# Patient Record
Sex: FEMALE | Race: WHITE | Employment: OTHER | URBAN - NONMETROPOLITAN AREA
[De-identification: names, ages, dates, MRNs, and addresses within clinical notes are randomized per-mention and may not be internally consistent; named-entity substitution may affect disease eponyms.]

---

## 2017-01-07 RX ORDER — METHYLPHENIDATE HYDROCHLORIDE 10 MG/1
10 TABLET ORAL 2 TIMES DAILY
Qty: 60 TABLET | Refills: 0 | Status: SHIPPED | OUTPATIENT
Start: 2017-01-07 | End: 2017-02-09 | Stop reason: SDUPTHER

## 2017-01-07 RX ORDER — HYDROCODONE BITARTRATE AND ACETAMINOPHEN 10; 325 MG/1; MG/1
1 TABLET ORAL EVERY 6 HOURS PRN
Qty: 90 TABLET | Refills: 0 | Status: SHIPPED | OUTPATIENT
Start: 2017-01-07 | End: 2017-02-09 | Stop reason: SDUPTHER

## 2017-02-07 ENCOUNTER — TELEPHONE (OUTPATIENT)
Dept: NEUROLOGY | Age: 47
End: 2017-02-07

## 2017-02-09 RX ORDER — HYDROCODONE BITARTRATE AND ACETAMINOPHEN 10; 325 MG/1; MG/1
1 TABLET ORAL EVERY 6 HOURS PRN
Qty: 90 TABLET | Refills: 0 | Status: SHIPPED | OUTPATIENT
Start: 2017-02-09 | End: 2017-03-01 | Stop reason: SDUPTHER

## 2017-02-09 RX ORDER — METHYLPHENIDATE HYDROCHLORIDE 10 MG/1
10 TABLET ORAL 2 TIMES DAILY
Qty: 60 TABLET | Refills: 0 | Status: SHIPPED | OUTPATIENT
Start: 2017-02-09 | End: 2017-03-01 | Stop reason: SDUPTHER

## 2017-02-23 ENCOUNTER — HOSPITAL ENCOUNTER (OUTPATIENT)
Dept: PAIN MANAGEMENT | Age: 47
Discharge: HOME | End: 2017-02-23
Payer: MEDICARE

## 2017-02-23 DIAGNOSIS — G35 MULTIPLE SCLEROSIS (HCC): ICD-10-CM

## 2017-02-23 PROCEDURE — 62369 ANAL SP INF PMP W/REPRG&FILL: CPT

## 2017-02-23 PROCEDURE — 2500000003 HC RX 250 WO HCPCS

## 2017-03-01 ENCOUNTER — OFFICE VISIT (OUTPATIENT)
Dept: NEUROLOGY | Age: 47
End: 2017-03-01
Payer: MEDICARE

## 2017-03-01 VITALS
WEIGHT: 170 LBS | OXYGEN SATURATION: 99 % | DIASTOLIC BLOOD PRESSURE: 80 MMHG | HEIGHT: 69 IN | HEART RATE: 80 BPM | SYSTOLIC BLOOD PRESSURE: 106 MMHG | BODY MASS INDEX: 25.18 KG/M2

## 2017-03-01 DIAGNOSIS — G35 MULTIPLE SCLEROSIS (HCC): Primary | ICD-10-CM

## 2017-03-01 DIAGNOSIS — M79.622 PAIN IN BOTH UPPER ARMS: ICD-10-CM

## 2017-03-01 DIAGNOSIS — M79.621 PAIN IN BOTH UPPER ARMS: ICD-10-CM

## 2017-03-01 DIAGNOSIS — R20.0 NUMBNESS: ICD-10-CM

## 2017-03-01 DIAGNOSIS — R53.1 WEAKNESS: ICD-10-CM

## 2017-03-01 DIAGNOSIS — M62.838 MUSCLE SPASTICITY: ICD-10-CM

## 2017-03-01 DIAGNOSIS — R53.83 OTHER FATIGUE: ICD-10-CM

## 2017-03-01 PROCEDURE — 99214 OFFICE O/P EST MOD 30 MIN: CPT | Performed by: PSYCHIATRY & NEUROLOGY

## 2017-03-01 PROCEDURE — G8484 FLU IMMUNIZE NO ADMIN: HCPCS | Performed by: PSYCHIATRY & NEUROLOGY

## 2017-03-01 PROCEDURE — 1036F TOBACCO NON-USER: CPT | Performed by: PSYCHIATRY & NEUROLOGY

## 2017-03-01 PROCEDURE — G8419 CALC BMI OUT NRM PARAM NOF/U: HCPCS | Performed by: PSYCHIATRY & NEUROLOGY

## 2017-03-01 PROCEDURE — G8427 DOCREV CUR MEDS BY ELIG CLIN: HCPCS | Performed by: PSYCHIATRY & NEUROLOGY

## 2017-03-01 RX ORDER — PREGABALIN 300 MG/1
300 CAPSULE ORAL 2 TIMES DAILY
Qty: 60 CAPSULE | Refills: 5 | Status: SHIPPED | OUTPATIENT
Start: 2017-03-01 | End: 2017-06-01

## 2017-03-01 RX ORDER — METHYLPHENIDATE HYDROCHLORIDE 10 MG/1
10 TABLET ORAL 2 TIMES DAILY
Qty: 60 TABLET | Refills: 0 | Status: SHIPPED | OUTPATIENT
Start: 2017-03-01 | End: 2017-04-07 | Stop reason: SDUPTHER

## 2017-03-01 RX ORDER — HYDROCODONE BITARTRATE AND ACETAMINOPHEN 10; 325 MG/1; MG/1
1 TABLET ORAL EVERY 6 HOURS PRN
Qty: 90 TABLET | Refills: 0 | Status: SHIPPED | OUTPATIENT
Start: 2017-03-08 | End: 2017-04-07 | Stop reason: SDUPTHER

## 2017-03-01 RX ORDER — ARMODAFINIL 150 MG/1
150 TABLET ORAL DAILY
Qty: 30 TABLET | Refills: 5 | Status: SHIPPED | OUTPATIENT
Start: 2017-03-01 | End: 2017-03-31

## 2017-03-17 RX ORDER — DULOXETIN HYDROCHLORIDE 60 MG/1
CAPSULE, DELAYED RELEASE ORAL
Qty: 60 CAPSULE | Refills: 0 | Status: SHIPPED | OUTPATIENT
Start: 2017-03-17 | End: 2017-06-15 | Stop reason: SDUPTHER

## 2017-04-07 RX ORDER — HYDROCODONE BITARTRATE AND ACETAMINOPHEN 10; 325 MG/1; MG/1
1 TABLET ORAL EVERY 6 HOURS PRN
Qty: 90 TABLET | Refills: 0 | Status: SHIPPED | OUTPATIENT
Start: 2017-04-07 | End: 2017-05-15 | Stop reason: SDUPTHER

## 2017-04-07 RX ORDER — METHYLPHENIDATE HYDROCHLORIDE 10 MG/1
10 TABLET ORAL 2 TIMES DAILY
Qty: 60 TABLET | Refills: 0 | Status: SHIPPED | OUTPATIENT
Start: 2017-04-07 | End: 2017-05-15 | Stop reason: SDUPTHER

## 2017-04-10 ENCOUNTER — HOSPITAL ENCOUNTER (OUTPATIENT)
Dept: WOUND CARE | Age: 47
Discharge: HOME | End: 2017-04-10
Payer: MEDICARE

## 2017-04-10 VITALS
HEART RATE: 76 BPM | DIASTOLIC BLOOD PRESSURE: 63 MMHG | RESPIRATION RATE: 16 BRPM | TEMPERATURE: 98.6 F | HEIGHT: 69 IN | SYSTOLIC BLOOD PRESSURE: 107 MMHG

## 2017-04-10 DIAGNOSIS — L97.521 NEUROPATHIC ULCER OF LEFT FOOT, LIMITED TO BREAKDOWN OF SKIN (HCC): ICD-10-CM

## 2017-04-10 PROCEDURE — 99213 OFFICE O/P EST LOW 20 MIN: CPT

## 2017-04-18 ENCOUNTER — HOSPITAL ENCOUNTER (OUTPATIENT)
Dept: WOUND CARE | Age: 47
Discharge: HOME | End: 2017-04-18
Payer: MEDICARE

## 2017-04-18 VITALS
HEART RATE: 90 BPM | SYSTOLIC BLOOD PRESSURE: 124 MMHG | DIASTOLIC BLOOD PRESSURE: 82 MMHG | HEIGHT: 69 IN | RESPIRATION RATE: 16 BRPM | WEIGHT: 170 LBS | TEMPERATURE: 99.5 F | BODY MASS INDEX: 25.18 KG/M2

## 2017-04-18 DIAGNOSIS — L97.521 NEUROPATHIC ULCER OF LEFT FOOT, LIMITED TO BREAKDOWN OF SKIN (HCC): ICD-10-CM

## 2017-04-18 PROCEDURE — 97597 DBRDMT OPN WND 1ST 20 CM/<: CPT | Performed by: SURGERY

## 2017-04-18 PROCEDURE — 97597 DBRDMT OPN WND 1ST 20 CM/<: CPT

## 2017-05-02 ENCOUNTER — HOSPITAL ENCOUNTER (OUTPATIENT)
Dept: WOUND CARE | Age: 47
Discharge: HOME | End: 2017-05-02
Payer: MEDICARE

## 2017-05-02 VITALS
HEIGHT: 69 IN | HEART RATE: 75 BPM | DIASTOLIC BLOOD PRESSURE: 78 MMHG | RESPIRATION RATE: 16 BRPM | SYSTOLIC BLOOD PRESSURE: 118 MMHG | WEIGHT: 170 LBS | BODY MASS INDEX: 25.18 KG/M2 | TEMPERATURE: 97.6 F

## 2017-05-02 DIAGNOSIS — L97.521 NEUROPATHIC ULCER OF LEFT FOOT, LIMITED TO BREAKDOWN OF SKIN (HCC): ICD-10-CM

## 2017-05-02 PROCEDURE — 97597 DBRDMT OPN WND 1ST 20 CM/<: CPT

## 2017-05-02 PROCEDURE — 97597 DBRDMT OPN WND 1ST 20 CM/<: CPT | Performed by: SURGERY

## 2017-05-09 RX ORDER — BACLOFEN 10 MG/1
10 TABLET ORAL 2 TIMES DAILY PRN
Qty: 60 TABLET | Refills: 2 | Status: SHIPPED | OUTPATIENT
Start: 2017-05-09 | End: 2018-03-26 | Stop reason: SDUPTHER

## 2017-05-16 ENCOUNTER — HOSPITAL ENCOUNTER (OUTPATIENT)
Dept: WOUND CARE | Age: 47
Discharge: HOME | End: 2017-05-16
Payer: MEDICARE

## 2017-05-16 VITALS
SYSTOLIC BLOOD PRESSURE: 127 MMHG | HEART RATE: 75 BPM | TEMPERATURE: 97.6 F | DIASTOLIC BLOOD PRESSURE: 85 MMHG | RESPIRATION RATE: 14 BRPM

## 2017-05-16 DIAGNOSIS — L97.521 NEUROPATHIC ULCER OF LEFT FOOT, LIMITED TO BREAKDOWN OF SKIN (HCC): ICD-10-CM

## 2017-05-16 PROCEDURE — 99212 OFFICE O/P EST SF 10 MIN: CPT | Performed by: SURGERY

## 2017-05-16 PROCEDURE — 99212 OFFICE O/P EST SF 10 MIN: CPT

## 2017-05-16 RX ORDER — METHYLPHENIDATE HYDROCHLORIDE 10 MG/1
10 TABLET ORAL 2 TIMES DAILY
Qty: 60 TABLET | Refills: 0 | Status: SHIPPED | OUTPATIENT
Start: 2017-05-16 | End: 2017-06-01 | Stop reason: SDUPTHER

## 2017-05-16 RX ORDER — HYDROCODONE BITARTRATE AND ACETAMINOPHEN 10; 325 MG/1; MG/1
1 TABLET ORAL EVERY 6 HOURS PRN
Qty: 90 TABLET | Refills: 0 | Status: SHIPPED | OUTPATIENT
Start: 2017-05-16 | End: 2017-06-01 | Stop reason: SDUPTHER

## 2017-05-16 RX ORDER — CYANOCOBALAMIN 1000 UG/ML
1000 INJECTION INTRAMUSCULAR; SUBCUTANEOUS
COMMUNITY
End: 2017-09-06

## 2017-05-16 ASSESSMENT — PAIN DESCRIPTION - LOCATION: LOCATION: BACK

## 2017-05-16 ASSESSMENT — PAIN DESCRIPTION - FREQUENCY: FREQUENCY: CONTINUOUS

## 2017-05-16 ASSESSMENT — PAIN DESCRIPTION - DESCRIPTORS: DESCRIPTORS: OTHER (COMMENT)

## 2017-05-16 ASSESSMENT — PAIN DESCRIPTION - PROGRESSION: CLINICAL_PROGRESSION: NOT CHANGED

## 2017-05-16 ASSESSMENT — PAIN DESCRIPTION - PAIN TYPE: TYPE: CHRONIC PAIN

## 2017-05-16 ASSESSMENT — PAIN SCALES - GENERAL: PAINLEVEL_OUTOF10: 4

## 2017-05-23 ENCOUNTER — HOSPITAL ENCOUNTER (EMERGENCY)
Age: 47
Discharge: HOME | End: 2017-05-23
Payer: MEDICARE

## 2017-05-23 ENCOUNTER — APPOINTMENT (OUTPATIENT)
Dept: GENERAL RADIOLOGY | Age: 47
End: 2017-05-23
Payer: MEDICARE

## 2017-05-23 VITALS
OXYGEN SATURATION: 96 % | BODY MASS INDEX: 25.18 KG/M2 | DIASTOLIC BLOOD PRESSURE: 62 MMHG | WEIGHT: 170 LBS | HEART RATE: 78 BPM | RESPIRATION RATE: 16 BRPM | HEIGHT: 69 IN | SYSTOLIC BLOOD PRESSURE: 124 MMHG | TEMPERATURE: 97.7 F

## 2017-05-23 DIAGNOSIS — N30.00 ACUTE CYSTITIS WITHOUT HEMATURIA: Primary | ICD-10-CM

## 2017-05-23 LAB
ALBUMIN SERPL-MCNC: 4.4 G/DL (ref 3.5–5.2)
ALP BLD-CCNC: 164 U/L (ref 35–104)
ALT SERPL-CCNC: 57 U/L (ref 5–33)
ANION GAP SERPL CALCULATED.3IONS-SCNC: 15 MMOL/L (ref 7–19)
AST SERPL-CCNC: 23 U/L (ref 5–32)
BACTERIA: ABNORMAL /HPF
BASOPHILS ABSOLUTE: 0 K/UL (ref 0–0.2)
BASOPHILS RELATIVE PERCENT: 0.2 % (ref 0–1)
BILIRUB SERPL-MCNC: 0.5 MG/DL (ref 0.2–1.2)
BILIRUBIN URINE: NEGATIVE
BLOOD, URINE: NEGATIVE
BUN BLDV-MCNC: 12 MG/DL (ref 6–20)
CALCIUM SERPL-MCNC: 9.4 MG/DL (ref 8.6–10)
CHLORIDE BLD-SCNC: 107 MMOL/L (ref 98–111)
CLARITY: ABNORMAL
CO2: 23 MMOL/L (ref 22–29)
COLOR: YELLOW
CREAT SERPL-MCNC: 0.3 MG/DL (ref 0.5–0.9)
EOSINOPHILS ABSOLUTE: 0 K/UL (ref 0–0.6)
EOSINOPHILS RELATIVE PERCENT: 0.4 % (ref 0–5)
EPITHELIAL CELLS, UA: ABNORMAL /HPF
GFR NON-AFRICAN AMERICAN: >60
GLUCOSE BLD-MCNC: 101 MG/DL (ref 74–109)
GLUCOSE URINE: NEGATIVE MG/DL
HCT VFR BLD CALC: 43 % (ref 37–47)
HEMOGLOBIN: 14.1 G/DL (ref 12–16)
KETONES, URINE: NEGATIVE MG/DL
LEUKOCYTE ESTERASE, URINE: ABNORMAL
LIPASE: 8 U/L (ref 13–60)
LYMPHOCYTES ABSOLUTE: 0.3 K/UL (ref 1.1–4.5)
LYMPHOCYTES RELATIVE PERCENT: 5.4 % (ref 20–40)
MCH RBC QN AUTO: 29.1 PG (ref 27–31)
MCHC RBC AUTO-ENTMCNC: 32.8 G/DL (ref 33–37)
MCV RBC AUTO: 88.7 FL (ref 81–99)
MONOCYTES ABSOLUTE: 0.2 K/UL (ref 0–0.9)
MONOCYTES RELATIVE PERCENT: 4 % (ref 0–10)
NEUTROPHILS ABSOLUTE: 4.5 K/UL (ref 1.5–7.5)
NEUTROPHILS RELATIVE PERCENT: 89.6 % (ref 50–65)
NITRITE, URINE: POSITIVE
PDW BLD-RTO: 13.3 % (ref 11.5–14.5)
PH UA: 7.5
PLATELET # BLD: 196 K/UL (ref 130–400)
PMV BLD AUTO: 11 FL (ref 7.4–10.4)
POTASSIUM SERPL-SCNC: 3.7 MMOL/L (ref 3.5–5)
PROTEIN UA: 30 MG/DL
RBC # BLD: 4.85 M/UL (ref 4.2–5.4)
RBC UA: ABNORMAL /HPF (ref 0–2)
SODIUM BLD-SCNC: 145 MMOL/L (ref 136–145)
SPECIFIC GRAVITY UA: 1.01
TOTAL PROTEIN: 7.1 G/DL (ref 6.6–8.7)
TROPONIN: <0.01 NG/ML (ref 0–0.03)
UROBILINOGEN, URINE: 0.2 E.U./DL
WBC # BLD: 5 K/UL (ref 4.8–10.8)
WBC UA: ABNORMAL /HPF (ref 0–5)

## 2017-05-23 PROCEDURE — 81001 URINALYSIS AUTO W/SCOPE: CPT

## 2017-05-23 PROCEDURE — 99283 EMERGENCY DEPT VISIT LOW MDM: CPT | Performed by: PHYSICIAN ASSISTANT

## 2017-05-23 PROCEDURE — 87185 SC STD ENZYME DETCJ PER NZM: CPT

## 2017-05-23 PROCEDURE — 85025 COMPLETE CBC W/AUTO DIFF WBC: CPT

## 2017-05-23 PROCEDURE — 87077 CULTURE AEROBIC IDENTIFY: CPT

## 2017-05-23 PROCEDURE — 93005 ELECTROCARDIOGRAM TRACING: CPT

## 2017-05-23 PROCEDURE — 87086 URINE CULTURE/COLONY COUNT: CPT

## 2017-05-23 PROCEDURE — 99284 EMERGENCY DEPT VISIT MOD MDM: CPT

## 2017-05-23 PROCEDURE — 80053 COMPREHEN METABOLIC PANEL: CPT

## 2017-05-23 PROCEDURE — 96365 THER/PROPH/DIAG IV INF INIT: CPT

## 2017-05-23 PROCEDURE — 36415 COLL VENOUS BLD VENIPUNCTURE: CPT

## 2017-05-23 PROCEDURE — 83690 ASSAY OF LIPASE: CPT

## 2017-05-23 PROCEDURE — 71010 XR CHEST PORTABLE: CPT

## 2017-05-23 PROCEDURE — 6360000002 HC RX W HCPCS: Performed by: PHYSICIAN ASSISTANT

## 2017-05-23 PROCEDURE — 2580000003 HC RX 258: Performed by: PHYSICIAN ASSISTANT

## 2017-05-23 PROCEDURE — 84484 ASSAY OF TROPONIN QUANT: CPT

## 2017-05-23 PROCEDURE — 96375 TX/PRO/DX INJ NEW DRUG ADDON: CPT

## 2017-05-23 RX ORDER — ONDANSETRON 4 MG/1
4 TABLET, ORALLY DISINTEGRATING ORAL EVERY 8 HOURS PRN
Qty: 15 TABLET | Refills: 0 | Status: SHIPPED | OUTPATIENT
Start: 2017-05-23 | End: 2018-03-26 | Stop reason: SDUPTHER

## 2017-05-23 RX ORDER — PROMETHAZINE HYDROCHLORIDE 25 MG/ML
12.5 INJECTION, SOLUTION INTRAMUSCULAR; INTRAVENOUS ONCE
Status: COMPLETED | OUTPATIENT
Start: 2017-05-23 | End: 2017-05-23

## 2017-05-23 RX ORDER — CEFDINIR 300 MG/1
300 CAPSULE ORAL 2 TIMES DAILY
Qty: 20 CAPSULE | Refills: 0 | Status: SHIPPED | OUTPATIENT
Start: 2017-05-23 | End: 2017-06-02

## 2017-05-23 RX ORDER — 0.9 % SODIUM CHLORIDE 0.9 %
500 INTRAVENOUS SOLUTION INTRAVENOUS ONCE
Status: COMPLETED | OUTPATIENT
Start: 2017-05-23 | End: 2017-05-23

## 2017-05-23 RX ADMIN — CEFTRIAXONE 1 G: 1 INJECTION, POWDER, FOR SOLUTION INTRAMUSCULAR; INTRAVENOUS at 19:39

## 2017-05-23 RX ADMIN — SODIUM CHLORIDE 500 ML: 9 INJECTION, SOLUTION INTRAVENOUS at 18:09

## 2017-05-23 RX ADMIN — PROMETHAZINE HYDROCHLORIDE 12.5 MG: 25 INJECTION, SOLUTION INTRAMUSCULAR; INTRAVENOUS at 18:50

## 2017-05-23 ASSESSMENT — ENCOUNTER SYMPTOMS
COUGH: 0
ABDOMINAL PAIN: 1
DIARRHEA: 0
BACK PAIN: 0
SHORTNESS OF BREATH: 0
WHEEZING: 0
NAUSEA: 1
CONSTIPATION: 0
VOMITING: 1

## 2017-05-23 ASSESSMENT — PAIN SCALES - GENERAL: PAINLEVEL_OUTOF10: 5

## 2017-05-24 ENCOUNTER — HOSPITAL ENCOUNTER (OUTPATIENT)
Dept: PAIN MANAGEMENT | Age: 47
Discharge: HOME | End: 2017-05-24
Payer: MEDICARE

## 2017-05-24 DIAGNOSIS — G35 MULTIPLE SCLEROSIS (HCC): ICD-10-CM

## 2017-05-24 PROCEDURE — 2500000003 HC RX 250 WO HCPCS

## 2017-05-24 PROCEDURE — 62369 ANAL SP INF PMP W/REPRG&FILL: CPT

## 2017-05-25 ENCOUNTER — TRANSCRIBE ORDERS (OUTPATIENT)
Dept: ADMINISTRATIVE | Facility: HOSPITAL | Age: 47
End: 2017-05-25

## 2017-05-25 DIAGNOSIS — Z12.31 VISIT FOR SCREENING MAMMOGRAM: Primary | ICD-10-CM

## 2017-05-25 LAB
EKG P AXIS: 84 DEGREES
EKG P-R INTERVAL: 176 MS
EKG Q-T INTERVAL: 436 MS
EKG QRS DURATION: 110 MS
EKG QTC CALCULATION (BAZETT): 455 MS
EKG T AXIS: 63 DEGREES

## 2017-05-26 LAB
EKG P AXIS: 76 DEGREES
EKG P-R INTERVAL: 158 MS
EKG Q-T INTERVAL: 452 MS
EKG QRS DURATION: 108 MS
EKG QTC CALCULATION (BAZETT): 474 MS
EKG T AXIS: 38 DEGREES
ORGANISM: ABNORMAL
URINE CULTURE, ROUTINE: ABNORMAL

## 2017-06-01 ENCOUNTER — OFFICE VISIT (OUTPATIENT)
Dept: NEUROLOGY | Age: 47
End: 2017-06-01
Payer: MEDICARE

## 2017-06-01 VITALS — HEART RATE: 95 BPM | OXYGEN SATURATION: 95 % | SYSTOLIC BLOOD PRESSURE: 133 MMHG | DIASTOLIC BLOOD PRESSURE: 78 MMHG

## 2017-06-01 DIAGNOSIS — R53.1 WEAKNESS: ICD-10-CM

## 2017-06-01 DIAGNOSIS — G35 MULTIPLE SCLEROSIS (HCC): Primary | ICD-10-CM

## 2017-06-01 DIAGNOSIS — M79.621 PAIN IN BOTH UPPER ARMS: ICD-10-CM

## 2017-06-01 DIAGNOSIS — M79.622 PAIN IN BOTH UPPER ARMS: ICD-10-CM

## 2017-06-01 DIAGNOSIS — R53.83 OTHER FATIGUE: ICD-10-CM

## 2017-06-01 DIAGNOSIS — K94.13 COLOSTOMY AND ENTEROSTOMY MALFUNCTION (HCC): ICD-10-CM

## 2017-06-01 DIAGNOSIS — M62.838 MUSCLE SPASTICITY: ICD-10-CM

## 2017-06-01 DIAGNOSIS — G35 MULTIPLE SCLEROSIS (HCC): ICD-10-CM

## 2017-06-01 DIAGNOSIS — K94.03 COLOSTOMY AND ENTEROSTOMY MALFUNCTION (HCC): ICD-10-CM

## 2017-06-01 DIAGNOSIS — R20.0 NUMBNESS: ICD-10-CM

## 2017-06-01 PROCEDURE — G8419 CALC BMI OUT NRM PARAM NOF/U: HCPCS | Performed by: PSYCHIATRY & NEUROLOGY

## 2017-06-01 PROCEDURE — 4004F PT TOBACCO SCREEN RCVD TLK: CPT | Performed by: PSYCHIATRY & NEUROLOGY

## 2017-06-01 PROCEDURE — 99214 OFFICE O/P EST MOD 30 MIN: CPT | Performed by: PSYCHIATRY & NEUROLOGY

## 2017-06-01 PROCEDURE — G8427 DOCREV CUR MEDS BY ELIG CLIN: HCPCS | Performed by: PSYCHIATRY & NEUROLOGY

## 2017-06-01 RX ORDER — METHYLPHENIDATE HYDROCHLORIDE 10 MG/1
10 TABLET ORAL 2 TIMES DAILY
Qty: 60 TABLET | Refills: 0 | Status: SHIPPED | OUTPATIENT
Start: 2017-06-15 | End: 2017-07-14 | Stop reason: SDUPTHER

## 2017-06-01 RX ORDER — PREGABALIN 300 MG/1
300 CAPSULE ORAL 2 TIMES DAILY
Qty: 60 CAPSULE | Refills: 5 | Status: SHIPPED | OUTPATIENT
Start: 2017-06-01 | End: 2017-12-04 | Stop reason: SDUPTHER

## 2017-06-01 RX ORDER — PREGABALIN 100 MG/1
300 CAPSULE ORAL 2 TIMES DAILY
COMMUNITY
End: 2017-06-01 | Stop reason: SDUPTHER

## 2017-06-01 RX ORDER — HYDROCODONE BITARTRATE AND ACETAMINOPHEN 10; 325 MG/1; MG/1
1 TABLET ORAL EVERY 6 HOURS PRN
Qty: 90 TABLET | Refills: 0 | Status: SHIPPED | OUTPATIENT
Start: 2017-06-15 | End: 2017-07-14 | Stop reason: SDUPTHER

## 2017-06-02 LAB — HBV SURFACE AB TITR SER: NORMAL MIU/ML

## 2017-06-03 LAB
HEPATITIS B CORE TOTAL ANTIBODY: NEGATIVE
HEPATITIS BE ANTIGEN: NEGATIVE

## 2017-06-05 ENCOUNTER — HOSPITAL ENCOUNTER (OUTPATIENT)
Dept: MAMMOGRAPHY | Facility: HOSPITAL | Age: 47
Discharge: HOME | End: 2017-06-05
Attending: INTERNAL MEDICINE | Admitting: INTERNAL MEDICINE

## 2017-06-05 DIAGNOSIS — Z12.31 VISIT FOR SCREENING MAMMOGRAM: ICD-10-CM

## 2017-06-05 PROCEDURE — 77063 BREAST TOMOSYNTHESIS BI: CPT

## 2017-06-05 PROCEDURE — G0202 SCR MAMMO BI INCL CAD: HCPCS

## 2017-06-15 RX ORDER — ALBUTEROL SULFATE 90 UG/1
POWDER, METERED RESPIRATORY (INHALATION)
Qty: 1 INHALER | Refills: 3 | Status: SHIPPED | OUTPATIENT
Start: 2017-06-15 | End: 2018-03-26 | Stop reason: SDUPTHER

## 2017-06-19 ENCOUNTER — TELEPHONE (OUTPATIENT)
Dept: NEUROLOGY | Age: 47
End: 2017-06-19

## 2017-06-28 ENCOUNTER — OFFICE VISIT (OUTPATIENT)
Dept: INTERNAL MEDICINE | Age: 47
End: 2017-06-28
Payer: MEDICARE

## 2017-06-28 VITALS
HEIGHT: 69 IN | DIASTOLIC BLOOD PRESSURE: 70 MMHG | TEMPERATURE: 98.6 F | HEART RATE: 74 BPM | OXYGEN SATURATION: 99 % | SYSTOLIC BLOOD PRESSURE: 124 MMHG

## 2017-06-28 DIAGNOSIS — Z13.1 ENCOUNTER FOR SCREENING FOR DIABETES MELLITUS: ICD-10-CM

## 2017-06-28 DIAGNOSIS — N30.00 ACUTE CYSTITIS WITHOUT HEMATURIA: ICD-10-CM

## 2017-06-28 DIAGNOSIS — F17.210 CIGARETTE NICOTINE DEPENDENCE WITHOUT COMPLICATION: ICD-10-CM

## 2017-06-28 DIAGNOSIS — Z23 NEED FOR PROPHYLACTIC VACCINATION AGAINST DIPHTHERIA-TETANUS-PERTUSSIS (DTP): ICD-10-CM

## 2017-06-28 DIAGNOSIS — B37.9 YEAST INFECTION: Primary | ICD-10-CM

## 2017-06-28 PROBLEM — N63.0 BREAST LUMP: Status: ACTIVE | Noted: 2017-06-28

## 2017-06-28 PROBLEM — R05.9 COUGH: Status: ACTIVE | Noted: 2017-06-28

## 2017-06-28 PROBLEM — E53.8 COBALAMIN DEFICIENCY: Status: ACTIVE | Noted: 2017-06-28

## 2017-06-28 PROBLEM — R51.9 HEADACHE: Status: ACTIVE | Noted: 2017-06-28

## 2017-06-28 PROBLEM — J01.90 ACUTE SINUSITIS: Status: ACTIVE | Noted: 2017-06-28

## 2017-06-28 PROBLEM — B49 DISEASE CAUSED BY FUNGUS: Status: ACTIVE | Noted: 2017-06-28

## 2017-06-28 PROBLEM — E78.5 HYPERLIPIDEMIA: Status: ACTIVE | Noted: 2017-06-28

## 2017-06-28 PROBLEM — S91.009A OPEN WOUND OF ANKLE: Status: ACTIVE | Noted: 2017-06-28

## 2017-06-28 PROBLEM — E66.3 OVERWEIGHT: Status: ACTIVE | Noted: 2017-06-28

## 2017-06-28 PROBLEM — J44.9 CAFL (CHRONIC AIRFLOW LIMITATION) (HCC): Status: ACTIVE | Noted: 2017-06-28

## 2017-06-28 PROBLEM — J11.1 INFLUENZA: Status: ACTIVE | Noted: 2017-06-28

## 2017-06-28 PROBLEM — L89.159 DECUBITUS ULCER OF SACRAL REGION: Status: ACTIVE | Noted: 2017-06-28

## 2017-06-28 PROBLEM — E55.9 VITAMIN D DEFICIENCY: Status: ACTIVE | Noted: 2017-06-28

## 2017-06-28 PROBLEM — Z13.89 ENCOUNTER FOR SCREENING FOR OTHER DISORDER: Status: ACTIVE | Noted: 2017-06-28

## 2017-06-28 PROBLEM — F17.200 CURRENT SMOKER: Status: ACTIVE | Noted: 2017-06-28

## 2017-06-28 PROBLEM — R30.0 DIFFICULT OR PAINFUL URINATION: Status: ACTIVE | Noted: 2017-06-28

## 2017-06-28 PROBLEM — G47.00 INSOMNIA: Status: ACTIVE | Noted: 2017-06-28

## 2017-06-28 PROBLEM — J30.1 HAY FEVER: Status: ACTIVE | Noted: 2017-06-28

## 2017-06-28 PROBLEM — K11.7 APTYALISM: Status: ACTIVE | Noted: 2017-06-28

## 2017-06-28 PROBLEM — J20.9 ACUTE BRONCHITIS: Status: ACTIVE | Noted: 2017-06-28

## 2017-06-28 PROBLEM — H53.2 BINOCULAR VISION DISORDER WITH DIPLOPIA: Status: ACTIVE | Noted: 2017-06-28

## 2017-06-28 PROBLEM — M54.9 BACK PAIN: Status: ACTIVE | Noted: 2017-06-28

## 2017-06-28 PROCEDURE — 99214 OFFICE O/P EST MOD 30 MIN: CPT | Performed by: PHYSICIAN ASSISTANT

## 2017-06-28 PROCEDURE — 4004F PT TOBACCO SCREEN RCVD TLK: CPT | Performed by: PHYSICIAN ASSISTANT

## 2017-06-28 PROCEDURE — G8427 DOCREV CUR MEDS BY ELIG CLIN: HCPCS | Performed by: PHYSICIAN ASSISTANT

## 2017-06-28 PROCEDURE — G8419 CALC BMI OUT NRM PARAM NOF/U: HCPCS | Performed by: PHYSICIAN ASSISTANT

## 2017-06-28 RX ORDER — FLUCONAZOLE 150 MG/1
150 TABLET ORAL ONCE
Qty: 1 TABLET | Refills: 1 | Status: SHIPPED | OUTPATIENT
Start: 2017-06-28 | End: 2017-06-28

## 2017-06-28 RX ORDER — VARENICLINE TARTRATE 25 MG
KIT ORAL
Qty: 53 TABLET | Refills: 0 | Status: SHIPPED | OUTPATIENT
Start: 2017-06-28 | End: 2017-09-05 | Stop reason: SDUPTHER

## 2017-06-28 RX ORDER — CEFDINIR 300 MG/1
300 CAPSULE ORAL 2 TIMES DAILY
Qty: 20 CAPSULE | Refills: 0 | Status: SHIPPED | OUTPATIENT
Start: 2017-06-28 | End: 2017-07-08

## 2017-06-28 RX ORDER — FLUCONAZOLE 100 MG/1
100 TABLET ORAL DAILY PRN
Status: CANCELLED | OUTPATIENT
Start: 2017-06-28

## 2017-06-28 ASSESSMENT — ENCOUNTER SYMPTOMS
ABDOMINAL PAIN: 0
DIARRHEA: 0
RHINORRHEA: 0
COLOR CHANGE: 0
PHOTOPHOBIA: 0
CHEST TIGHTNESS: 0
EYE PAIN: 0
SINUS PRESSURE: 0
SORE THROAT: 0
VOMITING: 0
SHORTNESS OF BREATH: 0
COUGH: 0
EYE REDNESS: 0
NAUSEA: 0

## 2017-06-28 ASSESSMENT — PATIENT HEALTH QUESTIONNAIRE - PHQ9
2. FEELING DOWN, DEPRESSED OR HOPELESS: 0
SUM OF ALL RESPONSES TO PHQ9 QUESTIONS 1 & 2: 0
1. LITTLE INTEREST OR PLEASURE IN DOING THINGS: 0
SUM OF ALL RESPONSES TO PHQ QUESTIONS 1-9: 0

## 2017-06-29 ENCOUNTER — TELEPHONE (OUTPATIENT)
Dept: NEUROLOGY | Age: 47
End: 2017-06-29

## 2017-07-13 ENCOUNTER — TELEPHONE (OUTPATIENT)
Dept: INTERNAL MEDICINE | Age: 47
End: 2017-07-13

## 2017-07-13 DIAGNOSIS — N39.0 URINARY TRACT INFECTION WITHOUT HEMATURIA, SITE UNSPECIFIED: ICD-10-CM

## 2017-07-13 DIAGNOSIS — N39.0 URINARY TRACT INFECTION WITHOUT HEMATURIA, SITE UNSPECIFIED: Primary | ICD-10-CM

## 2017-07-13 DIAGNOSIS — Z13.1 ENCOUNTER FOR SCREENING FOR DIABETES MELLITUS: ICD-10-CM

## 2017-07-13 LAB
BACTERIA: ABNORMAL /HPF
BILIRUBIN URINE: NEGATIVE
BLOOD, URINE: ABNORMAL
CASTS: ABNORMAL /LPF
CHOLESTEROL, FASTING: 192 MG/DL (ref 160–199)
CLARITY: ABNORMAL
COLOR: ABNORMAL
EPITHELIAL CELLS, UA: 1 /HPF (ref 0–5)
GLUCOSE FASTING: 82 MG/DL (ref 74–109)
GLUCOSE URINE: NEGATIVE MG/DL
HDLC SERPL-MCNC: 33 MG/DL (ref 65–121)
HYALINE CASTS: >87 /HPF (ref 0–8)
KETONES, URINE: NEGATIVE MG/DL
LDL CHOLESTEROL CALCULATED: 107 MG/DL
LEUKOCYTE ESTERASE, URINE: ABNORMAL
MUCUS: ABNORMAL /LPF
NITRITE, URINE: POSITIVE
PH UA: 7
PROTEIN UA: 30 MG/DL
RBC UA: 2 /HPF (ref 0–4)
RBC UA: ABNORMAL /HPF (ref 0–2)
SPECIFIC GRAVITY UA: 1.01
TRIGLYCERIDE, FASTING: 260 MG/DL (ref 150–199)
UROBILINOGEN, URINE: 0.2 E.U./DL
WBC UA: 83 /HPF (ref 0–5)
WBC UA: ABNORMAL /HPF (ref 0–5)

## 2017-07-14 RX ORDER — LEVOCETIRIZINE DIHYDROCHLORIDE 5 MG/1
TABLET, FILM COATED ORAL
Qty: 30 TABLET | Refills: 0 | Status: SHIPPED | OUTPATIENT
Start: 2017-07-14 | End: 2017-08-22 | Stop reason: SDUPTHER

## 2017-07-14 RX ORDER — METHYLPHENIDATE HYDROCHLORIDE 10 MG/1
10 TABLET ORAL 2 TIMES DAILY
Qty: 60 TABLET | Refills: 0 | Status: SHIPPED | OUTPATIENT
Start: 2017-07-14 | End: 2017-08-31 | Stop reason: CLARIF

## 2017-07-14 RX ORDER — LEVOFLOXACIN 500 MG/1
500 TABLET, FILM COATED ORAL DAILY
Qty: 7 TABLET | Refills: 0 | Status: SHIPPED | OUTPATIENT
Start: 2017-07-14 | End: 2017-09-06

## 2017-07-14 RX ORDER — HYDROCODONE BITARTRATE AND ACETAMINOPHEN 10; 325 MG/1; MG/1
1 TABLET ORAL EVERY 6 HOURS PRN
Qty: 90 TABLET | Refills: 0 | Status: SHIPPED | OUTPATIENT
Start: 2017-07-14 | End: 2017-08-15 | Stop reason: SDUPTHER

## 2017-07-17 LAB
ORGANISM: ABNORMAL
URINE CULTURE, ROUTINE: ABNORMAL

## 2017-07-17 RX ORDER — CEFDINIR 300 MG/1
300 CAPSULE ORAL 2 TIMES DAILY
Qty: 14 CAPSULE | Refills: 0 | Status: SHIPPED | OUTPATIENT
Start: 2017-07-17 | End: 2017-09-06

## 2017-07-18 ENCOUNTER — TELEPHONE (OUTPATIENT)
Dept: NEUROLOGY | Age: 47
End: 2017-07-18

## 2017-07-25 ENCOUNTER — OFFICE VISIT (OUTPATIENT)
Dept: NEUROLOGY | Age: 47
End: 2017-07-25
Payer: MEDICARE

## 2017-07-25 VITALS
BODY MASS INDEX: 25.18 KG/M2 | WEIGHT: 170 LBS | HEIGHT: 69 IN | SYSTOLIC BLOOD PRESSURE: 98 MMHG | DIASTOLIC BLOOD PRESSURE: 60 MMHG

## 2017-07-25 DIAGNOSIS — M62.838 MUSCLE SPASTICITY: ICD-10-CM

## 2017-07-25 DIAGNOSIS — R53.83 OTHER FATIGUE: ICD-10-CM

## 2017-07-25 DIAGNOSIS — M79.621 PAIN IN BOTH UPPER ARMS: ICD-10-CM

## 2017-07-25 DIAGNOSIS — M54.2 PAIN, NECK: ICD-10-CM

## 2017-07-25 DIAGNOSIS — R20.0 NUMBNESS: ICD-10-CM

## 2017-07-25 DIAGNOSIS — R53.1 WEAKNESS: ICD-10-CM

## 2017-07-25 DIAGNOSIS — M79.622 PAIN IN BOTH UPPER ARMS: ICD-10-CM

## 2017-07-25 DIAGNOSIS — G35 MULTIPLE SCLEROSIS (HCC): Primary | ICD-10-CM

## 2017-07-25 PROCEDURE — 99214 OFFICE O/P EST MOD 30 MIN: CPT | Performed by: PSYCHIATRY & NEUROLOGY

## 2017-07-25 PROCEDURE — 4004F PT TOBACCO SCREEN RCVD TLK: CPT | Performed by: PSYCHIATRY & NEUROLOGY

## 2017-07-25 PROCEDURE — G8427 DOCREV CUR MEDS BY ELIG CLIN: HCPCS | Performed by: PSYCHIATRY & NEUROLOGY

## 2017-07-25 PROCEDURE — G8419 CALC BMI OUT NRM PARAM NOF/U: HCPCS | Performed by: PSYCHIATRY & NEUROLOGY

## 2017-07-25 RX ORDER — LORAZEPAM 2 MG/1
2 TABLET ORAL ONCE
Qty: 3 TABLET | Refills: 0 | Status: SHIPPED | OUTPATIENT
Start: 2017-07-25 | End: 2017-07-25

## 2017-08-14 ENCOUNTER — HOSPITAL ENCOUNTER (OUTPATIENT)
Dept: NEUROLOGY | Age: 47
Discharge: HOME | End: 2017-08-14
Payer: MEDICARE

## 2017-08-14 ENCOUNTER — HOSPITAL ENCOUNTER (OUTPATIENT)
Dept: MRI IMAGING | Age: 47
Discharge: HOME | End: 2017-08-14
Payer: MEDICARE

## 2017-08-14 ENCOUNTER — HOSPITAL ENCOUNTER (OUTPATIENT)
Dept: PAIN MANAGEMENT | Age: 47
Discharge: HOME | End: 2017-08-14
Payer: MEDICARE

## 2017-08-14 ENCOUNTER — HOSPITAL ENCOUNTER (OUTPATIENT)
Dept: GENERAL RADIOLOGY | Age: 47
Discharge: HOME | End: 2017-08-14
Payer: MEDICARE

## 2017-08-14 VITALS
OXYGEN SATURATION: 100 % | DIASTOLIC BLOOD PRESSURE: 77 MMHG | HEART RATE: 70 BPM | SYSTOLIC BLOOD PRESSURE: 109 MMHG | TEMPERATURE: 97.4 F | RESPIRATION RATE: 18 BRPM

## 2017-08-14 DIAGNOSIS — R20.0 NUMBNESS: ICD-10-CM

## 2017-08-14 DIAGNOSIS — G35 MULTIPLE SCLEROSIS (HCC): ICD-10-CM

## 2017-08-14 DIAGNOSIS — M54.2 PAIN, NECK: ICD-10-CM

## 2017-08-14 PROCEDURE — 70553 MRI BRAIN STEM W/O & W/DYE: CPT

## 2017-08-14 PROCEDURE — A9579 GAD-BASE MR CONTRAST NOS,1ML: HCPCS | Performed by: PSYCHIATRY & NEUROLOGY

## 2017-08-14 PROCEDURE — 6360000004 HC RX CONTRAST MEDICATION: Performed by: PSYCHIATRY & NEUROLOGY

## 2017-08-14 PROCEDURE — 95886 MUSC TEST DONE W/N TEST COMP: CPT

## 2017-08-14 PROCEDURE — 95886 MUSC TEST DONE W/N TEST COMP: CPT | Performed by: PSYCHIATRY & NEUROLOGY

## 2017-08-14 PROCEDURE — 95911 NRV CNDJ TEST 9-10 STUDIES: CPT | Performed by: PSYCHIATRY & NEUROLOGY

## 2017-08-14 PROCEDURE — 62367 ANALYZE SPINE INFUS PUMP: CPT

## 2017-08-14 PROCEDURE — 72156 MRI NECK SPINE W/O & W/DYE: CPT

## 2017-08-14 PROCEDURE — 73521 X-RAY EXAM HIPS BI 2 VIEWS: CPT

## 2017-08-14 PROCEDURE — 95911 NRV CNDJ TEST 9-10 STUDIES: CPT

## 2017-08-14 RX ADMIN — GADOPENTETATE DIMEGLUMINE 16 ML: 469.01 INJECTION INTRAVENOUS at 16:52

## 2017-08-14 ASSESSMENT — PAIN SCALES - GENERAL: PAINLEVEL_OUTOF10: 6

## 2017-08-15 RX ORDER — HYDROCODONE BITARTRATE AND ACETAMINOPHEN 10; 325 MG/1; MG/1
1 TABLET ORAL EVERY 6 HOURS PRN
Qty: 90 TABLET | Refills: 0 | Status: SHIPPED | OUTPATIENT
Start: 2017-08-15 | End: 2017-09-06 | Stop reason: SDUPTHER

## 2017-08-15 RX ORDER — DEXTROAMPHETAMINE SACCHARATE, AMPHETAMINE ASPARTATE, DEXTROAMPHETAMINE SULFATE AND AMPHETAMINE SULFATE 5; 5; 5; 5 MG/1; MG/1; MG/1; MG/1
20 TABLET ORAL DAILY
Qty: 30 TABLET | Refills: 0 | Status: SHIPPED | OUTPATIENT
Start: 2017-08-15 | End: 2018-01-10 | Stop reason: SDUPTHER

## 2017-08-16 ENCOUNTER — TELEPHONE (OUTPATIENT)
Dept: NEUROLOGY | Age: 47
End: 2017-08-16

## 2017-08-18 ENCOUNTER — TELEPHONE (OUTPATIENT)
Dept: NEUROLOGY | Age: 47
End: 2017-08-18

## 2017-08-22 RX ORDER — LEVOCETIRIZINE DIHYDROCHLORIDE 5 MG/1
TABLET, FILM COATED ORAL
Qty: 30 TABLET | Refills: 0 | Status: SHIPPED | OUTPATIENT
Start: 2017-08-22 | End: 2017-10-30 | Stop reason: SDUPTHER

## 2017-08-23 ENCOUNTER — TELEPHONE (OUTPATIENT)
Dept: NEUROLOGY | Age: 47
End: 2017-08-23

## 2017-08-31 ENCOUNTER — HOSPITAL ENCOUNTER (OUTPATIENT)
Dept: PAIN MANAGEMENT | Age: 47
Discharge: HOME | End: 2017-08-31
Payer: MEDICARE

## 2017-08-31 PROCEDURE — 62369 ANAL SP INF PMP W/REPRG&FILL: CPT

## 2017-08-31 PROCEDURE — 2500000003 HC RX 250 WO HCPCS

## 2017-09-05 ENCOUNTER — OFFICE VISIT (OUTPATIENT)
Dept: INTERNAL MEDICINE | Age: 47
End: 2017-09-05
Payer: MEDICARE

## 2017-09-05 VITALS
DIASTOLIC BLOOD PRESSURE: 82 MMHG | OXYGEN SATURATION: 99 % | SYSTOLIC BLOOD PRESSURE: 134 MMHG | HEIGHT: 69 IN | BODY MASS INDEX: 25.18 KG/M2 | HEART RATE: 84 BPM | WEIGHT: 170 LBS

## 2017-09-05 DIAGNOSIS — I10 ESSENTIAL HYPERTENSION: ICD-10-CM

## 2017-09-05 DIAGNOSIS — N32.81 OAB (OVERACTIVE BLADDER): ICD-10-CM

## 2017-09-05 DIAGNOSIS — Z23 NEED FOR PNEUMOCOCCAL VACCINE: ICD-10-CM

## 2017-09-05 DIAGNOSIS — G35 MS (MULTIPLE SCLEROSIS) (HCC): ICD-10-CM

## 2017-09-05 DIAGNOSIS — E55.9 VITAMIN D DEFICIENCY: ICD-10-CM

## 2017-09-05 DIAGNOSIS — G40.909 SEIZURE DISORDER (HCC): ICD-10-CM

## 2017-09-05 DIAGNOSIS — Z87.440 HISTORY OF FREQUENT URINARY TRACT INFECTIONS: ICD-10-CM

## 2017-09-05 DIAGNOSIS — G89.29 OTHER CHRONIC PAIN: ICD-10-CM

## 2017-09-05 DIAGNOSIS — R41.840 CONCENTRATION DEFICIT: ICD-10-CM

## 2017-09-05 DIAGNOSIS — Z00.00 ROUTINE ADULT HEALTH MAINTENANCE: Primary | ICD-10-CM

## 2017-09-05 DIAGNOSIS — F17.200 SMOKING: ICD-10-CM

## 2017-09-05 DIAGNOSIS — R53.83 FATIGUE, UNSPECIFIED TYPE: ICD-10-CM

## 2017-09-05 DIAGNOSIS — F17.210 CIGARETTE NICOTINE DEPENDENCE WITHOUT COMPLICATION: ICD-10-CM

## 2017-09-05 DIAGNOSIS — Z00.00 ROUTINE GENERAL MEDICAL EXAMINATION AT A HEALTH CARE FACILITY: ICD-10-CM

## 2017-09-05 DIAGNOSIS — G62.9 NEUROPATHY: ICD-10-CM

## 2017-09-05 DIAGNOSIS — E78.5 HYPERLIPIDEMIA, UNSPECIFIED HYPERLIPIDEMIA TYPE: ICD-10-CM

## 2017-09-05 DIAGNOSIS — J01.90 ACUTE SINUSITIS, RECURRENCE NOT SPECIFIED, UNSPECIFIED LOCATION: ICD-10-CM

## 2017-09-05 DIAGNOSIS — K94.19 ALTERED BOWEL ELIMINATION DUE TO INTESTINAL OSTOMY (HCC): ICD-10-CM

## 2017-09-05 DIAGNOSIS — R68.2 DRY MOUTH: ICD-10-CM

## 2017-09-05 DIAGNOSIS — Z91.09 ENVIRONMENTAL ALLERGIES: ICD-10-CM

## 2017-09-05 DIAGNOSIS — F32.A DEPRESSION, UNSPECIFIED DEPRESSION TYPE: ICD-10-CM

## 2017-09-05 PROCEDURE — G0439 PPPS, SUBSEQ VISIT: HCPCS | Performed by: INTERNAL MEDICINE

## 2017-09-05 PROCEDURE — G0009 ADMIN PNEUMOCOCCAL VACCINE: HCPCS | Performed by: INTERNAL MEDICINE

## 2017-09-05 PROCEDURE — G8419 CALC BMI OUT NRM PARAM NOF/U: HCPCS | Performed by: INTERNAL MEDICINE

## 2017-09-05 PROCEDURE — 90732 PPSV23 VACC 2 YRS+ SUBQ/IM: CPT | Performed by: INTERNAL MEDICINE

## 2017-09-05 PROCEDURE — 4004F PT TOBACCO SCREEN RCVD TLK: CPT | Performed by: INTERNAL MEDICINE

## 2017-09-05 PROCEDURE — G8427 DOCREV CUR MEDS BY ELIG CLIN: HCPCS | Performed by: INTERNAL MEDICINE

## 2017-09-05 PROCEDURE — 99214 OFFICE O/P EST MOD 30 MIN: CPT | Performed by: INTERNAL MEDICINE

## 2017-09-05 RX ORDER — PILOCARPINE HYDROCHLORIDE 5 MG/1
7.5 TABLET, FILM COATED ORAL 3 TIMES DAILY
Qty: 90 TABLET | Refills: 5 | Status: SHIPPED | OUTPATIENT
Start: 2017-09-05 | End: 2018-03-26 | Stop reason: SDUPTHER

## 2017-09-05 RX ORDER — VARENICLINE TARTRATE 25 MG
KIT ORAL
Qty: 53 TABLET | Refills: 0 | Status: SHIPPED | OUTPATIENT
Start: 2017-09-05 | End: 2018-03-26

## 2017-09-05 RX ORDER — HYDROCHLOROTHIAZIDE 25 MG/1
25 TABLET ORAL DAILY
Qty: 30 TABLET | Refills: 5 | Status: SHIPPED | OUTPATIENT
Start: 2017-09-05 | End: 2018-07-24 | Stop reason: SDUPTHER

## 2017-09-05 RX ORDER — AMOXICILLIN AND CLAVULANATE POTASSIUM 875; 125 MG/1; MG/1
1 TABLET, FILM COATED ORAL 2 TIMES DAILY
Qty: 20 TABLET | Refills: 0 | Status: SHIPPED | OUTPATIENT
Start: 2017-09-05 | End: 2017-09-15

## 2017-09-05 ASSESSMENT — PATIENT HEALTH QUESTIONNAIRE - PHQ9
2. FEELING DOWN, DEPRESSED OR HOPELESS: 0
SUM OF ALL RESPONSES TO PHQ9 QUESTIONS 1 & 2: 0
SUM OF ALL RESPONSES TO PHQ QUESTIONS 1-9: 0
1. LITTLE INTEREST OR PLEASURE IN DOING THINGS: 0

## 2017-09-06 ENCOUNTER — OFFICE VISIT (OUTPATIENT)
Dept: NEUROLOGY | Age: 47
End: 2017-09-06
Payer: MEDICARE

## 2017-09-06 VITALS — SYSTOLIC BLOOD PRESSURE: 98 MMHG | HEART RATE: 88 BPM | OXYGEN SATURATION: 100 % | DIASTOLIC BLOOD PRESSURE: 67 MMHG

## 2017-09-06 DIAGNOSIS — R53.1 WEAKNESS: ICD-10-CM

## 2017-09-06 DIAGNOSIS — R20.0 NUMBNESS: ICD-10-CM

## 2017-09-06 DIAGNOSIS — K94.13 COLOSTOMY AND ENTEROSTOMY MALFUNCTION (HCC): ICD-10-CM

## 2017-09-06 DIAGNOSIS — K94.03 COLOSTOMY AND ENTEROSTOMY MALFUNCTION (HCC): ICD-10-CM

## 2017-09-06 DIAGNOSIS — M79.622 PAIN IN BOTH UPPER ARMS: ICD-10-CM

## 2017-09-06 DIAGNOSIS — M79.621 PAIN IN BOTH UPPER ARMS: ICD-10-CM

## 2017-09-06 DIAGNOSIS — G35 MULTIPLE SCLEROSIS (HCC): Primary | ICD-10-CM

## 2017-09-06 DIAGNOSIS — M54.2 PAIN, NECK: ICD-10-CM

## 2017-09-06 DIAGNOSIS — G35 MS (MULTIPLE SCLEROSIS) (HCC): Primary | ICD-10-CM

## 2017-09-06 DIAGNOSIS — M62.838 MUSCLE SPASTICITY: ICD-10-CM

## 2017-09-06 PROCEDURE — 99214 OFFICE O/P EST MOD 30 MIN: CPT | Performed by: PSYCHIATRY & NEUROLOGY

## 2017-09-06 PROCEDURE — G8419 CALC BMI OUT NRM PARAM NOF/U: HCPCS | Performed by: PSYCHIATRY & NEUROLOGY

## 2017-09-06 PROCEDURE — G8427 DOCREV CUR MEDS BY ELIG CLIN: HCPCS | Performed by: PSYCHIATRY & NEUROLOGY

## 2017-09-06 PROCEDURE — 4004F PT TOBACCO SCREEN RCVD TLK: CPT | Performed by: PSYCHIATRY & NEUROLOGY

## 2017-09-06 RX ORDER — HYDROCODONE BITARTRATE AND ACETAMINOPHEN 10; 325 MG/1; MG/1
1 TABLET ORAL EVERY 6 HOURS PRN
Qty: 90 TABLET | Refills: 0 | Status: SHIPPED | OUTPATIENT
Start: 2017-09-14 | End: 2017-10-06 | Stop reason: SDUPTHER

## 2017-09-06 RX ORDER — METHYLPHENIDATE HYDROCHLORIDE 20 MG/1
20 TABLET ORAL DAILY
Qty: 30 TABLET | Refills: 0 | Status: SHIPPED | OUTPATIENT
Start: 2017-09-06 | End: 2017-10-05 | Stop reason: SDUPTHER

## 2017-09-06 RX ORDER — METHYLPHENIDATE HYDROCHLORIDE 10 MG/1
10 TABLET ORAL DAILY
Qty: 30 TABLET | Refills: 0 | Status: SHIPPED | OUTPATIENT
Start: 2017-09-06 | End: 2017-10-05 | Stop reason: SDUPTHER

## 2017-09-06 RX ORDER — DULOXETIN HYDROCHLORIDE 60 MG/1
60 CAPSULE, DELAYED RELEASE ORAL DAILY
COMMUNITY
End: 2018-03-26 | Stop reason: ALTCHOICE

## 2017-09-08 ASSESSMENT — ENCOUNTER SYMPTOMS
DIARRHEA: 0
BLOOD IN STOOL: 0
WHEEZING: 0
EYE DISCHARGE: 0
RHINORRHEA: 0
CHEST TIGHTNESS: 0
ABDOMINAL PAIN: 0
ABDOMINAL DISTENTION: 0
SORE THROAT: 0
NAUSEA: 0
EYE REDNESS: 0
COUGH: 0
EYE PAIN: 0
PHOTOPHOBIA: 0
SINUS PRESSURE: 1
COLOR CHANGE: 0
EYE ITCHING: 0
CONSTIPATION: 0
SHORTNESS OF BREATH: 0
VOMITING: 0

## 2017-09-19 ENCOUNTER — HOSPITAL ENCOUNTER (OUTPATIENT)
Dept: INFUSION THERAPY | Age: 47
Setting detail: INFUSION SERIES
Discharge: HOME | End: 2017-09-19
Payer: MEDICARE

## 2017-09-19 VITALS
HEART RATE: 86 BPM | DIASTOLIC BLOOD PRESSURE: 95 MMHG | RESPIRATION RATE: 20 BRPM | SYSTOLIC BLOOD PRESSURE: 139 MMHG | OXYGEN SATURATION: 100 % | TEMPERATURE: 98.2 F

## 2017-09-19 DIAGNOSIS — G35 MULTIPLE SCLEROSIS (HCC): ICD-10-CM

## 2017-09-19 PROCEDURE — 96413 CHEMO IV INFUSION 1 HR: CPT

## 2017-09-19 PROCEDURE — 96415 CHEMO IV INFUSION ADDL HR: CPT

## 2017-09-19 PROCEDURE — 2500000003 HC RX 250 WO HCPCS: Performed by: PSYCHIATRY & NEUROLOGY

## 2017-09-19 PROCEDURE — 2580000003 HC RX 258: Performed by: PSYCHIATRY & NEUROLOGY

## 2017-09-19 PROCEDURE — C9494 INJECTION, OCRELIZUMAB: HCPCS | Performed by: PSYCHIATRY & NEUROLOGY

## 2017-09-19 RX ADMIN — OCRELIZUMAB 300 MG: 300 INJECTION INTRAVENOUS at 10:20

## 2017-09-19 NOTE — IP AVS SNAPSHOT
After Visit Summary    This summary was created for you. Thank you for entrusting your care to us. The following information includes details about your hospital/visit stay along with steps you should take to help with your recovery once you leave the hospital.  In this packet, you will find information about the topics listed below:    · Instructions about your medications including a list of your home medications  · A summary of your hospital visit  · Follow-up appointments once you have left the hospital  · Your care plan at home      You may receive a survey regarding the care you received during your stay. Your input is valuable to us. We encourage you to complete and return your survey in the envelope provided. We hope you will choose us in the future for your healthcare needs. Patient Information     Patient Name KATHARINE Haq 1970      Care Provided at:     Name Address Phone       [address] [phone]            Your Visit    Here you will find information about your visit, including the reason for your visit. Please take this sheet with you when you visit your doctor or other health care provider in the future. It will help determine the best possible medical care for you at that time. If you have any questions once you leave the hospital, please call the department phone number listed below. Why you were here     Your primary diagnosis was:  Not on File      Visit Information     Date & Time Department Dept. Phone    2017 [facility] [phone]         Follow-up Appointments    Below is a list of your follow-up and future appointments. This may not be a complete list as you may have made appointments directly with providers that we are not aware of or your providers may have made some for you. Please call your providers to confirm appointments. It is important to keep your appointments. Please bring your current insurance card, photo ID, co-pay, and all medication bottles to your appointment. If self-pay, payment is expected at the time of service. To-Do List     Future Appointments Provider Department Dept Phone    9/26/2017 2:00 PM Freedom Cavanaugh MD [facility] [phone]    Please arrive 15 minutes prior to appointment, bring photo ID and insurance card. 10/3/2017 9:00 AM SCHEDULE, [facility] [phone]    12/6/2017 1:00 PM [facility] PAIN PUMP REFILL [facility]    12/6/2017 2:00 PM Freedom Cavanaugh MD [facility] [phone]    Please arrive 15 minutes prior to appointment, bring photo ID and insurance card. 3/8/2018 1:30 PM Demetrius Garcia MD [facility] [phone]    Please arrive 15 minutes prior to appointment time, bring insurance card and photo ID. Care Plan Once You Return Home    This section includes instructions you will need to follow once you leave the hospital.  Your care team will discuss these with you, so you and those caring for you know how to best care for your health needs at home. This section may also include educational information about certain health topics that may be of help to you. Discharge Instructions       Ocrevus 300 mg         Important Information    If your condition worsens or if you have any concerns, call your doctor or seek emergency medical services (dial 9-1-1) as needed. If you have any of the following symptoms/conditions, call your doctor. Call your primary care physician to obtain results of outstanding lab tests, cultures, x-rays, or other tests. The information on all pages of the After Visit Summary has been reviewed with me, the patient and/or responsible adult, by my health care provider(s).  I had the opportunity to ask questions regarding this information. I understand I should dispose of my armband safely at home to protect my health information. A complete copy of the After Visit Summary has been given to me, the patient and/or responsible adult. Patient Signature/Responsible Adult:  __________________________________________________    Date/Time:  __________________________________________________                 Clinician Signature:  __________________________________________________    Date/Time:  __________________________________________________            After Visit Summary  (Discharge Instructions)    Medication List for Home    Based on the information you provided to us as well as any changes during this visit, the following is your updated medication list.  Compare this with your prescription bottles at home. If you have any questions or concerns, contact your primary care physician's office. Daily Medication List (This medication list can be shared with any healthcare provider who is helping you manage your medications)      ASK your doctor about these medications if you have questions        Last Dose    Next Dose Due AM NOON PM NIGHT    amphetamine-dextroamphetamine 20 MG tablet   Commonly known as:  ADDERALL (20MG)   Take 1 tablet by mouth daily .                                          baclofen 10 MG tablet   Commonly known as:  LIORESAL   Take 1 tablet by mouth 2 times daily as needed (muscle spasms)                                         DULoxetine 60 MG extended release capsule   Commonly known as:  CYMBALTA   Take 60 mg by mouth daily                                         Fingolimod HCl 0.5 MG Caps   Take 1 capsule by mouth daily                                         fluconazole 100 MG tablet   Commonly known as:  DIFLUCAN   Take 100 mg by mouth daily as needed (pt takes 1 tablet daily x3 doses prn)                                         fluticasone 50 MCG/ACT nasal spray Commonly known as:  FLONASE   2 sprays 2 times daily as needed                                         hydrochlorothiazide 25 MG tablet   Commonly known as:  HYDRODIURIL   Take 1 tablet by mouth daily                                         HYDROcodone-acetaminophen  MG per tablet   Commonly known as:  NORCO   Take 1 tablet by mouth every 6 hours as needed for Pain .                                         levETIRAcetam 500 MG tablet   Commonly known as:  KEPPRA   TAKE 1 TABLET BY MOUTH TWICE DAILY. levocetirizine 5 MG tablet   Commonly known as:  XYZAL   TAKE 1 TABLET IN THE EVENING                                         methylphenidate 10 MG tablet   Commonly known as:  RITALIN   Take 1 tablet by mouth daily . methylphenidate 20 MG tablet   Commonly known as:  RITALIN   Take 1 tablet by mouth daily . mupirocin 2 % ointment   Commonly known as:  BACTROBAN                                         nitrofurantoin 50 MG capsule   Commonly known as:  MACRODANTIN   TAKE 1 CAPSULE NIGHTLY FOR 10 DAYS. ondansetron 4 MG disintegrating tablet   Commonly known as:  ZOFRAN ODT   Take 1 tablet by mouth every 8 hours as needed for Nausea or Vomiting                                         oxybutynin 15 MG extended release tablet   Commonly known as:  DITROPAN XL   Take 10 mg by mouth 2 times daily                                         pilocarpine 5 MG tablet   Commonly known as:  SALAGEN   Take 1.5 tablets by mouth 3 times daily                                         pregabalin 300 MG capsule   Commonly known as:  LYRICA   Take 1 capsule by mouth 2 times daily                                         PROAIR RESPICLICK 427 (90 Base) MCG/ACT aerosol powder inhalation   Generic drug:  albuterol sulfate   INHALE 2 PUFFS EVERY 4 HOURS AS NEEDED FOR COUGH AND WHEEZE. tiZANidine 4 MG tablet   Commonly known as:  ZANAFLEX   TAKE 3 TABLETS BY MOUTH TWICE DAILY. varenicline 0.5 MG X 11 & 1 MG X 42 tablet   Commonly known as:  CHANTIX STARTING MONTH CHADWICK   Take by mouth. Allergies as of 9/19/2017        Reactions    Darvocet [Propoxyphene N-acetaminophen]     Morphine And Related Swelling    Morphine Hcl     Propoxyphene Swelling      Immunizations as of 9/19/2017     Name Date Dose VIS Date Route    Influenza Virus Vaccine 9/24/2015 -- -- --    Influenza, High Dose 9/24/2015 -- -- --    Pneumococcal Polysaccharide ([id number]) 9/5/2017 0.5 mL 4/24/2015 Intramuscular    Pneumococcal Polysaccharide (Zukbzhclg04) 10/9/2014 -- -- --    Tdap (Boostrix, Adacel) 6/28/2017 0.5 mL 2/24/2015 Intramuscular        MyChart Signup     [employer] allows you to send messages to your doctor, view your test results, renew your prescriptions, schedule appointments, view visit notes, and more. How Do I Sign Up? 1. In your Internet browser, go to [email or web address]  2. Click on the Sign Up Now link in the Sign In box. You will see the New Member Sign Up page. 3. Enter your [employer] Access Code exactly as it appears below. You will not need to use this code after youve completed the sign-up process. If you do not sign up before the expiration date, you must request a new code. [employer] Access Code: [email or web address]  Expires: 11/18/2017 10:47 AM    4. Enter your Social Security Number ([id number]) and Date of Birth (mm/dd/yyyy) as indicated and click Submit. You will be taken to the next sign-up page. 5. Create a [employer] ID. This will be your [employer] login ID and cannot be changed, so think of one that is secure and easy to remember. 6. Create a [employer] password. You can change your password at any time. 7. Enter your Password Reset Question and Answer. This can be used at a later time if you forget your password. 8. Enter your e-mail address. You will receive e-mail notification when new information is available in [address] 9. Click Sign Up. You can now view your medical record. Additional Information  If you have questions, please contact the physician practice where you receive care. Remember, [employer] is NOT to be used for urgent needs. For medical emergencies, dial 911. For questions regarding your [employer] account call [phone]. If you have a clinical question, please call your doctor's office. View your information online  ? Review your current list of  medications, immunization, and allergies. ? Review your future test results online . ? Review your discharge instructions provided by your caregivers at discharge    Certain functionality such as prescription refills, scheduling appointments or sending messages to your provider are not activated if your provider does not use [employer] in his/her office    For questions regarding your [employer] account call [phone]. If you have a clinical question, please call your doctor's office.            Default Flowsheet Data (all recorded)      Follow Up / Scheduling     None

## 2017-10-03 ENCOUNTER — HOSPITAL ENCOUNTER (OUTPATIENT)
Dept: INFUSION THERAPY | Age: 47
Setting detail: INFUSION SERIES
Discharge: HOME | End: 2017-10-03
Payer: MEDICARE

## 2017-10-03 VITALS — RESPIRATION RATE: 17 BRPM | DIASTOLIC BLOOD PRESSURE: 55 MMHG | HEART RATE: 88 BPM | SYSTOLIC BLOOD PRESSURE: 79 MMHG

## 2017-10-03 DIAGNOSIS — G35 MULTIPLE SCLEROSIS (HCC): ICD-10-CM

## 2017-10-03 NOTE — FLOWSHEET NOTE
Pt here for 2nd ocrevus infusion. Hypotensive (see flowsheet). Dr Blessing Cheung notified. Instructed to hold ocrevus infusion today and reschedule for next week. Pt released with assistant and instructed to drink plenty of fluids to rehydrate after taking a diuretic yesterday.

## 2017-10-06 ENCOUNTER — TELEPHONE (OUTPATIENT)
Dept: INTERNAL MEDICINE | Age: 47
End: 2017-10-06

## 2017-10-06 DIAGNOSIS — K94.03 COLOSTOMY MALFUNCTION (HCC): Primary | ICD-10-CM

## 2017-10-06 RX ORDER — METHYLPHENIDATE HYDROCHLORIDE 20 MG/1
20 TABLET ORAL DAILY
Qty: 30 TABLET | Refills: 0 | Status: SHIPPED | OUTPATIENT
Start: 2017-10-06 | End: 2017-11-05

## 2017-10-06 RX ORDER — HYDROCODONE BITARTRATE AND ACETAMINOPHEN 10; 325 MG/1; MG/1
1 TABLET ORAL EVERY 6 HOURS PRN
Qty: 90 TABLET | Refills: 0 | Status: SHIPPED | OUTPATIENT
Start: 2017-10-14 | End: 2017-12-06 | Stop reason: SDUPTHER

## 2017-10-06 RX ORDER — METHYLPHENIDATE HYDROCHLORIDE 10 MG/1
10 TABLET ORAL DAILY
Qty: 30 TABLET | Refills: 0 | Status: SHIPPED | OUTPATIENT
Start: 2017-10-06 | End: 2017-11-10 | Stop reason: SDUPTHER

## 2017-10-10 ENCOUNTER — HOSPITAL ENCOUNTER (OUTPATIENT)
Dept: INFUSION THERAPY | Age: 47
Setting detail: INFUSION SERIES
Discharge: HOME | End: 2017-10-10
Payer: MEDICARE

## 2017-10-10 VITALS
SYSTOLIC BLOOD PRESSURE: 108 MMHG | RESPIRATION RATE: 17 BRPM | TEMPERATURE: 97.3 F | WEIGHT: 180 LBS | DIASTOLIC BLOOD PRESSURE: 75 MMHG | HEART RATE: 87 BPM | BODY MASS INDEX: 26.66 KG/M2 | HEIGHT: 69 IN

## 2017-10-10 DIAGNOSIS — G35 MULTIPLE SCLEROSIS (HCC): ICD-10-CM

## 2017-10-10 PROCEDURE — 96365 THER/PROPH/DIAG IV INF INIT: CPT

## 2017-10-10 PROCEDURE — 96413 CHEMO IV INFUSION 1 HR: CPT

## 2017-10-10 PROCEDURE — 2580000003 HC RX 258: Performed by: PSYCHIATRY & NEUROLOGY

## 2017-10-10 PROCEDURE — 6360000002 HC RX W HCPCS: Performed by: PSYCHIATRY & NEUROLOGY

## 2017-10-10 PROCEDURE — 96415 CHEMO IV INFUSION ADDL HR: CPT

## 2017-10-10 PROCEDURE — C9494 INJECTION, OCRELIZUMAB: HCPCS | Performed by: PSYCHIATRY & NEUROLOGY

## 2017-10-10 PROCEDURE — 96366 THER/PROPH/DIAG IV INF ADDON: CPT

## 2017-10-10 RX ADMIN — OCRELIZUMAB 300 MG: 300 INJECTION INTRAVENOUS at 10:08

## 2017-10-16 RX ORDER — VARENICLINE TARTRATE 1 MG/1
TABLET, FILM COATED ORAL
Qty: 56 TABLET | Refills: 0 | Status: SHIPPED | OUTPATIENT
Start: 2017-10-16 | End: 2018-02-09 | Stop reason: SDUPTHER

## 2017-10-30 RX ORDER — LEVOCETIRIZINE DIHYDROCHLORIDE 5 MG/1
TABLET, FILM COATED ORAL
Qty: 30 TABLET | Refills: 0 | Status: SHIPPED | OUTPATIENT
Start: 2017-10-30 | End: 2018-01-05 | Stop reason: SDUPTHER

## 2017-10-31 ENCOUNTER — OFFICE VISIT (OUTPATIENT)
Dept: SURGERY | Age: 47
End: 2017-10-31
Payer: MEDICARE

## 2017-10-31 VITALS
BODY MASS INDEX: 25.18 KG/M2 | DIASTOLIC BLOOD PRESSURE: 68 MMHG | WEIGHT: 170 LBS | HEIGHT: 69 IN | SYSTOLIC BLOOD PRESSURE: 100 MMHG | TEMPERATURE: 98.1 F

## 2017-10-31 DIAGNOSIS — T14.8XXA SKIN EXCORIATION: Primary | ICD-10-CM

## 2017-10-31 PROCEDURE — G8417 CALC BMI ABV UP PARAM F/U: HCPCS | Performed by: PHYSICIAN ASSISTANT

## 2017-10-31 PROCEDURE — G8484 FLU IMMUNIZE NO ADMIN: HCPCS | Performed by: PHYSICIAN ASSISTANT

## 2017-10-31 PROCEDURE — 99212 OFFICE O/P EST SF 10 MIN: CPT | Performed by: PHYSICIAN ASSISTANT

## 2017-10-31 PROCEDURE — 4004F PT TOBACCO SCREEN RCVD TLK: CPT | Performed by: PHYSICIAN ASSISTANT

## 2017-10-31 PROCEDURE — G8427 DOCREV CUR MEDS BY ELIG CLIN: HCPCS | Performed by: PHYSICIAN ASSISTANT

## 2017-10-31 NOTE — PROGRESS NOTES
María Uriostegui is a 80-year-old  female that was referred by Dr. Sherri Turner due to problems with skin breakdown around her stomal site. She reports that this began about 1-2 months ago after having lots of problems with diarrhea. She has tried multiple creams to the skin surface with no success. She reports that her stoma is still working well with no complications. She also reports that by the time she had seen me today the skin is actually improving and is almost completely resolved. Physical examination demonstrated the patient have a left lower quadrant stoma. Stoma is fully functional with no evidence of prolapse. The skin site at the base of the stoma demonstrated resolving erythema and appears to be likely secondary to candidiasis. Impression: Resolving skin excoriation at the base of her left lower quadrant stoma    Plan: Due to the patient having success with her current creams she is advised to continue her same regiment. She was also recommended that if this breaks down again, we'll refer her to [facility] therapist at [facility]. She is also concerned about having sporadic constipation from her pain medication therefore I recommended daily MiraLAX for this issue.     Electronically signed by Maurisio Zaldivar PA-C on 11/25/17 at 2:05 PM

## 2017-11-13 RX ORDER — HYDROCODONE BITARTRATE AND ACETAMINOPHEN 10; 325 MG/1; MG/1
1 TABLET ORAL EVERY 6 HOURS PRN
Qty: 90 TABLET | Refills: 0 | Status: SHIPPED | OUTPATIENT
Start: 2017-11-13 | End: 2017-11-20

## 2017-11-13 RX ORDER — METHYLPHENIDATE HYDROCHLORIDE 10 MG/1
10 TABLET ORAL DAILY
Qty: 30 TABLET | Refills: 0 | Status: SHIPPED | OUTPATIENT
Start: 2017-11-13 | End: 2018-02-12 | Stop reason: SDUPTHER

## 2017-11-13 RX ORDER — METHYLPHENIDATE HYDROCHLORIDE 20 MG/1
20 TABLET ORAL DAILY
Qty: 30 TABLET | Refills: 0 | Status: SHIPPED | OUTPATIENT
Start: 2017-11-13 | End: 2017-12-06 | Stop reason: SDUPTHER

## 2017-11-21 RX ORDER — OXYBUTYNIN CHLORIDE 15 MG/1
TABLET, EXTENDED RELEASE ORAL
Qty: 60 TABLET | Refills: 5 | Status: SHIPPED | OUTPATIENT
Start: 2017-11-21 | End: 2018-07-24 | Stop reason: SDUPTHER

## 2017-12-04 RX ORDER — PREGABALIN 300 MG/1
300 CAPSULE ORAL 2 TIMES DAILY
Qty: 60 CAPSULE | Refills: 5 | Status: SHIPPED | OUTPATIENT
Start: 2017-12-04 | End: 2018-06-12 | Stop reason: SDUPTHER

## 2017-12-06 ENCOUNTER — HOSPITAL ENCOUNTER (OUTPATIENT)
Dept: PAIN MANAGEMENT | Age: 47
Discharge: HOME | End: 2017-12-06
Payer: MEDICARE

## 2017-12-06 ENCOUNTER — OFFICE VISIT (OUTPATIENT)
Dept: NEUROLOGY | Age: 47
End: 2017-12-06
Payer: MEDICARE

## 2017-12-06 VITALS — SYSTOLIC BLOOD PRESSURE: 94 MMHG | HEART RATE: 72 BPM | DIASTOLIC BLOOD PRESSURE: 60 MMHG

## 2017-12-06 DIAGNOSIS — M54.2 PAIN, NECK: ICD-10-CM

## 2017-12-06 DIAGNOSIS — R53.83 OTHER FATIGUE: ICD-10-CM

## 2017-12-06 DIAGNOSIS — M62.838 MUSCLE SPASTICITY: ICD-10-CM

## 2017-12-06 DIAGNOSIS — K94.13 COLOSTOMY AND ENTEROSTOMY MALFUNCTION (HCC): ICD-10-CM

## 2017-12-06 DIAGNOSIS — M79.622 PAIN IN BOTH UPPER ARMS: ICD-10-CM

## 2017-12-06 DIAGNOSIS — K94.03 COLOSTOMY AND ENTEROSTOMY MALFUNCTION (HCC): ICD-10-CM

## 2017-12-06 DIAGNOSIS — M79.621 PAIN IN BOTH UPPER ARMS: ICD-10-CM

## 2017-12-06 DIAGNOSIS — R53.1 WEAKNESS: ICD-10-CM

## 2017-12-06 DIAGNOSIS — G35 MS (MULTIPLE SCLEROSIS) (HCC): Primary | ICD-10-CM

## 2017-12-06 DIAGNOSIS — R20.0 NUMBNESS: ICD-10-CM

## 2017-12-06 PROCEDURE — 2500000003 HC RX 250 WO HCPCS

## 2017-12-06 PROCEDURE — 99214 OFFICE O/P EST MOD 30 MIN: CPT | Performed by: PSYCHIATRY & NEUROLOGY

## 2017-12-06 PROCEDURE — 4004F PT TOBACCO SCREEN RCVD TLK: CPT | Performed by: PSYCHIATRY & NEUROLOGY

## 2017-12-06 PROCEDURE — 62369 ANAL SP INF PMP W/REPRG&FILL: CPT

## 2017-12-06 PROCEDURE — G8484 FLU IMMUNIZE NO ADMIN: HCPCS | Performed by: PSYCHIATRY & NEUROLOGY

## 2017-12-06 PROCEDURE — G8417 CALC BMI ABV UP PARAM F/U: HCPCS | Performed by: PSYCHIATRY & NEUROLOGY

## 2017-12-06 PROCEDURE — G8427 DOCREV CUR MEDS BY ELIG CLIN: HCPCS | Performed by: PSYCHIATRY & NEUROLOGY

## 2017-12-06 RX ORDER — DULOXETIN HYDROCHLORIDE 30 MG/1
30 CAPSULE, DELAYED RELEASE ORAL DAILY
COMMUNITY
End: 2018-03-26 | Stop reason: SDUPTHER

## 2017-12-06 RX ORDER — LEVETIRACETAM 500 MG/1
500 TABLET ORAL DAILY
COMMUNITY
End: 2019-01-31 | Stop reason: SDUPTHER

## 2017-12-06 RX ORDER — METHYLPHENIDATE HYDROCHLORIDE 20 MG/1
20 TABLET ORAL DAILY
Qty: 30 TABLET | Refills: 0 | Status: SHIPPED | OUTPATIENT
Start: 2017-12-10 | End: 2018-01-09

## 2017-12-06 RX ORDER — HYDROCODONE BITARTRATE AND ACETAMINOPHEN 10; 325 MG/1; MG/1
1 TABLET ORAL EVERY 6 HOURS PRN
Qty: 90 TABLET | Refills: 0 | Status: SHIPPED | OUTPATIENT
Start: 2017-12-14 | End: 2018-01-10 | Stop reason: SDUPTHER

## 2017-12-06 NOTE — PROGRESS NOTES
Chief Complaint   Patient presents with    Multiple Sclerosis     3 month follow up       Kerri Olivares is a 56y.o. year old female who is seen for evaluation of multiple sclerosis. The patient indicates that she was diagnosed with multiple sclerosis in 1994. At that time to develop some visual disturbances including blurred vision and double vision. Within eight months she was in a wheelchair. She currently has no movement of the lower extremities. She does have some increased Spasticity in the legs worse in the arms. She underwent a baclofen pump with Dr. Mynra Gilliland with some complications. She is doing better from this. She currently sees Dr. nichols for her pump management. She does have some cognitive issues. She denies diplopia, dysarthria, or dysphagia. She does have some incontinence of bladder. She does have pain in the hips and lower extremities. She also has headaches with pain behind both eyes. She was followed by Dr. Miranda Pedro of neurology. She follows up today for evaluation. She is currently in a wheelchair. Since her last visit she is doing better with physical therapy. She had an accidental overdose with her baclofen pump and had a seizure. Doing better on. Recently admitted with seizure. Was off Chantix a few weeks and had a UTI. Had seizure in oct 2014 with seizure due to baclofen overose. This was her second seizure. On Keppra. no more seizures. trying to wean off some meds. More fatigue lately. Worsening memory. Doing much better. Lyrical helps. Ritalin helps. More numbness in hands. More neck pain. First infusion of Ocrevus. Normally has low blood pressure. Typically 90/60.         Active Ambulatory Problems     Diagnosis Date Noted    Muscle spasticity     Neuropathic ulcer of right foot, limited to breakdown of skin ([facility] 02/01/2016    Peripheral vascular disease ([facility] 02/01/2016    MS (multiple sclerosis) ([facility] 06/08/2016    Pain in both upper arms 08/04/2016    Numbness 08/04/2016    Fatigue 08/04/2016    Weakness 08/04/2016    Sepsis ([address] 08/19/2016    Urinary tract infection 08/19/2016    Chronic pain 08/19/2016    Hx of seizure disorder 08/19/2016    Hypokalemia 08/20/2016    Hypokalemia 08/20/2016    Abnormal EKG     Encephalopathy 08/25/2016    Elevated troponin level     Neuropathic ulcer of left foot, limited to breakdown of skin ([address] 04/10/2017    Acute bronchitis 06/28/2017    Acute sinusitis 06/28/2017    Open wound of ankle 06/28/2017    Vitamin D deficiency 06/28/2017    Back pain 06/28/2017    Breakdown (mechanical) of cranial or spinal infusion catheter, initial encounter 10/25/2016    CAFL (chronic airflow limitation) ([address] 06/28/2017    Cough 06/28/2017    Encounter for screening for other disorder 06/28/2017    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    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    Difficult or painful urination 06/28/2017    Headache 06/28/2017    Hyperlipidemia 06/28/2017    Influenza 06/28/2017    Insomnia 06/28/2017    Breast lump 06/28/2017    Patient overweight 06/28/2017    Hay fever 06/28/2017    Decubitus ulcer of sacral region 06/28/2017    Current smoker 06/28/2017    Cobalamin deficiency 06/28/2017    Disease caused by fungus 06/28/2017    Pain, neck 09/06/2017    Colostomy and enterostomy malfunction ([address] 09/06/2017     Resolved Ambulatory Problems     Diagnosis Date Noted    No Resolved Ambulatory Problems     Past Medical History:   Diagnosis Date    Asthma     Back pain 6/28/2017    CAFL (chronic airflow limitation) (HCC) 6/28/2017    Hay fever 6/28/2017    Headache 6/28/2017    Hyperlipidemia 6/28/2017    MS (multiple sclerosis) ([address]     Neuropathic ulcer of left foot, limited to breakdown of skin ([address] 4/10/2017    Peripheral vascular disease ([address]        Past Surgical History:   Procedure Laterality Date    BACLOFEN PUMP IMPLANTATION      COLOSTOMY      FEMUR FRACTURE SURGERY      Right    HYSTERECTOMY      OTHER SURGICAL HISTORY      urostomy    OTHER SURGICAL HISTORY      pain pump    TOE AMPUTATION      L last toe    TONSILLECTOMY      URETEROTOMY         Family History   Problem Relation Age of Onset    Cancer Mother      breast    Diabetes Father     High Blood Pressure Father     Cancer Paternal Aunt      breast    Heart Disease Paternal Grandmother        Allergies   Allergen Reactions    Darvocet [Propoxyphene N-Acetaminophen]     Morphine And Related Swelling    Morphine Hcl     Propoxyphene Swelling       Social History     Social History    Marital status:      Spouse name: N/A    Number of children: N/A    Years of education: N/A     Occupational History    Not on file. Social History Main Topics    Smoking status: Current Some Day Smoker     Last attempt to quit: 1/8/2016    Smokeless tobacco: Never Used    Alcohol use No    Drug use: Yes     Types: Marijuana    Sexual activity: Not on file     Other Topics Concern    Not on file     Social History Narrative    No narrative on file     Review of Systems     Constitutional - No fever or chills. No diaphoresis or significant fatigue. HENT -  No tinnitus or significant hearing loss. Eyes - yes sudden vision change or eye pain  Respiratory - no significant shortness of breath or cough  Cardiovascular - no chest pain No palpitations or significant leg swelling  Gastrointestinal - no abdominal swelling or pain. Genitourinary - No difficulty urinating, dysuria  Musculoskeletal - yes back pain or myalgia. Skin - no color change or rash  Neurologic - No seizures. No lateralizing weakness. Hematologic - yes easy bruising or excessive bleeding. Psychiatric - no severe anxiety or nervousness. All other review of systems are negative.       Current Outpatient Prescriptions   Medication Sig Dispense Refill    DULoxetine (CYMBALTA) 30 MG extended release capsule Take 30 mg by mouth daily      0.5 MG CAPS Take 1 capsule by mouth daily      methylphenidate (RITALIN) 10 MG tablet Take 1 tablet by mouth daily . 30 tablet 0    varenicline (CHANTIX STARTING MONTH CHADWICK) 0.5 MG X 11 & 1 MG X 42 tablet Take by mouth. 53 tablet 0    amphetamine-dextroamphetamine (ADDERALL, 20MG,) 20 MG tablet Take 1 tablet by mouth daily . 30 tablet 0     No current facility-administered medications for this visit. BP 94/60   Pulse 72     Constitutional - well developed, well nourished. Eyes - conjunctiva normal.  Pupils react to light  Ear, nose, throat -hearing intact to finger rub No scars, masses, or lesions over external nose or ears, no atrophy of tongue  Neck-symmetric, no masses noted, no jugular vein distension  Respiration- chest wall appears symmetric, good expansion,   normal effort without use of accessory muscles  Musculoskeletal - no significant wasting of muscles noted, no bony deformities, gait no gross ataxia  Extremities-no clubbing, cyanosis or edema  Skin - warm, dry, and intact. No rash, erythema, or pallor.   Psychiatric - mood, affect, and behavior appear normal.      Neurological exam  Awake, alert, fluent oriented x 3 appropriate affect  Attention and concentration appear appropriate  Recent and remote memory appears unremarkable  Speech normal without dysarthria  No clear issues with language of fund of knowledge    Cranial Nerve Exam   CN II- Visual fields grossly unremarkable  CN III, IV,VI-EOMI, No nystagmus, conjugate eye movements, no ptosis    CN VII-no facial assymetry    Motor Exam  Antigravity in arms    Sensory Exam  Sensation reduced     Reflexes     No clonus ankles  No Perez's sign bilateral hands    Tremors- no tremors in hands or head noted    Gait  In wheelchair    Coordination  Finger to nose-unremarkable    Lab Results   Component Value Date    RHMPPQOY68 517 08/20/2016     Lab Results   Component Value Date    WBC 5.0 05/23/2017    HGB 14.1 05/23/2017    HCT 43.0 05/23/2017    MCV 88.7 05/23/2017     05/23/2017     Lab Results   Component Value Date     05/23/2017    K 3.7 05/23/2017     05/23/2017    CO2 23 05/23/2017    BUN 12 05/23/2017    CREATININE 0.3 (L) 05/23/2017    GLUCOSE 101 05/23/2017    CALCIUM 9.4 05/23/2017    PROT 7.1 05/23/2017    LABALBU 4.4 05/23/2017    BILITOT 0.5 05/23/2017    ALKPHOS 164 (H) 05/23/2017    AST 23 05/23/2017    ALT 57 (H) 05/23/2017    LABGLOM >60 05/23/2017    GLOB 2.7 08/19/2016     Impression   1.. No foci of abnormal T2 signal or enhancement within the cervical   cord to suggest plaques of multiple sclerosis or mass. 2. Mild bulging of the disc at C5-C6 with uncinate spurring   contributing to neural foraminal narrowing. There is no central   stenosis. The remaining cervical disc levels are unremarkable. Signed by Dr Porfirio Herron on 8/14/2017 5:05 PM           Assessment    ICD-10-CM ICD-9-CM    1. MS (multiple sclerosis) ([facility] G35 340    2. Numbness R20.0 782.0    3. Pain in both upper arms M79.621 729.5     M79.622     4. Muscle spasticity M62.838 728.85    5. Weakness R53.1 780.79    6. Pain, neck M54.2 723.1    7. Colostomy and enterostomy malfunction (HCC) K94.03 569.62     K94.13     8. Other fatigue R53.83 780.79        Her neurological examination today was significant for no movement in the lower extremities. She had some altered sensation in the legs along with some spasticity. She's wheelchair-bound. Her MRI of the brain revealed no new lesions. There was extensive white matter lesions. , Her cervical, thoracic, and lumbosacral spine were relatively unremarkable. She was agreeable to be started on Gilenya with no issues. stable. She had been on Copaxone but came off of this on her own. She denies any clear relapses over the last several years. She'll be referred to physical therapy as well. She will have a CBC and CMP every 3 months. The patient indicated understanding of the management plan.  At

## 2017-12-06 NOTE — PROGRESS NOTES
Lisa Kan/Delgado  Intrathecal Pump Refill      Reason for visit today:  Pump Refill       Performed by: [x] RN   [] APRN   [] DOCTOR     B/P - 93/62       Temp - 98.4       Pulse - 81       Respirations - 18        SpO2 - 98% RA         Pupils - 3  mm     Oxygen Therapy: [] Yes  [x] No  Device/Liters -    CESAR report reviewed:    [x] Appropriate  [] Inappropriate, reviewed with physician    Diagnosis: Muscle Spasticity                                                                                  Pain Ratin/10    Last Oral Pain Medication taken: NA    Brief History & Current Issues: Patient has had a few muscle spasms. but otherwise everything is going well. Patient denies any falls, injuries or x-rays since last visit. Has been considering harming self to escape stress, pain, problems?   no       Has a sucide plan? no    Has attempted sucide in the past? no    Has a close friend or family member who committed sucide? no    Pump medication:   [x]  Baclofen:3000 mcg/ml   [x]  Bupivacaine:3 mg/ml    []  Clonidine:      []  Dilaudid:    []  Fentanyl:    []  Morphine:   []  Prialt:    []  Sufentanil:     Current medication daily rate:     [x]  Baclofen:784.5 mcg/day     [x]  Bupivacaine: 0.7845 mg/day   []  Clonidine:      []  Dilaudid:    []  Fentanyl:    []  Morphine:   []  Prialt:    []  Sufentanil:     Patient Therapy Manager (PTM) [] Yes [x] No    Pump Refill Procedure Note:  Implanted pump interrogated and pump area prepped according to protocol. Template placed over pump. Using sterile technique, pump accessed with #22 gauge [x] 1.5 inch, [] 2 inch non-coring needle securely attached to extension tubing and an empty 20 ml syringe. Accessed X 1 stick/s. Needle inserted into pump at a 90 degree angle. Pump emptied between syringe changes. Pump filled with sterile solution using a 0.22 micron bacterial-retentive filter at rate of 1ml/3seconds.  Small amount of solution withdrawn to confirm

## 2017-12-11 ENCOUNTER — OFFICE VISIT (OUTPATIENT)
Dept: UROLOGY | Age: 47
End: 2017-12-11
Payer: MEDICARE

## 2017-12-11 VITALS — TEMPERATURE: 96.4 F | HEIGHT: 69 IN

## 2017-12-11 DIAGNOSIS — N39.0 URINARY TRACT INFECTION WITHOUT HEMATURIA, SITE UNSPECIFIED: Primary | ICD-10-CM

## 2017-12-11 DIAGNOSIS — N39.0 RECURRENT URINARY TRACT INFECTION: ICD-10-CM

## 2017-12-11 LAB
BACTERIA URINE, POC: NORMAL
BILIRUBIN URINE: 0 MG/DL
BLOOD, URINE: NEGATIVE
CASTS URINE, POC: NORMAL
CLARITY: CLEAR
COLOR: YELLOW
CRYSTALS URINE, POC: NORMAL
EPI CELLS URINE, POC: NORMAL
GLUCOSE URINE: NORMAL
KETONES, URINE: NEGATIVE
LEUKOCYTE EST, POC: POSITIVE
NITRITE, URINE: POSITIVE
PH UA: 7 (ref 4.5–8)
PROTEIN UA: POSITIVE
RBC URINE, POC: NORMAL
SPECIFIC GRAVITY UA: 1.01 (ref 1–1.03)
UROBILINOGEN, URINE: NORMAL
WBC URINE, POC: NORMAL
YEAST URINE, POC: NORMAL

## 2017-12-11 PROCEDURE — G8417 CALC BMI ABV UP PARAM F/U: HCPCS | Performed by: PHYSICIAN ASSISTANT

## 2017-12-11 PROCEDURE — G8484 FLU IMMUNIZE NO ADMIN: HCPCS | Performed by: PHYSICIAN ASSISTANT

## 2017-12-11 PROCEDURE — 99214 OFFICE O/P EST MOD 30 MIN: CPT | Performed by: PHYSICIAN ASSISTANT

## 2017-12-11 PROCEDURE — 81001 URINALYSIS AUTO W/SCOPE: CPT | Performed by: PHYSICIAN ASSISTANT

## 2017-12-11 PROCEDURE — 4004F PT TOBACCO SCREEN RCVD TLK: CPT | Performed by: PHYSICIAN ASSISTANT

## 2017-12-11 PROCEDURE — G8427 DOCREV CUR MEDS BY ELIG CLIN: HCPCS | Performed by: PHYSICIAN ASSISTANT

## 2017-12-11 ASSESSMENT — ENCOUNTER SYMPTOMS
VOMITING: 0
BACK PAIN: 0
WHEEZING: 0
EYE PAIN: 0
ABDOMINAL PAIN: 0
BLURRED VISION: 0
SINUS PAIN: 0
SHORTNESS OF BREATH: 0

## 2017-12-11 NOTE — PROGRESS NOTES
Sulma Ji is a 56 y.o. female who presents today   Chief Complaint   Patient presents with    New Patient     im here today for recurrent uti     Urinary Tract Infection     Recurrent UTIs/UTI symptoms:  Patient with history of recurrent urinary tract infections with history of MS who is wheelchair bound. I saw the patient last 09/21/16. She has had previous infections that have required hospitalization. She has a urostomy but she does self cath through an opening in her abdomen this was done at [facility]. She was not able to cath using her urethra due to  her physical limitations. She is on oxybutynin 15 mg twice a day for bladder spasms and Macrodantin 50 mg. She has been getting worsening bladder spasms and discomfort as well as malodorous urine and colitis of the urine. She denies any fever or chills nausea vomiting or flank pain. Past Medical History:   Diagnosis Date    Asthma     Back pain 6/28/2017    [facility]FL [facility]flow limitation[facility] 6/28/2017    Hay fever 6/28/2017    Headache 6/28/2017    Hyperlipidemia 6/28/2017    MS [facility]multiple sclerosis) ([facility])     Neuropathic ulcer of left foot, limited to breakdown of skin ([facility] 4/10/2017    Peripheral vascular disease ([facility] [address]        Past Surgical History:   Procedure Laterality Date    BACLOFEN PUMP IMPLANTATION      COLOSTOMY      FEMUR FRACTURE SURGERY      Right    HYSTERECTOMY      OTHER SURGICAL HISTORY      urostomy    OTHER SURGICAL HISTORY      pain pump    TOE AMPUTATION      L last toe    TONSILLECTOMY      URETEROTOMY         Current Outpatient Prescriptions   Medication Sig Dispense Refill    DULoxetine (CYMBALTA) 30 MG extended release capsule Take 30 mg by mouth daily      levETIRAcetam (KEPPRA) 500 MG tablet Take 500 mg by mouth daily      [START ON 12/14/2017] HYDROcodone-acetaminophen (NORCO)  MG per tablet Take 1 tablet by mouth every 6 hours as needed for Pain .  90 tablet 0    methylphenidate (RITALIN) 20 MG tablet Take 1 tablet by mouth daily . 30 tablet 0    pregabalin (LYRICA) 300 MG capsule Take 1 capsule by mouth 2 times daily . 60 capsule 5    oxybutynin (DITROPAN XL) 15 MG extended release tablet TAKE 1 TABLET BY MOUTH TWICE DAILY. 60 tablet 5    methylphenidate (RITALIN) 10 MG tablet Take 1 tablet by mouth daily . 30 tablet 0    levocetirizine (XYZAL) 5 MG tablet TAKE 1 TABLET BY MOUTH IN THE EVENING 30 tablet 0    CHANTIX CONTINUING MONTH CHADWICK 1 MG tablet TAKE AS DIRECTED PER PACKAGE DIRECTIONS. 56 tablet 0    DULoxetine (CYMBALTA) 60 MG extended release capsule Take 30 mg by mouth daily       varenicline (CHANTIX STARTING MONTH PAK) 0.5 MG X 11 & 1 MG X 42 tablet Take by mouth. 53 tablet 0    hydrochlorothiazide (HYDRODIURIL) 25 MG tablet Take 1 tablet by mouth daily 30 tablet 5    pilocarpine (SALAGEN) 5 MG tablet Take 1.5 tablets by mouth 3 times daily 90 tablet 5    nitrofurantoin (MACRODANTIN) 50 MG capsule TAKE 1 CAPSULE NIGHTLY FOR 10 DAYS. 30 capsule 6    tiZANidine (ZANAFLEX) 4 MG tablet TAKE 3 TABLETS BY MOUTH TWICE DAILY. 180 tablet 5    levETIRAcetam (KEPPRA) 500 MG tablet TAKE 1 TABLET BY MOUTH TWICE DAILY. (Patient taking differently: DAILY) 60 tablet 11    amphetamine-dextroamphetamine (ADDERALL, 20MG,) 20 MG tablet Take 1 tablet by mouth daily .  30 tablet 0    PROAIR RESPICLICK 536 (90 Base) MCG/ACT aerosol powder inhalation INHALE 2 PUFFS EVERY 4 HOURS AS NEEDED FOR COUGH AND WHEEZE. 1 Inhaler 3    ondansetron (ZOFRAN ODT) 4 MG disintegrating tablet Take 1 tablet by mouth every 8 hours as needed for Nausea or Vomiting 15 tablet 0    baclofen (LIORESAL) 10 MG tablet Take 1 tablet by mouth 2 times daily as needed (muscle spasms) 60 tablet 2    fluconazole (DIFLUCAN) 100 MG tablet Take 100 mg by mouth daily as needed (pt takes 1 tablet daily x3 doses prn)       mupirocin (BACTROBAN) 2 % ointment       fluticasone (FLONASE) 50 MCG/ACT nasal spray 2 sprays 2 times daily as exhibits no discharge. No scleral icterus. Neck: No JVD present. No tracheal deviation present. No thyromegaly present. Cardiovascular: Exam reveals no gallop and no friction rub. Pulmonary/Chest: No stridor. She has no rales. Abdominal: She exhibits no distension and no mass. There is no rebound and no guarding. Musculoskeletal: She exhibits no edema. Patient wheelchair-bound. Neurological: No cranial nerve deficit. She exhibits normal muscle tone. Coordination normal.   Skin: She is not diaphoretic. No pallor. DATA:  U/A:    Lab Results   Component Value Date    COLORU Yellow 12/11/2017    PROTEINU Positive 12/11/2017    PHUR 7.0 12/11/2017    LABCAST 3-5 WBC 07/13/2017    WBCUA 16-20 07/13/2017    WBCUA 83 07/13/2017    RBCUA 2-4 07/13/2017    RBCUA 2 07/13/2017    MUCUS 1+ 07/13/2017    BACTERIA 4+ 07/13/2017    CLARITYU Clear 12/11/2017    SPECGRAV 1.015 12/11/2017    LEUKOCYTESUR positive 12/11/2017    LEUKOCYTESUR SMALL 07/13/2017    UROBILINOGEN Normal 12/11/2017    BILIRUBINUR 0 12/11/2017    BLOODU Negative 12/11/2017    GLUCOSEU neg 12/11/2017    AMORPHOUS 1+ 08/27/2016           1. Urinary tract infection without hematuria, site unspecified  Although with the self cathing is likely to be contaminant however the patient is symptomatic and voided urine was suspicious we'll send for culture and sensitivity a sterile cath was used to get the sample. We will not treat pending culture results. - POC URINE with Microscopic    2. Recurrent urinary tract infection        Orders Placed This Encounter   Procedures    POC URINE with Microscopic     No orders of the defined types were placed in this encounter. Plan:  Patient will return pending culture and sensitivity results.

## 2018-01-05 RX ORDER — LEVOCETIRIZINE DIHYDROCHLORIDE 5 MG/1
TABLET, FILM COATED ORAL
Qty: 30 TABLET | Refills: 0 | Status: SHIPPED | OUTPATIENT
Start: 2018-01-05 | End: 2018-02-09 | Stop reason: SDUPTHER

## 2018-01-05 RX ORDER — PILOCARPINE HYDROCHLORIDE 7.5 MG/1
TABLET, FILM COATED ORAL
Qty: 270 TABLET | Refills: 0 | Status: SHIPPED | OUTPATIENT
Start: 2018-01-05 | End: 2018-02-28 | Stop reason: SDUPTHER

## 2018-01-11 RX ORDER — HYDROCODONE BITARTRATE AND ACETAMINOPHEN 10; 325 MG/1; MG/1
1 TABLET ORAL EVERY 6 HOURS PRN
Qty: 90 TABLET | Refills: 0 | Status: SHIPPED | OUTPATIENT
Start: 2018-01-11 | End: 2018-02-12 | Stop reason: SDUPTHER

## 2018-01-11 RX ORDER — METHYLPHENIDATE HYDROCHLORIDE 20 MG/1
20 TABLET ORAL DAILY
Qty: 30 TABLET | Refills: 0 | Status: SHIPPED | OUTPATIENT
Start: 2018-01-11 | End: 2018-02-10

## 2018-02-08 ENCOUNTER — TELEPHONE (OUTPATIENT)
Dept: NEUROSURGERY | Age: 48
End: 2018-02-08

## 2018-02-08 RX ORDER — VARENICLINE TARTRATE 25 MG
KIT ORAL
Qty: 270 TABLET | Refills: 3 | Status: SHIPPED | OUTPATIENT
Start: 2018-02-08 | End: 2018-03-26 | Stop reason: CLARIF

## 2018-02-09 RX ORDER — LEVOCETIRIZINE DIHYDROCHLORIDE 5 MG/1
TABLET, FILM COATED ORAL
Qty: 30 TABLET | Refills: 0 | Status: SHIPPED | OUTPATIENT
Start: 2018-02-09 | End: 2018-07-24 | Stop reason: SDUPTHER

## 2018-02-09 RX ORDER — VARENICLINE TARTRATE 1 MG/1
1 TABLET, FILM COATED ORAL 2 TIMES DAILY
Qty: 56 TABLET | Refills: 5 | Status: SHIPPED | OUTPATIENT
Start: 2018-02-09 | End: 2018-07-24 | Stop reason: SDUPTHER

## 2018-02-09 NOTE — TELEPHONE ENCOUNTER
We have received the paper. Dr. Summer Friedman is out of the office and once he returns on 2/12/18 it will be signed and faxed back.

## 2018-02-12 NOTE — TELEPHONE ENCOUNTER
Requested Prescriptions     Pending Prescriptions Disp Refills    methylphenidate (RITALIN) 10 MG tablet 30 tablet 0     Sig: Take 1 tablet by mouth daily for 30 days.  HYDROcodone-acetaminophen (NORCO)  MG per tablet 90 tablet 0     Sig: Take 1 tablet by mouth every 6 hours as needed for Pain for up to 30 days Must last 30 days.          Last OV 12/6/17  Next OV 3/6/18  Last Rx norco- 12/14/18  Last Rx Ritalin- 12/10/18  Devyn Phillips

## 2018-02-13 RX ORDER — HYDROCODONE BITARTRATE AND ACETAMINOPHEN 10; 325 MG/1; MG/1
1 TABLET ORAL EVERY 6 HOURS PRN
Qty: 90 TABLET | Refills: 0 | Status: SHIPPED | OUTPATIENT
Start: 2018-02-13 | End: 2018-03-06 | Stop reason: SDUPTHER

## 2018-02-13 RX ORDER — METHYLPHENIDATE HYDROCHLORIDE 10 MG/1
10 TABLET ORAL DAILY
Qty: 30 TABLET | Refills: 0 | Status: SHIPPED | OUTPATIENT
Start: 2018-02-13 | End: 2018-03-06 | Stop reason: SDUPTHER

## 2018-02-21 ENCOUNTER — TELEPHONE (OUTPATIENT)
Dept: NEUROSURGERY | Age: 48
End: 2018-02-21

## 2018-02-28 RX ORDER — PILOCARPINE HYDROCHLORIDE 7.5 MG/1
TABLET, FILM COATED ORAL
Qty: 270 TABLET | Refills: 0 | Status: SHIPPED | OUTPATIENT
Start: 2018-02-28 | End: 2018-07-24 | Stop reason: SDUPTHER

## 2018-03-06 ENCOUNTER — OFFICE VISIT (OUTPATIENT)
Dept: NEUROLOGY | Age: 48
End: 2018-03-06
Payer: MEDICARE

## 2018-03-06 ENCOUNTER — TELEPHONE (OUTPATIENT)
Dept: NEUROLOGY | Age: 48
End: 2018-03-06

## 2018-03-06 ENCOUNTER — TELEPHONE (OUTPATIENT)
Dept: INTERNAL MEDICINE | Age: 48
End: 2018-03-06

## 2018-03-06 VITALS — SYSTOLIC BLOOD PRESSURE: 118 MMHG | HEART RATE: 84 BPM | DIASTOLIC BLOOD PRESSURE: 81 MMHG

## 2018-03-06 DIAGNOSIS — K94.13 COLOSTOMY AND ENTEROSTOMY MALFUNCTION (HCC): ICD-10-CM

## 2018-03-06 DIAGNOSIS — R53.1 WEAKNESS: ICD-10-CM

## 2018-03-06 DIAGNOSIS — R20.0 NUMBNESS: ICD-10-CM

## 2018-03-06 DIAGNOSIS — K94.03 COLOSTOMY AND ENTEROSTOMY MALFUNCTION (HCC): ICD-10-CM

## 2018-03-06 DIAGNOSIS — Z78.9 DIFFICULT INTRAVENOUS ACCESS: Primary | ICD-10-CM

## 2018-03-06 DIAGNOSIS — M54.2 PAIN, NECK: ICD-10-CM

## 2018-03-06 DIAGNOSIS — M79.622 PAIN IN BOTH UPPER ARMS: ICD-10-CM

## 2018-03-06 DIAGNOSIS — M79.621 PAIN IN BOTH UPPER ARMS: ICD-10-CM

## 2018-03-06 DIAGNOSIS — M62.838 MUSCLE SPASTICITY: ICD-10-CM

## 2018-03-06 DIAGNOSIS — G35 MS (MULTIPLE SCLEROSIS) (HCC): Primary | ICD-10-CM

## 2018-03-06 DIAGNOSIS — R53.83 OTHER FATIGUE: ICD-10-CM

## 2018-03-06 PROCEDURE — 4004F PT TOBACCO SCREEN RCVD TLK: CPT | Performed by: PSYCHIATRY & NEUROLOGY

## 2018-03-06 PROCEDURE — 99213 OFFICE O/P EST LOW 20 MIN: CPT | Performed by: PSYCHIATRY & NEUROLOGY

## 2018-03-06 PROCEDURE — G8484 FLU IMMUNIZE NO ADMIN: HCPCS | Performed by: PSYCHIATRY & NEUROLOGY

## 2018-03-06 PROCEDURE — G8427 DOCREV CUR MEDS BY ELIG CLIN: HCPCS | Performed by: PSYCHIATRY & NEUROLOGY

## 2018-03-06 PROCEDURE — G8417 CALC BMI ABV UP PARAM F/U: HCPCS | Performed by: PSYCHIATRY & NEUROLOGY

## 2018-03-06 RX ORDER — HYDROCODONE BITARTRATE AND ACETAMINOPHEN 10; 325 MG/1; MG/1
1 TABLET ORAL EVERY 6 HOURS PRN
Qty: 90 TABLET | Refills: 0 | Status: SHIPPED | OUTPATIENT
Start: 2018-03-15 | End: 2018-04-12 | Stop reason: SDUPTHER

## 2018-03-06 RX ORDER — METHYLPHENIDATE HYDROCHLORIDE 20 MG/1
20 TABLET ORAL DAILY
Qty: 30 TABLET | Refills: 0 | Status: SHIPPED | OUTPATIENT
Start: 2018-03-15 | End: 2018-04-12 | Stop reason: SDUPTHER

## 2018-03-06 NOTE — PROGRESS NOTES
08/04/2016    Fatigue 08/04/2016    Weakness 08/04/2016    Sepsis ([address] 08/19/2016    Urinary tract infection 08/19/2016    Chronic pain 08/19/2016    Hx of seizure disorder 08/19/2016    Hypokalemia 08/20/2016    Hypokalemia 08/20/2016    Abnormal EKG     Encephalopathy 08/25/2016    Elevated troponin level     Neuropathic ulcer of left foot, limited to breakdown of skin ([address] 04/10/2017    Acute bronchitis 06/28/2017    Acute sinusitis 06/28/2017    Open wound of ankle 06/28/2017    Vitamin D deficiency 06/28/2017    Back pain 06/28/2017    Breakdown (mechanical) of cranial or spinal infusion catheter, initial encounter 10/25/2016    CAFL (chronic airflow limitation) ([address] 06/28/2017    Cough 06/28/2017    Encounter for screening for other disorder 06/28/2017    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    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    Difficult or painful urination 06/28/2017    Headache 06/28/2017    Hyperlipidemia 06/28/2017    Influenza 06/28/2017    Insomnia 06/28/2017    Breast lump 06/28/2017    Patient overweight 06/28/2017    Hay fever 06/28/2017    Decubitus ulcer of sacral region 06/28/2017    Current smoker 06/28/2017    Cobalamin deficiency 06/28/2017    Disease caused by fungus 06/28/2017    Pain, neck 09/06/2017    Colostomy and enterostomy malfunction ([address] 09/06/2017     Resolved Ambulatory Problems     Diagnosis Date Noted    No Resolved Ambulatory Problems     Past Medical History:   Diagnosis Date    Asthma     Back pain 6/28/2017    [facility]FL [facility]ch[facility] airflow limitation) ([facility]) 6/28/2017    Hay fever 6/28/2017    Headache 6/28/2017    Hyperlipidemia 6/28/2017    MS (multiple sclerosis) ([address]     Neuropathic ulcer of left foot, limited to breakdown of skin ([address] 4/10/2017    Peripheral vascular disease ([address]        Past Surgical History:   Procedure Laterality Date    BACLOFEN PUMP IMPLANTATION      COLOSTOMY      FEMUR FRACTURE SURGERY      Right MG CAPS Take 1 capsule by mouth daily      varenicline (CHANTIX STARTING MONTH PAK) 0.5 MG X 11 & 1 MG X 42 tablet See instructiions on starter pack 270 tablet 3    varenicline (CHANTIX STARTING MONTH PAK) 0.5 MG X 11 & 1 MG X 42 tablet Take by mouth. 53 tablet 0    levETIRAcetam (KEPPRA) 500 MG tablet TAKE 1 TABLET BY MOUTH TWICE DAILY. (Patient taking differently: DAILY) 60 tablet 11     No current facility-administered medications for this visit. /81   Pulse 84     Constitutional - well developed, well nourished. Eyes - conjunctiva normal.  Pupils react to light  Ear, nose, throat -hearing intact to finger rub No scars, masses, or lesions over external nose or ears, no atrophy of tongue  Neck-symmetric, no masses noted, no jugular vein distension  Respiration- chest wall appears symmetric, good expansion,   normal effort without use of accessory muscles  Musculoskeletal - no significant wasting of muscles noted, no bony deformities, gait no gross ataxia  Extremities-no clubbing, cyanosis or edema  Skin - warm, dry, and intact. No rash, erythema, or pallor.   Psychiatric - mood, affect, and behavior appear normal.      Neurological exam  Awake, alert, fluent oriented x 3 appropriate affect  Attention and concentration appear appropriate  Recent and remote memory appears unremarkable  Speech normal without dysarthria  No clear issues with language of fund of knowledge    Cranial Nerve Exam   CN II- Visual fields grossly unremarkable  CN III, IV,VI-EOMI, No nystagmus, conjugate eye movements, no ptosis    CN VII-no facial assymetry    Motor Exam  Antigravity in arms    Sensory Exam  Sensation reduced     Reflexes     No clonus ankles  No Perez's sign bilateral hands    Tremors- no tremors in hands or head noted    Gait  In wheelchair    Coordination  Finger to nose-unremarkable    Lab Results   Component Value Date    [id number] 517 08/20/2016     Lab Results   Component Value Date    WBC 5.0 indicated understanding of the management plan. At this time she will be referred to [facility] as per her wishes for some experimental treatments. She may consider decreasing her keppra to 1 tabs to bid. She is seeing Dr Grisel Bailey who manages baclofen pump. She will be continued  Ritalin to see if this helps at a future. She will be tried on Nuvgil. Lyrica restarted. She will be referred to [facility] for colostomy issues. She will be started. She will be continued on Ocrevus for progressive MS. her hepatitis B panel was unremkarkable. Her EMG with NCs of the arms was suggestive of an ulnar neuropathy on the left. her MRI of the brain had stable white matter change. Her MRI of the c spine had some minimal disc bulging but no MS plaques Her x rays of her hips due to pain were unremarkable except for bladder stone along with pin in right hip. She's to follow-up with me in approximately 3 months and call with any further problems. Continue present care      Plan    No orders of the defined types were placed in this encounter. Orders Placed This Encounter   Medications    HYDROcodone-acetaminophen (NORCO)  MG per tablet     Sig: Take 1 tablet by mouth every 6 hours as needed for Pain for up to 30 days Must last 30 days. Dispense:  90 tablet     Refill:  0    methylphenidate (RITALIN) 20 MG tablet     Sig: Take 1 tablet by mouth daily for 30 days. Dispense:  30 tablet     Refill:  0       Return in about 3 months (around 6/6/2018).

## 2018-03-06 NOTE — TELEPHONE ENCOUNTER
Spoke with [facility],  She said they faxed over an order for Dr. Dennis Paul to sign and they have not gotten it back.   [phone] Reference [id number]  3/6 1111

## 2018-03-07 NOTE — TELEPHONE ENCOUNTER
Called [facility] to have them fax the forms, as I have not seen them come through as of yet. Left a vm with patients name, , reference number, our number and fax number and told her to call if she needed anything.

## 2018-03-21 ENCOUNTER — HOSPITAL ENCOUNTER (OUTPATIENT)
Dept: PAIN MANAGEMENT | Age: 48
Discharge: HOME | End: 2018-03-21
Payer: MEDICARE

## 2018-03-21 PROCEDURE — 62369 ANAL SP INF PMP W/REPRG&FILL: CPT

## 2018-03-21 PROCEDURE — 2500000003 HC RX 250 WO HCPCS

## 2018-03-26 ENCOUNTER — HOSPITAL ENCOUNTER (OUTPATIENT)
Dept: WOUND CARE | Age: 48
Discharge: HOME | End: 2018-03-26
Payer: MEDICARE

## 2018-03-26 ENCOUNTER — OFFICE VISIT (OUTPATIENT)
Dept: INTERNAL MEDICINE | Age: 48
End: 2018-03-26
Payer: MEDICARE

## 2018-03-26 VITALS
DIASTOLIC BLOOD PRESSURE: 62 MMHG | OXYGEN SATURATION: 99 % | HEART RATE: 55 BPM | TEMPERATURE: 97.7 F | SYSTOLIC BLOOD PRESSURE: 124 MMHG | HEIGHT: 69 IN

## 2018-03-26 VITALS — TEMPERATURE: 97.9 F | BODY MASS INDEX: 23.41 KG/M2 | HEIGHT: 69 IN | RESPIRATION RATE: 16 BRPM | WEIGHT: 158.07 LBS

## 2018-03-26 DIAGNOSIS — L97.521 NEUROPATHIC ULCER OF LEFT FOOT, LIMITED TO BREAKDOWN OF SKIN (HCC): ICD-10-CM

## 2018-03-26 DIAGNOSIS — G35 MS (MULTIPLE SCLEROSIS) (HCC): Chronic | ICD-10-CM

## 2018-03-26 DIAGNOSIS — M62.838 MUSCLE SPASTICITY: Chronic | ICD-10-CM

## 2018-03-26 DIAGNOSIS — J06.9 UPPER RESPIRATORY TRACT INFECTION, UNSPECIFIED TYPE: Primary | ICD-10-CM

## 2018-03-26 DIAGNOSIS — G89.29 OTHER CHRONIC PAIN: Chronic | ICD-10-CM

## 2018-03-26 PROCEDURE — G8427 DOCREV CUR MEDS BY ELIG CLIN: HCPCS | Performed by: NURSE PRACTITIONER

## 2018-03-26 PROCEDURE — 99214 OFFICE O/P EST MOD 30 MIN: CPT | Performed by: NURSE PRACTITIONER

## 2018-03-26 PROCEDURE — G8484 FLU IMMUNIZE NO ADMIN: HCPCS | Performed by: NURSE PRACTITIONER

## 2018-03-26 PROCEDURE — 4004F PT TOBACCO SCREEN RCVD TLK: CPT | Performed by: NURSE PRACTITIONER

## 2018-03-26 PROCEDURE — G8417 CALC BMI ABV UP PARAM F/U: HCPCS | Performed by: NURSE PRACTITIONER

## 2018-03-26 PROCEDURE — 99214 OFFICE O/P EST MOD 30 MIN: CPT

## 2018-03-26 RX ORDER — FLUCONAZOLE 100 MG/1
100 TABLET ORAL DAILY PRN
Qty: 3 TABLET | Refills: 1 | Status: SHIPPED | OUTPATIENT
Start: 2018-03-26 | End: 2018-07-16 | Stop reason: SDUPTHER

## 2018-03-26 RX ORDER — ONDANSETRON 4 MG/1
4 TABLET, ORALLY DISINTEGRATING ORAL EVERY 8 HOURS PRN
Qty: 15 TABLET | Refills: 0 | Status: SHIPPED | OUTPATIENT
Start: 2018-03-26 | End: 2018-07-16 | Stop reason: SDUPTHER

## 2018-03-26 RX ORDER — IPRATROPIUM BROMIDE AND ALBUTEROL SULFATE 2.5; .5 MG/3ML; MG/3ML
SOLUTION RESPIRATORY (INHALATION)
Qty: 450 ML | Refills: 0 | Status: ON HOLD | OUTPATIENT
Start: 2018-03-26 | End: 2018-06-26

## 2018-03-26 RX ORDER — PILOCARPINE HYDROCHLORIDE 7.5 MG/1
7.5 TABLET, FILM COATED ORAL 3 TIMES DAILY
Qty: 90 TABLET | Refills: 5 | Status: ON HOLD | OUTPATIENT
Start: 2018-03-26 | End: 2018-06-26

## 2018-03-26 RX ORDER — DULOXETIN HYDROCHLORIDE 30 MG/1
30 CAPSULE, DELAYED RELEASE ORAL DAILY
Qty: 30 CAPSULE | Refills: 5 | Status: SHIPPED | OUTPATIENT
Start: 2018-03-26 | End: 2018-11-15 | Stop reason: SDUPTHER

## 2018-03-26 RX ORDER — IPRATROPIUM BROMIDE AND ALBUTEROL SULFATE 2.5; .5 MG/3ML; MG/3ML
SOLUTION RESPIRATORY (INHALATION)
Qty: 450 ML | Refills: 0 | Status: SHIPPED | OUTPATIENT
Start: 2018-03-26 | End: 2018-07-24 | Stop reason: SDUPTHER

## 2018-03-26 RX ORDER — BACLOFEN 10 MG/1
10 TABLET ORAL 2 TIMES DAILY PRN
Qty: 60 TABLET | Refills: 2 | Status: SHIPPED | OUTPATIENT
Start: 2018-03-26 | End: 2018-08-15 | Stop reason: SDUPTHER

## 2018-03-26 RX ORDER — PILOCARPINE HYDROCHLORIDE 7.5 MG/1
7.5 TABLET, FILM COATED ORAL 3 TIMES DAILY
COMMUNITY
End: 2018-03-26 | Stop reason: SDUPTHER

## 2018-03-26 RX ORDER — METHYLPREDNISOLONE 4 MG/1
TABLET ORAL
Qty: 1 KIT | Refills: 0 | Status: SHIPPED | OUTPATIENT
Start: 2018-03-26 | End: 2018-04-01

## 2018-03-26 RX ORDER — FLUTICASONE PROPIONATE 50 MCG
2 SPRAY, SUSPENSION (ML) NASAL DAILY
Qty: 1 BOTTLE | Refills: 5 | Status: SHIPPED | OUTPATIENT
Start: 2018-03-26 | End: 2019-01-31 | Stop reason: ALTCHOICE

## 2018-03-26 RX ORDER — AZITHROMYCIN 250 MG/1
TABLET, FILM COATED ORAL
Qty: 1 PACKET | Refills: 0 | Status: SHIPPED | OUTPATIENT
Start: 2018-03-26 | End: 2018-06-12

## 2018-03-26 ASSESSMENT — ENCOUNTER SYMPTOMS
COUGH: 1
PHOTOPHOBIA: 0
SHORTNESS OF BREATH: 0
BACK PAIN: 0
NAUSEA: 0
COLOR CHANGE: 0
VOMITING: 0
VOICE CHANGE: 0
RHINORRHEA: 0

## 2018-03-26 ASSESSMENT — PAIN SCALES - GENERAL: PAINLEVEL_OUTOF10: 4

## 2018-03-26 ASSESSMENT — PAIN DESCRIPTION - DESCRIPTORS: DESCRIPTORS: OTHER (COMMENT)

## 2018-03-26 ASSESSMENT — PAIN DESCRIPTION - PROGRESSION: CLINICAL_PROGRESSION: NOT CHANGED

## 2018-03-26 ASSESSMENT — PAIN DESCRIPTION - FREQUENCY: FREQUENCY: CONTINUOUS

## 2018-03-26 ASSESSMENT — PAIN DESCRIPTION - PAIN TYPE: TYPE: CHRONIC PAIN

## 2018-03-26 ASSESSMENT — PAIN DESCRIPTION - LOCATION: LOCATION: BACK

## 2018-03-26 NOTE — PATIENT INSTRUCTIONS
1. Begin medrol dose pack and zpack as directed. 2. May take OTC delsym for cough. 3. Mucinex as needed to thin secretions. 4. Increase water intake. 5. Medication refills sent to pharmacy. 6. Follow-up as needed and for routine visit with Dr Donavan Prado.

## 2018-03-26 NOTE — PROGRESS NOTES
08/04/2016    Numbness 08/04/2016    Fatigue 08/04/2016    Weakness 08/04/2016    Neuropathic ulcer of right foot, limited to breakdown of skin ([facility] 02/01/2016    Peripheral vascular disease ([facility] [address] 02/01/2016     Updating Deprecated Diagnoses      Muscle spasticity      Current Outpatient Prescriptions   Medication Sig Dispense Refill    Ocrelizumab (OCREVUS IV) Infuse intravenously      albuterol sulfate (PROAIR RESPICLICK) 353 (90 Base) MCG/ACT aerosol powder inhalation Inhale 1 puff into the lungs every 6 hours as needed for Wheezing or Shortness of Breath 1 Inhaler 3    pilocarpine (SALAGEN) 7.5 MG tablet Take 1 tablet by mouth 3 times daily 90 tablet 5    ondansetron (ZOFRAN ODT) 4 MG disintegrating tablet Take 1 tablet by mouth every 8 hours as needed for Nausea or Vomiting 15 tablet 0    mupirocin (BACTROBAN) 2 % ointment Twice daily to affected area 1 Tube 0    fluticasone (FLONASE) 50 MCG/ACT nasal spray 2 sprays by Nasal route daily 1 Bottle 5    fluconazole (DIFLUCAN) 100 MG tablet Take 1 tablet by mouth daily as needed (pt takes 1 tablet daily x3 doses prn) 3 tablet 1    DULoxetine (CYMBALTA) 30 MG extended release capsule Take 1 capsule by mouth daily 30 capsule 5    baclofen (LIORESAL) 10 MG tablet Take 1 tablet by mouth 2 times daily as needed (muscle spasms) 60 tablet 2    azithromycin (ZITHROMAX) 250 MG tablet Take 2 tabs (500 mg) on Day 1, and take 1 tab (250 mg) on days 2 through 5. 1 packet 0    HYDROcodone-acetaminophen (NORCO)  MG per tablet Take 1 tablet by mouth every 6 hours as needed for Pain for up to 30 days Must last 30 days. 90 tablet 0    methylphenidate (RITALIN) 20 MG tablet Take 1 tablet by mouth daily for 30 days. 30 tablet 0    pilocarpine (SALAGEN) 7.5 MG tablet TAKE 1 TABLET BY MOUTH THREE TIMES DAILY.  270 tablet 0    tiZANidine (ZANAFLEX) 4 MG tablet TAKE 3 TABLETS TWICE DAILY 180 tablet 5    levocetirizine (XYZAL) 5 MG tablet TAKE 1 TABLET IN THE significant edema. No signs atheroembolic event. Pulmonary - effort appears normal.  No respiratory distress. Lungs - Breath sounds normal. No wheezes or rales. GI - Abdomen - soft, non tender, bowel sounds X 4 quadrants. No guarding or rebound tenderness. No distension or palpable mass. Genitourinary - deferred. Musculoskeletal - ROM appears normal.  No significant edema. Neurologic - alert and oriented X 3. Physiologic. Psychiatric - mood, affect, and behavior appear normal.  Judgment and thought processes appear normal.  Skin - left 2nd toe     Post Debridement Measurements and Assessment:  Pressure Ulcer 08/19/16 Ankle Outer;Left;Right reddened with scabs over (Active)   Number of days: 584       Wound 02/01/16 Other (Comment) Toe (Comment  which one) Left WOUND 56 LEFT GREAT TOE (Active)   Number of days: 784       Wound 08/19/16 Blister Toe (Comment  which one) pink and healing  (Active)   Number of days: 584       Wound 03/26/18 Toe (Comment  which one) Left WOUND 59 LEFT GREAT TOE NEUROPATHIC ULCER (Active)   Wound Type Wound 3/26/2018  3:22 PM   Wound Other 3/26/2018  3:22 PM   Dressing Status Old drainage; Intact 3/26/2018  3:22 PM   Wound Cleansed Rinsed/Irrigated with saline 3/26/2018  3:22 PM   Wound Length (cm) 0 cm 3/26/2018  3:49 PM   Wound Width (cm) 0 cm 3/26/2018  3:49 PM   Wound Depth (cm)  0 3/26/2018  3:49 PM   Calculated Wound Size (cm^2) (l*w) 0 cm^2 3/26/2018  3:49 PM   Change in Wound Size % (l*w) 100 3/26/2018  3:49 PM   Wound Assessment Granulation tissue;Pink;Red;Slough; Yellow 3/26/2018  3:22 PM   Drainage Amount Moderate 3/26/2018  3:22 PM   Drainage Description Serosanguinous 3/26/2018  3:22 PM   Odor None 3/26/2018  3:22 PM   Margins Attached edges 3/26/2018  3:22 PM   [place]-wound Assessment Pink 3/26/2018  3:22 PM   Non-staged Wound Description Full thickness 3/26/2018  3:22 PM   [place]%Wound Bed 90 3/26/2018  3:22 PM   Yellow%Wound Bed 10 3/26/2018  3:22 PM   Debridement

## 2018-03-26 NOTE — PROGRESS NOTES
[facility]  [facility] [address]  Dept: [phone]  Dept Fax: [phone]  Loc: [phone]    Asia Rhodes is a 53 y.o. female who presents today for her medical conditions/complaints as noted below. Asia Rhodes is c/o of Medication Refill (Medication Refill) and Congestion (Cough and congestion)        HPI:     HPI   Chief Complaint   Patient presents with    Medication Refill     Medication Refill    Congestion     Cough and congestion     Patient presents today for evaluation of cough and congestion as well as for medication refills. She states the cough and congestion has been present x 1 week. She has not taken any OTC medication; she denies fever or shortness of breath. She states the cough has been productive with green/yellow sputum. She is otherwise doing well on her medications for MS and pain; she states some of her medications do make her nauseous throughout the day and she would like some zofran.      Past Medical History:   Diagnosis Date    Asthma     Back pain 6/28/2017    CAFL [facility]chronic airflow limitation) ([facility]) 6/28/2017    Hay fever 6/28/2017    Headache 6/28/2017    Hyperlipidemia 6/28/2017    MS (multiple sclerosis) ([facility])     Neuropathic ulcer of left foot, limited to breakdown of skin ([address] 4/10/2017    Peripheral vascular disease ([facility] [address]       Past Surgical History:   Procedure Laterality Date    BACLOFEN PUMP IMPLANTATION      COLOSTOMY      FEMUR FRACTURE SURGERY      Right    HYSTERECTOMY      OTHER SURGICAL HISTORY      urostomy    OTHER SURGICAL HISTORY      pain pump    TOE AMPUTATION      L last toe    TONSILLECTOMY      URETEROTOMY         Vitals 3/26/2018 3/6/2018 12/11/2017 12/6/2017 10/31/2017 03/96/7517   SYSTOLIC 904 [id number] - [phone]   DIASTOLIC 62 81 - 60 68 75   Site Left Arm - - - Left Arm -   Position Sitting - - - Sitting -   Cuff Size - - - - Large Adult - pain.   Musculoskeletal: Negative for back pain and neck pain. Skin: Negative for color change and rash. Allergic/Immunologic: Negative for environmental allergies and food allergies. Neurological: Negative for dizziness, speech difficulty and headaches. Hematological: Does not bruise/bleed easily. Psychiatric/Behavioral: Negative for sleep disturbance and suicidal ideas. Objective:     Physical Exam   Constitutional: She is oriented to person, place, and time. She appears well-developed and well-nourished. Wheel-chair bound   HENT:   Head: Atraumatic. Right Ear: External ear normal.   Left Ear: External ear normal.   Nose: Nose normal.   Mouth/Throat: Oropharynx is clear and moist.   Eyes: Conjunctivae are normal. Pupils are equal, round, and reactive to light. Neck: Normal range of motion. Neck supple. Cardiovascular: Normal rate, regular rhythm, S1 normal, S2 normal and normal heart sounds. Pulmonary/Chest: Effort normal and breath sounds normal.   Abdominal: Soft. Bowel sounds are normal.   Musculoskeletal: Normal range of motion. Neurological: She is alert and oriented to person, place, and time. Skin: Skin is warm and dry. Bilateral foot dressings; she is going to woundcare next   Psychiatric: She has a normal mood and affect. Her behavior is normal.   Nursing note and vitals reviewed. /62 (Site: Left Arm, Position: Sitting)   Pulse 55   Temp 97.7 °F (36.5 °C)   Ht 5' 9\" (1.753 m)   SpO2 99%     Assessment:      1. Upper respiratory tract infection, unspecified type     2. MS (multiple sclerosis) (HCC)     3. Other chronic pain     4. Muscle spasticity         Plan:     1. Begin medrol dose pack and zpack as directed. 2. May take OTC delsym for cough. 3. Mucinex as needed to thin secretions. 4. Increase water intake. 5. Medication refills sent to pharmacy. 6. Follow-up as needed and for routine visit with Dr Rony Christiansen.      Patient given educational materials - see patient instructions. Discussed use, benefit, and side effects of prescribed medications. All patient questions answered. Pt voiced understanding. Reviewed health maintenance. Instructed to continue current medications, diet and exercise. Patient agreed with treatment plan. Follow up as directed. MEDICATIONS:  Orders Placed This Encounter   Medications    albuterol sulfate (PROAIR RESPICLICK) 411 (90 Base) MCG/ACT aerosol powder inhalation     Sig: Inhale 1 puff into the lungs every 6 hours as needed for Wheezing or Shortness of Breath     Dispense:  1 Inhaler     Refill:  3    pilocarpine (SALAGEN) 7.5 MG tablet     Sig: Take 1 tablet by mouth 3 times daily     Dispense:  90 tablet     Refill:  5    ondansetron (ZOFRAN ODT) 4 MG disintegrating tablet     Sig: Take 1 tablet by mouth every 8 hours as needed for Nausea or Vomiting     Dispense:  15 tablet     Refill:  0    mupirocin (BACTROBAN) 2 % ointment     Sig: Twice daily to affected area     Dispense:  1 Tube     Refill:  0    fluticasone (FLONASE) 50 MCG/ACT nasal spray     Si sprays by Nasal route daily     Dispense:  1 Bottle     Refill:  5    fluconazole (DIFLUCAN) 100 MG tablet     Sig: Take 1 tablet by mouth daily as needed (pt takes 1 tablet daily x3 doses prn)     Dispense:  3 tablet     Refill:  1    DULoxetine (CYMBALTA) 30 MG extended release capsule     Sig: Take 1 capsule by mouth daily     Dispense:  30 capsule     Refill:  5    baclofen (LIORESAL) 10 MG tablet     Sig: Take 1 tablet by mouth 2 times daily as needed (muscle spasms)     Dispense:  60 tablet     Refill:  2         ORDERS:  No orders of the defined types were placed in this encounter. Follow-up:  Return if symptoms worsen or fail to improve. PATIENT INSTRUCTIONS:  There are no Patient Instructions on file for this visit.   Electronically signed by EFREN Awad on 3/26/2018 at 2:48 PM

## 2018-03-28 ENCOUNTER — OFFICE VISIT (OUTPATIENT)
Dept: SURGERY | Age: 48
End: 2018-03-28
Payer: MEDICARE

## 2018-03-28 VITALS
SYSTOLIC BLOOD PRESSURE: 104 MMHG | TEMPERATURE: 97.4 F | DIASTOLIC BLOOD PRESSURE: 60 MMHG | WEIGHT: 155 LBS | HEIGHT: 68 IN | BODY MASS INDEX: 23.49 KG/M2

## 2018-03-28 DIAGNOSIS — G35 MS (MULTIPLE SCLEROSIS) (HCC): ICD-10-CM

## 2018-03-28 DIAGNOSIS — I87.8 POOR VENOUS ACCESS: Primary | ICD-10-CM

## 2018-03-28 PROCEDURE — 99214 OFFICE O/P EST MOD 30 MIN: CPT | Performed by: PHYSICIAN ASSISTANT

## 2018-03-28 PROCEDURE — G8484 FLU IMMUNIZE NO ADMIN: HCPCS | Performed by: PHYSICIAN ASSISTANT

## 2018-03-28 PROCEDURE — G8427 DOCREV CUR MEDS BY ELIG CLIN: HCPCS | Performed by: PHYSICIAN ASSISTANT

## 2018-03-28 PROCEDURE — G8420 CALC BMI NORM PARAMETERS: HCPCS | Performed by: PHYSICIAN ASSISTANT

## 2018-03-28 PROCEDURE — 4004F PT TOBACCO SCREEN RCVD TLK: CPT | Performed by: PHYSICIAN ASSISTANT

## 2018-04-13 RX ORDER — HYDROCODONE BITARTRATE AND ACETAMINOPHEN 10; 325 MG/1; MG/1
1 TABLET ORAL DAILY
Qty: 90 TABLET | Refills: 0 | Status: SHIPPED | OUTPATIENT
Start: 2018-04-13 | End: 2018-04-17 | Stop reason: SDUPTHER

## 2018-04-13 RX ORDER — METHYLPHENIDATE HYDROCHLORIDE 20 MG/1
20 TABLET ORAL DAILY
Qty: 30 TABLET | Refills: 0 | Status: SHIPPED | OUTPATIENT
Start: 2018-04-13 | End: 2018-05-13

## 2018-04-18 RX ORDER — HYDROCODONE BITARTRATE AND ACETAMINOPHEN 10; 325 MG/1; MG/1
1 TABLET ORAL 3 TIMES DAILY PRN
Qty: 90 TABLET | Refills: 0 | Status: SHIPPED | OUTPATIENT
Start: 2018-04-18 | End: 2018-05-14 | Stop reason: SDUPTHER

## 2018-05-01 ENCOUNTER — TELEPHONE (OUTPATIENT)
Dept: NEUROLOGY | Age: 48
End: 2018-05-01

## 2018-05-15 RX ORDER — METHYLPHENIDATE HYDROCHLORIDE 10 MG/1
20 TABLET ORAL DAILY
Qty: 30 TABLET | Refills: 0 | Status: SHIPPED | OUTPATIENT
Start: 2018-05-15 | End: 2018-06-12 | Stop reason: SDUPTHER

## 2018-05-15 RX ORDER — HYDROCODONE BITARTRATE AND ACETAMINOPHEN 10; 325 MG/1; MG/1
1 TABLET ORAL 3 TIMES DAILY PRN
Qty: 90 TABLET | Refills: 0 | Status: SHIPPED | OUTPATIENT
Start: 2018-05-18 | End: 2018-06-12 | Stop reason: SDUPTHER

## 2018-05-22 ENCOUNTER — HOSPITAL ENCOUNTER (OUTPATIENT)
Dept: INFUSION THERAPY | Age: 48
Setting detail: INFUSION SERIES
Discharge: HOME | End: 2018-05-22
Payer: MEDICARE

## 2018-05-22 VITALS
OXYGEN SATURATION: 100 % | DIASTOLIC BLOOD PRESSURE: 72 MMHG | SYSTOLIC BLOOD PRESSURE: 122 MMHG | RESPIRATION RATE: 17 BRPM | HEART RATE: 72 BPM | TEMPERATURE: 98.3 F

## 2018-05-22 DIAGNOSIS — G35 MS (MULTIPLE SCLEROSIS) (HCC): ICD-10-CM

## 2018-05-22 PROCEDURE — 6360000002 HC RX W HCPCS: Performed by: PSYCHIATRY & NEUROLOGY

## 2018-05-22 PROCEDURE — 2580000003 HC RX 258: Performed by: PSYCHIATRY & NEUROLOGY

## 2018-05-22 PROCEDURE — 96413 CHEMO IV INFUSION 1 HR: CPT

## 2018-05-22 PROCEDURE — 96415 CHEMO IV INFUSION ADDL HR: CPT

## 2018-05-22 RX ORDER — METHYLPREDNISOLONE SODIUM SUCCINATE 125 MG/2ML
100 INJECTION, POWDER, LYOPHILIZED, FOR SOLUTION INTRAMUSCULAR; INTRAVENOUS ONCE
Status: COMPLETED | OUTPATIENT
Start: 2018-05-22 | End: 2018-05-22

## 2018-05-22 RX ADMIN — OCRELIZUMAB 600 MG: 300 INJECTION INTRAVENOUS at 10:13

## 2018-05-22 RX ADMIN — METHYLPREDNISOLONE SODIUM SUCCINATE 100 MG: 125 INJECTION, POWDER, FOR SOLUTION INTRAMUSCULAR; INTRAVENOUS at 10:03

## 2018-05-29 ENCOUNTER — TELEPHONE (OUTPATIENT)
Dept: SURGERY | Age: 48
End: 2018-05-29

## 2018-06-12 ENCOUNTER — OFFICE VISIT (OUTPATIENT)
Dept: NEUROLOGY | Age: 48
End: 2018-06-12
Payer: MEDICARE

## 2018-06-12 ENCOUNTER — TELEPHONE (OUTPATIENT)
Dept: NEUROLOGY | Age: 48
End: 2018-06-12

## 2018-06-12 ENCOUNTER — HOSPITAL ENCOUNTER (OUTPATIENT)
Dept: PAIN MANAGEMENT | Age: 48
Discharge: HOME | End: 2018-06-12
Payer: MEDICARE

## 2018-06-12 VITALS — HEART RATE: 56 BPM | SYSTOLIC BLOOD PRESSURE: 102 MMHG | DIASTOLIC BLOOD PRESSURE: 66 MMHG

## 2018-06-12 DIAGNOSIS — M79.621 PAIN IN BOTH UPPER ARMS: ICD-10-CM

## 2018-06-12 DIAGNOSIS — M54.2 PAIN, NECK: ICD-10-CM

## 2018-06-12 DIAGNOSIS — M62.838 MUSCLE SPASTICITY: ICD-10-CM

## 2018-06-12 DIAGNOSIS — R20.0 NUMBNESS: ICD-10-CM

## 2018-06-12 DIAGNOSIS — M79.622 PAIN IN BOTH UPPER ARMS: ICD-10-CM

## 2018-06-12 DIAGNOSIS — G35 MS (MULTIPLE SCLEROSIS) (HCC): Primary | ICD-10-CM

## 2018-06-12 DIAGNOSIS — I73.9 PERIPHERAL VASCULAR DISEASE (HCC): ICD-10-CM

## 2018-06-12 DIAGNOSIS — R53.83 OTHER FATIGUE: ICD-10-CM

## 2018-06-12 DIAGNOSIS — R53.1 WEAKNESS: ICD-10-CM

## 2018-06-12 DIAGNOSIS — R53.1 WEAKNESS: Primary | ICD-10-CM

## 2018-06-12 PROCEDURE — 4004F PT TOBACCO SCREEN RCVD TLK: CPT | Performed by: PSYCHIATRY & NEUROLOGY

## 2018-06-12 PROCEDURE — 62369 ANAL SP INF PMP W/REPRG&FILL: CPT

## 2018-06-12 PROCEDURE — G8420 CALC BMI NORM PARAMETERS: HCPCS | Performed by: PSYCHIATRY & NEUROLOGY

## 2018-06-12 PROCEDURE — 99214 OFFICE O/P EST MOD 30 MIN: CPT | Performed by: PSYCHIATRY & NEUROLOGY

## 2018-06-12 PROCEDURE — 2500000003 HC RX 250 WO HCPCS

## 2018-06-12 PROCEDURE — G8427 DOCREV CUR MEDS BY ELIG CLIN: HCPCS | Performed by: PSYCHIATRY & NEUROLOGY

## 2018-06-12 RX ORDER — METHYLPHENIDATE HYDROCHLORIDE 5 MG/1
5 TABLET ORAL DAILY
Qty: 30 TABLET | Refills: 0 | Status: SHIPPED | OUTPATIENT
Start: 2018-06-12 | End: 2018-07-12 | Stop reason: SDUPTHER

## 2018-06-12 RX ORDER — LACTULOSE 10 G/15ML
20 SOLUTION ORAL 3 TIMES DAILY
Qty: 1 BOTTLE | Refills: 5 | Status: SHIPPED | OUTPATIENT
Start: 2018-06-12 | End: 2021-01-11

## 2018-06-12 RX ORDER — METHYLPHENIDATE HYDROCHLORIDE 20 MG/1
20 TABLET ORAL DAILY
Qty: 30 TABLET | Refills: 0 | Status: SHIPPED | OUTPATIENT
Start: 2018-06-12 | End: 2018-07-12 | Stop reason: SDUPTHER

## 2018-06-12 RX ORDER — PREGABALIN 300 MG/1
300 CAPSULE ORAL 2 TIMES DAILY
Qty: 60 CAPSULE | Refills: 5 | Status: SHIPPED | OUTPATIENT
Start: 2018-06-12 | End: 2018-09-12

## 2018-06-12 RX ORDER — HYDROCODONE BITARTRATE AND ACETAMINOPHEN 10; 325 MG/1; MG/1
1 TABLET ORAL 3 TIMES DAILY PRN
Qty: 90 TABLET | Refills: 0 | Status: SHIPPED | OUTPATIENT
Start: 2018-06-17 | End: 2018-07-12 | Stop reason: SDUPTHER

## 2018-06-26 ENCOUNTER — ANESTHESIA EVENT (OUTPATIENT)
Dept: OPERATING ROOM | Age: 48
End: 2018-06-26
Payer: MEDICARE

## 2018-06-26 ENCOUNTER — APPOINTMENT (OUTPATIENT)
Dept: GENERAL RADIOLOGY | Age: 48
End: 2018-06-26
Attending: SURGERY
Payer: MEDICARE

## 2018-06-26 ENCOUNTER — HOSPITAL ENCOUNTER (OUTPATIENT)
Age: 48
Setting detail: OUTPATIENT SURGERY
Discharge: HOME | End: 2018-06-26
Attending: SURGERY | Admitting: SURGERY
Payer: MEDICARE

## 2018-06-26 ENCOUNTER — ANESTHESIA (OUTPATIENT)
Dept: OPERATING ROOM | Age: 48
End: 2018-06-26
Payer: MEDICARE

## 2018-06-26 VITALS
SYSTOLIC BLOOD PRESSURE: 130 MMHG | TEMPERATURE: 97.8 F | BODY MASS INDEX: 25.33 KG/M2 | RESPIRATION RATE: 16 BRPM | HEART RATE: 67 BPM | OXYGEN SATURATION: 100 % | WEIGHT: 171 LBS | DIASTOLIC BLOOD PRESSURE: 78 MMHG | HEIGHT: 69 IN

## 2018-06-26 VITALS — DIASTOLIC BLOOD PRESSURE: 59 MMHG | SYSTOLIC BLOOD PRESSURE: 101 MMHG | OXYGEN SATURATION: 100 %

## 2018-06-26 DIAGNOSIS — G35 MS (MULTIPLE SCLEROSIS) (HCC): Primary | Chronic | ICD-10-CM

## 2018-06-26 LAB
ALBUMIN SERPL-MCNC: 4.2 G/DL (ref 3.5–5.2)
ALP BLD-CCNC: 74 U/L (ref 35–104)
ALT SERPL-CCNC: 21 U/L (ref 5–33)
ANION GAP SERPL CALCULATED.3IONS-SCNC: 14 MMOL/L (ref 7–19)
AST SERPL-CCNC: 15 U/L (ref 5–32)
BILIRUB SERPL-MCNC: 0.4 MG/DL (ref 0.2–1.2)
BUN BLDV-MCNC: 11 MG/DL (ref 6–20)
CALCIUM SERPL-MCNC: 9.5 MG/DL (ref 8.6–10)
CHLORIDE BLD-SCNC: 105 MMOL/L (ref 98–111)
CO2: 24 MMOL/L (ref 22–29)
CREAT SERPL-MCNC: <0.5 MG/DL (ref 0.5–0.9)
GFR NON-AFRICAN AMERICAN: >60
GLUCOSE BLD-MCNC: 97 MG/DL (ref 74–109)
HCT VFR BLD CALC: 43.5 % (ref 37–47)
HEMOGLOBIN: 13.3 G/DL (ref 12–16)
MCH RBC QN AUTO: 28.1 PG (ref 27–31)
MCHC RBC AUTO-ENTMCNC: 30.6 G/DL (ref 33–37)
MCV RBC AUTO: 91.8 FL (ref 81–99)
PDW BLD-RTO: 13.5 % (ref 11.5–14.5)
PLATELET # BLD: 210 K/UL (ref 130–400)
PMV BLD AUTO: 10.9 FL (ref 9.4–12.3)
POTASSIUM SERPL-SCNC: 4 MMOL/L (ref 3.5–4.9)
RBC # BLD: 4.74 M/UL (ref 4.2–5.4)
SODIUM BLD-SCNC: 143 MMOL/L (ref 136–145)
TOTAL PROTEIN: 6.6 G/DL (ref 6.6–8.7)
WBC # BLD: 6.2 K/UL (ref 4.8–10.8)

## 2018-06-26 PROCEDURE — 80053 COMPREHEN METABOLIC PANEL: CPT

## 2018-06-26 PROCEDURE — 7100000000 HC PACU RECOVERY - FIRST 15 MIN: Performed by: SURGERY

## 2018-06-26 PROCEDURE — 36561 INSERT TUNNELED CV CATH: CPT | Performed by: SURGERY

## 2018-06-26 PROCEDURE — 3600000003 HC SURGERY LEVEL 3 BASE: Performed by: SURGERY

## 2018-06-26 PROCEDURE — 3700000001 HC ADD 15 MINUTES (ANESTHESIA): Performed by: SURGERY

## 2018-06-26 PROCEDURE — 2500000003 HC RX 250 WO HCPCS: Performed by: SURGERY

## 2018-06-26 PROCEDURE — 6360000002 HC RX W HCPCS: Performed by: SURGERY

## 2018-06-26 PROCEDURE — 77001 FLUOROGUIDE FOR VEIN DEVICE: CPT | Performed by: SURGERY

## 2018-06-26 PROCEDURE — 3700000000 HC ANESTHESIA ATTENDED CARE: Performed by: SURGERY

## 2018-06-26 PROCEDURE — 36415 COLL VENOUS BLD VENIPUNCTURE: CPT

## 2018-06-26 PROCEDURE — 7100000011 HC PHASE II RECOVERY - ADDTL 15 MIN: Performed by: SURGERY

## 2018-06-26 PROCEDURE — 7100000010 HC PHASE II RECOVERY - FIRST 15 MIN: Performed by: SURGERY

## 2018-06-26 PROCEDURE — 85027 COMPLETE CBC AUTOMATED: CPT

## 2018-06-26 PROCEDURE — 3209999900 FLUORO FOR SURGICAL PROCEDURES

## 2018-06-26 PROCEDURE — 71045 X-RAY EXAM CHEST 1 VIEW: CPT

## 2018-06-26 PROCEDURE — 3600000013 HC SURGERY LEVEL 3 ADDTL 15MIN: Performed by: SURGERY

## 2018-06-26 PROCEDURE — 2580000003 HC RX 258: Performed by: NURSE ANESTHETIST, CERTIFIED REGISTERED

## 2018-06-26 PROCEDURE — 7100000001 HC PACU RECOVERY - ADDTL 15 MIN: Performed by: SURGERY

## 2018-06-26 PROCEDURE — 6360000002 HC RX W HCPCS: Performed by: NURSE ANESTHETIST, CERTIFIED REGISTERED

## 2018-06-26 PROCEDURE — C1751 CATH, INF, PER/CENT/MIDLINE: HCPCS | Performed by: SURGERY

## 2018-06-26 PROCEDURE — 2580000003 HC RX 258: Performed by: SURGERY

## 2018-06-26 DEVICE — PORT INFUS PLAS SGL LUMN W/ 9.6FR SIL CATH AIRGUARD VLV: Type: IMPLANTABLE DEVICE | Status: FUNCTIONAL

## 2018-06-26 RX ORDER — DIPHENHYDRAMINE HYDROCHLORIDE 50 MG/ML
12.5 INJECTION INTRAMUSCULAR; INTRAVENOUS
Status: DISCONTINUED | OUTPATIENT
Start: 2018-06-26 | End: 2018-06-26 | Stop reason: HOSPADM

## 2018-06-26 RX ORDER — METOCLOPRAMIDE HYDROCHLORIDE 5 MG/ML
10 INJECTION INTRAMUSCULAR; INTRAVENOUS
Status: DISCONTINUED | OUTPATIENT
Start: 2018-06-26 | End: 2018-06-26 | Stop reason: HOSPADM

## 2018-06-26 RX ORDER — PROPOFOL 10 MG/ML
INJECTION, EMULSION INTRAVENOUS PRN
Status: DISCONTINUED | OUTPATIENT
Start: 2018-06-26 | End: 2018-06-26 | Stop reason: SDUPTHER

## 2018-06-26 RX ORDER — ENALAPRILAT 2.5 MG/2ML
1.25 INJECTION INTRAVENOUS
Status: DISCONTINUED | OUTPATIENT
Start: 2018-06-26 | End: 2018-06-26 | Stop reason: HOSPADM

## 2018-06-26 RX ORDER — SODIUM CHLORIDE, SODIUM LACTATE, POTASSIUM CHLORIDE, CALCIUM CHLORIDE 600; 310; 30; 20 MG/100ML; MG/100ML; MG/100ML; MG/100ML
INJECTION, SOLUTION INTRAVENOUS CONTINUOUS PRN
Status: DISCONTINUED | OUTPATIENT
Start: 2018-06-26 | End: 2018-06-26 | Stop reason: SDUPTHER

## 2018-06-26 RX ORDER — FENTANYL CITRATE 50 UG/ML
50 INJECTION, SOLUTION INTRAMUSCULAR; INTRAVENOUS EVERY 5 MIN PRN
Status: DISCONTINUED | OUTPATIENT
Start: 2018-06-26 | End: 2018-06-26 | Stop reason: HOSPADM

## 2018-06-26 RX ORDER — LABETALOL HYDROCHLORIDE 5 MG/ML
5 INJECTION, SOLUTION INTRAVENOUS EVERY 10 MIN PRN
Status: DISCONTINUED | OUTPATIENT
Start: 2018-06-26 | End: 2018-06-26 | Stop reason: HOSPADM

## 2018-06-26 RX ORDER — HYDROCODONE BITARTRATE AND ACETAMINOPHEN 5; 325 MG/1; MG/1
1 TABLET ORAL EVERY 6 HOURS PRN
Qty: 10 TABLET | Refills: 0 | Status: SHIPPED | OUTPATIENT
Start: 2018-06-26 | End: 2018-07-24 | Stop reason: CLARIF

## 2018-06-26 RX ORDER — FENTANYL CITRATE 50 UG/ML
25 INJECTION, SOLUTION INTRAMUSCULAR; INTRAVENOUS EVERY 5 MIN PRN
Status: DISCONTINUED | OUTPATIENT
Start: 2018-06-26 | End: 2018-06-26 | Stop reason: HOSPADM

## 2018-06-26 RX ORDER — MEPERIDINE HYDROCHLORIDE 50 MG/ML
12.5 INJECTION INTRAMUSCULAR; INTRAVENOUS; SUBCUTANEOUS EVERY 5 MIN PRN
Status: DISCONTINUED | OUTPATIENT
Start: 2018-06-26 | End: 2018-06-26 | Stop reason: HOSPADM

## 2018-06-26 RX ORDER — MIDAZOLAM HYDROCHLORIDE 1 MG/ML
INJECTION INTRAMUSCULAR; INTRAVENOUS PRN
Status: DISCONTINUED | OUTPATIENT
Start: 2018-06-26 | End: 2018-06-26 | Stop reason: SDUPTHER

## 2018-06-26 RX ORDER — SODIUM CHLORIDE, SODIUM LACTATE, POTASSIUM CHLORIDE, CALCIUM CHLORIDE 600; 310; 30; 20 MG/100ML; MG/100ML; MG/100ML; MG/100ML
INJECTION, SOLUTION INTRAVENOUS CONTINUOUS
Status: DISCONTINUED | OUTPATIENT
Start: 2018-06-26 | End: 2018-06-26 | Stop reason: HOSPADM

## 2018-06-26 RX ORDER — PROPOFOL 10 MG/ML
INJECTION, EMULSION INTRAVENOUS CONTINUOUS PRN
Status: DISCONTINUED | OUTPATIENT
Start: 2018-06-26 | End: 2018-06-26 | Stop reason: SDUPTHER

## 2018-06-26 RX ORDER — PROMETHAZINE HYDROCHLORIDE 25 MG/ML
6.25 INJECTION, SOLUTION INTRAMUSCULAR; INTRAVENOUS
Status: DISCONTINUED | OUTPATIENT
Start: 2018-06-26 | End: 2018-06-26 | Stop reason: HOSPADM

## 2018-06-26 RX ORDER — HYDRALAZINE HYDROCHLORIDE 20 MG/ML
5 INJECTION INTRAMUSCULAR; INTRAVENOUS EVERY 10 MIN PRN
Status: DISCONTINUED | OUTPATIENT
Start: 2018-06-26 | End: 2018-06-26 | Stop reason: HOSPADM

## 2018-06-26 RX ORDER — ONDANSETRON 2 MG/ML
INJECTION INTRAMUSCULAR; INTRAVENOUS PRN
Status: DISCONTINUED | OUTPATIENT
Start: 2018-06-26 | End: 2018-06-26 | Stop reason: SDUPTHER

## 2018-06-26 RX ORDER — FENTANYL CITRATE 50 UG/ML
INJECTION, SOLUTION INTRAMUSCULAR; INTRAVENOUS PRN
Status: DISCONTINUED | OUTPATIENT
Start: 2018-06-26 | End: 2018-06-26 | Stop reason: SDUPTHER

## 2018-06-26 RX ADMIN — PROPOFOL 75 MCG/KG/MIN: 10 INJECTION, EMULSION INTRAVENOUS at 14:29

## 2018-06-26 RX ADMIN — MIDAZOLAM HYDROCHLORIDE 2 MG: 1 INJECTION, SOLUTION INTRAMUSCULAR; INTRAVENOUS at 14:21

## 2018-06-26 RX ADMIN — PROPOFOL 50 MG: 10 INJECTION, EMULSION INTRAVENOUS at 14:29

## 2018-06-26 RX ADMIN — SODIUM CHLORIDE, SODIUM LACTATE, POTASSIUM CHLORIDE, AND CALCIUM CHLORIDE: 600; 310; 30; 20 INJECTION, SOLUTION INTRAVENOUS at 14:11

## 2018-06-26 RX ADMIN — FENTANYL CITRATE 25 MCG: 50 INJECTION, SOLUTION INTRAMUSCULAR; INTRAVENOUS at 14:52

## 2018-06-26 RX ADMIN — ONDANSETRON HYDROCHLORIDE 4 MG: 2 SOLUTION INTRAMUSCULAR; INTRAVENOUS at 14:52

## 2018-06-26 RX ADMIN — SODIUM CHLORIDE, SODIUM LACTATE, POTASSIUM CHLORIDE, AND CALCIUM CHLORIDE: 600; 310; 30; 20 INJECTION, SOLUTION INTRAVENOUS at 14:23

## 2018-06-26 RX ADMIN — Medication 2 G: at 14:35

## 2018-06-26 ASSESSMENT — PAIN SCALES - GENERAL
PAINLEVEL_OUTOF10: 0
PAINLEVEL_OUTOF10: 0

## 2018-06-26 ASSESSMENT — PAIN DESCRIPTION - DESCRIPTORS: DESCRIPTORS: PRESSURE

## 2018-06-26 ASSESSMENT — PAIN - FUNCTIONAL ASSESSMENT: PAIN_FUNCTIONAL_ASSESSMENT: 0-10

## 2018-07-05 ENCOUNTER — TELEPHONE (OUTPATIENT)
Dept: SURGERY | Age: 48
End: 2018-07-05

## 2018-07-05 DIAGNOSIS — I87.8 POOR VENOUS ACCESS: Primary | ICD-10-CM

## 2018-07-12 DIAGNOSIS — M79.621 PAIN IN BOTH UPPER ARMS: ICD-10-CM

## 2018-07-12 DIAGNOSIS — R53.83 OTHER FATIGUE: ICD-10-CM

## 2018-07-12 DIAGNOSIS — G35 MS (MULTIPLE SCLEROSIS) (HCC): ICD-10-CM

## 2018-07-12 DIAGNOSIS — M79.622 PAIN IN BOTH UPPER ARMS: ICD-10-CM

## 2018-07-12 DIAGNOSIS — M62.838 MUSCLE SPASTICITY: ICD-10-CM

## 2018-07-12 DIAGNOSIS — M54.2 PAIN, NECK: ICD-10-CM

## 2018-07-13 RX ORDER — HYDROCODONE BITARTRATE AND ACETAMINOPHEN 10; 325 MG/1; MG/1
1 TABLET ORAL 3 TIMES DAILY PRN
Qty: 90 TABLET | Refills: 0 | Status: SHIPPED | OUTPATIENT
Start: 2018-07-17 | End: 2018-08-14 | Stop reason: SDUPTHER

## 2018-07-13 RX ORDER — METHYLPHENIDATE HYDROCHLORIDE 5 MG/1
5 TABLET ORAL DAILY
Qty: 30 TABLET | Refills: 0 | Status: SHIPPED | OUTPATIENT
Start: 2018-07-13 | End: 2018-08-14 | Stop reason: SDUPTHER

## 2018-07-13 RX ORDER — METHYLPHENIDATE HYDROCHLORIDE 20 MG/1
20 TABLET ORAL DAILY
Qty: 30 TABLET | Refills: 0 | Status: SHIPPED | OUTPATIENT
Start: 2018-07-13 | End: 2018-08-14 | Stop reason: SDUPTHER

## 2018-07-16 RX ORDER — OXYBUTYNIN CHLORIDE 15 MG/1
TABLET, EXTENDED RELEASE ORAL
Qty: 60 TABLET | Refills: 0 | OUTPATIENT
Start: 2018-07-16

## 2018-07-16 RX ORDER — FLUCONAZOLE 100 MG/1
TABLET ORAL
Qty: 3 TABLET | Refills: 0 | Status: SHIPPED | OUTPATIENT
Start: 2018-07-16 | End: 2018-09-13 | Stop reason: SDUPTHER

## 2018-07-16 RX ORDER — ONDANSETRON 4 MG/1
4 TABLET, FILM COATED ORAL EVERY 8 HOURS PRN
Qty: 15 TABLET | Refills: 0 | Status: SHIPPED | OUTPATIENT
Start: 2018-07-16 | End: 2018-09-13 | Stop reason: SDUPTHER

## 2018-07-24 ENCOUNTER — OFFICE VISIT (OUTPATIENT)
Dept: INTERNAL MEDICINE | Age: 48
End: 2018-07-24
Payer: MEDICARE

## 2018-07-24 VITALS
DIASTOLIC BLOOD PRESSURE: 74 MMHG | SYSTOLIC BLOOD PRESSURE: 122 MMHG | HEIGHT: 69 IN | OXYGEN SATURATION: 98 % | HEART RATE: 64 BPM | BODY MASS INDEX: 25.33 KG/M2 | WEIGHT: 171 LBS

## 2018-07-24 DIAGNOSIS — G35 COGNITIVE IMPAIRMENT DUE TO MULTIPLE SCLEROSIS (HCC): ICD-10-CM

## 2018-07-24 DIAGNOSIS — E78.5 HYPERLIPIDEMIA, UNSPECIFIED HYPERLIPIDEMIA TYPE: ICD-10-CM

## 2018-07-24 DIAGNOSIS — R41.89 COGNITIVE IMPAIRMENT DUE TO MULTIPLE SCLEROSIS (HCC): ICD-10-CM

## 2018-07-24 DIAGNOSIS — G35 MULTIPLE SCLEROSIS (HCC): ICD-10-CM

## 2018-07-24 DIAGNOSIS — F32.A ANXIETY AND DEPRESSION: Primary | ICD-10-CM

## 2018-07-24 DIAGNOSIS — I10 ESSENTIAL HYPERTENSION: ICD-10-CM

## 2018-07-24 DIAGNOSIS — F41.9 ANXIETY AND DEPRESSION: Primary | ICD-10-CM

## 2018-07-24 DIAGNOSIS — G62.9 NEUROPATHY: ICD-10-CM

## 2018-07-24 DIAGNOSIS — Z91.09 ENVIRONMENTAL ALLERGIES: ICD-10-CM

## 2018-07-24 PROCEDURE — G8417 CALC BMI ABV UP PARAM F/U: HCPCS | Performed by: INTERNAL MEDICINE

## 2018-07-24 PROCEDURE — G8427 DOCREV CUR MEDS BY ELIG CLIN: HCPCS | Performed by: INTERNAL MEDICINE

## 2018-07-24 PROCEDURE — 99214 OFFICE O/P EST MOD 30 MIN: CPT | Performed by: INTERNAL MEDICINE

## 2018-07-24 PROCEDURE — 1036F TOBACCO NON-USER: CPT | Performed by: INTERNAL MEDICINE

## 2018-07-24 RX ORDER — LEVOCETIRIZINE DIHYDROCHLORIDE 5 MG/1
TABLET, FILM COATED ORAL
Qty: 30 TABLET | Refills: 0 | Status: SHIPPED | OUTPATIENT
Start: 2018-07-24 | End: 2018-08-15 | Stop reason: SDUPTHER

## 2018-07-24 RX ORDER — HYDROCHLOROTHIAZIDE 25 MG/1
25 TABLET ORAL DAILY
Qty: 30 TABLET | Refills: 5 | Status: SHIPPED | OUTPATIENT
Start: 2018-07-24 | End: 2018-11-15 | Stop reason: SDUPTHER

## 2018-07-24 RX ORDER — OXYBUTYNIN CHLORIDE 15 MG/1
TABLET, EXTENDED RELEASE ORAL
Qty: 60 TABLET | Refills: 5 | Status: SHIPPED | OUTPATIENT
Start: 2018-07-24 | End: 2018-11-15 | Stop reason: SDUPTHER

## 2018-07-24 RX ORDER — VARENICLINE TARTRATE 1 MG/1
1 TABLET, FILM COATED ORAL 2 TIMES DAILY
Qty: 56 TABLET | Refills: 5 | Status: SHIPPED | OUTPATIENT
Start: 2018-07-24 | End: 2018-12-11 | Stop reason: ALTCHOICE

## 2018-07-24 RX ORDER — AZELASTINE 1 MG/ML
2 SPRAY, METERED NASAL 2 TIMES DAILY
Qty: 1 BOTTLE | Refills: 3 | Status: SHIPPED | OUTPATIENT
Start: 2018-07-24 | End: 2019-05-02 | Stop reason: SDUPTHER

## 2018-07-24 RX ORDER — PILOCARPINE HYDROCHLORIDE 7.5 MG/1
TABLET, FILM COATED ORAL
Qty: 270 TABLET | Refills: 0 | Status: SHIPPED | OUTPATIENT
Start: 2018-07-24 | End: 2018-11-15 | Stop reason: SDUPTHER

## 2018-07-24 RX ORDER — IPRATROPIUM BROMIDE AND ALBUTEROL SULFATE 2.5; .5 MG/3ML; MG/3ML
SOLUTION RESPIRATORY (INHALATION)
Qty: 450 ML | Refills: 0 | Status: SHIPPED | OUTPATIENT
Start: 2018-07-24 | End: 2018-07-30 | Stop reason: SDUPTHER

## 2018-07-24 NOTE — PATIENT INSTRUCTIONS
Patient Education        Allergies: Care Instructions  Your Care Instructions    Allergies occur when your body's defense system (immune system) overreacts to certain substances. The immune system treats a harmless substance as if it were a harmful germ or virus. Many things can cause this overreaction, including pollens, medicine, food, dust, animal dander, and mold. Allergies can be mild or severe. Mild allergies can be managed with home treatment. But medicine may be needed to prevent problems. Managing your allergies is an important part of staying healthy. Your doctor may suggest that you have allergy testing to help find out what is causing your allergies. When you know what things trigger your symptoms, you can avoid them. This can prevent allergy symptoms and other health problems. For severe allergies that cause reactions that affect your whole body (anaphylactic reactions), your doctor may prescribe a shot of epinephrine to carry with you in case you have a severe reaction. Learn how to give yourself the shot and keep it with you at all times. Make sure it is not . Follow-up care is a key part of your treatment and safety. Be sure to make and go to all appointments, and call your doctor if you are having problems. It's also a good idea to know your test results and keep a list of the medicines you take. How can you care for yourself at home? · If you have been told by your doctor that dust or dust mites are causing your allergy, decrease the dust around your bed:  [facility] sheets, pillowcases, and other bedding in hot water every week. ¨ Use dust-proof covers for pillows, duvets, and mattresses. Avoid plastic covers because they tear easily and do not \"breathe. \" Wash as instructed on the label. ¨ Do not use any blankets and pillows that you do not need. ¨ Use blankets that you can wash in your washing machine.   ¨ Consider removing drapes and carpets, which attract and hold dust, from your bedroom. · If you are allergic to house dust and mites, do not use home humidifiers. Your doctor can suggest ways you can control dust and mites. · Look for signs of cockroaches. Cockroaches cause allergic reactions. Use cockroach baits to get rid of them. Then, clean your home well. Cockroaches like areas where grocery bags, newspapers, empty bottles, or cardboard boxes are stored. Do not keep these inside your home, and keep trash and food containers sealed. Seal off any spots where cockroaches might enter your home. · If you are allergic to mold, get rid of furniture, rugs, and drapes that smell musty. Check for mold in the bathroom. · If you are allergic to outdoor pollen or mold spores, use air-conditioning. Change or clean all filters every month. Keep windows closed. · If you are allergic to pollen, stay inside when pollen counts are high. Use a vacuum  with a HEPA filter or a double-thickness filter at least two times each week. · Stay inside when air pollution is bad. Avoid paint fumes, perfumes, and other strong odors. · Avoid conditions that make your allergies worse. Stay away from smoke. Do not smoke or let anyone else smoke in your house. Do not use fireplaces or wood-burning stoves. · If you are allergic to your pets, change the air filter in your furnace every month. Use high-efficiency filters. · If you are allergic to pet dander, keep pets outside or out of your bedroom. Old carpet and cloth furniture can hold a lot of animal dander. You may need to replace them. When should you call for help? Give an epinephrine shot if:    · You think you are having a severe allergic reaction.     · You have symptoms in more than one body area, such as mild nausea and an itchy mouth.    After giving an epinephrine shot call 911, even if you feel better.   Call 911 if:    · You have symptoms of a severe allergic reaction.  These may include:  ¨ Sudden raised, red areas (hives) all over your

## 2018-07-29 ASSESSMENT — ENCOUNTER SYMPTOMS
SORE THROAT: 0
ABDOMINAL DISTENTION: 0
EYE REDNESS: 0
NAUSEA: 0
SINUS PRESSURE: 0
VOMITING: 0
EYE ITCHING: 0
EYE DISCHARGE: 0
PHOTOPHOBIA: 0
BLOOD IN STOOL: 0
ABDOMINAL PAIN: 0
EYE PAIN: 0
CONSTIPATION: 0
WHEEZING: 0
COUGH: 0
RHINORRHEA: 0
DIARRHEA: 0
COLOR CHANGE: 0
SHORTNESS OF BREATH: 0
CHEST TIGHTNESS: 0

## 2018-07-29 NOTE — PROGRESS NOTES
Chief Complaint   Patient presents with    Medication Refill     pt requesting azelastine        HPI: Nadia Jasmine is a 53 y.o. female is here for Follow-up of depression. She does have a history of multiple sclerosis. She has been put on Ocrevus. She thinks that that is working well. She feels that she is less forgetful since starting the medication. She has stopped smoking. She does take Lyrica for her neuropathic pain, which does work weell. She does see neurology for her MS. She is on Ritalin for focus. She takes it 5 out of 7 days of the week. She feels that the Ritalin does help her to focus and concentrate. She did have a Mediport placed recently for history of frequent UTIs. She has not had any fevers or chills. She feels well. She has no major concerns or complaints today. She does have a history of environmental allergies.  She wants a refill on her Astelin nasal spray    Past Medical History:   Diagnosis Date    Asthma     Back pain 6/28/2017    [facility] limitation[facility] 6/28/2017    Hay fever 6/28/2017    Headache 6/28/2017    Hyperlipidemia 6/28/2017    MS [facility]multiple sclerosis) ([facility])     Neuropathic ulcer of left foot, limited to breakdown of skin ([address] 4/10/2017    Peripheral vascular disease ([facility] [address]       Past Surgical History:   Procedure Laterality Date    BACLOFEN PUMP IMPLANTATION      COLOSTOMY      FEMUR FRACTURE SURGERY      Right    HYSTERECTOMY      OTHER SURGICAL HISTORY      urostomy    OTHER SURGICAL HISTORY      pain pump    [place] INSJ PRPH CTR VAD W/SUBQ PORT UNDER 5 YR N/A 6/26/2018    SINGLE LUMEN PORT PLACEMENT WITH FLUORO performed by Washington Ramos MD at [facility] TOE AMPUTATION      L last toe    TONSILLECTOMY      URETEROTOMY        Social History     Social History    Marital status:      Spouse name: N/A    Number of children: N/A    Years of education: N/A     Social History Main Topics    Smoking status: Former Smoker Breast lump    Patient overweight    Hay fever    Decubitus ulcer of sacral region    Current smoker    Cobalamin deficiency    Disease caused by fungus    Pain, neck    Colostomy and enterostomy malfunction ([facility] [address]        Review of Systems   Constitutional: Positive for fatigue. Negative for activity change, appetite change, fever and unexpected weight change. HENT: Negative for congestion, postnasal drip, rhinorrhea, sinus pressure, sore throat and tinnitus. Eyes: Negative for photophobia, pain, discharge, redness, itching and visual disturbance. Respiratory: Negative for cough, chest tightness, shortness of breath and wheezing. Cardiovascular: Negative for chest pain, palpitations and leg swelling. Gastrointestinal: Negative for abdominal distention, abdominal pain, blood in stool, constipation, diarrhea, nausea and vomiting. Endocrine: Negative for cold intolerance, heat intolerance, polydipsia and polyuria. Genitourinary: Negative for decreased urine volume, difficulty urinating, dysuria, flank pain, frequency, hematuria and urgency. Musculoskeletal:        She has chronic back pain and muscle spasms. She is wheelchair-bound secondary to history of multiple sclerosis. Skin: Negative for color change, pallor, rash and wound. Allergic/Immunologic: Positive for environmental allergies. Negative for food allergies and immunocompromised state. Neurological: Positive for headaches. Negative for dizziness, seizures, syncope, speech difficulty, weakness and numbness. Complains of a sinus headache   Hematological: Negative for adenopathy. Does not bruise/bleed easily. Psychiatric/Behavioral: Positive for dysphoric mood. Negative for agitation, confusion and decreased concentration. The patient is nervous/anxious. The patient is not hyperactive.          Anxiety and depression are stable       /74   Pulse 64   Ht 5' 9\" (1.753 m)   Wt 171 lb (77.6 kg)   SpO2 98%   BMI 25.25

## 2018-07-30 ENCOUNTER — TELEPHONE (OUTPATIENT)
Dept: INTERNAL MEDICINE | Age: 48
End: 2018-07-30

## 2018-07-30 RX ORDER — IPRATROPIUM BROMIDE AND ALBUTEROL SULFATE 2.5; .5 MG/3ML; MG/3ML
SOLUTION RESPIRATORY (INHALATION)
Qty: 450 ML | Refills: 3 | Status: SHIPPED | OUTPATIENT
Start: 2018-07-30 | End: 2019-05-02 | Stop reason: SDUPTHER

## 2018-08-09 ENCOUNTER — TELEPHONE (OUTPATIENT)
Dept: NEUROLOGY | Age: 48
End: 2018-08-09

## 2018-08-09 DIAGNOSIS — G35 MULTIPLE SCLEROSIS (HCC): Primary | ICD-10-CM

## 2018-08-10 NOTE — TELEPHONE ENCOUNTER
Spoke to the infusion center and the patient is needing an order for her port to be flushed. I tried to place the order but there isn't a standard one that I see. They said it needs to say routine port flush every 4 to 6 weeks. 20cc normal saline and 300 units of heparin and a diagnosis of why the patient has a port. If you will print it out I will take it over.

## 2018-08-13 RX ORDER — SODIUM CHLORIDE 0.9 % (FLUSH) 0.9 %
20 SYRINGE (ML) INJECTION
Qty: 20 ML | Refills: 5 | Status: ON HOLD | OUTPATIENT
Start: 2018-08-13

## 2018-08-14 ENCOUNTER — HOSPITAL ENCOUNTER (OUTPATIENT)
Dept: INFUSION THERAPY | Age: 48
Setting detail: INFUSION SERIES
Discharge: HOME | End: 2018-08-14
Payer: MEDICARE

## 2018-08-14 DIAGNOSIS — M79.621 PAIN IN BOTH UPPER ARMS: ICD-10-CM

## 2018-08-14 DIAGNOSIS — R53.83 OTHER FATIGUE: ICD-10-CM

## 2018-08-14 DIAGNOSIS — M54.2 PAIN, NECK: ICD-10-CM

## 2018-08-14 DIAGNOSIS — M62.838 MUSCLE SPASTICITY: ICD-10-CM

## 2018-08-14 DIAGNOSIS — G35 MS (MULTIPLE SCLEROSIS) (HCC): ICD-10-CM

## 2018-08-14 DIAGNOSIS — M79.622 PAIN IN BOTH UPPER ARMS: ICD-10-CM

## 2018-08-14 PROCEDURE — 2580000003 HC RX 258: Performed by: PSYCHIATRY & NEUROLOGY

## 2018-08-14 PROCEDURE — 6360000002 HC RX W HCPCS: Performed by: PSYCHIATRY & NEUROLOGY

## 2018-08-14 PROCEDURE — 96523 IRRIG DRUG DELIVERY DEVICE: CPT

## 2018-08-14 RX ORDER — HEPARIN SODIUM (PORCINE) LOCK FLUSH IV SOLN 100 UNIT/ML 100 UNIT/ML
300 SOLUTION INTRAVENOUS PRN
Status: DISCONTINUED | OUTPATIENT
Start: 2018-08-14 | End: 2018-08-16 | Stop reason: HOSPADM

## 2018-08-14 RX ORDER — SODIUM CHLORIDE 0.9 % (FLUSH) 0.9 %
20 SYRINGE (ML) INJECTION PRN
Status: DISCONTINUED | OUTPATIENT
Start: 2018-08-14 | End: 2018-08-16 | Stop reason: HOSPADM

## 2018-08-14 RX ADMIN — SODIUM CHLORIDE, PRESERVATIVE FREE 300 UNITS: 5 INJECTION INTRAVENOUS at 13:33

## 2018-08-14 RX ADMIN — Medication 20 ML: at 13:32

## 2018-08-15 RX ORDER — LEVOCETIRIZINE DIHYDROCHLORIDE 5 MG/1
TABLET, FILM COATED ORAL
Qty: 90 TABLET | Refills: 3 | Status: SHIPPED | OUTPATIENT
Start: 2018-08-15 | End: 2018-11-15 | Stop reason: SDUPTHER

## 2018-08-15 RX ORDER — METHYLPHENIDATE HYDROCHLORIDE 5 MG/1
5 TABLET ORAL DAILY
Qty: 30 TABLET | Refills: 0 | Status: SHIPPED | OUTPATIENT
Start: 2018-08-15 | End: 2018-09-14

## 2018-08-15 RX ORDER — HYDROCODONE BITARTRATE AND ACETAMINOPHEN 10; 325 MG/1; MG/1
1 TABLET ORAL 3 TIMES DAILY PRN
Qty: 90 TABLET | Refills: 0 | Status: SHIPPED | OUTPATIENT
Start: 2018-08-15 | End: 2018-09-12 | Stop reason: SDUPTHER

## 2018-08-15 RX ORDER — BACLOFEN 10 MG/1
10 TABLET ORAL 2 TIMES DAILY PRN
Qty: 60 TABLET | Refills: 0 | Status: SHIPPED | OUTPATIENT
Start: 2018-08-15 | End: 2018-09-12 | Stop reason: SDUPTHER

## 2018-08-15 RX ORDER — METHYLPHENIDATE HYDROCHLORIDE 20 MG/1
20 TABLET ORAL DAILY
Qty: 30 TABLET | Refills: 0 | Status: SHIPPED | OUTPATIENT
Start: 2018-08-15 | End: 2018-09-12 | Stop reason: SDUPTHER

## 2018-09-12 ENCOUNTER — OFFICE VISIT (OUTPATIENT)
Dept: NEUROLOGY | Age: 48
End: 2018-09-12
Payer: MEDICARE

## 2018-09-12 ENCOUNTER — HOSPITAL ENCOUNTER (OUTPATIENT)
Dept: INFUSION THERAPY | Age: 48
Setting detail: INFUSION SERIES
Discharge: HOME | End: 2018-09-12
Payer: MEDICARE

## 2018-09-12 VITALS
SYSTOLIC BLOOD PRESSURE: 134 MMHG | HEIGHT: 69 IN | DIASTOLIC BLOOD PRESSURE: 84 MMHG | BODY MASS INDEX: 25.33 KG/M2 | HEART RATE: 85 BPM | WEIGHT: 171 LBS

## 2018-09-12 DIAGNOSIS — G35 MULTIPLE SCLEROSIS (HCC): ICD-10-CM

## 2018-09-12 DIAGNOSIS — M79.622 PAIN IN BOTH UPPER ARMS: ICD-10-CM

## 2018-09-12 DIAGNOSIS — M79.621 PAIN IN BOTH UPPER ARMS: ICD-10-CM

## 2018-09-12 DIAGNOSIS — M54.2 PAIN, NECK: ICD-10-CM

## 2018-09-12 DIAGNOSIS — M62.838 MUSCLE SPASTICITY: ICD-10-CM

## 2018-09-12 DIAGNOSIS — G35 MS (MULTIPLE SCLEROSIS) (HCC): Primary | ICD-10-CM

## 2018-09-12 DIAGNOSIS — R53.83 OTHER FATIGUE: ICD-10-CM

## 2018-09-12 PROCEDURE — 1036F TOBACCO NON-USER: CPT | Performed by: PSYCHIATRY & NEUROLOGY

## 2018-09-12 PROCEDURE — G8427 DOCREV CUR MEDS BY ELIG CLIN: HCPCS | Performed by: PSYCHIATRY & NEUROLOGY

## 2018-09-12 PROCEDURE — 99213 OFFICE O/P EST LOW 20 MIN: CPT | Performed by: PSYCHIATRY & NEUROLOGY

## 2018-09-12 PROCEDURE — 6360000002 HC RX W HCPCS: Performed by: PSYCHIATRY & NEUROLOGY

## 2018-09-12 PROCEDURE — G8417 CALC BMI ABV UP PARAM F/U: HCPCS | Performed by: PSYCHIATRY & NEUROLOGY

## 2018-09-12 PROCEDURE — 2580000003 HC RX 258: Performed by: PSYCHIATRY & NEUROLOGY

## 2018-09-12 PROCEDURE — 96523 IRRIG DRUG DELIVERY DEVICE: CPT

## 2018-09-12 RX ORDER — SODIUM CHLORIDE 0.9 % (FLUSH) 0.9 %
20 SYRINGE (ML) INJECTION PRN
Status: DISCONTINUED | OUTPATIENT
Start: 2018-09-12 | End: 2018-09-14 | Stop reason: HOSPADM

## 2018-09-12 RX ORDER — BACLOFEN 10 MG/1
10 TABLET ORAL 2 TIMES DAILY PRN
Qty: 60 TABLET | Refills: 5 | Status: ON HOLD | OUTPATIENT
Start: 2018-09-14

## 2018-09-12 RX ORDER — HEPARIN SODIUM (PORCINE) LOCK FLUSH IV SOLN 100 UNIT/ML 100 UNIT/ML
300 SOLUTION INTRAVENOUS PRN
Status: DISCONTINUED | OUTPATIENT
Start: 2018-09-12 | End: 2018-09-14 | Stop reason: HOSPADM

## 2018-09-12 RX ORDER — HYDROCODONE BITARTRATE AND ACETAMINOPHEN 10; 325 MG/1; MG/1
1 TABLET ORAL 3 TIMES DAILY PRN
Qty: 90 TABLET | Refills: 0 | Status: SHIPPED | OUTPATIENT
Start: 2018-09-14 | End: 2018-10-17 | Stop reason: SDUPTHER

## 2018-09-12 RX ORDER — PREGABALIN 300 MG/1
300 CAPSULE ORAL 2 TIMES DAILY
COMMUNITY
End: 2018-11-13 | Stop reason: SDUPTHER

## 2018-09-12 RX ORDER — METHYLPHENIDATE HYDROCHLORIDE 20 MG/1
20 TABLET ORAL DAILY
Qty: 30 TABLET | Refills: 0 | Status: SHIPPED | OUTPATIENT
Start: 2018-09-14 | End: 2018-10-17 | Stop reason: SDUPTHER

## 2018-09-12 RX ADMIN — Medication 20 ML: at 14:28

## 2018-09-12 RX ADMIN — HEPARIN 300 UNITS: 100 SYRINGE at 14:27

## 2018-09-12 NOTE — FLOWSHEET NOTE
Pt here for routine port flush.  mediport accessed with 20 gg 1 inch salomon needle. Good blood return noted. Flushes easily. Port flushed with 20 cc n/s, then 300 u heparin flush. Needle removed, bandaid applied. Pt released in satisfacctory condition with d/c instructions.

## 2018-09-12 NOTE — PROGRESS NOTES
OTHER SURGICAL HISTORY      urostomy    OTHER SURGICAL HISTORY      pain pump    [place] [facility] VAD W/SUBQ PORT UNDER 5 YR N/A 6/26/2018    SINGLE LUMEN PORT PLACEMENT WITH FLUORO performed by Clarissa Melara MD at [address] TOE AMPUTATION      L last toe    TONSILLECTOMY      URETEROTOMY         Family History   Problem Relation Age of Onset    Cancer Mother         breast    Diabetes Father     High Blood Pressure Father     Cancer Paternal Aunt         breast    Heart Disease Paternal Grandmother        Allergies   Allergen Reactions    Darvocet [Propoxyphene N-Acetaminophen]     Morphine And Related Swelling    Morphine Hcl     Propoxyphene Swelling       Social History     Social History    Marital status:      Spouse name: N/A    Number of children: N/A    Years of education: N/A     Occupational History    Not on file. Social History Main Topics    Smoking status: Former Smoker     Types: Cigarettes     Quit date: 1/8/2018    Smokeless tobacco: Never Used    Alcohol use No    Drug use: Yes     Types: Marijuana    Sexual activity: Not on file     Other Topics Concern    Not on file     Social History Narrative    No narrative on file     Review of Systems     Constitutional - No fever or chills. yes diaphoresis or significant fatigue. HENT -  No tinnitus or significant hearing loss. Eyes - yes sudden vision change or eye pain  Respiratory - no significant shortness of breath or cough  Cardiovascular - no chest pain No palpitations or significant leg swelling  Gastrointestinal - no abdominal swelling or pain. Genitourinary - No difficulty urinating, dysuria  Musculoskeletal - yes back pain or myalgia. Skin - no color change or rash  Neurologic - No seizures. No lateralizing weakness. Hematologic - no easy bruising or excessive bleeding. Psychiatric - no severe anxiety or nervousness. All other review of systems are negative.     Current Outpatient Prescriptions route 2 times daily Use in each nostril as directed 1 Bottle 3    ondansetron (ZOFRAN) 4 MG tablet Take 1 tablet by mouth every 8 hours as needed for Nausea or Vomiting 15 tablet 0    fluconazole (DIFLUCAN) 100 MG tablet TAKE 1 TABLET DAILY FOR 3 DOSES AS NEEDED. 3 tablet 0    nitrofurantoin (MACRODANTIN) 50 MG capsule TAKE 1 CAPSULE BY MOUTH NIGHTLY FOR 10 DAYS 30 capsule 0    lactulose (CHRONULAC) 10 GM/15ML solution Take 30 mLs by mouth 3 times daily 1 Bottle 5    Ocrelizumab (OCREVUS IV) Infuse intravenously      fluticasone (FLONASE) 50 MCG/ACT nasal spray 2 sprays by Nasal route daily 1 Bottle 5    DULoxetine (CYMBALTA) 30 MG extended release capsule Take 1 capsule by mouth daily 30 capsule 5    tiZANidine (ZANAFLEX) 4 MG tablet TAKE 3 TABLETS TWICE DAILY 180 tablet 5    levETIRAcetam (KEPPRA) 500 MG tablet Take 500 mg by mouth daily       No current facility-administered medications for this visit. /84   Pulse 85   Ht 5' 9\" (1.753 m)   Wt 171 lb (77.6 kg)   BMI 25.25 kg/m²     Constitutional - well developed, well nourished. Eyes - conjunctiva normal.  Pupils react to light  Ear, nose, throat -hearing intact to finger rub No scars, masses, or lesions over external nose or ears, no atrophy of tongue  Neck-symmetric, no masses noted, no jugular vein distension  Respiration- chest wall appears symmetric, good expansion,   normal effort without use of accessory muscles  Musculoskeletal - no significant wasting of muscles noted, no bony deformities, gait no gross ataxia  Extremities-no clubbing, cyanosis or edema  Skin - warm, dry, and intact. No rash, erythema, or pallor.   Psychiatric - mood, affect, and behavior appear normal.      Neurological exam  Awake, alert, fluent oriented x 3 appropriate affect  Attention and concentration appear appropriate  Recent and remote memory appears unremarkable  Speech normal without dysarthria  No clear issues with language of fund of and call with any further problems. Continue present care      Plan    No orders of the defined types were placed in this encounter. Orders Placed This Encounter   Medications    HYDROcodone-acetaminophen (NORCO)  MG per tablet     Sig: Take 1 tablet by mouth 3 times daily as needed for Pain for up to 30 days. Must last 30 days. Dispense:  90 tablet     Refill:  0    methylphenidate (RITALIN) 20 MG tablet     Sig: Take 1 tablet by mouth daily for 30 days. .     Dispense:  30 tablet     Refill:  0    baclofen (LIORESAL) 10 MG tablet     Sig: Take 1 tablet by mouth 2 times daily as needed (muscle spasms)     Dispense:  60 tablet     Refill:  5       Return in about 3 months (around 12/12/2018).

## 2018-09-13 RX ORDER — FLUCONAZOLE 100 MG/1
TABLET ORAL
Qty: 3 TABLET | Refills: 0 | Status: SHIPPED | OUTPATIENT
Start: 2018-09-13 | End: 2018-11-15 | Stop reason: SDUPTHER

## 2018-09-13 RX ORDER — ONDANSETRON 4 MG/1
4 TABLET, FILM COATED ORAL EVERY 8 HOURS PRN
Qty: 15 TABLET | Refills: 0 | Status: SHIPPED | OUTPATIENT
Start: 2018-09-13 | End: 2018-11-15 | Stop reason: SDUPTHER

## 2018-09-25 RX ORDER — VARENICLINE TARTRATE 1 MG/1
TABLET, FILM COATED ORAL
Qty: 56 TABLET | Refills: 0 | Status: SHIPPED | OUTPATIENT
Start: 2018-09-25 | End: 2018-12-11 | Stop reason: ALTCHOICE

## 2018-09-25 NOTE — TELEPHONE ENCOUNTER
Alyce called requesting a refill of the below medication which has been pended for you:     Requested Prescriptions     Pending Prescriptions Disp Refills    CHANTIX CONTINUING MONTH CHADWICK 1 MG tablet [Pharmacy Med Name: CHANTIX CONTINUING MONTH PA] 56 tablet 0     Sig: TAKE AS DIRECTED.        Last Appointment Date: 7/24/2018  Next Appointment Date: 11/15/2018    Allergies   Allergen Reactions    Darvocet [Propoxyphene N-Acetaminophen]     Morphine And Related Swelling    Morphine Hcl     Propoxyphene Swelling

## 2018-09-26 PROBLEM — Z13.89 ENCOUNTER FOR SCREENING FOR OTHER DISORDER: Status: RESOLVED | Noted: 2017-06-28 | Resolved: 2018-09-26

## 2018-09-26 PROBLEM — R05.9 COUGH: Status: RESOLVED | Noted: 2017-06-28 | Resolved: 2018-09-26

## 2018-09-26 LAB
Lab: NORMAL
REPORT: NORMAL
THIS TEST SENT TO: NORMAL

## 2018-09-28 ENCOUNTER — TELEPHONE (OUTPATIENT)
Dept: INTERNAL MEDICINE | Age: 48
End: 2018-09-28

## 2018-09-28 NOTE — TELEPHONE ENCOUNTER
Medical supplies company called and advised she needed a verbal order for her ostomy supplies. Gave the verbal order for all colostomy supplies for lifetime up to what insurance would cover. She is going to fax over a form for you to sign and fax back.

## 2018-10-03 ENCOUNTER — TELEPHONE (OUTPATIENT)
Dept: INTERNAL MEDICINE | Age: 48
End: 2018-10-03

## 2018-10-15 ENCOUNTER — TELEPHONE (OUTPATIENT)
Dept: NEUROLOGY | Age: 48
End: 2018-10-15

## 2018-10-15 ENCOUNTER — HOSPITAL ENCOUNTER (OUTPATIENT)
Dept: INFUSION THERAPY | Age: 48
Setting detail: INFUSION SERIES
Discharge: HOME | End: 2018-10-15
Payer: MEDICARE

## 2018-10-15 PROCEDURE — 96523 IRRIG DRUG DELIVERY DEVICE: CPT

## 2018-10-15 PROCEDURE — 2580000003 HC RX 258: Performed by: PSYCHIATRY & NEUROLOGY

## 2018-10-15 PROCEDURE — 6360000002 HC RX W HCPCS: Performed by: PSYCHIATRY & NEUROLOGY

## 2018-10-15 RX ORDER — HEPARIN SODIUM (PORCINE) LOCK FLUSH IV SOLN 100 UNIT/ML 100 UNIT/ML
300 SOLUTION INTRAVENOUS PRN
Status: DISCONTINUED | OUTPATIENT
Start: 2018-10-15 | End: 2018-10-17 | Stop reason: HOSPADM

## 2018-10-15 RX ORDER — SODIUM CHLORIDE 0.9 % (FLUSH) 0.9 %
20 SYRINGE (ML) INJECTION PRN
Status: DISCONTINUED | OUTPATIENT
Start: 2018-10-15 | End: 2018-10-17 | Stop reason: HOSPADM

## 2018-10-15 RX ADMIN — SODIUM CHLORIDE, PRESERVATIVE FREE 300 UNITS: 5 INJECTION INTRAVENOUS at 14:54

## 2018-10-15 RX ADMIN — Medication 20 ML: at 14:54

## 2018-10-17 DIAGNOSIS — R53.83 OTHER FATIGUE: ICD-10-CM

## 2018-10-17 DIAGNOSIS — M79.621 PAIN IN BOTH UPPER ARMS: ICD-10-CM

## 2018-10-17 DIAGNOSIS — M54.2 PAIN, NECK: ICD-10-CM

## 2018-10-17 DIAGNOSIS — M62.838 MUSCLE SPASTICITY: ICD-10-CM

## 2018-10-17 DIAGNOSIS — M79.622 PAIN IN BOTH UPPER ARMS: ICD-10-CM

## 2018-10-17 DIAGNOSIS — G35 MS (MULTIPLE SCLEROSIS) (HCC): ICD-10-CM

## 2018-10-17 NOTE — TELEPHONE ENCOUNTER
Requested Prescriptions     Pending Prescriptions Disp Refills    HYDROcodone-acetaminophen (NORCO)  MG per tablet 90 tablet 0     Sig: Take 1 tablet by mouth 3 times daily as needed for Pain for up to 30 days. Must last 30 days.  methylphenidate (RITALIN) 20 MG tablet 30 tablet 0     Sig: Take 1 tablet by mouth daily for 30 days. .       Last OV 9/12/18  Next OV 12/20/18  Last Rx 9/14/18  Randall Newell up to date

## 2018-10-18 RX ORDER — METHYLPHENIDATE HYDROCHLORIDE 20 MG/1
20 TABLET ORAL DAILY
Qty: 30 TABLET | Refills: 0 | Status: SHIPPED | OUTPATIENT
Start: 2018-10-18 | End: 2018-11-13 | Stop reason: SDUPTHER

## 2018-10-18 RX ORDER — HYDROCODONE BITARTRATE AND ACETAMINOPHEN 10; 325 MG/1; MG/1
1 TABLET ORAL 3 TIMES DAILY PRN
Qty: 90 TABLET | Refills: 0 | Status: SHIPPED | OUTPATIENT
Start: 2018-10-18 | End: 2018-11-13 | Stop reason: SDUPTHER

## 2018-11-13 DIAGNOSIS — G35 MS (MULTIPLE SCLEROSIS) (HCC): ICD-10-CM

## 2018-11-13 DIAGNOSIS — M79.622 PAIN IN BOTH UPPER ARMS: ICD-10-CM

## 2018-11-13 DIAGNOSIS — M79.621 PAIN IN BOTH UPPER ARMS: ICD-10-CM

## 2018-11-13 DIAGNOSIS — R53.83 OTHER FATIGUE: ICD-10-CM

## 2018-11-13 DIAGNOSIS — M54.2 PAIN, NECK: ICD-10-CM

## 2018-11-13 DIAGNOSIS — M62.838 MUSCLE SPASTICITY: ICD-10-CM

## 2018-11-13 RX ORDER — METHYLPHENIDATE HYDROCHLORIDE 20 MG/1
20 TABLET ORAL DAILY
Qty: 30 TABLET | Refills: 0 | Status: SHIPPED | OUTPATIENT
Start: 2018-11-17 | End: 2018-12-18 | Stop reason: SDUPTHER

## 2018-11-13 RX ORDER — HYDROCODONE BITARTRATE AND ACETAMINOPHEN 10; 325 MG/1; MG/1
1 TABLET ORAL 3 TIMES DAILY PRN
Qty: 90 TABLET | Refills: 0 | Status: SHIPPED | OUTPATIENT
Start: 2018-11-17 | End: 2018-12-18 | Stop reason: SDUPTHER

## 2018-11-13 RX ORDER — PREGABALIN 300 MG/1
300 CAPSULE ORAL 2 TIMES DAILY
Qty: 60 CAPSULE | Refills: 5 | Status: SHIPPED | OUTPATIENT
Start: 2018-11-17 | End: 2019-04-22 | Stop reason: SDUPTHER

## 2018-11-13 NOTE — TELEPHONE ENCOUNTER
Requested Prescriptions     Pending Prescriptions Disp Refills    HYDROcodone-acetaminophen (NORCO)  MG per tablet 90 tablet 0     Sig: Take 1 tablet by mouth 3 times daily as needed for Pain for up to 30 days. Must last 30 days.  pregabalin (LYRICA) 300 MG capsule       Sig: Take 1 capsule by mouth 2 times daily.  Massy methylphenidate (RITALIN) 20 MG tablet 30 tablet 0     Sig: Take 1 tablet by mouth daily for 30 days. .       Last ov 9/12/18  Next ov 12/20/18  Last rx 10/18/18  Alesha Benitoe done in oct

## 2018-11-15 ENCOUNTER — TELEPHONE (OUTPATIENT)
Dept: INTERNAL MEDICINE | Age: 48
End: 2018-11-15

## 2018-11-15 ENCOUNTER — OFFICE VISIT (OUTPATIENT)
Dept: INTERNAL MEDICINE | Age: 48
End: 2018-11-15
Payer: MEDICARE

## 2018-11-15 VITALS
BODY MASS INDEX: 25.25 KG/M2 | HEIGHT: 69 IN | DIASTOLIC BLOOD PRESSURE: 80 MMHG | HEART RATE: 68 BPM | SYSTOLIC BLOOD PRESSURE: 110 MMHG | OXYGEN SATURATION: 97 %

## 2018-11-15 DIAGNOSIS — R30.0 DYSURIA: ICD-10-CM

## 2018-11-15 DIAGNOSIS — G35 MULTIPLE SCLEROSIS (HCC): ICD-10-CM

## 2018-11-15 DIAGNOSIS — Z12.31 SCREENING MAMMOGRAM, ENCOUNTER FOR: ICD-10-CM

## 2018-11-15 DIAGNOSIS — E55.9 VITAMIN D DEFICIENCY: ICD-10-CM

## 2018-11-15 DIAGNOSIS — Z80.0 FAMILY HISTORY OF COLON CANCER: ICD-10-CM

## 2018-11-15 DIAGNOSIS — L98.9 SKIN LESION: ICD-10-CM

## 2018-11-15 DIAGNOSIS — Z91.09 ENVIRONMENTAL ALLERGIES: ICD-10-CM

## 2018-11-15 DIAGNOSIS — Z00.00 ROUTINE GENERAL MEDICAL EXAMINATION AT A HEALTH CARE FACILITY: ICD-10-CM

## 2018-11-15 DIAGNOSIS — J44.9 CHRONIC OBSTRUCTIVE PULMONARY DISEASE, UNSPECIFIED COPD TYPE (HCC): ICD-10-CM

## 2018-11-15 DIAGNOSIS — R68.2 DRY MOUTH: ICD-10-CM

## 2018-11-15 DIAGNOSIS — R56.9 SEIZURE (HCC): ICD-10-CM

## 2018-11-15 DIAGNOSIS — R73.9 HYPERGLYCEMIA: ICD-10-CM

## 2018-11-15 DIAGNOSIS — Z00.00 ROUTINE ADULT HEALTH MAINTENANCE: Primary | ICD-10-CM

## 2018-11-15 DIAGNOSIS — N39.0 RECURRENT UTI: ICD-10-CM

## 2018-11-15 DIAGNOSIS — F32.89 OTHER DEPRESSION: ICD-10-CM

## 2018-11-15 DIAGNOSIS — N32.81 OAB (OVERACTIVE BLADDER): ICD-10-CM

## 2018-11-15 DIAGNOSIS — E78.5 HYPERLIPIDEMIA, UNSPECIFIED HYPERLIPIDEMIA TYPE: ICD-10-CM

## 2018-11-15 DIAGNOSIS — R53.83 OTHER FATIGUE: ICD-10-CM

## 2018-11-15 LAB
ALBUMIN SERPL-MCNC: 4.2 G/DL (ref 3.5–5.2)
ALP BLD-CCNC: 74 U/L (ref 35–104)
ALT SERPL-CCNC: 17 U/L (ref 5–33)
ANION GAP SERPL CALCULATED.3IONS-SCNC: 10 MMOL/L (ref 7–19)
AST SERPL-CCNC: 15 U/L (ref 5–32)
BACTERIA: ABNORMAL /HPF
BASOPHILS ABSOLUTE: 0 K/UL (ref 0–0.2)
BASOPHILS RELATIVE PERCENT: 0.6 % (ref 0–1)
BILIRUB SERPL-MCNC: 0.4 MG/DL (ref 0.2–1.2)
BILIRUBIN URINE: ABNORMAL
BLOOD, URINE: ABNORMAL
BUN BLDV-MCNC: 15 MG/DL (ref 6–20)
CALCIUM SERPL-MCNC: 9.7 MG/DL (ref 8.6–10)
CHLORIDE BLD-SCNC: 104 MMOL/L (ref 98–111)
CHOLESTEROL, TOTAL: 192 MG/DL (ref 160–199)
CLARITY: ABNORMAL
CO2: 30 MMOL/L (ref 22–29)
COLOR: ABNORMAL
CREAT SERPL-MCNC: 0.5 MG/DL (ref 0.5–0.9)
EOSINOPHILS ABSOLUTE: 0.1 K/UL (ref 0–0.6)
EOSINOPHILS RELATIVE PERCENT: 1.9 % (ref 0–5)
EPITHELIAL CELLS, UA: ABNORMAL /HPF
GFR NON-AFRICAN AMERICAN: >60
GLUCOSE BLD-MCNC: 98 MG/DL (ref 74–109)
GLUCOSE URINE: NEGATIVE MG/DL
HCT VFR BLD CALC: 43.7 % (ref 37–47)
HDLC SERPL-MCNC: 49 MG/DL (ref 65–121)
HEMOGLOBIN: 13.4 G/DL (ref 12–16)
KETONES, URINE: ABNORMAL MG/DL
LDL CHOLESTEROL CALCULATED: 121 MG/DL
LEUKOCYTE ESTERASE, URINE: ABNORMAL
LYMPHOCYTES ABSOLUTE: 0.9 K/UL (ref 1.1–4.5)
LYMPHOCYTES RELATIVE PERCENT: 13.6 % (ref 20–40)
MCH RBC QN AUTO: 27.7 PG (ref 27–31)
MCHC RBC AUTO-ENTMCNC: 30.7 G/DL (ref 33–37)
MCV RBC AUTO: 90.3 FL (ref 81–99)
MONOCYTES ABSOLUTE: 0.5 K/UL (ref 0–0.9)
MONOCYTES RELATIVE PERCENT: 7.1 % (ref 0–10)
NEUTROPHILS ABSOLUTE: 4.8 K/UL (ref 1.5–7.5)
NEUTROPHILS RELATIVE PERCENT: 76.5 % (ref 50–65)
NITRITE, URINE: NEGATIVE
PDW BLD-RTO: 13.3 % (ref 11.5–14.5)
PH UA: 7
PLATELET # BLD: 176 K/UL (ref 130–400)
PMV BLD AUTO: 11.6 FL (ref 9.4–12.3)
POTASSIUM SERPL-SCNC: 3.9 MMOL/L (ref 3.5–5)
PROTEIN UA: 100 MG/DL
RBC # BLD: 4.84 M/UL (ref 4.2–5.4)
RBC UA: ABNORMAL /HPF (ref 0–2)
SODIUM BLD-SCNC: 144 MMOL/L (ref 136–145)
SPECIFIC GRAVITY UA: 1.02
TOTAL PROTEIN: 6.9 G/DL (ref 6.6–8.7)
TRIGL SERPL-MCNC: 109 MG/DL (ref 0–149)
TSH SERPL DL<=0.05 MIU/L-ACNC: 2.56 UIU/ML (ref 0.27–4.2)
URINE REFLEX TO CULTURE: YES
UROBILINOGEN, URINE: 1 E.U./DL
WBC # BLD: 6.3 K/UL (ref 4.8–10.8)
WBC UA: ABNORMAL /HPF (ref 0–5)

## 2018-11-15 PROCEDURE — G8417 CALC BMI ABV UP PARAM F/U: HCPCS | Performed by: INTERNAL MEDICINE

## 2018-11-15 PROCEDURE — G0008 ADMIN INFLUENZA VIRUS VAC: HCPCS | Performed by: INTERNAL MEDICINE

## 2018-11-15 PROCEDURE — G8482 FLU IMMUNIZE ORDER/ADMIN: HCPCS | Performed by: INTERNAL MEDICINE

## 2018-11-15 PROCEDURE — 3023F SPIROM DOC REV: CPT | Performed by: INTERNAL MEDICINE

## 2018-11-15 PROCEDURE — 90686 IIV4 VACC NO PRSV 0.5 ML IM: CPT | Performed by: INTERNAL MEDICINE

## 2018-11-15 PROCEDURE — G8926 SPIRO NO PERF OR DOC: HCPCS | Performed by: INTERNAL MEDICINE

## 2018-11-15 PROCEDURE — 1036F TOBACCO NON-USER: CPT | Performed by: INTERNAL MEDICINE

## 2018-11-15 PROCEDURE — G0439 PPPS, SUBSEQ VISIT: HCPCS | Performed by: INTERNAL MEDICINE

## 2018-11-15 PROCEDURE — 99214 OFFICE O/P EST MOD 30 MIN: CPT | Performed by: INTERNAL MEDICINE

## 2018-11-15 PROCEDURE — G8427 DOCREV CUR MEDS BY ELIG CLIN: HCPCS | Performed by: INTERNAL MEDICINE

## 2018-11-15 RX ORDER — BLOOD-GLUCOSE METER
1 KIT MISCELLANEOUS DAILY
Qty: 1 KIT | Refills: 0 | Status: ON HOLD | OUTPATIENT
Start: 2018-11-15

## 2018-11-15 RX ORDER — TIZANIDINE 4 MG/1
TABLET ORAL
Qty: 180 TABLET | Refills: 5 | Status: SHIPPED | OUTPATIENT
Start: 2018-11-15 | End: 2019-05-02 | Stop reason: SDUPTHER

## 2018-11-15 RX ORDER — SULFAMETHOXAZOLE AND TRIMETHOPRIM 400; 80 MG/1; MG/1
1 TABLET ORAL 2 TIMES DAILY
Qty: 20 TABLET | Refills: 0 | Status: SHIPPED | OUTPATIENT
Start: 2018-11-15 | End: 2018-11-25

## 2018-11-15 RX ORDER — ONDANSETRON 4 MG/1
4 TABLET, FILM COATED ORAL EVERY 8 HOURS PRN
Qty: 15 TABLET | Refills: 0 | Status: SHIPPED | OUTPATIENT
Start: 2018-11-15 | End: 2019-05-02 | Stop reason: SDUPTHER

## 2018-11-15 RX ORDER — GLUCOSAMINE HCL/CHONDROITIN SU 500-400 MG
CAPSULE ORAL
Qty: 100 STRIP | Refills: 0 | Status: ON HOLD | OUTPATIENT
Start: 2018-11-15

## 2018-11-15 RX ORDER — NITROFURANTOIN MACROCRYSTALS 50 MG/1
50 CAPSULE ORAL DAILY
Qty: 90 CAPSULE | Refills: 3 | Status: SHIPPED | OUTPATIENT
Start: 2018-11-15 | End: 2019-12-02 | Stop reason: SDUPTHER

## 2018-11-15 RX ORDER — DULOXETIN HYDROCHLORIDE 30 MG/1
30 CAPSULE, DELAYED RELEASE ORAL DAILY
Qty: 30 CAPSULE | Refills: 0 | Status: SHIPPED | OUTPATIENT
Start: 2018-11-15 | End: 2018-11-15 | Stop reason: SDUPTHER

## 2018-11-15 RX ORDER — LEVOCETIRIZINE DIHYDROCHLORIDE 5 MG/1
TABLET, FILM COATED ORAL
Qty: 90 TABLET | Refills: 3 | Status: SHIPPED | OUTPATIENT
Start: 2018-11-15 | End: 2020-04-01

## 2018-11-15 RX ORDER — FUROSEMIDE 20 MG/1
20 TABLET ORAL DAILY
Qty: 90 TABLET | Refills: 1 | Status: SHIPPED | OUTPATIENT
Start: 2018-11-15 | End: 2019-01-31 | Stop reason: ALTCHOICE

## 2018-11-15 RX ORDER — DULOXETIN HYDROCHLORIDE 30 MG/1
30 CAPSULE, DELAYED RELEASE ORAL DAILY
Qty: 30 CAPSULE | Refills: 0 | Status: SHIPPED | OUTPATIENT
Start: 2018-11-15 | End: 2019-01-18 | Stop reason: SDUPTHER

## 2018-11-15 RX ORDER — FLUCONAZOLE 100 MG/1
TABLET ORAL
Qty: 3 TABLET | Refills: 0 | Status: SHIPPED | OUTPATIENT
Start: 2018-11-15 | End: 2018-11-15 | Stop reason: SDUPTHER

## 2018-11-15 RX ORDER — ALBUTEROL SULFATE 90 UG/1
1 AEROSOL, METERED RESPIRATORY (INHALATION) EVERY 6 HOURS PRN
Qty: 8.5 G | Refills: 0 | Status: SHIPPED | OUTPATIENT
Start: 2018-11-15 | End: 2019-01-18 | Stop reason: SDUPTHER

## 2018-11-15 RX ORDER — FLUCONAZOLE 100 MG/1
TABLET ORAL
Qty: 3 TABLET | Refills: 0 | Status: SHIPPED | OUTPATIENT
Start: 2018-11-15 | End: 2019-05-28

## 2018-11-15 RX ORDER — LANCETS 30 GAUGE
1 EACH MISCELLANEOUS 2 TIMES DAILY
Qty: 100 EACH | Refills: 5 | Status: SHIPPED | OUTPATIENT
Start: 2018-11-15 | End: 2020-04-14 | Stop reason: ALTCHOICE

## 2018-11-15 RX ORDER — ONDANSETRON 4 MG/1
4 TABLET, FILM COATED ORAL EVERY 8 HOURS PRN
Qty: 15 TABLET | Refills: 0 | Status: SHIPPED | OUTPATIENT
Start: 2018-11-15 | End: 2018-11-15 | Stop reason: SDUPTHER

## 2018-11-15 RX ORDER — PILOCARPINE HYDROCHLORIDE 7.5 MG/1
TABLET, FILM COATED ORAL
Qty: 270 TABLET | Refills: 0 | Status: SHIPPED | OUTPATIENT
Start: 2018-11-15 | End: 2019-05-02 | Stop reason: SDUPTHER

## 2018-11-15 RX ORDER — OXYBUTYNIN CHLORIDE 15 MG/1
TABLET, EXTENDED RELEASE ORAL
Qty: 60 TABLET | Refills: 5 | Status: SHIPPED | OUTPATIENT
Start: 2018-11-15 | End: 2019-05-02 | Stop reason: SDUPTHER

## 2018-11-15 RX ORDER — HYDROCHLOROTHIAZIDE 25 MG/1
25 TABLET ORAL DAILY
Qty: 30 TABLET | Refills: 5 | Status: SHIPPED | OUTPATIENT
Start: 2018-11-15 | End: 2018-11-15 | Stop reason: ALTCHOICE

## 2018-11-15 ASSESSMENT — ENCOUNTER SYMPTOMS
ABDOMINAL PAIN: 0
BLOOD IN STOOL: 0
DIARRHEA: 0
PHOTOPHOBIA: 0
EYE ITCHING: 0
EYE PAIN: 0
SORE THROAT: 0
RHINORRHEA: 0
WHEEZING: 0
VOMITING: 0
ABDOMINAL DISTENTION: 0
EYE REDNESS: 0
EYE DISCHARGE: 0
SINUS PRESSURE: 0
CHEST TIGHTNESS: 0
SHORTNESS OF BREATH: 0
COUGH: 0
COLOR CHANGE: 0
NAUSEA: 0
CONSTIPATION: 0

## 2018-11-15 ASSESSMENT — PATIENT HEALTH QUESTIONNAIRE - PHQ9
SUM OF ALL RESPONSES TO PHQ QUESTIONS 1-9: 0
SUM OF ALL RESPONSES TO PHQ QUESTIONS 1-9: 0

## 2018-11-15 ASSESSMENT — LIFESTYLE VARIABLES: HOW OFTEN DO YOU HAVE A DRINK CONTAINING ALCOHOL: 0

## 2018-11-15 ASSESSMENT — ANXIETY QUESTIONNAIRES: GAD7 TOTAL SCORE: 0

## 2018-11-15 NOTE — PROGRESS NOTES
 Encephalopathy    Elevated troponin level    Neuropathic ulcer of left foot, limited to breakdown of skin ([facility])    Acute bronchitis    Acute sinusitis    Open wound of ankle    Vitamin D deficiency    Back pain    Breakdown (mechanical) of cranial or spinal infusion catheter, initial encounter    CAFL (chronic airflow limitation) ([facility])    Binocular vision disorder with diplopia    Aptyalism    Difficult or painful urination    Headache    Hyperlipidemia    Influenza    Insomnia    Breast lump    Patient overweight    Hay fever    Decubitus ulcer of sacral region    Current smoker    Cobalamin deficiency    Disease caused by fungus    Pain, neck    Colostomy and enterostomy malfunction ([facility])        Review of Systems   Constitutional: Positive for fatigue. Negative for activity change, appetite change, fever and unexpected weight change. HENT: Negative for congestion, postnasal drip, rhinorrhea, sinus pressure, sore throat and tinnitus. Eyes: Negative for photophobia, pain, discharge, redness, itching and visual disturbance. Respiratory: Negative for cough, chest tightness, shortness of breath and wheezing. Cardiovascular: Negative for chest pain, palpitations and leg swelling. Gastrointestinal: Negative for abdominal distention, abdominal pain, blood in stool, constipation, diarrhea, nausea and vomiting. Endocrine: Negative for cold intolerance, heat intolerance, polydipsia and polyuria. Genitourinary: Negative for decreased urine volume, difficulty urinating, dysuria, flank pain, frequency, hematuria and urgency. Musculoskeletal:        She has chronic back pain and muscle spasms. She is wheelchair-bound secondary to history of multiple sclerosis. Skin: Positive for rash. Negative for color change, pallor and wound. She has a red raised lesion to her right lower extremity.  She does have 1+ edema bilaterally   Allergic/Immunologic: Positive for environmental allergies. Negative for food allergies and immunocompromised state. Neurological: Positive for headaches. Negative for dizziness, seizures, syncope, speech difficulty, weakness and numbness. Occasionally gets sinus headaches   Hematological: Negative for adenopathy. Does not bruise/bleed easily. Psychiatric/Behavioral: Positive for dysphoric mood. Negative for agitation, confusion and decreased concentration. The patient is nervous/anxious. The patient is not hyperactive. Anxiety and depression are stable       /80 (Site: Right Upper Arm, Position: Sitting, Cuff Size: Medium Adult)   Pulse 68   Ht 5' 9\" (1.753 m)   SpO2 97%   BMI 25.25 kg/m²   Physical Exam   Constitutional: She is oriented to person, place, and time. She appears well-developed and well-nourished. No distress. HENT:   Head: Normocephalic and atraumatic. Right Ear: External ear normal.   Left Ear: External ear normal.   Nose: Nose normal.   Mouth/Throat: Oropharynx is clear and moist. No oropharyngeal exudate. Postnasal drip   Eyes: Pupils are equal, round, and reactive to light. Conjunctivae and EOM are normal. Right eye exhibits no discharge. Left eye exhibits no discharge. No scleral icterus. Neck: Normal range of motion. Neck supple. No JVD present. No tracheal deviation present. No thyromegaly present. Cardiovascular: Normal rate, regular rhythm, normal heart sounds and intact distal pulses. Exam reveals no gallop and no friction rub. No murmur heard. Pulmonary/Chest: Effort normal and breath sounds normal. No respiratory distress. She has no wheezes. She has no rales. She exhibits no tenderness. Abdominal: Soft. Bowel sounds are normal. She exhibits no distension and no mass. There is no tenderness. There is no rebound and no guarding. Ostomy bags are intact   Musculoskeletal: Normal range of motion. She is wheelchair bound. She does have contractures of both of her feet.    Lymphadenopathy: She has no cervical adenopathy. Neurological: She is alert and oriented to person, place, and time. She has normal reflexes. She displays normal reflexes. No cranial nerve deficit. She exhibits normal muscle tone. Coordination normal.   Skin: Skin is warm and dry. Rash noted. She is not diaphoretic. No erythema. No pallor. He has a red raised lesion about the size of a baseball to her right lower extremity   Psychiatric: She has a normal mood and affect. Her behavior is normal. Judgment and thought content normal.   Nursing note and vitals reviewed. 1. Dysuria    2. Screening mammogram, encounter for    3. Family history of colon cancer        ASSESSMENT/PLAN:    61-year-old woman here for follow-up and annual physical exam    1. Health maintenance: Routine screening is as per HPI. Labs ordered    2. Overactive bladder: Continue oxybutynin    3. Depression: Continue Cymbalta at current dose    4. Dry mouth: Continue polyp heart pain    5. Environmental allergies: Continue Xyzal    6. Multiple sclerosis: Continue Zanaflex, Lyrica, and hydrocodone as per Dr. Johnny Ball    7. COPD: Continue albuterol. Nebulizer and supplies ordered for patient    8. Possible hyperglycemia: Labs ordered today. Glucometer ordered    9. Recurrent UTIs: Continue Macrobid. We will order a urinalysis today    10. Swelling of the lower extremities: Discontinue hydrochlorothiazide. Start Lasix. I'll he suspected this is secondary to Lyrica. Patient's been advised to keep her feet up    11. Decreased focus and fatigue: Continue Ritalin as per Dr. Johnny Ball    12. Seizure disorder: Continue Keppra    13. Skin lesion: Try Silvadene cream            Alyce was seen today for medicare awv. Diagnoses and all orders for this visit:    Dysuria  -     Urinalysis Reflex to Culture; Future    Screening mammogram, encounter for  -     DK DIGITAL SCREEN W OR WO CAD BILATERAL;  Future    Family history of colon cancer  -     [facility] [place]

## 2018-11-15 NOTE — PROGRESS NOTES
pain    Hx of seizure disorder    Hypokalemia    Hypokalemia    Abnormal EKG    Encephalopathy    Elevated troponin level    [facility]uropath[facility] ulcer [facility] left foot, [facility] breakdown [facility]    Acute bronchitis    Acute sinusitis    Open wound of ankle    Vitamin D deficiency    Back pain    Breakdown (mechanical) of cranial or spinal infusion catheter, initial encounter    [facility]FL [facility]chronic airflow limitation) [facility]    Binocular vision disorder with diplopia    Aptyalism    Difficult or painful urination    Headache    Hyperlipidemia    Influenza    Insomnia    Breast lump    Patient overweight    Hay fever    Decubitus ulcer of sacral region    Current smoker    Cobalamin deficiency    Disease caused by fungus    Pain, neck    Colostomy and enterostomy malfunction ([facility])        Review of Systems    /80 (Site: Right Upper Arm, Position: Sitting, Cuff Size: Medium Adult)   Pulse 68   Ht 5' 9\" (1.753 m)   SpO2 97%   BMI 25.25 kg/m²   Physical Exam    1. Dysuria    2. Screening mammogram, encounter for    3. Family history of colon cancer        ASSESSMENT/PLAN:    Alyce was seen today for medicare aw. Diagnoses and all orders for this visit:    Dysuria  -     Urinalysis Reflex to Culture; Future    Screening mammogram, encounter for  -     DK DIGITAL SCREEN W OR WO CAD BILATERAL; Future    Family history of colon cancer  -     [facility]- Mariza Huerta MD    Other orders  -     ondansetron (ZOFRAN) 4 MG tablet; Take 1 tablet by mouth every 8 hours as needed for Nausea or Vomiting  -     fluconazole (DIFLUCAN) 100 MG tablet; TAKE 1 TABLET DAILY FOR 3 DOSES AS NEEDED.  -     oxybutynin (DITROPAN XL) 15 MG extended release tablet; TAKE 1 TABLET BY MOUTH TWICE DAILY. -     DULoxetine (CYMBALTA) 30 MG extended release capsule; Take 1 capsule by mouth daily  -     pilocarpine (SALAGEN) 7.5 MG tablet; TAKE 1 TABLET BY MOUTH THREE TIMES DAILY.   -     Discontinue: hydrochlorothiazide (HYDRODIURIL) 25 MG tablet; Take 1 tablet by mouth daily  -     levocetirizine (XYZAL) 5 MG tablet; TAKE 1 TABLET IN THE EVENING  -     tiZANidine (ZANAFLEX) 4 MG tablet; TAKE 3 TABLETS TWICE DAILY  -     albuterol sulfate HFA (PROAIR HFA) 108 (90 Base) MCG/ACT inhaler; Inhale 1 puff into the lungs every 6 hours as needed for Wheezing or Shortness of Breath  -     glucose monitoring kit (FREESTYLE) monitoring kit; 1 kit by Does not apply route daily  -     blood glucose monitor strips; Test 1 times a day & as needed for symptoms of irregular blood glucose. -     Lancets MISC; 1 each by Does not apply route 2 times daily  -     INFLUENZA, QUADV, 6 MO AND OLDER, IM, PF, PREFILL SYR, 0.5ML (FLUARIX QUADV, PF)  -     nitrofurantoin (MACRODANTIN) 50 MG capsule; Take 1 capsule by mouth daily  -     silver sulfADIAZINE (SILVADENE) 1 % cream; Apply topically daily. -     furosemide (LASIX) 20 MG tablet; Take 1 tablet by mouth daily          Return in about 1 week (around 11/22/2018), or recheck swelling and leg wound. Orders Placed This Encounter   Procedures    DK DIGITAL SCREEN W OR WO CAD BILATERAL     Standing Status:   Future     Standing Expiration Date:   1/15/2020    INFLUENZA, QUADV, 6 MO AND OLDER, IM, PF, PREFILL SYR, 0.5ML (FLUARIX QUADV, PF)    Urinalysis Reflex to Culture     Standing Status:   Future     Number of Occurrences:   1     Standing Expiration Date:   11/15/2019     Order Specific Question:   SPECIFY(EX-CATH,MIDSTREAM,CYSTO,ETC)?      Answer:   clean catch   [facility]- Hector Adams MD     Referral Priority:   Routine     Referral Type:   Eval and Treat     Referral Reason:   Specialty Services Required     Referred to Provider:   Brady Renteria MD     Requested Specialty:   Gastroenterology     Number of Visits Requested:   Luz Arellano MD

## 2018-11-15 NOTE — PROGRESS NOTES
daily Yes Shine Hatfield MD   blood glucose monitor strips Test 1 times a day & as needed for symptoms of irregular blood glucose. Yes Shine Hatfield MD   Lancets MISC 1 each by Does not apply route 2 times daily Yes Shine Hatfield MD   nitrofurantoin (MACRODANTIN) 50 MG capsule Take 1 capsule by mouth daily Yes Shine Hatfield MD   silver sulfADIAZINE (SILVADENE) 1 % cream Apply topically daily. Yes Shine Hatfield MD   furosemide (LASIX) 20 MG tablet Take 1 tablet by mouth daily Yes Shine Hatfield MD   HYDROcodone-acetaminophen (NORCO)  MG per tablet Take 1 tablet by mouth 3 times daily as needed for Pain for up to 30 days. Must last 30 days. Yes Dada Santo MD   pregabalin (LYRICA) 300 MG capsule Take 1 capsule by mouth 2 times daily for 30 days. Huma Dobbins MD   methylphenidate (RITALIN) 20 MG tablet Take 1 tablet by mouth daily for 30 days. Huma Dobbins MD   levETIRAcetam (KEPPRA) 500 MG tablet TAKE 1 TABLET BY MOUTH TWICE DAILY Yes Dada Santo MD   mupirocin (BACTROBAN) 2 % ointment APPLY AND GENTLY MASSAGE INTO AFFECTED AREA(S) TWICE DAILY. Yes Shine Hatfield MD   baclofen (LIORESAL) 10 MG tablet Take 1 tablet by mouth 2 times daily as needed (muscle spasms) Yes Dada Santo MD   Sodium Chloride Flush (SALINE FLUSH) 0.9 % SOLN Infuse 20 mLs intravenously every 30 days Not every 30 days but every 4-6 weeks as need with 300 units of heparin to flush port for Infusion of Ocrevus for multiple sclerosis Yes Dada Santo MD   Heparin Lock Flush (HEPARIN FLUSH, 100 UNITS/ML,) 100 UNIT/ML injection 3 mLs by Intercatheter route every 30 days No every 30 days but every 4-6 weeks. Flush port with 300 units heparin with 20 cc normal saline for ocrevus infusion for Multiple sclerosis Yes Dada Santo MD   ipratropium-albuterol (DUONEB) 0.5-2.5 (3) MG/3ML SOLN nebulizer solution USE 1 UNIT DOSE IN NEBULIZER EVERY 4 TO 6 HOURS AS NEEDED.  Yes Shine Hatfield MD and emotional support that you need?: Yes  Do you have a Living Will?: (!) No  General Health Risk Interventions:  · None needed    Health Habits/Nutrition  Do you exercise for at least 20 minutes 2-3 times per week?: (!) No  Have you lost any weight without trying in the past 3 months?: No  Do you eat fewer than 2 meals per day?: No  Have you seen a dentist within the past year?: (!) No  Body mass index is 25.25 kg/m².   Health Habits/Nutrition Interventions:  · None needed    Hearing/Vision  Do you or your family notice any trouble with your hearing?: No  Do you have difficulty driving, watching TV, or doing any of your daily activities because of your eyesight?: No  Have you had an eye exam within the past year?: (!) No  Hearing/Vision Interventions:  · None needed    Safety  Do you have working smoke detectors?: Yes  Have all throw rugs been removed or fastened?: Yes  Do you have non-slip mats in all bathtubs?: (!) No  Do all of your stairways have a railing or banister?: Yes  Are your doorways, halls and stairs free of clutter?: Yes  Do you always fasten your seatbelt when you are in a car?: Yes  Safety Interventions:  · Patient declines any further evaluation/treatment for this issue    ADLs  In the past 7 days, did you need help from others to perform any of the following everyday activities?: (!) Dressing, Bathing, Toileting  In the past 7 days, did you need help from others to take care of any of the following?: (!) Transportation, Food Preparation, Taking Medications  ADL Interventions:  · Patient declines any further evaluation/treatment for this issue    Personalized Preventive Plan   Current Health Maintenance Status  Immunization History   Administered Date(s) Administered    Influenza Virus Vaccine 09/24/2015    Influenza, High Dose (Fluzone 65 yrs and older) 09/24/2015    InfluenzaBen, 6 mo and older, IM, PF (Flulaval, Fluarix) 11/15/2018    Pneumococcal Polysaccharide ([id number]) 10/09/2014, 2017    Tdap (Boostrix, Adacel) 2017        Health Maintenance   Topic Date Due    Cervical cancer screen  1992    Potassium monitoring  2019    Creatinine monitoring  2019    Lipid screen  2022    DTaP/Tdap/Td vaccine (2 - Td) 2027    Flu vaccine  Completed    HIV screen  Completed       Recommendations for Preventive Services Due: see orders. Recommended screening schedule for the next 5-10 years is provided to the patient in written form: see Patient Instructions/AVS.    Medicare Annual Wellness Visit  Name: Giuseppe Conte Date: 11/15/2018   MRN: [id number] Sex: Female   Age: 53 y.o. Ethnicity: Non-/Non    : 1970 Race: Armando David is here for Medicare AWV (has swelling in feet and calves, sore on right leg,daily dose of macrodantin,wants flu shot, new nebulizer supplies)    Screenings for behavioral, psychosocial and functional/safety risks, and cognitive dysfunction are all negative except as indicated below. These results, as well as other patient data from the [address] form, are documented in Flowsheets linked to this Encounter. Allergies   Allergen Reactions    Darvocet [Propoxyphene N-Acetaminophen]     Morphine And Related Swelling    Morphine Hcl     Propoxyphene Swelling       Prior to Visit Medications    Medication Sig Taking? Authorizing Provider   ondansetron (ZOFRAN) 4 MG tablet Take 1 tablet by mouth every 8 hours as needed for Nausea or Vomiting Yes Jerl Gaucher, MD   fluconazole (DIFLUCAN) 100 MG tablet TAKE 1 TABLET DAILY FOR 3 DOSES AS NEEDED. Yes Jerl Gaucher, MD   oxybutynin (DITROPAN XL) 15 MG extended release tablet TAKE 1 TABLET BY MOUTH TWICE DAILY.  Yes Jerl Gaucher, MD   DULoxetine (CYMBALTA) 30 MG extended release capsule Take 1 capsule by mouth daily Yes Jerl Gaucher, MD   pilocarpine (SALAGEN) 7.5 MG tablet TAKE 1 TABLET BY MOUTH THREE TIMES

## 2018-11-17 LAB — VITAMIN D 1,25-DIHYDROXY: 58.1 PG/ML (ref 19.9–79.3)

## 2018-11-18 LAB
ORGANISM: ABNORMAL
URINE CULTURE, ROUTINE: ABNORMAL

## 2018-11-19 ENCOUNTER — TELEPHONE (OUTPATIENT)
Dept: INTERNAL MEDICINE | Age: 48
End: 2018-11-19

## 2018-11-19 RX ORDER — CEFDINIR 300 MG/1
300 CAPSULE ORAL 2 TIMES DAILY
Qty: 20 CAPSULE | Refills: 0 | Status: SHIPPED | OUTPATIENT
Start: 2018-11-19 | End: 2018-11-29

## 2018-11-26 ENCOUNTER — HOSPITAL ENCOUNTER (OUTPATIENT)
Dept: WOUND CARE | Age: 48
Discharge: HOME | End: 2018-11-26
Payer: MEDICARE

## 2018-11-26 VITALS
SYSTOLIC BLOOD PRESSURE: 121 MMHG | DIASTOLIC BLOOD PRESSURE: 78 MMHG | TEMPERATURE: 97.5 F | RESPIRATION RATE: 18 BRPM | HEART RATE: 78 BPM

## 2018-11-26 DIAGNOSIS — I83.891 VENOUS STASIS ULCER OF RIGHT LOWER LEG WITH EDEMA OF RIGHT LOWER LEG (HCC): ICD-10-CM

## 2018-11-26 DIAGNOSIS — I70.232 ATHEROSCLEROSIS OF NATIVE ARTERIES OF RIGHT LEG WITH ULCERATION OF CALF (HCC): ICD-10-CM

## 2018-11-26 DIAGNOSIS — L97.919 VENOUS STASIS ULCER OF RIGHT LOWER LEG WITH EDEMA OF RIGHT LOWER LEG (HCC): ICD-10-CM

## 2018-11-26 DIAGNOSIS — I83.019 VENOUS STASIS ULCER OF RIGHT LOWER LEG WITH EDEMA OF RIGHT LOWER LEG (HCC): ICD-10-CM

## 2018-11-26 DIAGNOSIS — L97.812 NON-PRESSURE CHRONIC ULCER OF OTHER PART OF RIGHT LOWER LEG WITH FAT LAYER EXPOSED (HCC): ICD-10-CM

## 2018-11-26 DIAGNOSIS — R60.9 VENOUS STASIS ULCER OF RIGHT LOWER LEG WITH EDEMA OF RIGHT LOWER LEG (HCC): ICD-10-CM

## 2018-11-26 PROBLEM — L97.521 NEUROPATHIC ULCER OF LEFT FOOT, LIMITED TO BREAKDOWN OF SKIN (HCC): Status: RESOLVED | Noted: 2017-04-10 | Resolved: 2018-11-26

## 2018-11-26 PROBLEM — R60.0 VENOUS STASIS ULCER OF RIGHT LOWER LEG WITH EDEMA OF RIGHT LOWER LEG (HCC): Status: ACTIVE | Noted: 2018-11-26

## 2018-11-26 PROCEDURE — 99214 OFFICE O/P EST MOD 30 MIN: CPT

## 2018-11-26 PROCEDURE — 99214 OFFICE O/P EST MOD 30 MIN: CPT | Performed by: NURSE PRACTITIONER

## 2018-11-26 NOTE — PROGRESS NOTES
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 This started 3 week(s) ago. She believes this is not healing. She has been applying nothing. She has not had  fever or chills. She has a history of venous disease.     Melvin Chavez is a 53 y.o. 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 ([address] 11/26/2018    Venous stasis ulcer of right lower leg with edema of right lower leg (HCC) 11/26/2018    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 layer exposed ([address] 11/26/2018    Pain, neck 09/06/2017    Colostomy and enterostomy malfunction ([address] 09/06/2017    Acute bronchitis 06/28/2017    Acute sinusitis 06/28/2017    Open wound of ankle 06/28/2017    Vitamin D deficiency 06/28/2017    Back pain 06/28/2017    CAFL (chronic airflow limitation) ([facility] 06/28/2017    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    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    Difficult or painful urination 06/28/2017    Headache 06/28/2017    Hyperlipidemia 06/28/2017    Influenza 06/28/2017    Insomnia 06/28/2017    Breast lump 06/28/2017    Patient overweight 06/28/2017     Updating deleted diagnoses      Hay fever 06/28/2017    Decubitus ulcer of sacral region 06/28/2017    Current smoker 06/28/2017    Cobalamin deficiency 06/28/2017    Disease caused by fungus 06/28/2017 Ocrelizumab (OCREVUS IV) Infuse intravenously      fluticasone (FLONASE) 50 MCG/ACT nasal spray 2 sprays by Nasal route daily 1 Bottle 5    levETIRAcetam (KEPPRA) 500 MG tablet Take 500 mg by mouth daily       No current facility-administered medications for this encounter. Allergies: Darvocet [propoxyphene n-acetaminophen];  Morphine and related; Morphine hcl; and Propoxyphene    Past Medical History:   Diagnosis Date    Asthma     Back pain 6/28/2017    [facility]FL [facility]ch[facility]flow limitation) ([facility] 6/28/2017    Hay fever 6/28/2017    Headache 6/28/2017    Hyperlipidemia 6/28/2017    MS (multiple sclerosis) [facility]     Neuropathic ulcer of left foot, limited to breakdown of skin ([facility] 4/10/2017    Peripheral vascular disease ([facility]      Past Surgical History:   Procedure Laterality Date    BACLOFEN PUMP IMPLANTATION      COLOSTOMY      FEMUR FRACTURE SURGERY      Right    HYSTERECTOMY      OTHER SURGICAL HISTORY      urostomy    OTHER SURGICAL HISTORY      pain pump    AR INSJ PRPH CTR VAD W/SUBQ PORT UNDER 5 YR N/A 6/26/2018    SINGLE LUMEN PORT PLACEMENT WITH FLUORO performed by Nestor Dobbins MD at [address] [facility] TOE AMPUTATION      L last toe    TONSILLECTOMY      URETEROTOMY       Family History   Problem Relation Age of Onset    Cancer Mother         breast    Diabetes Father     High Blood Pressure Father     Cancer Paternal Aunt         breast    Heart Disease Paternal Grandmother      Social History   Substance Use Topics    Smoking status: Former Smoker     Types: Cigarettes     Quit date: 1/8/2018    Smokeless tobacco: Never Used    Alcohol use No       Review of Systems  Constitutional / general:  No fever / chills / sweats  Head:  No headache / neck stiffness / trauma / visual change  Eyes:  No blurry vision / acute visual change or loss / itching / redness  ENT: No sore throat / hoarseness / nasal drainage / ear pain  CV:  No chest pain / palpitations/ orthopnea   Respiratory:  No and even online, but you must have a calf and ankle measurement (taken in the morning before your legs swell) to order them. You will also need the strength that the physician has ordered. Most insurance companies do not pay for these unless you have an open ulceration currently. 8)  Replace your stockings every 3-6 months as they stretch out and become less effective. 9) If you are overweight, losing weight can be helpful. 10) Follow up with your primary care to address medical management to prevent swelling in your lower legs if you are on diuretics (lasix, bumex).

## 2018-12-04 ENCOUNTER — HOSPITAL ENCOUNTER (OUTPATIENT)
Dept: WOUND CARE | Age: 48
Discharge: HOME | End: 2018-12-04
Payer: MEDICARE

## 2018-12-04 ENCOUNTER — HOSPITAL ENCOUNTER (OUTPATIENT)
Dept: NON INVASIVE DIAGNOSTICS | Age: 48
Discharge: HOME | End: 2018-12-04
Payer: MEDICARE

## 2018-12-04 VITALS — BODY MASS INDEX: 25.25 KG/M2 | HEIGHT: 69 IN

## 2018-12-04 DIAGNOSIS — R60.9 VENOUS STASIS ULCER OF RIGHT LOWER LEG WITH EDEMA OF RIGHT LOWER LEG (HCC): ICD-10-CM

## 2018-12-04 DIAGNOSIS — L97.919 VENOUS STASIS ULCER OF RIGHT LOWER LEG WITH EDEMA OF RIGHT LOWER LEG (HCC): ICD-10-CM

## 2018-12-04 DIAGNOSIS — I70.232 ATHEROSCLEROSIS OF NATIVE ARTERIES OF RIGHT LEG WITH ULCERATION OF CALF (HCC): ICD-10-CM

## 2018-12-04 DIAGNOSIS — L97.812 NON-PRESSURE CHRONIC ULCER OF OTHER PART OF RIGHT LOWER LEG WITH FAT LAYER EXPOSED (HCC): ICD-10-CM

## 2018-12-04 DIAGNOSIS — I83.019 VENOUS STASIS ULCER OF RIGHT LOWER LEG WITH EDEMA OF RIGHT LOWER LEG (HCC): ICD-10-CM

## 2018-12-04 DIAGNOSIS — I83.891 VENOUS STASIS ULCER OF RIGHT LOWER LEG WITH EDEMA OF RIGHT LOWER LEG (HCC): ICD-10-CM

## 2018-12-04 PROCEDURE — 97597 DBRDMT OPN WND 1ST 20 CM/<: CPT

## 2018-12-04 PROCEDURE — 93923 UPR/LXTR ART STDY 3+ LVLS: CPT

## 2018-12-04 PROCEDURE — 97597 DBRDMT OPN WND 1ST 20 CM/<: CPT | Performed by: SURGERY

## 2018-12-04 PROCEDURE — 99212 OFFICE O/P EST SF 10 MIN: CPT | Performed by: SURGERY

## 2018-12-04 ASSESSMENT — PAIN SCALES - GENERAL: PAINLEVEL_OUTOF10: 0

## 2018-12-04 ASSESSMENT — PAIN DESCRIPTION - PAIN TYPE: TYPE: ACUTE PAIN

## 2018-12-04 ASSESSMENT — PAIN DESCRIPTION - PROGRESSION: CLINICAL_PROGRESSION: NOT CHANGED
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
## 2018-12-11 ENCOUNTER — TELEPHONE (OUTPATIENT)
Dept: INTERNAL MEDICINE | Age: 48
End: 2018-12-11

## 2018-12-11 ENCOUNTER — HOSPITAL ENCOUNTER (OUTPATIENT)
Dept: WOUND CARE | Age: 48
Discharge: HOME | End: 2018-12-11
Payer: MEDICARE

## 2018-12-11 VITALS
BODY MASS INDEX: 25.25 KG/M2 | SYSTOLIC BLOOD PRESSURE: 89 MMHG | DIASTOLIC BLOOD PRESSURE: 59 MMHG | RESPIRATION RATE: 18 BRPM | HEART RATE: 74 BPM | TEMPERATURE: 97.4 F | HEIGHT: 69 IN

## 2018-12-11 DIAGNOSIS — I83.019 VENOUS STASIS ULCER OF RIGHT LOWER LEG WITH EDEMA OF RIGHT LOWER LEG (HCC): ICD-10-CM

## 2018-12-11 DIAGNOSIS — L97.812 NON-PRESSURE CHRONIC ULCER OF OTHER PART OF RIGHT LOWER LEG WITH FAT LAYER EXPOSED (HCC): ICD-10-CM

## 2018-12-11 DIAGNOSIS — S91.105A OPEN WOUND OF SECOND TOE OF LEFT FOOT, INITIAL ENCOUNTER: Chronic | ICD-10-CM

## 2018-12-11 DIAGNOSIS — I70.232 ATHEROSCLEROSIS OF NATIVE ARTERIES OF RIGHT LEG WITH ULCERATION OF CALF (HCC): ICD-10-CM

## 2018-12-11 DIAGNOSIS — R60.9 VENOUS STASIS ULCER OF RIGHT LOWER LEG WITH EDEMA OF RIGHT LOWER LEG (HCC): ICD-10-CM

## 2018-12-11 DIAGNOSIS — I83.891 VENOUS STASIS ULCER OF RIGHT LOWER LEG WITH EDEMA OF RIGHT LOWER LEG (HCC): ICD-10-CM

## 2018-12-11 DIAGNOSIS — L97.919 VENOUS STASIS ULCER OF RIGHT LOWER LEG WITH EDEMA OF RIGHT LOWER LEG (HCC): ICD-10-CM

## 2018-12-11 PROCEDURE — 97597 DBRDMT OPN WND 1ST 20 CM/<: CPT | Performed by: SURGERY

## 2018-12-11 PROCEDURE — 99212 OFFICE O/P EST SF 10 MIN: CPT | Performed by: SURGERY

## 2018-12-11 PROCEDURE — 97597 DBRDMT OPN WND 1ST 20 CM/<: CPT

## 2018-12-11 ASSESSMENT — PAIN DESCRIPTION - PAIN TYPE: TYPE: ACUTE PAIN

## 2018-12-11 ASSESSMENT — PAIN DESCRIPTION - PROGRESSION: CLINICAL_PROGRESSION: NOT CHANGED

## 2018-12-11 ASSESSMENT — PAIN SCALES - GENERAL: PAINLEVEL_OUTOF10: 0

## 2018-12-11 NOTE — PROGRESS NOTES
layer exposed ([facility] L97.812    Open wound of second toe of left foot S91.105A       ALLERGIES    Allergies   Allergen Reactions    Darvocet [Propoxyphene N-Acetaminophen]     Morphine And Related Swelling    Morphine Hcl     Propoxyphene Swelling            Objective:      BP (!) 89/59   Pulse 74   Temp 97.4 °F (36.3 °C) (Temporal)   Resp 18   Ht 5' 9\" (1.753 m)   BMI 25.25 kg/m²     Post Debridement Measurements and Assessment:  Wound 11/26/18 Wound 60 - Right lateral lower leg - Venous  (Active)   Wound Image   12/11/2018 10:48 AM   Wound Venous 12/11/2018 10:48 AM   Dressing Status Old drainage 12/11/2018 10:48 AM   Dressing Changed Changed/New 12/11/2018 12:00 PM   Wound Cleansed Rinsed/Irrigated with saline 12/11/2018 10:48 AM   Wound Length (cm) 0.2 cm 12/11/2018 10:48 AM   Wound Width (cm) 0.2 cm 12/11/2018 10:48 AM   Wound Depth (cm) 0.1 cm 12/11/2018 10:48 AM   Wound Surface Area (cm^2) 0.04 cm^2 12/11/2018 10:48 AM   Change in Wound Size % (l*w) 86.67 12/11/2018 10:48 AM   Wound Volume (cm^3) 0 cm^3 12/11/2018 10:48 AM   Wound Healing % 100 12/11/2018 10:48 AM   Post-Procedure Length (cm) 1 cm 12/11/2018 11:36 AM   Post-Procedure Width (cm) 0.3 cm 12/11/2018 11:36 AM   Post-Procedure Depth (cm) 0.1 cm 12/11/2018 11:36 AM   Post-Procedure Surface Area (cm^2) 0.3 cm^2 12/11/2018 11:36 AM   Post-Procedure Volume (cm^3) 0.03 cm^3 12/11/2018 11:36 AM   Wound Assessment Pink;Red 12/11/2018 10:48 AM   Drainage Amount Moderate 12/11/2018 10:48 AM   Drainage Description Serosanguinous 12/11/2018 10:48 AM   Odor None 12/11/2018 10:48 AM   Margins Defined edges 12/11/2018 10:48 AM   [place]%Wound Bed 50 12/4/2018 11:54 AM   Red%Wound Bed 50 12/4/2018 11:54 AM   Yellow%Wound Bed 0 12/4/2018 11:54 AM   Black%Wound Bed 0 12/4/2018 11:54 AM   Number of days: 14       Wound 12/04/18 Wound 61.   Right Dorsal Foot (Active)   Wound Image   12/11/2018 10:48 AM   Wound Burn 12/11/2018 10:48 AM   Dressing Status Old

## 2018-12-11 NOTE — TELEPHONE ENCOUNTER
Called the patient, we had received a request for catheters and supplies. Dr Blessing Colindres will need to see her for this, this patient has not talked to Dr Blessing Colindres about this and the place that she is now wanting them from is wanting office notes. Patient voice understood and will address this at her next office visit.

## 2018-12-12 ENCOUNTER — TELEPHONE (OUTPATIENT)
Dept: NEUROLOGY | Age: 48
End: 2018-12-12

## 2018-12-13 DIAGNOSIS — G35 MULTIPLE SCLEROSIS (HCC): Primary | ICD-10-CM

## 2018-12-18 ENCOUNTER — TELEPHONE (OUTPATIENT)
Dept: INTERNAL MEDICINE | Age: 48
End: 2018-12-18

## 2018-12-18 ENCOUNTER — HOSPITAL ENCOUNTER (OUTPATIENT)
Dept: WOUND CARE | Age: 48
Discharge: HOME | End: 2018-12-18
Payer: MEDICARE

## 2018-12-18 ENCOUNTER — TELEPHONE (OUTPATIENT)
Dept: NEUROLOGY | Age: 48
End: 2018-12-18

## 2018-12-18 VITALS
DIASTOLIC BLOOD PRESSURE: 82 MMHG | BODY MASS INDEX: 25.25 KG/M2 | HEART RATE: 78 BPM | RESPIRATION RATE: 18 BRPM | TEMPERATURE: 98.5 F | SYSTOLIC BLOOD PRESSURE: 119 MMHG | HEIGHT: 69 IN

## 2018-12-18 DIAGNOSIS — I83.891 VENOUS STASIS ULCER OF RIGHT LOWER LEG WITH EDEMA OF RIGHT LOWER LEG (HCC): ICD-10-CM

## 2018-12-18 DIAGNOSIS — M62.838 MUSCLE SPASTICITY: ICD-10-CM

## 2018-12-18 DIAGNOSIS — I83.019 VENOUS STASIS ULCER OF RIGHT LOWER LEG WITH EDEMA OF RIGHT LOWER LEG (HCC): ICD-10-CM

## 2018-12-18 DIAGNOSIS — M54.2 PAIN, NECK: ICD-10-CM

## 2018-12-18 DIAGNOSIS — I70.232 ATHEROSCLEROSIS OF NATIVE ARTERIES OF RIGHT LEG WITH ULCERATION OF CALF (HCC): ICD-10-CM

## 2018-12-18 DIAGNOSIS — R53.83 OTHER FATIGUE: ICD-10-CM

## 2018-12-18 DIAGNOSIS — M79.621 PAIN IN BOTH UPPER ARMS: ICD-10-CM

## 2018-12-18 DIAGNOSIS — M79.622 PAIN IN BOTH UPPER ARMS: ICD-10-CM

## 2018-12-18 DIAGNOSIS — G35 MS (MULTIPLE SCLEROSIS) (HCC): ICD-10-CM

## 2018-12-18 DIAGNOSIS — L97.812 NON-PRESSURE CHRONIC ULCER OF OTHER PART OF RIGHT LOWER LEG WITH FAT LAYER EXPOSED (HCC): ICD-10-CM

## 2018-12-18 DIAGNOSIS — L97.919 VENOUS STASIS ULCER OF RIGHT LOWER LEG WITH EDEMA OF RIGHT LOWER LEG (HCC): ICD-10-CM

## 2018-12-18 DIAGNOSIS — R60.9 VENOUS STASIS ULCER OF RIGHT LOWER LEG WITH EDEMA OF RIGHT LOWER LEG (HCC): ICD-10-CM

## 2018-12-18 PROCEDURE — 11042 DBRDMT SUBQ TIS 1ST 20SQCM/<: CPT | Performed by: SURGERY

## 2018-12-18 PROCEDURE — 11042 DBRDMT SUBQ TIS 1ST 20SQCM/<: CPT

## 2018-12-18 RX ORDER — METHYLPHENIDATE HYDROCHLORIDE 20 MG/1
20 TABLET ORAL DAILY
Qty: 30 TABLET | Refills: 0 | Status: SHIPPED | OUTPATIENT
Start: 2018-12-18 | End: 2019-01-18 | Stop reason: SDUPTHER

## 2018-12-18 RX ORDER — HYDROCODONE BITARTRATE AND ACETAMINOPHEN 10; 325 MG/1; MG/1
1 TABLET ORAL 3 TIMES DAILY PRN
Qty: 90 TABLET | Refills: 0 | Status: SHIPPED | OUTPATIENT
Start: 2018-12-18 | End: 2019-01-18 | Stop reason: SDUPTHER

## 2018-12-18 ASSESSMENT — PAIN SCALES - GENERAL: PAINLEVEL_OUTOF10: 0

## 2018-12-18 ASSESSMENT — PAIN DESCRIPTION - PROGRESSION: CLINICAL_PROGRESSION: NOT CHANGED

## 2018-12-18 NOTE — PROGRESS NOTES
left foot S91.105A       ALLERGIES    Allergies   Allergen Reactions    Darvocet [Propoxyphene N-Acetaminophen]     Morphine And Related Swelling    Morphine Hcl     Propoxyphene Swelling            Objective:      /82   Pulse 78   Temp 98.5 °F (36.9 °C) (Temporal)   Resp 18   Ht 5' 9\" (1.753 m)   BMI 25.25 kg/m²     Post Debridement Measurements and Assessment:  Wound 11/26/18 Wound 60 - Right lateral lower leg - Venous  (Active)   Wound Image   12/18/2018  3:38 PM   Wound Venous 12/18/2018  3:38 PM   Dressing Status Clean;Dry; Intact 12/18/2018  3:38 PM   Dressing Changed Changed/New 12/11/2018 12:00 PM   Wound Cleansed Rinsed/Irrigated with saline 12/18/2018  3:38 PM   Wound Length (cm) 0 cm 12/18/2018  3:38 PM   Wound Width (cm) 0 cm 12/18/2018  3:38 PM   Wound Depth (cm) 0 cm 12/18/2018  3:38 PM   Wound Surface Area (cm^2) 0 cm^2 12/18/2018  3:38 PM   Change in Wound Size % (l*w) 100 12/18/2018  3:38 PM   Wound Volume (cm^3) 0 cm^3 12/18/2018  3:38 PM   Wound Healing % 100 12/18/2018  3:38 PM   Post-Procedure Length (cm) 1 cm 12/11/2018 11:36 AM   Post-Procedure Width (cm) 0.3 cm 12/11/2018 11:36 AM   Post-Procedure Depth (cm) 0.1 cm 12/11/2018 11:36 AM   Post-Procedure Surface Area (cm^2) 0.3 cm^2 12/11/2018 11:36 AM   Post-Procedure Volume (cm^3) 0.03 cm^3 12/11/2018 11:36 AM   Wound Assessment Pink;Red 12/11/2018 10:48 AM   Drainage Amount Moderate 12/11/2018 10:48 AM   Drainage Description Serosanguinous 12/11/2018 10:48 AM   Odor None 12/11/2018 10:48 AM   Margins Defined edges 12/11/2018 10:48 AM   [place]%Wound Bed 50 12/4/2018 11:54 AM   Red%Wound Bed 50 12/4/2018 11:54 AM   Yellow%Wound Bed 0 12/4/2018 11:54 AM   Black%Wound Bed 0 12/4/2018 11:54 AM   Number of days: 22       Wound 12/04/18 Wound 61.   Right Dorsal Foot (Active)   Wound Image   12/18/2018  3:38 PM   Wound Venous 12/18/2018  3:38 PM   Dressing Status Old drainage 12/18/2018  3:38 PM   Dressing Changed Changed/New 12/11/2018 12:00 PM   Wound Cleansed Rinsed/Irrigated with saline 12/18/2018  3:38 PM   Wound Length (cm) 1.4 cm 12/18/2018  3:38 PM   Wound Width (cm) 0.8 cm 12/18/2018  3:38 PM   Wound Depth (cm) 0.2 cm 12/18/2018  3:38 PM   Wound Surface Area (cm^2) 1.12 cm^2 12/18/2018  3:38 PM   Change in Wound Size % (l*w) -40 12/18/2018  3:38 PM   Wound Volume (cm^3) 0.22 cm^3 12/18/2018  3:38 PM   Wound Healing % -175 12/18/2018  3:38 PM   Post-Procedure Length (cm) 1.4 cm 12/18/2018  4:15 PM   Post-Procedure Width (cm) 0.8 cm 12/18/2018  4:15 PM   Post-Procedure Depth (cm) 0.1 cm 12/18/2018  4:15 PM   Post-Procedure Surface Area (cm^2) 1.12 cm^2 12/18/2018  4:15 PM   Post-Procedure Volume (cm^3) 0.11 cm^3 12/18/2018  4:15 PM   Wound Assessment Tan; White;Bleeding;Granulation tissue;Painful;Pink;Slough; Red 12/18/2018  3:38 PM   Drainage Amount Moderate 12/18/2018  3:38 PM   Drainage Description Yellow;Tan;Serosanguinous 12/18/2018  3:38 PM   Odor None 12/18/2018  3:38 PM   Margins Defined edges 12/18/2018  3:38 PM   Audelia-wound Assessment Red 12/18/2018  3:38 PM   [place]%Wound Bed 0 12/18/2018  3:38 PM   Red%Wound Bed 50 12/18/2018  3:38 PM   Yellow%Wound Bed 50 12/18/2018  3:38 PM   Black%Wound Bed 0 12/18/2018  3:38 PM   Purple%Wound Bed 0 12/18/2018  3:38 PM   Other%Wound Bed 0 12/18/2018  3:38 PM   Number of days: 14       Wound 12/11/18 Wound 62. Left 2nd Toe (Active)   Wound Image   12/18/2018  3:38 PM   Wound Traumatic 12/18/2018  3:38 PM   Dressing Status Dry; Intact 12/18/2018  3:38 PM   Dressing Changed Changed/New 12/11/2018 12:00 PM   Wound Cleansed Rinsed/Irrigated with saline 12/18/2018  3:38 PM   Wound Length (cm) 0.5 cm 12/18/2018  3:38 PM   Wound Width (cm) 1 cm 12/18/2018  3:38 PM   Wound Depth (cm) 0.1 cm 12/18/2018  3:38 PM   Wound Surface Area (cm^2) 0.5 cm^2 12/18/2018  3:38 PM   Change in Wound Size % (l*w) 0 12/18/2018  3:38 PM   Wound Volume (cm^3) 0.05 cm^3 12/18/2018  3:38 PM   Wound Healing % 0 12/18/2018  3:38

## 2018-12-18 NOTE — PLAN OF CARE
Problem: Wound:  Goal: Will show signs of wound healing; wound closure and no evidence of infection  Will show signs of wound healing; wound closure and no evidence of infection   Outcome: Ongoing      Problem: Venous:  Goal: Signs of wound healing will improve  Signs of wound healing will improve   Outcome: Ongoing      Problem: Smoking cessation:  Goal: Ability to formulate a plan to maintain a tobacco-free life will be supported  Ability to formulate a plan to maintain a tobacco-free life will be supported   Outcome: Ongoing

## 2018-12-20 ENCOUNTER — HOSPITAL ENCOUNTER (OUTPATIENT)
Dept: INFUSION THERAPY | Age: 48
Setting detail: INFUSION SERIES
Discharge: HOME | End: 2018-12-20
Payer: MEDICARE

## 2018-12-20 PROCEDURE — 2580000003 HC RX 258: Performed by: PSYCHIATRY & NEUROLOGY

## 2018-12-20 PROCEDURE — 6360000002 HC RX W HCPCS: Performed by: PSYCHIATRY & NEUROLOGY

## 2018-12-20 PROCEDURE — 96523 IRRIG DRUG DELIVERY DEVICE: CPT

## 2018-12-20 RX ORDER — SODIUM CHLORIDE 0.9 % (FLUSH) 0.9 %
20 SYRINGE (ML) INJECTION PRN
Status: DISCONTINUED | OUTPATIENT
Start: 2018-12-20 | End: 2018-12-22 | Stop reason: HOSPADM

## 2018-12-20 RX ADMIN — SODIUM CHLORIDE, PRESERVATIVE FREE 300 UNITS: 5 INJECTION INTRAVENOUS at 14:54

## 2018-12-20 RX ADMIN — Medication 20 ML: at 14:54

## 2018-12-28 ENCOUNTER — TELEPHONE (OUTPATIENT)
Dept: INTERNAL MEDICINE | Age: 48
End: 2018-12-28

## 2019-01-08 ENCOUNTER — HOSPITAL ENCOUNTER (OUTPATIENT)
Dept: WOUND CARE | Age: 49
Discharge: HOME | End: 2019-01-08
Payer: MEDICARE

## 2019-01-08 VITALS — HEIGHT: 69 IN | BODY MASS INDEX: 25.25 KG/M2

## 2019-01-08 DIAGNOSIS — R60.9 VENOUS STASIS ULCER OF RIGHT LOWER LEG WITH EDEMA OF RIGHT LOWER LEG (HCC): ICD-10-CM

## 2019-01-08 DIAGNOSIS — L97.812 NON-PRESSURE CHRONIC ULCER OF OTHER PART OF RIGHT LOWER LEG WITH FAT LAYER EXPOSED (HCC): ICD-10-CM

## 2019-01-08 DIAGNOSIS — L97.919 VENOUS STASIS ULCER OF RIGHT LOWER LEG WITH EDEMA OF RIGHT LOWER LEG (HCC): ICD-10-CM

## 2019-01-08 DIAGNOSIS — I70.232 ATHEROSCLEROSIS OF NATIVE ARTERIES OF RIGHT LEG WITH ULCERATION OF CALF (HCC): ICD-10-CM

## 2019-01-08 DIAGNOSIS — I83.019 VENOUS STASIS ULCER OF RIGHT LOWER LEG WITH EDEMA OF RIGHT LOWER LEG (HCC): ICD-10-CM

## 2019-01-08 DIAGNOSIS — I83.891 VENOUS STASIS ULCER OF RIGHT LOWER LEG WITH EDEMA OF RIGHT LOWER LEG (HCC): ICD-10-CM

## 2019-01-08 PROCEDURE — 29580 STRAPPING UNNA BOOT: CPT

## 2019-01-08 PROCEDURE — 99211 OFF/OP EST MAY X REQ PHY/QHP: CPT | Performed by: SURGERY

## 2019-01-08 ASSESSMENT — PAIN DESCRIPTION - PAIN TYPE: TYPE: ACUTE PAIN

## 2019-01-08 ASSESSMENT — PAIN SCALES - GENERAL: PAINLEVEL_OUTOF10: 0

## 2019-01-08 ASSESSMENT — PAIN DESCRIPTION - PROGRESSION: CLINICAL_PROGRESSION: NOT CHANGED

## 2019-01-09 ENCOUNTER — HOSPITAL ENCOUNTER (OUTPATIENT)
Dept: INFUSION THERAPY | Age: 49
End: 2019-01-09

## 2019-01-15 ENCOUNTER — HOSPITAL ENCOUNTER (OUTPATIENT)
Dept: WOUND CARE | Age: 49
Discharge: HOME | End: 2019-01-15
Payer: MEDICARE

## 2019-01-15 VITALS
TEMPERATURE: 98 F | DIASTOLIC BLOOD PRESSURE: 74 MMHG | RESPIRATION RATE: 18 BRPM | HEIGHT: 69 IN | SYSTOLIC BLOOD PRESSURE: 111 MMHG | HEART RATE: 80 BPM | BODY MASS INDEX: 25.25 KG/M2

## 2019-01-15 DIAGNOSIS — I83.019 VENOUS STASIS ULCER OF RIGHT LOWER LEG WITH EDEMA OF RIGHT LOWER LEG (HCC): ICD-10-CM

## 2019-01-15 DIAGNOSIS — L97.919 VENOUS STASIS ULCER OF RIGHT LOWER LEG WITH EDEMA OF RIGHT LOWER LEG (HCC): ICD-10-CM

## 2019-01-15 DIAGNOSIS — R60.9 VENOUS STASIS ULCER OF RIGHT LOWER LEG WITH EDEMA OF RIGHT LOWER LEG (HCC): ICD-10-CM

## 2019-01-15 DIAGNOSIS — L97.812 NON-PRESSURE CHRONIC ULCER OF OTHER PART OF RIGHT LOWER LEG WITH FAT LAYER EXPOSED (HCC): ICD-10-CM

## 2019-01-15 DIAGNOSIS — I70.232 ATHEROSCLEROSIS OF NATIVE ARTERIES OF RIGHT LEG WITH ULCERATION OF CALF (HCC): ICD-10-CM

## 2019-01-15 DIAGNOSIS — I83.891 VENOUS STASIS ULCER OF RIGHT LOWER LEG WITH EDEMA OF RIGHT LOWER LEG (HCC): ICD-10-CM

## 2019-01-15 PROCEDURE — 97597 DBRDMT OPN WND 1ST 20 CM/<: CPT | Performed by: SURGERY

## 2019-01-15 PROCEDURE — 97597 DBRDMT OPN WND 1ST 20 CM/<: CPT

## 2019-01-15 ASSESSMENT — PAIN SCALES - GENERAL: PAINLEVEL_OUTOF10: 0

## 2019-01-18 DIAGNOSIS — G35 MS (MULTIPLE SCLEROSIS) (HCC): ICD-10-CM

## 2019-01-18 DIAGNOSIS — M62.838 MUSCLE SPASTICITY: ICD-10-CM

## 2019-01-18 DIAGNOSIS — M54.2 PAIN, NECK: ICD-10-CM

## 2019-01-18 DIAGNOSIS — M79.622 PAIN IN BOTH UPPER ARMS: ICD-10-CM

## 2019-01-18 DIAGNOSIS — R53.83 OTHER FATIGUE: ICD-10-CM

## 2019-01-18 DIAGNOSIS — M79.621 PAIN IN BOTH UPPER ARMS: ICD-10-CM

## 2019-01-18 RX ORDER — DULOXETIN HYDROCHLORIDE 30 MG/1
30 CAPSULE, DELAYED RELEASE ORAL DAILY
Qty: 30 CAPSULE | Refills: 0 | Status: SHIPPED | OUTPATIENT
Start: 2019-01-18 | End: 2019-02-18 | Stop reason: SDUPTHER

## 2019-01-18 RX ORDER — HYDROCODONE BITARTRATE AND ACETAMINOPHEN 10; 325 MG/1; MG/1
1 TABLET ORAL 3 TIMES DAILY PRN
Qty: 90 TABLET | Refills: 0 | Status: SHIPPED | OUTPATIENT
Start: 2019-01-18 | End: 2019-02-14 | Stop reason: SDUPTHER

## 2019-01-18 RX ORDER — METHYLPHENIDATE HYDROCHLORIDE 20 MG/1
20 TABLET ORAL DAILY
Qty: 30 TABLET | Refills: 0 | Status: SHIPPED | OUTPATIENT
Start: 2019-01-18 | End: 2019-02-14 | Stop reason: SDUPTHER

## 2019-01-22 ENCOUNTER — HOSPITAL ENCOUNTER (OUTPATIENT)
Dept: INFUSION THERAPY | Age: 49
Setting detail: INFUSION SERIES
Discharge: HOME | End: 2019-01-22
Payer: MEDICARE

## 2019-01-22 VITALS
DIASTOLIC BLOOD PRESSURE: 79 MMHG | HEART RATE: 91 BPM | RESPIRATION RATE: 17 BRPM | TEMPERATURE: 98.8 F | OXYGEN SATURATION: 99 % | SYSTOLIC BLOOD PRESSURE: 127 MMHG

## 2019-01-22 DIAGNOSIS — G35 MS (MULTIPLE SCLEROSIS) (HCC): ICD-10-CM

## 2019-01-22 PROCEDURE — 96413 CHEMO IV INFUSION 1 HR: CPT

## 2019-01-22 PROCEDURE — 2580000003 HC RX 258: Performed by: PSYCHIATRY & NEUROLOGY

## 2019-01-22 PROCEDURE — 96415 CHEMO IV INFUSION ADDL HR: CPT

## 2019-01-22 PROCEDURE — 6360000002 HC RX W HCPCS: Performed by: PSYCHIATRY & NEUROLOGY

## 2019-01-22 RX ORDER — SODIUM CHLORIDE 0.9 % (FLUSH) 0.9 %
20 SYRINGE (ML) INJECTION PRN
Status: DISCONTINUED | OUTPATIENT
Start: 2019-01-22 | End: 2019-01-24 | Stop reason: HOSPADM

## 2019-01-22 RX ORDER — SODIUM CHLORIDE 9 MG/ML
INJECTION, SOLUTION INTRAVENOUS ONCE
Status: COMPLETED | OUTPATIENT
Start: 2019-01-22 | End: 2019-01-22

## 2019-01-22 RX ORDER — METHYLPREDNISOLONE SODIUM SUCCINATE 125 MG/2ML
100 INJECTION, POWDER, LYOPHILIZED, FOR SOLUTION INTRAMUSCULAR; INTRAVENOUS ONCE
Status: COMPLETED | OUTPATIENT
Start: 2019-01-22 | End: 2019-01-22

## 2019-01-22 RX ADMIN — OCRELIZUMAB 600 MG: 300 INJECTION INTRAVENOUS at 10:54

## 2019-01-22 RX ADMIN — Medication 20 ML: at 15:42

## 2019-01-22 RX ADMIN — SODIUM CHLORIDE: 9 INJECTION, SOLUTION INTRAVENOUS at 10:39

## 2019-01-22 RX ADMIN — SODIUM CHLORIDE, PRESERVATIVE FREE 300 UNITS: 5 INJECTION INTRAVENOUS at 15:42

## 2019-01-22 RX ADMIN — METHYLPREDNISOLONE SODIUM SUCCINATE 100 MG: 125 INJECTION, POWDER, FOR SOLUTION INTRAMUSCULAR; INTRAVENOUS at 10:39

## 2019-01-31 ENCOUNTER — HOSPITAL ENCOUNTER (OUTPATIENT)
Dept: WOUND CARE | Age: 49
Discharge: HOME | End: 2019-01-31
Payer: MEDICARE

## 2019-01-31 VITALS
WEIGHT: 161.82 LBS | TEMPERATURE: 99.3 F | HEART RATE: 78 BPM | RESPIRATION RATE: 18 BRPM | BODY MASS INDEX: 23.97 KG/M2 | SYSTOLIC BLOOD PRESSURE: 109 MMHG | DIASTOLIC BLOOD PRESSURE: 70 MMHG | HEIGHT: 69 IN

## 2019-01-31 DIAGNOSIS — L97.812 NON-PRESSURE CHRONIC ULCER OF OTHER PART OF RIGHT LOWER LEG WITH FAT LAYER EXPOSED (HCC): ICD-10-CM

## 2019-01-31 DIAGNOSIS — L97.929 VENOUS STASIS ULCER OF LEFT LOWER EXTREMITY (HCC): Chronic | ICD-10-CM

## 2019-01-31 DIAGNOSIS — I83.029 VENOUS STASIS ULCER OF LEFT LOWER EXTREMITY (HCC): Chronic | ICD-10-CM

## 2019-01-31 DIAGNOSIS — S91.105D OPEN WOUND OF SECOND TOE OF LEFT FOOT, SUBSEQUENT ENCOUNTER: Primary | Chronic | ICD-10-CM

## 2019-01-31 DIAGNOSIS — I70.232 ATHEROSCLEROSIS OF NATIVE ARTERIES OF RIGHT LEG WITH ULCERATION OF CALF (HCC): ICD-10-CM

## 2019-01-31 DIAGNOSIS — S91.105D OPEN WOUND OF SECOND TOE OF LEFT FOOT, SUBSEQUENT ENCOUNTER: Chronic | ICD-10-CM

## 2019-01-31 DIAGNOSIS — Z12.31 SCREENING MAMMOGRAM, ENCOUNTER FOR: ICD-10-CM

## 2019-01-31 LAB
ALBUMIN SERPL-MCNC: 4.3 G/DL (ref 3.5–5.2)
ALP BLD-CCNC: 90 U/L (ref 35–104)
ALT SERPL-CCNC: 26 U/L (ref 5–33)
ANION GAP SERPL CALCULATED.3IONS-SCNC: 14 MMOL/L (ref 7–19)
AST SERPL-CCNC: 10 U/L (ref 5–32)
BILIRUB SERPL-MCNC: <0.2 MG/DL (ref 0.2–1.2)
BUN BLDV-MCNC: 16 MG/DL (ref 6–20)
C-REACTIVE PROTEIN: 1.36 MG/DL (ref 0–0.5)
CALCIUM SERPL-MCNC: 9.5 MG/DL (ref 8.6–10)
CHLORIDE BLD-SCNC: 102 MMOL/L (ref 98–111)
CO2: 28 MMOL/L (ref 22–29)
CREAT SERPL-MCNC: <0.5 MG/DL (ref 0.5–0.9)
GFR NON-AFRICAN AMERICAN: >60
GLUCOSE BLD-MCNC: 101 MG/DL (ref 74–109)
HCT VFR BLD CALC: 43.7 % (ref 37–47)
HEMOGLOBIN: 13.5 G/DL (ref 12–16)
MCH RBC QN AUTO: 27.6 PG (ref 27–31)
MCHC RBC AUTO-ENTMCNC: 30.9 G/DL (ref 33–37)
MCV RBC AUTO: 89.4 FL (ref 81–99)
PDW BLD-RTO: 13.3 % (ref 11.5–14.5)
PLATELET # BLD: 229 K/UL (ref 130–400)
PMV BLD AUTO: 11.4 FL (ref 9.4–12.3)
POTASSIUM SERPL-SCNC: 4 MMOL/L (ref 3.5–5)
PREALBUMIN: 22 MG/DL (ref 20–40)
RBC # BLD: 4.89 M/UL (ref 4.2–5.4)
SEDIMENTATION RATE, ERYTHROCYTE: 12 MM/HR (ref 0–20)
SODIUM BLD-SCNC: 144 MMOL/L (ref 136–145)
TOTAL PROTEIN: 7.1 G/DL (ref 6.6–8.7)
WBC # BLD: 7.3 K/UL (ref 4.8–10.8)

## 2019-01-31 PROCEDURE — 11044 DBRDMT BONE 1ST 20 SQ CM/<: CPT | Performed by: SURGERY

## 2019-01-31 PROCEDURE — 97597 DBRDMT OPN WND 1ST 20 CM/<: CPT | Performed by: SURGERY

## 2019-01-31 PROCEDURE — 11044 DBRDMT BONE 1ST 20 SQ CM/<: CPT

## 2019-01-31 PROCEDURE — 97597 DBRDMT OPN WND 1ST 20 CM/<: CPT

## 2019-01-31 RX ORDER — HYDROCHLOROTHIAZIDE 25 MG/1
25 TABLET ORAL DAILY PRN
COMMUNITY
Start: 2018-12-18 | End: 2019-05-02 | Stop reason: SDUPTHER

## 2019-01-31 RX ORDER — CEPHALEXIN 250 MG/1
250 CAPSULE ORAL 4 TIMES DAILY
Qty: 28 CAPSULE | Refills: 0 | Status: SHIPPED | OUTPATIENT
Start: 2019-01-31 | End: 2019-03-26 | Stop reason: ALTCHOICE

## 2019-01-31 ASSESSMENT — PAIN SCALES - GENERAL: PAINLEVEL_OUTOF10: 0

## 2019-02-14 ENCOUNTER — HOSPITAL ENCOUNTER (OUTPATIENT)
Dept: GENERAL RADIOLOGY | Age: 49
Discharge: HOME | End: 2019-02-14
Payer: MEDICARE

## 2019-02-14 ENCOUNTER — HOSPITAL ENCOUNTER (OUTPATIENT)
Dept: WOUND CARE | Age: 49
Discharge: HOME | End: 2019-02-14
Payer: MEDICARE

## 2019-02-14 VITALS
HEART RATE: 110 BPM | HEIGHT: 69 IN | WEIGHT: 161 LBS | SYSTOLIC BLOOD PRESSURE: 99 MMHG | BODY MASS INDEX: 23.85 KG/M2 | DIASTOLIC BLOOD PRESSURE: 63 MMHG | TEMPERATURE: 99.9 F | RESPIRATION RATE: 18 BRPM

## 2019-02-14 DIAGNOSIS — S91.105D OPEN WOUND OF SECOND TOE OF LEFT FOOT, SUBSEQUENT ENCOUNTER: Chronic | ICD-10-CM

## 2019-02-14 DIAGNOSIS — M79.622 PAIN IN BOTH UPPER ARMS: ICD-10-CM

## 2019-02-14 DIAGNOSIS — M79.621 PAIN IN BOTH UPPER ARMS: ICD-10-CM

## 2019-02-14 DIAGNOSIS — M62.838 MUSCLE SPASTICITY: ICD-10-CM

## 2019-02-14 DIAGNOSIS — L97.929 VENOUS STASIS ULCER OF LEFT LOWER EXTREMITY (HCC): ICD-10-CM

## 2019-02-14 DIAGNOSIS — I70.232 ATHEROSCLEROSIS OF NATIVE ARTERIES OF RIGHT LEG WITH ULCERATION OF CALF (HCC): ICD-10-CM

## 2019-02-14 DIAGNOSIS — L97.812 NON-PRESSURE CHRONIC ULCER OF OTHER PART OF RIGHT LOWER LEG WITH FAT LAYER EXPOSED (HCC): ICD-10-CM

## 2019-02-14 DIAGNOSIS — I83.029 VENOUS STASIS ULCER OF LEFT LOWER EXTREMITY (HCC): ICD-10-CM

## 2019-02-14 DIAGNOSIS — G35 MS (MULTIPLE SCLEROSIS) (HCC): ICD-10-CM

## 2019-02-14 DIAGNOSIS — S91.105D OPEN WOUND OF SECOND TOE OF LEFT FOOT, SUBSEQUENT ENCOUNTER: Primary | Chronic | ICD-10-CM

## 2019-02-14 DIAGNOSIS — R53.83 OTHER FATIGUE: ICD-10-CM

## 2019-02-14 DIAGNOSIS — M54.2 PAIN, NECK: ICD-10-CM

## 2019-02-14 PROCEDURE — 97597 DBRDMT OPN WND 1ST 20 CM/<: CPT

## 2019-02-14 PROCEDURE — 73600 X-RAY EXAM OF ANKLE: CPT

## 2019-02-14 PROCEDURE — 73630 X-RAY EXAM OF FOOT: CPT

## 2019-02-14 PROCEDURE — 97597 DBRDMT OPN WND 1ST 20 CM/<: CPT | Performed by: NURSE PRACTITIONER

## 2019-02-14 RX ORDER — METHYLPHENIDATE HYDROCHLORIDE 20 MG/1
20 TABLET ORAL DAILY
Qty: 30 TABLET | Refills: 0 | Status: SHIPPED | OUTPATIENT
Start: 2019-02-16 | End: 2019-03-21 | Stop reason: SDUPTHER

## 2019-02-14 RX ORDER — HYDROCODONE BITARTRATE AND ACETAMINOPHEN 10; 325 MG/1; MG/1
1 TABLET ORAL 3 TIMES DAILY PRN
Qty: 90 TABLET | Refills: 0 | Status: SHIPPED | OUTPATIENT
Start: 2019-02-16 | End: 2019-03-15 | Stop reason: SDUPTHER

## 2019-02-14 ASSESSMENT — PAIN DESCRIPTION - PAIN TYPE: TYPE: ACUTE PAIN

## 2019-02-14 ASSESSMENT — PAIN DESCRIPTION - PROGRESSION: CLINICAL_PROGRESSION: NOT CHANGED

## 2019-02-14 ASSESSMENT — PAIN SCALES - GENERAL: PAINLEVEL_OUTOF10: 0

## 2019-02-18 RX ORDER — DULOXETIN HYDROCHLORIDE 30 MG/1
CAPSULE, DELAYED RELEASE ORAL
Qty: 30 CAPSULE | Refills: 1 | Status: SHIPPED | OUTPATIENT
Start: 2019-02-18 | End: 2019-04-01

## 2019-02-21 ENCOUNTER — HOSPITAL ENCOUNTER (OUTPATIENT)
Dept: WOUND CARE | Age: 49
Discharge: HOME | End: 2019-02-21
Payer: MEDICARE

## 2019-02-21 VITALS
TEMPERATURE: 97.2 F | DIASTOLIC BLOOD PRESSURE: 73 MMHG | HEIGHT: 69 IN | BODY MASS INDEX: 23.85 KG/M2 | RESPIRATION RATE: 18 BRPM | HEART RATE: 88 BPM | SYSTOLIC BLOOD PRESSURE: 129 MMHG | WEIGHT: 161 LBS

## 2019-02-21 PROCEDURE — 97597 DBRDMT OPN WND 1ST 20 CM/<: CPT

## 2019-02-21 PROCEDURE — 97597 DBRDMT OPN WND 1ST 20 CM/<: CPT | Performed by: NURSE PRACTITIONER

## 2019-02-21 ASSESSMENT — PAIN SCALES - GENERAL: PAINLEVEL_OUTOF10: 0

## 2019-02-26 ENCOUNTER — OFFICE VISIT (OUTPATIENT)
Dept: NEUROLOGY | Age: 49
End: 2019-02-26
Payer: MEDICARE

## 2019-02-26 ENCOUNTER — HOSPITAL ENCOUNTER (OUTPATIENT)
Dept: INFUSION THERAPY | Age: 49
Setting detail: INFUSION SERIES
Discharge: HOME | End: 2019-02-26
Payer: MEDICARE

## 2019-02-26 VITALS
HEIGHT: 69 IN | WEIGHT: 161 LBS | DIASTOLIC BLOOD PRESSURE: 79 MMHG | SYSTOLIC BLOOD PRESSURE: 116 MMHG | HEART RATE: 83 BPM | BODY MASS INDEX: 23.85 KG/M2

## 2019-02-26 DIAGNOSIS — R53.1 WEAKNESS: ICD-10-CM

## 2019-02-26 DIAGNOSIS — M54.2 PAIN, NECK: ICD-10-CM

## 2019-02-26 DIAGNOSIS — I73.9 PERIPHERAL VASCULAR DISEASE (HCC): ICD-10-CM

## 2019-02-26 DIAGNOSIS — G35 MS (MULTIPLE SCLEROSIS) (HCC): ICD-10-CM

## 2019-02-26 DIAGNOSIS — M62.838 MUSCLE SPASTICITY: ICD-10-CM

## 2019-02-26 DIAGNOSIS — M79.621 PAIN IN BOTH UPPER ARMS: ICD-10-CM

## 2019-02-26 DIAGNOSIS — R20.0 NUMBNESS: ICD-10-CM

## 2019-02-26 DIAGNOSIS — R53.83 OTHER FATIGUE: ICD-10-CM

## 2019-02-26 DIAGNOSIS — M79.622 PAIN IN BOTH UPPER ARMS: ICD-10-CM

## 2019-02-26 DIAGNOSIS — G35 MS (MULTIPLE SCLEROSIS) (HCC): Primary | ICD-10-CM

## 2019-02-26 DIAGNOSIS — G35 MULTIPLE SCLEROSIS (HCC): ICD-10-CM

## 2019-02-26 PROCEDURE — 99214 OFFICE O/P EST MOD 30 MIN: CPT | Performed by: PSYCHIATRY & NEUROLOGY

## 2019-02-26 PROCEDURE — 2580000003 HC RX 258: Performed by: PSYCHIATRY & NEUROLOGY

## 2019-02-26 PROCEDURE — G8427 DOCREV CUR MEDS BY ELIG CLIN: HCPCS | Performed by: PSYCHIATRY & NEUROLOGY

## 2019-02-26 PROCEDURE — 6360000002 HC RX W HCPCS: Performed by: PSYCHIATRY & NEUROLOGY

## 2019-02-26 PROCEDURE — G8599 NO ASA/ANTIPLAT THER USE RNG: HCPCS | Performed by: PSYCHIATRY & NEUROLOGY

## 2019-02-26 PROCEDURE — 1036F TOBACCO NON-USER: CPT | Performed by: PSYCHIATRY & NEUROLOGY

## 2019-02-26 PROCEDURE — 96523 IRRIG DRUG DELIVERY DEVICE: CPT

## 2019-02-26 PROCEDURE — G8420 CALC BMI NORM PARAMETERS: HCPCS | Performed by: PSYCHIATRY & NEUROLOGY

## 2019-02-26 PROCEDURE — G8482 FLU IMMUNIZE ORDER/ADMIN: HCPCS | Performed by: PSYCHIATRY & NEUROLOGY

## 2019-02-26 RX ORDER — SODIUM CHLORIDE 0.9 % (FLUSH) 0.9 %
20 SYRINGE (ML) INJECTION PRN
Status: DISCONTINUED | OUTPATIENT
Start: 2019-02-26 | End: 2019-02-28 | Stop reason: HOSPADM

## 2019-02-26 RX ORDER — HEPARIN SODIUM (PORCINE) LOCK FLUSH IV SOLN 100 UNIT/ML 100 UNIT/ML
300 SOLUTION INTRAVENOUS PRN
Status: DISCONTINUED | OUTPATIENT
Start: 2019-02-26 | End: 2019-02-28 | Stop reason: HOSPADM

## 2019-02-26 RX ADMIN — SODIUM CHLORIDE, PRESERVATIVE FREE 300 UNITS: 5 INJECTION INTRAVENOUS at 14:02

## 2019-02-26 RX ADMIN — Medication 20 ML: at 14:02

## 2019-02-27 ENCOUNTER — HOSPITAL ENCOUNTER (OUTPATIENT)
Dept: WOUND CARE | Age: 49
Discharge: HOME | End: 2019-02-27
Payer: MEDICARE

## 2019-02-27 VITALS
RESPIRATION RATE: 18 BRPM | TEMPERATURE: 98.6 F | HEART RATE: 78 BPM | DIASTOLIC BLOOD PRESSURE: 87 MMHG | SYSTOLIC BLOOD PRESSURE: 110 MMHG

## 2019-02-27 PROCEDURE — 99214 OFFICE O/P EST MOD 30 MIN: CPT | Performed by: NURSE PRACTITIONER

## 2019-02-27 PROCEDURE — 99214 OFFICE O/P EST MOD 30 MIN: CPT

## 2019-03-04 LAB
JC VIRUS SOURCE: NORMAL
JC VIRUS, DNA: NOT DETECTED

## 2019-03-12 ENCOUNTER — HOSPITAL ENCOUNTER (OUTPATIENT)
Dept: WOUND CARE | Age: 49
Discharge: HOME | End: 2019-03-12
Payer: MEDICARE

## 2019-03-12 VITALS — HEIGHT: 69 IN | WEIGHT: 161 LBS | BODY MASS INDEX: 23.85 KG/M2

## 2019-03-12 DIAGNOSIS — L97.929 VENOUS STASIS ULCER OF LEFT LOWER EXTREMITY (HCC): ICD-10-CM

## 2019-03-12 DIAGNOSIS — L97.812 NON-PRESSURE CHRONIC ULCER OF OTHER PART OF RIGHT LOWER LEG WITH FAT LAYER EXPOSED (HCC): ICD-10-CM

## 2019-03-12 DIAGNOSIS — I83.029 VENOUS STASIS ULCER OF LEFT LOWER EXTREMITY (HCC): ICD-10-CM

## 2019-03-12 PROBLEM — B49 DISEASE CAUSED BY FUNGUS: Status: RESOLVED | Noted: 2017-06-28 | Resolved: 2019-03-12

## 2019-03-12 PROBLEM — K94.03 COLOSTOMY AND ENTEROSTOMY MALFUNCTION (HCC): Status: RESOLVED | Noted: 2017-09-06 | Resolved: 2019-03-12

## 2019-03-12 PROBLEM — J20.9 ACUTE BRONCHITIS: Status: RESOLVED | Noted: 2017-06-28 | Resolved: 2019-03-12

## 2019-03-12 PROBLEM — K94.13 COLOSTOMY AND ENTEROSTOMY MALFUNCTION (HCC): Status: RESOLVED | Noted: 2017-09-06 | Resolved: 2019-03-12

## 2019-03-12 PROBLEM — J01.90 ACUTE SINUSITIS: Status: RESOLVED | Noted: 2017-06-28 | Resolved: 2019-03-12

## 2019-03-12 PROBLEM — E66.3 PATIENT OVERWEIGHT: Status: RESOLVED | Noted: 2017-06-28 | Resolved: 2019-03-12

## 2019-03-12 PROCEDURE — 11042 DBRDMT SUBQ TIS 1ST 20SQCM/<: CPT | Performed by: SURGERY

## 2019-03-12 PROCEDURE — 99213 OFFICE O/P EST LOW 20 MIN: CPT

## 2019-03-12 PROCEDURE — 11044 DBRDMT BONE 1ST 20 SQ CM/<: CPT | Performed by: SURGERY

## 2019-03-12 ASSESSMENT — PAIN SCALES - GENERAL: PAINLEVEL_OUTOF10: 0

## 2019-03-12 ASSESSMENT — PAIN DESCRIPTION - PAIN TYPE: TYPE: ACUTE PAIN

## 2019-03-12 ASSESSMENT — PAIN DESCRIPTION - PROGRESSION: CLINICAL_PROGRESSION: NOT CHANGED

## 2019-03-15 DIAGNOSIS — G35 MS (MULTIPLE SCLEROSIS) (HCC): ICD-10-CM

## 2019-03-15 DIAGNOSIS — M79.621 PAIN IN BOTH UPPER ARMS: ICD-10-CM

## 2019-03-15 DIAGNOSIS — M79.622 PAIN IN BOTH UPPER ARMS: ICD-10-CM

## 2019-03-15 DIAGNOSIS — R53.83 OTHER FATIGUE: ICD-10-CM

## 2019-03-15 DIAGNOSIS — M54.2 PAIN, NECK: ICD-10-CM

## 2019-03-15 DIAGNOSIS — M62.838 MUSCLE SPASTICITY: ICD-10-CM

## 2019-03-18 RX ORDER — HYDROCODONE BITARTRATE AND ACETAMINOPHEN 10; 325 MG/1; MG/1
1 TABLET ORAL 3 TIMES DAILY PRN
Qty: 90 TABLET | Refills: 0 | Status: SHIPPED | OUTPATIENT
Start: 2019-03-18 | End: 2019-04-22 | Stop reason: SDUPTHER

## 2019-03-21 LAB
Lab: NORMAL
REPORT: NORMAL
THIS TEST SENT TO: NORMAL

## 2019-03-26 ENCOUNTER — PREP FOR PROCEDURE (OUTPATIENT)
Dept: WOUND CARE | Age: 49
End: 2019-03-26

## 2019-03-26 ENCOUNTER — HOSPITAL ENCOUNTER (OUTPATIENT)
Dept: WOUND CARE | Age: 49
Discharge: HOME | End: 2019-03-26
Payer: MEDICARE

## 2019-03-26 VITALS
RESPIRATION RATE: 16 BRPM | HEIGHT: 69 IN | BODY MASS INDEX: 23.85 KG/M2 | SYSTOLIC BLOOD PRESSURE: 91 MMHG | TEMPERATURE: 98.6 F | WEIGHT: 161 LBS | HEART RATE: 80 BPM | DIASTOLIC BLOOD PRESSURE: 67 MMHG

## 2019-03-26 DIAGNOSIS — I83.029 VENOUS STASIS ULCER OF LEFT LOWER EXTREMITY (HCC): ICD-10-CM

## 2019-03-26 DIAGNOSIS — L97.929 VENOUS STASIS ULCER OF LEFT LOWER EXTREMITY (HCC): ICD-10-CM

## 2019-03-26 DIAGNOSIS — L97.812 NON-PRESSURE CHRONIC ULCER OF OTHER PART OF RIGHT LOWER LEG WITH FAT LAYER EXPOSED (HCC): ICD-10-CM

## 2019-03-26 PROCEDURE — 11042 DBRDMT SUBQ TIS 1ST 20SQCM/<: CPT

## 2019-03-26 PROCEDURE — 11044 DBRDMT BONE 1ST 20 SQ CM/<: CPT

## 2019-03-26 PROCEDURE — 11042 DBRDMT SUBQ TIS 1ST 20SQCM/<: CPT | Performed by: SURGERY

## 2019-03-26 PROCEDURE — 11044 DBRDMT BONE 1ST 20 SQ CM/<: CPT | Performed by: SURGERY

## 2019-04-01 ENCOUNTER — HOSPITAL ENCOUNTER (OUTPATIENT)
Dept: PREADMISSION TESTING | Age: 49
Discharge: HOME | DRG: 580 | End: 2019-04-05
Payer: MEDICARE

## 2019-04-01 VITALS — BODY MASS INDEX: 23.85 KG/M2 | WEIGHT: 161 LBS | HEIGHT: 69 IN

## 2019-04-01 LAB
EKG P AXIS: 72 DEGREES
EKG P-R INTERVAL: 158 MS
EKG Q-T INTERVAL: 390 MS
EKG QRS DURATION: 100 MS
EKG QTC CALCULATION (BAZETT): 400 MS
EKG T AXIS: 49 DEGREES

## 2019-04-01 PROCEDURE — 93005 ELECTROCARDIOGRAM TRACING: CPT

## 2019-04-01 RX ORDER — LEVETIRACETAM 500 MG/1
500 TABLET ORAL NIGHTLY
COMMUNITY
End: 2020-06-10 | Stop reason: SDUPTHER

## 2019-04-01 RX ORDER — M-VIT,TX,IRON,MINS/CALC/FOLIC 27MG-0.4MG
1 TABLET ORAL DAILY
Status: ON HOLD | COMMUNITY

## 2019-04-01 RX ORDER — DULOXETIN HYDROCHLORIDE 30 MG/1
30 CAPSULE, DELAYED RELEASE ORAL DAILY
COMMUNITY
End: 2019-08-28

## 2019-04-01 RX ORDER — VITAMIN B COMPLEX
1 CAPSULE ORAL DAILY
COMMUNITY
End: 2021-06-14

## 2019-04-01 RX ORDER — ASCORBIC ACID 500 MG
1000 TABLET ORAL DAILY
COMMUNITY
End: 2019-08-28

## 2019-04-03 ENCOUNTER — PREP FOR PROCEDURE (OUTPATIENT)
Dept: WOUND CARE | Age: 49
End: 2019-04-03

## 2019-04-03 RX ORDER — VANCOMYCIN HYDROCHLORIDE 1 G/200ML
1000 INJECTION, SOLUTION INTRAVENOUS
Status: CANCELLED | OUTPATIENT
Start: 2019-04-03 | End: 2019-04-03

## 2019-04-03 RX ORDER — SODIUM CHLORIDE 0.9 % (FLUSH) 0.9 %
10 SYRINGE (ML) INJECTION PRN
Status: CANCELLED | OUTPATIENT
Start: 2019-04-03

## 2019-04-03 RX ORDER — SODIUM CHLORIDE 0.9 % (FLUSH) 0.9 %
10 SYRINGE (ML) INJECTION EVERY 12 HOURS SCHEDULED
Status: CANCELLED | OUTPATIENT
Start: 2019-04-03

## 2019-04-03 NOTE — H&P (VIEW-ONLY)
Wound Care Center              Progress Note and Procedure Note        Alyce Baker  AGE: 51 y.o. GENDER: female  : 1970  TODAY'S DATE:  3/26/2019 Note: this note will serve as H&P for 4-3-19     Subjective:         HISTORY of PRESENT ILLNESS HPI   Eber Nix is a 51 y.o. female who presents today for wound evaluation.     Wound Type:pressure and neuropathic  Wound Location:left lateral ankle and left second toe  Modifying factors:chronic pressure and shear force          Patient Active Problem List   Diagnosis Code    Muscle spasticity M62.838    Peripheral vascular disease ([facility]) I73.9    MS (multiple sclerosis) ([facility]) G35    Pain in both upper arms M79.621, M79.622    Numbness R20.0    Fatigue R53.83    Weakness R53.1    Sepsis ([facility]) A41.9    Chronic pain G89.29    Hx of seizure disorder Z86.69    Hypokalemia E87.6    Hypokalemia E87.6    Abnormal EKG R94.31    Encephalopathy G93.40    Open wound of ankle S91.009A    Vitamin D deficiency E55.9    Back pain M54.9    Breakdown (mechanical) of cranial or spinal infusion catheter, initial encounter T85.610A    CAFL (chronic airflow limitation) ([facility]) J44.9    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    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    Difficult or painful urination R30.0    Headache R51    Hyperlipidemia E78.5    Influenza J11.1    Insomnia G47.00    Breast lump N63.0    Hay fever J30.1    Decubitus ulcer of sacral region L89.159    Current smoker F17.200    Cobalamin deficiency E53.8    Pain, neck M54.2    Venous stasis ulcer of left lower extremity ([facility]) I83.029, L97.929    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 layer exposed ([facility] L97.812    Open wound of second toe of left foot S91.105A         ALLERGIES          Allergies   Allergen Reactions    Darvocet [Propoxyphene N-Acetaminophen]      Morphine And Related Swelling    Morphine Hcl      Propoxyphene Swelling      ROS:  Negative except for weakness, chronic wounds, no acute changes        Objective:       BP 91/67   Pulse 80   Temp 98.6 °F (37 °C) (Temporal)   Resp 16   Ht 5' 9\" (1.753 m)   Wt 161 lb (73 kg)   BMI 23.78 kg/m²   General: NAD, awake and alert  Chest: Regular, no wheezinf  Abdomen: Soft, NT, ND  Extremities: chronic atrophy, wounds as described  Post Debridement Measurements and Assessment:       Wound 12/11/18 Wound 62.   Left 2nd Toe Neuropathic  (Active)   Wound Image   3/26/2019  2:09 PM   Wound Traumatic 3/26/2019  2:09 PM   Dressing Status Old drainage 3/26/2019  2:09 PM   Dressing Changed Changed/New 3/12/2019  4:15 PM   Dressing/Treatment Hydrofera blue;4x4;Medipore 3/12/2019  4:15 PM   Wound Cleansed Rinsed/Irrigated with saline 1/15/2019  3:50 PM   Wound Length (cm) 0.3 cm 3/26/2019  2:09 PM   Wound Width (cm) 1 cm 3/26/2019  2:09 PM   Wound Depth (cm) 0.2 cm 3/26/2019  2:09 PM   Wound Surface Area (cm^2) 0.3 cm^2 3/26/2019  2:09 PM   Change in Wound Size % (l*w) 40 3/26/2019  2:09 PM   Wound Volume (cm^3) 0.06 cm^3 3/26/2019  2:09 PM   Wound Healing % -20 3/26/2019  2:09 PM   Post-Procedure Length (cm) 0.4 cm 3/12/2019  3:34 PM   Post-Procedure Width (cm) 0.5 cm 3/12/2019  3:34 PM   Post-Procedure Depth (cm) 0.1 cm 3/12/2019  3:34 PM   Post-Procedure Surface Area (cm^2) 0.2 cm^2 3/12/2019  3:34 PM   Post-Procedure Volume (cm^3) 0.02 cm^3 3/12/2019  3:34 PM   Distance Tunneling (cm) 0 cm 3/26/2019  2:09 PM   Tunneling Position ___ O'Clock 0 3/26/2019  2:09 PM   Undermining Starts ___ O'Clock 0 3/26/2019  2:09 PM   Undermining Ends___ O'Clock 0 3/26/2019  2:09 PM   Undermining Maxium Distance (cm) 0 3/26/2019  2:09 PM   Wound Assessment Purple;Pink;Pale 3/26/2019  2:09 PM   Drainage Amount Small 3/26/2019  2:09 PM   Drainage Description Serosanguinous 3/26/2019  2:09 PM   Odor None 3/26/2019  2:09 PM   Margins Unattached edges 3/26/2019  2:09 PM   Exposed structure Bone 3/26/2019  2:09 PM   Audelia-wound Assessment [place] 3/26/2019  2:09 PM   Non-staged Wound Description Not applicable 2/85/2027  4:81 PM   [place]%Wound Bed 100 3/26/2019  2:09 PM   Red%Wound Bed 0 3/26/2019  2:09 PM   Yellow%Wound Bed 0 3/26/2019  2:09 PM   Black%Wound Bed 0 3/26/2019  2:09 PM   Purple%Wound Bed 0 3/26/2019  2:09 PM   Other%Wound Bed 0 3/12/2019  2:59 PM   Culture Taken No 3/12/2019  2:59 PM   Number of days: 105       Wound 01/08/19 Ankle Left;Lateral Wound 63.   left lateral ankle Neuropathic Ulcer (Active)   Wound Image   3/26/2019  2:09 PM   Wound Traumatic 3/26/2019  2:09 PM   Dressing Status Old drainage 3/26/2019  2:09 PM   Dressing Changed Changed/New 3/12/2019  4:15 PM   Dressing/Treatment Hydrofera blue 3/12/2019  4:15 PM   Wound Cleansed Rinsed/Irrigated with saline 3/26/2019  2:09 PM   Wound Length (cm) 0.9 cm 3/26/2019  2:09 PM   Wound Width (cm) 0.9 cm 3/26/2019  2:09 PM   Wound Depth (cm) 0.1 cm 3/26/2019  2:09 PM   Wound Surface Area (cm^2) 0.81 cm^2 3/26/2019  2:09 PM   Change in Wound Size % (l*w) 19 3/26/2019  2:09 PM   Wound Volume (cm^3) 0.08 cm^3 3/26/2019  2:09 PM   Wound Healing % 20 3/26/2019  2:09 PM   Post-Procedure Length (cm) 0.5 cm 3/12/2019  3:34 PM   Post-Procedure Width (cm) 0.3 cm 3/12/2019  3:34 PM   Post-Procedure Depth (cm) 0.1 cm 3/12/2019  3:34 PM   Post-Procedure Surface Area (cm^2) 0.15 cm^2 3/12/2019  3:34 PM   Post-Procedure Volume (cm^3) 0.02 cm^3 3/12/2019  3:34 PM   Distance Tunneling (cm) 0 cm 3/26/2019  2:09 PM   Tunneling Position ___ O'Clock 0 3/26/2019  2:09 PM   Undermining Starts ___ O'Clock 0 3/26/2019  2:09 PM   Undermining Ends___ O'Clock 0 3/26/2019  2:09 PM   Undermining [place] Distance (cm) 0 3/26/2019  2:09 PM   Wound Assessment Purple;Pink;Red 3/26/2019  2:09 PM   Drainage Amount Small 3/26/2019  2:09 PM   Drainage Description Serosanguinous 3/26/2019  2:09 PM   Odor None 3/26/2019  2:09 PM   Margins Epibole (rolled edges) 3/26/2019  2:09 PM   Exposed structure Fascia 3/26/2019  2:09 PM Audelia-wound Assessment Red; Swelling 3/26/2019  2:09 PM   Non-staged Wound Description Full thickness 3/26/2019  2:09 PM   [place]%Wound Bed 50 3/26/2019  2:09 PM   Red%Wound Bed 0 3/26/2019  2:09 PM   Yellow%Wound Bed 50 3/26/2019  2:09 PM   Black%Wound Bed 0 3/26/2019  2:09 PM   Purple%Wound Bed 0 3/26/2019  2:09 PM   Other%Wound Bed 0 2/27/2019 11:27 AM   Culture Taken No 3/12/2019  2:59 PM   Number of days: 76         The patients pain isPain Level: 0  . Wounds are unchanged.     Please refer to nursing measurements and assessment regarding wound pre and post debridement.     Procedure Note:     Wound #: 62, 63      Debridement: Excisional Debridement     Anesthetic:    Using curette the wound was sharply debrided    down through and including the removal of epidermis, dermis, subcutaneous tissue, muscle/fascia and bone of toe wound; epidermis, dermis and subcutaneous of left ankle        Total Surface Area Debrided:  0.35 sq cm   Devitalized Tissue Debrided:  fibrin, biofilm and slough     Percent of Wound Debrided: 100%        Bleeding: Minimal     Hemostasis:   not needed        Response to treatment:  Well tolerated by patient.        Assessment:           Patient Active Problem List   Diagnosis    Muscle spasticity    Peripheral vascular disease ([address]    [facility]) ([facility])    Pain in both upper arms    Numbness    Fatigue    Weakness    Sepsis ([address]    Chronic pain    Hx of seizure disorder    Hypokalemia    Hypokalemia    Abnormal EKG    Encephalopathy    Open wound of ankle    Vitamin D deficiency    Back pain    Breakdown (mechanical) of cranial or spinal infusion catheter, initial encounter    CAFL (chronic airflow limitation) ([facility])    Binocular vision disorder with diplopia    Aptyalism    Difficult or painful urination    Headache    Hyperlipidemia    Influenza    Insomnia    Breast lump    Hay fever    Decubitus ulcer of sacral region    Current smoker    Cobalamin deficiency    Pain, neck    Venous stasis ulcer of left lower extremity (HCC)    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 layer exposed ([address]    Open wound of second toe of left foot            Plan:            Plan for wound - Dress per physician order  Treatment:                Compression : No              Offloading : Yes              Dressing : see AVS              Additional Therapy : none            1. Discussed appropriate home care of this wound. Wound redressed. 2. Patient instructions were given. 3. Follow up: after surgery     The patient has decided on a transmet amputation with skin graft over the left ankle wound. This has been scheduled for next Thursday. Continue with current dressings, and discussed protecting the skin on her foot in the area of the amputation.      Electronically signed by Josefina Fuentes MD on 3/26/2019 at 2:34 PM      Addendum: Melony Kussmaul, MD     I met with patient on 2/27/19 in [facility] note Karen Laureano's) Discussed at length options of left 2nd toe amputation, vs left TMA, vs left AKA. And possible STSG to left ankle wound. The patient decided on a left TMA with STSG to left ankle wound.

## 2019-04-04 ENCOUNTER — ANESTHESIA (OUTPATIENT)
Dept: OPERATING ROOM | Age: 49
DRG: 580 | End: 2019-04-04
Payer: MEDICARE

## 2019-04-04 ENCOUNTER — ANESTHESIA EVENT (OUTPATIENT)
Dept: OPERATING ROOM | Age: 49
DRG: 580 | End: 2019-04-04
Payer: MEDICARE

## 2019-04-04 ENCOUNTER — HOSPITAL ENCOUNTER (INPATIENT)
Age: 49
LOS: 1 days | Discharge: HOME | DRG: 580 | End: 2019-04-05
Attending: SURGERY | Admitting: SURGERY
Payer: MEDICARE

## 2019-04-04 VITALS — SYSTOLIC BLOOD PRESSURE: 94 MMHG | OXYGEN SATURATION: 100 % | DIASTOLIC BLOOD PRESSURE: 75 MMHG

## 2019-04-04 PROBLEM — L97.529 TYPE 2 DIABETES MELLITUS WITH LEFT DIABETIC FOOT ULCER (HCC): Status: ACTIVE | Noted: 2019-04-04

## 2019-04-04 PROBLEM — E11.621 TYPE 2 DIABETES MELLITUS WITH LEFT DIABETIC FOOT ULCER (HCC): Status: ACTIVE | Noted: 2019-04-04

## 2019-04-04 PROCEDURE — 0Y6N0Z9 DETACHMENT AT LEFT FOOT, PARTIAL 1ST RAY, OPEN APPROACH: ICD-10-PCS | Performed by: SURGERY

## 2019-04-04 PROCEDURE — 28805 AMPUTATION THRU METATARSAL: CPT | Performed by: SURGERY

## 2019-04-04 PROCEDURE — 0Y6N0ZD DETACHMENT AT LEFT FOOT, PARTIAL 4TH RAY, OPEN APPROACH: ICD-10-PCS | Performed by: SURGERY

## 2019-04-04 PROCEDURE — 88305 TISSUE EXAM BY PATHOLOGIST: CPT

## 2019-04-04 PROCEDURE — 2580000003 HC RX 258: Performed by: SURGERY

## 2019-04-04 PROCEDURE — 6370000000 HC RX 637 (ALT 250 FOR IP): Performed by: SURGERY

## 2019-04-04 PROCEDURE — 2709999900 HC NON-CHARGEABLE SUPPLY: Performed by: SURGERY

## 2019-04-04 PROCEDURE — 0QBR0ZZ EXCISION OF LEFT TOE PHALANX, OPEN APPROACH: ICD-10-PCS | Performed by: SURGERY

## 2019-04-04 PROCEDURE — 0Y6N0ZB DETACHMENT AT LEFT FOOT, PARTIAL 2ND RAY, OPEN APPROACH: ICD-10-PCS | Performed by: SURGERY

## 2019-04-04 PROCEDURE — 6360000002 HC RX W HCPCS: Performed by: ANESTHESIOLOGY

## 2019-04-04 PROCEDURE — 6360000002 HC RX W HCPCS: Performed by: SURGERY

## 2019-04-04 PROCEDURE — 64445 NJX AA&/STRD SCIATIC NRV IMG: CPT | Performed by: NURSE ANESTHETIST, CERTIFIED REGISTERED

## 2019-04-04 PROCEDURE — 7100000001 HC PACU RECOVERY - ADDTL 15 MIN: Performed by: SURGERY

## 2019-04-04 PROCEDURE — 1210000000 HC MED SURG R&B

## 2019-04-04 PROCEDURE — 6360000002 HC RX W HCPCS: Performed by: NURSE ANESTHETIST, CERTIFIED REGISTERED

## 2019-04-04 PROCEDURE — 7100000000 HC PACU RECOVERY - FIRST 15 MIN: Performed by: SURGERY

## 2019-04-04 PROCEDURE — 0Y6N0ZC DETACHMENT AT LEFT FOOT, PARTIAL 3RD RAY, OPEN APPROACH: ICD-10-PCS | Performed by: SURGERY

## 2019-04-04 PROCEDURE — 3600000004 HC SURGERY LEVEL 4 BASE: Performed by: SURGERY

## 2019-04-04 PROCEDURE — 3700000000 HC ANESTHESIA ATTENDED CARE: Performed by: SURGERY

## 2019-04-04 PROCEDURE — 3600000014 HC SURGERY LEVEL 4 ADDTL 15MIN: Performed by: SURGERY

## 2019-04-04 PROCEDURE — 3700000001 HC ADD 15 MINUTES (ANESTHESIA): Performed by: SURGERY

## 2019-04-04 PROCEDURE — 0Y6N0ZF DETACHMENT AT LEFT FOOT, PARTIAL 5TH RAY, OPEN APPROACH: ICD-10-PCS | Performed by: SURGERY

## 2019-04-04 RX ORDER — SODIUM CHLORIDE 0.9 % (FLUSH) 0.9 %
20 SYRINGE (ML) INJECTION PRN
Status: DISCONTINUED | OUTPATIENT
Start: 2019-04-04 | End: 2019-04-05 | Stop reason: HOSPADM

## 2019-04-04 RX ORDER — SODIUM CHLORIDE, SODIUM LACTATE, POTASSIUM CHLORIDE, CALCIUM CHLORIDE 600; 310; 30; 20 MG/100ML; MG/100ML; MG/100ML; MG/100ML
INJECTION, SOLUTION INTRAVENOUS CONTINUOUS
Status: DISCONTINUED | OUTPATIENT
Start: 2019-04-04 | End: 2019-04-04

## 2019-04-04 RX ORDER — ONDANSETRON 2 MG/ML
4 INJECTION INTRAMUSCULAR; INTRAVENOUS EVERY 6 HOURS PRN
Status: DISCONTINUED | OUTPATIENT
Start: 2019-04-04 | End: 2019-04-05 | Stop reason: HOSPADM

## 2019-04-04 RX ORDER — SODIUM CHLORIDE 0.9 % (FLUSH) 0.9 %
10 SYRINGE (ML) INJECTION PRN
Status: DISCONTINUED | OUTPATIENT
Start: 2019-04-04 | End: 2019-04-04 | Stop reason: HOSPADM

## 2019-04-04 RX ORDER — MIDAZOLAM HYDROCHLORIDE 1 MG/ML
2 INJECTION INTRAMUSCULAR; INTRAVENOUS
Status: DISCONTINUED | OUTPATIENT
Start: 2019-04-04 | End: 2019-04-04 | Stop reason: HOSPADM

## 2019-04-04 RX ORDER — ONDANSETRON 2 MG/ML
INJECTION INTRAMUSCULAR; INTRAVENOUS PRN
Status: DISCONTINUED | OUTPATIENT
Start: 2019-04-04 | End: 2019-04-04 | Stop reason: SDUPTHER

## 2019-04-04 RX ORDER — PROPOFOL 10 MG/ML
INJECTION, EMULSION INTRAVENOUS CONTINUOUS PRN
Status: DISCONTINUED | OUTPATIENT
Start: 2019-04-04 | End: 2019-04-04 | Stop reason: SDUPTHER

## 2019-04-04 RX ORDER — SODIUM CHLORIDE 0.9 % (FLUSH) 0.9 %
10 SYRINGE (ML) INJECTION EVERY 12 HOURS SCHEDULED
Status: DISCONTINUED | OUTPATIENT
Start: 2019-04-04 | End: 2019-04-04 | Stop reason: HOSPADM

## 2019-04-04 RX ORDER — LABETALOL HYDROCHLORIDE 5 MG/ML
5 INJECTION, SOLUTION INTRAVENOUS EVERY 10 MIN PRN
Status: DISCONTINUED | OUTPATIENT
Start: 2019-04-04 | End: 2019-04-04 | Stop reason: HOSPADM

## 2019-04-04 RX ORDER — DIPHENHYDRAMINE HYDROCHLORIDE 50 MG/ML
12.5 INJECTION INTRAMUSCULAR; INTRAVENOUS
Status: DISCONTINUED | OUTPATIENT
Start: 2019-04-04 | End: 2019-04-04 | Stop reason: HOSPADM

## 2019-04-04 RX ORDER — ENALAPRILAT 2.5 MG/2ML
1.25 INJECTION INTRAVENOUS
Status: DISCONTINUED | OUTPATIENT
Start: 2019-04-04 | End: 2019-04-04 | Stop reason: HOSPADM

## 2019-04-04 RX ORDER — ROPIVACAINE HYDROCHLORIDE 5 MG/ML
INJECTION, SOLUTION EPIDURAL; INFILTRATION; PERINEURAL PRN
Status: DISCONTINUED | OUTPATIENT
Start: 2019-04-04 | End: 2019-04-04 | Stop reason: SDUPTHER

## 2019-04-04 RX ORDER — HYDRALAZINE HYDROCHLORIDE 20 MG/ML
5 INJECTION INTRAMUSCULAR; INTRAVENOUS EVERY 10 MIN PRN
Status: DISCONTINUED | OUTPATIENT
Start: 2019-04-04 | End: 2019-04-04 | Stop reason: HOSPADM

## 2019-04-04 RX ORDER — METOCLOPRAMIDE HYDROCHLORIDE 5 MG/ML
10 INJECTION INTRAMUSCULAR; INTRAVENOUS
Status: DISCONTINUED | OUTPATIENT
Start: 2019-04-04 | End: 2019-04-04 | Stop reason: HOSPADM

## 2019-04-04 RX ORDER — SCOLOPAMINE TRANSDERMAL SYSTEM 1 MG/1
1 PATCH, EXTENDED RELEASE TRANSDERMAL
Status: DISCONTINUED | OUTPATIENT
Start: 2019-04-04 | End: 2019-04-05 | Stop reason: HOSPADM

## 2019-04-04 RX ORDER — DULOXETIN HYDROCHLORIDE 30 MG/1
30 CAPSULE, DELAYED RELEASE ORAL NIGHTLY
Status: DISCONTINUED | OUTPATIENT
Start: 2019-04-04 | End: 2019-04-05 | Stop reason: HOSPADM

## 2019-04-04 RX ORDER — OXYBUTYNIN CHLORIDE 5 MG/1
15 TABLET, EXTENDED RELEASE ORAL 2 TIMES DAILY
Status: DISCONTINUED | OUTPATIENT
Start: 2019-04-04 | End: 2019-04-05 | Stop reason: HOSPADM

## 2019-04-04 RX ORDER — CEFAZOLIN SODIUM 1 G/50ML
1 INJECTION, SOLUTION INTRAVENOUS EVERY 8 HOURS
Status: COMPLETED | OUTPATIENT
Start: 2019-04-05 | End: 2019-04-05

## 2019-04-04 RX ORDER — METHYLPHENIDATE HYDROCHLORIDE 5 MG/1
20 TABLET ORAL DAILY
Status: DISCONTINUED | OUTPATIENT
Start: 2019-04-05 | End: 2019-04-05 | Stop reason: HOSPADM

## 2019-04-04 RX ORDER — FENTANYL CITRATE 50 UG/ML
25 INJECTION, SOLUTION INTRAMUSCULAR; INTRAVENOUS
Status: DISCONTINUED | OUTPATIENT
Start: 2019-04-04 | End: 2019-04-04 | Stop reason: HOSPADM

## 2019-04-04 RX ORDER — MEPERIDINE HYDROCHLORIDE 50 MG/ML
12.5 INJECTION INTRAMUSCULAR; INTRAVENOUS; SUBCUTANEOUS EVERY 5 MIN PRN
Status: DISCONTINUED | OUTPATIENT
Start: 2019-04-04 | End: 2019-04-04 | Stop reason: HOSPADM

## 2019-04-04 RX ORDER — LACTULOSE 10 G/15ML
20 SOLUTION ORAL 3 TIMES DAILY
Status: DISCONTINUED | OUTPATIENT
Start: 2019-04-04 | End: 2019-04-05 | Stop reason: HOSPADM

## 2019-04-04 RX ORDER — HYDROCODONE BITARTRATE AND ACETAMINOPHEN 10; 325 MG/1; MG/1
1 TABLET ORAL 3 TIMES DAILY PRN
Status: DISCONTINUED | OUTPATIENT
Start: 2019-04-04 | End: 2019-04-05 | Stop reason: HOSPADM

## 2019-04-04 RX ORDER — AZELASTINE 1 MG/ML
2 SPRAY, METERED NASAL 2 TIMES DAILY
Status: DISCONTINUED | OUTPATIENT
Start: 2019-04-04 | End: 2019-04-04

## 2019-04-04 RX ORDER — VANCOMYCIN HYDROCHLORIDE 1 G/200ML
1000 INJECTION, SOLUTION INTRAVENOUS
Status: COMPLETED | OUTPATIENT
Start: 2019-04-04 | End: 2019-04-04

## 2019-04-04 RX ORDER — TIZANIDINE 4 MG/1
12 TABLET ORAL 2 TIMES DAILY
Status: DISCONTINUED | OUTPATIENT
Start: 2019-04-04 | End: 2019-04-05 | Stop reason: HOSPADM

## 2019-04-04 RX ORDER — IPRATROPIUM BROMIDE AND ALBUTEROL SULFATE 2.5; .5 MG/3ML; MG/3ML
1 SOLUTION RESPIRATORY (INHALATION) EVERY 6 HOURS PRN
Status: DISCONTINUED | OUTPATIENT
Start: 2019-04-04 | End: 2019-04-05 | Stop reason: HOSPADM

## 2019-04-04 RX ORDER — VANCOMYCIN HYDROCHLORIDE 1 G/200ML
1000 INJECTION, SOLUTION INTRAVENOUS EVERY 12 HOURS
Status: COMPLETED | OUTPATIENT
Start: 2019-04-04 | End: 2019-04-05

## 2019-04-04 RX ORDER — ALBUTEROL SULFATE 90 UG/1
1 AEROSOL, METERED RESPIRATORY (INHALATION) EVERY 6 HOURS PRN
Status: DISCONTINUED | OUTPATIENT
Start: 2019-04-04 | End: 2019-04-05 | Stop reason: HOSPADM

## 2019-04-04 RX ORDER — SODIUM CHLORIDE 9 MG/ML
INJECTION, SOLUTION INTRAVENOUS CONTINUOUS
Status: DISCONTINUED | OUTPATIENT
Start: 2019-04-04 | End: 2019-04-05 | Stop reason: HOSPADM

## 2019-04-04 RX ORDER — PILOCARPINE HYDROCHLORIDE 5 MG/1
7.5 TABLET, FILM COATED ORAL 3 TIMES DAILY
Status: DISCONTINUED | OUTPATIENT
Start: 2019-04-04 | End: 2019-04-05 | Stop reason: HOSPADM

## 2019-04-04 RX ORDER — FENTANYL CITRATE 50 UG/ML
INJECTION, SOLUTION INTRAMUSCULAR; INTRAVENOUS PRN
Status: DISCONTINUED | OUTPATIENT
Start: 2019-04-04 | End: 2019-04-04 | Stop reason: SDUPTHER

## 2019-04-04 RX ORDER — BACLOFEN 10 MG/1
10 TABLET ORAL 2 TIMES DAILY PRN
Status: DISCONTINUED | OUTPATIENT
Start: 2019-04-04 | End: 2019-04-05 | Stop reason: HOSPADM

## 2019-04-04 RX ORDER — MIDAZOLAM HYDROCHLORIDE 1 MG/ML
2 INJECTION INTRAMUSCULAR; INTRAVENOUS
Status: COMPLETED | OUTPATIENT
Start: 2019-04-04 | End: 2019-04-04

## 2019-04-04 RX ORDER — CETIRIZINE HYDROCHLORIDE 5 MG/1
5 TABLET ORAL NIGHTLY
Status: DISCONTINUED | OUTPATIENT
Start: 2019-04-04 | End: 2019-04-05 | Stop reason: HOSPADM

## 2019-04-04 RX ORDER — PREGABALIN 150 MG/1
300 CAPSULE ORAL 2 TIMES DAILY
Status: DISCONTINUED | OUTPATIENT
Start: 2019-04-04 | End: 2019-04-05 | Stop reason: HOSPADM

## 2019-04-04 RX ORDER — ONDANSETRON 4 MG/1
4 TABLET, FILM COATED ORAL EVERY 8 HOURS PRN
Status: DISCONTINUED | OUTPATIENT
Start: 2019-04-04 | End: 2019-04-05 | Stop reason: HOSPADM

## 2019-04-04 RX ORDER — LIDOCAINE HYDROCHLORIDE 10 MG/ML
1 INJECTION, SOLUTION EPIDURAL; INFILTRATION; INTRACAUDAL; PERINEURAL
Status: DISCONTINUED | OUTPATIENT
Start: 2019-04-04 | End: 2019-04-04 | Stop reason: HOSPADM

## 2019-04-04 RX ORDER — LEVETIRACETAM 500 MG/1
500 TABLET ORAL NIGHTLY
Status: DISCONTINUED | OUTPATIENT
Start: 2019-04-04 | End: 2019-04-05 | Stop reason: HOSPADM

## 2019-04-04 RX ORDER — FENTANYL CITRATE 50 UG/ML
50 INJECTION, SOLUTION INTRAMUSCULAR; INTRAVENOUS
Status: COMPLETED | OUTPATIENT
Start: 2019-04-04 | End: 2019-04-04

## 2019-04-04 RX ORDER — PROMETHAZINE HYDROCHLORIDE 25 MG/ML
6.25 INJECTION, SOLUTION INTRAMUSCULAR; INTRAVENOUS
Status: DISCONTINUED | OUTPATIENT
Start: 2019-04-04 | End: 2019-04-04 | Stop reason: HOSPADM

## 2019-04-04 RX ORDER — NITROFURANTOIN MACROCRYSTALS 50 MG/1
50 CAPSULE ORAL NIGHTLY
Status: DISCONTINUED | OUTPATIENT
Start: 2019-04-04 | End: 2019-04-05 | Stop reason: HOSPADM

## 2019-04-04 RX ADMIN — PILOCARPINE HYDROCHLORIDE 7.5 MG: 5 TABLET, FILM COATED ORAL at 23:03

## 2019-04-04 RX ADMIN — CETIRIZINE HYDROCHLORIDE 5 MG: 5 TABLET ORAL at 23:03

## 2019-04-04 RX ADMIN — PREGABALIN 300 MG: 150 CAPSULE ORAL at 23:03

## 2019-04-04 RX ADMIN — FENTANYL CITRATE 50 MCG: 50 INJECTION, SOLUTION INTRAMUSCULAR; INTRAVENOUS at 16:19

## 2019-04-04 RX ADMIN — ROPIVACAINE HYDROCHLORIDE 20 ML: 5 INJECTION, SOLUTION EPIDURAL; INFILTRATION; PERINEURAL at 16:36

## 2019-04-04 RX ADMIN — LEVETIRACETAM 500 MG: 500 TABLET ORAL at 23:05

## 2019-04-04 RX ADMIN — SODIUM CHLORIDE: 9 INJECTION, SOLUTION INTRAVENOUS at 20:05

## 2019-04-04 RX ADMIN — PROPOFOL 100 MCG/KG/MIN: 10 INJECTION, EMULSION INTRAVENOUS at 16:49

## 2019-04-04 RX ADMIN — NITROFURANTOIN MACROCRYSTALS 50 MG: 50 CAPSULE ORAL at 23:03

## 2019-04-04 RX ADMIN — VANCOMYCIN HYDROCHLORIDE 1000 MG: 1 INJECTION, SOLUTION INTRAVENOUS at 10:21

## 2019-04-04 RX ADMIN — LACTULOSE 20 G: 20 SOLUTION ORAL at 23:10

## 2019-04-04 RX ADMIN — TIZANIDINE 12 MG: 4 TABLET ORAL at 23:05

## 2019-04-04 RX ADMIN — Medication 2 G: at 16:49

## 2019-04-04 RX ADMIN — MIDAZOLAM 1 MG: 1 INJECTION INTRAMUSCULAR; INTRAVENOUS at 16:17

## 2019-04-04 RX ADMIN — VANCOMYCIN HYDROCHLORIDE 1000 MG: 1 INJECTION, SOLUTION INTRAVENOUS at 23:09

## 2019-04-04 RX ADMIN — HYDROCODONE BITARTRATE AND ACETAMINOPHEN 1 TABLET: 10; 325 TABLET ORAL at 23:04

## 2019-04-04 RX ADMIN — SODIUM CHLORIDE, SODIUM LACTATE, POTASSIUM CHLORIDE, AND CALCIUM CHLORIDE: 600; 310; 30; 20 INJECTION, SOLUTION INTRAVENOUS at 16:49

## 2019-04-04 RX ADMIN — ONDANSETRON HYDROCHLORIDE 4 MG: 2 INJECTION, SOLUTION INTRAMUSCULAR; INTRAVENOUS at 16:53

## 2019-04-04 RX ADMIN — OXYBUTYNIN CHLORIDE 15 MG: 5 TABLET, EXTENDED RELEASE ORAL at 23:04

## 2019-04-04 RX ADMIN — SODIUM CHLORIDE, SODIUM LACTATE, POTASSIUM CHLORIDE, AND CALCIUM CHLORIDE: 600; 310; 30; 20 INJECTION, SOLUTION INTRAVENOUS at 10:22

## 2019-04-04 RX ADMIN — DULOXETINE HYDROCHLORIDE 30 MG: 30 CAPSULE, DELAYED RELEASE ORAL at 23:04

## 2019-04-04 RX ADMIN — FENTANYL CITRATE 100 MCG: 50 INJECTION INTRAMUSCULAR; INTRAVENOUS at 16:49

## 2019-04-04 ASSESSMENT — PAIN SCALES - GENERAL
PAINLEVEL_OUTOF10: 4
PAINLEVEL_OUTOF10: 6
PAINLEVEL_OUTOF10: 0

## 2019-04-04 ASSESSMENT — LIFESTYLE VARIABLES: SMOKING_STATUS: 1

## 2019-04-04 NOTE — INTERVAL H&P NOTE
H&P Update    Patient's History and Physical from March 26,  was reviewed. Patient examined. There has been no change  I did go over with patient and her  that there are changes which might indicate osteomyelitis on her exray. Some of the femur may have to be resected if this wound goes down to bone and there is osteomyelitis present. Liliya Coelho

## 2019-04-04 NOTE — BRIEF OP NOTE
Brief Postoperative Note  ______________________________________________________________    Patient: Rahul Hodges  YOB: 1970  MRN: [id number]  Date of Procedure: 4/4/2019    Pre-Op Diagnosis: L97.516, L98.499, L97.322    Post-Op Diagnosis: Same       Procedure(s):  LEFT TRANSMET AMPUTATION    Anesthesia: Regional, Monitor Anesthesia Care    Surgeon(s):  Kathya Hill MD    Assistant: Elvi Guillermo    Estimated Blood Loss (mL): 392     Complications: None    Specimens:   ID Type Source Tests Collected by Time Destination   A : left forefoot Tissue Foot SURGICAL PATHOLOGY Kathya Hill MD 4/4/2019 1709      Drains:   Colostomy RUQ (Active)       Urostomy (Active)       Findings: good bleeding and clean tissue at line of resection.   Dictation #[id number]  Kathya Hill MD  Date: 4/4/2019  Time: 6:08 PM

## 2019-04-04 NOTE — ANESTHESIA POSTPROCEDURE EVALUATION
Department of Anesthesiology  Postprocedure Note    Patient: Florence Alexandre  MRN: [id number]  YOB: 1970  Date of evaluation: 4/4/2019  Time:  6:06 PM     Procedure Summary     Date:  04/04/19 Room / Location:  [facility] OR  / [facility] OR    Anesthesia Start:  1647 Anesthesia Stop:  1806    Procedure:  LEFT TRANSMET AMPUTATION (Left ) Diagnosis:  (L97.516, L98.499, D86.846)    Surgeon:  Holli Caputo MD Responsible Provider: EFREN Bergman CRNA    Anesthesia Type:  regional, MAC ASA Status:  3          Anesthesia Type: regional, MAC    Adriana Phase I: Adriana Score: 10    Adriana Phase II:      Last vitals: Reviewed and per EMR flowsheets.        Anesthesia Post Evaluation    Patient location during evaluation: PACU  Patient participation: complete - patient participated  Level of consciousness: awake and alert  Pain score: 0  Airway patency: patent  Nausea & Vomiting: no vomiting and no nausea  Complications: no  Cardiovascular status: hemodynamically stable  Respiratory status: spontaneous ventilation and nasal cannula  Hydration status: stable

## 2019-04-04 NOTE — INTERVAL H&P NOTE
H&P Update    Patient's History and Physical from March 26,  was reviewed. Patient examined. There has been no change. My original interval note was for the wrong patient and should be disregarded.   Dain Rabago

## 2019-04-04 NOTE — ANESTHESIA PRE PROCEDURE
Department of Anesthesiology  Preprocedure Note       Name:  Billy Mallory   Age:  51 y.o.  :  1970                                          MRN:  [id number]         Date:  2019      Surgeon: José Pedroza):  Bonnye Jeans, MD    Procedure: LEFT TRANSMET AMPUTATION (Left )  SPLIT THICKNESS SKIN GRAFT LEFT ANKLE (Left )    Medications prior to admission:   Prior to Admission medications    Medication Sig Start Date End Date Taking? Authorizing Provider   BACLOFEN, PAIN PUMP REFILL CHARGE, by Implant route continuous Indications: every 3 months   Yes Historical Provider, MD   DULoxetine (CYMBALTA) 30 MG extended release capsule Take 30 mg by mouth daily   Yes Historical Provider, MD   levETIRAcetam (KEPPRA) 500 MG tablet Take 500 mg by mouth nightly   Yes Historical Provider, MD   vitamin A 57317 units capsule Take 10,000 Units by mouth daily   Yes Historical Provider, MD   b complex vitamins capsule Take 1 capsule by mouth daily   Yes Historical Provider, MD   vitamin D (CHOLECALCIFEROL) 1000 UNIT TABS tablet Take 1,000 Units by mouth daily   Yes Historical Provider, MD   vitamin C (ASCORBIC ACID) 500 MG tablet Take 1,000 mg by mouth daily   Yes Historical Provider, MD   Multiple Vitamins-Minerals (THERAPEUTIC MULTIVITAMIN-MINERALS) tablet Take 1 tablet by mouth daily   Yes Historical Provider, MD   Multiple Vitamins-Minerals (HAIR SKIN & NAILS ADVANCED PO) Take 1 tablet by mouth daily   Yes Historical Provider, MD   methylphenidate (RITALIN) 20 MG tablet Take 1 tablet by mouth daily for 30 days. 3/22/19 4/21/19 Yes Ileana Lee MD   PROAIR  (90 Base) MCG/ACT inhaler INHALE 1 PUFF INTO THE LUNGS EVERY 6 HOURS AS NEEDED FOR WHEEZING OR SHORTNESS OF BREATH 3/15/19  Yes Jackie Avelar MD   oxybutynin (DITROPAN XL) 15 MG extended release tablet TAKE 1 TABLET BY MOUTH TWICE DAILY. Patient taking differently: Take 15 mg by mouth 2 times daily TAKE 1 TABLET BY MOUTH TWICE DAILY.  11/15/18  Yes Sandra Vogt 10 mL Intravenous 2 times per day Holli Caputo MD        sodium chloride flush 0.9 % injection 10 mL  10 mL Intravenous PRN Holli Caputo MD        vancomycin (VANCOCIN) 1000 mg in dextrose 5% 200 mL IVPB  1,000 mg Intravenous On Call to Ortiz Oconnor MD           Allergies:     Allergies   Allergen Reactions    Darvocet [Propoxyphene N-Acetaminophen]     Morphine And Related Swelling    Morphine Hcl     Propoxyphene Swelling       Problem List:    Patient Active Problem List   Diagnosis Code    Muscle spasticity M62.838    Peripheral vascular disease ([facility]) I73.9    MS (multiple sclerosis) ([facility]) G35    Pain in both upper arms M79.621, M79.622    Numbness R20.0    Fatigue R53.83    Weakness R53.1    Sepsis ([facility]) A41.9    Chronic pain G89.29    Hx of seizure disorder Z86.69    Hypokalemia E87.6    Hypokalemia E87.6    Abnormal EKG R94.31    Encephalopathy G93.40    Open wound of ankle S91.009A    Vitamin D deficiency E55.9    Back pain M54.9    Breakdown (mechanical) of cranial or spinal infusion catheter, initial encounter T85.610A    CAFL (chronic airflow limitation) ([facility]) J44.9    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    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    Difficult or painful urination R30.0    Headache R51    Hyperlipidemia E78.5    Influenza J11.1    Insomnia G47.00    Breast lump N63.0    Hay fever J30.1    Decubitus ulcer of sacral region L89.159    Current smoker F17.200    Cobalamin deficiency E53.8    Pain, neck M54.2    Venous stasis ulcer of left lower extremity ([facility]) I83.029, L97.929    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 layer exposed ([facility] [address] L97.812    Open wound of second toe of left foot S91.105A       Past Medical History:        Diagnosis Date    Ankle wound     and toe wound    Asthma     Back pain 6/28/2017    CAFL (chronic airflow limitation) ([facility]) 6/28/2017    Hay fever 6/28/2017    Headache 6/28/2017    Hyperlipidemia 2017    MS (multiple sclerosis) (HCC)     Muscle spasticity     Neuropathic ulcer of left foot, limited to breakdown of skin ([address] 4/10/2017    Peripheral vascular disease (HCC)     Seizure (HCC)     occ. related to ms       Past Surgical History:        Procedure Laterality Date    BACLOFEN PUMP IMPLANTATION      COLOSTOMY      FEMUR FRACTURE SURGERY      Right    HYSTERECTOMY      OTHER SURGICAL HISTORY      urostomy    OTHER SURGICAL HISTORY      pain pump    NV INSJ PRPH CTR VAD W/SUBQ PORT UNDER 5 YR N/A 2018    SINGLE LUMEN PORT PLACEMENT WITH FLUORO performed by Maeve Gill MD at [facility] TOE AMPUTATION      L last toe    TONSILLECTOMY      URETEROTOMY         Social History:    Social History     Tobacco Use    Smoking status: Current Some Day Smoker     Packs/day: 0.25     Types: Cigarettes     Last attempt to quit: 2018     Years since quittin.2    Smokeless tobacco: Never Used    Tobacco comment: rare cigarette   Substance Use Topics    Alcohol use: No                                Ready to quit: Not Answered  Counseling given: Not Answered  Comment: rare cigarette      Vital Signs (Current):   Vitals:    19 1007   BP: (!) 119/56   Pulse: 66   Resp: 18   Temp: 99 °F (37.2 °C)   TempSrc: Temporal   SpO2: 100%   Weight: 161 lb (73 kg)   Height: 5' 9\" (1.753 m)                                              BP Readings from Last 3 Encounters:   19 (!) 119/56   19 91/67   19 110/87       NPO Status: Time of last liquid consumption: 0000                        Time of last solid consumption: 0000                        Date of last liquid consumption: 19                        Date of last solid food consumption: 19    BMI:   Wt Readings from Last 3 Encounters:   19 161 lb (73 kg)   19 161 lb (73 kg)   19 161 lb (73 kg)     Body mass index is 23.78 kg/m².     CBC:   Lab Results   Component Value Date

## 2019-04-05 VITALS
WEIGHT: 161 LBS | OXYGEN SATURATION: 100 % | HEIGHT: 69 IN | TEMPERATURE: 99.3 F | SYSTOLIC BLOOD PRESSURE: 121 MMHG | HEART RATE: 70 BPM | RESPIRATION RATE: 17 BRPM | BODY MASS INDEX: 23.85 KG/M2 | DIASTOLIC BLOOD PRESSURE: 74 MMHG

## 2019-04-05 LAB
ANION GAP SERPL CALCULATED.3IONS-SCNC: 12 MMOL/L (ref 7–19)
BUN BLDV-MCNC: 11 MG/DL (ref 6–20)
CALCIUM SERPL-MCNC: 8.6 MG/DL (ref 8.6–10)
CHLORIDE BLD-SCNC: 110 MMOL/L (ref 98–111)
CO2: 25 MMOL/L (ref 22–29)
CREAT SERPL-MCNC: <0.5 MG/DL (ref 0.5–0.9)
GFR NON-AFRICAN AMERICAN: >60
GLUCOSE BLD-MCNC: 93 MG/DL (ref 74–109)
HCT VFR BLD CALC: 35.1 % (ref 37–47)
HEMOGLOBIN: 10.7 G/DL (ref 12–16)
MCH RBC QN AUTO: 27.4 PG (ref 27–31)
MCHC RBC AUTO-ENTMCNC: 30.5 G/DL (ref 33–37)
MCV RBC AUTO: 90 FL (ref 81–99)
PDW BLD-RTO: 13.6 % (ref 11.5–14.5)
PLATELET # BLD: 163 K/UL (ref 130–400)
PMV BLD AUTO: 11.5 FL (ref 9.4–12.3)
POTASSIUM SERPL-SCNC: 3.6 MMOL/L (ref 3.5–5)
RBC # BLD: 3.9 M/UL (ref 4.2–5.4)
SODIUM BLD-SCNC: 147 MMOL/L (ref 136–145)
WBC # BLD: 4.4 K/UL (ref 4.8–10.8)

## 2019-04-05 PROCEDURE — 2580000003 HC RX 258: Performed by: SURGERY

## 2019-04-05 PROCEDURE — 99024 POSTOP FOLLOW-UP VISIT: CPT | Performed by: SURGERY

## 2019-04-05 PROCEDURE — 36415 COLL VENOUS BLD VENIPUNCTURE: CPT

## 2019-04-05 PROCEDURE — 99024 POSTOP FOLLOW-UP VISIT: CPT | Performed by: NURSE PRACTITIONER

## 2019-04-05 PROCEDURE — 80048 BASIC METABOLIC PNL TOTAL CA: CPT

## 2019-04-05 PROCEDURE — 6360000002 HC RX W HCPCS: Performed by: SURGERY

## 2019-04-05 PROCEDURE — 6370000000 HC RX 637 (ALT 250 FOR IP): Performed by: SURGERY

## 2019-04-05 PROCEDURE — 85027 COMPLETE CBC AUTOMATED: CPT

## 2019-04-05 RX ADMIN — CEFAZOLIN SODIUM 1 G: 1 INJECTION, SOLUTION INTRAVENOUS at 10:04

## 2019-04-05 RX ADMIN — OXYBUTYNIN CHLORIDE 15 MG: 5 TABLET, EXTENDED RELEASE ORAL at 10:01

## 2019-04-05 RX ADMIN — HEPARIN 300 UNITS: 100 SYRINGE at 15:44

## 2019-04-05 RX ADMIN — PILOCARPINE HYDROCHLORIDE 7.5 MG: 5 TABLET, FILM COATED ORAL at 15:10

## 2019-04-05 RX ADMIN — TIZANIDINE 12 MG: 4 TABLET ORAL at 10:02

## 2019-04-05 RX ADMIN — METHYLPHENIDATE HYDROCHLORIDE 20 MG: 5 TABLET ORAL at 10:03

## 2019-04-05 RX ADMIN — CEFAZOLIN SODIUM 1 G: 1 INJECTION, SOLUTION INTRAVENOUS at 01:35

## 2019-04-05 RX ADMIN — ENOXAPARIN SODIUM 40 MG: 40 INJECTION SUBCUTANEOUS at 10:04

## 2019-04-05 RX ADMIN — PREGABALIN 300 MG: 150 CAPSULE ORAL at 10:02

## 2019-04-05 RX ADMIN — LACTULOSE 20 G: 20 SOLUTION ORAL at 10:04

## 2019-04-05 RX ADMIN — LACTULOSE 20 G: 20 SOLUTION ORAL at 15:10

## 2019-04-05 RX ADMIN — PILOCARPINE HYDROCHLORIDE 7.5 MG: 5 TABLET, FILM COATED ORAL at 10:02

## 2019-04-05 RX ADMIN — SODIUM CHLORIDE: 9 INJECTION, SOLUTION INTRAVENOUS at 05:32

## 2019-04-05 NOTE — PROGRESS NOTES
Patient's specialty mattress was found to be turned off during morning rounds. Mattress was turned back on and this nurse, Merrick Moya, and [facility] rolled patient to examine back, no skin breakdown noted. This nurse noticed right heel redness and softness during shift assessment. Mepilex was applied. Will continue to monitor.

## 2019-04-05 NOTE — PROGRESS NOTES
PHARMACY NOTE  Alyce Baker was ordered azelastine (Astelin) nasal spray. Per the [address], this medication is non-formulary and not stocked by pharmacy. It has been discontinued. The medication can be reordered at discharge.      Electronically signed by Can Tijerina [address] [place] [address] on 4/4/2019 at 7:16 PM

## 2019-04-05 NOTE — PROGRESS NOTES
Vascular Surgery  Discharge Summary    Patient ID: Pancho Wong      Patient's PCP: Dee Ramachandran MD    Admit Date: 4/4/2019     Discharge Date: 04/05/2019      Admitting Physician: Dr. Lucy Jefferson     Discharge Physician: Blue Landry MD      Discharge Diagnoses:    Principal Problem:    Open wound of second toe of left foot -  S/P Left Transmet Amputation on 04/04/2019    Active Problems:    MS (multiple sclerosis) ([address]    Mixed Hyperlipidemia       Hospital Course:   Ms. Belen Perez is a 51year old female with MS, wheelchair bound, who is followed in wound care clinic. She was seen on 03/26/2019, noted to have chronic atrophy with left 2nd toe wound. Due to chronicity of wounds with atrophy, left transmet amputation was recommended. The operation was explained and discussed in detail, including the risks and benefits. She understood and was in agreement to proceed. She was admitted on 04/04/2019, taken to the OR and underwent left transmet amputation with application of CoFlex dressing. She tolerated the operation without complication and was admitted to the vascular floor. She has done well postoperatively. She has remained hemodynamically stable in good pain control, tolerating her diet. She is felt stable for discharge home with outpatient follow-up in wound clinic. Exam:   Vital Signs: /74   Pulse 70   Temp 99.3 °F (37.4 °C)   Resp 17   Ht 5' 9\" (1.753 m)   Wt 161 lb (73 kg)   SpO2 100%   BMI 23.78 kg/m²   General appearance: No apparent distress, appears stated age and cooperative. Neck: Supple. No JVD. Respiratory:  Normal respiratory effort. Clear to auscultation bilaterally without rales, wheezes, or rhonchi. Cardiovascular: Regular rate and rhythm with normal heart tones without murmurs, rubs or gallops. Abdomen: Soft, non-tender, non-distended with normal bowel sounds. Extremities: Left lower extremity with CoFlex dressing dry and intact.  Right heel with DAILY.  Qty: 270 tablet, Refills: 0      levocetirizine (XYZAL) 5 MG tablet TAKE 1 TABLET IN THE EVENING  Qty: 90 tablet, Refills: 3      tiZANidine (ZANAFLEX) 4 MG tablet TAKE 3 TABLETS TWICE DAILY  Qty: 180 tablet, Refills: 5      nitrofurantoin (MACRODANTIN) 50 MG capsule Take 1 capsule by mouth daily  Qty: 90 capsule, Refills: 3      baclofen (LIORESAL) 10 MG tablet Take 1 tablet by mouth 2 times daily as needed (muscle spasms)  Qty: 60 tablet, Refills: 5    Associated Diagnoses: MS (multiple sclerosis) ([facility] [address] Pain, neck      azelastine (ASTELIN) 0.1 % nasal spray 2 sprays by Nasal route 2 times daily Use in each nostril as directed  Qty: 1 Bottle, Refills: 3      mupirocin (BACTROBAN) 2 % nasal ointment by Nasal route 2 times daily as needed Take by Nasal route 2 times daily. HYDROcodone-acetaminophen (NORCO)  MG per tablet Take 1 tablet by mouth 3 times daily as needed for Pain for up to 30 days. Must last 30 days  Qty: 90 tablet, Refills: 0    Associated Diagnoses: MS (multiple sclerosis) ([facility] [address] Pain, neck; Pain in both upper arms; Muscle spasticity; Other fatigue      hydrochlorothiazide (HYDRODIURIL) 25 MG tablet Take 25 mg by mouth daily as needed      ondansetron (ZOFRAN) 4 MG tablet Take 1 tablet by mouth every 8 hours as needed for Nausea or Vomiting  Qty: 15 tablet, Refills: 0      fluconazole (DIFLUCAN) 100 MG tablet TAKE 1 TABLET DAILY FOR 3 DOSES AS NEEDED. Qty: 3 tablet, Refills: 0      glucose monitoring kit (FREESTYLE) monitoring kit 1 kit by Does not apply route daily  Qty: 1 kit, Refills: 0      blood glucose monitor strips Test 1 times a day & as needed for symptoms of irregular blood glucose. Qty: 100 strip, Refills: 0    Comments: Brand per patient preference. May round up to next available package size. Lancets MISC 1 each by Does not apply route 2 times daily  Qty: 100 each, Refills: 5      silver sulfADIAZINE (SILVADENE) 1 % cream Apply topically daily.   Qty: 400 g, Refills: 3      pregabalin (LYRICA) 300 MG capsule Take 1 capsule by mouth 2 times daily for 30 days. Fayrene Challenger: 60 capsule, Refills: 5    Associated Diagnoses: Pain, neck      Sodium Chloride Flush (SALINE FLUSH) 0.9 % SOLN Infuse 20 mLs intravenously every 30 days Not every 30 days but every 4-6 weeks as need with 300 units of heparin to flush port for Infusion of Ocrevus for multiple sclerosis  Qty: 20 mL, Refills: 5    Associated Diagnoses: Multiple sclerosis (HCC)      Heparin Lock Flush (HEPARIN FLUSH, 100 UNITS/ML,) 100 UNIT/ML injection 3 mLs by Intercatheter route every 30 days No every 30 days but every 4-6 weeks. Flush port with 300 units heparin with 20 cc normal saline for ocrevus infusion for Multiple sclerosis  Qty: 1 Syringe, Refills: 5    Associated Diagnoses: Multiple sclerosis (HCC)      ipratropium-albuterol (DUONEB) 0.5-2.5 (3) MG/3ML SOLN nebulizer solution USE 1 UNIT DOSE IN NEBULIZER EVERY 4 TO 6 HOURS AS NEEDED. Qty: 450 mL, Refills: 3      lactulose (CHRONULAC) 10 GM/15ML solution Take 30 mLs by mouth 3 times daily  Qty: 1 Bottle, Refills: 5    Associated Diagnoses: MS (multiple sclerosis) ([address] Pain, neck; Pain in both upper arms; Muscle spasticity; Other fatigue      Ocrelizumab (OCREVUS IV) Infuse intravenously       !! - Potential duplicate medications found. Please discuss with provider. Disposition:  Home    Discharge Instructions/Follow-up:    1. Hospital Follow-up with Dr. Gerardo Keane in 1 week. 2. Appointment at [facility] on 04/10/2019 at 230 pm for Routine Postoperative Follow-up with Wound Check. Time Spent on discharge is 25 minutes in the examination, evaluation, counseling and review of medications and discharge plan. Patient seen in conjunction with EFREN Haddad      Thank you Nicole Slater MD for the opportunity to be involved in this patient's care.  If you have any questions or concerns please feel free to contact me at [phone].

## 2019-04-05 NOTE — CARE COORDINATION
[address] Transitions Interview     2019    Patient: Yelena Adams Patient : 1970   MRN: [id number]    RARS: Readmission Risk Score: 12         Spoke with: Alyce Baker      Readmission Risk  Patient Active Problem List   Diagnosis    Muscle spasticity    Peripheral vascular disease ([address]    MS (multiple sclerosis) ([address]    Pain in both upper arms    Numbness    Fatigue    Weakness    Sepsis ([address]    Chronic pain    Hx of seizure disorder    Hypokalemia    Hypokalemia    Abnormal EKG    Encephalopathy    Open wound of ankle    Vitamin D deficiency    Back pain    Breakdown (mechanical) of cranial or spinal infusion catheter, initial encounter    CAFL (chronic airflow limitation) ([facility])    Binocular vision disorder with diplopia    Aptyalism    Difficult or painful urination    Headache    Hyperlipidemia    Influenza    Insomnia    Breast lump    Hay fever    Decubitus ulcer of sacral region    Current smoker    Cobalamin deficiency    Pain, neck    Venous stasis ulcer of left lower extremity ([address]    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 layer exposed ([address]    Open wound of second toe of left foot    Type 2 diabetes mellitus with left diabetic foot ulcer [facility])       Inpatient Assessment  Care Transitions Summary    Care Transitions Inpatient Review  Medication Review  Housing Review  Social Support  Durable Medical Equipment  Functional Review  Hearing and Vision  Care Transitions Interventions         Follow Up: Met at bedside with patient and attempted to discuss CTC process. She was rather sleepy and did not really answer any questions. Will follow along while here and after discharge as indicated.        Future Appointments   Date Time Provider Maurizio Hawkins   4/10/2019  2:30 PM MD DIANA [facility]   2019  1:50 PM EFREN [facility]   2019  2:45 PM Litzy CALDERON Fantasma Galindo MD LPS [facility]   5/28/2019  1:00 PM Vargas Mitchell MD [address] [facility] Due   Topic Date Due    A1C test (Diabetic or Prediabetic)  01/08/1981    Diabetic microalbuminuria test  01/08/1989       Dequan Dubon RN

## 2019-04-05 NOTE — DISCHARGE INSTR - DIET

## 2019-04-05 NOTE — PROGRESS NOTES
Vascular Surgery Rounding Note    4/5/2019  1:48 PM  Post op day - 1 left TMA    Subjective- No complaints    Objective-   Invalid input(s): 24H    Intake/Output Summary (Last 24 hours) at 4/5/2019 1348  Last data filed at 4/5/2019 1008  Gross per 24 hour   Intake 2278.65 ml   Output 570 ml   Net 1708.65 ml     In: 1778.7 [I.V.:1778.7]  Out: 470 [Urine:470]    CBC:   Recent Labs     04/05/19  0516   WBC 4.4*   RBC 3.90*   HGB 10.7*   HCT 35.1*   MCV 90.0   MCH 27.4   MCHC 30.5*   RDW 13.6      MPV 11.5      Last 3 CMP:   Recent Labs     04/05/19  0516   *   K 3.6      CO2 25   BUN 11   CREATININE <0.5   GLUCOSE 93   CALCIUM 8.6      Troponin: No results for input(s): TROPONINI in the last 72 hours. Calcium:   Lab Results   Component Value Date    CALCIUM 8.6 04/05/2019    CALCIUM 9.5 01/31/2019    CALCIUM 9.7 11/15/2018      Ionized Calcium: No results found for: IONCA   Lipids: No results for input(s): CHOL, HDL in the last 72 hours. Invalid input(s): LDLCALCU  INR: No results for input(s): INR in the last 72 hours. Lactic Acid:   Lab Results   Component Value Date    LACTA 1.9 08/19/2016    LACTA 2.5 08/19/2016              Physical Exam:  Awake, alert, appropriate Yes  /74   Pulse 70   Temp 99.3 °F (37.4 °C)   Resp 17   Ht 5' 9\" (1.753 m)   Wt 161 lb (73 kg)   SpO2 100%   BMI 23.78 kg/m²   Heart-Normal S1 and S2.  Regular rhythm. No murmurs, gallops, or rubs. Lung-negative  Neck-suppleno adenopathy, no carotid bruit, no JVD, supple, symmetrical, trachea midline and thyroid not enlarged, symmetric, no tenderness/mass/nodules  Abdomen-Abdomen soft, non-tender. BS normal. No masses,  No organomegaly  Extremities-negative  Neurologic- alert, oriented, normal speech, no focal findings generalized weakness  Wounds wrapped in Coflex. Back-no wounds  Assessment/Plan  1. POD 1 left TMA, doing well,pain well controlled,   2.  Homme today

## 2019-04-05 NOTE — DISCHARGE SUMMARY
Vascular Surgery  Discharge Summary    Patient ID: Marge Vyas      Patient's PCP: Sid Wu MD    Admit Date: 4/4/2019     Discharge Date: 04/05/2019      Admitting Physician: Dr. Cece Rodriguez     Discharge Physician: Chetan Palafox MD      Discharge Diagnoses:    Principal Problem:    Open wound of second toe of left foot -  S/P Left Transmet Amputation on 04/04/2019    Active Problems:    MS (multiple sclerosis) ([address]    Mixed Hyperlipidemia       Hospital Course:   Ms. José Rolon is a 51year old female with MS, wheelchair bound, who is followed in wound care clinic. She was seen on 03/26/2019, noted to have chronic atrophy with left 2nd toe wound. Due to chronicity of wounds with atrophy, left transmet amputation was recommended. The operation was explained and discussed in detail, including the risks and benefits. She understood and was in agreement to proceed. She was admitted on 04/04/2019, taken to the OR and underwent left transmet amputation with application of CoFlex dressing. She tolerated the operation without complication and was admitted to the vascular floor. She has done well postoperatively. She has remained hemodynamically stable in good pain control, tolerating her diet. She is felt stable for discharge home with outpatient follow-up in wound clinic. Exam:   Vital Signs: /74   Pulse 70   Temp 99.3 °F (37.4 °C)   Resp 17   Ht 5' 9\" (1.753 m)   Wt 161 lb (73 kg)   SpO2 100%   BMI 23.78 kg/m²   General appearance: No apparent distress, appears stated age and cooperative. Neck: Supple. No JVD. Respiratory:  Normal respiratory effort. Clear to auscultation bilaterally without rales, wheezes, or rhonchi. Cardiovascular: Regular rate and rhythm with normal heart tones without murmurs, rubs or gallops. Abdomen: Soft, non-tender, non-distended with normal bowel sounds. Extremities: Left lower extremity with CoFlex dressing dry and intact.  Right heel with [phone].

## 2019-04-08 ENCOUNTER — CARE COORDINATION (OUTPATIENT)
Dept: CASE MANAGEMENT | Age: 49
End: 2019-04-08

## 2019-04-08 DIAGNOSIS — I73.9 PERIPHERAL VASCULAR DISEASE (HCC): Primary | ICD-10-CM

## 2019-04-08 PROCEDURE — 1111F DSCHRG MED/CURRENT MED MERGE: CPT | Performed by: INTERNAL MEDICINE

## 2019-04-08 NOTE — CARE COORDINATION
[address] Transitions Initial Follow Up Call    Call within 2 business days of discharge: Yes    Patient: Rahul Hodges Patient : 1970   MRN: [id number]    Discharge Date: 19 RARS: Readmission Risk Score: 12      Last Discharge [facility]       Complaint Diagnosis Description Type Department Provider    19   Admission (Discharged) [facility] [address] [place] MD Nasim           Bruno Ermias: Ladarius Khan      Non-face-to-face services provided:  Reviewed encounter information for continuity of care prior to follow up phone call - chart notes, consults, progress notes, test results, med list, appointments, AVS, other information. Care Transitions 24 Hour Call    Schedule Follow Up Appointment with PCP:  Completed  Do you have any ongoing symptoms?:  No  Do you have a copy of your discharge instructions?:  Yes  Do you have all of your prescriptions and are they filled?:  Yes  Have you been contacted by a [facility] Pharmacist?:  No  Have you scheduled your follow up appointment?:  No  Were you discharged with any Home Care or Post Acute Services:  No  Do you feel like you have everything you need to keep you well at home?:  Yes  Care Transitions Interventions         Follow Up: Placed a call to the home and spoke with patient for an initial follow up call post discharge. She reported that she is doing very well and has not had any issues since getting home. She reported that she is sleeping well. She reported that she has not had any wound care since getting home as it is not to be addressed until she sees the surgeon in a couple of days. She is eating and drinking well. She is tolerating pain well. She reported that she does not have home health, but is able to hire help to come in at home as needed through a program.  She denied any needs. Reported that she already had an appointment scheduled with PCP.   No new issues or needs reported. Will follow as indicated.        Future Appointments   Date Time Provider Maurizio Shruthi   4/10/2019  2:30 PM Rita Sandoval MD [facility] [address] [facility]   1/55/2864  6:82 PM [facility]   5/2/2019  1:50 PM EFREN Dempsey Res [facility]   5/2/2019  2:45 PM [facility]   5/28/2019  1:00 PM Ata Banks MD [facility]       Rebel Melendez RN

## 2019-04-10 ENCOUNTER — HOSPITAL ENCOUNTER (OUTPATIENT)
Dept: WOUND CARE | Age: 49
Discharge: HOME | End: 2019-04-10
Payer: MEDICARE

## 2019-04-10 VITALS
TEMPERATURE: 97.9 F | DIASTOLIC BLOOD PRESSURE: 64 MMHG | WEIGHT: 161 LBS | RESPIRATION RATE: 17 BRPM | HEART RATE: 77 BPM | HEIGHT: 69 IN | SYSTOLIC BLOOD PRESSURE: 101 MMHG | BODY MASS INDEX: 23.85 KG/M2

## 2019-04-10 DIAGNOSIS — L97.812 NON-PRESSURE CHRONIC ULCER OF OTHER PART OF RIGHT LOWER LEG WITH FAT LAYER EXPOSED (HCC): ICD-10-CM

## 2019-04-10 DIAGNOSIS — I83.029 VENOUS STASIS ULCER OF LEFT LOWER EXTREMITY (HCC): ICD-10-CM

## 2019-04-10 DIAGNOSIS — L97.929 VENOUS STASIS ULCER OF LEFT LOWER EXTREMITY (HCC): ICD-10-CM

## 2019-04-10 PROCEDURE — 29580 STRAPPING UNNA BOOT: CPT

## 2019-04-10 ASSESSMENT — PAIN SCALES - GENERAL: PAINLEVEL_OUTOF10: 0

## 2019-04-10 NOTE — PLAN OF CARE
Problem: Wound:  Goal: Will show signs of wound healing; wound closure and no evidence of infection  Description  Will show signs of wound healing; wound closure and no evidence of infection   Outcome: Ongoing     Problem: Venous:  Goal: Signs of wound healing will improve  Description  Signs of wound healing will improve   Outcome: Ongoing     Problem: Smoking cessation:  Goal: Ability to formulate a plan to maintain a tobacco-free life will be supported  Description  Ability to formulate a plan to maintain a tobacco-free life will be supported   Outcome: Ongoing

## 2019-04-10 NOTE — PLAN OF CARE
[employer] Application   Below Knee    NAME:  Alyce Baker  YOB: 1970  MEDICAL RECORD NUMBER:  [id number]  DATE:  4/10/2019     [x] Applied moisturizing agent to dry skin as needed.  [x] Appied primary and secondary dressing as ordered     [x] Applied Unna roll from toes to knee overlapping each time.  [x] Applied ace wrap or coban from toes to below the knee. Lucilla Parent [x] Instructed patient/caregiver to keep dressing dry and intact. DO NOT REMOVE DRESSING.  [x] Instructed pt/family/caregiver to report excessive draining, loose bandage, wet dressing, severe pain or tingling in toes.  [x] Applied [employer] dressing below the knee to Left lower leg(s)        Unna Boot(s) were applied per  Guidelines.      Electronically signed by Jalyn Sarabia RN on 4/10/2019 at 3:39 PM

## 2019-04-11 ENCOUNTER — CARE COORDINATION (OUTPATIENT)
Dept: CASE MANAGEMENT | Age: 49
End: 2019-04-11

## 2019-04-11 DIAGNOSIS — N39.0 URINARY TRACT INFECTION WITHOUT HEMATURIA, SITE UNSPECIFIED: Primary | ICD-10-CM

## 2019-04-11 NOTE — CARE COORDINATION
Called and left voicemail with Dr. Araujo [place] office regarding rescheduling patient an appointment for hospital follow up, and that the patient needed 72 hours notice to make transit aware, as that is her mode of transportation. She also has a foul smelling urine per Irene Cortez, the caregiver, and would like to bring in a urine for testing if possible. Left patient's contact information, and CTC contact information.

## 2019-04-11 NOTE — CARE COORDINATION
[facility] Transitions Follow Up Call    2019    Patient: Tee Estrada  Patient : 1970   MRN: [id number]  Reason for Admission:   Discharge Date: 19 RARS: Readmission Risk Score: 12         Spoke with: Belkys Ngo private nurse    Care Transitions Subsequent and Final Call    Subsequent and Final Calls  Do you have any ongoing symptoms?:  No  Have your medications changed?:  No  Do you have any questions related to your medications?:  No  Do you currently have any active services?:  No  Do you have any needs or concerns that I can assist you with?:  No  Identified Barriers:  None  Care Transitions Interventions  Other Interventions: Follow Up : Called and spoke with Belkys Ngo, patient's private nurse, and she says patient is doing great. She states that patient is currently in the shower. She says that they saw Dr. Donny Lovell office yesterday and had dressing changes to wound, all looks good. She is to follow up with him on the  next week. She says she was to follow up with Dr. Fantasma Galindo and had to cancel due to needing 72 hours notice for transit to transport. She is trying to reschedule for next week on the same day they are out to follow up with Dr. Donny Lovell office. She says the patient has been drinking more, but her urine is still foul smelling. Advised her to take a urine to Dr. Fantasma Galindo office, and get her in as soon as possible so as to catch it before it becomes a full blown UTI. Will follow up with patient at a later time.    Future Appointments   Date Time Provider Maurizio Hawkins   2019  1:00 PM EFREN Gonzalez [facility]   2019  1:50 PM EFREN Beavers [facility] Jorge Underwood [facility]   2019  2:45 PM Saul Gold MD [facility]   2019  1:00 PM Vargas Mitchell MD [facility] NEURO [facility]       Alvin Pepe RN

## 2019-04-15 ENCOUNTER — CARE COORDINATION (OUTPATIENT)
Dept: CASE MANAGEMENT | Age: 49
End: 2019-04-15

## 2019-04-17 ENCOUNTER — TELEPHONE (OUTPATIENT)
Dept: INTERNAL MEDICINE | Age: 49
End: 2019-04-17

## 2019-04-17 ENCOUNTER — HOSPITAL ENCOUNTER (OUTPATIENT)
Dept: WOUND CARE | Age: 49
Discharge: HOME | End: 2019-04-17
Payer: MEDICARE

## 2019-04-17 VITALS
TEMPERATURE: 97.3 F | DIASTOLIC BLOOD PRESSURE: 78 MMHG | RESPIRATION RATE: 18 BRPM | SYSTOLIC BLOOD PRESSURE: 103 MMHG | HEIGHT: 69 IN | WEIGHT: 161 LBS | BODY MASS INDEX: 23.85 KG/M2 | HEART RATE: 95 BPM

## 2019-04-17 DIAGNOSIS — Z89.432 S/P TRANSMETATARSAL AMPUTATION OF FOOT, LEFT (HCC): ICD-10-CM

## 2019-04-17 DIAGNOSIS — L97.812 NON-PRESSURE CHRONIC ULCER OF OTHER PART OF RIGHT LOWER LEG WITH FAT LAYER EXPOSED (HCC): ICD-10-CM

## 2019-04-17 DIAGNOSIS — I83.029 VENOUS STASIS ULCER OF LEFT LOWER EXTREMITY (HCC): ICD-10-CM

## 2019-04-17 DIAGNOSIS — L97.929 VENOUS STASIS ULCER OF LEFT LOWER EXTREMITY (HCC): ICD-10-CM

## 2019-04-17 PROCEDURE — 29580 STRAPPING UNNA BOOT: CPT

## 2019-04-17 PROCEDURE — 99214 OFFICE O/P EST MOD 30 MIN: CPT | Performed by: NURSE PRACTITIONER

## 2019-04-17 RX ORDER — VARENICLINE TARTRATE 25 MG
KIT ORAL
Qty: 270 TABLET | Refills: 3 | Status: SHIPPED | OUTPATIENT
Start: 2019-04-17 | End: 2019-05-28

## 2019-04-17 ASSESSMENT — PAIN SCALES - GENERAL: PAINLEVEL_OUTOF10: 0

## 2019-04-17 NOTE — TELEPHONE ENCOUNTER
Alyce called requesting a refill of the below medication which has been pended for you:     Requested Prescriptions     Pending Prescriptions Disp Refills    varenicline (CHANTIX STARTING MONTH PAK) 0.5 MG X 11 & 1 MG X 42 tablet 270 tablet 3     Sig: See instructiions on starter pack       Last Appointment Date: 11/15/2018  Next Appointment Date: 5/2/2019    Allergies   Allergen Reactions    Darvocet [Propoxyphene N-Acetaminophen]     Morphine And Related Swelling    Morphine Hcl     Propoxyphene Swelling

## 2019-04-17 NOTE — PROGRESS NOTES
Patient Care Team:  Josie Randle MD as PCP - General (Family Medicine)  Josie Randle MD as PCP - S Attributed Provider  Reji Wren MD as Neurologist (Neurology)  Reji Wren MD as Consulting Physician (Neurology)  Ti Thompson, RN as Care Transition  Marilee Perera, NIC as Care Transition    TODAY'S DATE:  4/17/2019     HISTORY of PRESENT ILLNESS HPI   Niharika Hauser is a 51 y.o. female who presents today for wound evaluation. Patient had left trans met by Dr. Amaya Ramirez and is here for post op visit.      Wound Type:non-healing surgical  Wound Location:left trans met  Modifying factors:edema    Patient Active Problem List   Diagnosis Code    Muscle spasticity M62.838    Peripheral vascular disease ([facility]) I73.9    MS (multiple sclerosis) ([facility]) G35    Pain in both upper arms M79.621, M79.622    Numbness R20.0    Fatigue R53.83    Weakness R53.1    Sepsis ([facility]) A41.9    Chronic pain G89.29    Hx of seizure disorder Z86.69    Hypokalemia E87.6    Hypokalemia E87.6    Abnormal EKG R94.31    Encephalopathy G93.40    Open wound of ankle S91.009A    Vitamin D deficiency E55.9    Back pain M54.9    Breakdown (mechanical) of cranial or spinal infusion catheter, initial encounter T85.610A    CAFL (chronic airflow limitation) ([facility]) J44.9    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    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    Difficult or painful urination R30.0    Headache R51    Hyperlipidemia E78.5    Influenza J11.1    Insomnia G47.00    Breast lump N63.0    Hay fever J30.1    Decubitus ulcer of sacral region L89.159    Current smoker F17.200    Cobalamin deficiency E53.8    Pain, neck M54.2    Venous stasis ulcer of left lower extremity ([facility]) I83.029, L97.929    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 layer exposed ([facility] L97.812    Open wound of second toe of left foot S91.105A    Type 2 diabetes mellitus with left diabetic foot ulcer ([address] E11.621, S25.008  S/P transmetatarsal amputation of foot, left ([address] X57.726       She reports she developed a wound on left foot. She believes this is not healing. She has been applying coflex per wound care orders. She has not had  fever or chills. She has a history of MS.     Yusef Riojas is a 51 y.o. female with the following history reviewed and [facility] in [facility]:  Patient Active Problem List    Diagnosis Date Noted    Sepsis ([address] 08/19/2016     Priority: High    MS (multiple sclerosis) ([address] 06/08/2016     Priority: Medium    S/P transmetatarsal amputation of foot, left ([address] 04/17/2019    Type 2 diabetes mellitus with left diabetic foot ulcer ([address] 04/04/2019    Open wound of second toe of left foot 12/11/2018    Venous stasis ulcer of left lower extremity ([address] 11/26/2018    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 layer exposed ([address] 11/26/2018    Pain, neck 09/06/2017    Open wound of ankle 06/28/2017    Vitamin D deficiency 06/28/2017    Back pain 06/28/2017    CAFL (chronic airflow limitation) ([address] 06/28/2017    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    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    Difficult or painful urination 06/28/2017    Headache 06/28/2017    Hyperlipidemia 06/28/2017    Influenza 06/28/2017    Insomnia 06/28/2017    Breast lump 06/28/2017    Hay fever 06/28/2017    Decubitus ulcer of sacral region 06/28/2017    Current smoker 06/28/2017    Cobalamin deficiency 06/28/2017    Breakdown (mechanical) of cranial or spinal infusion catheter, initial encounter 10/25/2016    Encephalopathy 08/25/2016    Abnormal EKG     Hypokalemia 08/20/2016    Hypokalemia 08/20/2016    Chronic pain 08/19/2016    Hx of seizure disorder 08/19/2016    Pain in both upper arms 08/04/2016    Numbness 08/04/2016    Fatigue 08/04/2016    Weakness 08/04/2016    Peripheral vascular disease ([address] 02/01/2016     Updating Deprecated Diagnoses      Muscle spasticity Current Outpatient Medications   Medication Sig Dispense Refill    BACLOFEN, PAIN PUMP REFILL CHARGE, by Implant route continuous Indications: every 3 months      mupirocin (BACTROBAN) 2 % nasal ointment by Nasal route 2 times daily as needed Take by Nasal route 2 times daily.  DULoxetine (CYMBALTA) 30 MG extended release capsule Take 30 mg by mouth daily      levETIRAcetam (KEPPRA) 500 MG tablet Take 500 mg by mouth nightly      vitamin A 15874 units capsule Take 10,000 Units by mouth daily      b complex vitamins capsule Take 1 capsule by mouth daily      vitamin D (CHOLECALCIFEROL) 1000 UNIT TABS tablet Take 1,000 Units by mouth daily      vitamin C (ASCORBIC ACID) 500 MG tablet Take 1,000 mg by mouth daily      Multiple Vitamins-Minerals (THERAPEUTIC MULTIVITAMIN-MINERALS) tablet Take 1 tablet by mouth daily      Multiple Vitamins-Minerals (HAIR SKIN & NAILS ADVANCED PO) Take 1 tablet by mouth daily      methylphenidate (RITALIN) 20 MG tablet Take 1 tablet by mouth daily for 30 days. 30 tablet 0    HYDROcodone-acetaminophen (NORCO)  MG per tablet Take 1 tablet by mouth 3 times daily as needed for Pain for up to 30 days. Must last 30 days 90 tablet 0    PROAIR  (90 Base) MCG/ACT inhaler INHALE 1 PUFF INTO THE LUNGS EVERY 6 HOURS AS NEEDED FOR WHEEZING OR SHORTNESS OF BREATH 8.5 g 0    hydrochlorothiazide (HYDRODIURIL) 25 MG tablet Take 25 mg by mouth daily as needed      ondansetron (ZOFRAN) 4 MG tablet Take 1 tablet by mouth every 8 hours as needed for Nausea or Vomiting 15 tablet 0    fluconazole (DIFLUCAN) 100 MG tablet TAKE 1 TABLET DAILY FOR 3 DOSES AS NEEDED. (Patient taking differently: Take 100 mg by mouth daily as needed TAKE 1 TABLET DAILY FOR 3 DOSES AS NEEDED.) 3 tablet 0    oxybutynin (DITROPAN XL) 15 MG extended release tablet TAKE 1 TABLET BY MOUTH TWICE DAILY. (Patient taking differently: Take 15 mg by mouth 2 times daily TAKE 1 TABLET BY MOUTH TWICE DAILY. ) 60 tablet 5    pilocarpine (SALAGEN) 7.5 MG tablet TAKE 1 TABLET BY MOUTH THREE TIMES DAILY. (Patient taking differently: Take 7.5 mg by mouth 3 times daily TAKE 1 TABLET BY MOUTH THREE TIMES DAILY.) 270 tablet 0    levocetirizine (XYZAL) 5 MG tablet TAKE 1 TABLET IN THE EVENING (Patient taking differently: Take 5 mg by mouth nightly TAKE 1 TABLET IN THE EVENING) 90 tablet 3    tiZANidine (ZANAFLEX) 4 MG tablet TAKE 3 TABLETS TWICE DAILY (Patient taking differently: Take 12 mg by mouth 2 times daily TAKE 3 TABLETS TWICE DAILY) 180 tablet 5    glucose monitoring kit (FREESTYLE) monitoring kit 1 kit by Does not apply route daily 1 kit 0    blood glucose monitor strips Test 1 times a day & as needed for symptoms of irregular blood glucose. 100 strip 0    Lancets MISC 1 each by Does not apply route 2 times daily 100 each 5    nitrofurantoin (MACRODANTIN) 50 MG capsule Take 1 capsule by mouth daily (Patient taking differently: Take 50 mg by mouth nightly ) 90 capsule 3    silver sulfADIAZINE (SILVADENE) 1 % cream Apply topically daily. (Patient taking differently: daily as needed Apply topically daily.) 400 g 3    pregabalin (LYRICA) 300 MG capsule Take 1 capsule by mouth 2 times daily for 30 days. . 60 capsule 5    baclofen (LIORESAL) 10 MG tablet Take 1 tablet by mouth 2 times daily as needed (muscle spasms) 60 tablet 5    Sodium Chloride Flush (SALINE FLUSH) 0.9 % SOLN Infuse 20 mLs intravenously every 30 days Not every 30 days but every 4-6 weeks as need with 300 units of heparin to flush port for Infusion of Ocrevus for multiple sclerosis 20 mL 5    Heparin Lock Flush (HEPARIN FLUSH, 100 UNITS/ML,) 100 UNIT/ML injection 3 mLs by Intercatheter route every 30 days No every 30 days but every 4-6 weeks.  Flush port with 300 units heparin with 20 cc normal saline for ocrevus infusion for Multiple sclerosis 1 Syringe 5    ipratropium-albuterol (DUONEB) 0.5-2.5 (3) MG/3ML SOLN nebulizer solution USE 1 UNIT DOSE IN NEBULIZER EVERY 4 TO 6 HOURS AS NEEDED. (Patient taking differently: Take 1 vial by nebulization every 6 hours as needed USE 1 UNIT DOSE IN NEBULIZER EVERY 4 TO 6 HOURS AS NEEDED.) 450 mL 3    azelastine (ASTELIN) 0.1 % nasal spray 2 sprays by Nasal route 2 times daily Use in each nostril as directed 1 Bottle 3    lactulose (CHRONULAC) 10 GM/15ML solution Take 30 mLs by mouth 3 times daily (Patient taking differently: Take 20 g by mouth 2 times daily as needed ) 1 Bottle 5    Ocrelizumab (OCREVUS IV) Infuse intravenously      varenicline (CHANTIX STARTING MONTH PAK) 0.5 MG X 11 & 1 MG X 42 tablet See instructiions on starter pack 270 tablet 3     No current facility-administered medications for this encounter. Allergies: Darvocet [propoxyphene n-acetaminophen];  Morphine and related; Morphine hcl; and Propoxyphene    Past Medical History:   Diagnosis Date    Ankle wound     and toe wound    Asthma     Back pain 6/28/2017    CAFL (chronic airflow limitation) ([facility]) 6/28/2017    Hay fever 6/28/2017    Headache 6/28/2017    Hyperlipidemia 6/28/2017    MS (multiple sclerosis) ([facility])     Muscle spasticity     Neuropathic ulcer of left foot, limited to breakdown of skin ([address] 4/10/2017    [facility] disease [facility]     Seizure ([facility])     occ. related to ms    Type 2 diabetes mellitus with left diabetic foot ulcer ([facility] 4/4/2019     Past Surgical History:   Procedure Laterality Date    BACLOFEN PUMP IMPLANTATION      COLOSTOMY      FEMUR FRACTURE SURGERY      Right    FOOT AMPUTATION Left 4/4/2019    LEFT TRANSMET AMPUTATION performed by Saji López MD at [address]    OTHER SURGICAL HISTORY      pain pump    [facility] INSJ PRPH CTR VAD W/SUBQ PORT UNDER 5 YR N/A 6/26/2018    SINGLE LUMEN PORT PLACEMENT WITH FLUORO performed by Gabe Snyder MD at [facility] TOE AMPUTATION      L last toe    TONSILLECTOMY      URETEROTOMY Family History   Problem Relation Age of Onset    Cancer Mother         breast    Diabetes Father     High Blood Pressure Father     Cancer Paternal Aunt         breast    Heart Disease Paternal Grandmother      Social History     Tobacco Use    Smoking status: Current Some Day Smoker     Packs/day: 0.25     Types: Cigarettes     Last attempt to quit: 2018     Years since quittin.2    Smokeless tobacco: Never Used    Tobacco comment: 2 to 5 cigarettes   Substance Use Topics    Alcohol use: No       Review of Systems  Constitutional / general:  No fever / chills / sweats  Head:  No headache / neck stiffness  Eyes:  No blurry vision / itching / redness  ENT: No sore throat / ear pain  CV:  No chest pain / palpitations   Respiratory:  No cough / shortness of breath  GI: No nausea / vomiting   Neuro: No seizure / headache  Musculoskeletal:  No muscle pain  Vascular: No thrombosis  Heme / endocrine: No easy bruising / bleeding / heat or cold intolerance  Psychiatric:  No depression / anxiety / insomnia / mood changes  Skin:  No skin hair or nail changes  Wound: left trans met  All other review of systems are negative.     Physical Exam    /78   Pulse 95   Temp 97.3 °F (36.3 °C) (Temporal)   Resp 18   Ht 5' 9\" (1.753 m)   Wt 161 lb (73 kg)   BMI 23.78 kg/m²     General appearance: Appears stated age, cooperative and no distress  Head: Normocephalic, atraumatic  Eyes: conjunctivae/corneas clear  Ears: normal external ears, nares patent  Neck: no JVD,  trachea midline   Lungs: clear to auscultation bilaterally with symmetric expansion  Heart: regular rate rhythm   Abdomen: soft, non-tender; non-distended   Extremities: no sign of DVT  Lymphatic: No palpable lymph node enlargment  Neurologic: Alert and oriented X 3,  Psychiatric:  Thought content appropriate, normal insight, mood appropriate  Skin: left trans met    Post Debridement Measurements and Assessment:    Wound 19 Ankle Left;Lateral Wound 63. left lateral ankle Neuropathic Ulcer (Active)   Wound Image   4/17/2019  1:48 PM   Wound Traumatic 4/10/2019  3:28 PM   Dressing Status Old drainage; Intact 4/10/2019  3:28 PM   Dressing Changed Changed/New 3/12/2019  4:15 PM   Dressing/Treatment Hydrofera blue 3/12/2019  4:15 PM   Wound Cleansed Rinsed/Irrigated with saline 4/10/2019  3:28 PM   Wound Length (cm) 0 cm 4/17/2019  1:48 PM   Wound Width (cm) 0 cm 4/17/2019  1:48 PM   Wound Depth (cm) 0 cm 4/17/2019  1:48 PM   Wound Surface Area (cm^2) 0 cm^2 4/17/2019  1:48 PM   Change in Wound Size % (l*w) 100 4/17/2019  1:48 PM   Wound Volume (cm^3) 0 cm^3 4/17/2019  1:48 PM   Wound Healing % 100 4/17/2019  1:48 PM   Post-Procedure Length (cm) 0.5 cm 3/12/2019  3:34 PM   Post-Procedure Width (cm) 0.3 cm 3/12/2019  3:34 PM   Post-Procedure Depth (cm) 0.1 cm 3/12/2019  3:34 PM   Post-Procedure Surface Area (cm^2) 0.15 cm^2 3/12/2019  3:34 PM   Post-Procedure Volume (cm^3) 0.02 cm^3 3/12/2019  3:34 PM   Distance Tunneling (cm) 0 cm 3/26/2019  2:09 PM   Tunneling Position ___ O'Clock 0 3/26/2019  2:09 PM   Undermining Starts ___ O'Clock 0 3/26/2019  2:09 PM   Undermining Ends___ O'Clock 0 3/26/2019  2:09 PM   Undermining Maxium Distance (cm) 0 3/26/2019  2:09 PM   Wound Assessment Granulation tissue;Pink;Red;Slough; Yellow 4/10/2019  3:28 PM   Drainage Amount Small 4/10/2019  3:28 PM   Drainage Description Serosanguinous 4/10/2019  3:28 PM   Odor None 4/10/2019  3:28 PM   Margins Attached edges 4/10/2019  3:28 PM   Exposed structure Fascia 3/26/2019  2:09 PM   Audelia-wound Assessment Dry; Intact 4/10/2019  3:28 PM   Non-staged Wound Description Full thickness 4/10/2019  3:28 PM   [place]%Wound Bed 45 4/10/2019  3:28 PM   Red%Wound Bed 45 4/10/2019  3:28 PM   Yellow%Wound Bed 10 4/10/2019  3:28 PM   Black%Wound Bed 0 3/26/2019  2:09 PM   Purple%Wound Bed 0 3/26/2019  2:09 PM   Other%Wound Bed 0 1/31/2019  2:04 PM   Culture Taken No 3/12/2019  2:59 PM Number of days: 98      Please refer to nursing measurements and assessment regarding wound pre and post debridement. Procedure Note:  Wound scrubbed with saline and gauze to remove biofilm       Risk factors for atherosclerosis of all vascular beds have been reviewed with the patient including:  Family history, tobacco abuse in all forms, elevated cholesterol, hyperlipidemia, and diabetes. Assessment    1. Venous stasis ulcer of left lower extremity ([address]    2. 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 layer exposed ([address]    3. S/P transmetatarsal amputation of foot, left ([address]        Plan for wound - Dress per physician order  Treatment:     Compression : Yes   Offloading : Yes    Dressing : SEE AVS Discharge Instructions    Plan  Dressings to promote moist wound healing   Patient examined by Dr. Hope Gee and myself. Ok to remove every other suture. 1. Nurse visit one week   2. Follow up 2 weeks     I spent a total of 45 minutes face to face with the patient. Over 50% of that time was spent on counseling and care coordination. Patient was told that if symptoms worsen or new symptoms develop they are to go to the emergency department immediately. The patient was educated on care and need for routine wound care follow-up. Patient educated that any labs, tests or imaging ordered today will be reviewed at next wound care follow up appointment. Strict return instructions including red flag signs and symptoms were discussed with the patient. Patient was educated on diagnosis and treatment plan. Medications for discharge discussed, and adverse effects reviewed. Questions invited and answered. Patient shows understanding of the discharge information and agrees to follow-up. Discussed appropriate home care of this wound. Wound re-dressed, using teach back method. Patient instructions were given.   Recommend no smoking  Offloading instructions given  Encourage to follow up routine medical management with PCP     Discharge Instructions       Visit Discharge/Physician Orders    Discharge condition: Stable  Discharge to: Home  Left via:Private automobile  Accompanied by:  ECF/HHA:    Dressing Orders:   Left trans met: Adaptic to incision line, Copa, Coflex  Elevate legs to above the level of your heart for 30-40 minutes at least 3 times a week    Treatment Orders:  Every other suture out   Keep Dressing Dry and Intact until next visit  Smoking cessation with PCP     [facility] followup visit:     Wednesday April 17th Nurse Visit   Wednesday April 24th Dr. Jacky Hector     (Please note your next appointment above and if you are unable to keep, kindly give a 24 hour notice. Thank you.)  If you experience any of the following, please call the [facility] during business hours:  * Increase in Pain  * Temperature over 101  * Increase in drainage from your wound  * Drainage with a foul odor  * Bleeding  * Increase in swelling  * Need for compression bandage changes due to slippage, breakthrough drainage. If you need medical attention outside of the business hours of the [address] please contact your PCP or go  to the nearest emergency room.

## 2019-04-18 ENCOUNTER — CARE COORDINATION (OUTPATIENT)
Dept: CASE MANAGEMENT | Age: 49
End: 2019-04-18

## 2019-04-18 NOTE — CARE COORDINATION
[facility] Transitions Follow Up Call    2019    Patient: Jayson Story  Patient : 1970   MRN: [id number]  Reason for Admission:   Discharge Date: 19 RARS: Readmission Risk Score: 12         Spoke with: [address] Transitions Subsequent and Final Call    Subsequent and Final Calls  Do you have any ongoing symptoms?:  No  Have your medications changed?:  Yes  Patient Reports:  see CC note  Do you have any questions related to your medications?:  No  Do you currently have any active services?:  No  Do you have any needs or concerns that I can assist you with?:  No  Identified Barriers:  None  Care Transitions Interventions  Other Interventions: Follow Up : Spoke with patient and she states she is doing well. She has had follow up this week with Dr. Laura Tate and wound care. She states that half of her stitches were taken out, every other one. She says she may get those out next week. Dr. Laura Tate says everything is healing good. She says her ankle wound is totally closed. She did obtain a script for Chantix and has stopped smoking. She states she started because she was worrying about the wound on her foot. She also got a sterile container to get a urine to submit to Dr. Arthur Callahan office. She has no complaints, or problems today. Will discharge from [facility] services at this time. Instructed patient to call prn with any issues or concerns.    Future Appointments   Date Time Provider Maurizio Hawkins   2019  2:30 PM Kaden Alonso MD [facility]   2019  9:15 AM Kaden Alonso MD [facility]   2019  1:50 PM EFREN Plata [facility]   2019  2:45 PM Gloria Adams MD [facility]   2019  1:00 PM Courtney Muhammad MD [facility]       Josiah Phillips RN

## 2019-04-22 DIAGNOSIS — M54.2 PAIN, NECK: ICD-10-CM

## 2019-04-22 DIAGNOSIS — M62.838 MUSCLE SPASTICITY: ICD-10-CM

## 2019-04-22 DIAGNOSIS — G35 MS (MULTIPLE SCLEROSIS) (HCC): ICD-10-CM

## 2019-04-22 DIAGNOSIS — M79.621 PAIN IN BOTH UPPER ARMS: ICD-10-CM

## 2019-04-22 DIAGNOSIS — M79.622 PAIN IN BOTH UPPER ARMS: ICD-10-CM

## 2019-04-22 DIAGNOSIS — R53.83 OTHER FATIGUE: ICD-10-CM

## 2019-04-22 RX ORDER — PREGABALIN 300 MG/1
300 CAPSULE ORAL 2 TIMES DAILY
Qty: 60 CAPSULE | Refills: 5 | Status: SHIPPED | OUTPATIENT
Start: 2019-04-22 | End: 2019-12-31 | Stop reason: SDUPTHER

## 2019-04-22 RX ORDER — HYDROCODONE BITARTRATE AND ACETAMINOPHEN 10; 325 MG/1; MG/1
1 TABLET ORAL 3 TIMES DAILY PRN
Qty: 90 TABLET | Refills: 0 | Status: SHIPPED | OUTPATIENT
Start: 2019-04-22 | End: 2019-05-17 | Stop reason: SDUPTHER

## 2019-04-22 RX ORDER — METHYLPHENIDATE HYDROCHLORIDE 20 MG/1
20 TABLET ORAL DAILY
Qty: 30 TABLET | Refills: 0 | Status: SHIPPED | OUTPATIENT
Start: 2019-04-22 | End: 2019-05-17 | Stop reason: SDUPTHER

## 2019-04-24 ENCOUNTER — HOSPITAL ENCOUNTER (OUTPATIENT)
Dept: WOUND CARE | Age: 49
Discharge: HOME | End: 2019-04-24
Payer: MEDICARE

## 2019-04-24 VITALS
RESPIRATION RATE: 16 BRPM | SYSTOLIC BLOOD PRESSURE: 106 MMHG | WEIGHT: 161 LBS | TEMPERATURE: 98 F | DIASTOLIC BLOOD PRESSURE: 78 MMHG | HEIGHT: 69 IN | BODY MASS INDEX: 23.85 KG/M2 | HEART RATE: 80 BPM

## 2019-04-24 DIAGNOSIS — Z89.432 S/P TRANSMETATARSAL AMPUTATION OF FOOT, LEFT (HCC): ICD-10-CM

## 2019-04-24 DIAGNOSIS — L97.812 NON-PRESSURE CHRONIC ULCER OF OTHER PART OF RIGHT LOWER LEG WITH FAT LAYER EXPOSED (HCC): ICD-10-CM

## 2019-04-24 DIAGNOSIS — N39.0 URINARY TRACT INFECTION WITHOUT HEMATURIA, SITE UNSPECIFIED: ICD-10-CM

## 2019-04-24 DIAGNOSIS — L97.929 VENOUS STASIS ULCER OF LEFT LOWER EXTREMITY (HCC): ICD-10-CM

## 2019-04-24 DIAGNOSIS — I83.029 VENOUS STASIS ULCER OF LEFT LOWER EXTREMITY (HCC): ICD-10-CM

## 2019-04-24 LAB
BACTERIA: ABNORMAL /HPF
BILIRUBIN URINE: NEGATIVE
BLOOD, URINE: NEGATIVE
CLARITY: ABNORMAL
COLOR: YELLOW
EPITHELIAL CELLS, UA: ABNORMAL /HPF
GLUCOSE URINE: NEGATIVE MG/DL
KETONES, URINE: NEGATIVE MG/DL
LEUKOCYTE ESTERASE, URINE: ABNORMAL
NITRITE, URINE: POSITIVE
PH UA: 7 (ref 5–8)
PROTEIN UA: 30 MG/DL
RBC UA: ABNORMAL /HPF (ref 0–2)
SPECIFIC GRAVITY UA: 1.01 (ref 1–1.03)
URINE REFLEX TO CULTURE: YES
UROBILINOGEN, URINE: 0.2 E.U./DL
WBC UA: ABNORMAL /HPF (ref 0–5)

## 2019-04-24 PROCEDURE — 29580 STRAPPING UNNA BOOT: CPT

## 2019-04-24 NOTE — PLAN OF CARE
[employer] Application   Below Knee    NAME:  Alyce Baker  YOB: 1970  MEDICAL RECORD NUMBER:  [id number]  DATE:  4/24/2019     [x] Applied moisturizing agent to dry skin as needed.  [x] Appied primary and secondary dressing as ordered     [x] Applied Unna roll from toes to knee overlapping each time.  [x] Applied ace wrap or coban from toes to below the knee. Fabiana Avelar [x] Instructed patient/caregiver to keep dressing dry and intact. DO NOT REMOVE DRESSING.  [x] Instructed pt/family/caregiver to report excessive draining, loose bandage, wet dressing, severe pain or tingling in toes.  [x] Applied [employer] dressing below the knee to left lower leg(s)        Unna Boot(s) were applied per  Guidelines.      Electronically signed by Ida Salas RN on 4/24/2019 at 2:59 PM

## 2019-04-25 ENCOUNTER — TELEPHONE (OUTPATIENT)
Dept: INTERNAL MEDICINE | Age: 49
End: 2019-04-25

## 2019-04-25 RX ORDER — LEVOFLOXACIN 750 MG/1
750 TABLET ORAL DAILY
Qty: 10 TABLET | Refills: 0 | Status: SHIPPED | OUTPATIENT
Start: 2019-04-25 | End: 2019-04-29 | Stop reason: ALTCHOICE

## 2019-04-25 NOTE — TELEPHONE ENCOUNTER
Notes recorded by Jaycob Reese on 4/25/2019 at 12:39 PM CDT  I spoke with patient about results and she voiced understanding. I will send her script to [address] She will take all of this unless we call her back to change it, after we get the culture.  ------    Notes recorded by Perfecto Bush MD on 4/25/2019 at 5:56 AM CDT  She has a UTI.  Levaquin 750 mg po daily for 10 days

## 2019-04-29 ENCOUNTER — TELEPHONE (OUTPATIENT)
Dept: INTERNAL MEDICINE | Age: 49
End: 2019-04-29

## 2019-04-29 LAB
ORGANISM: ABNORMAL
URINE CULTURE, ROUTINE: ABNORMAL

## 2019-04-29 RX ORDER — CEFDINIR 300 MG/1
300 CAPSULE ORAL 2 TIMES DAILY
Qty: 20 CAPSULE | Refills: 0 | Status: SHIPPED | OUTPATIENT
Start: 2019-04-29 | End: 2019-05-09

## 2019-04-29 NOTE — TELEPHONE ENCOUNTER
----- Message from Saul Gold MD sent at 4/28/2019  8:15 AM CDT -----  She is growing out three different bacteria in her urine.  Change antibiotic to Omnicef 300 mg po BID for 10 days

## 2019-04-30 NOTE — DISCHARGE INSTR - COC
Continuity of Care Form    Patient Name: Roman Reyna   :  1970  MRN:  [id number]    Admit date:  (Not on file)  Discharge date:  ***    Code Status Order: Prior   Advance Directives:     Admitting Physician:  No admitting provider for patient encounter. PCP: Kody Lai MD    Discharging Nurse: [facility] Unit/Room#: No information available for this encounter.   Discharging Unit Phone Number: ***    Emergency Contact:   Extended Emergency Contact Information  Primary Emergency Contact: Adam Kellogg  Address: [address] [place], [address] Phone: [phone]  Relation: Other  Secondary Emergency Contact: Surinder Dumont  [facility]           [facility] [address] Phone: [phone]  Relation: Child    Past Surgical History:  Past Surgical History:   Procedure Laterality Date    BACLOFEN PUMP IMPLANTATION      COLOSTOMY      FEMUR FRACTURE SURGERY      Right    FOOT AMPUTATION Left 2019    LEFT TRANSMET AMPUTATION performed by Kishan Bentley MD at [address]      urostomy    OTHER SURGICAL HISTORY      pain pump    VA INSJ PRPH CTR VAD W/SUBQ PORT UNDER 5 YR N/A 2018    SINGLE LUMEN PORT PLACEMENT WITH FLUORO performed by Chuck Moore MD at [address] TOE AMPUTATION      L last toe    TONSILLECTOMY      URETEROTOMY         Immunization History:   Immunization History   Administered Date(s) Administered    Influenza Virus Vaccine 2015    Influenza, High Dose (Fluzone 65 yrs and older) 2015    Influenza, Luisito Srivastava, 6 mo and older, IM, PF (Flulaval, Fluarix) 11/15/2018    Pneumococcal Polysaccharide ([id number]) 10/09/2014, 2017    Tdap (Boostrix, Adacel) 2017       Active Problems:  Patient Active Problem List   Diagnosis Code    Muscle spasticity M62.838    Peripheral vascular disease ([facility] I73.9    MS (multiple sclerosis) ([facility] G35    Pain in both upper arms M79.621, M79.622    Numbness R20.0    Fatigue R53.83    Weakness R53.1    Sepsis ([facility]) A41.9    Chronic pain G89.29    Hx of seizure disorder Z86.69    Hypokalemia E87.6    Hypokalemia E87.6    Abnormal EKG R94.31    Encephalopathy G93.40    Open wound of ankle S91.009A    Vitamin D deficiency E55.9    Back pain M54.9    Breakdown (mechanical) of cranial or spinal infusion catheter, initial encounter T85.610A    CAFL (chronic airflow limitation) ([facility]) J44.9    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    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    Difficult or painful urination R30.0    Headache R51    Hyperlipidemia E78.5    Influenza J11.1    Insomnia G47.00    Breast lump N63.0    Hay fever J30.1    Decubitus ulcer of sacral region L89.159    Current smoker F17.200    Cobalamin deficiency E53.8    Pain, neck M54.2    Venous stasis ulcer of left lower extremity ([facility]) I83.029, L97.929    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 layer exposed ([facility] L97.812    Open wound of second toe of left foot S91.105A    Type 2 diabetes mellitus with left diabetic foot ulcer ([facility]) E11.621, L97.529    S/P transmetatarsal amputation of foot, left ([facility]) K27.037       Isolation/Infection:   Isolation          No Isolation        Patient Infection Status     Infection Encounter Level? Onset Date Added Added By Resolved Resolved By Review Date    MDRO (multi-drug resistant organism) No  08/22/16 Sotero Perez             Nurse Assessment:  Last Vital Signs: There were no vitals taken for this visit.     Last documented pain score (0-10 scale):    Last Weight:   Wt Readings from Last 1 Encounters:   04/24/19 161 lb (73 kg)     Mental Status:  {IP PT MENTAL STATUS:20030}    IV Access:  {[facility] IV ACCESS:174097074}    Nursing Mobility/ADLs:  Walking   {P DME JXYW:357094245}  Transfer  {P DME VZBO:681195470}  Bathing  {P DME LPXU:259067908}  Dressing  {CHP DME NPMN:468674178}  Toileting  {CHP DME MWCN:930702349}  Feeding  {CHP DME JPGY:680476355}  Med Admin  {CHP DME KNXR:815800955}  Med Delivery   { [facility] MED Delivery:844876764}    Wound Care Documentation and Therapy:        Elimination:  Continence:   · Bowel: {YES / MI:39604}  · Bladder: {YES / AE:49566}  Urinary Catheter: {Urinary Catheter:443505500}   Colostomy/Ileostomy/Ileal Conduit: {YES / BM:36407}       Date of Last BM: ***  No intake or output data in the 24 hours ending 19 1645  No intake/output data recorded.     Safety Concerns:     [employer] Safety Concerns:502388565}    Impairments/Disabilities:      [employer] Impairments/Disabilities:239193379}    Nutrition Therapy:  Current Nutrition Therapy:   [employer] Diet List:423440542}    Routes of Feeding: {CHP DME Other Feedings:929085869}  Liquids: {Slp liquid thickness:12423}  Daily Fluid Restriction: {CHP DME Yes amt example:600653225}  Last Modified Barium Swallow with Video (Video Swallowing Test): {Done Not Done VOOX:319246796}    Treatments at the Time of Hospital Discharge:   Respiratory Treatments: ***  Oxygen Therapy:  {Therapy; copd oxygen:71308}  Ventilator:    { CC Vent HDSR:742437665}    Rehab Therapies: {THERAPEUTIC INTERVENTION:5905744435}  Weight Bearing Status/Restrictions: [employer] Weight Bearin}  Other Medical Equipment (for information only, NOT a DME order):  {EQUIPMENT:312131166}  Other Treatments: ***    Patient's personal belongings (please select all that are sent with patient):  {[facility] DME Belongings:150032357}    RN SIGNATURE:  {Esignature:689730108}    CASE MANAGEMENT/SOCIAL WORK SECTION    Inpatient Status Date: ***    Readmission Risk Assessment Score:  Readmission Risk              Risk of Unplanned Readmission:        0           Discharging to Facility/ Agency   · Name:   · Address:  · Phone:  · Fax:    Dialysis Facility (if applicable)   · Name:  · Address:  · Dialysis Schedule:  · Phone:  · Fax:    / signature: {Esignature:293884677}    PHYSICIAN SECTION    Prognosis: {Prognosis:8657276828}    Condition at Discharge: Jenae Pierce Patient Condition:121938680}    Rehab Potential (if transferring to Rehab): {Prognosis:3509062817}    Recommended Labs or Other Treatments After Discharge: ***    Physician Certification: I certify the above information and transfer of Maureen Minaya  is necessary for the continuing treatment of the diagnosis listed and that she requires {Admit to Appropriate Level of Care:21778} for {GREATER/LESS:735734036} 30 days.      Update Admission H&P: {CHP DME Changes in ANRYA:612422572}    PHYSICIAN SIGNATURE:  {Esignature:020139700}

## 2019-05-01 ENCOUNTER — HOSPITAL ENCOUNTER (OUTPATIENT)
Dept: WOUND CARE | Age: 49
Discharge: HOME | End: 2019-05-01
Payer: MEDICARE

## 2019-05-01 VITALS
TEMPERATURE: 99.7 F | RESPIRATION RATE: 16 BRPM | WEIGHT: 161 LBS | SYSTOLIC BLOOD PRESSURE: 115 MMHG | BODY MASS INDEX: 23.85 KG/M2 | HEIGHT: 69 IN | HEART RATE: 77 BPM | DIASTOLIC BLOOD PRESSURE: 81 MMHG

## 2019-05-01 DIAGNOSIS — L97.929 VENOUS STASIS ULCER OF LEFT LOWER EXTREMITY (HCC): ICD-10-CM

## 2019-05-01 DIAGNOSIS — I83.029 VENOUS STASIS ULCER OF LEFT LOWER EXTREMITY (HCC): ICD-10-CM

## 2019-05-01 DIAGNOSIS — Z89.432 S/P TRANSMETATARSAL AMPUTATION OF FOOT, LEFT (HCC): ICD-10-CM

## 2019-05-01 DIAGNOSIS — L97.812 NON-PRESSURE CHRONIC ULCER OF OTHER PART OF RIGHT LOWER LEG WITH FAT LAYER EXPOSED (HCC): ICD-10-CM

## 2019-05-01 PROCEDURE — 97597 DBRDMT OPN WND 1ST 20 CM/<: CPT

## 2019-05-01 PROCEDURE — 97597 DBRDMT OPN WND 1ST 20 CM/<: CPT | Performed by: SURGERY

## 2019-05-01 ASSESSMENT — PAIN DESCRIPTION - PAIN TYPE: TYPE: ACUTE PAIN

## 2019-05-01 ASSESSMENT — PAIN SCALES - GENERAL: PAINLEVEL_OUTOF10: 0

## 2019-05-01 ASSESSMENT — PAIN DESCRIPTION - PROGRESSION: CLINICAL_PROGRESSION: NOT CHANGED

## 2019-05-01 NOTE — PROGRESS NOTES
[facility]   Progress Note and Procedure Note      Alyce Baker  AGE: 51 y.o. GENDER: female  : 1970  TODAY'S DATE:  2019    Subjective:   CC- Left foot wound sp TMA, left lateral ankle wound (Not connected to PO period). TMA 4-4-19 POD-26  Left lateral ankle wound  HISTORY of PRESENT ILLNESS HPI   Tee Estrada is a 51 y.o. female who presents today for wound evaluation.     Wound Type:pressure and neuropathic  Wound Location:left ankle(s): lateral  Modifying factors:chronic pressure and shear force    Patient Active Problem List   Diagnosis Code    Muscle spasticity M62.838    Peripheral vascular disease ([facility]) I73.9    MS (multiple sclerosis) ([facility]) G35    Pain in both upper arms M79.621, M79.622    Numbness R20.0    Fatigue R53.83    Weakness R53.1    Sepsis ([facility]) A41.9    Chronic pain G89.29    Hx of seizure disorder Z86.69    Hypokalemia E87.6    Hypokalemia E87.6    Abnormal EKG R94.31    Encephalopathy G93.40    Open wound of ankle S91.009A    Vitamin D deficiency E55.9    Back pain M54.9    Breakdown (mechanical) of cranial or spinal infusion catheter, initial encounter T85.610A    CAFL (chronic airflow limitation) ([facility]) J44.9    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    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    Difficult or painful urination R30.0    Headache R51    Hyperlipidemia E78.5    Influenza J11.1    Insomnia G47.00    Breast lump N63.0    Hay fever J30.1    Decubitus ulcer of sacral region L89.159    Current smoker F17.200    Cobalamin deficiency E53.8    Pain, neck M54.2    Venous stasis ulcer of left lower extremity ([facility]) I83.029, L97.929    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 layer exposed ([address] L97.812    Open wound of second toe of left foot S91.105A    Type 2 diabetes mellitus with left diabetic foot ulcer ([facility]) E11.621, L97.529    S/P transmetatarsal amputation of foot, left ([address] S21.569 ALLERGIES    Allergies   Allergen Reactions    Darvocet [Propoxyphene N-Acetaminophen]     Morphine And Related Swelling    Morphine Hcl     Propoxyphene Swelling       Incision 04/05/19 Foot Left (Active)   Wound Image   5/1/2019  1:20 PM   Wound Assessment Dry; Intact 5/1/2019  1:20 PM   Wound Length (cm) 0.1 cm 5/1/2019  1:20 PM   Wound Width (cm) 10 cm 5/1/2019  1:20 PM   Wound Depth (cm) 0.1 cm 5/1/2019  1:20 PM   Wound Volume (cm^3) 0.1 cm^3 5/1/2019  1:20 PM   Wound Healing % 0 5/1/2019  1:20 PM   Closure Sutures 4/17/2019  1:48 PM   Drainage Amount Scant 4/24/2019  3:33 PM   Drainage Description Serosanguinous 4/24/2019  3:33 PM   Odor None 4/24/2019  3:33 PM   Dressing Changed Changed/New 4/10/2019  3:28 PM   Dressing Status Old drainage; Intact 4/10/2019  3:28 PM   Number of days: 25       Objective:      /81   Pulse 77   Temp 99.7 °F (37.6 °C) (Temporal)   Resp 16   Ht 5' 9\" (1.753 m)   Wt 161 lb (73 kg)   BMI 23.78 kg/m²     Post Debridement Measurements and Assessment:  Wound 01/08/19 Ankle Left;Lateral Wound 63.   left lateral ankle Neuropathic Ulcer (Active)   Wound Image   5/1/2019  1:20 PM   Wound Traumatic 5/1/2019  1:20 PM   Dressing Status Old drainage 5/1/2019  1:20 PM   Dressing Changed Changed/New 3/12/2019  4:15 PM   Dressing/Treatment Hydrofera blue 3/12/2019  4:15 PM   Wound Cleansed Rinsed/Irrigated with saline 5/1/2019  1:20 PM   Wound Length (cm) 1 cm 5/1/2019  1:20 PM   Wound Width (cm) 0.6 cm 5/1/2019  1:20 PM   Wound Depth (cm) 0.1 cm 5/1/2019  1:20 PM   Wound Surface Area (cm^2) 0.6 cm^2 5/1/2019  1:20 PM   Change in Wound Size % (l*w) 40 5/1/2019  1:20 PM   Wound Volume (cm^3) 0.06 cm^3 5/1/2019  1:20 PM   Wound Healing % 40 5/1/2019  1:20 PM   Post-Procedure Length (cm) 0.5 cm 3/12/2019  3:34 PM   Post-Procedure Width (cm) 0.3 cm 3/12/2019  3:34 PM   Post-Procedure Depth (cm) 0.1 cm 3/12/2019  3:34 PM   Post-Procedure Surface Area (cm^2) 0.15 cm^2 3/12/2019  3:34 5/1/2019  1:20 PM   Margins Defined edges 5/1/2019  1:20 PM   Non-staged Wound Description Full thickness 5/1/2019  1:20 PM   [place]%Wound Bed 50 5/1/2019  1:20 PM   Red%Wound Bed 50 5/1/2019  1:20 PM   Yellow%Wound Bed 0 5/1/2019  1:20 PM   Black%Wound Bed 0 5/1/2019  1:20 PM   Purple%Wound Bed 0 5/1/2019  1:20 PM   Number of days: 0          The patients pain isPain Level: 0 Pain Type: Acute pain. Wound(s) has worsened slightly. Please refer to nursing measurements and assessment regarding wound pre and post debridement. Procedure Note:    Wound #: 63 Left lateral ankle wound, (Note this is problem not in PO period)  Debridement: Non-excisional Debridement    Anesthetic:    Using curette the wound was sharply debrided    down through and including the removal of epidermis and dermis. Total Surface Area Debrided:  2 sq cm   Devitalized Tissue Debrided:  fibrin and biofilm    Percent of Wound Debrided: 100%      Bleeding: None    Hemostasis:   not needed      Response to treatment:  Well tolerated by patient.       Assessment:      Patient Active Problem List   Diagnosis    Muscle spasticity    Peripheral vascular disease ([address]    MS [facility] sclerosis) ([facility]    Pain in both upper arms    Numbness    Fatigue    Weakness    Sepsis ([address]    Chronic pain    Hx of seizure disorder    Hypokalemia    Hypokalemia    Abnormal EKG    Encephalopathy    Open wound of ankle    Vitamin D deficiency    Back pain    Breakdown (mechanical) of cranial or spinal infusion catheter, initial encounter    CAFL (chronic airflow limitation) ([facility])    Binocular vision disorder with diplopia    Aptyalism    Difficult or painful urination    Headache    Hyperlipidemia    Influenza    Insomnia    Breast lump    Hay fever    Decubitus ulcer of sacral region    Current smoker    Cobalamin deficiency    Pain, neck    Venous stasis ulcer of left lower extremity (HCC)    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 layer exposed ([address]    Open wound of second toe of left foot    Type 2 diabetes mellitus with left diabetic foot ulcer ([address]    S/P transmetatarsal amputation of foot, left ([facility])      Assessment- small open area on medial aspect of TMA incision- I debrided out a vicryl suture- most likely in process of \"spitting out\" suture-should heal will use gel. Ankle wound - will use Hydroferra blue. Plan:          Plan for wound - Dress per physician order  Treatment:     Compression : No   Offloading : Yes   Dressing : see avs   Additional Therapy :      1. Discussed appropriate home care of this wound. Wound redressed. 2. Patient instructions were given. 3. Follow up: 2 week(s).   Electronically signed by Parrish Jauregui MD on 5/1/2019 at 2:17 PM

## 2019-05-02 ENCOUNTER — OFFICE VISIT (OUTPATIENT)
Dept: INTERNAL MEDICINE | Age: 49
End: 2019-05-02
Payer: MEDICARE

## 2019-05-02 ENCOUNTER — OFFICE VISIT (OUTPATIENT)
Dept: GASTROENTEROLOGY | Age: 49
End: 2019-05-02
Payer: MEDICARE

## 2019-05-02 VITALS
DIASTOLIC BLOOD PRESSURE: 72 MMHG | RESPIRATION RATE: 20 BRPM | OXYGEN SATURATION: 98 % | HEART RATE: 69 BPM | SYSTOLIC BLOOD PRESSURE: 102 MMHG

## 2019-05-02 VITALS
HEART RATE: 73 BPM | SYSTOLIC BLOOD PRESSURE: 120 MMHG | HEIGHT: 69 IN | WEIGHT: 161 LBS | OXYGEN SATURATION: 98 % | BODY MASS INDEX: 23.85 KG/M2 | DIASTOLIC BLOOD PRESSURE: 70 MMHG

## 2019-05-02 DIAGNOSIS — G35 MULTIPLE SCLEROSIS (HCC): ICD-10-CM

## 2019-05-02 DIAGNOSIS — Z12.11 ENCOUNTER FOR SCREENING COLONOSCOPY: Primary | ICD-10-CM

## 2019-05-02 DIAGNOSIS — E55.9 VITAMIN D DEFICIENCY: ICD-10-CM

## 2019-05-02 DIAGNOSIS — Z28.39 DELINQUENT IMMUNIZATION STATUS: ICD-10-CM

## 2019-05-02 DIAGNOSIS — F32.89 OTHER DEPRESSION: Primary | ICD-10-CM

## 2019-05-02 DIAGNOSIS — E78.5 HYPERLIPIDEMIA, UNSPECIFIED HYPERLIPIDEMIA TYPE: ICD-10-CM

## 2019-05-02 DIAGNOSIS — R53.83 OTHER FATIGUE: ICD-10-CM

## 2019-05-02 DIAGNOSIS — R73.9 HYPERGLYCEMIA: ICD-10-CM

## 2019-05-02 DIAGNOSIS — Z80.0 FAMILY HISTORY OF COLON CANCER: ICD-10-CM

## 2019-05-02 DIAGNOSIS — Z93.3 COLOSTOMY IN PLACE (HCC): ICD-10-CM

## 2019-05-02 DIAGNOSIS — E03.9 HYPOTHYROIDISM, UNSPECIFIED TYPE: ICD-10-CM

## 2019-05-02 DIAGNOSIS — K59.09 CHRONIC CONSTIPATION: ICD-10-CM

## 2019-05-02 DIAGNOSIS — S91.332S PENETRATING WOUND OF LEFT FOOT, SEQUELA: ICD-10-CM

## 2019-05-02 LAB
ALBUMIN SERPL-MCNC: 4.6 G/DL (ref 3.5–5.2)
ALP BLD-CCNC: 77 U/L (ref 35–104)
ALT SERPL-CCNC: 13 U/L (ref 5–33)
AST SERPL-CCNC: 11 U/L (ref 5–32)
BILIRUB SERPL-MCNC: <0.2 MG/DL (ref 0.2–1.2)
BILIRUBIN DIRECT: 0.1 MG/DL (ref 0–0.3)
BILIRUBIN, INDIRECT: 0.1 MG/DL (ref 0.1–1)
HBA1C MFR BLD: 4.9 % (ref 4–6)
RUBELLA ANTIBODY IGG: REACTIVE
TOTAL PROTEIN: 7 G/DL (ref 6.6–8.7)
TSH SERPL DL<=0.05 MIU/L-ACNC: 2.27 UIU/ML (ref 0.27–4.2)
VITAMIN B-12: 387 PG/ML (ref 211–946)
VITAMIN D 25-HYDROXY: 33.6 NG/ML

## 2019-05-02 PROCEDURE — 1111F DSCHRG MED/CURRENT MED MERGE: CPT | Performed by: NURSE PRACTITIONER

## 2019-05-02 PROCEDURE — G8420 CALC BMI NORM PARAMETERS: HCPCS | Performed by: INTERNAL MEDICINE

## 2019-05-02 PROCEDURE — 1036F TOBACCO NON-USER: CPT | Performed by: INTERNAL MEDICINE

## 2019-05-02 PROCEDURE — 1111F DSCHRG MED/CURRENT MED MERGE: CPT | Performed by: INTERNAL MEDICINE

## 2019-05-02 PROCEDURE — G8598 ASA/ANTIPLAT THER USED: HCPCS | Performed by: INTERNAL MEDICINE

## 2019-05-02 PROCEDURE — G8598 ASA/ANTIPLAT THER USED: HCPCS | Performed by: NURSE PRACTITIONER

## 2019-05-02 PROCEDURE — 99213 OFFICE O/P EST LOW 20 MIN: CPT | Performed by: NURSE PRACTITIONER

## 2019-05-02 PROCEDURE — 1036F TOBACCO NON-USER: CPT | Performed by: NURSE PRACTITIONER

## 2019-05-02 PROCEDURE — G8427 DOCREV CUR MEDS BY ELIG CLIN: HCPCS | Performed by: INTERNAL MEDICINE

## 2019-05-02 PROCEDURE — G8420 CALC BMI NORM PARAMETERS: HCPCS | Performed by: NURSE PRACTITIONER

## 2019-05-02 PROCEDURE — G8427 DOCREV CUR MEDS BY ELIG CLIN: HCPCS | Performed by: NURSE PRACTITIONER

## 2019-05-02 PROCEDURE — 99214 OFFICE O/P EST MOD 30 MIN: CPT | Performed by: INTERNAL MEDICINE

## 2019-05-02 RX ORDER — HYDROCHLOROTHIAZIDE 25 MG/1
25 TABLET ORAL DAILY PRN
Qty: 90 TABLET | Refills: 3 | Status: SHIPPED | OUTPATIENT
Start: 2019-05-02 | End: 2020-06-22

## 2019-05-02 RX ORDER — VARENICLINE TARTRATE 1 MG/1
1 TABLET, FILM COATED ORAL 2 TIMES DAILY
Qty: 60 TABLET | Refills: 5 | Status: SHIPPED | OUTPATIENT
Start: 2019-05-02 | End: 2019-11-01 | Stop reason: SDUPTHER

## 2019-05-02 RX ORDER — PILOCARPINE HYDROCHLORIDE 7.5 MG/1
TABLET, FILM COATED ORAL
Qty: 270 TABLET | Refills: 3 | Status: SHIPPED | OUTPATIENT
Start: 2019-05-02 | End: 2020-05-01

## 2019-05-02 RX ORDER — TIZANIDINE 4 MG/1
TABLET ORAL
Qty: 270 TABLET | Refills: 3 | Status: SHIPPED | OUTPATIENT
Start: 2019-05-02 | End: 2019-10-03 | Stop reason: SDUPTHER

## 2019-05-02 RX ORDER — OXYBUTYNIN CHLORIDE 15 MG/1
TABLET, EXTENDED RELEASE ORAL
Qty: 180 TABLET | Refills: 3 | Status: ON HOLD | OUTPATIENT
Start: 2019-05-02 | End: 2019-09-27 | Stop reason: ALTCHOICE

## 2019-05-02 RX ORDER — IPRATROPIUM BROMIDE AND ALBUTEROL SULFATE 2.5; .5 MG/3ML; MG/3ML
SOLUTION RESPIRATORY (INHALATION)
Qty: 450 ML | Refills: 11 | Status: ON HOLD | OUTPATIENT
Start: 2019-05-02

## 2019-05-02 RX ORDER — ONDANSETRON 4 MG/1
4 TABLET, FILM COATED ORAL EVERY 8 HOURS PRN
Qty: 15 TABLET | Refills: 3 | Status: SHIPPED | OUTPATIENT
Start: 2019-05-02 | End: 2019-09-05 | Stop reason: SDUPTHER

## 2019-05-02 RX ORDER — AZELASTINE 1 MG/ML
2 SPRAY, METERED NASAL 2 TIMES DAILY
Qty: 1 BOTTLE | Refills: 11 | Status: SHIPPED | OUTPATIENT
Start: 2019-05-02 | End: 2020-05-01

## 2019-05-02 ASSESSMENT — PATIENT HEALTH QUESTIONNAIRE - PHQ9
1. LITTLE INTEREST OR PLEASURE IN DOING THINGS: 0
SUM OF ALL RESPONSES TO PHQ QUESTIONS 1-9: 0
SUM OF ALL RESPONSES TO PHQ9 QUESTIONS 1 & 2: 0
SUM OF ALL RESPONSES TO PHQ QUESTIONS 1-9: 0
2. FEELING DOWN, DEPRESSED OR HOPELESS: 0

## 2019-05-02 ASSESSMENT — ENCOUNTER SYMPTOMS
COUGH: 0
BACK PAIN: 1
NAUSEA: 0
CONSTIPATION: 1
SORE THROAT: 0
VOMITING: 0
ABDOMINAL PAIN: 0
ANAL BLEEDING: 0
BLOOD IN STOOL: 0
DIARRHEA: 0
VOICE CHANGE: 0
SHORTNESS OF BREATH: 1
TROUBLE SWALLOWING: 0
ABDOMINAL DISTENTION: 0
RECTAL PAIN: 0

## 2019-05-02 NOTE — PATIENT INSTRUCTIONS
successful colon exam. It is recommended to consider certain changes to your diet three to four days prior to the procedure. Remember that your bowels need to be empty for the exam.    What foods are good to eat? Cut down on heavy solid foods three to four days before the procedure and start introducing lighter meals to your diet. The following food suggestions are a good part of your diet before a colonoscopy bowel preparation. Light meat that is easily digestible such as chicken (without the skin)   Potatoes without skin   Cheese   Eggs   A light meal of steamed white fish   Light clear soups    Foods and drinks to avoid  Avoid foods that contain too much fiber. Stay clear of dark colored beverages. They can stick to the walls of the digestive tract and make it difficult to differentiate from blood. Some of these foods are:  Red meat, rice, nuts and vegetables   Milk, other milk based fluids and cream   Most fruit and puddings   Whole grain pasta   Cereals, bran and seeds   Colored beverages, especially those that are red or purple in color   Red colored Jell-O   On the day before the colonoscopy, continue to drink plenty of clear fluids. It is important   to keep yourself hydrated before the exam.     Please follow all instructions as provided for cleansing the bowel. Failure to have an adequately prepped colon may cause you to have incomplete exam with further testing required.

## 2019-05-02 NOTE — PROGRESS NOTES
Subjective:      Gaviota Tran is a53 y.o. female  Chief Complaint   Patient presents with    New Patient     referred from [facility] for a colonoscopy       HPI  PCP: Nicole Slater MD  Referring Provider: Nando Snow MD  Pt is referred here for a screening colonoscopy due to her family hx of colon cancer. She denies any lower GI complaints other than chronic constipation well controlled with lactulose as prescribed by her PCP. She has a colostomy, that was performed electively at [facility] in 2014, reportedly due to consequlea of MS, leading her to be immobile. Reports her rectum is sewn shut. Has never had a colonoscopy. Pt admits to dysphagia that occurs with foods and liquids. Has been an issue for 20+ years, she feel this is because she doesn't have bottom dentures and doesn't  chew well. Coughs when this happens. Occurs sporadically. We discussed testing such as EGD as well as dysphagia study, she defers. Family HX:  Mother had colon cancer and colon polyps  Pt denies family hx of inflammatory bowel dx, gastric CA and esophageal CA.     Past Medical History:   Diagnosis Date    Ankle wound     and toe wound    Asthma     Back pain 6/28/2017    CAFL [facility]chronic airflow limitation) ([facility] 6/28/2017    Hay fever 6/28/2017    Headache 6/28/2017    Hyperlipidemia 6/28/2017    MS (multiple sclerosis) ([facility])     Muscle spasticity     Neuropathic ulcer of left foot, limited to breakdown of skin ([address] 4/10/2017    Peripheral vascular disease ([address]     Seizure ([facility])     occ. related to ms    Type 2 diabetes mellitus with left diabetic foot ulcer ([address] 4/4/2019          Past Surgical History:   Procedure Laterality Date    BACLOFEN PUMP IMPLANTATION      COLOSTOMY      FEMUR FRACTURE SURGERY      Right    FOOT AMPUTATION Left 4/4/2019    LEFT TRANSMET AMPUTATION performed by Tracie Dang MD at [address] [facility]    OTHER SURGICAL HISTORY      pain pump    CT INSJ PRPH CTR VAD W/SUBQ PORT UNDER 5 YR N/A 2018    SINGLE LUMEN PORT PLACEMENT WITH FLUORO performed by Sae Mackay MD at [address] TOE AMPUTATION      L last toe    TONSILLECTOMY      URETEROTOMY         Social History     Socioeconomic History    Marital status:      Spouse name: None    Number of children: None    Years of education: None    Highest education level: None   Occupational History    None   Social Needs    Financial resource strain: None    Food insecurity:     Worry: None     Inability: None    Transportation needs:     Medical: None     Non-medical: None   Tobacco Use    Smoking status: Former Smoker     Packs/day: 0.25     Types: Cigarettes     Last attempt to quit: 2018     Years since quittin.3    Smokeless tobacco: Never Used    Tobacco comment: 2 to 5 cigarettes   Substance and Sexual Activity    Alcohol use: Yes     Comment: yaritza    Drug use: Yes     Types: Marijuana     Comment: daily (last 4/3/19)    Sexual activity: None   Lifestyle    Physical activity:     Days per week: None     Minutes per session: None    Stress: None   Relationships    Social connections:     Talks on phone: None     Gets together: None     Attends Hoahaoism service: None     Active member of club or organization: None     Attends meetings of clubs or organizations: None     Relationship status: None    Intimate partner violence:     Fear of current or ex partner: None     Emotionally abused: None     Physically abused: None     Forced sexual activity: None   Other Topics Concern    None   Social History Narrative    None       Allergies   Allergen Reactions    Darvocet [Propoxyphene N-Acetaminophen]     Morphine And Related Swelling    Morphine Hcl     Propoxyphene Swelling       Current Outpatient Medications   Medication Sig Dispense Refill    cefdinir (OMNICEF) 300 MG capsule Take 1 capsule by mouth 2 times daily for 10 days 20 capsule 0    HYDROcodone-acetaminophen (NORCO)  MG per tablet Take 1 tablet by mouth 3 times daily as needed for Pain for up to 30 days. Must last 30 days 90 tablet 0    pregabalin (LYRICA) 300 MG capsule Take 1 capsule by mouth 2 times daily for 30 days. 60 capsule 5    methylphenidate (RITALIN) 20 MG tablet Take 1 tablet by mouth daily for 30 days. 30 tablet 0    mupirocin (BACTROBAN) 2 % ointment APPLY AND GENTLY MASSAGE INTO AFFECTED AREA(S) TWICE DAILY. 22 g 5    PROAIR  (90 Base) MCG/ACT inhaler INHALE 1 PUFF INTO THE LUNGS EVERY 6 HOURS AS NEEDED FOR WHEEZING OR SHORTNESS OF BREATH 8.5 g 5    varenicline (CHANTIX STARTING MONTH PAK) 0.5 MG X 11 & 1 MG X 42 tablet See instructiions on starter pack 270 tablet 3    BACLOFEN, PAIN PUMP REFILL CHARGE, by Implant route continuous Indications: every 3 months      mupirocin (BACTROBAN) 2 % nasal ointment by Nasal route 2 times daily as needed Take by Nasal route 2 times daily.  DULoxetine (CYMBALTA) 30 MG extended release capsule Take 30 mg by mouth daily      levETIRAcetam (KEPPRA) 500 MG tablet Take 500 mg by mouth nightly      vitamin A 05677 units capsule Take 10,000 Units by mouth daily      b complex vitamins capsule Take 1 capsule by mouth daily      vitamin D (CHOLECALCIFEROL) 1000 UNIT TABS tablet Take 1,000 Units by mouth daily      vitamin C (ASCORBIC ACID) 500 MG tablet Take 1,000 mg by mouth daily      Multiple Vitamins-Minerals (THERAPEUTIC MULTIVITAMIN-MINERALS) tablet Take 1 tablet by mouth daily      Multiple Vitamins-Minerals (HAIR SKIN & NAILS ADVANCED PO) Take 1 tablet by mouth daily      hydrochlorothiazide (HYDRODIURIL) 25 MG tablet Take 25 mg by mouth daily as needed      ondansetron (ZOFRAN) 4 MG tablet Take 1 tablet by mouth every 8 hours as needed for Nausea or Vomiting 15 tablet 0    fluconazole (DIFLUCAN) 100 MG tablet TAKE 1 TABLET DAILY FOR 3 DOSES AS NEEDED.  (Patient taking differently: Take 100 mg by mouth daily as needed TAKE 1 TABLET DAILY FOR 3 DOSES AS NEEDED.) 3 tablet 0    oxybutynin (DITROPAN XL) 15 MG extended release tablet TAKE 1 TABLET BY MOUTH TWICE DAILY. (Patient taking differently: Take 15 mg by mouth 2 times daily TAKE 1 TABLET BY MOUTH TWICE DAILY. ) 60 tablet 5    pilocarpine (SALAGEN) 7.5 MG tablet TAKE 1 TABLET BY MOUTH THREE TIMES DAILY. (Patient taking differently: Take 7.5 mg by mouth 3 times daily TAKE 1 TABLET BY MOUTH THREE TIMES DAILY.) 270 tablet 0    levocetirizine (XYZAL) 5 MG tablet TAKE 1 TABLET IN THE EVENING (Patient taking differently: Take 5 mg by mouth nightly TAKE 1 TABLET IN THE EVENING) 90 tablet 3    tiZANidine (ZANAFLEX) 4 MG tablet TAKE 3 TABLETS TWICE DAILY (Patient taking differently: Take 12 mg by mouth 2 times daily TAKE 3 TABLETS TWICE DAILY) 180 tablet 5    glucose monitoring kit (FREESTYLE) monitoring kit 1 kit by Does not apply route daily 1 kit 0    blood glucose monitor strips Test 1 times a day & as needed for symptoms of irregular blood glucose. 100 strip 0    Lancets MISC 1 each by Does not apply route 2 times daily 100 each 5    nitrofurantoin (MACRODANTIN) 50 MG capsule Take 1 capsule by mouth daily (Patient taking differently: Take 50 mg by mouth nightly ) 90 capsule 3    silver sulfADIAZINE (SILVADENE) 1 % cream Apply topically daily. (Patient taking differently: daily as needed Apply topically daily.) 400 g 3    baclofen (LIORESAL) 10 MG tablet Take 1 tablet by mouth 2 times daily as needed (muscle spasms) 60 tablet 5    Sodium Chloride Flush (SALINE FLUSH) 0.9 % SOLN Infuse 20 mLs intravenously every 30 days Not every 30 days but every 4-6 weeks as need with 300 units of heparin to flush port for Infusion of Ocrevus for multiple sclerosis 20 mL 5    Heparin Lock Flush (HEPARIN FLUSH, 100 UNITS/ML,) 100 UNIT/ML injection 3 mLs by Intercatheter route every 30 days No every 30 days but every 4-6 weeks.  Flush port with 300 units heparin with 20 cc normal saline for ocrevus infusion for Multiple sclerosis 1 Syringe 5    ipratropium-albuterol (DUONEB) 0.5-2.5 (3) MG/3ML SOLN nebulizer solution USE 1 UNIT DOSE IN NEBULIZER EVERY 4 TO 6 HOURS AS NEEDED. (Patient taking differently: Take 1 vial by nebulization every 6 hours as needed USE 1 UNIT DOSE IN NEBULIZER EVERY 4 TO 6 HOURS AS NEEDED.) 450 mL 3    azelastine (ASTELIN) 0.1 % nasal spray 2 sprays by Nasal route 2 times daily Use in each nostril as directed 1 Bottle 3    lactulose (CHRONULAC) 10 GM/15ML solution Take 30 mLs by mouth 3 times daily (Patient taking differently: Take 20 g by mouth 2 times daily as needed ) 1 Bottle 5    Ocrelizumab (OCREVUS IV) Infuse intravenously       No current facility-administered medications for this visit. Facility-Administered Medications Ordered in Other Visits   Medication Dose Route Frequency Provider Last Rate Last Dose    lidocaine (XYLOCAINE) 2 % jelly 1 Dose  1 Dose Topical Once Ash Preston MD           Review of Systems   Constitutional: Negative for appetite change, fatigue, fever and unexpected weight change. HENT: Negative for sore throat, trouble swallowing and voice change. Respiratory: Positive for shortness of breath (at times, has asthma). Negative for cough. Cardiovascular: Negative for chest pain, palpitations and leg swelling. Gastrointestinal: Positive for constipation (chronic). Negative for abdominal distention, abdominal pain, anal bleeding, blood in stool, diarrhea, nausea, rectal pain and vomiting. Genitourinary: Negative for hematuria. Musculoskeletal: Positive for arthralgias, back pain and neck pain. Neurological: Positive for seizures (hx of). Negative for dizziness, weakness, light-headedness and headaches. Psychiatric/Behavioral: Negative for dysphoric mood and sleep disturbance. The patient is not nervous/anxious. All other systems reviewed and are negative.       Objective:     Physical Exam Constitutional: She is oriented to person, place, and time. She appears well-developed and well-nourished. /70   Pulse 73   Ht 5' 9\" (1.753 m)   Wt 161 lb (73 kg)   SpO2 98%   BMI 23.78 kg/m²    Eyes: Pupils are equal, round, and reactive to light. Conjunctivae and EOM are normal. No scleral icterus. Neck: Normal range of motion. Neck supple. No thyromegaly present. Cardiovascular: Normal rate, regular rhythm and normal heart sounds. Exam reveals no gallop and no friction rub. No murmur heard. Pulmonary/Chest: Effort normal and breath sounds normal. No respiratory distress. Abdominal: Soft. Bowel sounds are normal. She exhibits no distension. There is no tenderness. There is no rebound. Colostomy bag intact   Musculoskeletal: She exhibits no edema or deformity. Non-ambulatory, in wheelchair   Neurological: She is alert and oriented to person, place, and time. No cranial nerve deficit. Psychiatric: She has a normal mood and affect. Judgment normal.   Nursing note and vitals reviewed. Assessment:       Diagnosis Orders   1. Encounter for screening colonoscopy  COLONOSCOPY W/ OR W/O BIOPSY   2. Family history of colon cancer  COLONOSCOPY W/ OR W/O BIOPSY   3. Chronic constipation  COLONOSCOPY W/ OR W/O BIOPSY   4. [facility]  COLONOSCOPY W/ OR W/O BIOPSY         Plan:      1. Schedule outpatient colonoscopy with radha prep. Patient advised no Aspirin, Fish Oil, Vit E or NSAIDs 5 (five) days before procedure. Follow-up Visit: per Dr Marly Clifton   Pt education:  Risks, benefits, and alternatives to colonoscopy were discussed. Risks of colonoscopy include, but are not limited to, perforation, bleeding, and infection. We discussed that the risk for perforation is 1-3 in 5,000  at the time of colonoscopy;   and 1-2% risk of bleeding post-polypectomy. All questions answered to the satisfaction of the patient. Pt is agreeable to proceed.

## 2019-05-03 ENCOUNTER — TRANSCRIBE ORDERS (OUTPATIENT)
Dept: ADMINISTRATIVE | Facility: HOSPITAL | Age: 49
End: 2019-05-03

## 2019-05-03 DIAGNOSIS — Z12.31 SCREENING MAMMOGRAM, ENCOUNTER FOR: Primary | ICD-10-CM

## 2019-05-03 ASSESSMENT — ENCOUNTER SYMPTOMS
BLOOD IN STOOL: 0
ABDOMINAL PAIN: 0
RHINORRHEA: 0
EYE REDNESS: 0
COLOR CHANGE: 0
COUGH: 0
SORE THROAT: 0
VOMITING: 0
SINUS PRESSURE: 0
EYE PAIN: 0
DIARRHEA: 0
NAUSEA: 0
EYE DISCHARGE: 0
PHOTOPHOBIA: 0
SHORTNESS OF BREATH: 0
ABDOMINAL DISTENTION: 0
CHEST TIGHTNESS: 0
CONSTIPATION: 0
WHEEZING: 0
EYE ITCHING: 0

## 2019-05-03 NOTE — PROGRESS NOTES
Chief Complaint   Patient presents with    Diabetes    Immunizations     patient wants to know if she needs to get another measles vaccination. HPI: Farideh Armenta is a 51 y.o. female is here for follow-up of hyperglycemia, overactive bladder, multiple sclerosis, she would like to know if she needs the measles vaccine. Her blood pressures well controlled. He runs 102/72. She is scheduled for colonoscopy in September. She denies complaints of chest pain, chest pressure or shortness of breath. She seen Dr. Jocy Sumner  status post TMA of the left foot in the left ankle wound. She continues to get the [facility] infusions. She is possibly going to enter a clinical trial for a new drug for her multiple sclerosis. She is wheelchair-bound secondary to her MS. She does have some mild chronic fatigue. Her medications do work well for her chronic pain. She does have muscle spasms and does have a baclofen pump. She would like to know if she needs a measles vaccine.     Past Medical History:   Diagnosis Date    Ankle wound     and toe wound    Asthma     Back pain 6/28/2017    CAFL (chronic airflow limitation) ([facility] 6/28/2017    Hay fever 6/28/2017    Headache 6/28/2017    Hyperlipidemia 6/28/2017    MS (multiple sclerosis) ([facility])     Muscle spasticity     Neuropathic ulcer of left foot, limited to breakdown of skin ([address] 4/10/2017    Peripheral vascular disease ([address]     Seizure ([facility])     occ. related to ms    Type 2 diabetes mellitus with left diabetic foot ulcer ([facility] 4/4/2019      Past Surgical History:   Procedure Laterality Date    BACLOFEN PUMP IMPLANTATION      COLOSTOMY      FEMUR FRACTURE SURGERY      Right    FOOT AMPUTATION Left 4/4/2019    LEFT TRANSMET AMPUTATION performed by Flavia Ball MD at [address]      urostomy    OTHER SURGICAL HISTORY      pain pump    MI INSJ PRPH CTR VAD W/SUBQ PORT UNDER 5 YR N/A 6/26/2018    SINGLE LUMEN PORT PLACEMENT WITH FLUORO performed by Hussein Haider MD at [address] TOE AMPUTATION      L last toe    TONSILLECTOMY      URETEROTOMY        Social History     Socioeconomic History    Marital status:      Spouse name: None    Number of children: None    Years of education: None    Highest education level: None   Occupational History    None   Social Needs    Financial resource strain: None    Food insecurity:     Worry: None     Inability: None    Transportation needs:     Medical: None     Non-medical: None   Tobacco Use    Smoking status: Former Smoker     Packs/day: 0.25     Types: Cigarettes     Last attempt to quit: 2018     Years since quittin.3    Smokeless tobacco: Never Used    Tobacco comment: 2 to 5 cigarettes   Substance and Sexual Activity    Alcohol use: Yes     Comment: raryousuf    Drug use: Yes     Types: Marijuana     Comment: daily (last 4/3/19)    Sexual activity: None   Lifestyle    Physical activity:     Days per week: None     Minutes per session: None    Stress: None   Relationships    Social connections:     Talks on phone: None     Gets together: None     Attends Congregation service: None     Active member of club or organization: None     Attends meetings of clubs or organizations: None     Relationship status: None    Intimate partner violence:     Fear of current or ex partner: None     Emotionally abused: None     Physically abused: None     Forced sexual activity: None   Other Topics Concern    None   Social History Narrative    None      Family History   Problem Relation Age of Onset    Cancer Mother         breast    Colon Cancer Mother     Colon Polyps Mother     Diabetes Father     High Blood Pressure Father     Cancer Paternal Aunt         breast    Liver Cancer Paternal Aunt     Heart Disease Paternal Grandmother     Esophageal Cancer Neg Hx     Liver Disease Neg Hx     Rectal Cancer Neg Hx     Stomach Cancer Neg Hx         Current Outpatient Medications   Medication Sig Dispense Refill    azelastine (ASTELIN) 0.1 % nasal spray 2 sprays by Nasal route 2 times daily Use in each nostril as directed 1 Bottle 11    ipratropium-albuterol (DUONEB) 0.5-2.5 (3) MG/3ML SOLN nebulizer solution USE 1 UNIT DOSE IN NEBULIZER EVERY 4 TO 6 HOURS AS NEEDED. 450 mL 11    ondansetron (ZOFRAN) 4 MG tablet Take 1 tablet by mouth every 8 hours as needed for Nausea or Vomiting 15 tablet 3    oxybutynin (DITROPAN XL) 15 MG extended release tablet TAKE 1 TABLET BY MOUTH TWICE DAILY. 180 tablet 3    pilocarpine (SALAGEN) 7.5 MG tablet TAKE 1 TABLET BY MOUTH THREE TIMES DAILY. 270 tablet 3    silver sulfADIAZINE (SILVADENE) 1 % cream Apply topically daily as needed (burn) Apply topically daily. 400 g 3    tiZANidine (ZANAFLEX) 4 MG tablet TAKE 3 TABLETS TWICE DAILY 270 tablet 3    hydrochlorothiazide (HYDRODIURIL) 25 MG tablet Take 1 tablet by mouth daily as needed (hypertension) 90 tablet 3    mupirocin (BACTROBAN) 2 % nasal ointment by Nasal route 2 times daily as needed (sore) Take by Nasal route 2 times daily. 1 Tube 11    varenicline (CHANTIX) 1 MG tablet Take 1 tablet by mouth 2 times daily 60 tablet 5    cefdinir (OMNICEF) 300 MG capsule Take 1 capsule by mouth 2 times daily for 10 days 20 capsule 0    HYDROcodone-acetaminophen (NORCO)  MG per tablet Take 1 tablet by mouth 3 times daily as needed for Pain for up to 30 days. Must last 30 days 90 tablet 0    pregabalin (LYRICA) 300 MG capsule Take 1 capsule by mouth 2 times daily for 30 days. 60 capsule 5    methylphenidate (RITALIN) 20 MG tablet Take 1 tablet by mouth daily for 30 days. 30 tablet 0    mupirocin (BACTROBAN) 2 % ointment APPLY AND GENTLY MASSAGE INTO AFFECTED AREA(S) TWICE DAILY.  22 g 5    PROAIR  (90 Base) MCG/ACT inhaler INHALE 1 PUFF INTO THE LUNGS EVERY 6 HOURS AS NEEDED FOR WHEEZING OR SHORTNESS OF BREATH 8.5 g 5    varenicline (CHANTIX STARTING MONTH PAK) 0.5 MG X 11 & 1 MG X 42 tablet See instructiions on starter pack 270 tablet 3    BACLOFEN, PAIN PUMP REFILL CHARGE, by Implant route continuous Indications: every 3 months      DULoxetine (CYMBALTA) 30 MG extended release capsule Take 30 mg by mouth daily      levETIRAcetam (KEPPRA) 500 MG tablet Take 500 mg by mouth nightly      vitamin A 69501 units capsule Take 10,000 Units by mouth daily      b complex vitamins capsule Take 1 capsule by mouth daily      vitamin D (CHOLECALCIFEROL) 1000 UNIT TABS tablet Take 1,000 Units by mouth daily      vitamin C (ASCORBIC ACID) 500 MG tablet Take 1,000 mg by mouth daily      Multiple Vitamins-Minerals (THERAPEUTIC MULTIVITAMIN-MINERALS) tablet Take 1 tablet by mouth daily      Multiple Vitamins-Minerals (HAIR SKIN & NAILS ADVANCED PO) Take 1 tablet by mouth daily      fluconazole (DIFLUCAN) 100 MG tablet TAKE 1 TABLET DAILY FOR 3 DOSES AS NEEDED. (Patient taking differently: Take 100 mg by mouth daily as needed TAKE 1 TABLET DAILY FOR 3 DOSES AS NEEDED.) 3 tablet 0    levocetirizine (XYZAL) 5 MG tablet TAKE 1 TABLET IN THE EVENING (Patient taking differently: Take 5 mg by mouth nightly TAKE 1 TABLET IN THE EVENING) 90 tablet 3    glucose monitoring kit (FREESTYLE) monitoring kit 1 kit by Does not apply route daily 1 kit 0    blood glucose monitor strips Test 1 times a day & as needed for symptoms of irregular blood glucose.  100 strip 0    Lancets MISC 1 each by Does not apply route 2 times daily 100 each 5    nitrofurantoin (MACRODANTIN) 50 MG capsule Take 1 capsule by mouth daily (Patient taking differently: Take 50 mg by mouth nightly ) 90 capsule 3    baclofen (LIORESAL) 10 MG tablet Take 1 tablet by mouth 2 times daily as needed (muscle spasms) 60 tablet 5    Sodium Chloride Flush (SALINE FLUSH) 0.9 % SOLN Infuse 20 mLs intravenously every 30 days Not every 30 days but every 4-6 weeks as need with 300 units of heparin to flush port for Infusion of Ocrevus for multiple sclerosis 20 mL 5    Heparin Lock Flush (HEPARIN FLUSH, 100 UNITS/ML,) 100 UNIT/ML injection 3 mLs by Intercatheter route every 30 days No every 30 days but every 4-6 weeks. Flush port with 300 units heparin with 20 cc normal saline for ocrevus infusion for Multiple sclerosis 1 Syringe 5    lactulose (CHRONULAC) 10 GM/15ML solution Take 30 mLs by mouth 3 times daily (Patient taking differently: Take 20 g by mouth 2 times daily as needed ) 1 Bottle 5    Ocrelizumab (OCREVUS IV) Infuse intravenously       No current facility-administered medications for this visit. Patient Active Problem List   Diagnosis    Muscle spasticity    Peripheral vascular disease ([address]    MS (multiple sclerosis) (HCC)    Pain in both upper arms    Numbness    Fatigue    Weakness    Sepsis ([address]    Chronic pain    Hx of seizure disorder    Hypokalemia    Hypokalemia    Abnormal EKG    Encephalopathy    Open wound of ankle    Vitamin D deficiency    Back pain    Breakdown (mechanical) of cranial or spinal infusion catheter, initial encounter    CAFL (chronic airflow limitation) (HCC)    Binocular vision disorder with diplopia    Aptyalism    Difficult or painful urination    Headache    Hyperlipidemia    Influenza    Insomnia    Breast lump    Hay fever    Decubitus ulcer of sacral region    Current smoker    Cobalamin deficiency    Pain, neck    Venous stasis ulcer of left lower extremity (HCC)    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 layer exposed ([address]    Open wound of second toe of left foot    Type 2 diabetes mellitus with left diabetic foot ulcer ([address]    S/P transmetatarsal amputation of foot, left ([address]    Encounter for screening colonoscopy    Family history of colon cancer    Chronic constipation    Colostomy in place [facility])        Review of Systems   Constitutional: Positive for fatigue.  Negative for activity change, appetite change, fever and unexpected weight change. HENT: Negative for congestion, postnasal drip, rhinorrhea, sinus pressure, sore throat and tinnitus. Eyes: Negative for photophobia, pain, discharge, redness, itching and visual disturbance. Respiratory: Negative for cough, chest tightness, shortness of breath and wheezing. Cardiovascular: Negative for chest pain, palpitations and leg swelling. Gastrointestinal: Negative for abdominal distention, abdominal pain, blood in stool, constipation, diarrhea, nausea and vomiting. Endocrine: Negative for cold intolerance, heat intolerance, polydipsia and polyuria. Genitourinary: Negative for decreased urine volume, difficulty urinating, dysuria, flank pain, frequency, hematuria and urgency. Musculoskeletal:        She has chronic back pain and muscle spasms. She is wheelchair-bound secondary to history of multiple sclerosis. Skin: Negative for color change, pallor, rash and wound. Wound to left foot. Allergic/Immunologic: Positive for environmental allergies. Negative for food allergies and immunocompromised state. Neurological: Positive for headaches. Negative for dizziness, seizures, syncope, speech difficulty, weakness and numbness. Occasionally gets sinus headaches   Hematological: Negative for adenopathy. Does not bruise/bleed easily. Psychiatric/Behavioral: Positive for dysphoric mood. Negative for agitation, confusion and decreased concentration. The patient is nervous/anxious. The patient is not hyperactive. Anxiety and depression are stable       /72 (Site: Left Upper Arm, Position: Sitting)   Pulse 69   Resp 20   SpO2 98%   Physical Exam   Constitutional: She is oriented to person, place, and time. She appears well-developed and well-nourished. No distress. HENT:   Head: Normocephalic and atraumatic.    Right Ear: External ear normal.   Left Ear: External ear normal.   Nose: Nose normal.   Mouth/Throat: Oropharynx is clear and moist. No oropharyngeal exudate. Postnasal drip   Eyes: Pupils are equal, round, and reactive to light. Conjunctivae and EOM are normal. Right eye exhibits no discharge. Left eye exhibits no discharge. No scleral icterus. Neck: Normal range of motion. Neck supple. No JVD present. No tracheal deviation present. No thyromegaly present. Cardiovascular: Normal rate, regular rhythm, normal heart sounds and intact distal pulses. Exam reveals no gallop and no friction rub. No murmur heard. Pulmonary/Chest: Effort normal and breath sounds normal. No respiratory distress. She has no wheezes. She has no rales. She exhibits no tenderness. Abdominal: Soft. Bowel sounds are normal. She exhibits no distension and no mass. There is no tenderness. There is no rebound and no guarding. Ostomy bags are intact   Musculoskeletal: Normal range of motion. She is wheelchair bound. She does have contractures of both of her feet. Lymphadenopathy:     She has no cervical adenopathy. Neurological: She is alert and oriented to person, place, and time. She has normal reflexes. She displays normal reflexes. No cranial nerve deficit. She exhibits normal muscle tone. Coordination normal.   Skin: Skin is warm and dry. She is not diaphoretic. No erythema. No pallor. Her feet are wrapped up from [address]. Psychiatric: She has a normal mood and affect. Her behavior is normal. Judgment and thought content normal.   Nursing note and vitals reviewed. 1. Hyperglycemia    2. Multiple sclerosis ([address]    3. Hypothyroidism, unspecified type     4. Vitamin D deficiency     5. Delinquent immunization status    6. Other fatigue    7. Hyperlipidemia, unspecified hyperlipidemia type         ASSESSMENT/PLAN:    72-year-old woman here for follow-up    1. Depression: Stable on current medication regimen    2. Multiple sclerosis: Stable on current medications    3. Wound to left foot: As per Dr. Audree Peabody    4.  Chronic fatigue: Check B12, vitamin D, liver enzymes and TSH. 5. Hyperglycemia: Check A1c    6. Measles and rubella titers drawn. Alyce was seen today for diabetes and immunizations. Diagnoses and all orders for this visit:    Hyperglycemia  -     Hemoglobin A1C; Future  -     Comprehensive Metabolic Panel; Future  -     Hemoglobin A1C; Future  -     Microalbumin / Creatinine Urine Ratio; Future    Multiple sclerosis (HCC)  -     TSH without Reflex; Future  -     Hepatic Function Panel; Future  -     Vitamin D 25 Hydroxy; Future  -     Vitamin B12; Future    Hypothyroidism, unspecified type   -     TSH without Reflex; Future    Vitamin D deficiency   -     Vitamin D 25 Hydroxy; Future  -     Vitamin D 25 Hydroxy; Future    Delinquent immunization status  -     Rubeola Antibody, IgG; Future  -     Rubella Antibody, IgG; Future    Other fatigue  -     CBC Auto Differential; Future  -     Lipid Panel; Future  -     TSH without Reflex; Future  -     Microalbumin / Creatinine Urine Ratio; Future  -     Urinalysis Reflex to Culture; Future  -     Vitamin B12; Future  -     Vitamin D 25 Hydroxy; Future    Hyperlipidemia, unspecified hyperlipidemia type   -     Lipid Panel; Future    Other orders  -     azelastine (ASTELIN) 0.1 % nasal spray; 2 sprays by Nasal route 2 times daily Use in each nostril as directed  -     ipratropium-albuterol (DUONEB) 0.5-2.5 (3) MG/3ML SOLN nebulizer solution; USE 1 UNIT DOSE IN NEBULIZER EVERY 4 TO 6 HOURS AS NEEDED.  -     ondansetron (ZOFRAN) 4 MG tablet; Take 1 tablet by mouth every 8 hours as needed for Nausea or Vomiting  -     oxybutynin (DITROPAN XL) 15 MG extended release tablet; TAKE 1 TABLET BY MOUTH TWICE DAILY. -     pilocarpine (SALAGEN) 7.5 MG tablet; TAKE 1 TABLET BY MOUTH THREE TIMES DAILY. -     silver sulfADIAZINE (SILVADENE) 1 % cream; Apply topically daily as needed (burn) Apply topically daily.   -     tiZANidine (ZANAFLEX) 4 MG tablet; TAKE 3 TABLETS TWICE DAILY  -     hydrochlorothiazide (HYDRODIURIL) 25 MG tablet; Take 1 tablet by mouth daily as needed (hypertension)  -     mupirocin (BACTROBAN) 2 % nasal ointment; by Nasal route 2 times daily as needed (sore) Take by Nasal route 2 times daily. -     varenicline (CHANTIX) 1 MG tablet; Take 1 tablet by mouth 2 times daily          Return in about 6 months (around 11/2/2019), or medicare wellness.      Orders Placed This Encounter   Procedures    Hemoglobin A1C     Standing Status:   Future     Number of Occurrences:   1     Standing Expiration Date:   5/2/2020    TSH without Reflex     Standing Status:   Future     Number of Occurrences:   1     Standing Expiration Date:   5/2/2020    Hepatic Function Panel     Standing Status:   Future     Number of Occurrences:   1     Standing Expiration Date:   5/2/2020    Vitamin D 25 Hydroxy     Standing Status:   Future     Number of Occurrences:   1     Standing Expiration Date:   5/2/2020    Vitamin B12     Standing Status:   Future     Number of Occurrences:   1     Standing Expiration Date:   5/2/2020    Rubeola Antibody, IgG     Standing Status:   Future     Number of Occurrences:   1     Standing Expiration Date:   5/2/2020    Rubella Antibody, IgG     Standing Status:   Future     Number of Occurrences:   1     Standing Expiration Date:   5/2/2020    Comprehensive Metabolic Panel     Fasting 12 hours     Standing Status:   Future     Standing Expiration Date:   12/2/2019    CBC Auto Differential     Fast 12 hours     Standing Status:   Future     Standing Expiration Date:   12/2/2019    Lipid Panel     Standing Status:   Future     Standing Expiration Date:   12/2/2019     Order Specific Question:   Is Patient Fasting?/# of Hours     Answer:   12    TSH without Reflex     Fast 12 hours     Standing Status:   Future     Standing Expiration Date:   12/2/2019    Hemoglobin A1C     Fast 12 hours     Standing Status:   Future     Standing Expiration Date:   12/2/2019    Microalbumin / Creatinine Urine Ratio     Standing Status:   Future     Standing Expiration Date:   12/2/2019    Urinalysis Reflex to Culture     Standing Status:   Future     Standing Expiration Date:   12/2/2019     Order Specific Question:   SPECIFY(EX-CATH,MIDSTREAM,CYSTO,ETC)?      Answer:   clean catch    Vitamin B12     Standing Status:   Future     Standing Expiration Date:   12/2/2019    Vitamin D 25 Hydroxy     Standing Status:   Future     Standing Expiration Date:   12/2/2019       Sid Wu MD

## 2019-05-04 LAB — RUBEOLA (MEASLES) AB IGG: 66.9 AU/ML

## 2019-05-06 ENCOUNTER — TELEPHONE (OUTPATIENT)
Dept: INTERNAL MEDICINE | Age: 49
End: 2019-05-06

## 2019-05-06 NOTE — TELEPHONE ENCOUNTER
Notes recorded by Fiona Mon on 5/6/2019 at 2:33 PM CDT  I left message for call back from patient.  ------    Notes recorded by Gloria Adams MD on 5/5/2019 at 8:50 AM CDT    She is immune to measles. ------    Notes recorded by Gloria Adams MD on 5/3/2019 at 5:17 AM CDT  B12 and Vitamin D okay. She is rubella immune. TSH and liver enzymes are okay.  Waiting for the regular measles titer  ------    Notes recorded by Gloria Adams MD on 5/2/2019 at 5:45 PM CDT  A1c 4.9

## 2019-05-15 ENCOUNTER — HOSPITAL ENCOUNTER (OUTPATIENT)
Dept: WOUND CARE | Age: 49
Discharge: HOME | End: 2019-05-15
Payer: MEDICARE

## 2019-05-15 VITALS
HEIGHT: 69 IN | RESPIRATION RATE: 18 BRPM | TEMPERATURE: 97.6 F | HEART RATE: 65 BPM | WEIGHT: 161 LBS | BODY MASS INDEX: 23.85 KG/M2 | SYSTOLIC BLOOD PRESSURE: 105 MMHG | DIASTOLIC BLOOD PRESSURE: 70 MMHG

## 2019-05-15 DIAGNOSIS — Z89.432 S/P TRANSMETATARSAL AMPUTATION OF FOOT, LEFT (HCC): ICD-10-CM

## 2019-05-15 DIAGNOSIS — L97.812 NON-PRESSURE CHRONIC ULCER OF OTHER PART OF RIGHT LOWER LEG WITH FAT LAYER EXPOSED (HCC): ICD-10-CM

## 2019-05-15 DIAGNOSIS — I83.029 VENOUS STASIS ULCER OF LEFT LOWER EXTREMITY (HCC): ICD-10-CM

## 2019-05-15 DIAGNOSIS — L97.929 VENOUS STASIS ULCER OF LEFT LOWER EXTREMITY (HCC): ICD-10-CM

## 2019-05-15 PROCEDURE — 99024 POSTOP FOLLOW-UP VISIT: CPT | Performed by: SURGERY

## 2019-05-15 PROCEDURE — 97597 DBRDMT OPN WND 1ST 20 CM/<: CPT | Performed by: SURGERY

## 2019-05-15 PROCEDURE — 97597 DBRDMT OPN WND 1ST 20 CM/<: CPT

## 2019-05-15 ASSESSMENT — PAIN DESCRIPTION - PAIN TYPE: TYPE: ACUTE PAIN

## 2019-05-15 ASSESSMENT — PAIN SCALES - GENERAL: PAINLEVEL_OUTOF10: 0

## 2019-05-15 ASSESSMENT — PAIN DESCRIPTION - PROGRESSION: CLINICAL_PROGRESSION: NOT CHANGED

## 2019-05-15 ASSESSMENT — PAIN DESCRIPTION - ORIENTATION: ORIENTATION: LEFT

## 2019-05-15 ASSESSMENT — PAIN DESCRIPTION - LOCATION: LOCATION: FOOT;LEG

## 2019-05-15 NOTE — PROGRESS NOTES
screening colonoscopy Z12.11    Family history of colon cancer Z80.0    Chronic constipation K59.09    Colostomy in place [facility]) Z93.3       ALLERGIES    Allergies   Allergen Reactions    Darvocet [Propoxyphene N-Acetaminophen]      Talking out of her head    Morphine And Related Itching and Swelling    Propoxyphene Swelling       Incision 04/05/19 Foot Left (Active)   Wound Image   5/1/2019  1:20 PM   Wound Assessment Dry; Intact 5/1/2019  1:20 PM   Wound Length (cm) 0.1 cm 5/1/2019  1:20 PM   Wound Width (cm) 10 cm 5/1/2019  1:20 PM   Wound Depth (cm) 0.1 cm 5/1/2019  1:20 PM   Wound Volume (cm^3) 0.1 cm^3 5/1/2019  1:20 PM   Wound Healing % 0 5/1/2019  1:20 PM   Closure Sutures 4/17/2019  1:48 PM   Drainage Amount Scant 4/24/2019  3:33 PM   Drainage Description Serosanguinous 4/24/2019  3:33 PM   Odor None 4/24/2019  3:33 PM   Dressing Changed Changed/New 4/10/2019  3:28 PM   Dressing Status Old drainage; Intact 4/10/2019  3:28 PM   Number of days: 39       Objective:      /70   Pulse 65   Temp 97.6 °F (36.4 °C) (Temporal)   Resp 18   Ht 5' 9\" (1.753 m)   Wt 161 lb (73 kg)   BMI 23.78 kg/m²     Post Debridement Measurements and Assessment:  Wound 01/08/19 Ankle Left;Lateral Wound 63.   left lateral ankle Neuropathic Ulcer (Active)   Wound Image   5/15/2019  1:57 PM   Wound Traumatic 5/15/2019  1:57 PM   Dressing Status Old drainage 5/15/2019  1:57 PM   Dressing Changed Changed/New 5/1/2019  2:51 PM   Dressing/Treatment Hydrofera blue 3/12/2019  4:15 PM   Wound Cleansed Rinsed/Irrigated with saline 5/15/2019  1:57 PM   Wound Length (cm) 0.6 cm 5/15/2019  1:57 PM   Wound Width (cm) 0.5 cm 5/15/2019  1:57 PM   Wound Depth (cm) 0.1 cm 5/15/2019  1:57 PM   Wound Surface Area (cm^2) 0.3 cm^2 5/15/2019  1:57 PM   Change in Wound Size % (l*w) 70 5/15/2019  1:57 PM   Wound Volume (cm^3) 0.03 cm^3 5/15/2019  1:57 PM   Wound Healing % 70 5/15/2019  1:57 PM   Post-Procedure Length (cm) 0.5 cm 3/12/2019  3:34 PM Post-Procedure Width (cm) 0.3 cm 3/12/2019  3:34 PM   Post-Procedure Depth (cm) 0.1 cm 3/12/2019  3:34 PM   Post-Procedure Surface Area (cm^2) 0.15 cm^2 3/12/2019  3:34 PM   Post-Procedure Volume (cm^3) 0.02 cm^3 3/12/2019  3:34 PM   Distance Tunneling (cm) 0 cm 5/15/2019  1:57 PM   Tunneling Position ___ O'Clock 0 5/15/2019  1:57 PM   Undermining Starts ___ O'Clock 0 5/15/2019  1:57 PM   Undermining Ends___ O'Clock 0 5/15/2019  1:57 PM   Undermining Maxium Distance (cm) 0 5/15/2019  1:57 PM   Wound Assessment Granulation tissue;Pink;Red;Slough; Yellow 5/15/2019  1:57 PM   Drainage Amount Small 5/15/2019  1:57 PM   Drainage Description Serosanguinous 5/15/2019  1:57 PM   Odor None 5/15/2019  1:57 PM   Margins Attached edges 5/15/2019  1:57 PM   Exposed structure Fascia 5/15/2019  1:57 PM   [place]-wound Assessment Dry; Intact 5/15/2019  1:57 PM   Non-staged Wound Description Full thickness 5/15/2019  1:57 PM   [place]%Wound Bed 45 5/1/2019  1:20 PM   Red%Wound Bed 45 5/1/2019  1:20 PM   Yellow%Wound Bed 10 5/1/2019  1:20 PM   Black%Wound Bed 0 5/1/2019  1:20 PM   Purple%Wound Bed 0 5/1/2019  1:20 PM   Other%Wound Bed 0 1/31/2019  2:04 PM   Culture Taken No 3/12/2019  2:59 PM   Number of days: 126       Wound 05/01/19 Foot Anterior; Left Wound 64.   left transmet (Active)   Wound Image   5/15/2019  1:57 PM   Wound Non-Healing Surgical 5/15/2019  1:57 PM   Dressing Status Old drainage 5/15/2019  1:57 PM   Dressing Changed Changed/New 5/1/2019  2:51 PM   Dressing/Treatment Wound gel;4x4 5/1/2019  2:51 PM   Wound Cleansed Rinsed/Irrigated with saline 5/15/2019  1:57 PM   Wound Length (cm) 0.2 cm 5/15/2019  1:57 PM   Wound Width (cm) 0.4 cm 5/15/2019  1:57 PM   Wound Depth (cm) 0.1 cm 5/15/2019  1:57 PM   Wound Surface Area (cm^2) 0.08 cm^2 5/15/2019  1:57 PM   Change in Wound Size % (l*w) -100 5/15/2019  1:57 PM   Wound Volume (cm^3) 0.01 cm^3 5/15/2019  1:57 PM   Distance Tunneling (cm) 0 cm 5/15/2019  1:57 PM   Tunneling Position ___ O'Clock 0 5/15/2019  1:57 PM   Undermining Starts ___ O'Clock 0 5/15/2019  1:57 PM   Undermining Ends___ O'Clock 0 5/15/2019  1:57 PM   Undermining Maxium Distance (cm) 0 5/15/2019  1:57 PM   Wound Assessment Pink;Red 5/15/2019  1:57 PM   Drainage Amount Small 5/15/2019  1:57 PM   Drainage Description Sanguinous 5/15/2019  1:57 PM   Odor None 5/15/2019  1:57 PM   Margins Defined edges 5/15/2019  1:57 PM   Non-staged Wound Description Full thickness 5/15/2019  1:57 PM   [place]%Wound Bed 50 5/15/2019  1:57 PM   Red%Wound Bed 50 5/15/2019  1:57 PM   Yellow%Wound Bed 0 5/15/2019  1:57 PM   Black%Wound Bed 0 5/15/2019  1:57 PM   Purple%Wound Bed 0 5/15/2019  1:57 PM   Number of days: 14          The patients pain isPain Level: 0 Pain Type: Acute pain. Wound(s) has slightly improved. Please refer to nursing measurements and assessment regarding wound pre and post debridement. Procedure Note:    Wound #: 64     Debridement: Non-excisional Debridement    Anesthetic:    Using #15 blade scalpel and forceps the wound was sharply debrided    down through and including the removal of epidermis and dermis. Total Surface Area Debrided:  1 sq cm   Devitalized Tissue Debrided:  fibrin and biofilm    Percent of Wound Debrided: 100%      Bleeding: Minimal    Hemostasis:   not needed      Response to treatment:  Well tolerated by patient.       Assessment:      Patient Active Problem List   Diagnosis    Muscle spasticity    Peripheral vascular disease ([address]    [facility]    Pain in both upper arms    Numbness    Fatigue    Weakness    Sepsis ([address]    Chronic pain    Hx of seizure disorder    Hypokalemia    Hypokalemia    Abnormal EKG    Encephalopathy    Open wound of ankle    Vitamin D deficiency    Back pain    Breakdown (mechanical) of cranial or spinal infusion catheter, initial encounter    CAFL (chronic airflow limitation) ([facility]    Binocular vision disorder with diplopia    Aptyalism    Difficult or painful urination    Headache    Hyperlipidemia    Influenza    Insomnia    Breast lump    Hay fever    Decubitus ulcer of sacral region    Current smoker    Cobalamin deficiency    Pain, neck    Venous stasis ulcer of left lower extremity (HCC)    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 layer exposed ([address]    Open wound of second toe of left foot    Type 2 diabetes mellitus with left diabetic foot ulcer ([address]    S/P transmetatarsal amputation of foot, left ([address]    Encounter for screening colonoscopy    Family history of colon cancer    Chronic constipation    Colostomy in place [facility])      Assessment- left ankle wound healed. Small area of left TMA where stitch spit out, clean, small amout of slough debrided. No further suture material found. Plan:          Plan for wound - Dress per physician order  Treatment:     Compression : No   Offloading : Yes   Dressing : see avs   Additional Therapy : Hydrofera Blue     1. Discussed appropriate home care of this wound. Wound redressed. 2. Patient instructions were given. 3. Follow up: 3 week(s).       Electronically signed by Flavia Ball MD on 5/15/2019 at 2:57 PM

## 2019-05-15 NOTE — PLAN OF CARE
Problem: Wound:  Goal: Will show signs of wound healing; wound closure and no evidence of infection  Description  Will show signs of wound healing; wound closure and no evidence of infection   Outcome: Met This Shift     Problem: Venous:  Goal: Signs of wound healing will improve  Description  Signs of wound healing will improve   Outcome: Met This Shift     Problem: Smoking cessation:  Goal: Ability to formulate a plan to maintain a tobacco-free life will be supported  Description  Ability to formulate a plan to maintain a tobacco-free life will be supported   Outcome: Met This Shift

## 2019-05-17 DIAGNOSIS — R53.83 OTHER FATIGUE: ICD-10-CM

## 2019-05-17 DIAGNOSIS — M54.2 PAIN, NECK: ICD-10-CM

## 2019-05-17 DIAGNOSIS — G35 MS (MULTIPLE SCLEROSIS) (HCC): ICD-10-CM

## 2019-05-17 DIAGNOSIS — M62.838 MUSCLE SPASTICITY: ICD-10-CM

## 2019-05-17 DIAGNOSIS — M79.622 PAIN IN BOTH UPPER ARMS: ICD-10-CM

## 2019-05-17 DIAGNOSIS — M79.621 PAIN IN BOTH UPPER ARMS: ICD-10-CM

## 2019-05-17 NOTE — TELEPHONE ENCOUNTER
Requested Prescriptions     Pending Prescriptions Disp Refills    HYDROcodone-acetaminophen (NORCO)  MG per tablet 90 tablet 0     Sig: Take 1 tablet by mouth 3 times daily as needed for Pain for up to 30 days. Must last 30 days    methylphenidate (RITALIN) 20 MG tablet 30 tablet 0     Sig: Take 1 tablet by mouth daily for 30 days.      Last filled: 4/22/19(meds pended to 5/22/19)  Last Office Visit: 2/26/19  Next Office Visit: 5/28/19  Last Sylvia Hamper: 4/9/19

## 2019-05-20 RX ORDER — METHYLPHENIDATE HYDROCHLORIDE 20 MG/1
20 TABLET ORAL DAILY
Qty: 30 TABLET | Refills: 0 | Status: SHIPPED | OUTPATIENT
Start: 2019-05-22 | End: 2019-06-21 | Stop reason: SDUPTHER

## 2019-05-20 RX ORDER — HYDROCODONE BITARTRATE AND ACETAMINOPHEN 10; 325 MG/1; MG/1
1 TABLET ORAL 3 TIMES DAILY PRN
Qty: 90 TABLET | Refills: 0 | Status: SHIPPED | OUTPATIENT
Start: 2019-05-22 | End: 2019-06-21 | Stop reason: SDUPTHER

## 2019-05-28 ENCOUNTER — HOSPITAL ENCOUNTER (OUTPATIENT)
Dept: WOMENS IMAGING | Age: 49
Discharge: HOME | End: 2019-05-28
Payer: MEDICARE

## 2019-05-28 ENCOUNTER — OFFICE VISIT (OUTPATIENT)
Dept: NEUROLOGY | Age: 49
End: 2019-05-28
Payer: MEDICARE

## 2019-05-28 ENCOUNTER — HOSPITAL ENCOUNTER (OUTPATIENT)
Dept: INFUSION THERAPY | Age: 49
Setting detail: INFUSION SERIES
Discharge: HOME | End: 2019-05-28
Payer: MEDICARE

## 2019-05-28 VITALS
HEIGHT: 69 IN | WEIGHT: 161 LBS | BODY MASS INDEX: 23.85 KG/M2 | DIASTOLIC BLOOD PRESSURE: 96 MMHG | SYSTOLIC BLOOD PRESSURE: 137 MMHG | HEART RATE: 70 BPM

## 2019-05-28 VITALS
HEART RATE: 68 BPM | TEMPERATURE: 98.4 F | RESPIRATION RATE: 17 BRPM | DIASTOLIC BLOOD PRESSURE: 78 MMHG | SYSTOLIC BLOOD PRESSURE: 123 MMHG

## 2019-05-28 DIAGNOSIS — R20.0 NUMBNESS: ICD-10-CM

## 2019-05-28 DIAGNOSIS — M54.2 PAIN, NECK: ICD-10-CM

## 2019-05-28 DIAGNOSIS — G35 MS (MULTIPLE SCLEROSIS) (HCC): Primary | ICD-10-CM

## 2019-05-28 DIAGNOSIS — Z12.31 SCREENING MAMMOGRAM, ENCOUNTER FOR: ICD-10-CM

## 2019-05-28 DIAGNOSIS — M62.838 MUSCLE SPASTICITY: ICD-10-CM

## 2019-05-28 DIAGNOSIS — R53.83 OTHER FATIGUE: ICD-10-CM

## 2019-05-28 DIAGNOSIS — M79.621 PAIN IN BOTH UPPER ARMS: ICD-10-CM

## 2019-05-28 DIAGNOSIS — R53.1 WEAKNESS: ICD-10-CM

## 2019-05-28 DIAGNOSIS — M79.622 PAIN IN BOTH UPPER ARMS: ICD-10-CM

## 2019-05-28 PROCEDURE — 6360000002 HC RX W HCPCS: Performed by: PSYCHIATRY & NEUROLOGY

## 2019-05-28 PROCEDURE — 77067 SCR MAMMO BI INCL CAD: CPT

## 2019-05-28 PROCEDURE — 96523 IRRIG DRUG DELIVERY DEVICE: CPT

## 2019-05-28 PROCEDURE — 1036F TOBACCO NON-USER: CPT | Performed by: PSYCHIATRY & NEUROLOGY

## 2019-05-28 PROCEDURE — G8427 DOCREV CUR MEDS BY ELIG CLIN: HCPCS | Performed by: PSYCHIATRY & NEUROLOGY

## 2019-05-28 PROCEDURE — 2580000003 HC RX 258: Performed by: PSYCHIATRY & NEUROLOGY

## 2019-05-28 PROCEDURE — G8420 CALC BMI NORM PARAMETERS: HCPCS | Performed by: PSYCHIATRY & NEUROLOGY

## 2019-05-28 PROCEDURE — 99214 OFFICE O/P EST MOD 30 MIN: CPT | Performed by: PSYCHIATRY & NEUROLOGY

## 2019-05-28 PROCEDURE — G8598 ASA/ANTIPLAT THER USED: HCPCS | Performed by: PSYCHIATRY & NEUROLOGY

## 2019-05-28 RX ORDER — SODIUM CHLORIDE 0.9 % (FLUSH) 0.9 %
20 SYRINGE (ML) INJECTION PRN
Status: DISCONTINUED | OUTPATIENT
Start: 2019-05-28 | End: 2019-05-30 | Stop reason: HOSPADM

## 2019-05-28 RX ADMIN — Medication 20 ML: at 14:05

## 2019-05-28 RX ADMIN — SODIUM CHLORIDE, PRESERVATIVE FREE 300 UNITS: 5 INJECTION INTRAVENOUS at 14:05

## 2019-05-28 NOTE — PROGRESS NOTES
Chief Complaint   Patient presents with   Robert Mueller is a 51y.o. year old female who is seen for evaluation of multiple sclerosis. The patient indicates that she was diagnosed with multiple sclerosis in 1994. At that time to develop some visual disturbances including blurred vision and double vision. Within eight months she was in a wheelchair. She currently has no movement of the lower extremities. She does have some increased Spasticity in the legs worse in the arms. She underwent a baclofen pump with Dr. Marisa Mendoza with some complications. She is doing better from this. She currently sees Dr. nichols for her pump management. She does have some cognitive issues. She denies diplopia, dysarthria, or dysphagia. She does have some incontinence of bladder. She does have pain in the hips and lower extremities. She also has headaches with pain behind both eyes. She was followed by Dr. Jared Mclain of neurology. She follows up today for evaluation. She is currently in a wheelchair. Since her last visit she is doing better with physical therapy. She had an accidental overdose with her baclofen pump and had a seizure. Doing better on. Recently admitted with seizure. Was off Chantix a few weeks and had a UTI. Had seizure in oct 2014 with seizure due to baclofen overose. This was her second seizure. On Keppra. no more seizures. trying to wean off some meds. More fatigue lately. Worsening memory. Doing much better. Lyrical helps. Ritalin helps. More numbness in hands. More neck pain. First infusion of Ocrevus. Normally has low blood pressure. No new issues since last visit. .        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 ([facility] 11/26/2018     Past Medical History:   Diagnosis Date    Ankle wound     Asthma     Back pain 6/28/2017    CAFL (chronic airflow limitation) ([facility]) 6/28/2017    Hay fever 6/28/2017    Headache 6/28/2017    Hyperlipidemia 6/28/2017    MS (multiple sclerosis) ([facility])     Muscle spasticity     Neuropathic ulcer of left foot, limited to breakdown of skin ([facility] 4/10/2017    Peripheral vascular disease ([facility]     Seizure ([facility]     Type 2 diabetes mellitus with left diabetic foot ulcer ([facility] 4/4/2019       Past Surgical History:   Procedure Laterality Date    BACLOFEN PUMP IMPLANTATION      COLOSTOMY      FEMUR FRACTURE SURGERY      Right    FOOT AMPUTATION Left 4/4/2019    LEFT TRANSMET AMPUTATION performed by Blue Landry MD at [address]      urostomy    OTHER SURGICAL HISTORY      pain pump    KS INSJ PRPH CTR VAD W/SUBQ PORT UNDER 5 YR N/A 6/26/2018    SINGLE LUMEN PORT PLACEMENT WITH FLUORO performed by Seb Quintana MD at [address] TOE AMPUTATION      L last toe    TONSILLECTOMY      URETEROTOMY         Family History   Problem Relation Age of Onset    Cancer Mother         breast    Colon Cancer Mother     Colon Polyps Mother     Diabetes Father     High Blood Pressure Father     Cancer Paternal Aunt         breast    Liver Cancer Paternal Aunt     Heart Disease Paternal Grandmother     Esophageal Cancer Neg Hx     Liver Disease Neg Hx     Rectal Cancer Neg Hx     Stomach Cancer Neg Hx        Allergies   Allergen Reactions    Darvocet [Propoxyphene N-Acetaminophen]      Talking out of her head    Morphine And Related Itching and Swelling    Propoxyphene Swelling       Social History     Socioeconomic History    Marital status:      Spouse name: Not on file    Number of children: Not on file    Years of education: Not on file    Highest education no easy bruising or excessive bleeding. Psychiatric - no severe anxiety or nervousness. All other review of systems are negative.       Current Outpatient Medications   Medication Sig Dispense Refill    HYDROcodone-acetaminophen (NORCO)  MG per tablet Take 1 tablet by mouth 3 times daily as needed for Pain for up to 30 days. Must last 30 days 90 tablet 0    methylphenidate (RITALIN) 20 MG tablet Take 1 tablet by mouth daily for 30 days. 30 tablet 0    azelastine (ASTELIN) 0.1 % nasal spray 2 sprays by Nasal route 2 times daily Use in each nostril as directed 1 Bottle 11    ipratropium-albuterol (DUONEB) 0.5-2.5 (3) MG/3ML SOLN nebulizer solution USE 1 UNIT DOSE IN NEBULIZER EVERY 4 TO 6 HOURS AS NEEDED. 450 mL 11    ondansetron (ZOFRAN) 4 MG tablet Take 1 tablet by mouth every 8 hours as needed for Nausea or Vomiting 15 tablet 3    oxybutynin (DITROPAN XL) 15 MG extended release tablet TAKE 1 TABLET BY MOUTH TWICE DAILY. 180 tablet 3    pilocarpine (SALAGEN) 7.5 MG tablet TAKE 1 TABLET BY MOUTH THREE TIMES DAILY. 270 tablet 3    silver sulfADIAZINE (SILVADENE) 1 % cream Apply topically daily as needed (burn) Apply topically daily. 400 g 3    tiZANidine (ZANAFLEX) 4 MG tablet TAKE 3 TABLETS TWICE DAILY 270 tablet 3    hydrochlorothiazide (HYDRODIURIL) 25 MG tablet Take 1 tablet by mouth daily as needed (hypertension) 90 tablet 3    mupirocin (BACTROBAN) 2 % nasal ointment by Nasal route 2 times daily as needed (sore) Take by Nasal route 2 times daily. 1 Tube 11    varenicline (CHANTIX) 1 MG tablet Take 1 tablet by mouth 2 times daily 60 tablet 5    pregabalin (LYRICA) 300 MG capsule Take 1 capsule by mouth 2 times daily for 30 days. 60 capsule 5    mupirocin (BACTROBAN) 2 % ointment APPLY AND GENTLY MASSAGE INTO AFFECTED AREA(S) TWICE DAILY.  22 g 5    PROAIR  (90 Base) MCG/ACT inhaler INHALE 1 PUFF INTO THE LUNGS EVERY 6 HOURS AS NEEDED FOR WHEEZING OR SHORTNESS OF BREATH 8.5 g 5    sclerosis 1 Syringe 5    lactulose (CHRONULAC) 10 GM/15ML solution Take 30 mLs by mouth 3 times daily (Patient taking differently: Take 20 g by mouth 2 times daily as needed ) 1 Bottle 5    Ocrelizumab (OCREVUS IV) Infuse intravenously       No current facility-administered medications for this visit. BP (!) 137/96   Pulse 70   Ht 5' 9\" (1.753 m)   Wt 161 lb (73 kg)   BMI 23.78 kg/m²     Constitutional - well developed, well nourished. Eyes - conjunctiva normal.  Pupils react to light  Ear, nose, throat -hearing intact to finger rub No scars, masses, or lesions over external nose or ears, no atrophy of tongue  Neck-symmetric, no masses noted, no jugular vein distension  Respiration- chest wall appears symmetric, good expansion,   normal effort without use of accessory muscles  Musculoskeletal - no significant wasting of muscles noted, no bony deformities, gait no gross ataxia  Extremities-no clubbing, cyanosis or edema  Skin - warm, dry, and intact. No rash, erythema, or pallor.   Psychiatric - mood, affect, and behavior appear normal.      Neurological exam  Awake, alert, fluent oriented x 3 appropriate affect  Attention and concentration appear appropriate  Recent and remote memory appears unremarkable  Speech normal without dysarthria  No clear issues with language of fund of knowledge    Cranial Nerve Exam   CN II- Visual fields grossly unremarkable  CN III, IV,VI-EOMI, No nystagmus, conjugate eye movements, no ptosis    CN VII-no facial assymetry    Motor Exam  Antigravity in arms    Sensory Exam  Sensation reduced     Reflexes     No clonus ankles  No Perez's sign bilateral hands    Tremors- no tremors in hands or head noted    Gait  In wheelchair    Coordination  Finger to nose-unremarkable    Lab Results   Component Value Date    [id number] 387 05/02/2019     Lab Results   Component Value Date    WBC 4.4 (L) 04/05/2019    HGB 10.7 (L) 04/05/2019    HCT 35.1 (L) 04/05/2019    MCV 90.0 04/05/2019     04/05/2019     Lab Results   Component Value Date     (H) 04/05/2019    K 3.6 04/05/2019     04/05/2019    CO2 25 04/05/2019    BUN 11 04/05/2019    CREATININE <0.5 04/05/2019    GLUCOSE 93 04/05/2019    CALCIUM 8.6 04/05/2019    PROT 7.0 05/02/2019    LABALBU 4.6 05/02/2019    BILITOT <0.2 05/02/2019    ALKPHOS 77 05/02/2019    AST 11 05/02/2019    ALT 13 05/02/2019    LABGLOM >60 04/05/2019    GLOB 2.7 08/19/2016     Impression   1.. No foci of abnormal T2 signal or enhancement within the cervical   cord to suggest plaques of multiple sclerosis or mass. 2. Mild bulging of the disc at C5-C6 with uncinate spurring   contributing to neural foraminal narrowing. There is no central   stenosis. The remaining cervical disc levels are unremarkable. Signed by Dr Caty Dodson on 8/14/2017 5:05 PM           Assessment    ICD-10-CM    1. MS (multiple sclerosis) ([facility] [address] Ambulatory referral to Physical Therapy     Amb External Referral To Occupational Therapy   2. Pain, neck M54.2    3. Pain in both upper arms M79.621 Ambulatory referral to Physical Therapy    M79.622 Amb External Referral To Occupational Therapy   4. Muscle spasticity M62.838 Ambulatory referral to Physical Therapy     Amb External Referral To Occupational Therapy   5. Other fatigue R53.83    6. Weakness R53.1 Ambulatory referral to Physical Therapy     Amb External Referral To Occupational Therapy   7. Numbness R20.0        Her neurological examination today was significant for no movement in the lower extremities. She had some altered sensation in the legs along with some spasticity. She's wheelchair-bound. Her MRI of the brain revealed no new lesions. There was extensive white matter lesions. , Her cervical, thoracic, and lumbosacral spine were relatively unremarkable. She was agreeable to be started on Gilen[place] with no issues. stable. She had been on Copaxone but came off of this on her own.  She denies any clear

## 2019-05-28 NOTE — PROGRESS NOTES
Late Entry (4/11/18) incoming call/message from Ms. Montalvo that she could not make her appointment with Dr. Kely Welch on 4/16/18 and asked me to reschedule this for another day after her treatments. I did call Dr. Lillian Mayorga office and rescheduled her appointment for 4/18/18 at 2:15pm and will ask her if she needs transportation to this appointment. I did call the patient back today (4/12/18) and left a message with the new appointment information and asked her to call me back if she wants me to schedule her the transportation. I will coordinate services as needed for Ms. Montalvo and assist with any needs at home. This note will not be viewable in [address] Review of Systems    Constitutional - No fever or chills. No diaphoresis or significant fatigue. HENT -  No tinnitus or significant hearing loss. Eyes - no sudden vision change or eye pain  Respiratory - no significant shortness of breath or cough  Cardiovascular - no chest pain No palpitations or significant leg swelling  Gastrointestinal - no abdominal swelling or pain. Genitourinary - No difficulty urinating, dysuria  Musculoskeletal - no back pain or myalgia. Skin - no color change or rash  Neurologic - No seizures. No lateralizing weakness. Hematologic - no easy bruising or excessive bleeding. Psychiatric - no severe anxiety or nervousness. All other review of systems are negative.

## 2019-06-01 PROBLEM — Z12.11 ENCOUNTER FOR SCREENING COLONOSCOPY: Status: RESOLVED | Noted: 2019-05-02 | Resolved: 2019-06-01

## 2019-06-05 ENCOUNTER — HOSPITAL ENCOUNTER (OUTPATIENT)
Dept: WOUND CARE | Age: 49
Discharge: HOME | End: 2019-06-05

## 2019-06-13 ENCOUNTER — HOSPITAL ENCOUNTER (OUTPATIENT)
Dept: WOUND CARE | Age: 49
Discharge: HOME | End: 2019-06-13
Payer: MEDICARE

## 2019-06-13 VITALS
HEART RATE: 81 BPM | HEIGHT: 69 IN | WEIGHT: 161 LBS | TEMPERATURE: 98.6 F | BODY MASS INDEX: 23.85 KG/M2 | RESPIRATION RATE: 16 BRPM | DIASTOLIC BLOOD PRESSURE: 81 MMHG | SYSTOLIC BLOOD PRESSURE: 114 MMHG

## 2019-06-13 DIAGNOSIS — I83.029 VENOUS STASIS ULCER OF LEFT LOWER EXTREMITY (HCC): ICD-10-CM

## 2019-06-13 DIAGNOSIS — L97.812 NON-PRESSURE CHRONIC ULCER OF OTHER PART OF RIGHT LOWER LEG WITH FAT LAYER EXPOSED (HCC): ICD-10-CM

## 2019-06-13 DIAGNOSIS — Z89.432 S/P TRANSMETATARSAL AMPUTATION OF FOOT, LEFT (HCC): ICD-10-CM

## 2019-06-13 DIAGNOSIS — L97.929 VENOUS STASIS ULCER OF LEFT LOWER EXTREMITY (HCC): ICD-10-CM

## 2019-06-13 DIAGNOSIS — T81.31XA DEHISCENCE OF OPERATIVE WOUND, INITIAL ENCOUNTER: Chronic | ICD-10-CM

## 2019-06-13 PROCEDURE — 11042 DBRDMT SUBQ TIS 1ST 20SQCM/<: CPT | Performed by: SURGERY

## 2019-06-13 PROCEDURE — 11042 DBRDMT SUBQ TIS 1ST 20SQCM/<: CPT

## 2019-06-13 ASSESSMENT — PAIN SCALES - GENERAL: PAINLEVEL_OUTOF10: 0

## 2019-06-13 NOTE — PROGRESS NOTES
[facility]   Progress Note and Procedure Note      Alyce Baker  AGE: 51 y.o. GENDER: female  : 1970  TODAY'S DATE:  2019    Subjective:        HISTORY of PRESENT ILLNESS HPI   Rahul Hodges is a 51 y.o. female who presents today for wound/ulcer evaluation.     Ulcer Type:non-healing surgical  Ulcer/Wound Location:left transmet incision line  Contributing factors:diabetes, chronic pressure, decreased mobility and shear force    Patient Active Problem List   Diagnosis Code    Muscle spasticity M62.838    Peripheral vascular disease ([facility]) I73.9    MS (multiple sclerosis) ([facility]) G35    Pain in both upper arms M79.621, M79.622    Numbness R20.0    Fatigue R53.83    Weakness R53.1    Sepsis ([facility]) A41.9    Chronic pain G89.29    Hx of seizure disorder Z86.69    Hypokalemia E87.6    Hypokalemia E87.6    Abnormal EKG R94.31    Encephalopathy G93.40    Open wound of ankle S91.009A    Vitamin D deficiency E55.9    Back pain M54.9    Breakdown (mechanical) of cranial or spinal infusion catheter, initial encounter T85.610A    CAFL (chronic airflow limitation) ([facility]) J44.9    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    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    Difficult or painful urination R30.0    Headache R51    Hyperlipidemia E78.5    Influenza J11.1    Insomnia G47.00    Breast lump N63.0    Hay fever J30.1    Decubitus ulcer of sacral region L89.159    Current smoker F17.200    Cobalamin deficiency E53.8    Pain, neck M54.2    Venous stasis ulcer of left lower extremity ([facility]) I83.029, L97.929    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 layer exposed ([address] L97.812    Open wound of second toe of left foot S91.105A    Type 2 diabetes mellitus with left diabetic foot ulcer ([facility]) E11.621, L97.529    S/P transmetatarsal amputation of foot, left ([facility]) Q12.090    Family history of colon cancer Z80.0    Chronic constipation K59.09    Colostomy in place ([facility] Z93.3       ALLERGIES    Allergies   Allergen Reactions    Darvocet [Propoxyphene N-Acetaminophen]      Talking out of her head    Morphine And Related Itching and Swelling    Propoxyphene Swelling           Objective:      /81   Pulse 81   Temp 98.6 °F (37 °C) (Temporal)   Resp 16   Ht 5' 9\" (1.753 m)   Wt 161 lb (73 kg)   BMI 23.78 kg/m²         Assessment:      Problem List Items Addressed This Visit     Venous stasis ulcer of left lower extremity (HCC) (Chronic)    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 layer exposed ([facility]    S/P transmetatarsal amputation of foot, left (HCC)          The patients pain isPain Level: 0  . Wound(s) has slightly improved. Please refer to nursing measurements and assessment regarding wound pre and post debridement. Wound 01/08/19 Ankle Left;Lateral Wound 63.   left lateral ankle Neuropathic Ulcer (Active)   Wound Image   6/13/2019  3:21 PM   Wound Traumatic 5/15/2019  1:57 PM   Dressing Status Old drainage 5/15/2019  1:57 PM   Dressing Changed Changed/New 5/1/2019  2:51 PM   Dressing/Treatment Hydrofera blue 3/12/2019  4:15 PM   Wound Cleansed Rinsed/Irrigated with saline 5/15/2019  1:57 PM   Wound Length (cm) 0 cm 6/13/2019  3:21 PM   Wound Width (cm) 0 cm 6/13/2019  3:21 PM   Wound Depth (cm) 0 cm 6/13/2019  3:21 PM   Wound Surface Area (cm^2) 0 cm^2 6/13/2019  3:21 PM   Change in Wound Size % (l*w) 100 6/13/2019  3:21 PM   Wound Volume (cm^3) 0 cm^3 6/13/2019  3:21 PM   Wound Healing % 100 6/13/2019  3:21 PM   Post-Procedure Length (cm) 0.5 cm 3/12/2019  3:34 PM   Post-Procedure Width (cm) 0.3 cm 3/12/2019  3:34 PM   Post-Procedure Depth (cm) 0.1 cm 3/12/2019  3:34 PM   Post-Procedure Surface Area (cm^2) 0.15 cm^2 3/12/2019  3:34 PM   Post-Procedure Volume (cm^3) 0.02 cm^3 3/12/2019  3:34 PM   Distance Tunneling (cm) 0 cm 5/15/2019  1:57 PM   Tunneling Position ___ O'Clock 0 5/15/2019  1:57 PM   Undermining Starts ___ O'Clock 0 5/15/2019  1:57 PM   Undermining Ends___ O'Clock 0 5/15/2019  1:57 PM   Undermining Maxium Distance (cm) 0 5/15/2019  1:57 PM   Wound Assessment Epithelialization 6/13/2019  3:21 PM   Drainage Amount Small 5/15/2019  1:57 PM   Drainage Description Serosanguinous 5/15/2019  1:57 PM   Odor None 5/15/2019  1:57 PM   Margins Attached edges 5/15/2019  1:57 PM   Exposed structure Fascia 5/15/2019  1:57 PM   Audelia-wound Assessment Dry; Intact 5/15/2019  1:57 PM   Non-staged Wound Description Full thickness 5/15/2019  1:57 PM   [place]%Wound Bed 45 5/1/2019  1:20 PM   Red%Wound Bed 45 5/1/2019  1:20 PM   Yellow%Wound Bed 10 5/1/2019  1:20 PM   Black%Wound Bed 0 5/1/2019  1:20 PM   Purple%Wound Bed 0 5/1/2019  1:20 PM   Other%Wound Bed 0 1/31/2019  2:04 PM   Culture Taken No 3/12/2019  2:59 PM   Number of days: 155       Wound 05/01/19 Foot Anterior; Left Wound 64.   left transmet (Active)   Wound Image   6/13/2019  3:21 PM   Wound Non-Healing Surgical 6/13/2019  3:21 PM   Dressing Status Old drainage 6/13/2019  3:21 PM   Dressing Changed Changed/New 5/1/2019  2:51 PM   Dressing/Treatment Wound gel;4x4 5/1/2019  2:51 PM   Wound Cleansed Rinsed/Irrigated with saline 6/13/2019  3:21 PM   Wound Length (cm) 0.3 cm 6/13/2019  3:21 PM   Wound Width (cm) 0.3 cm 6/13/2019  3:21 PM   Wound Depth (cm) 0.2 cm 6/13/2019  3:21 PM   Wound Surface Area (cm^2) 0.09 cm^2 6/13/2019  3:21 PM   Change in Wound Size % (l*w) -125 6/13/2019  3:21 PM   Wound Volume (cm^3) 0.02 cm^3 6/13/2019  3:21 PM   Distance Tunneling (cm) 0 cm 6/13/2019  3:21 PM   Tunneling Position ___ O'Clock 0 6/13/2019  3:21 PM   Undermining Starts ___ O'Clock 0 6/13/2019  3:21 PM   Undermining Ends___ O'Clock 0 6/13/2019  3:21 PM   Undermining Maxium Distance (cm) 0 6/13/2019  3:21 PM   Wound Assessment Pink;Red 6/13/2019  3:21 PM   Drainage Amount Small 6/13/2019  3:21 PM   Drainage Description Sanguinous 6/13/2019  3:21 PM   Odor None 6/13/2019  3:21 PM   Margins Defined edges 6/13/2019  3:21 PM   Non-staged Wound Description Full thickness 6/13/2019  3:21 PM   [place]%Wound Bed 50 6/13/2019  3:21 PM   Red%Wound Bed 50 6/13/2019  3:21 PM   Yellow%Wound Bed 0 6/13/2019  3:21 PM   Black%Wound Bed 0 6/13/2019  3:21 PM   Purple%Wound Bed 0 6/13/2019  3:21 PM   Number of days: 43        Incision 04/05/19 Foot Left (Active)   Wound Image   5/1/2019  1:20 PM   Wound Assessment Dry; Intact 6/13/2019  3:15 PM   Wound Length (cm) 0 cm 6/13/2019  3:15 PM   Wound Width (cm) 0 cm 6/13/2019  3:15 PM   Wound Depth (cm) 0 cm 6/13/2019  3:15 PM   Wound Volume (cm^3) 0 cm^3 6/13/2019  3:15 PM   Wound Healing % 100 6/13/2019  3:15 PM   Closure Sutures 4/17/2019  1:48 PM   Drainage Amount Scant 4/24/2019  3:33 PM   Drainage Description Serosanguinous 4/24/2019  3:33 PM   Odor None 6/13/2019  3:15 PM   Dressing Changed Changed/New 4/10/2019  3:28 PM   Dressing Status Old drainage; Intact 4/10/2019  3:28 PM   Number of days: 69       Procedure Note  Indications:  Based on my examination of this patient's wound(s)/ulcer(s) today, debridement is required to promote healing and evaluate the wound base. Performed by: Arlene Christina MD    Consent obtained:  Yes    Time out taken:  Yes    Pain Control:         Debridement: Excisional Debridement    Using curette the wound(s)/ulcer(s) was/were sharply debrided down through and including the removal of epidermis, dermis and subcutaneous tissue.         Devitalized Tissue Debrided:  fibrin, biofilm and slough    Pre Debridement Measurements:  Are located in the Wound/Ulcer Documentation Flow Sheet    Wound/Ulcer #: 64    Post Debridement Measurements:  Wound/Ulcer Descriptions are Pre Debridement except measurements:          Percent of Wound(s)/Ulcer(s) Debrided: 100%    Total Surface Area Debrided:  0.09 sq cm     Diabetic/Pressure/Non Pressure Ulcers only:  Ulcer: N/A     Estimated Blood Loss:  None    Hemostasis Achieved:  not needed    Response to treatment: Well tolerated by patient. Plan:          Plan for wound - Dress per physician order  Treatment:     Compression : No   Offloading : Yes   Dressing : see AVS   Additional Therapy : non3     1. Discussed appropriate home care of this wound. Wound redressed. 2. Written patient dismissal instructions given to patient and signed by patient or POA. 3. Follow up: 2 week(s). The incision line is improving. Will switch her dressing to aquacel. The area on her left ankle is healed. Encouraged her to keep the new skin moisturized and protected. Follow up in 2 weeks.      Electronically signed by Josefina Fuentes MD on 6/13/2019 at 3:34 PM

## 2019-06-21 DIAGNOSIS — R53.83 OTHER FATIGUE: ICD-10-CM

## 2019-06-21 DIAGNOSIS — M62.838 MUSCLE SPASTICITY: ICD-10-CM

## 2019-06-21 DIAGNOSIS — M54.2 PAIN, NECK: ICD-10-CM

## 2019-06-21 DIAGNOSIS — G35 MS (MULTIPLE SCLEROSIS) (HCC): ICD-10-CM

## 2019-06-21 DIAGNOSIS — M79.622 PAIN IN BOTH UPPER ARMS: ICD-10-CM

## 2019-06-21 DIAGNOSIS — M79.621 PAIN IN BOTH UPPER ARMS: ICD-10-CM

## 2019-06-21 RX ORDER — PREGABALIN 300 MG/1
300 CAPSULE ORAL 2 TIMES DAILY
Qty: 60 CAPSULE | Refills: 5 | Status: CANCELLED | OUTPATIENT
Start: 2019-06-21 | End: 2019-07-21

## 2019-06-21 RX ORDER — METHYLPHENIDATE HYDROCHLORIDE 20 MG/1
20 TABLET ORAL DAILY
Qty: 30 TABLET | Refills: 0 | Status: SHIPPED | OUTPATIENT
Start: 2019-06-21 | End: 2019-07-22 | Stop reason: SDUPTHER

## 2019-06-21 RX ORDER — HYDROCODONE BITARTRATE AND ACETAMINOPHEN 10; 325 MG/1; MG/1
1 TABLET ORAL 3 TIMES DAILY PRN
Qty: 90 TABLET | Refills: 0 | Status: SHIPPED | OUTPATIENT
Start: 2019-06-21 | End: 2019-07-22 | Stop reason: SDUPTHER

## 2019-07-05 ENCOUNTER — TELEPHONE (OUTPATIENT)
Dept: NEUROLOGY | Age: 49
End: 2019-07-05

## 2019-07-18 ENCOUNTER — HOSPITAL ENCOUNTER (OUTPATIENT)
Dept: WOUND CARE | Age: 49
Discharge: HOME | End: 2019-07-18
Payer: MEDICARE

## 2019-07-18 VITALS
RESPIRATION RATE: 16 BRPM | HEART RATE: 74 BPM | SYSTOLIC BLOOD PRESSURE: 100 MMHG | DIASTOLIC BLOOD PRESSURE: 64 MMHG | HEIGHT: 69 IN | WEIGHT: 161 LBS | BODY MASS INDEX: 23.85 KG/M2 | TEMPERATURE: 98.5 F

## 2019-07-18 DIAGNOSIS — Z89.432 S/P TRANSMETATARSAL AMPUTATION OF FOOT, LEFT (HCC): ICD-10-CM

## 2019-07-18 DIAGNOSIS — I83.029 VENOUS STASIS ULCER OF LEFT LOWER EXTREMITY (HCC): ICD-10-CM

## 2019-07-18 DIAGNOSIS — L97.929 VENOUS STASIS ULCER OF LEFT LOWER EXTREMITY (HCC): ICD-10-CM

## 2019-07-18 DIAGNOSIS — L97.812 NON-PRESSURE CHRONIC ULCER OF OTHER PART OF RIGHT LOWER LEG WITH FAT LAYER EXPOSED (HCC): ICD-10-CM

## 2019-07-18 PROCEDURE — 99212 OFFICE O/P EST SF 10 MIN: CPT | Performed by: SURGERY

## 2019-07-18 PROCEDURE — 97597 DBRDMT OPN WND 1ST 20 CM/<: CPT

## 2019-07-18 PROCEDURE — 97597 DBRDMT OPN WND 1ST 20 CM/<: CPT | Performed by: SURGERY

## 2019-07-18 ASSESSMENT — PAIN SCALES - GENERAL: PAINLEVEL_OUTOF10: 0

## 2019-07-18 NOTE — PROGRESS NOTES
ALLERGIES    Allergies   Allergen Reactions    Darvocet [Propoxyphene N-Acetaminophen]      Talking out of her head    Morphine And Related Itching and Swelling    Propoxyphene Swelling           Objective:      /64   Pulse 74   Temp 98.5 °F (36.9 °C)   Resp 16   Ht 5' 9\" (1.753 m)   Wt 161 lb (73 kg)   BMI 23.78 kg/m²         Assessment:      Problem List Items Addressed This Visit     Venous stasis ulcer of left lower extremity (HCC) (Chronic)    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 layer exposed ([address]    S/P transmetatarsal amputation of foot, left (HCC)          The patients pain isPain Level: 0  . Wound(s) on her trans met is healed, new wound on her right ankle. Please refer to nursing measurements and assessment regarding wound pre and post debridement. Wound 05/01/19 Foot Anterior; Left Wound 64. left transmet (Active)   Wound Image   7/18/2019  1:52 PM   Wound Non-Healing Surgical 7/18/2019  1:52 PM   Dressing Status Changed;Clean;Dry; Intact 6/13/2019  4:16 PM   Dressing Changed Changed/New 6/13/2019  4:16 PM   Dressing/Treatment Wound gel;4x4 5/1/2019  2:51 PM   Wound Cleansed Not Cleansed 7/18/2019  1:52 PM   Wound Length (cm) 0 cm 7/18/2019  1:52 PM   Wound Width (cm) 0 cm 7/18/2019  1:52 PM   Wound Depth (cm) 0 cm 7/18/2019  1:52 PM   Wound Surface Area (cm^2) 0 cm^2 7/18/2019  1:52 PM   Change in Wound Size % (l*w) 100 7/18/2019  1:52 PM   Wound Volume (cm^3) 0 cm^3 7/18/2019  1:52 PM   Post-Procedure Length (cm) 0.3 cm 6/13/2019  3:35 PM   Post-Procedure Width (cm) 0.3 cm 6/13/2019  3:35 PM   Post-Procedure Depth (cm) 0.2 cm 6/13/2019  3:35 PM   Post-Procedure Surface Area (cm^2) 0.09 cm^2 6/13/2019  3:35 PM   Post-Procedure Volume (cm^3) 0.02 cm^3 6/13/2019  3:35 PM   Distance Tunneling (cm) 0 cm 7/18/2019  1:52 PM   Tunneling Position ___ O'Clock 0 7/18/2019  1:52 PM   Undermining Starts ___ O'Clock 0 7/18/2019  1:52 PM   Undermining Ends___ O'Clock 0 7/18/2019  1:52 PM   Undermining Maxium Distance (cm) 0 7/18/2019  1:52 PM   Wound Assessment Epithelialization 7/18/2019  1:52 PM   Drainage Amount None 7/18/2019  1:52 PM   Drainage Description Sanguinous 6/13/2019  3:21 PM   Odor None 7/18/2019  1:52 PM   Margins Defined edges 6/13/2019  3:21 PM   Audelia-wound Assessment Dry; Intact 7/18/2019  1:52 PM   Non-staged Wound Description Not applicable 6/01/6022  0:18 PM   [place]%Wound Bed 0 7/18/2019  1:52 PM   Red%Wound Bed 0 7/18/2019  1:52 PM   Yellow%Wound Bed 0 7/18/2019  1:52 PM   Black%Wound Bed 0 7/18/2019  1:52 PM   Purple%Wound Bed 0 7/18/2019  1:52 PM   Other%Wound Bed 0 7/18/2019  1:52 PM   Number of days: 78       Wound 07/18/19 Ankle Right Reference R. Ankle Scab (Active)   Wound Image    7/18/2019  1:52 PM   Number of days: 0           Procedure Note  Indications:  Based on my examination of this patient's wound(s)/ulcer(s) today, debridement is required to promote healing and evaluate the wound base. Performed by: Grupo John MD    Pain Control: Anesthetic  Anesthetic: None       Debridement: Selective Debridement    [place] the wound(s)/ulcer(s) was/were sharply debrided down through and including the removal of epidermis and dermis. Devitalized Tissue Debrided:  fibrin and biofilm    Pre Debridement Measurements:  Are located in the Wound/Ulcer Documentation Flow Sheet    Wound/Ulcer #: 65    Post Debridement Measurements:  Wound/Ulcer Descriptions are Pre Debridement except measurements:          Percent of Wound(s)/Ulcer(s) Debrided: 100%    Total Surface Area Debrided:  0.02 sq cm     Diabetic/Pressure/Non Pressure Ulcers only:  Ulcer: N/A     Estimated Blood Loss:  None    Hemostasis Achieved:  not needed    Response to treatment:  Well tolerated by patient.            Plan:          Plan for wound - Dress per physician order  Treatment:     Compression : No   Offloading : Yes   Dressing : see AVS   Additional Therapy :

## 2019-07-22 DIAGNOSIS — G35 MS (MULTIPLE SCLEROSIS) (HCC): ICD-10-CM

## 2019-07-22 DIAGNOSIS — M54.2 PAIN, NECK: ICD-10-CM

## 2019-07-22 DIAGNOSIS — M79.622 PAIN IN BOTH UPPER ARMS: ICD-10-CM

## 2019-07-22 DIAGNOSIS — R53.83 OTHER FATIGUE: ICD-10-CM

## 2019-07-22 DIAGNOSIS — M79.621 PAIN IN BOTH UPPER ARMS: ICD-10-CM

## 2019-07-22 DIAGNOSIS — M62.838 MUSCLE SPASTICITY: ICD-10-CM

## 2019-07-23 RX ORDER — HYDROCODONE BITARTRATE AND ACETAMINOPHEN 10; 325 MG/1; MG/1
TABLET ORAL
Qty: 90 TABLET | Refills: 0 | Status: SHIPPED | OUTPATIENT
Start: 2019-07-23 | End: 2019-08-23 | Stop reason: SDUPTHER

## 2019-07-23 RX ORDER — METHYLPHENIDATE HYDROCHLORIDE 20 MG/1
TABLET ORAL
Qty: 30 TABLET | Refills: 0 | Status: SHIPPED | OUTPATIENT
Start: 2019-07-23 | End: 2019-08-23 | Stop reason: SDUPTHER

## 2019-07-24 PROBLEM — T81.31XA SURGICAL WOUND BREAKDOWN: Chronic | Status: ACTIVE | Noted: 2019-07-24

## 2019-07-31 ENCOUNTER — HOSPITAL ENCOUNTER (EMERGENCY)
Age: 49
Discharge: HOME | End: 2019-07-31
Attending: EMERGENCY MEDICINE
Payer: MEDICARE

## 2019-07-31 ENCOUNTER — APPOINTMENT (OUTPATIENT)
Dept: GENERAL RADIOLOGY | Age: 49
End: 2019-07-31
Payer: MEDICARE

## 2019-07-31 VITALS
RESPIRATION RATE: 20 BRPM | DIASTOLIC BLOOD PRESSURE: 94 MMHG | TEMPERATURE: 99.4 F | SYSTOLIC BLOOD PRESSURE: 123 MMHG | BODY MASS INDEX: 23.78 KG/M2 | HEART RATE: 92 BPM | OXYGEN SATURATION: 94 % | WEIGHT: 161 LBS
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
## 2019-07-31 DIAGNOSIS — G35 MS (MULTIPLE SCLEROSIS) (HCC): Chronic | ICD-10-CM

## 2019-07-31 LAB
ALBUMIN SERPL-MCNC: 3.9 G/DL (ref 3.5–5.2)
ALP BLD-CCNC: 67 U/L (ref 35–104)
ALT SERPL-CCNC: 59 U/L (ref 5–33)
ANION GAP SERPL CALCULATED.3IONS-SCNC: 10 MMOL/L (ref 7–19)
AST SERPL-CCNC: 59 U/L (ref 5–32)
BASOPHILS ABSOLUTE: 0 K/UL (ref 0–0.2)
BASOPHILS RELATIVE PERCENT: 0.4 % (ref 0–1)
BILIRUB SERPL-MCNC: 0.4 MG/DL (ref 0.2–1.2)
BUN BLDV-MCNC: 18 MG/DL (ref 6–20)
CALCIUM SERPL-MCNC: 8.8 MG/DL (ref 8.6–10)
CHLORIDE BLD-SCNC: 110 MMOL/L (ref 98–111)
CO2: 24 MMOL/L (ref 22–29)
CREAT SERPL-MCNC: <0.5 MG/DL (ref 0.5–0.9)
EOSINOPHILS ABSOLUTE: 0.1 K/UL (ref 0–0.6)
EOSINOPHILS RELATIVE PERCENT: 2.1 % (ref 0–5)
ETHANOL: <10 MG/DL (ref 0–0.08)
GFR NON-AFRICAN AMERICAN: >60
GLUCOSE BLD-MCNC: 98 MG/DL (ref 74–109)
HCT VFR BLD CALC: 38.1 % (ref 37–47)
HEMOGLOBIN: 12.1 G/DL (ref 12–16)
LACTIC ACID: 1.1 MMOL/L (ref 0.5–1.9)
LYMPHOCYTES ABSOLUTE: 0.5 K/UL (ref 1.1–4.5)
LYMPHOCYTES RELATIVE PERCENT: 10.1 % (ref 20–40)
MCH RBC QN AUTO: 27.5 PG (ref 27–31)
MCHC RBC AUTO-ENTMCNC: 31.8 G/DL (ref 33–37)
MCV RBC AUTO: 86.6 FL (ref 81–99)
MONOCYTES ABSOLUTE: 0.4 K/UL (ref 0–0.9)
MONOCYTES RELATIVE PERCENT: 8 % (ref 0–10)
NEUTROPHILS ABSOLUTE: 4.2 K/UL (ref 1.5–7.5)
NEUTROPHILS RELATIVE PERCENT: 79.2 % (ref 50–65)
PDW BLD-RTO: 13.9 % (ref 11.5–14.5)
PLATELET # BLD: 168 K/UL (ref 130–400)
PMV BLD AUTO: 11.7 FL (ref 9.4–12.3)
POTASSIUM REFLEX MAGNESIUM: 3.8 MMOL/L (ref 3.5–5)
RBC # BLD: 4.4 M/UL (ref 4.2–5.4)
SODIUM BLD-SCNC: 144 MMOL/L (ref 136–145)
TOTAL CK: 62 U/L (ref 26–192)
TOTAL PROTEIN: 6.1 G/DL (ref 6.6–8.7)
WBC # BLD: 5.3 K/UL (ref 4.8–10.8)

## 2019-07-31 PROCEDURE — 2580000003 HC RX 258: Performed by: EMERGENCY MEDICINE

## 2019-07-31 PROCEDURE — 93005 ELECTROCARDIOGRAM TRACING: CPT

## 2019-07-31 PROCEDURE — 83605 ASSAY OF LACTIC ACID: CPT

## 2019-07-31 PROCEDURE — G0480 DRUG TEST DEF 1-7 CLASSES: HCPCS

## 2019-07-31 PROCEDURE — 99284 EMERGENCY DEPT VISIT MOD MDM: CPT | Performed by: EMERGENCY MEDICINE

## 2019-07-31 PROCEDURE — 85025 COMPLETE CBC W/AUTO DIFF WBC: CPT

## 2019-07-31 PROCEDURE — 80053 COMPREHEN METABOLIC PANEL: CPT

## 2019-07-31 PROCEDURE — 71045 X-RAY EXAM CHEST 1 VIEW: CPT

## 2019-07-31 PROCEDURE — 36415 COLL VENOUS BLD VENIPUNCTURE: CPT

## 2019-07-31 PROCEDURE — 82550 ASSAY OF CK (CPK): CPT

## 2019-07-31 PROCEDURE — 99285 EMERGENCY DEPT VISIT HI MDM: CPT

## 2019-07-31 RX ORDER — 0.9 % SODIUM CHLORIDE 0.9 %
1000 INTRAVENOUS SOLUTION INTRAVENOUS ONCE
Status: COMPLETED | OUTPATIENT
Start: 2019-07-31 | End: 2019-07-31

## 2019-07-31 RX ADMIN — SODIUM CHLORIDE 1000 ML: 9 INJECTION, SOLUTION INTRAVENOUS at 13:40

## 2019-07-31 ASSESSMENT — ENCOUNTER SYMPTOMS
SORE THROAT: 0
CHOKING: 0
EYE DISCHARGE: 0
NAUSEA: 0
FACIAL SWELLING: 0
BLOOD IN STOOL: 0
CONSTIPATION: 0
SHORTNESS OF BREATH: 1
APNEA: 0
VOICE CHANGE: 0
DIARRHEA: 0
ABDOMINAL PAIN: 0
SINUS PRESSURE: 0

## 2019-07-31 NOTE — ED NOTES
MD Georgia Moreno approved to contact EMS services of [facility] for transport home due to medical condition     Prateek Dunham RN  07/31/19 [id number]

## 2019-07-31 NOTE — ED PROVIDER NOTES
Eyes: Negative for discharge. Respiratory: Positive for shortness of breath. Negative for apnea and choking. Cardiovascular: Negative for chest pain and leg swelling. Gastrointestinal: Negative for abdominal pain, blood in stool, constipation, diarrhea and nausea. Genitourinary:        Urostomy and colostomy   Musculoskeletal: Negative for joint swelling. Skin: Negative for rash and wound. Neurological: Negative for seizures and syncope. Psychiatric/Behavioral: Negative for behavioral problems, hallucinations and suicidal ideas. All other systems reviewed and are negative. A complete review of systems was performed and is negative except as noted above in the HPI.        PAST MEDICAL HISTORY     Past Medical History:   Diagnosis Date    Ankle wound     and toe wound    Asthma     Back pain 6/28/2017    CAFL (chronic airflow limitation) ([facility] 6/28/2017    Hay fever 6/28/2017    Headache 6/28/2017    Hyperlipidemia 6/28/2017    MS (multiple sclerosis) ([facility])     Muscle spasticity     Neuropathic ulcer of left foot, limited to breakdown of skin ([address] 4/10/2017    Peripheral vascular disease ([facility]     Seizure ([facility])     occ. related to ms    Type 2 diabetes mellitus with left diabetic foot ulcer ([facility] 4/4/2019         SURGICAL HISTORY       Past Surgical History:   Procedure Laterality Date    BACLOFEN PUMP IMPLANTATION      COLOSTOMY      FEMUR FRACTURE SURGERY      Right    FOOT AMPUTATION Left 4/4/2019    LEFT TRANSMET AMPUTATION performed by Desmond Martin MD at [address] [facility]    OTHER SURGICAL HISTORY      pain pump    AR INSJ PRPH CTR VAD W/SUBQ PORT UNDER 5 YR N/A 6/26/2018    SINGLE LUMEN PORT PLACEMENT WITH FLUORO performed by Fabio Ferguson MD at [address] TOE AMPUTATION      L last toe    TONSILLECTOMY      URETEROTOMY           CURRENT MEDICATIONS       Previous Medications    AZELASTINE (ASTELIN) 0.1 % NASAL MUPIROCIN (BACTROBAN) 2 % OINTMENT    APPLY AND GENTLY MASSAGE INTO AFFECTED AREA(S) TWICE DAILY. NITROFURANTOIN (MACRODANTIN) 50 MG CAPSULE    Take 1 capsule by mouth daily    OCRELIZUMAB (OCREVUS IV)    Infuse intravenously    ONDANSETRON (ZOFRAN) 4 MG TABLET    Take 1 tablet by mouth every 8 hours as needed for Nausea or Vomiting    OXYBUTYNIN (DITROPAN XL) 15 MG EXTENDED RELEASE TABLET    TAKE 1 TABLET BY MOUTH TWICE DAILY. PILOCARPINE (SALAGEN) 7.5 MG TABLET    TAKE 1 TABLET BY MOUTH THREE TIMES DAILY. PREGABALIN (LYRICA) 300 MG CAPSULE    Take 1 capsule by mouth 2 times daily for 30 days. PROAIR  (90 BASE) MCG/ACT INHALER    INHALE 1 PUFF INTO THE LUNGS EVERY 6 HOURS AS NEEDED FOR WHEEZING OR SHORTNESS OF BREATH    SODIUM CHLORIDE FLUSH (SALINE FLUSH) 0.9 % SOLN    Infuse 20 mLs intravenously every 30 days Not every 30 days but every 4-6 weeks as need with 300 units of heparin to flush port for Infusion of Ocrevus for multiple sclerosis    TIZANIDINE (ZANAFLEX) 4 MG TABLET    TAKE 3 TABLETS TWICE DAILY    VARENICLINE (CHANTIX) 1 MG TABLET    Take 1 tablet by mouth 2 times daily    VITAMIN A 21811 UNITS CAPSULE    Take 10,000 Units by mouth daily    VITAMIN C (ASCORBIC ACID) 500 MG TABLET    Take 1,000 mg by mouth daily    VITAMIN D (CHOLECALCIFEROL) 1000 UNIT TABS TABLET    Take 1,000 Units by mouth daily       ALLERGIES     Darvocet [propoxyphene n-acetaminophen];  Morphine and related; and Propoxyphene    FAMILY HISTORY       Family History   Problem Relation Age of Onset    Cancer Mother         breast    Colon Cancer Mother     Colon Polyps Mother     Diabetes Father     High Blood Pressure Father     Cancer Paternal Aunt         breast    Liver Cancer Paternal Aunt     Heart Disease Paternal Grandmother     Esophageal Cancer Neg Hx     Liver Disease Neg Hx     Rectal Cancer Neg Hx     Stomach Cancer Neg Hx           SOCIAL HISTORY       Social History     Socioeconomic

## 2019-08-01 ENCOUNTER — HOSPITAL ENCOUNTER (OUTPATIENT)
Dept: WOUND CARE | Age: 49
Discharge: HOME | End: 2019-08-01
Payer: MEDICARE

## 2019-08-01 VITALS
WEIGHT: 161 LBS | HEIGHT: 69 IN | RESPIRATION RATE: 18 BRPM | BODY MASS INDEX: 23.85 KG/M2 | TEMPERATURE: 98 F | HEART RATE: 90 BPM

## 2019-08-01 DIAGNOSIS — I83.029 VENOUS STASIS ULCER OF LEFT LOWER EXTREMITY (HCC): ICD-10-CM

## 2019-08-01 DIAGNOSIS — L97.812 NON-PRESSURE CHRONIC ULCER OF OTHER PART OF RIGHT LOWER LEG WITH FAT LAYER EXPOSED (HCC): ICD-10-CM

## 2019-08-01 DIAGNOSIS — Z89.432 S/P TRANSMETATARSAL AMPUTATION OF FOOT, LEFT (HCC): ICD-10-CM

## 2019-08-01 DIAGNOSIS — L97.929 VENOUS STASIS ULCER OF LEFT LOWER EXTREMITY (HCC): ICD-10-CM

## 2019-08-01 LAB
EKG P AXIS: 13 DEGREES
EKG P-R INTERVAL: 140 MS
EKG Q-T INTERVAL: 358 MS
EKG QRS DURATION: 104 MS
EKG QTC CALCULATION (BAZETT): 403 MS
EKG T AXIS: 30 DEGREES

## 2019-08-01 PROCEDURE — 99212 OFFICE O/P EST SF 10 MIN: CPT

## 2019-08-01 PROCEDURE — 99211 OFF/OP EST MAY X REQ PHY/QHP: CPT | Performed by: SURGERY

## 2019-08-01 ASSESSMENT — PAIN SCALES - GENERAL: PAINLEVEL_OUTOF10: 0

## 2019-08-05 RX ORDER — OXYBUTYNIN CHLORIDE 15 MG/1
TABLET, EXTENDED RELEASE ORAL
Qty: 60 TABLET | Refills: 0 | Status: SHIPPED | OUTPATIENT
Start: 2019-08-05 | End: 2019-08-28

## 2019-08-23 DIAGNOSIS — R53.83 OTHER FATIGUE: ICD-10-CM

## 2019-08-23 DIAGNOSIS — M62.838 MUSCLE SPASTICITY: ICD-10-CM

## 2019-08-23 DIAGNOSIS — M54.2 PAIN, NECK: ICD-10-CM

## 2019-08-23 DIAGNOSIS — M79.622 PAIN IN BOTH UPPER ARMS: ICD-10-CM

## 2019-08-23 DIAGNOSIS — G35 MS (MULTIPLE SCLEROSIS) (HCC): ICD-10-CM

## 2019-08-23 DIAGNOSIS — M79.621 PAIN IN BOTH UPPER ARMS: ICD-10-CM

## 2019-08-23 RX ORDER — METHYLPHENIDATE HYDROCHLORIDE 20 MG/1
TABLET ORAL
Qty: 30 TABLET | Refills: 0 | Status: SHIPPED | OUTPATIENT
Start: 2019-08-23 | End: 2019-09-25 | Stop reason: SDUPTHER

## 2019-08-23 RX ORDER — HYDROCODONE BITARTRATE AND ACETAMINOPHEN 10; 325 MG/1; MG/1
TABLET ORAL
Qty: 90 TABLET | Refills: 0 | Status: SHIPPED | OUTPATIENT
Start: 2019-08-23 | End: 2019-09-25 | Stop reason: SDUPTHER

## 2019-08-28 ENCOUNTER — OFFICE VISIT (OUTPATIENT)
Dept: UROLOGY | Age: 49
End: 2019-08-28
Payer: MEDICARE

## 2019-08-28 ENCOUNTER — OFFICE VISIT (OUTPATIENT)
Dept: NEUROLOGY | Age: 49
End: 2019-08-28
Payer: MEDICARE

## 2019-08-28 ENCOUNTER — HOSPITAL ENCOUNTER (OUTPATIENT)
Dept: INFUSION THERAPY | Age: 49
Setting detail: INFUSION SERIES
Discharge: HOME | End: 2019-08-28
Payer: MEDICARE

## 2019-08-28 VITALS — BODY MASS INDEX: 23.85 KG/M2 | TEMPERATURE: 97.3 F | WEIGHT: 161 LBS | HEIGHT: 69 IN

## 2019-08-28 VITALS
OXYGEN SATURATION: 98 % | DIASTOLIC BLOOD PRESSURE: 67 MMHG | SYSTOLIC BLOOD PRESSURE: 110 MMHG | TEMPERATURE: 97.8 F | HEART RATE: 65 BPM | RESPIRATION RATE: 18 BRPM

## 2019-08-28 VITALS — HEART RATE: 72 BPM | DIASTOLIC BLOOD PRESSURE: 77 MMHG | SYSTOLIC BLOOD PRESSURE: 109 MMHG

## 2019-08-28 DIAGNOSIS — M54.2 PAIN, NECK: ICD-10-CM

## 2019-08-28 DIAGNOSIS — M79.622 PAIN IN BOTH UPPER ARMS: ICD-10-CM

## 2019-08-28 DIAGNOSIS — N39.0 RECURRENT UTI: Primary | ICD-10-CM

## 2019-08-28 DIAGNOSIS — M79.621 PAIN IN BOTH UPPER ARMS: ICD-10-CM

## 2019-08-28 DIAGNOSIS — G35 MULTIPLE SCLEROSIS (HCC): ICD-10-CM

## 2019-08-28 DIAGNOSIS — G35 MS (MULTIPLE SCLEROSIS) (HCC): Primary | ICD-10-CM

## 2019-08-28 DIAGNOSIS — R20.0 NUMBNESS: ICD-10-CM

## 2019-08-28 DIAGNOSIS — N39.42 URINARY INCONTINENCE WITHOUT SENSORY AWARENESS: ICD-10-CM

## 2019-08-28 DIAGNOSIS — R53.1 WEAKNESS: ICD-10-CM

## 2019-08-28 DIAGNOSIS — M25.559 ARTHRALGIA OF HIP, UNSPECIFIED LATERALITY: Primary | ICD-10-CM

## 2019-08-28 DIAGNOSIS — N31.9 NEUROGENIC BLADDER: ICD-10-CM

## 2019-08-28 DIAGNOSIS — R53.83 OTHER FATIGUE: ICD-10-CM

## 2019-08-28 DIAGNOSIS — M62.838 MUSCLE SPASTICITY: ICD-10-CM

## 2019-08-28 LAB
BACTERIA URINE, POC: ABNORMAL
BILIRUBIN URINE: 0 MG/DL
BLOOD, URINE: NEGATIVE
CASTS URINE, POC: ABNORMAL
CLARITY: CLEAR
COLOR: YELLOW
CRYSTALS URINE, POC: ABNORMAL
EPI CELLS URINE, POC: ABNORMAL
GLUCOSE URINE: ABNORMAL
KETONES, URINE: NEGATIVE
LEUKOCYTE EST, POC: ABNORMAL
NITRITE, URINE: POSITIVE
PH UA: 7.5 (ref 4.5–8)
PROTEIN UA: NEGATIVE
RBC URINE, POC: ABNORMAL
SPECIFIC GRAVITY UA: 1.01 (ref 1–1.03)
UROBILINOGEN, URINE: NORMAL
WBC URINE, POC: ABNORMAL
YEAST URINE, POC: ABNORMAL

## 2019-08-28 PROCEDURE — G8420 CALC BMI NORM PARAMETERS: HCPCS | Performed by: PSYCHIATRY & NEUROLOGY

## 2019-08-28 PROCEDURE — 96523 IRRIG DRUG DELIVERY DEVICE: CPT

## 2019-08-28 PROCEDURE — 99214 OFFICE O/P EST MOD 30 MIN: CPT | Performed by: PSYCHIATRY & NEUROLOGY

## 2019-08-28 PROCEDURE — 2580000003 HC RX 258: Performed by: PSYCHIATRY & NEUROLOGY

## 2019-08-28 PROCEDURE — 81001 URINALYSIS AUTO W/SCOPE: CPT | Performed by: PHYSICIAN ASSISTANT

## 2019-08-28 PROCEDURE — G8598 ASA/ANTIPLAT THER USED: HCPCS | Performed by: PHYSICIAN ASSISTANT

## 2019-08-28 PROCEDURE — 1036F TOBACCO NON-USER: CPT | Performed by: PSYCHIATRY & NEUROLOGY

## 2019-08-28 PROCEDURE — G8427 DOCREV CUR MEDS BY ELIG CLIN: HCPCS | Performed by: PSYCHIATRY & NEUROLOGY

## 2019-08-28 PROCEDURE — 51798 US URINE CAPACITY MEASURE: CPT | Performed by: PHYSICIAN ASSISTANT

## 2019-08-28 PROCEDURE — 99214 OFFICE O/P EST MOD 30 MIN: CPT | Performed by: PHYSICIAN ASSISTANT

## 2019-08-28 PROCEDURE — G8420 CALC BMI NORM PARAMETERS: HCPCS | Performed by: PHYSICIAN ASSISTANT

## 2019-08-28 PROCEDURE — G8598 ASA/ANTIPLAT THER USED: HCPCS | Performed by: PSYCHIATRY & NEUROLOGY

## 2019-08-28 PROCEDURE — 6360000002 HC RX W HCPCS: Performed by: PSYCHIATRY & NEUROLOGY

## 2019-08-28 PROCEDURE — G8427 DOCREV CUR MEDS BY ELIG CLIN: HCPCS | Performed by: PHYSICIAN ASSISTANT

## 2019-08-28 PROCEDURE — 1036F TOBACCO NON-USER: CPT | Performed by: PHYSICIAN ASSISTANT

## 2019-08-28 RX ORDER — SODIUM CHLORIDE 0.9 % (FLUSH) 0.9 %
20 SYRINGE (ML) INJECTION PRN
Status: DISCONTINUED | OUTPATIENT
Start: 2019-08-28 | End: 2019-08-30 | Stop reason: HOSPADM

## 2019-08-28 RX ORDER — SOLIFENACIN SUCCINATE 10 MG/1
10 TABLET, FILM COATED ORAL DAILY
Qty: 30 TABLET | Refills: 2 | Status: ON HOLD | OUTPATIENT
Start: 2019-08-28 | End: 2019-09-27

## 2019-08-28 RX ADMIN — HEPARIN 300 UNITS: 100 SYRINGE at 14:20

## 2019-08-28 RX ADMIN — Medication 20 ML: at 14:30

## 2019-08-28 ASSESSMENT — ENCOUNTER SYMPTOMS
BACK PAIN: 0
WHEEZING: 0
EYE REDNESS: 0
COUGH: 0
SHORTNESS OF BREATH: 0
CONSTIPATION: 0
DIARRHEA: 0
ABDOMINAL PAIN: 0
EYE DISCHARGE: 0

## 2019-08-28 NOTE — PROGRESS NOTES
Sepsis ([address] 08/19/2016    Chronic pain 08/19/2016    Hx of seizure disorder 08/19/2016    Hypokalemia 08/20/2016    Hypokalemia 08/20/2016    Abnormal EKG     Encephalopathy 08/25/2016    Open wound of ankle 06/28/2017    Vitamin D deficiency 06/28/2017    Back pain 06/28/2017    Breakdown (mechanical) of cranial or spinal infusion catheter, initial encounter 10/25/2016    CAFL (chronic airflow limitation) ([address] 06/28/2017    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    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    Difficult or painful urination 06/28/2017    Headache 06/28/2017    Hyperlipidemia 06/28/2017    Influenza 06/28/2017    Insomnia 06/28/2017    Breast lump 06/28/2017    Hay fever 06/28/2017    Decubitus ulcer of sacral region 06/28/2017    Current smoker 06/28/2017    Cobalamin deficiency 06/28/2017    Pain, neck 09/06/2017    Venous stasis ulcer of left lower extremity ([address] 11/26/2018    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 layer exposed ([address] 11/26/2018    Open wound of second toe of left foot 12/11/2018    Type 2 diabetes mellitus with left diabetic foot ulcer ([address] 04/04/2019    S/P transmetatarsal amputation of foot, left ([address] 04/17/2019    Family history of colon cancer 05/02/2019    Chronic constipation 05/02/2019    Colostomy in place [facility]) 05/02/2019    Surgical wound breakdown 07/24/2019     Resolved Ambulatory Problems     Diagnosis Date Noted    Neuropathic ulcer of right foot, limited to breakdown of skin ([address] 02/01/2016    Urinary tract infection 08/19/2016    Elevated troponin level     Neuropathic ulcer of left foot, limited to breakdown of skin ([address] 04/10/2017    Acute bronchitis 06/28/2017    Acute sinusitis 06/28/2017    Cough 06/28/2017    Encounter for screening for other disorder 06/28/2017    Patient overweight 06/28/2017    Disease caused by fungus 06/28/2017    Colostomy and enterostomy malfunction ([address] Assessment    ICD-10-CM    1. MS (multiple sclerosis) ([facility] G35    2. Pain, neck M54.2    3. Pain in both upper arms M79.621     M79.622    4. Muscle spasticity M62.838    5. Other fatigue R53.83    6. Weakness R53.1    7. Numbness R20.0        Her neurological examination today was significant for no movement in the lower extremities. She had some altered sensation in the legs along with some spasticity. She's wheelchair-bound. Her MRI of the brain revealed no new lesions. There was extensive white matter lesions. , Her cervical, thoracic, and lumbosacral spine were relatively unremarkable. She was agreeable to be started on Gilenya with no issues. stable. She had been on Copaxone but came off of this on her own. She denies any clear relapses over the last several years. She'll be referred to physical therapy as well. She will have a CBC and CMP every 3 months. The patient indicated understanding of the management plan. At this time she will be referred to [facility] as per her wishes for some experimental treatments. She may consider decreasing her keppra to 1 tabs to bid. She is seeing Dr Perry Miramontes who manages baclofen pump. She will be continued  Ritalin to see if this helps at a future. She will be tried on Nuvgil. Lyrica restarted. She will be referred to [facility] for colostomy issues. .her hepatitis B panel was unremkarkable. Her EMG with NCs of the arms was suggestive of an ulnar neuropathy on the left. her MRI of the brain had stable white matter change. Her MRI of the c spine had some minimal disc bulging but no MS plaques Her x rays of her hips due to pain were unremarkable except for bladder stone along with pin in right hip. 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 She wasn't to come off Ocrevus due to [address] virus. She's to follow-up with me in approximately 3 months and call with any further problems. She will be referred to PT and OT. Continue current care as noted. Follow up 3 months. Plan    No orders of the defined types were placed in this encounter. No orders of the defined types were placed in this encounter. Return in about 3 months (around 11/28/2019).

## 2019-08-28 NOTE — PROGRESS NOTES
Hero Sommers is a 51 y.o. female who presents today   Chief Complaint   Patient presents with    New Patient     I am here through referral for recurrent UTI; I have a urostomy bag and most nights I am experiencing urinary incontinence vaginally      HPI:  Patient is a 70-year-old female we have not seen since December 2017 with history of recurrent urinary tract infections with history of MS who is wheelchair-bound. She has a urostomy and colostomy. She does not use a urostomy bag but inserts catheter within the urostomy opening. She was not able to cath in her urethra due to her physical limitations from her MS. He has been using oxybutynin for bladder spasms as well as Macrodantin 50 mg. She states when she gets urinary tract infection her urine becomes cloudy and very malodorous and increasing bladder spasms she never has fever chills or back pain.      Past Medical History:   Diagnosis Date    Ankle wound     and toe wound    Asthma     Back pain 6/28/2017    CAFL (chronic airflow limitation) ([facility]) 6/28/2017    Hay fever 6/28/2017    Headache 6/28/2017    Hyperlipidemia 6/28/2017    MS (multiple sclerosis) ([facility])     Muscle spasticity     Neuropathic ulcer of left foot, limited to breakdown of skin ([address] 4/10/2017    Peripheral vascular disease ([address]     Seizure ([facility])     occ. related to ms    Type 2 diabetes mellitus with left diabetic foot ulcer ([address] 4/4/2019       Past Surgical History:   Procedure Laterality Date    BACLOFEN PUMP IMPLANTATION      COLOSTOMY      FEMUR FRACTURE SURGERY      Right    FOOT AMPUTATION Left 4/4/2019    LEFT TRANSMET AMPUTATION performed by Bernard Rodriguez MD at [address] [facility]    OTHER SURGICAL HISTORY      pain pump    AL INSJ PRPH CTR VAD W/SUBQ PORT UNDER 5 YR N/A 6/26/2018    SINGLE LUMEN PORT PLACEMENT WITH FLUORO performed by Verner Ripper, MD at [address] [facility] [address] TOE AMPUTATION      L last toe

## 2019-08-30 ENCOUNTER — TELEPHONE (OUTPATIENT)
Dept: NEUROLOGY | Age: 49
End: 2019-08-30

## 2019-08-30 LAB
ORGANISM: ABNORMAL
ORGANISM: ABNORMAL
URINE CULTURE, ROUTINE: ABNORMAL

## 2019-08-30 RX ORDER — LEVOFLOXACIN 500 MG/1
500 TABLET, FILM COATED ORAL DAILY
Qty: 7 TABLET | Refills: 0 | Status: SHIPPED | OUTPATIENT
Start: 2019-08-30 | End: 2019-09-06

## 2019-09-05 RX ORDER — ONDANSETRON 4 MG/1
4 TABLET, FILM COATED ORAL EVERY 8 HOURS PRN
Qty: 15 TABLET | Refills: 3 | Status: SHIPPED | OUTPATIENT
Start: 2019-09-05 | End: 2020-08-25

## 2019-09-06 ENCOUNTER — TELEPHONE (OUTPATIENT)
Dept: INTERNAL MEDICINE | Age: 49
End: 2019-09-06

## 2019-09-10 ENCOUNTER — TELEPHONE (OUTPATIENT)
Dept: UROLOGY | Age: 49
End: 2019-09-10

## 2019-09-10 RX ORDER — TOLTERODINE 4 MG/1
4 CAPSULE, EXTENDED RELEASE ORAL DAILY
Qty: 30 CAPSULE | Refills: 5 | Status: SHIPPED | OUTPATIENT
Start: 2019-09-10 | End: 2020-05-26 | Stop reason: ALTCHOICE

## 2019-09-23 DIAGNOSIS — N39.0 RECURRENT UTI: Primary | ICD-10-CM

## 2019-09-25 DIAGNOSIS — R53.83 OTHER FATIGUE: ICD-10-CM

## 2019-09-25 DIAGNOSIS — G35 MS (MULTIPLE SCLEROSIS) (HCC): ICD-10-CM

## 2019-09-25 DIAGNOSIS — M79.621 PAIN IN BOTH UPPER ARMS: ICD-10-CM

## 2019-09-25 DIAGNOSIS — M79.622 PAIN IN BOTH UPPER ARMS: ICD-10-CM

## 2019-09-25 DIAGNOSIS — M62.838 MUSCLE SPASTICITY: ICD-10-CM

## 2019-09-25 DIAGNOSIS — M54.2 PAIN, NECK: ICD-10-CM

## 2019-09-25 LAB
ORGANISM: ABNORMAL
URINE CULTURE, ROUTINE: ABNORMAL
URINE CULTURE, ROUTINE: ABNORMAL

## 2019-09-25 RX ORDER — HYDROCODONE BITARTRATE AND ACETAMINOPHEN 10; 325 MG/1; MG/1
TABLET ORAL
Qty: 90 TABLET | Refills: 0 | Status: SHIPPED | OUTPATIENT
Start: 2019-09-25 | End: 2019-10-30 | Stop reason: SDUPTHER

## 2019-09-25 RX ORDER — METHYLPHENIDATE HYDROCHLORIDE 20 MG/1
TABLET ORAL
Qty: 30 TABLET | Refills: 0 | Status: SHIPPED | OUTPATIENT
Start: 2019-09-25 | End: 2019-11-04 | Stop reason: SDUPTHER

## 2019-09-25 NOTE — TELEPHONE ENCOUNTER
Requested Prescriptions     Pending Prescriptions Disp Refills    methylphenidate (RITALIN) 20 MG tablet [Pharmacy Med Name: METHYLPHENIDATE 20MG TABLET] 30 tablet 0     Sig: TAKE 1 TABLET BY MOUTH ONCE DAILY.     HYDROcodone-acetaminophen (NORCO)  MG per tablet [Pharmacy Med Name: Marcheta Spindle MG] 90 tablet 0     Sig: TAKE 1 TABLET BY MOUTH 3 TIMES A DAY AS NEEDED FOR PAIN       Last Office Visit:  Visit date not found  Next Office Visit:  Visit date not found  Last Medication Refill:  8/23/2019  Daniel Marshall up to date:  07/2019    *RX updated to reflect   9/25/2019  fill date*

## 2019-09-26 ENCOUNTER — ANESTHESIA EVENT (OUTPATIENT)
Dept: ENDOSCOPY | Age: 49
End: 2019-09-26
Payer: MEDICARE

## 2019-09-26 DIAGNOSIS — N39.0 RECURRENT UTI: Primary | ICD-10-CM

## 2019-09-26 RX ORDER — CEFDINIR 300 MG/1
300 CAPSULE ORAL 2 TIMES DAILY
Qty: 20 CAPSULE | Refills: 0 | Status: ON HOLD | OUTPATIENT
Start: 2019-09-26 | End: 2019-09-27 | Stop reason: ALTCHOICE

## 2019-09-27 ENCOUNTER — ANESTHESIA (OUTPATIENT)
Dept: ENDOSCOPY | Age: 49
End: 2019-09-27
Payer: MEDICARE

## 2019-09-27 ENCOUNTER — HOSPITAL ENCOUNTER (OUTPATIENT)
Age: 49
Setting detail: OUTPATIENT SURGERY
Discharge: HOME | End: 2019-09-27
Attending: INTERNAL MEDICINE | Admitting: INTERNAL MEDICINE
Payer: MEDICARE

## 2019-09-27 VITALS
HEART RATE: 71 BPM | BODY MASS INDEX: 23.85 KG/M2 | HEIGHT: 69 IN | RESPIRATION RATE: 18 BRPM | OXYGEN SATURATION: 100 % | SYSTOLIC BLOOD PRESSURE: 109 MMHG | WEIGHT: 161 LBS | TEMPERATURE: 98.5 F | DIASTOLIC BLOOD PRESSURE: 60 MMHG

## 2019-09-27 VITALS
OXYGEN SATURATION: 99 % | SYSTOLIC BLOOD PRESSURE: 103 MMHG | RESPIRATION RATE: 18 BRPM | DIASTOLIC BLOOD PRESSURE: 76 MMHG

## 2019-09-27 LAB
GLUCOSE BLD-MCNC: 89 MG/DL (ref 70–99)
PERFORMED ON: NORMAL

## 2019-09-27 PROCEDURE — 82948 REAGENT STRIP/BLOOD GLUCOSE: CPT

## 2019-09-27 PROCEDURE — G0121 COLON CA SCRN NOT HI RSK IND: HCPCS | Performed by: INTERNAL MEDICINE

## 2019-09-27 PROCEDURE — 3700000001 HC ADD 15 MINUTES (ANESTHESIA): Performed by: INTERNAL MEDICINE

## 2019-09-27 PROCEDURE — 6370000000 HC RX 637 (ALT 250 FOR IP): Performed by: NURSE ANESTHETIST, CERTIFIED REGISTERED

## 2019-09-27 PROCEDURE — 3700000000 HC ANESTHESIA ATTENDED CARE: Performed by: INTERNAL MEDICINE

## 2019-09-27 PROCEDURE — 7100000011 HC PHASE II RECOVERY - ADDTL 15 MIN: Performed by: INTERNAL MEDICINE

## 2019-09-27 PROCEDURE — 2709999900 HC NON-CHARGEABLE SUPPLY: Performed by: INTERNAL MEDICINE

## 2019-09-27 PROCEDURE — 2500000003 HC RX 250 WO HCPCS: Performed by: NURSE ANESTHETIST, CERTIFIED REGISTERED

## 2019-09-27 PROCEDURE — 3609027000 HC COLONOSCOPY: Performed by: INTERNAL MEDICINE

## 2019-09-27 PROCEDURE — 6360000002 HC RX W HCPCS: Performed by: INTERNAL MEDICINE

## 2019-09-27 PROCEDURE — 7100000010 HC PHASE II RECOVERY - FIRST 15 MIN: Performed by: INTERNAL MEDICINE

## 2019-09-27 PROCEDURE — 2580000003 HC RX 258: Performed by: INTERNAL MEDICINE

## 2019-09-27 PROCEDURE — 6360000002 HC RX W HCPCS: Performed by: NURSE ANESTHETIST, CERTIFIED REGISTERED

## 2019-09-27 RX ORDER — HEPARIN SODIUM (PORCINE) LOCK FLUSH IV SOLN 100 UNIT/ML 100 UNIT/ML
300 SOLUTION INTRAVENOUS PRN
Status: COMPLETED | OUTPATIENT
Start: 2019-09-27 | End: 2019-09-27

## 2019-09-27 RX ORDER — LIDOCAINE HYDROCHLORIDE 20 MG/ML
INJECTION, SOLUTION INFILTRATION; PERINEURAL PRN
Status: DISCONTINUED | OUTPATIENT
Start: 2019-09-27 | End: 2019-09-27 | Stop reason: SDUPTHER

## 2019-09-27 RX ORDER — PROPOFOL 10 MG/ML
INJECTION, EMULSION INTRAVENOUS PRN
Status: DISCONTINUED | OUTPATIENT
Start: 2019-09-27 | End: 2019-09-27 | Stop reason: SDUPTHER

## 2019-09-27 RX ORDER — ALBUTEROL SULFATE 90 UG/1
AEROSOL, METERED RESPIRATORY (INHALATION) PRN
Status: DISCONTINUED | OUTPATIENT
Start: 2019-09-27 | End: 2019-09-27 | Stop reason: SDUPTHER

## 2019-09-27 RX ORDER — SODIUM CHLORIDE, SODIUM LACTATE, POTASSIUM CHLORIDE, CALCIUM CHLORIDE 600; 310; 30; 20 MG/100ML; MG/100ML; MG/100ML; MG/100ML
INJECTION, SOLUTION INTRAVENOUS CONTINUOUS
Status: DISCONTINUED | OUTPATIENT
Start: 2019-09-27 | End: 2019-09-27 | Stop reason: HOSPADM

## 2019-09-27 RX ADMIN — LIDOCAINE HYDROCHLORIDE 40 MG: 20 INJECTION, SOLUTION INFILTRATION; PERINEURAL at 12:29

## 2019-09-27 RX ADMIN — ALBUTEROL SULFATE 4 PUFF: 90 AEROSOL, METERED RESPIRATORY (INHALATION) at 12:23

## 2019-09-27 RX ADMIN — HEPARIN 300 UNITS: 100 SYRINGE at 13:15

## 2019-09-27 RX ADMIN — PROPOFOL 150 MG: 10 INJECTION, EMULSION INTRAVENOUS at 12:29

## 2019-09-27 RX ADMIN — SODIUM CHLORIDE, POTASSIUM CHLORIDE, SODIUM LACTATE AND CALCIUM CHLORIDE: 600; 310; 30; 20 INJECTION, SOLUTION INTRAVENOUS at 11:30

## 2019-09-27 ASSESSMENT — PAIN - FUNCTIONAL ASSESSMENT: PAIN_FUNCTIONAL_ASSESSMENT: 0-10

## 2019-09-27 ASSESSMENT — PAIN SCALES - GENERAL
PAINLEVEL_OUTOF10: 0
PAINLEVEL_OUTOF10: 0

## 2019-09-27 NOTE — ANESTHESIA PRE PROCEDURE
capsule by mouth 2 times daily for 30 days. 4/22/19 7/18/19  Angelique Gamble MD   glucose monitoring kit (FREESTYLE) monitoring kit 1 kit by Does not apply route daily 11/15/18   Abbey Funes MD   blood glucose monitor strips Test 1 times a day & as needed for symptoms of irregular blood glucose. 11/15/18   Abbey Funes MD   Lancets MISC 1 each by Does not apply route 2 times daily 11/15/18   Abbey Funes MD   lactulose (CHRONULAC) 10 GM/15ML solution Take 30 mLs by mouth 3 times daily  Patient taking differently: Take 20 g by mouth 2 times daily as needed  6/12/18   Angelique Gamble MD       Current medications:    Current Facility-Administered Medications   Medication Dose Route Frequency Provider Last Rate Last Dose    lactated ringers infusion   Intravenous Continuous Anisa De La Garza  mL/hr at 09/27/19 1130      heparin flush 100 UNIT/ML injection 300 Units  300 Units Intracatheter PRN Anisa De La Garza MD           Allergies:     Allergies   Allergen Reactions    Darvocet [Propoxyphene N-Acetaminophen]      Talking out of her head    Morphine And Related Itching and Swelling    Propoxyphene Swelling       Problem List:    Patient Active Problem List   Diagnosis Code    Muscle spasticity M62.838    Peripheral vascular disease ([facility] [address] I73.9    MS (multiple sclerosis) ([facility] [address] G35    Pain in both upper arms M79.621, M79.622    Numbness R20.0    Fatigue R53.83    Weakness R53.1    Sepsis ([address] A41.9    Chronic pain G89.29    Hx of seizure disorder Z86.69    Hypokalemia E87.6    Hypokalemia E87.6    Abnormal EKG R94.31    Encephalopathy G93.40    Open wound of ankle S91.009A    Vitamin D deficiency E55.9    Back pain M54.9    Breakdown (mechanical) of cranial or spinal infusion catheter, initial encounter T85.610A    CAFL (chronic airflow limitation) ([facility]) J44.9    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    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    Difficult or painful urination R30.0    Headache R51    Hyperlipidemia E78.5    Influenza J11.1    Insomnia G47.00    Breast lump N63.0    Hay fever J30.1    Decubitus ulcer of sacral region L89.159    Current smoker F17.200    Cobalamin deficiency E53.8    Pain, neck M54.2    Venous stasis ulcer of left lower extremity ([facility]) I83.029, L97.929    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 layer exposed ([address] L97.812    Open wound of second toe of left foot S91.105A    Type 2 diabetes mellitus with left diabetic foot ulcer ([facility]) E11.621, L97.529    S/P transmetatarsal amputation of foot, left ([facility]) G25.188    Family history of colon cancer Z80.0    Chronic constipation K59.09    Colostomy in place [facility]) Z93.3    Surgical wound breakdown T81.31XA       Past Medical History:        Diagnosis Date    Ankle wound     and toe wound    Asthma     Back pain 6/28/2017    CAFL (chronic airflow limitation) ([facility]) 6/28/2017    Hay fever 6/28/2017    Headache 6/28/2017    MS (multiple sclerosis) ([facility])     Muscle spasticity     Neuropathic ulcer of left foot, limited to breakdown of skin ([address] 4/10/2017    Peripheral vascular disease ([facility])     Seizure ([facility])     occ. related to ms       Past Surgical History:        Procedure Laterality Date    BACLOFEN PUMP IMPLANTATION      COLOSTOMY      FEMUR FRACTURE SURGERY      Right    FOOT AMPUTATION Left 4/4/2019    LEFT TRANSMET AMPUTATION performed by Hai Bell MD at [facility] OR    HYSTERECTOMY      OTHER SURGICAL HISTORY      urostomy    OTHER SURGICAL HISTORY      pain pump    NC INSJ PRPH CTR VAD W/SUBQ PORT UNDER 5 YR N/A 6/26/2018    SINGLE LUMEN PORT PLACEMENT WITH FLUORO performed by Nikia Andrews MD at [facility] TOE AMPUTATION      L last toe    TONSILLECTOMY      URETEROTOMY         Social History:    Social History     Tobacco Use    Smoking status: Former Smoker     Packs/day: 0.25     Types: Cigarettes     Last attempt to quit: 1/8/2018     Years since PREGTESTUR, PREGSERUM, HCG, HCGQUANT     ABGs:   Lab Results   Component Value Date    PHART 7.460 08/27/2016    PO2ART 128.0 08/27/2016    XRT4CLE 26.0 08/27/2016    EYN1IEW 18.5 08/27/2016    BEART -3.8 08/27/2016    E5ICVJON 96.6 08/26/2016        Type & Screen (If Applicable):  No results found for: LABABO, [address]    Anesthesia Evaluation  Patient summary reviewed and Nursing notes reviewed no history of anesthetic complications:   Airway: Mallampati: II  TM distance: >3 FB   Neck ROM: full  Mouth opening: < 3 FB Dental: normal exam         Pulmonary:normal exam    (+) COPD:  asthma:                           ROS comment: Smoking 2018   Cardiovascular:                      Neuro/Psych:   (+) neuromuscular disease: multiple sclerosis,              ROS comment: Chronic narc use/chronic pain  Wheelchair bound due to MS GI/Hepatic/Renal:   (+) bowel prep,          ROS comment: etoh and marijuana use  Colectomy  Urostomy . Endo/Other:    (+) DiabetesType II DM, , .                 Abdominal:           Vascular: negative vascular ROS. Anesthesia Plan      general and TIVA     ASA 4       Induction: intravenous. Anesthetic plan and risks discussed with patient.                       Lianne Vences, EFREN - CRNA   9/27/2019

## 2019-10-01 ENCOUNTER — OFFICE VISIT (OUTPATIENT)
Dept: UROLOGY | Age: 49
End: 2019-10-01
Payer: MEDICARE

## 2019-10-01 DIAGNOSIS — N32.89 BLADDER SPASMS: ICD-10-CM

## 2019-10-01 DIAGNOSIS — N31.9 NEUROGENIC BLADDER: Primary | ICD-10-CM

## 2019-10-01 PROCEDURE — G8427 DOCREV CUR MEDS BY ELIG CLIN: HCPCS | Performed by: PHYSICIAN ASSISTANT

## 2019-10-01 PROCEDURE — 99213 OFFICE O/P EST LOW 20 MIN: CPT | Performed by: PHYSICIAN ASSISTANT

## 2019-10-01 PROCEDURE — G8484 FLU IMMUNIZE NO ADMIN: HCPCS | Performed by: PHYSICIAN ASSISTANT

## 2019-10-01 PROCEDURE — G8420 CALC BMI NORM PARAMETERS: HCPCS | Performed by: PHYSICIAN ASSISTANT

## 2019-10-01 PROCEDURE — 1036F TOBACCO NON-USER: CPT | Performed by: PHYSICIAN ASSISTANT

## 2019-10-01 PROCEDURE — G8598 ASA/ANTIPLAT THER USED: HCPCS | Performed by: PHYSICIAN ASSISTANT

## 2019-10-01 RX ORDER — TOLTERODINE TARTRATE 2 MG/1
2 TABLET, EXTENDED RELEASE ORAL 2 TIMES DAILY
Qty: 60 TABLET | Refills: 3 | Status: SHIPPED | OUTPATIENT
Start: 2019-10-01 | End: 2020-01-28 | Stop reason: ALTCHOICE

## 2019-10-01 ASSESSMENT — ENCOUNTER SYMPTOMS
NAUSEA: 0
SHORTNESS OF BREATH: 0
VOMITING: 0
FACIAL SWELLING: 0
EYE PAIN: 0
WHEEZING: 0
COUGH: 0
EYE REDNESS: 0
SORE THROAT: 0
EYE DISCHARGE: 0
SINUS PRESSURE: 0
ABDOMINAL PAIN: 0
ABDOMINAL DISTENTION: 0
RHINORRHEA: 0
BLOOD IN STOOL: 0
COLOR CHANGE: 0
DIARRHEA: 0
CONSTIPATION: 0
BACK PAIN: 0

## 2019-10-03 RX ORDER — TIZANIDINE 4 MG/1
TABLET ORAL
Qty: 180 TABLET | Refills: 3 | Status: SHIPPED | OUTPATIENT
Start: 2019-10-03 | End: 2019-10-05 | Stop reason: SDUPTHER

## 2019-10-07 RX ORDER — TIZANIDINE 4 MG/1
TABLET ORAL
Qty: 180 TABLET | Refills: 0 | Status: SHIPPED | OUTPATIENT
Start: 2019-10-07 | End: 2020-03-10 | Stop reason: SDUPTHER

## 2019-10-17 ENCOUNTER — HOSPITAL ENCOUNTER (OUTPATIENT)
Dept: WOUND CARE | Age: 49
Discharge: HOME | End: 2019-10-17
Payer: MEDICARE

## 2019-10-17 ENCOUNTER — NURSE ONLY (OUTPATIENT)
Dept: INTERNAL MEDICINE | Age: 49
End: 2019-10-17
Payer: MEDICARE

## 2019-10-17 VITALS
HEART RATE: 81 BPM | TEMPERATURE: 99.3 F | BODY MASS INDEX: 23.31 KG/M2 | DIASTOLIC BLOOD PRESSURE: 67 MMHG | HEIGHT: 69 IN | RESPIRATION RATE: 16 BRPM | SYSTOLIC BLOOD PRESSURE: 104 MMHG | WEIGHT: 157.41 LBS

## 2019-10-17 DIAGNOSIS — L89.153 STAGE III PRESSURE ULCER OF SACRAL REGION (HCC): Chronic | ICD-10-CM

## 2019-10-17 DIAGNOSIS — G35 MS (MULTIPLE SCLEROSIS) (HCC): Primary | Chronic | ICD-10-CM

## 2019-10-17 DIAGNOSIS — L97.929 VENOUS STASIS ULCER OF LEFT LOWER EXTREMITY (HCC): ICD-10-CM

## 2019-10-17 DIAGNOSIS — L97.812 NON-PRESSURE CHRONIC ULCER OF OTHER PART OF RIGHT LOWER LEG WITH FAT LAYER EXPOSED (HCC): ICD-10-CM

## 2019-10-17 DIAGNOSIS — Z23 FLU VACCINE NEED: Primary | ICD-10-CM

## 2019-10-17 DIAGNOSIS — L89.153 PRESSURE INJURY OF SACRAL REGION, STAGE 3 (HCC): ICD-10-CM

## 2019-10-17 DIAGNOSIS — Z89.432 S/P TRANSMETATARSAL AMPUTATION OF FOOT, LEFT (HCC): ICD-10-CM

## 2019-10-17 DIAGNOSIS — I83.029 VENOUS STASIS ULCER OF LEFT LOWER EXTREMITY (HCC): ICD-10-CM

## 2019-10-17 PROBLEM — E11.621 TYPE 2 DIABETES MELLITUS WITH LEFT DIABETIC FOOT ULCER (HCC): Status: RESOLVED | Noted: 2019-04-04 | Resolved: 2019-10-17

## 2019-10-17 PROBLEM — L97.529 TYPE 2 DIABETES MELLITUS WITH LEFT DIABETIC FOOT ULCER (HCC): Status: RESOLVED | Noted: 2019-04-04 | Resolved: 2019-10-17

## 2019-10-17 PROCEDURE — 90686 IIV4 VACC NO PRSV 0.5 ML IM: CPT | Performed by: INTERNAL MEDICINE

## 2019-10-17 PROCEDURE — G0008 ADMIN INFLUENZA VIRUS VAC: HCPCS | Performed by: INTERNAL MEDICINE

## 2019-10-17 PROCEDURE — 99213 OFFICE O/P EST LOW 20 MIN: CPT | Performed by: SURGERY

## 2019-10-17 PROCEDURE — 99214 OFFICE O/P EST MOD 30 MIN: CPT

## 2019-10-17 PROCEDURE — 11042 DBRDMT SUBQ TIS 1ST 20SQCM/<: CPT | Performed by: SURGERY

## 2019-10-17 PROCEDURE — 11042 DBRDMT SUBQ TIS 1ST 20SQCM/<: CPT

## 2019-10-17 RX ORDER — SODIUM HYPOCHLORITE 1.25 MG/ML
SOLUTION TOPICAL
Qty: 1000 ML | Refills: 3 | Status: SHIPPED | OUTPATIENT
Start: 2019-10-17 | End: 2020-05-26 | Stop reason: ALTCHOICE

## 2019-10-17 ASSESSMENT — ENCOUNTER SYMPTOMS
STRIDOR: 0
EYE PAIN: 0
SORE THROAT: 0
BACK PAIN: 0
ABDOMINAL DISTENTION: 0
BLOOD IN STOOL: 0
ABDOMINAL PAIN: 0
SHORTNESS OF BREATH: 0
EYE REDNESS: 0
COUGH: 1

## 2019-10-17 ASSESSMENT — PAIN SCALES - GENERAL: PAINLEVEL_OUTOF10: 0

## 2019-10-22 ENCOUNTER — HOSPITAL ENCOUNTER (OUTPATIENT)
Dept: INFUSION THERAPY | Age: 49
End: 2019-10-22

## 2019-10-30 DIAGNOSIS — M79.622 PAIN IN BOTH UPPER ARMS: ICD-10-CM

## 2019-10-30 DIAGNOSIS — G35 MS (MULTIPLE SCLEROSIS) (HCC): ICD-10-CM

## 2019-10-30 DIAGNOSIS — M79.621 PAIN IN BOTH UPPER ARMS: ICD-10-CM

## 2019-10-30 DIAGNOSIS — M62.838 MUSCLE SPASTICITY: ICD-10-CM

## 2019-10-30 DIAGNOSIS — R53.83 OTHER FATIGUE: ICD-10-CM

## 2019-10-30 DIAGNOSIS — M54.2 PAIN, NECK: ICD-10-CM

## 2019-10-31 RX ORDER — HYDROCODONE BITARTRATE AND ACETAMINOPHEN 10; 325 MG/1; MG/1
TABLET ORAL
Qty: 90 TABLET | Refills: 0 | Status: SHIPPED | OUTPATIENT
Start: 2019-10-31 | End: 2019-11-22 | Stop reason: SDUPTHER

## 2019-11-01 RX ORDER — LEVETIRACETAM 500 MG/1
TABLET ORAL
Qty: 60 TABLET | Refills: 11 | Status: SHIPPED | OUTPATIENT
Start: 2019-11-01 | End: 2019-11-26

## 2019-11-01 RX ORDER — VARENICLINE TARTRATE 1 MG/1
TABLET, FILM COATED ORAL
Qty: 56 TABLET | Refills: 0 | Status: SHIPPED | OUTPATIENT
Start: 2019-11-01 | End: 2019-12-02 | Stop reason: SDUPTHER

## 2019-11-04 DIAGNOSIS — M54.2 PAIN, NECK: ICD-10-CM

## 2019-11-04 DIAGNOSIS — M62.838 MUSCLE SPASTICITY: ICD-10-CM

## 2019-11-04 DIAGNOSIS — M79.622 PAIN IN BOTH UPPER ARMS: ICD-10-CM

## 2019-11-04 DIAGNOSIS — M79.621 PAIN IN BOTH UPPER ARMS: ICD-10-CM

## 2019-11-04 DIAGNOSIS — G35 MS (MULTIPLE SCLEROSIS) (HCC): ICD-10-CM

## 2019-11-04 DIAGNOSIS — R53.83 OTHER FATIGUE: ICD-10-CM

## 2019-11-04 RX ORDER — METHYLPHENIDATE HYDROCHLORIDE 20 MG/1
TABLET ORAL
Qty: 30 TABLET | Refills: 0 | Status: SHIPPED | OUTPATIENT
Start: 2019-11-04 | End: 2019-12-02 | Stop reason: SDUPTHER

## 2019-11-05 ENCOUNTER — HOSPITAL ENCOUNTER (OUTPATIENT)
Dept: WOUND CARE | Age: 49
Discharge: HOME | End: 2019-11-05
Payer: MEDICARE

## 2019-11-05 VITALS
BODY MASS INDEX: 23.25 KG/M2 | TEMPERATURE: 100.3 F | HEART RATE: 78 BPM | RESPIRATION RATE: 16 BRPM | SYSTOLIC BLOOD PRESSURE: 104 MMHG | WEIGHT: 157 LBS | HEIGHT: 69 IN | DIASTOLIC BLOOD PRESSURE: 72 MMHG

## 2019-11-05 DIAGNOSIS — I83.029 VENOUS STASIS ULCER OF LEFT LOWER EXTREMITY (HCC): ICD-10-CM

## 2019-11-05 DIAGNOSIS — Z89.432 S/P TRANSMETATARSAL AMPUTATION OF FOOT, LEFT (HCC): ICD-10-CM

## 2019-11-05 DIAGNOSIS — L97.812 NON-PRESSURE CHRONIC ULCER OF OTHER PART OF RIGHT LOWER LEG WITH FAT LAYER EXPOSED (HCC): ICD-10-CM

## 2019-11-05 DIAGNOSIS — G35 MS (MULTIPLE SCLEROSIS) (HCC): Primary | Chronic | ICD-10-CM

## 2019-11-05 DIAGNOSIS — G82.20 PARAPLEGIA (HCC): Chronic | ICD-10-CM

## 2019-11-05 DIAGNOSIS — L89.154 PRESSURE INJURY OF SACRAL REGION, STAGE 4 (HCC): ICD-10-CM

## 2019-11-05 DIAGNOSIS — L89.153 STAGE III PRESSURE ULCER OF SACRAL REGION (HCC): Chronic | ICD-10-CM

## 2019-11-05 DIAGNOSIS — L97.929 VENOUS STASIS ULCER OF LEFT LOWER EXTREMITY (HCC): ICD-10-CM

## 2019-11-05 PROCEDURE — 11042 DBRDMT SUBQ TIS 1ST 20SQCM/<: CPT | Performed by: SURGERY

## 2019-11-05 PROCEDURE — 11042 DBRDMT SUBQ TIS 1ST 20SQCM/<: CPT

## 2019-11-05 RX ORDER — SODIUM HYPOCHLORITE 1.25 MG/ML
SOLUTION TOPICAL
Qty: 1000 ML | Refills: 2 | Status: SHIPPED | OUTPATIENT
Start: 2019-11-05 | End: 2019-11-26

## 2019-11-05 ASSESSMENT — PAIN SCALES - GENERAL: PAINLEVEL_OUTOF10: 0

## 2019-11-15 ENCOUNTER — HOSPITAL ENCOUNTER (OUTPATIENT)
Dept: INFUSION THERAPY | Age: 49
Setting detail: INFUSION SERIES
Discharge: HOME | End: 2019-11-15
Payer: MEDICARE

## 2019-11-15 ENCOUNTER — HOSPITAL ENCOUNTER (OUTPATIENT)
Dept: GENERAL RADIOLOGY | Age: 49
Discharge: HOME | End: 2019-11-15
Payer: MEDICARE

## 2019-11-15 DIAGNOSIS — L89.153 STAGE III PRESSURE ULCER OF SACRAL REGION (HCC): Chronic | ICD-10-CM

## 2019-11-15 DIAGNOSIS — G35 MS (MULTIPLE SCLEROSIS) (HCC): Chronic | ICD-10-CM

## 2019-11-15 PROCEDURE — 96523 IRRIG DRUG DELIVERY DEVICE: CPT

## 2019-11-15 PROCEDURE — 6360000002 HC RX W HCPCS: Performed by: PSYCHIATRY & NEUROLOGY

## 2019-11-15 PROCEDURE — 2580000003 HC RX 258: Performed by: PSYCHIATRY & NEUROLOGY

## 2019-11-15 PROCEDURE — 72170 X-RAY EXAM OF PELVIS: CPT

## 2019-11-15 RX ORDER — SODIUM CHLORIDE 0.9 % (FLUSH) 0.9 %
20 SYRINGE (ML) INJECTION PRN
Status: DISCONTINUED | OUTPATIENT
Start: 2019-11-15 | End: 2019-11-17 | Stop reason: HOSPADM

## 2019-11-15 RX ORDER — HEPARIN SODIUM (PORCINE) LOCK FLUSH IV SOLN 100 UNIT/ML 100 UNIT/ML
300 SOLUTION INTRAVENOUS PRN
Status: DISCONTINUED | OUTPATIENT
Start: 2019-11-15 | End: 2019-11-17 | Stop reason: HOSPADM

## 2019-11-15 RX ADMIN — HEPARIN 300 UNITS: 100 SYRINGE at 15:26

## 2019-11-15 RX ADMIN — Medication 20 ML: at 15:26

## 2019-11-22 DIAGNOSIS — M79.622 PAIN IN BOTH UPPER ARMS: ICD-10-CM

## 2019-11-22 DIAGNOSIS — M54.2 PAIN, NECK: ICD-10-CM

## 2019-11-22 DIAGNOSIS — R53.83 OTHER FATIGUE: ICD-10-CM

## 2019-11-22 DIAGNOSIS — M62.838 MUSCLE SPASTICITY: ICD-10-CM

## 2019-11-22 DIAGNOSIS — G35 MS (MULTIPLE SCLEROSIS) (HCC): ICD-10-CM

## 2019-11-22 DIAGNOSIS — M79.621 PAIN IN BOTH UPPER ARMS: ICD-10-CM

## 2019-11-25 RX ORDER — HYDROCODONE BITARTRATE AND ACETAMINOPHEN 10; 325 MG/1; MG/1
TABLET ORAL
Qty: 90 TABLET | Refills: 0 | Status: SHIPPED | OUTPATIENT
Start: 2019-11-29 | End: 2020-01-02

## 2019-11-26 ENCOUNTER — HOSPITAL ENCOUNTER (OUTPATIENT)
Dept: WOUND CARE | Age: 49
Discharge: HOME | End: 2019-11-26
Payer: MEDICARE

## 2019-11-26 VITALS
TEMPERATURE: 97.8 F | DIASTOLIC BLOOD PRESSURE: 77 MMHG | HEIGHT: 69 IN | RESPIRATION RATE: 18 BRPM | SYSTOLIC BLOOD PRESSURE: 123 MMHG | BODY MASS INDEX: 23.25 KG/M2 | HEART RATE: 70 BPM | WEIGHT: 157 LBS

## 2019-11-26 DIAGNOSIS — Z89.432 S/P TRANSMETATARSAL AMPUTATION OF FOOT, LEFT (HCC): ICD-10-CM

## 2019-11-26 DIAGNOSIS — I83.029 VENOUS STASIS ULCER OF LEFT LOWER EXTREMITY (HCC): ICD-10-CM

## 2019-11-26 DIAGNOSIS — L97.812 NON-PRESSURE CHRONIC ULCER OF OTHER PART OF RIGHT LOWER LEG WITH FAT LAYER EXPOSED (HCC): ICD-10-CM

## 2019-11-26 DIAGNOSIS — L97.929 VENOUS STASIS ULCER OF LEFT LOWER EXTREMITY (HCC): ICD-10-CM

## 2019-11-26 PROCEDURE — 11042 DBRDMT SUBQ TIS 1ST 20SQCM/<: CPT

## 2019-11-26 PROCEDURE — 11042 DBRDMT SUBQ TIS 1ST 20SQCM/<: CPT | Performed by: SURGERY

## 2019-11-26 RX ORDER — MAGNESIUM HYDROXIDE 1200 MG/15ML
LIQUID ORAL
Qty: 500 ML | Refills: 3 | Status: SHIPPED | OUTPATIENT
Start: 2019-11-26 | End: 2019-12-03

## 2019-11-26 ASSESSMENT — PAIN SCALES - GENERAL: PAINLEVEL_OUTOF10: 0

## 2019-11-26 ASSESSMENT — PAIN DESCRIPTION - PROGRESSION: CLINICAL_PROGRESSION: NOT CHANGED

## 2019-12-02 PROBLEM — M54.9 BACK PAIN: Status: RESOLVED | Noted: 2017-06-28 | Resolved: 2019-12-02

## 2019-12-02 PROBLEM — H53.2 BINOCULAR VISION DISORDER WITH DIPLOPIA: Status: RESOLVED | Noted: 2017-06-28 | Resolved: 2019-12-02

## 2019-12-02 PROBLEM — S91.009A OPEN WOUND OF ANKLE: Status: RESOLVED | Noted: 2017-06-28 | Resolved: 2019-12-02

## 2019-12-02 PROBLEM — R30.0 DIFFICULT OR PAINFUL URINATION: Status: RESOLVED | Noted: 2017-06-28 | Resolved: 2019-12-02

## 2019-12-02 PROBLEM — K11.7 APTYALISM: Status: RESOLVED | Noted: 2017-06-28 | Resolved: 2019-12-02

## 2019-12-02 PROBLEM — E55.9 VITAMIN D DEFICIENCY: Status: RESOLVED | Noted: 2017-06-28 | Resolved: 2019-12-02

## 2019-12-02 PROBLEM — R51.9 HEADACHE: Status: RESOLVED | Noted: 2017-06-28 | Resolved: 2019-12-02

## 2019-12-02 PROBLEM — S91.105A OPEN WOUND OF SECOND TOE OF LEFT FOOT: Chronic | Status: RESOLVED | Noted: 2018-12-11 | Resolved: 2019-12-02

## 2019-12-02 PROBLEM — J30.1 HAY FEVER: Status: RESOLVED | Noted: 2017-06-28 | Resolved: 2019-12-02

## 2019-12-02 PROBLEM — N63.0 BREAST LUMP: Status: RESOLVED | Noted: 2017-06-28 | Resolved: 2019-12-02

## 2019-12-02 PROBLEM — J11.1 INFLUENZA: Status: RESOLVED | Noted: 2017-06-28 | Resolved: 2019-12-02

## 2019-12-02 PROBLEM — M54.2 PAIN, NECK: Status: RESOLVED | Noted: 2017-09-06 | Resolved: 2019-12-02

## 2019-12-02 PROBLEM — Z80.0 FAMILY HISTORY OF COLON CANCER: Status: RESOLVED | Noted: 2019-05-02 | Resolved: 2019-12-02

## 2019-12-02 PROBLEM — E78.5 HYPERLIPIDEMIA: Status: RESOLVED | Noted: 2017-06-28 | Resolved: 2019-12-02

## 2019-12-02 PROBLEM — E53.8 COBALAMIN DEFICIENCY: Status: RESOLVED | Noted: 2017-06-28 | Resolved: 2019-12-02

## 2019-12-02 RX ORDER — NITROFURANTOIN MACROCRYSTALS 50 MG/1
50 CAPSULE ORAL DAILY
Qty: 30 CAPSULE | Refills: 5 | Status: SHIPPED | OUTPATIENT
Start: 2019-12-02 | End: 2020-07-01

## 2019-12-02 RX ORDER — VARENICLINE TARTRATE 1 MG/1
TABLET, FILM COATED ORAL
Qty: 56 TABLET | Refills: 0 | Status: SHIPPED | OUTPATIENT
Start: 2019-12-02 | End: 2020-01-02

## 2019-12-02 RX ORDER — LEVOCETIRIZINE DIHYDROCHLORIDE 5 MG/1
TABLET, FILM COATED ORAL
Qty: 90 TABLET | Refills: 0 | Status: SHIPPED | OUTPATIENT
Start: 2019-12-02 | End: 2019-12-10 | Stop reason: SDUPTHER

## 2019-12-03 ENCOUNTER — OFFICE VISIT (OUTPATIENT)
Dept: NEUROLOGY | Age: 49
End: 2019-12-03
Payer: MEDICARE

## 2019-12-03 VITALS — HEART RATE: 96 BPM | DIASTOLIC BLOOD PRESSURE: 75 MMHG | RESPIRATION RATE: 14 BRPM | SYSTOLIC BLOOD PRESSURE: 116 MMHG

## 2019-12-03 DIAGNOSIS — R20.0 NUMBNESS: ICD-10-CM

## 2019-12-03 DIAGNOSIS — M54.2 PAIN, NECK: ICD-10-CM

## 2019-12-03 DIAGNOSIS — M79.622 PAIN IN BOTH UPPER ARMS: ICD-10-CM

## 2019-12-03 DIAGNOSIS — R53.83 OTHER FATIGUE: ICD-10-CM

## 2019-12-03 DIAGNOSIS — R53.1 WEAKNESS: ICD-10-CM

## 2019-12-03 DIAGNOSIS — G82.20 PARAPLEGIA (HCC): ICD-10-CM

## 2019-12-03 DIAGNOSIS — M25.559 ARTHRALGIA OF HIP, UNSPECIFIED LATERALITY: ICD-10-CM

## 2019-12-03 DIAGNOSIS — G35 MS (MULTIPLE SCLEROSIS) (HCC): Primary | ICD-10-CM

## 2019-12-03 DIAGNOSIS — M79.621 PAIN IN BOTH UPPER ARMS: ICD-10-CM

## 2019-12-03 DIAGNOSIS — M62.838 MUSCLE SPASTICITY: ICD-10-CM

## 2019-12-03 PROCEDURE — G8598 ASA/ANTIPLAT THER USED: HCPCS | Performed by: PSYCHIATRY & NEUROLOGY

## 2019-12-03 PROCEDURE — G8427 DOCREV CUR MEDS BY ELIG CLIN: HCPCS | Performed by: PSYCHIATRY & NEUROLOGY

## 2019-12-03 PROCEDURE — 99214 OFFICE O/P EST MOD 30 MIN: CPT | Performed by: PSYCHIATRY & NEUROLOGY

## 2019-12-03 PROCEDURE — G8420 CALC BMI NORM PARAMETERS: HCPCS | Performed by: PSYCHIATRY & NEUROLOGY

## 2019-12-03 PROCEDURE — G8482 FLU IMMUNIZE ORDER/ADMIN: HCPCS | Performed by: PSYCHIATRY & NEUROLOGY

## 2019-12-03 PROCEDURE — 1036F TOBACCO NON-USER: CPT | Performed by: PSYCHIATRY & NEUROLOGY

## 2019-12-03 RX ORDER — DULOXETIN HYDROCHLORIDE 30 MG/1
30 CAPSULE, DELAYED RELEASE ORAL DAILY
Qty: 30 CAPSULE | Refills: 2 | Status: SHIPPED | OUTPATIENT
Start: 2019-12-03 | End: 2020-05-11

## 2019-12-10 ENCOUNTER — HOSPITAL ENCOUNTER (OUTPATIENT)
Dept: WOUND CARE | Age: 49
Discharge: HOME | End: 2019-12-10
Payer: MEDICARE

## 2019-12-10 VITALS
WEIGHT: 145.06 LBS | HEART RATE: 80 BPM | DIASTOLIC BLOOD PRESSURE: 72 MMHG | RESPIRATION RATE: 18 BRPM | TEMPERATURE: 99.3 F | BODY MASS INDEX: 21.49 KG/M2 | HEIGHT: 69 IN | SYSTOLIC BLOOD PRESSURE: 104 MMHG

## 2019-12-10 DIAGNOSIS — L97.929 VENOUS STASIS ULCER OF LEFT LOWER EXTREMITY (HCC): ICD-10-CM

## 2019-12-10 DIAGNOSIS — I83.029 VENOUS STASIS ULCER OF LEFT LOWER EXTREMITY (HCC): ICD-10-CM

## 2019-12-10 DIAGNOSIS — G82.20 PARAPLEGIA (HCC): Chronic | ICD-10-CM

## 2019-12-10 DIAGNOSIS — Z89.432 S/P TRANSMETATARSAL AMPUTATION OF FOOT, LEFT (HCC): ICD-10-CM

## 2019-12-10 DIAGNOSIS — L89.154 PRESSURE INJURY OF SACRAL REGION, STAGE 4 (HCC): Chronic | ICD-10-CM

## 2019-12-10 DIAGNOSIS — L97.812 NON-PRESSURE CHRONIC ULCER OF OTHER PART OF RIGHT LOWER LEG WITH FAT LAYER EXPOSED (HCC): ICD-10-CM

## 2019-12-10 DIAGNOSIS — G35 MS (MULTIPLE SCLEROSIS) (HCC): Primary | Chronic | ICD-10-CM

## 2019-12-10 PROCEDURE — 11042 DBRDMT SUBQ TIS 1ST 20SQCM/<: CPT | Performed by: SURGERY

## 2019-12-10 PROCEDURE — 11042 DBRDMT SUBQ TIS 1ST 20SQCM/<: CPT

## 2019-12-10 ASSESSMENT — PAIN SCALES - GENERAL: PAINLEVEL_OUTOF10: 0

## 2019-12-10 ASSESSMENT — PAIN DESCRIPTION - LOCATION: LOCATION: LEG;FOOT

## 2019-12-10 ASSESSMENT — PAIN DESCRIPTION - ORIENTATION: ORIENTATION: LEFT

## 2019-12-10 ASSESSMENT — PAIN DESCRIPTION - PROGRESSION: CLINICAL_PROGRESSION: NOT CHANGED

## 2019-12-10 ASSESSMENT — PAIN DESCRIPTION - PAIN TYPE: TYPE: ACUTE PAIN

## 2019-12-16 PROBLEM — J44.9 CAFL (CHRONIC AIRFLOW LIMITATION) (HCC): Status: RESOLVED | Noted: 2017-06-28 | Resolved: 2019-12-16

## 2019-12-31 ENCOUNTER — HOSPITAL ENCOUNTER (OUTPATIENT)
Dept: WOUND CARE | Age: 49
Discharge: HOME | End: 2019-12-31
Payer: MEDICARE

## 2019-12-31 VITALS
TEMPERATURE: 97.4 F | DIASTOLIC BLOOD PRESSURE: 93 MMHG | HEIGHT: 69 IN | SYSTOLIC BLOOD PRESSURE: 139 MMHG | HEART RATE: 76 BPM | RESPIRATION RATE: 20 BRPM | WEIGHT: 145 LBS | BODY MASS INDEX: 21.48 KG/M2

## 2019-12-31 PROCEDURE — 97605 NEG PRS WND THER DME<=50SQCM: CPT

## 2019-12-31 PROCEDURE — 11042 DBRDMT SUBQ TIS 1ST 20SQCM/<: CPT | Performed by: SURGERY

## 2019-12-31 PROCEDURE — 11042 DBRDMT SUBQ TIS 1ST 20SQCM/<: CPT

## 2019-12-31 ASSESSMENT — PAIN DESCRIPTION - LOCATION: LOCATION: LEG;FOOT

## 2019-12-31 ASSESSMENT — PAIN DESCRIPTION - PAIN TYPE: TYPE: ACUTE PAIN

## 2019-12-31 ASSESSMENT — PAIN SCALES - GENERAL: PAINLEVEL_OUTOF10: 0

## 2019-12-31 NOTE — PLAN OF CARE
Negative Pressure    NAME:  Alyce Baker  YOB: 1970  MEDICAL RECORD NUMBER:  [id number]  DATE:  12/31/2019     Applied Negative Pressure to coccyx wound(s)/ulcer(s).  [x] Applied skin barrier prep to thierry-wound.  [x] Cut strips of plastic drape to picture frame wound so that thierry-wound is     covered with the drape.  [x] If bridging dressing to less prominent site, cover any intact skin that will come in contact with the Negative Pressure Therapy sponge, gauze or channel drain with plastic drape. The sponge should never touch intact skin.  [x] Cut sponge, gauze or channel drain to size which will fit into the wound/ulcer bed without being forced.  [x] Be sure the sponge is large enough to hold the entire round plastic flange which is attached to the tubing. Never allow flange to be larger than the sponge or it will produce suction damaging intact skin.  Total number of individual pieces of foam used within the wound bed: 1     [x] If bridging the dressing away from the primary site, be sure the bridge leads to a piece of sponge large enough to hold the entire flange without allowing any of the flange to overlap onto intact skin.  [x] Covered sponge, gauze or channel drain with plastic drape.  [x] Cut a hole in this plastic drape directly over the sponge the same size as the plastic drain tubing.  [x] Removed plastic liner from flange and apply it directly over the hole you cut.  [x] Removed the plastic cover from the flange.  [x] Attached the tubing to the wound/ulcer Negative Pressure Therapy and turn it on to be sure a vacuum is created and that there are no leaks.  [x] If air leaks occur, use plastic drape to patch them.  [x] Secured Negative Pressure Therapy dressing with ace wrap loosely if located on an extremity. Maintain tubing outside of ace wrap. Tubing must not exert pressure on intact skin.     Applied per  Guidelines      Electronically

## 2020-01-02 RX ORDER — HYDROCODONE BITARTRATE AND ACETAMINOPHEN 10; 325 MG/1; MG/1
TABLET ORAL
Qty: 90 TABLET | Refills: 0 | Status: SHIPPED | OUTPATIENT
Start: 2020-01-02 | End: 2020-01-31

## 2020-01-02 RX ORDER — VARENICLINE TARTRATE 1 MG/1
TABLET, FILM COATED ORAL
Qty: 56 TABLET | Refills: 0 | Status: SHIPPED | OUTPATIENT
Start: 2020-01-02 | End: 2020-02-03

## 2020-01-02 NOTE — TELEPHONE ENCOUNTER
Requested Prescriptions     Pending Prescriptions Disp Refills    HYDROcodone-acetaminophen ([address])  MG per tablet [Pharmacy Med Name: HYDROCOD-APAP  MG] 90 tablet 0     Sig: TAKE 1 TABLET BY MOUTH 3 TIMES A DAY AS NEEDED FOR PAIN       Last Office Visit:  12/3/2019  Next Office Visit:  3/10/2020  Last Medication Refill: 11/29/19    Haley Vences up to date: 11/4/19      *RX updated to reflect   1/2/20  fill date*

## 2020-01-03 RX ORDER — METHYLPHENIDATE HYDROCHLORIDE 20 MG/1
TABLET ORAL
Qty: 30 TABLET | Refills: 0 | Status: SHIPPED | OUTPATIENT
Start: 2020-01-03 | End: 2020-01-31

## 2020-01-03 NOTE — TELEPHONE ENCOUNTER
Requested Prescriptions     Pending Prescriptions Disp Refills    methylphenidate (RITALIN) 20 MG tablet 30 tablet 0       Last Office Visit:  12/3/2019  Next Office Visit:  3/10/2020  Last Medication Refill: 12/3/2019   Bao Anderson up to date:  11/2019    *RX updated to reflect 1/3/2020  fill date*

## 2020-01-06 ENCOUNTER — HOSPITAL ENCOUNTER (OUTPATIENT)
Dept: INFUSION THERAPY | Age: 50
Setting detail: INFUSION SERIES
Discharge: HOME | End: 2020-01-06
Payer: MEDICARE

## 2020-01-06 ENCOUNTER — OFFICE VISIT (OUTPATIENT)
Dept: UROLOGY | Age: 50
End: 2020-01-06
Payer: MEDICARE

## 2020-01-06 ENCOUNTER — TELEPHONE (OUTPATIENT)
Dept: NEUROLOGY | Age: 50
End: 2020-01-06

## 2020-01-06 VITALS — WEIGHT: 145 LBS | BODY MASS INDEX: 21.48 KG/M2 | TEMPERATURE: 97.9 F | HEIGHT: 69 IN

## 2020-01-06 LAB
BACTERIA URINE, POC: ABNORMAL
BILIRUBIN URINE: 0 MG/DL
BLOOD, URINE: POSITIVE
CASTS URINE, POC: 0
CLARITY: ABNORMAL
COLOR: YELLOW
CRYSTALS URINE, POC: 0
EPI CELLS URINE, POC: 0
GLUCOSE URINE: ABNORMAL
KETONES, URINE: NEGATIVE
LEUKOCYTE EST, POC: ABNORMAL
NITRITE, URINE: POSITIVE
PH UA: 8.5 (ref 4.5–8)
PROTEIN UA: POSITIVE
RBC URINE, POC: 0
SPECIFIC GRAVITY UA: 1.02 (ref 1–1.03)
UROBILINOGEN, URINE: NORMAL
WBC URINE, POC: 0
YEAST URINE, POC: 0

## 2020-01-06 PROCEDURE — 96523 IRRIG DRUG DELIVERY DEVICE: CPT

## 2020-01-06 PROCEDURE — 6360000002 HC RX W HCPCS: Performed by: PSYCHIATRY & NEUROLOGY

## 2020-01-06 PROCEDURE — G8427 DOCREV CUR MEDS BY ELIG CLIN: HCPCS | Performed by: PHYSICIAN ASSISTANT

## 2020-01-06 PROCEDURE — 2580000003 HC RX 258: Performed by: PSYCHIATRY & NEUROLOGY

## 2020-01-06 PROCEDURE — 99214 OFFICE O/P EST MOD 30 MIN: CPT | Performed by: PHYSICIAN ASSISTANT

## 2020-01-06 PROCEDURE — 81001 URINALYSIS AUTO W/SCOPE: CPT | Performed by: PHYSICIAN ASSISTANT

## 2020-01-06 PROCEDURE — G8420 CALC BMI NORM PARAMETERS: HCPCS | Performed by: PHYSICIAN ASSISTANT

## 2020-01-06 PROCEDURE — G8482 FLU IMMUNIZE ORDER/ADMIN: HCPCS | Performed by: PHYSICIAN ASSISTANT

## 2020-01-06 PROCEDURE — 1036F TOBACCO NON-USER: CPT | Performed by: PHYSICIAN ASSISTANT

## 2020-01-06 PROCEDURE — 99211 OFF/OP EST MAY X REQ PHY/QHP: CPT

## 2020-01-06 RX ORDER — HEPARIN SODIUM (PORCINE) LOCK FLUSH IV SOLN 100 UNIT/ML 100 UNIT/ML
300 SOLUTION INTRAVENOUS PRN
Status: COMPLETED | OUTPATIENT
Start: 2020-01-06 | End: 2020-01-06

## 2020-01-06 RX ORDER — SODIUM CHLORIDE 0.9 % (FLUSH) 0.9 %
20 SYRINGE (ML) INJECTION ONCE
Status: COMPLETED | OUTPATIENT
Start: 2020-01-06 | End: 2020-01-06

## 2020-01-06 RX ORDER — OXYBUTYNIN CHLORIDE 10 MG/1
10 TABLET, EXTENDED RELEASE ORAL DAILY
Qty: 30 TABLET | Refills: 2 | Status: SHIPPED | OUTPATIENT
Start: 2020-01-06 | End: 2020-01-14 | Stop reason: ALTCHOICE

## 2020-01-06 RX ADMIN — Medication 20 ML: at 15:16

## 2020-01-06 RX ADMIN — HEPARIN 300 UNITS: 100 SYRINGE at 15:16

## 2020-01-06 ASSESSMENT — ENCOUNTER SYMPTOMS
VOMITING: 0
ABDOMINAL PAIN: 0
BACK PAIN: 0
SINUS PAIN: 0
SHORTNESS OF BREATH: 0
EYE PAIN: 0
WHEEZING: 0

## 2020-01-06 NOTE — PROGRESS NOTES
Priya Starr is a 51 y.o. female who presents today   Chief Complaint   Patient presents with    Follow-up     3 month follow up  neurogenic bladder      HPI:  Patient is a 20-year-old female with history of severe MS. There went urostomy urinary diversion by Dr. Gerardo Rendon in [facility]. Patient was not able to do in and out catheterizations due to her physical limitations. Patient cast to the urostomy opening she does not have a urostomy bag or occult stoma. She has noticed she is leaking more frequently and more amounts through her urethra. She is also having increased difficulty inserting the catheter through the opening to drain urine.   She has not had follow-up with Dr. Gerardo Rendon.     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    Asthma     Back pain 6/28/2017    Binocular vision disorder with diplopia 6/28/2017    CAFL (chronic airflow limitation) ([address] 6/28/2017    Hay fever 6/28/2017    Headache 6/28/2017    MS (multiple sclerosis) ([address]     Muscle spasticity     Neuropathic ulcer of left foot, limited to breakdown of skin ([address] 4/10/2017    Numbness 8/4/2016    Open wound of ankle 6/28/2017    Open wound of second toe of left foot 12/11/2018    Peripheral vascular disease ([address]     Seizure ([address]     occ. related to ms    Sepsis ([address] 8/19/2016    Weakness 8/4/2016       Past Surgical History:   Procedure Laterality Date    BACLOFEN PUMP IMPLANTATION      COLONOSCOPY N/A 9/27/2019    Dr Toñito Fischer ileum through ostomy-patent and healthy appearing anastomosis in the right colon-entero colonic anastomosis-10 yr recall    COLOSTOMY      [address]      Right    FOOT AMPUTATION Left 4/4/2019    LEFT TRANSMET AMPUTATION performed by Jen Elizondo MD at [address]      urostomy    OTHER SURGICAL HISTORY      pain pump    CO INSJ PRPH CTR VAD W/SUBQ PORT UNDER 5 YR N/A 6/26/2018 SINGLE LUMEN PORT PLACEMENT WITH FLUORO performed by Jadyn Pratt MD at [facility] OR    TOE AMPUTATION      L last toe    TONSILLECTOMY      URETEROTOMY         Current Outpatient Medications   Medication Sig Dispense Refill    methylphenidate (RITALIN) 20 MG tablet 1 daily 30 tablet 0    HYDROcodone-acetaminophen (NORCO)  MG per tablet TAKE 1 TABLET BY MOUTH 3 TIMES A DAY AS NEEDED FOR PAIN 90 tablet 0    CHANTIX CONTINUING MONTH CHADWICK 1 MG tablet TAKE 1 TABLET BY MOUTH 2 TIMES A DAY 56 tablet 0    DULoxetine (CYMBALTA) 30 MG extended release capsule Take 1 capsule by mouth daily 30 capsule 2    PROAIR  (90 Base) MCG/ACT inhaler INHALE 1 PUFF INTO THE LUNGS EVERY 6 HOURS AS NEEDED FOR WHEEZING OR SHORTNESS OF BREATH 8.5 g 5    nitrofurantoin (MACRODANTIN) 50 MG capsule TAKE 1 CAPSULE BY MOUTH DAILY 30 capsule 5    collagenase (SANTYL) 250 UNIT/GM ointment Apply topically twice daily. 1 Tube 1    LYRICA 300 MG capsule TAKE 1 CAPSULE BY MOUTH 2 TIMES A DAY 60 capsule 5    sodium hypochlorite (DAKINS) 0.125 % SOLN external solution Moisten gauze apply to wound twice daily 1000 mL 3    tiZANidine (ZANAFLEX) 4 MG tablet TAKE 3 TABLETS BY MOUTH TWICE DAILY 180 tablet 0    tolterodine (DETROL LA) 4 MG extended release capsule Take 1 capsule by mouth daily 30 capsule 5    Diapers & Supplies MISC Diapers, chucks, wipes, and gloves for incontinence. -[id number] each 11    ondansetron (ZOFRAN) 4 MG tablet Take 1 tablet by mouth every 8 hours as needed for Nausea or Vomiting 15 tablet 3    Omega-3 Fatty Acids (OMEGA-3 FISH OIL PO) Take by mouth      Heparin Lock Flush (HEPARIN FLUSH, 100 UNITS/ML,) 100 UNIT/ML injection 3 mLs by Intercatheter route every 30 days No every 30 days but every 4-6 weeks.  Flush port with 300 units heparin with 20 cc normal saline for ocrevus infusion for Multiple sclerosis and NS 20CC 1 Syringe 5    azelastine (ASTELIN) 0.1 % nasal spray 2 sprays by Nasal 3     No current facility-administered medications for this visit.         Allergies   Allergen Reactions    Darvocet [Propoxyphene N-Acetaminophen]      Talking out of her head    Morphine And Related Itching and Swelling    Propoxyphene Swelling          Social History     Socioeconomic History    Marital status:      Spouse name: None    Number of children: None    Years of education: None    Highest education level: None   Occupational History    None   Social Needs    Financial resource strain: None    Food insecurity:     Worry: None     Inability: None    Transportation needs:     Medical: None     Non-medical: None   Tobacco Use    Smoking status: Former Smoker     Packs/day: 0.25     Types: Cigarettes     Last attempt to quit: 2018     Years since quittin.9    Smokeless tobacco: Never Used   Substance and Sexual Activity    Alcohol use: Yes     Comment: yaritza    Drug use: Yes     Types: Marijuana     Comment: daily (last use 2019)    Sexual activity: None   Lifestyle    Physical activity:     Days per week: None     Minutes per session: None    Stress: None   Relationships    Social connections:     Talks on phone: None     Gets together: None     Attends Buddhism service: None     Active member of club or organization: None     Attends meetings of clubs or organizations: None     Relationship status: None    Intimate partner violence:     Fear of current or ex partner: None     Emotionally abused: None     Physically abused: None     Forced sexual activity: None   Other Topics Concern    None   Social History Narrative    None       Family History   Problem Relation Age of Onset    Cancer Mother         breast    Colon Cancer Mother     Colon Polyps Mother     Diabetes Father     High Blood Pressure Father     Cancer Paternal Aunt         breast    Liver Cancer Paternal Aunt     Heart Disease Paternal Grandmother     Esophageal Cancer Neg Hx     Liver Disease Neg Hx     Rectal Cancer Neg Hx     Stomach Cancer Neg Hx        REVIEW OF SYSTEMS:  Review of Systems   HENT: Negative for nosebleeds and sinus pain. Eyes: Negative for pain. Respiratory: Negative for shortness of breath and wheezing. Cardiovascular: Negative for palpitations and leg swelling. Gastrointestinal: Negative for abdominal pain and vomiting. Endocrine: Negative for polydipsia. Genitourinary: Negative for dysuria, flank pain, frequency, hematuria and urgency. Musculoskeletal: Negative for back pain and myalgias. Skin: Negative for rash. Allergic/Immunologic: Negative for environmental allergies. Neurological: Negative for tremors. Psychiatric/Behavioral: Negative for hallucinations. The patient is not nervous/anxious. PHYSICAL EXAM:  Temp 97.9 °F (36.6 °C) (Temporal)   Ht 5' 9\" (1.753 m)   Wt 145 lb (65.8 kg)   BMI 21.41 kg/m²   Physical Exam  Vitals signs reviewed. HENT:      Head: Normocephalic. Nose: Nose normal.   Eyes:      Conjunctiva/sclera: Conjunctivae normal.   Abdominal:      Palpations: Abdomen is soft. Comments: On her left mid abdomen there is a small stoma. Musculoskeletal:      Comments: Patient wheelchair-bound   Skin:     General: Skin is warm and dry. Neurological:      Mental Status: She is alert and oriented to person, place, and time.    Psychiatric:         Mood and Affect: Mood normal.         Behavior: Behavior normal.             DATA:  U/A:    Lab Results   Component Value Date    COLORU Yellow 01/06/2020    PROTEINU Positive 01/06/2020    PHUR 8.5 01/06/2020    LABCAST 3-5 WBC 07/13/2017    WBCUA TNTC 04/24/2019    RBCUA 0 01/06/2020    RBCUA 2-4 04/24/2019    MUCUS 1+ 07/13/2017    YEAST 0 01/06/2020    BACTERIA + 01/06/2020    BACTERIA 4+ 04/24/2019    CLARITYU Cloudy 01/06/2020    SPECGRAV 1.020 01/06/2020    LEUKOCYTESUR neg 01/06/2020    LEUKOCYTESUR TRACE 04/24/2019    UROBILINOGEN Normal 01/06/2020    BILIRUBINUR

## 2020-01-08 LAB
ORGANISM: ABNORMAL
URINE CULTURE, ROUTINE: ABNORMAL
URINE CULTURE, ROUTINE: ABNORMAL

## 2020-01-10 RX ORDER — CEFDINIR 300 MG/1
300 CAPSULE ORAL 2 TIMES DAILY
Qty: 20 CAPSULE | Refills: 0 | Status: SHIPPED | OUTPATIENT
Start: 2020-01-10 | End: 2020-01-20

## 2020-01-14 ENCOUNTER — HOSPITAL ENCOUNTER (OUTPATIENT)
Dept: WOUND CARE | Age: 50
Discharge: HOME | End: 2020-01-14
Payer: MEDICARE

## 2020-01-14 VITALS
HEART RATE: 86 BPM | SYSTOLIC BLOOD PRESSURE: 135 MMHG | RESPIRATION RATE: 18 BRPM | BODY MASS INDEX: 21.58 KG/M2 | HEIGHT: 69 IN | WEIGHT: 145.72 LBS | TEMPERATURE: 97.6 F | DIASTOLIC BLOOD PRESSURE: 83 MMHG

## 2020-01-14 PROCEDURE — 11042 DBRDMT SUBQ TIS 1ST 20SQCM/<: CPT | Performed by: SURGERY

## 2020-01-14 PROCEDURE — 11042 DBRDMT SUBQ TIS 1ST 20SQCM/<: CPT

## 2020-01-14 ASSESSMENT — PAIN DESCRIPTION - LOCATION: LOCATION: NECK

## 2020-01-14 ASSESSMENT — PAIN DESCRIPTION - PAIN TYPE: TYPE: ACUTE PAIN

## 2020-01-14 ASSESSMENT — PAIN DESCRIPTION - FREQUENCY: FREQUENCY: CONTINUOUS

## 2020-01-14 ASSESSMENT — PAIN DESCRIPTION - ONSET: ONSET: ON-GOING

## 2020-01-14 ASSESSMENT — PAIN SCALES - GENERAL: PAINLEVEL_OUTOF10: 8

## 2020-01-14 ASSESSMENT — PAIN DESCRIPTION - PROGRESSION: CLINICAL_PROGRESSION: NOT CHANGED

## 2020-01-14 ASSESSMENT — PAIN DESCRIPTION - DESCRIPTORS: DESCRIPTORS: ACHING

## 2020-01-14 ASSESSMENT — PAIN DESCRIPTION - ORIENTATION: ORIENTATION: RIGHT

## 2020-01-14 NOTE — PLAN OF CARE
Problem: Pressure Ulcer:  Goal: Signs of wound healing will improve  Description  Signs of wound healing will improve  Outcome: Ongoing  Goal: Absence of new pressure ulcer  Description  Absence of new pressure ulcer  Outcome: Ongoing  Goal: Will show no infection signs and symptoms  Description  Will show no infection signs and symptoms  Outcome: Ongoing     Problem: Wound:  Goal: Will show signs of wound healing; wound closure and no evidence of infection  Description  Will show signs of wound healing; wound closure and no evidence of infection  Outcome: Ongoing

## 2020-01-24 ENCOUNTER — TELEPHONE (OUTPATIENT)
Dept: UROLOGY | Age: 50
End: 2020-01-24

## 2020-01-24 NOTE — TELEPHONE ENCOUNTER
Insurance will only cover tolterodine (DETROL IR) 2 MG tablet. Could this be called in for her? Pharmacy [facility]. Take out Juanito please.

## 2020-01-27 ENCOUNTER — CARE COORDINATION (OUTPATIENT)
Dept: CARE COORDINATION | Age: 50
End: 2020-01-27

## 2020-01-28 ENCOUNTER — HOSPITAL ENCOUNTER (OUTPATIENT)
Dept: WOUND CARE | Age: 50
Discharge: HOME | End: 2020-01-28
Payer: MEDICARE

## 2020-01-28 VITALS
DIASTOLIC BLOOD PRESSURE: 66 MMHG | HEART RATE: 82 BPM | SYSTOLIC BLOOD PRESSURE: 115 MMHG | HEIGHT: 69 IN | WEIGHT: 145.5 LBS | TEMPERATURE: 98.7 F | RESPIRATION RATE: 18 BRPM | BODY MASS INDEX: 21.55 KG/M2

## 2020-01-28 PROCEDURE — 11042 DBRDMT SUBQ TIS 1ST 20SQCM/<: CPT | Performed by: SURGERY

## 2020-01-28 PROCEDURE — 11042 DBRDMT SUBQ TIS 1ST 20SQCM/<: CPT

## 2020-01-28 ASSESSMENT — PAIN SCALES - GENERAL: PAINLEVEL_OUTOF10: 0

## 2020-01-28 NOTE — PLAN OF CARE
Problem: Wound:  Goal: Will show signs of wound healing; wound closure and no evidence of infection  Description  Will show signs of wound healing; wound closure and no evidence of infection  1/28/2020 1320 by Peter Reina RN  Outcome: Ongoing  1/28/2020 1319 by Peter Reina RN  Outcome: Ongoing     Problem: Pressure Ulcer:  Goal: Signs of wound healing will improve  Description  Signs of wound healing will improve  1/28/2020 1320 by Petre Reina RN  Outcome: Ongoing  1/28/2020 1319 by Peter Reina RN  Outcome: Ongoing  Goal: Absence of new pressure ulcer  Description  Absence of new pressure ulcer  1/28/2020 1320 by Peter Reina RN  Outcome: Ongoing  1/28/2020 1319 by Peter Reina RN  Outcome: Ongoing  Goal: Will show no infection signs and symptoms  Description  Will show no infection signs and symptoms  1/28/2020 1320 by Peter Reina RN  Outcome: Ongoing  1/28/2020 1319 by Peter Reina RN  Outcome: Ongoing

## 2020-01-28 NOTE — PROGRESS NOTES
[address]  Progress Note and Procedure Note      Alyce Baker  AGE: 53 y.o. GENDER: female  : 1970  TODAY'S DATE:  2020    Subjective:     CHIEF COMPLAINT sacral wound     HISTORY of PRESENT ILLNESS HUEY Rome is a 53 y.o. female who presents today for wound/ulcer evaluation.     Ulcer Type:pressure  Ulcer/Wound Location:sacral  Contributing factors:chronic pressure, decreased mobility and shear force    Patient Active Problem List   Diagnosis Code    Muscle spasticity M62.838    Peripheral vascular disease (HCC) I73.9    MS (multiple sclerosis) ([facility]) G35    Pain in both upper arms M79.621, M79.622    Numbness R20.0    Other fatigue R53.83    Weakness R53.1    Chronic pain G89.29    Hx of seizure disorder Z86.69    Insomnia G47.00    Decubitus ulcer of sacral region L89.159    Pain, neck M54.2    Venous stasis ulcer of left lower extremity (HCC) I83.029, L97.929    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 layer exposed ([address] L97.812    S/P transmetatarsal amputation of foot, left ([facility]) J21.127    Chronic constipation K59.09    Colostomy in place [facility]) Z93.3    Surgical wound breakdown T81.31XA    Pressure injury of sacral region, stage 4 ([facility]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    Paraplegia ([facility]) G82.20    Arthralgia of hip M25.559       ALLERGIES    Allergies   Allergen Reactions    Darvocet [Propoxyphene N-Acetaminophen]      Talking out of her head    Morphine And Related Itching and Swelling    Propoxyphene Swelling           Objective:      /66   Pulse 82   Temp 98.7 °F (37.1 °C) (Temporal)   Resp 18   Ht 5' 9\" (1.753 m)   Wt 145 lb 8.1 oz (66 kg)   BMI 21.49 kg/m²         Assessment:      Problem List Items Addressed This Visit     Venous stasis ulcer of left lower extremity (HCC) (Chronic)    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 layer exposed ([address]    S/P transmetatarsal amputation of foot, left ([address]          The patients pain isPain Level: 0  . Wound(s) has slightly improved. Please refer to nursing measurements and assessment regarding wound pre and post debridement. Wound 10/17/19 Coccyx WOUND 65; COCCYX (PRESSSURE 4)  merged with Wound 66 on 11/5/19 (Active)   Wound Image   1/28/2020 12:02 PM   Wound Pressure Stage  4 1/28/2020 12:02 PM   Dressing Status Old drainage 1/28/2020 12:02 PM   Dressing Changed Changed/New 12/31/2019 12:46 PM   Dressing/Treatment Vacuum dressing 12/31/2019 12:46 PM   Wound Cleansed Soap and water 1/28/2020 12:02 PM   Wound Length (cm) 1.3 cm 1/28/2020 12:02 PM   Wound Width (cm) 0.6 cm 1/28/2020 12:02 PM   Wound Depth (cm) 0.8 cm 1/28/2020 12:02 PM   Wound Surface Area (cm^2) 0.78 cm^2 1/28/2020 12:02 PM   Change in Wound Size % (l*w) -290 1/28/2020 12:02 PM   Wound Volume (cm^3) 0.62 cm^3 1/28/2020 12:02 PM   Wound Healing % -520 1/28/2020 12:02 PM   Post-Procedure Length (cm) 2.5 cm 12/31/2019 12:07 PM   Post-Procedure Width (cm) 1.7 cm 12/31/2019 12:07 PM   Post-Procedure Depth (cm) 1.3 cm 12/31/2019 12:07 PM   Post-Procedure Surface Area (cm^2) 4.25 cm^2 12/31/2019 12:07 PM   Post-Procedure Volume (cm^3) 5.52 cm^3 12/31/2019 12:07 PM   Distance Tunneling (cm) 0 cm 1/28/2020 12:02 PM   Tunneling Position ___ O'Clock 0 1/28/2020 12:02 PM   Undermining Starts ___ O'Clock 12 1/28/2020 12:02 PM   Undermining Ends___ O'Clock 2 1/28/2020 12:02 PM   Undermining Maxium Distance (cm) 1.2 1/28/2020 12:02 PM   Wound Assessment Red;Pink;Slough; Yellow;Drainage;Granulation tissue 1/28/2020 12:02 PM   Drainage Amount Small 1/28/2020 12:02 PM   Drainage Description Serosanguinous 1/28/2020 12:02 PM   Odor None 1/28/2020 12:02 PM   Margins Unattached edges 1/28/2020 12:02 PM   Audelia-wound Assessment Pink 1/28/2020 12:02 PM   Non-staged Wound Description Not applicable 4/70/8692 22:49 PM   [place]%Wound Bed 30 1/28/2020 12:02 PM   Red%Wound Bed 60 1/28/2020 12:02 PM   Yellow%Wound Bed 10 1/28/2020 12:02 PM   Black%Wound Bed 0 1/28/2020 12:02 PM   Purple%Wound Bed 0 1/28/2020 12:02 PM   Other%Wound Bed 0 1/28/2020 12:02 PM   Number of days: 102       Wound 01/28/20 Coccyx Distal Wound 67 Coccyx distal (Active)   Wound Image    1/28/2020 12:02 PM   Wound Pressure Stage  3 1/28/2020 12:02 PM   Dressing Status Old drainage 1/28/2020 12:02 PM   Wound Cleansed Soap and water 1/28/2020 12:02 PM   Wound Length (cm) 1 cm 1/28/2020 12:02 PM   Wound Width (cm) 0.5 cm 1/28/2020 12:02 PM   Wound Depth (cm) 0.1 cm 1/28/2020 12:02 PM   Wound Surface Area (cm^2) 0.5 cm^2 1/28/2020 12:02 PM   Wound Volume (cm^3) 0.05 cm^3 1/28/2020 12:02 PM   Distance Tunneling (cm) 0 cm 1/28/2020 12:02 PM   Tunneling Position ___ O'Clock 0 1/28/2020 12:02 PM   Undermining Starts ___ O'Clock 0 1/28/2020 12:02 PM   Undermining Ends___ O'Clock 0 1/28/2020 12:02 PM   Undermining Maxium Distance (cm) 0 1/28/2020 12:02 PM   Wound Assessment Red;Pink;Drainage;Slough; Yellow 1/28/2020 12:02 PM   Drainage Amount Small 1/28/2020 12:02 PM   Drainage Description Serosanguinous 1/28/2020 12:02 PM   Odor None 1/28/2020 12:02 PM   Margins Attached edges 1/28/2020 12:02 PM   Audelia-wound Assessment Red 1/28/2020 12:02 PM   Non-staged Wound Description Not applicable 6/29/1791 96:93 PM   [place]%Wound Bed 30 1/28/2020 12:02 PM   Red%Wound Bed 50 1/28/2020 12:02 PM   Yellow%Wound Bed 20 1/28/2020 12:02 PM   Black%Wound Bed 0 1/28/2020 12:02 PM   Purple%Wound Bed 0 1/28/2020 12:02 PM   Other%Wound Bed 0 1/28/2020 12:02 PM   Number of days: 0           Procedure Note  Indications:  Based on my examination of this patient's wound(s)/ulcer(s) today, debridement is required to promote healing and evaluate the wound base.     Performed by: Kevin Uribe MD    Consent obtained:  Yes    Time out taken:  Yes    Pain Control:         Debridement: Excisional Debridement    Using curette the wound(s)/ulcer(s) was/were sharply debrided down through and including the removal of epidermis, dermis and subcutaneous tissue. Devitalized Tissue Debrided:  fibrin, biofilm and slough    Pre Debridement Measurements:  Are located in the Wound/Ulcer Documentation Flow Sheet    Wound/Ulcer #: 65, 67    Post Debridement Measurements:  Wound/Ulcer Descriptions are Pre Debridement except measurements:          Percent of Wound(s)/Ulcer(s) Debrided: 100%    Total Surface Area Debrided:  1.28 sq cm     Diabetic/Pressure/Non Pressure Ulcers only:  Ulcer: Pressure ulcer, Stage 4     Estimated Blood Loss:  Minimal    Hemostasis Achieved:  not needed      Response to treatment:  Well tolerated by patient. Plan:          Plan for wound - Dress per physician order  Treatment:     Compression : No   Offloading : Yes   Dressing : see AVS   Additional Therapy : none     1. Discussed appropriate home care of this wound. Wound redressed. 2. Written patient dismissal instructions given to patient and signed by patient or POA. 3. Follow up: 2 week(s). Ms. Baker's wound is improving and is much smaller. She has a small area of irritation possibly from the duoderm. I explained to her that I think they can stop the drape above this level and just apply skin barrier to this area of irritation.  We will continue with the wound vac and offloading, and hopefully it will continue to make this much improvement.      subq    Electronically signed by Sebastian Almaguer MD on 1/28/2020 at 12:15 PM

## 2020-01-28 NOTE — PLAN OF CARE
Problem: Wound:  Goal: Will show signs of wound healing; wound closure and no evidence of infection  Description  Will show signs of wound healing; wound closure and no evidence of infection  Outcome: Ongoing     Problem: Pressure Ulcer:  Goal: Signs of wound healing will improve  Description  Signs of wound healing will improve  Outcome: Ongoing  Goal: Absence of new pressure ulcer  Description  Absence of new pressure ulcer  Outcome: Ongoing  Goal: Will show no infection signs and symptoms  Description  Will show no infection signs and symptoms  Outcome: Ongoing

## 2020-02-01 RX ORDER — METHYLPHENIDATE HYDROCHLORIDE 20 MG/1
TABLET ORAL
Qty: 30 TABLET | Refills: 0 | Status: SHIPPED | OUTPATIENT
Start: 2020-02-02 | End: 2020-03-02

## 2020-02-01 RX ORDER — HYDROCODONE BITARTRATE AND ACETAMINOPHEN 10; 325 MG/1; MG/1
TABLET ORAL
Qty: 90 TABLET | Refills: 0 | Status: SHIPPED | OUTPATIENT
Start: 2020-02-02 | End: 2020-03-02

## 2020-02-03 RX ORDER — VARENICLINE TARTRATE 1 MG/1
TABLET, FILM COATED ORAL
Qty: 56 TABLET | Refills: 0 | Status: SHIPPED | OUTPATIENT
Start: 2020-02-03 | End: 2020-03-26

## 2020-02-18 ENCOUNTER — HOSPITAL ENCOUNTER (OUTPATIENT)
Dept: WOUND CARE | Age: 50
Discharge: HOME | End: 2020-02-18
Payer: MEDICARE

## 2020-02-18 VITALS
HEART RATE: 80 BPM | TEMPERATURE: 98.4 F | WEIGHT: 149.03 LBS | BODY MASS INDEX: 22.07 KG/M2 | RESPIRATION RATE: 18 BRPM | HEIGHT: 69 IN

## 2020-02-18 PROCEDURE — 11042 DBRDMT SUBQ TIS 1ST 20SQCM/<: CPT | Performed by: SURGERY

## 2020-02-18 PROCEDURE — 11042 DBRDMT SUBQ TIS 1ST 20SQCM/<: CPT

## 2020-02-18 NOTE — PROGRESS NOTES
[address]  Progress Note and Procedure Note      Alyce Baker  AGE: 53 y.o. GENDER: female  : 1970  TODAY'S DATE:  2020    Subjective:     CHIEF COMPLAINT sacral     HISTORY of PRESENT ILLNESS HPI   Darlin Edwards is a 53 y.o. female who presents today for wound/ulcer evaluation.     Ulcer Type:pressure  Ulcer/Wound Location:sacral  Contributing factors:chronic pressure, decreased mobility and shear force    Patient Active Problem List   Diagnosis Code    Muscle spasticity M62.838    Peripheral vascular disease ([facility]) I73.9    MS (multiple sclerosis) ([facility]) G35    Pain in both upper arms M79.621, M79.622    Numbness R20.0    Other fatigue R53.83    Weakness R53.1    Chronic pain G89.29    Hx of seizure disorder Z86.69    Insomnia G47.00    Decubitus ulcer of sacral region L89.159    Pain, neck M54.2    Venous stasis ulcer of left lower extremity ([facility]) I83.029, L97.929    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 layer exposed ([address] L97.812    S/P transmetatarsal amputation of foot, left ([facility]) F70.785    Chronic constipation K59.09    Colostomy in place [facility]) Z93.3    Surgical wound breakdown T81.31XA    Pressure injury of sacral region, stage 4 ([facility]) 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    Paraplegia ([facility]) G82.20    Arthralgia of hip M25.559       ALLERGIES    Allergies   Allergen Reactions    Darvocet [Propoxyphene N-Acetaminophen]      Talking out of her head    Morphine And Related Itching and Swelling    Propoxyphene Swelling           Objective:      Pulse 80   Temp 98.4 °F (36.9 °C) (Temporal)   Resp 18   Ht 5' 9\" (1.753 m)   Wt 145 lb (65.8 kg)   BMI 21.41 kg/m²         Assessment:      Problem List Items Addressed This Visit     Venous stasis ulcer of left lower extremity (HCC) (Chronic)    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 layer exposed ([address]    S/P transmetatarsal amputation of foot, left ([address] The patients pain isPain Level: 0  . Wound(s) is unchanged. Please refer to nursing measurements and assessment regarding wound pre and post debridement. Wound 10/17/19 Coccyx WOUND 65; COCCYX (PRESSSURE 4)  merged with Wound 66 on 11/5/19 (Active)   Wound Image   2/18/2020 11:34 AM   Wound Pressure Stage  4 2/18/2020 11:34 AM   Dressing Status Old drainage 2/18/2020 11:34 AM   Dressing Changed Changed/New 12/31/2019 12:46 PM   Dressing/Treatment Vacuum dressing 12/31/2019 12:46 PM   Wound Cleansed Soap and water 1/28/2020 12:02 PM   Wound Length (cm) 2 cm 2/18/2020 11:34 AM   Wound Width (cm) 1 cm 2/18/2020 11:34 AM   Wound Depth (cm) 0.7 cm 2/18/2020 11:34 AM   Wound Surface Area (cm^2) 2 cm^2 2/18/2020 11:34 AM   Change in Wound Size % (l*w) -900 2/18/2020 11:34 AM   Wound Volume (cm^3) 1.4 cm^3 2/18/2020 11:34 AM   Wound Healing % -1300 2/18/2020 11:34 AM   Post-Procedure Length (cm) 2 cm 2/18/2020 11:48 AM   Post-Procedure Width (cm) 1 cm 2/18/2020 11:48 AM   Post-Procedure Depth (cm) 0.7 cm 2/18/2020 11:48 AM   Post-Procedure Surface Area (cm^2) 2 cm^2 2/18/2020 11:48 AM   Post-Procedure Volume (cm^3) 1.4 cm^3 2/18/2020 11:48 AM   Distance Tunneling (cm) 0 cm 2/18/2020 11:34 AM   Tunneling Position ___ O'Clock 0 2/18/2020 11:34 AM   Undermining Starts ___ O'Clock 1 2/18/2020 11:34 AM   Undermining Ends___ O'Clock 3 2/18/2020 11:34 AM   Undermining Maxium Distance (cm) 1.0 2/18/2020 11:34 AM   Wound Assessment Red;Pink;Slough; Yellow;Drainage;Granulation tissue 2/18/2020 11:34 AM   Drainage Amount Small 2/18/2020 11:34 AM   Drainage Description Serosanguinous 2/18/2020 11:34 AM   Odor None 2/18/2020 11:34 AM   Margins Unattached edges 2/18/2020 11:34 AM   Audelia-wound Assessment Pink 2/18/2020 11:34 AM   Non-staged Wound Description Not applicable 2/19/5373 45:15 AM   [place]%Wound Bed 50 2/18/2020 11:34 AM   Red%Wound Bed 50 2/18/2020 11:34 AM   Yellow%Wound Bed 0 2/18/2020 11:34 AM   Black%Wound Bed 0 2/18/2020 11:34 AM   Purple%Wound Bed 0 2/18/2020 11:34 AM   Other%Wound Bed 0 1/28/2020 12:02 PM   Number of days: 123       Wound 01/28/20 Coccyx Distal Wound 67 Coccyx distal (Active)   Wound Image   2/18/2020 11:34 AM   Wound Pressure Stage  3 1/28/2020 12:02 PM   Dressing Status Old drainage 1/28/2020 12:02 PM   Wound Cleansed Soap and water 1/28/2020 12:02 PM   Wound Length (cm) 0 cm 2/18/2020 11:34 AM   Wound Width (cm) 0 cm 2/18/2020 11:34 AM   Wound Depth (cm) 0 cm 2/18/2020 11:34 AM   Wound Surface Area (cm^2) 0 cm^2 2/18/2020 11:34 AM   Change in Wound Size % (l*w) 100 2/18/2020 11:34 AM   Wound Volume (cm^3) 0 cm^3 2/18/2020 11:34 AM   Wound Healing % 100 2/18/2020 11:34 AM   Distance Tunneling (cm) 0 cm 1/28/2020 12:02 PM   Tunneling Position ___ O'Clock 0 1/28/2020 12:02 PM   Undermining Starts ___ O'Clock 0 1/28/2020 12:02 PM   Undermining Ends___ O'Clock 0 1/28/2020 12:02 PM   Undermining Maxium Distance (cm) 0 1/28/2020 12:02 PM   Wound Assessment Epithelialization 2/18/2020 11:34 AM   Drainage Amount Small 1/28/2020 12:02 PM   Drainage Description Serosanguinous 1/28/2020 12:02 PM   Odor None 1/28/2020 12:02 PM   Margins Attached edges 1/28/2020 12:02 PM   Audelia-wound Assessment Red 1/28/2020 12:02 PM   Non-staged Wound Description Not applicable 3/22/4164 08:25 PM   [place]%Wound Bed 30 1/28/2020 12:02 PM   Red%Wound Bed 50 1/28/2020 12:02 PM   Yellow%Wound Bed 20 1/28/2020 12:02 PM   Black%Wound Bed 0 1/28/2020 12:02 PM   Purple%Wound Bed 0 1/28/2020 12:02 PM   Other%Wound Bed 0 1/28/2020 12:02 PM   Number of days: 20           Procedure Note  Indications:  Based on my examination of this patient's wound(s)/ulcer(s) today, debridement is required to promote healing and evaluate the wound base.     Performed by: Magalys Nielsen MD    Consent obtained:  Yes    Time out taken:  Yes    Pain Control: Anesthetic  Anesthetic: 2% Lidocaine Gel Topical       Debridement: Excisional Debridement    Using curette

## 2020-02-24 ENCOUNTER — TELEPHONE (OUTPATIENT)
Dept: ADMINISTRATIVE | Age: 50
End: 2020-02-24

## 2020-02-24 NOTE — TELEPHONE ENCOUNTER
Alyce requests that someone return their call. The best time to reach her is Anytime. The pt has requested that a [facility] Transportation form be completed and sent to [facility] by noon. Thank you.

## 2020-03-02 RX ORDER — METHYLPHENIDATE HYDROCHLORIDE 20 MG/1
TABLET ORAL
Qty: 30 TABLET | Refills: 0 | Status: SHIPPED | OUTPATIENT
Start: 2020-03-02 | End: 2020-04-01

## 2020-03-02 RX ORDER — HYDROCODONE BITARTRATE AND ACETAMINOPHEN 10; 325 MG/1; MG/1
TABLET ORAL
Qty: 90 TABLET | Refills: 0 | Status: SHIPPED | OUTPATIENT
Start: 2020-03-02 | End: 2020-04-01

## 2020-03-03 ENCOUNTER — HOSPITAL ENCOUNTER (OUTPATIENT)
Dept: WOUND CARE | Age: 50
Discharge: HOME | End: 2020-03-03
Payer: MEDICARE

## 2020-03-03 VITALS
TEMPERATURE: 98.3 F | SYSTOLIC BLOOD PRESSURE: 106 MMHG | HEIGHT: 69 IN | RESPIRATION RATE: 18 BRPM | DIASTOLIC BLOOD PRESSURE: 72 MMHG | WEIGHT: 149.03 LBS | BODY MASS INDEX: 22.07 KG/M2 | HEART RATE: 84 BPM

## 2020-03-03 PROCEDURE — 11042 DBRDMT SUBQ TIS 1ST 20SQCM/<: CPT | Performed by: SURGERY

## 2020-03-03 PROCEDURE — 11042 DBRDMT SUBQ TIS 1ST 20SQCM/<: CPT

## 2020-03-03 RX ORDER — DOCUSATE SODIUM 100 MG/1
200 CAPSULE, LIQUID FILLED ORAL PRN
Status: ON HOLD | COMMUNITY

## 2020-03-03 ASSESSMENT — PAIN DESCRIPTION - LOCATION: LOCATION: BACK

## 2020-03-03 ASSESSMENT — PAIN DESCRIPTION - DESCRIPTORS: DESCRIPTORS: DULL;ACHING;SHARP

## 2020-03-03 ASSESSMENT — PAIN DESCRIPTION - ONSET: ONSET: ON-GOING

## 2020-03-03 ASSESSMENT — PAIN DESCRIPTION - PAIN TYPE: TYPE: CHRONIC PAIN

## 2020-03-03 ASSESSMENT — PAIN DESCRIPTION - FREQUENCY: FREQUENCY: CONTINUOUS

## 2020-03-03 ASSESSMENT — PAIN SCALES - GENERAL: PAINLEVEL_OUTOF10: 8

## 2020-03-03 NOTE — PROGRESS NOTES
[address]  Progress Note and Procedure Note      Alyce Baker  AGE: 53 y.o. GENDER: female  : 1970  TODAY'S DATE:  3/3/2020    Subjective:     CHIEF COMPLAINT sacral wound     HISTORY of PRESENT ILLNESS HPI   Mingo Braun is a 53 y.o. female who presents today for wound/ulcer evaluation.     Ulcer Type:pressure  Ulcer/Wound Location:sacral  Contributing factors:chronic pressure, decreased mobility and shear force    Patient Active Problem List   Diagnosis Code    Muscle spasticity M62.838    Peripheral vascular disease ([facility]) I73.9    MS (multiple sclerosis) ([facility]) G35    Pain in both upper arms M79.621, M79.622    Numbness R20.0    Other fatigue R53.83    Weakness R53.1    Chronic pain G89.29    Hx of seizure disorder Z86.69    Insomnia G47.00    Decubitus ulcer of sacral region L89.159    Pain, neck M54.2    Venous stasis ulcer of left lower extremity ([facility]) I83.029, L97.929    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 layer exposed ([address] L97.812    S/P transmetatarsal amputation of foot, left ([facility]) J84.466    Chronic constipation K59.09    Colostomy in place [facility]) Z93.3    Surgical wound breakdown T81.31XA    Pressure injury of sacral region, 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    Paraplegia ([facility]) G82.20    Arthralgia of hip M25.559       ALLERGIES    Allergies   Allergen Reactions    Darvocet [Propoxyphene N-Acetaminophen]      Talking out of her head    Morphine And Related Itching and Swelling    Propoxyphene Swelling           Objective:      /72   Pulse 84   Temp 98.3 °F (36.8 °C) (Temporal)   Resp 18   Ht 5' 9\" (1.753 m)   Wt 149 lb 0.5 oz (67.6 kg)   BMI 22.01 kg/m²         Assessment:      Problem List Items Addressed This Visit     Venous stasis ulcer of left lower extremity (HCC) (Chronic)    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 layer exposed ([address]    S/P transmetatarsal amputation of foot, left (HCC)          The patients pain isPain Level: 8 Pain Type: Chronic pain. Wound(s) is unchanged. Please refer to nursing measurements and assessment regarding wound pre and post debridement.   Wound 10/17/19 Coccyx WOUND 65; COCCYX (PRESSSURE 4)  merged with Wound 66 on 11/5/19 (Active)   Wound Image    3/3/2020 11:43 AM   Wound Pressure Stage  4 3/3/2020 11:43 AM   Dressing Status Old drainage 3/3/2020 11:43 AM   Dressing Changed Changed/New 2/18/2020 12:15 PM   Wound Cleansed Soap and water 3/3/2020 11:43 AM   Wound Length (cm) 1.8 cm 3/3/2020 11:43 AM   Wound Width (cm) 1.3 cm 3/3/2020 11:43 AM   Wound Depth (cm) 1 cm 3/3/2020 11:43 AM   Wound Surface Area (cm^2) 2.34 cm^2 3/3/2020 11:43 AM   Change in Wound Size % (l*w) -1070 3/3/2020 11:43 AM   Wound Volume (cm^3) 2.34 cm^3 3/3/2020 11:43 AM   Wound Healing % -2240 3/3/2020 11:43 AM   Post-Procedure Length (cm) 2 cm 2/18/2020 11:48 AM   Post-Procedure Width (cm) 1 cm 2/18/2020 11:48 AM   Post-Procedure Depth (cm) 0.7 cm 2/18/2020 11:48 AM   Post-Procedure Surface Area (cm^2) 2 cm^2 2/18/2020 11:48 AM   Post-Procedure Volume (cm^3) 1.4 cm^3 2/18/2020 11:48 AM   Distance Tunneling (cm) 0 cm 3/3/2020 11:43 AM   Tunneling Position ___ O'Clock 0 3/3/2020 11:43 AM   Undermining Starts ___ O'Clock 11 3/3/2020 11:43 AM   Undermining Ends___ O'Clock 2 3/3/2020 11:43 AM   Undermining Maxium Distance (cm) 1.2 3/3/2020 11:43 AM   Wound Assessment Red;Slough;Pink;Yellow;Granulation tissue;Drainage 3/3/2020 11:43 AM   Drainage Amount Small 3/3/2020 11:43 AM   Drainage Description Serosanguinous 3/3/2020 11:43 AM   Odor None 3/3/2020 11:43 AM   Margins Unattached edges 3/3/2020 11:43 AM   Exposed structure Muscle 3/3/2020 11:43 AM   Audelia-wound Assessment Purple 3/3/2020 11:43 AM   Non-staged Wound Description Not applicable 8/1/9450 43:94 AM   [place]%Wound Bed 20 3/3/2020 11:43 AM   Red%Wound Bed 65 3/3/2020 11:43 AM   Yellow%Wound Bed 15 3/3/2020 11:43 AM   Black%Wound Bed 0 3/3/2020 11:43 AM   Purple%Wound Bed 0 3/3/2020 11:43 AM   Other%Wound Bed 0 3/3/2020 11:43 AM   Number of days: 137           Procedure Note  Indications:  Based on my examination of this patient's wound(s)/ulcer(s) today, debridement is required to promote healing and evaluate the wound base. Performed by: Citlaly Brownlee MD    Consent obtained:  Yes    Time out taken:  Yes    Pain Control:         Debridement: Excisional Debridement    Using curette the wound(s)/ulcer(s) was/were sharply debrided down through and including the removal of epidermis, dermis and subcutaneous tissue. Devitalized Tissue Debrided:  fibrin, biofilm and slough    Pre Debridement Measurements:  Are located in the Wound/Ulcer Documentation Flow Sheet    Wound/Ulcer #: 65    Post Debridement Measurements:  Wound/Ulcer Descriptions are Pre Debridement except measurements:          Percent of Wound(s)/Ulcer(s) Debrided: 100%    Total Surface Area Debrided:  2.34 sq cm     Diabetic/Pressure/Non Pressure Ulcers only:  Ulcer: Pressure ulcer, Stage 4     Estimated Blood Loss:  Minimal    Hemostasis Achieved:  not needed    Response to treatment:  Well tolerated by patient. Plan:          Plan for wound - Dress per physician order  Treatment:     Compression : No   Offloading : Yes   Dressing : see AVS   Additional Therapy : none     1. Discussed appropriate home care of this wound. Wound redressed. 2. Written patient dismissal instructions given to patient and signed by patient or POA. 3. Follow up: 2 week(s). Will try holding the wound vac for two weeks, use promogran. Continue offloading. Follow up in 2 weeks to see about reapplying wound vac.      Electronically signed by Citlaly Brownlee MD on 3/3/2020 at 12:20 PM

## 2020-03-10 ENCOUNTER — TELEPHONE (OUTPATIENT)
Dept: NEUROLOGY | Age: 50
End: 2020-03-10

## 2020-03-10 ENCOUNTER — OFFICE VISIT (OUTPATIENT)
Dept: NEUROLOGY | Age: 50
End: 2020-03-10
Payer: MEDICARE

## 2020-03-10 VITALS
SYSTOLIC BLOOD PRESSURE: 95 MMHG | DIASTOLIC BLOOD PRESSURE: 65 MMHG | HEIGHT: 69 IN | HEART RATE: 88 BPM | BODY MASS INDEX: 22.07 KG/M2 | WEIGHT: 149 LBS

## 2020-03-10 PROCEDURE — 1036F TOBACCO NON-USER: CPT | Performed by: PSYCHIATRY & NEUROLOGY

## 2020-03-10 PROCEDURE — G8420 CALC BMI NORM PARAMETERS: HCPCS | Performed by: PSYCHIATRY & NEUROLOGY

## 2020-03-10 PROCEDURE — G8482 FLU IMMUNIZE ORDER/ADMIN: HCPCS | Performed by: PSYCHIATRY & NEUROLOGY

## 2020-03-10 PROCEDURE — G8427 DOCREV CUR MEDS BY ELIG CLIN: HCPCS | Performed by: PSYCHIATRY & NEUROLOGY

## 2020-03-10 PROCEDURE — 99213 OFFICE O/P EST LOW 20 MIN: CPT | Performed by: PSYCHIATRY & NEUROLOGY

## 2020-03-10 RX ORDER — TIZANIDINE 4 MG/1
TABLET ORAL
Qty: 180 TABLET | Refills: 5 | Status: SHIPPED | OUTPATIENT
Start: 2020-03-10 | End: 2020-10-02

## 2020-03-10 RX ORDER — PREGABALIN 300 MG/1
CAPSULE ORAL
Qty: 60 CAPSULE | Refills: 5 | Status: SHIPPED | OUTPATIENT
Start: 2020-03-10 | End: 2020-10-02

## 2020-03-10 NOTE — TELEPHONE ENCOUNTER
I called and spoke with Katharina Persaud at the pharmacy. She was wanting to verify the Zanaflex order. It stated TID PRN. I gave her a verbal for the 3 tablets by mouth 2 times daily.  Katharina Persaud voiced understanding

## 2020-03-10 NOTE — PROGRESS NOTES
Chief Complaint   Patient presents with   Rajinder Maria Luisa is a 53y.o. year old female who is seen for evaluation of multiple sclerosis. The patient indicates that she was diagnosed with multiple sclerosis in 1994. At that time to develop some visual disturbances including blurred vision and double vision. Within eight months she was in a wheelchair. She currently has no movement of the lower extremities. She does have some increased Spasticity in the legs worse in the arms. She underwent a baclofen pump with Dr. Corado with some complications. She is doing better from this. She currently sees Dr. nichols for her pump management. She does have some cognitive issues. She denies diplopia, dysarthria, or dysphagia. She does have some incontinence of bladder. She does have pain in the hips and lower extremities. She also has headaches with pain behind both eyes. She was followed by Dr. Justus Hart of neurology. She follows up today for evaluation. She is currently in a wheelchair. Since her last visit she is doing better with physical therapy. She had an accidental overdose with her baclofen pump and had a seizure. Doing better on. Recently admitted with seizure. Was off Chantix a few weeks and had a UTI. Had seizure in oct 2014 with seizure due to baclofen overose. This was her second seizure. On Keppra. no more seizures. trying to wean off some meds. More fatigue lately. Worsening memory. Doing much better. Lyrical helps. Ritalin helps. More numbness in hands. More neck pain. First infusion of Ocrevus. Normally has low blood pressure. Off Ocrevus.       Active Ambulatory Problems     Diagnosis Date Noted    Muscle spasticity     Peripheral vascular disease ([facility] 02/01/2016    MS (multiple sclerosis) ([facility] 06/08/2016    Pain in both upper arms 08/04/2016    Numbness 08/04/2016    Other fatigue 08/04/2016    Weakness 08/04/2016    Chronic pain 08/19/2016    Hx of seizure disorder 08/19/2016    12/11/2018    Type 2 diabetes mellitus with left diabetic foot ulcer ([address] 04/04/2019    Encounter for screening colonoscopy 05/02/2019    Family history of colon cancer 05/02/2019     Past Medical History:   Diagnosis Date    Ankle wound     Asthma     Seizure [facility])        Past Surgical History:   Procedure Laterality Date    BACLOFEN PUMP IMPLANTATION      COLONOSCOPY N/A 9/27/2019    Dr Jinny Golden ileum through ostomy-patent and healthy appearing anastomosis in the right colon-entero colonic anastomosis-5 yr recall    COLOSTOMY      FEMUR FRACTURE SURGERY      Right    FOOT AMPUTATION Left 4/4/2019    LEFT TRANSMET AMPUTATION performed by Ana Lay MD at [address]      urostomy    OTHER SURGICAL HISTORY      pain pump    [place] [facility] PR[facility] CTR VAD W/SUBQ PORT UNDER 5 YR N/A 6/26/2018    SINGLE LUMEN PORT PLACEMENT WITH FLUORO performed by Harmony Luna MD at [address] TOE AMPUTATION      L last toe    TONSILLECTOMY      URETEROTOMY         Family History   Problem Relation Age of Onset    Cancer Mother         breast    Colon Cancer Mother     Colon Polyps Mother     Diabetes Father     High Blood Pressure Father     Cancer Paternal Aunt         breast    Liver Cancer Paternal Aunt     Heart Disease Paternal Grandmother     Esophageal Cancer Neg Hx     Liver Disease Neg Hx     Rectal Cancer Neg Hx     Stomach Cancer Neg Hx        Allergies   Allergen Reactions    Darvocet [Propoxyphene N-Acetaminophen]      Talking out of her head    Morphine And Related Itching and Swelling    Propoxyphene Swelling       Social History     Socioeconomic History    Marital status:      Spouse name: Not on file    Number of children: Not on file    Years of education: Not on file    Highest education level: Not on file   Occupational History    Not on file   Social Needs    Financial resource strain: Not on file    Food insecurity Worry: Not on file     Inability: Not on file    Transportation needs     Medical: Not on file     Non-medical: Not on file   Tobacco Use    Smoking status: Former Smoker     Packs/day: 0.25     Types: Cigarettes     Last attempt to quit: 2018     Years since quittin.1    Smokeless tobacco: Never Used   Substance and Sexual Activity    Alcohol use: Yes     Comment: yaritza    Drug use: Yes     Types: Marijuana     Comment: daily     Sexual activity: Not on file   Lifestyle    Physical activity     Days per week: Not on file     Minutes per session: Not on file    Stress: Not on file   Relationships    Social connections     Talks on phone: Not on file     Gets together: Not on file     Attends Spiritism service: Not on file     Active member of club or organization: Not on file     Attends meetings of clubs or organizations: Not on file     Relationship status: Not on file    Intimate partner violence     Fear of current or ex partner: Not on file     Emotionally abused: Not on file     Physically abused: Not on file     Forced sexual activity: Not on file   Other Topics Concern    Not on file   Social History Narrative    Not on file                 Review of Systems     Constitutional - No fever or chills. yes diaphoresis or significant fatigue. HENT -  No tinnitus or significant hearing loss. Eyes - no sudden vision change or eye pain  Respiratory - no significant shortness of breath or cough  Cardiovascular - no chest pain No palpitations or significant leg swelling  Gastrointestinal - no abdominal swelling or pain. Genitourinary - No difficulty urinating, dysuria  Musculoskeletal - yes back pain or myalgia. Skin - no color change or rash  Neurologic - No seizures. No lateralizing weakness. Hematologic - no easy bruising or excessive bleeding. Psychiatric - no severe anxiety or nervousness. All other review of systems are negative.                         Current Outpatient nostril as directed 1 Bottle 11    ipratropium-albuterol (DUONEB) 0.5-2.5 (3) MG/3ML SOLN nebulizer solution USE 1 UNIT DOSE IN NEBULIZER EVERY 4 TO 6 HOURS AS NEEDED. 450 mL 11    pilocarpine (SALAGEN) 7.5 MG tablet TAKE 1 TABLET BY MOUTH THREE TIMES DAILY. 270 tablet 3    hydrochlorothiazide (HYDRODIURIL) 25 MG tablet Take 1 tablet by mouth daily as needed (hypertension) 90 tablet 3    BACLOFEN, PAIN PUMP REFILL CHARGE, by Implant route continuous Indications: every 3 months      levETIRAcetam (KEPPRA) 500 MG tablet Take 500 mg by mouth nightly      b complex vitamins capsule Take 1 capsule by mouth daily      Multiple Vitamins-Minerals (THERAPEUTIC MULTIVITAMIN-MINERALS) tablet Take 1 tablet by mouth daily      Multiple Vitamins-Minerals (HAIR SKIN & NAILS ADVANCED PO) Take 1 tablet by mouth daily      levocetirizine (XYZAL) 5 MG tablet TAKE 1 TABLET IN THE EVENING (Patient taking differently: Take 5 mg by mouth nightly TAKE 1 TABLET IN THE EVENING) 90 tablet 3    glucose monitoring kit (FREESTYLE) monitoring kit 1 kit by Does not apply route daily 1 kit 0    blood glucose monitor strips Test 1 times a day & as needed for symptoms of irregular blood glucose. 100 strip 0    Lancets MISC 1 each by Does not apply route 2 times daily 100 each 5    baclofen (LIORESAL) 10 MG tablet Take 1 tablet by mouth 2 times daily as needed (muscle spasms) 60 tablet 5    Sodium Chloride Flush (SALINE FLUSH) 0.9 % SOLN Infuse 20 mLs intravenously every 30 days Not every 30 days but every 4-6 weeks as need with 300 units of heparin to flush port for Infusion of Ocrevus for multiple sclerosis 20 mL 5    lactulose (CHRONULAC) 10 GM/15ML solution Take 30 mLs by mouth 3 times daily (Patient taking differently: Take 20 g by mouth 2 times daily as needed ) 1 Bottle 5     No current facility-administered medications for this visit.         BP 95/65   Pulse 88   Ht 5' 9\" (1.753 m)   Wt 149 lb (67.6 kg)   BMI 22.00 kg/m² Constitutional - well developed, well nourished. Eyes - conjunctiva normal.  Pupils react to light  Ear, nose, throat -hearing intact to finger rub No scars, masses, or lesions over external nose or ears, no atrophy of tongue  Neck-symmetric, no masses noted, no jugular vein distension  Respiration- chest wall appears symmetric, good expansion,   normal effort without use of accessory muscles  Musculoskeletal - no significant wasting of muscles noted, no bony deformities, gait no gross ataxia  Extremities-no clubbing, cyanosis or edema  Skin - warm, dry, and intact. No rash, erythema, or pallor.   Psychiatric - mood, affect, and behavior appear normal.      Neurological exam  Awake, alert, fluent oriented x 3 appropriate affect  Attention and concentration appear appropriate  Recent and remote memory appears unremarkable  Speech normal without dysarthria  No clear issues with language of fund of knowledge    Cranial Nerve Exam   CN II- Visual fields grossly unremarkable  CN III, IV,VI-EOMI, No nystagmus, conjugate eye movements, no ptosis    CN VII-no facial assymetry    Motor Exam  Antigravity in arms    Sensory Exam  Sensation reduced     Reflexes     No clonus ankles  No Perez's sign bilateral hands    Tremors- no tremors in hands or head noted    Gait  In wheelchair    Coordination  Finger to nose-unremarkable    Lab Results   Component Value Date    JHOIGLAX20 387 05/02/2019     Lab Results   Component Value Date    WBC 5.3 07/31/2019    HGB 12.1 07/31/2019    HCT 38.1 07/31/2019    MCV 86.6 07/31/2019     07/31/2019     Lab Results   Component Value Date     07/31/2019    K 3.8 07/31/2019     07/31/2019    CO2 24 07/31/2019    BUN 18 07/31/2019    CREATININE <0.5 07/31/2019    GLUCOSE 98 07/31/2019    CALCIUM 8.8 07/31/2019    PROT 6.1 (L) 07/31/2019    LABALBU 3.9 07/31/2019    BILITOT 0.4 07/31/2019    ALKPHOS 67 07/31/2019    AST 59 (H) 07/31/2019    ALT 59 (H) 07/31/2019 LABGLOM >60 07/31/2019    GLOB 2.7 08/19/2016     Impression   1.. No foci of abnormal T2 signal or enhancement within the cervical   cord to suggest plaques of multiple sclerosis or mass. 2. Mild bulging of the disc at C5-C6 with uncinate spurring   contributing to neural foraminal narrowing. There is no central   stenosis. The remaining cervical disc levels are unremarkable. Signed by Dr Silke Olvera on 8/14/2017 5:05 PM           Assessment    ICD-10-CM    1. Pain, neck M54.2 pregabalin (LYRICA) 300 MG capsule       Her neurological examination today was significant for no movement in the lower extremities. She had some altered sensation in the legs along with some spasticity. She's wheelchair-bound. Her MRI of the brain revealed no new lesions. There was extensive white matter lesions. , Her cervical, thoracic, and lumbosacral spine were relatively unremarkable. She was agreeable to be started on Gilenya with no issues. stable. She had been on Copaxone but came off of this on her own. She denies any clear relapses over the last several years. She'll be referred to physical therapy as well. She will have a CBC and CMP every 3 months. The patient indicated understanding of the management plan. At this time she will be referred to [facility] as per her wishes for some experimental treatments. She may consider decreasing her keppra to 1 tabs to bid. She is seeing Dr Db Castle who manages baclofen pump. She will be continued  Ritalin to see if this helps at a future. She will be tried on Nuvgil. Lyrica restarted. She will be referred to [facility] for colostomy issues. .her hepatitis B panel was unremkarkable. Her EMG with NCs of the arms was suggestive of an ulnar neuropathy on the left. her MRI of the brain had stable white matter change. Her MRI of the c spine had some minimal disc bulging but no MS plaques Her x rays of her hips due to pain were unremarkable except for bladder stone along with pin in right hip.   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 She is off Ocrevus due to [address] virus. She will be started on Cymbala for a few months She wants to continue  Cymbalta for now. .She's to follow-up with me in approximately 3 months and call with any further problems. She will be referred to PT and OT. Continue current care as noted. Follow up 3 months. Plan    No orders of the defined types were placed in this encounter. Orders Placed This Encounter   Medications    pregabalin (LYRICA) 300 MG capsule     Sig: TAKE 1 CAPSULE BY MOUTH 2 TIMES A DAY     Dispense:  60 capsule     Refill:  5     $    tiZANidine (ZANAFLEX) 4 MG tablet     Sig: 3 tabs prn     Dispense:  180 tablet     Refill:  5     $       No follow-ups on file.

## 2020-03-17 ENCOUNTER — HOSPITAL ENCOUNTER (OUTPATIENT)
Dept: WOUND CARE | Age: 50
Discharge: HOME | End: 2020-03-17
Payer: MEDICARE

## 2020-03-17 VITALS — HEIGHT: 69 IN | BODY MASS INDEX: 22.07 KG/M2 | WEIGHT: 149 LBS

## 2020-03-17 PROCEDURE — 11042 DBRDMT SUBQ TIS 1ST 20SQCM/<: CPT

## 2020-03-17 PROCEDURE — 11042 DBRDMT SUBQ TIS 1ST 20SQCM/<: CPT | Performed by: SURGERY

## 2020-03-17 ASSESSMENT — PAIN DESCRIPTION - DESCRIPTORS: DESCRIPTORS: DULL;ACHING

## 2020-03-17 ASSESSMENT — PAIN DESCRIPTION - PROGRESSION: CLINICAL_PROGRESSION: NOT CHANGED

## 2020-03-17 ASSESSMENT — PAIN DESCRIPTION - LOCATION: LOCATION: BACK

## 2020-03-17 ASSESSMENT — PAIN DESCRIPTION - PAIN TYPE: TYPE: CHRONIC PAIN

## 2020-03-17 ASSESSMENT — PAIN DESCRIPTION - ORIENTATION: ORIENTATION: RIGHT

## 2020-03-17 ASSESSMENT — PAIN DESCRIPTION - ONSET: ONSET: ON-GOING

## 2020-03-17 ASSESSMENT — PAIN DESCRIPTION - FREQUENCY: FREQUENCY: CONTINUOUS

## 2020-03-17 ASSESSMENT — PAIN SCALES - GENERAL: PAINLEVEL_OUTOF10: 5

## 2020-03-17 NOTE — PLAN OF CARE
Problem: Pain:  Description: Pain management should include both nonpharmacologic and pharmacologic interventions.   Goal: Pain level will decrease  Description: Pain level will decrease  Outcome: Ongoing  Goal: Control of acute pain  Description: Control of acute pain  Outcome: Ongoing  Goal: Control of chronic pain  Description: Control of chronic pain  Outcome: Ongoing     Problem: Wound:  Goal: Will show signs of wound healing; wound closure and no evidence of infection  Description: Will show signs of wound healing; wound closure and no evidence of infection  Outcome: Ongoing     Problem: Pressure Ulcer:  Goal: Signs of wound healing will improve  Description: Signs of wound healing will improve  Outcome: Ongoing  Goal: Absence of new pressure ulcer  Description: Absence of new pressure ulcer  Outcome: Ongoing  Goal: Will show no infection signs and symptoms  Description: Will show no infection signs and symptoms  Outcome: Ongoing

## 2020-03-26 RX ORDER — VARENICLINE TARTRATE 1 MG/1
TABLET, FILM COATED ORAL
Qty: 56 TABLET | Refills: 0 | Status: SHIPPED | OUTPATIENT
Start: 2020-03-26 | End: 2020-05-01

## 2020-03-26 NOTE — TELEPHONE ENCOUNTER
Alyce called requesting a refill of the below medication which has been pended for you:     Requested Prescriptions     Pending Prescriptions Disp Refills    CHANTIX CONTINUING MONTH CHADWICK 1 MG tablet [Pharmacy Med Name: Kristine POE] 56 tablet 0     Sig: TAKE 1 TABLET BY MOUTH 2 TIMES A DAY       Last Appointment Date: 5/2/2019  Next Appointment Date: Visit date not found    Allergies   Allergen Reactions    Darvocet [Propoxyphene N-Acetaminophen]      Talking out of her head    Morphine And Related Itching and Swelling    Propoxyphene Swelling

## 2020-04-02 RX ORDER — METHYLPHENIDATE HYDROCHLORIDE 20 MG/1
TABLET ORAL
Qty: 30 TABLET | Refills: 0 | Status: SHIPPED | OUTPATIENT
Start: 2020-04-02 | End: 2020-05-01

## 2020-04-02 RX ORDER — LEVOCETIRIZINE DIHYDROCHLORIDE 5 MG/1
TABLET, FILM COATED ORAL
Qty: 90 TABLET | Refills: 0 | Status: SHIPPED | OUTPATIENT
Start: 2020-04-02 | End: 2020-07-01

## 2020-04-02 RX ORDER — HYDROCODONE BITARTRATE AND ACETAMINOPHEN 10; 325 MG/1; MG/1
TABLET ORAL
Qty: 90 TABLET | Refills: 0 | Status: SHIPPED | OUTPATIENT
Start: 2020-04-02 | End: 2020-05-01

## 2020-04-06 ENCOUNTER — TELEPHONE (OUTPATIENT)
Dept: INTERNAL MEDICINE | Age: 50
End: 2020-04-06

## 2020-04-06 RX ORDER — AMOXICILLIN AND CLAVULANATE POTASSIUM 875; 125 MG/1; MG/1
1 TABLET, FILM COATED ORAL 2 TIMES DAILY
Qty: 20 TABLET | Refills: 0 | Status: SHIPPED | OUTPATIENT
Start: 2020-04-06 | End: 2020-04-14 | Stop reason: ALTCHOICE

## 2020-04-14 ENCOUNTER — HOSPITAL ENCOUNTER (OUTPATIENT)
Dept: WOUND CARE | Age: 50
Discharge: HOME | End: 2020-04-14
Payer: MEDICARE

## 2020-04-14 VITALS
SYSTOLIC BLOOD PRESSURE: 110 MMHG | HEART RATE: 76 BPM | RESPIRATION RATE: 18 BRPM | TEMPERATURE: 97.5 F | DIASTOLIC BLOOD PRESSURE: 60 MMHG | BODY MASS INDEX: 21.62 KG/M2 | HEIGHT: 69 IN | WEIGHT: 145.94 LBS

## 2020-04-14 PROCEDURE — 11042 DBRDMT SUBQ TIS 1ST 20SQCM/<: CPT

## 2020-04-14 PROCEDURE — 11042 DBRDMT SUBQ TIS 1ST 20SQCM/<: CPT | Performed by: SURGERY

## 2020-04-14 ASSESSMENT — PAIN DESCRIPTION - PAIN TYPE: TYPE: CHRONIC PAIN

## 2020-04-14 ASSESSMENT — PAIN DESCRIPTION - LOCATION: LOCATION: BACK

## 2020-04-14 ASSESSMENT — PAIN DESCRIPTION - FREQUENCY: FREQUENCY: CONTINUOUS

## 2020-04-14 ASSESSMENT — PAIN DESCRIPTION - ORIENTATION: ORIENTATION: LOWER

## 2020-04-14 ASSESSMENT — PAIN DESCRIPTION - DESCRIPTORS: DESCRIPTORS: ACHING

## 2020-04-14 ASSESSMENT — PAIN SCALES - GENERAL: PAINLEVEL_OUTOF10: 5

## 2020-04-14 ASSESSMENT — PAIN DESCRIPTION - ONSET: ONSET: ON-GOING

## 2020-04-14 ASSESSMENT — PAIN DESCRIPTION - PROGRESSION: CLINICAL_PROGRESSION: NOT CHANGED

## 2020-04-14 NOTE — PROGRESS NOTES
[address]  Progress Note and Procedure Note      Alyce Baker  AGE: 53 y.o. GENDER: female  : 1970  TODAY'S DATE:  2020    Subjective:     CHIEF COMPLAINT sacral wound       HISTORY of PRESENT ILLNESS HUEY Pagan is a 53 y.o. female who presents today for wound/ulcer evaluation.     Ulcer Type:pressure  Ulcer/Wound Location:sacral  Contributing factors:chronic pressure, decreased mobility and shear force    Patient Active Problem List   Diagnosis Code    Muscle spasticity M62.838    Peripheral vascular disease ([facility]) I73.9    MS (multiple sclerosis) ([facility]) G35    Pain in both upper arms M79.621, M79.622    Numbness R20.0    Other fatigue R53.83    Weakness R53.1    Chronic pain G89.29    Hx of seizure disorder Z86.69    Insomnia G47.00    Decubitus ulcer of sacral region L89.159    Pain, neck M54.2    Venous stasis ulcer of left lower extremity ([facility]) I83.029, L97.929    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 layer exposed ([address] L97.812    S/P transmetatarsal amputation of foot, left ([facility]) C61.099    Chronic constipation K59.09    Colostomy in place [facility]) Z93.3    Surgical wound breakdown T81.31XA    Pressure injury of sacral region, stage 4 ([facility]) L89.154    Paraplegia ([facility]) G82.20    Arthralgia of hip M25.559       ALLERGIES    Allergies   Allergen Reactions    Darvocet [Propoxyphene N-Acetaminophen]      Talking out of her head    Morphine And Related Itching and Swelling    Propoxyphene Swelling           Objective:      /60   Pulse 76   Temp 97.5 °F (36.4 °C)   Resp 18   Ht 5' 9\" (1.753 m)   Wt 145 lb 15.1 oz (66.2 kg)   BMI 21.55 kg/m²         Assessment:      Problem List Items Addressed This Visit     Venous stasis ulcer of left lower extremity (HCC) (Chronic)    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 layer exposed ([address]    S/P transmetatarsal amputation of

## 2020-05-01 RX ORDER — PILOCARPINE HYDROCHLORIDE 7.5 MG/1
TABLET, FILM COATED ORAL
Qty: 90 TABLET | Refills: 0 | Status: SHIPPED | OUTPATIENT
Start: 2020-05-01 | End: 2020-06-01

## 2020-05-01 RX ORDER — VARENICLINE TARTRATE 1 MG/1
TABLET, FILM COATED ORAL
Qty: 56 TABLET | Refills: 0 | Status: SHIPPED | OUTPATIENT
Start: 2020-05-01 | End: 2020-08-25 | Stop reason: ALTCHOICE

## 2020-05-01 RX ORDER — AZELASTINE 1 MG/ML
SPRAY, METERED NASAL
Qty: 30 ML | Refills: 0 | Status: SHIPPED | OUTPATIENT
Start: 2020-05-01 | End: 2020-06-01

## 2020-05-01 NOTE — TELEPHONE ENCOUNTER
Rachelle Lopez has requested a refill on her medication.       Last office visit : 3/10/2020   Next office visit : 6/10/2020   Last medication refill :4/2/20  Alexis Collazo : up to date       Requested Prescriptions     Pending Prescriptions Disp Refills    HYDROcodone-acetaminophen ([facility])  MG per tablet [Pharmacy Med Name: HYDROCOD-APAP  MG] 90 tablet 0     Sig: TAKE 1 TABLET BY MOUTH 3 TIMES A DAY AS NEEDED FOR PAIN(REDUCE DOSES TAKEN AS PAIN BECOMES MANAGEABLE)    methylphenidate (RITALIN) 20 MG tablet [Pharmacy Med Name: METHYLPHENIDAT 20MG TAB DD] 30 tablet 0     Sig: TAKE 1 TABLET BY MOUTH DAILY

## 2020-05-02 RX ORDER — METHYLPHENIDATE HYDROCHLORIDE 20 MG/1
TABLET ORAL
Qty: 30 TABLET | Refills: 0 | Status: SHIPPED | OUTPATIENT
Start: 2020-05-02 | End: 2020-06-01

## 2020-05-02 RX ORDER — HYDROCODONE BITARTRATE AND ACETAMINOPHEN 10; 325 MG/1; MG/1
TABLET ORAL
Qty: 90 TABLET | Refills: 0 | Status: SHIPPED | OUTPATIENT
Start: 2020-05-02 | End: 2020-06-01

## 2020-05-05 ENCOUNTER — HOSPITAL ENCOUNTER (OUTPATIENT)
Dept: WOUND CARE | Age: 50
Discharge: HOME | End: 2020-05-05
Payer: MEDICARE

## 2020-05-05 VITALS
BODY MASS INDEX: 21.48 KG/M2 | WEIGHT: 145 LBS | RESPIRATION RATE: 16 BRPM | TEMPERATURE: 98.6 F | HEIGHT: 69 IN | HEART RATE: 70 BPM

## 2020-05-05 PROBLEM — L97.521: Chronic | Status: ACTIVE | Noted: 2020-05-05

## 2020-05-05 PROCEDURE — 99213 OFFICE O/P EST LOW 20 MIN: CPT | Performed by: SURGERY

## 2020-05-05 PROCEDURE — 99214 OFFICE O/P EST MOD 30 MIN: CPT

## 2020-05-05 ASSESSMENT — PAIN DESCRIPTION - ONSET: ONSET: ON-GOING

## 2020-05-05 ASSESSMENT — PAIN DESCRIPTION - ORIENTATION: ORIENTATION: POSTERIOR

## 2020-05-05 ASSESSMENT — PAIN DESCRIPTION - FREQUENCY: FREQUENCY: CONTINUOUS

## 2020-05-05 ASSESSMENT — PAIN DESCRIPTION - DESCRIPTORS: DESCRIPTORS: ACHING

## 2020-05-05 ASSESSMENT — PAIN DESCRIPTION - PAIN TYPE: TYPE: CHRONIC PAIN

## 2020-05-05 ASSESSMENT — PAIN SCALES - GENERAL: PAINLEVEL_OUTOF10: 5

## 2020-05-05 ASSESSMENT — PAIN DESCRIPTION - LOCATION: LOCATION: NECK

## 2020-05-18 ENCOUNTER — HOSPITAL ENCOUNTER (OUTPATIENT)
Dept: CT IMAGING | Age: 50
Discharge: HOME | End: 2020-05-18
Payer: MEDICARE

## 2020-05-18 ENCOUNTER — HOSPITAL ENCOUNTER (OUTPATIENT)
Dept: GENERAL RADIOLOGY | Age: 50
Discharge: HOME | End: 2020-05-18
Payer: MEDICARE

## 2020-05-18 PROCEDURE — 74178 CT ABD&PLV WO CNTR FLWD CNTR: CPT

## 2020-05-18 PROCEDURE — 6360000004 HC RX CONTRAST MEDICATION: Performed by: UROLOGY

## 2020-05-18 RX ADMIN — IOPAMIDOL 90 ML: 755 INJECTION, SOLUTION INTRAVENOUS at 10:51

## 2020-05-26 ENCOUNTER — HOSPITAL ENCOUNTER (OUTPATIENT)
Dept: WOUND CARE | Age: 50
Discharge: HOME | End: 2020-05-26
Payer: MEDICARE

## 2020-05-26 ENCOUNTER — OFFICE VISIT (OUTPATIENT)
Dept: INTERNAL MEDICINE | Age: 50
End: 2020-05-26
Payer: MEDICARE

## 2020-05-26 VITALS
BODY MASS INDEX: 21.48 KG/M2 | WEIGHT: 145 LBS | HEIGHT: 69 IN | SYSTOLIC BLOOD PRESSURE: 103 MMHG | RESPIRATION RATE: 20 BRPM | DIASTOLIC BLOOD PRESSURE: 67 MMHG | HEART RATE: 66 BPM | TEMPERATURE: 97.8 F

## 2020-05-26 VITALS — SYSTOLIC BLOOD PRESSURE: 104 MMHG | HEART RATE: 64 BPM | DIASTOLIC BLOOD PRESSURE: 80 MMHG

## 2020-05-26 PROBLEM — R56.9 SEIZURE (HCC): Status: ACTIVE | Noted: 2020-05-26

## 2020-05-26 PROBLEM — E11.9 TYPE 2 DIABETES MELLITUS WITHOUT COMPLICATION (HCC): Status: ACTIVE | Noted: 2020-05-26

## 2020-05-26 PROCEDURE — 3046F HEMOGLOBIN A1C LEVEL >9.0%: CPT | Performed by: INTERNAL MEDICINE

## 2020-05-26 PROCEDURE — 99212 OFFICE O/P EST SF 10 MIN: CPT | Performed by: SURGERY

## 2020-05-26 PROCEDURE — G0439 PPPS, SUBSEQ VISIT: HCPCS | Performed by: INTERNAL MEDICINE

## 2020-05-26 PROCEDURE — 99213 OFFICE O/P EST LOW 20 MIN: CPT

## 2020-05-26 RX ORDER — AZITHROMYCIN 250 MG/1
250 TABLET, FILM COATED ORAL SEE ADMIN INSTRUCTIONS
Qty: 6 TABLET | Refills: 0 | Status: SHIPPED | OUTPATIENT
Start: 2020-05-26 | End: 2020-05-31

## 2020-05-26 RX ORDER — BLOOD-GLUCOSE METER
1 KIT MISCELLANEOUS DAILY
Qty: 1 KIT | Refills: 0 | Status: ON HOLD | OUTPATIENT
Start: 2020-05-26

## 2020-05-26 RX ORDER — VARENICLINE TARTRATE 1 MG/1
1 TABLET, FILM COATED ORAL 2 TIMES DAILY
Qty: 60 TABLET | Refills: 5 | Status: SHIPPED | OUTPATIENT
Start: 2020-05-26 | End: 2020-08-25

## 2020-05-26 RX ORDER — GLUCOSAMINE HCL/CHONDROITIN SU 500-400 MG
CAPSULE ORAL
Qty: 100 STRIP | Refills: 3 | Status: ON HOLD | OUTPATIENT
Start: 2020-05-26

## 2020-05-26 ASSESSMENT — PAIN DESCRIPTION - FREQUENCY: FREQUENCY: CONTINUOUS

## 2020-05-26 ASSESSMENT — PATIENT HEALTH QUESTIONNAIRE - PHQ9
1. LITTLE INTEREST OR PLEASURE IN DOING THINGS: 1
SUM OF ALL RESPONSES TO PHQ9 QUESTIONS 1 & 2: 2
SUM OF ALL RESPONSES TO PHQ QUESTIONS 1-9: 2
SUM OF ALL RESPONSES TO PHQ QUESTIONS 1-9: 2
2. FEELING DOWN, DEPRESSED OR HOPELESS: 1

## 2020-05-26 ASSESSMENT — PAIN DESCRIPTION - PAIN TYPE: TYPE: CHRONIC PAIN

## 2020-05-26 ASSESSMENT — PAIN DESCRIPTION - PROGRESSION: CLINICAL_PROGRESSION: NOT CHANGED

## 2020-05-26 ASSESSMENT — PAIN DESCRIPTION - DESCRIPTORS: DESCRIPTORS: ACHING

## 2020-05-26 ASSESSMENT — PAIN DESCRIPTION - ONSET: ONSET: ON-GOING

## 2020-05-26 ASSESSMENT — PAIN DESCRIPTION - LOCATION: LOCATION: BACK

## 2020-05-26 ASSESSMENT — PAIN SCALES - GENERAL: PAINLEVEL_OUTOF10: 3

## 2020-05-26 NOTE — PATIENT INSTRUCTIONS

## 2020-05-26 NOTE — PROGRESS NOTES
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 related to ms    Sepsis ([address] 8/19/2016    Weakness 8/4/2016      Past Surgical History:   Procedure Laterality Date    BACLOFEN PUMP IMPLANTATION      COLONOSCOPY N/A 9/27/2019    Dr Lizett Ness [facility] ileum through ostomy-patent and healthy appearing anastomosis in the right colon-entero colonic anastomosis-10 yr recall    COLOSTOMY     [facility]      Right    FOOT AMPUTATION Left 4/4/2019    LEFT TRANSMET AMPUTATION performed by Kristy Calloway MD at [address]      urostomy    OTHER SURGICAL HISTORY      pain pump    [place] INSJ PRPH CTR VAD W/SUBQ PORT UNDER 5 YR N/A 6/26/2018    SINGLE LUMEN PORT PLACEMENT WITH FLUORO performed by Cecilia Walter MD at [facility] TOE AMPUTATION      L last toe    TONSILLECTOMY      URETEROTOMY        Social History     Socioeconomic History    Marital status:      Spouse name: Not on file    Number of children: Not on file    Years of education: Not on file    Highest education level: Not on file   Occupational History    Not on file   Social Needs    Financial resource strain: Not on file    Food insecurity     Worry: Not on file     Inability: Not on file    Transportation needs     Medical: Not on file Non-medical: Not on file   Tobacco Use    Smoking status: Former Smoker     Packs/day: 0.25     Types: Cigarettes     Last attempt to quit: 2018     Years since quittin.3    Smokeless tobacco: Never Used   Substance and Sexual Activity    Alcohol use: Yes     Comment: yaritza    Drug use: Yes     Types: Marijuana     Comment: daily     Sexual activity: Not on file   Lifestyle    Physical activity     Days per week: Not on file     Minutes per session: Not on file    Stress: Not on file   Relationships    Social connections     Talks on phone: Not on file     Gets together: Not on file     Attends [employer] service: Not on file     Active member of club or organization: Not on file     Attends meetings of clubs or organizations: Not on file     Relationship status: Not on file    Intimate partner violence     Fear of current or ex partner: Not on file     Emotionally abused: Not on file     Physically abused: Not on file     Forced sexual activity: Not on file   Other Topics Concern    Not on file   Social History Narrative    Not on file      Family History   Problem Relation Age of Onset    Cancer Mother         breast    Colon Cancer Mother     Colon Polyps Mother     Diabetes Father     High Blood Pressure Father     Cancer Paternal Aunt         breast    Liver Cancer Paternal Aunt     Heart Disease Paternal Grandmother     Esophageal Cancer Neg Hx     Liver Disease Neg Hx     Rectal Cancer Neg Hx     Stomach Cancer Neg Hx         Current Outpatient Medications   Medication Sig Dispense Refill    glucose monitoring kit (FREESTYLE) monitoring kit 1 kit by Does not apply route daily 1 kit 0    blood glucose monitor strips Test 1 times a day & as needed for symptoms of irregular blood glucose.  100 strip 3    neomycin-polymyxin-hydrocortisone (CORTISPORIN) 3.5-90449-4 otic solution Place 4 drops into the right ear 3 times daily for 10 days Instill into right   Ear 1 Bottle 0    azithromycin (ZITHROMAX) 250 MG tablet Take 1 tablet by mouth See Admin Instructions for 5 days 500mg on day 1 followed by 250mg on days 2 - 5 6 tablet 0    varenicline (CHANTIX) 1 MG tablet Take 1 tablet by mouth 2 times daily 60 tablet 5    DULoxetine (CYMBALTA) 30 MG extended release capsule Take 1 capsule by mouth daily 30 capsule 11    HYDROcodone-acetaminophen (NORCO)  MG per tablet TAKE 1 TABLET BY MOUTH 3 TIMES A DAY AS NEEDED FOR PAIN(REDUCE DOSES TAKEN AS PAIN BECOMES MANAGEABLE) 90 tablet 0    methylphenidate (RITALIN) 20 MG tablet TAKE 1 TABLET BY MOUTH DAILY 30 tablet 0    pilocarpine (SALAGEN) 7.5 MG tablet TAKE 1 TABLET BY MOUTH THREE TIMES DAILY. 90 tablet 0    azelastine (ASTELIN) 0.1 % nasal spray USE 2 SPRAYS IN EACH NOSTRIL TWICE DAILY AS DIRECTED 30 mL 0    CHANTIX CONTINUING MONTH CHADWICK 1 MG tablet TAKE 1 TABLET BY MOUTH 2 TIMES A DAY 56 tablet 0    levocetirizine (XYZAL) 5 MG tablet TAKE 1 TABLET BY MOUTH IN THE EVENING 90 tablet 0    pregabalin (LYRICA) 300 MG capsule TAKE 1 CAPSULE BY MOUTH 2 TIMES A DAY 60 capsule 5    tiZANidine (ZANAFLEX) 4 MG tablet 3 tabs prn 180 tablet 5    docusate sodium (COLACE) 100 MG capsule Take 200 mg by mouth as needed for Constipation      PROAIR  (90 Base) MCG/ACT inhaler INHALE 1 PUFF INTO THE LUNGS EVERY 6 HOURS AS NEEDED FOR WHEEZING OR SHORTNESS OF BREATH 8.5 g 5    nitrofurantoin (MACRODANTIN) 50 MG capsule TAKE 1 CAPSULE BY MOUTH DAILY 30 capsule 5    Diapers & Supplies MISC Diapers, chucks, wipes, and gloves for incontinence. [phone] each 11    ondansetron (ZOFRAN) 4 MG tablet Take 1 tablet by mouth every 8 hours as needed for Nausea or Vomiting 15 tablet 3    Omega-3 Fatty Acids (OMEGA-3 FISH OIL PO) Take by mouth daily       Heparin Lock Flush (HEPARIN FLUSH, 100 UNITS/ML,) 100 UNIT/ML injection 3 mLs by Intercatheter route every 30 days No every 30 days but every 4-6 weeks.  Flush port with 300 units heparin with 20 cc normal saline for ocrevus infusion for Multiple sclerosis and NS 20CC 1 Syringe 5    ipratropium-albuterol (DUONEB) 0.5-2.5 (3) MG/3ML SOLN nebulizer solution USE 1 UNIT DOSE IN NEBULIZER EVERY 4 TO 6 HOURS AS NEEDED. 450 mL 11    hydrochlorothiazide (HYDRODIURIL) 25 MG tablet Take 1 tablet by mouth daily as needed (hypertension) 90 tablet 3    BACLOFEN, PAIN PUMP REFILL CHARGE, by Implant route continuous Indications: every 3 months      levETIRAcetam (KEPPRA) 500 MG tablet Take 500 mg by mouth nightly      b complex vitamins capsule Take 1 capsule by mouth daily      Multiple Vitamins-Minerals (THERAPEUTIC MULTIVITAMIN-MINERALS) tablet Take 1 tablet by mouth daily      Multiple Vitamins-Minerals (HAIR SKIN & NAILS ADVANCED PO) Take 1 tablet by mouth daily      glucose monitoring kit (FREESTYLE) monitoring kit 1 kit by Does not apply route daily 1 kit 0    blood glucose monitor strips Test 1 times a day & as needed for symptoms of irregular blood glucose. 100 strip 0    baclofen (LIORESAL) 10 MG tablet Take 1 tablet by mouth 2 times daily as needed (muscle spasms) 60 tablet 5    Sodium Chloride Flush (SALINE FLUSH) 0.9 % SOLN Infuse 20 mLs intravenously every 30 days Not every 30 days but every 4-6 weeks as need with 300 units of heparin to flush port for Infusion of Ocrevus for multiple sclerosis 20 mL 5    lactulose (CHRONULAC) 10 GM/15ML solution Take 30 mLs by mouth 3 times daily (Patient taking differently: Take 20 g by mouth 2 times daily as needed ) 1 Bottle 5     No current facility-administered medications for this visit.          Patient Active Problem List   Diagnosis    Muscle spasticity    Peripheral vascular disease ([address]    MS (multiple sclerosis) (HCC)    Pain in both upper arms    Numbness    Other fatigue    Weakness    Chronic pain    Hx of seizure disorder    Insomnia    Decubitus ulcer of sacral region    Pain, neck    Venous stasis ulcer of left lower not bruise/bleed easily. Psychiatric/Behavioral: Positive for dysphoric mood. Negative for agitation, confusion and decreased concentration. The patient is nervous/anxious. The patient is not hyperactive. Anxiety and depression are stable       /80   Pulse 64   Physical Exam  Vitals signs and nursing note reviewed. Constitutional:       General: She is not in acute distress. Appearance: Normal appearance. She is well-developed and normal weight. She is not ill-appearing, toxic-appearing or diaphoretic. HENT:      Head: Normocephalic and atraumatic. Right Ear: External ear normal. There is no impacted cerumen. Left Ear: Tympanic membrane, ear canal and external ear normal. There is no impacted cerumen. Ears:      Comments: Right tympanic membrane is erythematous. Nose: Nose normal. No congestion or rhinorrhea. Mouth/Throat:      Mouth: Mucous membranes are moist.      Pharynx: Oropharynx is clear. No oropharyngeal exudate or posterior oropharyngeal erythema. Eyes:      General: No scleral icterus. Right eye: No discharge. Left eye: No discharge. Extraocular Movements: Extraocular movements intact. Conjunctiva/sclera: Conjunctivae normal.      Pupils: Pupils are equal, round, and reactive to light. Neck:      Musculoskeletal: Normal range of motion and neck supple. No neck rigidity or muscular tenderness. Thyroid: No thyromegaly. Vascular: No carotid bruit or JVD. Trachea: No tracheal deviation. Cardiovascular:      Rate and Rhythm: Normal rate and regular rhythm. Pulses: Normal pulses. Heart sounds: Normal heart sounds. No murmur. No friction rub. No gallop. Pulmonary:      Effort: Pulmonary effort is normal. No respiratory distress. Breath sounds: Normal breath sounds. No stridor. No wheezing, rhonchi or rales. Chest:      Chest wall: No tenderness.    Abdominal:      General: Bowel sounds are normal. performed?      Answer:   5/26/2019    XR SHOULDER LEFT (MIN 2 VIEWS)     Standing Status:   Future     Standing Expiration Date:   5/26/2021     Order Specific Question:   Reason for exam:     Answer:   left shoulder pain    CBC Auto Differential     Fast 12 hours     Standing Status:   Future     Standing Expiration Date:   5/26/2021    Comprehensive Metabolic Panel     Fasting 12 hours     Standing Status:   Future     Standing Expiration Date:   5/26/2021    Lipid Panel     Standing Status:   Future     Standing Expiration Date:   5/26/2021     Order Specific Question:   Is Patient Fasting?/# of Hours     Answer:   12    TSH without Reflex     Fast 12 hours     Standing Status:   Future     Standing Expiration Date:   5/26/2021    Hemoglobin A1C     Fast 12 hours     Standing Status:   Future     Standing Expiration Date:   5/26/2021    Vitamin D 25 Hydroxy     Standing Status:   Future     Standing Expiration Date:   5/26/2021    CBC Auto Differential     Fast 12 hours     Standing Status:   Future     Standing Expiration Date:   5/26/2021    Comprehensive Metabolic Panel     Fasting 12 hours     Standing Status:   Future     Standing Expiration Date:   5/26/2021    Lipid Panel     Standing Status:   Future     Standing Expiration Date:   5/26/2021     Order Specific Question:   Is Patient Fasting?/# of Hours     Answer:   12    TSH without Reflex     Fast 12 hours     Standing Status:   Future     Standing Expiration Date:   5/26/2021    Vitamin D 25 Hydroxy     Standing Status:   Future     Standing Expiration Date:   5/26/2021    External Referral To Physical Therapy     Referral Priority:   Routine     Referral Type:   Eval and Treat     Referral Reason:   Specialty Services Required     Requested Specialty:   Physical Therapy     Number of Visits Requested:   MD Ayaka       Medicare Annual Wellness Visit - Subsequent    Name: Jose L Valdes Date: 5/27/2020   MRN: [id number] Sex: Female   Age: 53 y.o. Ethnicity: Non-/Non    : 1970 Race: Walter Benoit is here for   Chief Complaint   Patient presents with    Medicare AWV    Shoulder Pain    Otalgia        Screenings for behavioral, psychosocial and functional/safety risks, and cognitive dysfunction are all negative except as indicated below. These results, as well as other patient data from the [address] form, are documented in Flowsheets linked to this Encounter. Allergies   Allergen Reactions    Darvocet [Propoxyphene N-Acetaminophen]      Talking out of her head    Morphine And Related Itching and Swelling    Propoxyphene Swelling       Prior to Visit Medications    Medication Sig Taking? Authorizing Provider   glucose monitoring kit (FREESTYLE) monitoring kit 1 kit by Does not apply route daily Yes Huma Garza MD   blood glucose monitor strips Test 1 times a day & as needed for symptoms of irregular blood glucose. Yes Huma Garza MD   neomycin-polymyxin-hydrocortisone (CORTISPORIN) 3.5-25210-0 otic solution Place 4 drops into the right ear 3 times daily for 10 days Instill into right   Ear Yes Huma Garza MD   azithromycin (ZITHROMAX) 250 MG tablet Take 1 tablet by mouth See Admin Instructions for 5 days 500mg on day 1 followed by 250mg on days 2 - 5 Yes Huma Garza MD   varenicline (CHANTIX) 1 MG tablet Take 1 tablet by mouth 2 times daily Yes Huma Garza MD   DULoxetine (CYMBALTA) 30 MG extended release capsule Take 1 capsule by mouth daily  Silvia Pulido MD   HYDROcodone-acetaminophen (NORCO)  MG per tablet TAKE 1 TABLET BY MOUTH 3 TIMES A DAY AS NEEDED FOR PAIN(REDUCE DOSES TAKEN AS PAIN BECOMES MANAGEABLE)  Silvia Pulido MD   methylphenidate (RITALIN) 20 MG tablet TAKE 1 TABLET BY MOUTH DAILY  Silvia Pulido MD   pilocarpine (SALAGEN) 7.5 MG tablet TAKE 1 TABLET BY MOUTH THREE TIMES DAILY.   Huma Garza MD   azelastine (ASTELIN) 0.1 % nasal spray USE 2 SPRAYS IN EACH NOSTRIL TWICE DAILY AS DIRECTED  Pauline Jones MD   CHANTIX CONTINUING MONTH CHADWICK 1 MG tablet TAKE 1 TABLET BY MOUTH 2 TIMES A DAY  Litzy Contreras MD   levocetirizine (XYZAL) 5 MG tablet TAKE 1 TABLET BY MOUTH IN THE EVENING  Litzy Tang MD   pregabalin (LYRICA) 300 MG capsule TAKE 1 CAPSULE BY MOUTH 2 TIMES A DAY  Vincent Zavala MD   tiZANidine (ZANAFLEX) 4 MG tablet 3 tabs prn  Vincent Zavala MD   docusate sodium (COLACE) 100 MG capsule Take 200 mg by mouth as needed for Constipation  Historical Provider, MD   PROAIR  (90 Base) MCG/ACT inhaler INHALE 1 PUFF INTO THE LUNGS EVERY 6 HOURS AS NEEDED FOR WHEEZING OR SHORTNESS OF BREATH  Pauline Jones MD   nitrofurantoin (MACRODANTIN) 50 MG capsule TAKE 1 CAPSULE BY MOUTH DAILY  Pauline Jones MD   Diapers & Supplies MISC Diapers, chucks, wipes, and gloves for incontinence. [phone]  Pauline Jones MD   ondansetron (ZOFRAN) 4 MG tablet Take 1 tablet by mouth every 8 hours as needed for Nausea or Vomiting  Pauline Jones MD   Omega-3 Fatty Acids (OMEGA-3 FISH OIL PO) Take by mouth daily   Historical Provider, MD   Heparin Lock Flush (HEPARIN FLUSH, 100 UNITS/ML,) 100 UNIT/ML injection 3 mLs by Intercatheter route every 30 days No every 30 days but every 4-6 weeks. Flush port with 300 units heparin with 20 cc normal saline for ocrevus infusion for Multiple sclerosis and NS 20CC  Vincent Zavala MD   ipratropium-albuterol (DUONEB) 0.5-2.5 (3) MG/3ML SOLN nebulizer solution USE 1 UNIT DOSE IN NEBULIZER EVERY 4 TO 6 HOURS AS NEEDED.   Pauline Jones MD   hydrochlorothiazide (HYDRODIURIL) 25 MG tablet Take 1 tablet by mouth daily as needed (hypertension)  Pauline Jones MD   BACLOFEN, PAIN PUMP REFILL CHARGE, by Implant route continuous Indications: every 3 months  Historical Provider, MD   levETIRAcetam (KEPPRA) 500 MG tablet Take 500 mg by mouth nightly  Historical Provider, MD patel anastomosis in the right colon-entero colonic anastomosis-10 yr recall    COLOSTOMY      FEMUR FRACTURE SURGERY      Right    FOOT AMPUTATION Left 4/4/2019    LEFT TRANSMET AMPUTATION performed by Claribel Hernandez MD at [address] OTHER SURGICAL HISTORY      urostomy    OTHER SURGICAL HISTORY      pain pump    [place] INSJ PRPH CTR VAD W/SUBQ PORT UNDER 5 YR N/A 6/26/2018    SINGLE LUMEN PORT PLACEMENT WITH FLUORO performed by Sofia Seaman MD at [address] TOE AMPUTATION      L last toe    TONSILLECTOMY      URETEROTOMY         Family History   Problem Relation Age of Onset   [facility] Cancer Mother         breast    Colon Cancer Mother     Colon Polyps Mother     Diabetes Father     High Blood Pressure Father     Cancer Paternal Aunt         breast    Liver Cancer Paternal Aunt     Heart Disease Paternal Grandmother     Esophageal Cancer Neg Hx     Liver Disease Neg Hx     Rectal Cancer Neg Hx     Stomach Cancer Neg Hx        CareTeam (Including outside providers/suppliers regularly involved in providing care):   Patient Care Team:  Sandor Ash MD as PCP - General (Family Medicine)  Sandor Ash MD as PCP - [facility]  Zaida Taylor MD as Neurologist (Neurology)  Zaida Taylor MD as Consulting Physician (Neurology)  EFREN Mcclure as Advanced Practice Nurse (Gastroenterology)    Wt Readings from Last 3 Encounters:   05/26/20 145 lb (65.8 kg)   05/05/20 145 lb (65.8 kg)   04/14/20 145 lb 15.1 oz (66.2 kg)     Vitals:    05/26/20 1224   BP: 104/80   Pulse: 64           The following problems were reviewed today and where indicated follow up appointments were made and/or referrals ordered.     Risk Factor Screenings with Interventions     Fall Risk:     Fall Risk Interventions:    · Home safety tips provided    Depression:  PHQ-2 Score: 2  Depression Interventions:  · Relaxation techniques discussed    Anxiety:     Anxiety Interventions:  · Relaxation techniques discussed    Cognitive:     Cognitive Impairment Interventions:  · Patient declines any further evaluation/treatment for cognitive impairment    Substance Abuse:  Social History     Socioeconomic History    Marital status:      Spouse name: Not on file    Number of children: Not on file    Years of education: Not on file    Highest education level: Not on file   Occupational History    Not on file   Social Needs    Financial resource strain: Not on file    Food insecurity     Worry: Not on file     Inability: Not on file    Transportation needs     Medical: Not on file     Non-medical: Not on file   Tobacco Use    Smoking status: Former Smoker     Packs/day: 0.25     Types: Cigarettes     Last attempt to quit: 2018     Years since quittin.3    Smokeless tobacco: Never Used   Substance and Sexual Activity    Alcohol use: Yes     Comment: yaritza    Drug use: Yes     Types: Marijuana     Comment: daily     Sexual activity: Not on file   Lifestyle    Physical activity     Days per week: Not on file     Minutes per session: Not on file    Stress: Not on file   Relationships    Social connections     Talks on phone: Not on file     Gets together: Not on file     Attends Anab[employer]ist service: Not on file     Active member of club or organization: Not on file     Attends meetings of clubs or organizations: Not on file     Relationship status: Not on file    Intimate partner violence     Fear of current or ex partner: Not on file     Emotionally abused: Not on file     Physically abused: Not on file     Forced sexual activity: Not on file   Other Topics Concern    Not on file   Social History Narrative    Not on file        Substance Abuse Interventions:  · none neeed    Health Risk Assessment:        General Health Risk Interventions:  · none needed       There is no height or weight on file to calculate BMI.   Health Habits/Nutrition Interventions:  · none needed Hearing/Vision Interventions:  · none needed       Safety Interventions:  · Patient declines any further evaluation/treatment for this issue       ADL Interventions:  · Patient declines any further evaluation/treatment for this issue    Personalized Preventive Plan   Current Health Maintenance Status  Immunization History   Administered Date(s) Administered    Influenza Virus Vaccine 09/24/2015    Influenza, High Dose (Fluzone 65 yrs and older) 09/24/2015    Influenza, Gareth Gola, 6 mo and older, IM, PF (Flulaval, Fluarix) 11/15/2018    Influenza, Quadv, IM, PF (6 mo and older Fluzone, Flulaval, Fluarix, and 3 yrs and older Afluria) 10/17/2019    Pneumococcal Polysaccharide ([id number]) 10/09/2014, 09/05/2017    Tdap (Boostrix, Adacel) 06/28/2017        Health Maintenance   Topic Date Due    Diabetic foot exam  01/08/1981    Diabetic retinal exam  01/08/1981    Diabetic microalbuminuria test  01/08/1989    Hepatitis B vaccine (1 of 3 - Risk 3-dose series) 01/08/1990    Cervical cancer screen  01/08/1992    Annual Wellness Visit (AWV)  05/29/2019    Lipid screen  11/15/2019    A1C test (Diabetic or Prediabetic)  05/02/2020    Potassium monitoring  07/31/2020    Creatinine monitoring  07/31/2020    DTaP/Tdap/Td vaccine (2 - Td) 06/28/2027    Colon cancer screen colonoscopy  09/27/2029    Flu vaccine  Completed    Pneumococcal 0-64 years Vaccine  Completed    HIV screen  Completed    Hepatitis A vaccine  Aged Out    Hib vaccine  Aged Out    Meningococcal (ACWY) vaccine  Aged Out       Recommendations for [employer] Due: see orders.   Recommended screening schedule for the next 5-10 years is provided to the patient in written form: see Patient Instructions/AVS.

## 2020-05-27 ASSESSMENT — ENCOUNTER SYMPTOMS
VOMITING: 0
SINUS PRESSURE: 0
CHEST TIGHTNESS: 0
WHEEZING: 0
NAUSEA: 0
COUGH: 0
CONSTIPATION: 0
DIARRHEA: 0
ABDOMINAL DISTENTION: 0
PHOTOPHOBIA: 0
EYE PAIN: 0
ABDOMINAL PAIN: 0
SORE THROAT: 0
EYE DISCHARGE: 0
EYE ITCHING: 0
COLOR CHANGE: 0
BLOOD IN STOOL: 0
SHORTNESS OF BREATH: 0
EYE REDNESS: 0
RHINORRHEA: 0

## 2020-06-01 RX ORDER — PILOCARPINE HYDROCHLORIDE 7.5 MG/1
TABLET, FILM COATED ORAL
Qty: 90 TABLET | Refills: 0 | Status: SHIPPED | OUTPATIENT
Start: 2020-06-01 | End: 2020-07-01

## 2020-06-01 RX ORDER — AZELASTINE 1 MG/ML
SPRAY, METERED NASAL
Qty: 30 ML | Refills: 0 | Status: SHIPPED | OUTPATIENT
Start: 2020-06-01 | End: 2020-10-01

## 2020-06-02 RX ORDER — HYDROCODONE BITARTRATE AND ACETAMINOPHEN 10; 325 MG/1; MG/1
TABLET ORAL
Qty: 90 TABLET | Refills: 0 | Status: SHIPPED | OUTPATIENT
Start: 2020-06-02 | End: 2020-07-01

## 2020-06-02 RX ORDER — METHYLPHENIDATE HYDROCHLORIDE 20 MG/1
TABLET ORAL
Qty: 30 TABLET | Refills: 0 | Status: SHIPPED | OUTPATIENT
Start: 2020-06-02 | End: 2020-07-01

## 2020-06-10 ENCOUNTER — OFFICE VISIT (OUTPATIENT)
Dept: NEUROLOGY | Age: 50
End: 2020-06-10
Payer: MEDICARE

## 2020-06-10 VITALS — SYSTOLIC BLOOD PRESSURE: 97 MMHG | HEART RATE: 65 BPM | DIASTOLIC BLOOD PRESSURE: 67 MMHG

## 2020-06-10 DIAGNOSIS — E55.9 VITAMIN D DEFICIENCY: ICD-10-CM

## 2020-06-10 DIAGNOSIS — E11.9 TYPE 2 DIABETES MELLITUS WITHOUT COMPLICATION, UNSPECIFIED WHETHER LONG TERM INSULIN USE (HCC): ICD-10-CM

## 2020-06-10 LAB
ALBUMIN SERPL-MCNC: 4 G/DL (ref 3.5–5.2)
ALP BLD-CCNC: 94 U/L (ref 35–104)
ALT SERPL-CCNC: 17 U/L (ref 5–33)
ANION GAP SERPL CALCULATED.3IONS-SCNC: 12 MMOL/L (ref 7–19)
AST SERPL-CCNC: 16 U/L (ref 5–32)
BASOPHILS ABSOLUTE: 0.1 K/UL (ref 0–0.2)
BASOPHILS RELATIVE PERCENT: 1 % (ref 0–1)
BILIRUB SERPL-MCNC: <0.2 MG/DL (ref 0.2–1.2)
BUN BLDV-MCNC: 15 MG/DL (ref 6–20)
CALCIUM SERPL-MCNC: 8.9 MG/DL (ref 8.6–10)
CHLORIDE BLD-SCNC: 108 MMOL/L (ref 98–111)
CHOLESTEROL, TOTAL: 155 MG/DL (ref 160–199)
CO2: 25 MMOL/L (ref 22–29)
CREAT SERPL-MCNC: <0.5 MG/DL (ref 0.5–0.9)
EOSINOPHILS ABSOLUTE: 0.2 K/UL (ref 0–0.6)
EOSINOPHILS RELATIVE PERCENT: 3.1 % (ref 0–5)
GFR NON-AFRICAN AMERICAN: >60
GLUCOSE BLD-MCNC: 135 MG/DL (ref 74–109)
HBA1C MFR BLD: 3.8 % (ref 4–6)
HCT VFR BLD CALC: 39 % (ref 37–47)
HDLC SERPL-MCNC: 35 MG/DL (ref 65–121)
HEMOGLOBIN: 12 G/DL (ref 12–16)
IMMATURE GRANULOCYTES #: 0 K/UL
LDL CHOLESTEROL CALCULATED: 77 MG/DL
LYMPHOCYTES ABSOLUTE: 1.4 K/UL (ref 1.1–4.5)
LYMPHOCYTES RELATIVE PERCENT: 27.2 % (ref 20–40)
MCH RBC QN AUTO: 30.3 PG (ref 27–31)
MCHC RBC AUTO-ENTMCNC: 30.8 G/DL (ref 33–37)
MCV RBC AUTO: 98.5 FL (ref 81–99)
MONOCYTES ABSOLUTE: 0.2 K/UL (ref 0–0.9)
MONOCYTES RELATIVE PERCENT: 4.5 % (ref 0–10)
NEUTROPHILS ABSOLUTE: 3.3 K/UL (ref 1.5–7.5)
NEUTROPHILS RELATIVE PERCENT: 64 % (ref 50–65)
PDW BLD-RTO: 14 % (ref 11.5–14.5)
PLATELET # BLD: 179 K/UL (ref 130–400)
PMV BLD AUTO: 11.4 FL (ref 9.4–12.3)
POTASSIUM SERPL-SCNC: 3.9 MMOL/L (ref 3.5–5)
RBC # BLD: 3.96 M/UL (ref 4.2–5.4)
SODIUM BLD-SCNC: 145 MMOL/L (ref 136–145)
TOTAL PROTEIN: 6.2 G/DL (ref 6.6–8.7)
TRIGL SERPL-MCNC: 217 MG/DL (ref 0–149)
TSH SERPL DL<=0.05 MIU/L-ACNC: 2.85 UIU/ML (ref 0.27–4.2)
VITAMIN D 25-HYDROXY: 32.4 NG/ML
WBC # BLD: 5.1 K/UL (ref 4.8–10.8)

## 2020-06-10 PROCEDURE — G8427 DOCREV CUR MEDS BY ELIG CLIN: HCPCS | Performed by: PSYCHIATRY & NEUROLOGY

## 2020-06-10 PROCEDURE — G8420 CALC BMI NORM PARAMETERS: HCPCS | Performed by: PSYCHIATRY & NEUROLOGY

## 2020-06-10 PROCEDURE — 99213 OFFICE O/P EST LOW 20 MIN: CPT | Performed by: PSYCHIATRY & NEUROLOGY

## 2020-06-10 PROCEDURE — 1036F TOBACCO NON-USER: CPT | Performed by: PSYCHIATRY & NEUROLOGY

## 2020-06-10 RX ORDER — LEVETIRACETAM 500 MG/1
500 TABLET ORAL NIGHTLY
Qty: 30 TABLET | Refills: 5 | Status: SHIPPED | OUTPATIENT
Start: 2020-06-10 | End: 2022-04-11

## 2020-06-10 NOTE — PROGRESS NOTES
of left foot 12/11/2018    Type 2 diabetes mellitus with left diabetic foot ulcer ([facility] [address] 04/04/2019    Encounter for screening colonoscopy 05/02/2019    Family history of colon cancer 05/02/2019     Past Medical History:   Diagnosis Date    Ankle wound     Asthma        Past Surgical History:   Procedure Laterality Date    BACLOFEN PUMP IMPLANTATION      COLONOSCOPY N/A 9/27/2019    Dr Corbett Car ileum through ostomy-patent and healthy appearing anastomosis in the right colon-entero colonic anastomosis-5 yr recall    COLOSTOMY      FEMUR FRACTURE SURGERY      Right    FOOT AMPUTATION Left 4/4/2019    LEFT TRANSMET AMPUTATION performed by Danna Newby MD at [address]      urostomy    OTHER SURGICAL HISTORY      pain pump    [place] INSJ PRPH CTR VAD W/SUBQ PORT UNDER 5 YR N/A 6/26/2018    SINGLE LUMEN PORT PLACEMENT WITH FLUORO performed by Miquel Reis MD at [address] TOE AMPUTATION      L last toe    TONSILLECTOMY      URETEROTOMY         Family History   Problem Relation Age of Onset    Cancer Mother         breast    Colon Cancer Mother     Colon Polyps Mother     Diabetes Father     High Blood Pressure Father     Cancer Paternal Aunt         breast    Liver Cancer Paternal Aunt     Heart Disease Paternal Grandmother     Esophageal Cancer Neg Hx     Liver Disease Neg Hx     Rectal Cancer Neg Hx     Stomach Cancer Neg Hx        Allergies   Allergen Reactions    Darvocet [Propoxyphene N-Acetaminophen]      Talking out of her head    Morphine And Related Itching and Swelling    Propoxyphene Swelling       Social History     Socioeconomic History    Marital status:      Spouse name: Not on file    Number of children: Not on file    Years of education: Not on file    Highest education level: Not on file   Occupational History    Not on file   Social Needs    Financial resource strain: Not on file    Food insecurity     Worry: Not on file     Inability: Not on file    Transportation needs     Medical: Not on file     Non-medical: Not on file   Tobacco Use    Smoking status: Former Smoker     Packs/day: 0.25     Types: Cigarettes     Last attempt to quit: 2018     Years since quittin.4    Smokeless tobacco: Never Used   Substance and Sexual Activity    Alcohol use: Yes     Comment: yaritza    Drug use: Yes     Types: Marijuana     Comment: daily     Sexual activity: Not on file   Lifestyle    Physical activity     Days per week: Not on file     Minutes per session: Not on file    Stress: Not on file   Relationships    Social connections     Talks on phone: Not on file     Gets together: Not on file     Attends [employer] service: Not on file     Active member of club or organization: Not on file     Attends meetings of clubs or organizations: Not on file     Relationship status: Not on file    Intimate partner violence     Fear of current or ex partner: Not on file     Emotionally abused: Not on file     Physically abused: Not on file     Forced sexual activity: Not on file   Other Topics Concern    Not on file   Social History Narrative    Not on file                 Review of Systems     Constitutional - No fever or chills. No diaphoresis or significant fatigue. HENT -  No tinnitus or significant hearing loss. Eyes - no sudden vision change or eye pain  Respiratory - no significant shortness of breath or cough  Cardiovascular - no chest pain No palpitations or significant leg swelling  Gastrointestinal - no abdominal swelling or pain. Genitourinary - No difficulty urinating, dysuria  Musculoskeletal - no back pain or myalgia. Skin - no color change or rash  Neurologic - No seizures. No lateralizing weakness. Hematologic - no easy bruising or excessive bleeding. Psychiatric - no severe anxiety or nervousness.    All other review of systems are negative.                             Current Outpatient Medications route every 30 days No every 30 days but every 4-6 weeks. Flush port with 300 units heparin with 20 cc normal saline for ocrevus infusion for Multiple sclerosis and NS 20CC 1 Syringe 5    ipratropium-albuterol (DUONEB) 0.5-2.5 (3) MG/3ML SOLN nebulizer solution USE 1 UNIT DOSE IN NEBULIZER EVERY 4 TO 6 HOURS AS NEEDED. 450 mL 11    hydrochlorothiazide (HYDRODIURIL) 25 MG tablet Take 1 tablet by mouth daily as needed (hypertension) 90 tablet 3    BACLOFEN, PAIN PUMP REFILL CHARGE, by Implant route continuous Indications: every 3 months      b complex vitamins capsule Take 1 capsule by mouth daily      Multiple Vitamins-Minerals (THERAPEUTIC MULTIVITAMIN-MINERALS) tablet Take 1 tablet by mouth daily      Multiple Vitamins-Minerals (HAIR SKIN & NAILS ADVANCED PO) Take 1 tablet by mouth daily      glucose monitoring kit (FREESTYLE) monitoring kit 1 kit by Does not apply route daily 1 kit 0    blood glucose monitor strips Test 1 times a day & as needed for symptoms of irregular blood glucose. 100 strip 0    baclofen (LIORESAL) 10 MG tablet Take 1 tablet by mouth 2 times daily as needed (muscle spasms) 60 tablet 5    Sodium Chloride Flush (SALINE FLUSH) 0.9 % SOLN Infuse 20 mLs intravenously every 30 days Not every 30 days but every 4-6 weeks as need with 300 units of heparin to flush port for Infusion of Ocrevus for multiple sclerosis 20 mL 5    lactulose (CHRONULAC) 10 GM/15ML solution Take 30 mLs by mouth 3 times daily (Patient taking differently: Take 20 g by mouth 2 times daily as needed ) 1 Bottle 5    pregabalin (LYRICA) 300 MG capsule TAKE 1 CAPSULE BY MOUTH 2 TIMES A DAY 60 capsule 5     No current facility-administered medications for this visit. BP 97/67   Pulse 65     Constitutional - well developed, well nourished.     Eyes - conjunctiva normal.  Pupils react to light  Ear, nose, throat -hearing intact to finger rub No scars, masses, or lesions over external nose or ears, no atrophy of right hip. 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 She is off Ocrevus due to [address] virus. She will be started on Cymbala for a few months She wants to continue  Cymbalta for now. .She's to follow-up with me in approximately 3 months and call with any further problems. She will be referred to PT and OT. Continue present care. Plan    No orders of the defined types were placed in this encounter. Orders Placed This Encounter   Medications    levETIRAcetam (KEPPRA) 500 MG tablet     Sig: Take 1 tablet by mouth nightly     Dispense:  30 tablet     Refill:  5       Return in about 3 months (around 9/10/2020).

## 2020-06-22 RX ORDER — HYDROCHLOROTHIAZIDE 25 MG/1
TABLET ORAL
Qty: 30 TABLET | Refills: 0 | Status: SHIPPED | OUTPATIENT
Start: 2020-06-22 | End: 2020-10-01

## 2020-07-01 RX ORDER — METHYLPHENIDATE HYDROCHLORIDE 20 MG/1
TABLET ORAL
Qty: 30 TABLET | Refills: 0 | Status: SHIPPED | OUTPATIENT
Start: 2020-07-02 | End: 2020-08-03

## 2020-07-01 RX ORDER — LEVOCETIRIZINE DIHYDROCHLORIDE 5 MG/1
TABLET, FILM COATED ORAL
Qty: 90 TABLET | Refills: 1 | Status: SHIPPED | OUTPATIENT
Start: 2020-07-01 | End: 2020-09-10

## 2020-07-01 RX ORDER — HYDROCODONE BITARTRATE AND ACETAMINOPHEN 10; 325 MG/1; MG/1
TABLET ORAL
Qty: 90 TABLET | Refills: 0 | Status: SHIPPED | OUTPATIENT
Start: 2020-07-02 | End: 2020-08-03

## 2020-07-01 RX ORDER — PILOCARPINE HYDROCHLORIDE 7.5 MG/1
TABLET, FILM COATED ORAL
Qty: 90 TABLET | Refills: 5 | Status: SHIPPED | OUTPATIENT
Start: 2020-07-01 | End: 2020-12-01 | Stop reason: SDUPTHER

## 2020-07-01 RX ORDER — NITROFURANTOIN MACROCRYSTALS 50 MG/1
50 CAPSULE ORAL DAILY
Qty: 30 CAPSULE | Refills: 5 | Status: SHIPPED | OUTPATIENT
Start: 2020-07-01 | End: 2020-12-01 | Stop reason: SDUPTHER

## 2020-07-01 NOTE — TELEPHONE ENCOUNTER
Alyce called requesting a refill of the below medication which has been pended for you:     Requested Prescriptions     Pending Prescriptions Disp Refills    nitrofurantoin (MACRODANTIN) 50 MG capsule [Pharmacy Med Name: NITROFURANTOIN MCR 50 MG CAP] 30 capsule 0     Sig: TAKE 1 CAPSULE BY MOUTH DAILY    levocetirizine (XYZAL) 5 MG tablet [Pharmacy Med Name: LEVOCETIRIZINE 5MG TAB] 90 tablet 0     Sig: TAKE 1 TABLET BY MOUTH IN THE EVENING    pilocarpine (SALAGEN) 7.5 MG tablet [Pharmacy Med Name: PILOCARPINE HCL 7.5 MG TABLET] 90 tablet 0     Sig: TAKE 1 TABLET BY MOUTH THREE TIMES DAILY.        Last Appointment Date: 5/26/2020  Next Appointment Date: 12/1/2020    Allergies   Allergen Reactions    Darvocet [Propoxyphene N-Acetaminophen]      Talking out of her head    Morphine And Related Itching and Swelling    Propoxyphene Swelling

## 2020-08-03 NOTE — TELEPHONE ENCOUNTER
Requested Prescriptions     Pending Prescriptions Disp Refills    methylphenidate (RITALIN) 20 MG tablet [Pharmacy Med Name: METHYLPHENIDAT 20MG TAB DD] 30 tablet 0     Sig: TAKE 1 TABLET BY MOUTH DAILY    HYDROcodone-acetaminophen (Chichester Darshana)  MG per tablet [Pharmacy Med Name: Corona Zackary MG] 90 tablet 0     Sig: TAKE 1 TABLET BY MOUTH3 TIMES A DAY AS NEEDED FOR PAIN REDUCE DOSES T AKEN AS PAIN BECOMES MANAGEABLE)       Last Office Visit:  6/10/2020  Next Office Visit:  9/10/2020  Last Medication Refill:  7/2/2020  [facility] up to date: yes

## 2020-08-04 RX ORDER — HYDROCODONE BITARTRATE AND ACETAMINOPHEN 10; 325 MG/1; MG/1
TABLET ORAL
Qty: 90 TABLET | Refills: 0 | Status: SHIPPED | OUTPATIENT
Start: 2020-08-04 | End: 2020-09-02

## 2020-08-04 RX ORDER — METHYLPHENIDATE HYDROCHLORIDE 20 MG/1
TABLET ORAL
Qty: 30 TABLET | Refills: 0 | Status: SHIPPED | OUTPATIENT
Start: 2020-08-04 | End: 2020-09-04

## 2020-08-24 ENCOUNTER — TELEPHONE (OUTPATIENT)
Dept: INTERNAL MEDICINE | Age: 50
End: 2020-08-24

## 2020-08-24 NOTE — TELEPHONE ENCOUNTER
Alyce called to request a order. Patient called to request an order be sent to [id number] [facility] for her catheters and lubricant. Fax # [phone].

## 2020-08-25 ENCOUNTER — HOSPITAL ENCOUNTER (OUTPATIENT)
Dept: WOUND CARE | Age: 50
Discharge: HOME | End: 2020-08-25
Payer: MEDICARE

## 2020-08-25 VITALS
BODY MASS INDEX: 22.01 KG/M2 | HEART RATE: 78 BPM | HEIGHT: 69 IN | RESPIRATION RATE: 18 BRPM | SYSTOLIC BLOOD PRESSURE: 113 MMHG | DIASTOLIC BLOOD PRESSURE: 71 MMHG | TEMPERATURE: 98.8 F | WEIGHT: 148.59 LBS

## 2020-08-25 PROBLEM — F17.200 SMOKING: Chronic | Status: ACTIVE | Noted: 2020-08-25

## 2020-08-25 PROCEDURE — 97597 DBRDMT OPN WND 1ST 20 CM/<: CPT

## 2020-08-25 PROCEDURE — 99212 OFFICE O/P EST SF 10 MIN: CPT | Performed by: SURGERY

## 2020-08-25 PROCEDURE — 97597 DBRDMT OPN WND 1ST 20 CM/<: CPT | Performed by: SURGERY

## 2020-08-25 RX ORDER — VARENICLINE TARTRATE
KIT
Qty: 1 BOX | Refills: 0 | Status: SHIPPED | OUTPATIENT
Start: 2020-08-25 | End: 2020-12-01 | Stop reason: SDUPTHER

## 2020-08-25 NOTE — PROGRESS NOTES
O'Clock 0 08/25/20 1118   Undermining Maxium Distance (cm) 0 08/25/20 1118   Wound Assessment Red;Purple 08/25/20 1118   Drainage Amount Moderate 08/25/20 1118   Drainage Description Serosanguinous 08/25/20 1118   Odor None 08/25/20 1118   Margins Attached edges 08/25/20 1118   Audelia-wound Assessment Intact 08/25/20 1118   Non-staged Wound Description Not applicable 03/[birth year]/39 2325   [place]%Wound Bed 10 08/25/20 1118   Red%Wound Bed 80 08/25/20 1118   Yellow%Wound Bed 10 08/25/20 1118   Black%Wound Bed 0 08/25/20 1118   Purple%Wound Bed 0 08/25/20 1118   Number of days: 0          Supplies Requested :      WOUND #: 1   PRIMARY DRESSING:  Other: Promogran   Cover and Secure with: ABD pad     FREQUENCY OF DRESSING CHANGES:  Daily         ADDITIONAL ITEMS:  [] Gloves Small  [x] Gloves Medium [] Gloves Large [] Gloves XLarge  [] Tape 1\" [x] Tape 2\" [] Tape 3\"  [x] Medipore Tape  [x] Saline  [] Skin Prep   [] Adhesive Remover   [x] Cotton Tip Applicators   [] Other:    Patient Wound(s) Debrided: [x] Yes if yes please add date 8/25/20   [] No    Debribement Type: Non-excisional/Selective    Patient currently being seen by Home Health: [] Yes   [x] No    Duration for needed supplies:  []15  [x]30  []60  []90 Days    Provider Information:      PROVIDER'S NAME: Dr Tiffany Andrade    NPI: [id number]

## 2020-08-25 NOTE — PROGRESS NOTES
Patient Care Team:  Maria E Gastelum MD as PCP - General (Family Medicine)  Maria E Gastelum MD as PCP - [facility]  Ranjith Kelly MD as Neurologist (Neurology)  Ranjith Kelly MD as Consulting Physician (Neurology)  EFREN Ramos as Advanced Practice Nurse (Gastroenterology)    TODAY'S DATE:  8/25/2020     HISTORY of PRESENTILLNESS HUEY Murray is a 53 y.o. female who presents today for wound evaluation. Wound Type:pressure  Wound Location:sacral  Modifying factors:chronic pressure, decreased mobility and shear force    Patient Active Problem List   Diagnosis Code    Muscle spasticity M62.838    Peripheral vascular disease ([facility]) I73.9    MS (multiple sclerosis) ([facility]) G35    Pain in both upper arms M79.621, M79.622    Numbness R20.0    Other fatigue R53.83    Weakness R53.1    Chronic pain G89.29    Hx of seizure disorder Z86.69    Insomnia G47.00    Decubitus ulcer of sacral region L89.159    Pain, neck M54.2    Venous stasis ulcer of left lower extremity ([facility]) I83.029, L97.929    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 layer exposed ([facility] [address] L97.812    S/P transmetatarsal amputation of foot, left ([facility]) U38.056    Chronic constipation K59.09    Colostomy in place [facility]) Z93.3    Surgical wound breakdown T81.31XA    Pressure injury of sacral region, stage 4 ([facility]) L89.154    Paraplegia ([facility]) G82.20    Arthralgia of hip M25.559    Neuropathic foot ulcer, left, limited to breakdown of skin ([facility]) L97.521    Seizure ([facility]) R56.9    Type 2 diabetes mellitus without complication ([facility]) C15.9    Smoking F17.200       The patient is a 53year old female with MS and paraplegia, known to wound care from numerous previous wounds. She had a sacral wound earlier this year, and it did eventually heal. The patient reports that this new area opened a couple weeks ago. She has been applying promogran.  She got a different cushion and has not been using mouth 2 times daily as needed (muscle spasms) 60 tablet 5    Sodium Chloride Flush (SALINE FLUSH) 0.9 % SOLN Infuse 20 mLs intravenously every 30 days Not every 30 days but every 4-6 weeks as need with 300 units of heparin to flush port for Infusion of Ocrevus for multiple sclerosis 20 mL 5    lactulose (CHRONULAC) 10 GM/15ML solution Take 30 mLs by mouth 3 times daily (Patient taking differently: Take 20 g by mouth 2 times daily as needed ) 1 Bottle 5    methylphenidate (RITALIN) 20 MG tablet TAKE 1 TABLET BY MOUTH DAILY 30 tablet 0    HYDROcodone-acetaminophen (NORCO)  MG per tablet TAKE 1 TABLET BY MOUTH3 TIMES A DAY AS NEEDED FOR PAIN REDUCE DOSES T AKEN AS PAIN BECOMES MANAGEABLE) 90 tablet 0    hydroCHLOROthiazide (HYDRODIURIL) 25 MG tablet TAKE 1 TABLET BY MOUTH ONCE DAILY AS NEEDED 30 tablet 0    azelastine (ASTELIN) 0.1 % nasal spray USE 2 SPRAYS IN EACH NOSTRIL TWICE DAILY AS DIRECTED 30 mL 0    glucose monitoring kit (FREESTYLE) monitoring kit 1 kit by Does not apply route daily 1 kit 0    Diapers & Supplies MISC Diapers, chucks, wipes, and gloves for incontinence. [phone] each 11    Heparin Lock Flush (HEPARIN FLUSH, 100 UNITS/ML,) 100 UNIT/ML injection 3 mLs by Intercatheter route every 30 days No every 30 days but every 4-6 weeks. Flush port with 300 units heparin with 20 cc normal saline for ocrevus infusion for Multiple sclerosis and NS 20CC 1 Syringe 5    ipratropium-albuterol (DUONEB) 0.5-2.5 (3) MG/3ML SOLN nebulizer solution USE 1 UNIT DOSE IN NEBULIZER EVERY 4 TO 6 HOURS AS NEEDED. 450 mL 11    glucose monitoring kit (FREESTYLE) monitoring kit 1 kit by Does not apply route daily 1 kit 0     No current facility-administered medications for this encounter. 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 related to ms    Sepsis ([address] 2016    Weakness 2016       Past Surgical History:   Procedure Laterality Date    BACLOFEN PUMP IMPLANTATION      COLONOSCOPY N/A 2019    Dr Savita Murray ileum through ostomy-patent and healthy appearing anastomosis in the right colon-entero colonic anastomosis-10 yr recall    COLOSTOMY     [facility]      Right    FOOT AMPUTATION Left 2019    LEFT TRANSMET AMPUTATION performed by Franko Rawls MD at [address]      urostomy    OTHER SURGICAL HISTORY      pain pump    [place] INSJ PRPH CTR VAD W/SUBQ PORT UNDER 5 YR N/A 2018    SINGLE LUMEN PORT PLACEMENT WITH FLUORO performed by Rupal Garcia MD at [address] [facility] TOE AMPUTATION      L last toe    TONSILLECTOMY      URETEROTOMY       Family History   Problem Relation Age of Onset   Dangelo Filter Cancer Mother         breast    Colon Cancer Mother     Colon Polyps Mother     Diabetes Father     High Blood Pressure Father     Cancer Paternal Aunt         breast    Liver Cancer Paternal Aunt     Heart Disease Paternal Grandmother     Esophageal Cancer Neg Hx     Liver Disease Neg Hx     Rectal Cancer Neg Hx     Stomach Cancer Neg Hx      Social History     Tobacco Use    Smoking status: Current Every Day Smoker     Packs/day: 0.50     Types: Cigarettes     Last attempt to quit: 2018     Years since quittin.6    Smokeless tobacco: Never Used   Substance Use Topics    Length (cm) 0.8 cm 08/25/20 1144   Post-Procedure Width (cm) 1 cm 08/25/20 1144   Post-Procedure Depth (cm) 0.1 cm 08/25/20 1144   Post-Procedure Surface Area (cm^2) 0.8 cm^2 08/25/20 1144   Post-Procedure Volume (cm^3) 0.08 cm^3 08/25/20 1144   Distance Tunneling (cm) 0 cm 08/25/20 1118   Tunneling Position ___ O'Clock 0 08/25/20 1118   Undermining Starts ___ O'Clock 0 08/25/20 1118   Undermining Ends___ O'Clock 0 08/25/20 1118   Undermining Maxium Distance (cm) 0 08/25/20 1118   Wound Assessment Red;Purple 08/25/20 1118   Drainage Amount Moderate 08/25/20 1118   Drainage Description Serosanguinous 08/25/20 1118   Odor None 08/25/20 1118   Margins Attached edges 08/25/20 1118   Audelia-wound Assessment Intact 08/25/20 1118   Non-staged Wound Description Not applicable 01/08/29 1850   [place]%Wound Bed 10 08/25/20 1118   Red%Wound Bed 80 08/25/20 1118   Yellow%Wound Bed 10 08/25/20 1118   Black%Wound Bed 0 08/25/20 1118   Purple%Wound Bed 0 08/25/20 1118   Number of days: 0            Debridement: Selective Debridement/Non-Excisional Debridement    Using curette the wound(s)/ulcer(s) was/were sharply debrided down through and including the removal of epidermis and dermis. Devitalized Tissue Debrided:  fibrin and biofilm    Pre Debridement Measurements:  Are located in the Wound/Ulcer Documentation Flow Sheet    Wound/Ulcer #: 1    Post Debridement Measurements:  Wound/Ulcer Descriptions are Pre Debridement except measurements:          Percent of Wound(s)/Ulcer(s) Debrided: 100%    Total Surface Area Debrided:  0.8 sq cm     Diabetic/Pressure/Non Pressure Ulcers only:  Ulcer: Pressure ulcer, Stage 3     Estimated Blood Loss:  Minimal    Hemostasis Achieved:  not needed    Response to treatment:  Well tolerated by patient.        Risk factors for atherosclerosis of all vascular beds have been reviewed with the patient including:  Family history, tobacco abuse in all forms, elevated cholesterol, hyperlipidemia, and diabetes. Assessment    1. S/P transmetatarsal amputation of foot, left ([facility] [address]    2. Venous stasis ulcer of left lower extremity ([address]    3. 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 layer exposed ([facility] [address]    4. Smoking          Planfor wound - Dress per physician order  Treatment:     Compression : No   Offloading : Yes   Dressing : see AVS  Therapy : none   Discussed appropriate home care of this wound. Wound redressed. Patient instructions were given. Follow up: 2-3 week. Recommend no smoking  Offloading instructions given      Will call riddhi to come out, she needs to see about getting a new roho. Skin barrier, promogran, offloading, discussed not sitting up in chair for long periods of time, and stopping smoking. Will continue with promogran. Follow up in 2-3 weeks.

## 2020-09-04 RX ORDER — METHYLPHENIDATE HYDROCHLORIDE 20 MG/1
TABLET ORAL
Qty: 30 TABLET | Refills: 0 | Status: SHIPPED | OUTPATIENT
Start: 2020-09-04 | End: 2020-10-02

## 2020-09-04 NOTE — TELEPHONE ENCOUNTER
Requested Prescriptions     Pending Prescriptions Disp Refills    methylphenidate (RITALIN) 20 MG tablet [Pharmacy Med Name: METHYLPHENIDAT 20MG TAB DD] 30 tablet 0     Sig: TAKE 1 TABLET BY MOUTH DAILY       Last Office Visit:  6/10/2020  Next Office Visit:  9/10/2020  Last Medication Refill:  8/4/20  [facility]man up to date:  9/4/20    *RX updated to reflect 9/4/20  fill date*

## 2020-09-08 ENCOUNTER — HOSPITAL ENCOUNTER (OUTPATIENT)
Dept: WOUND CARE | Age: 50
Discharge: HOME | End: 2020-09-08
Payer: MEDICARE

## 2020-09-08 VITALS
TEMPERATURE: 98 F | RESPIRATION RATE: 16 BRPM | BODY MASS INDEX: 21.65 KG/M2 | SYSTOLIC BLOOD PRESSURE: 115 MMHG | HEIGHT: 69 IN | WEIGHT: 146.16 LBS | DIASTOLIC BLOOD PRESSURE: 73 MMHG | HEART RATE: 68 BPM

## 2020-09-08 PROCEDURE — 97597 DBRDMT OPN WND 1ST 20 CM/<: CPT

## 2020-09-08 PROCEDURE — 97597 DBRDMT OPN WND 1ST 20 CM/<: CPT | Performed by: SURGERY

## 2020-09-08 ASSESSMENT — PAIN SCALES - GENERAL: PAINLEVEL_OUTOF10: 0

## 2020-09-08 NOTE — PROGRESS NOTES
Venous stasis ulcer of left lower extremity (HCC) (Chronic)    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 layer exposed ([address]    S/P transmetatarsal amputation of foot, left (HCC)          The patients pain isPain Level: 0  . Wound(s) has slightly improved. Please refer to nursing measurements and assessment regarding wound pre and post debridement. Wound 08/25/20 Coccyx WOUND 1 Coccyx Pressure Stage 3 (Active)   Wound Image   09/08/20 1118   Wound Pressure Stage  3 09/08/20 1118   Dressing Status Old drainage 09/08/20 1118   Dressing Changed Changed/New 08/25/20 1200   Dressing/Treatment ABD; Medipore 08/25/20 1200   Wound Cleansed Soap and water 09/08/20 1118   Wound Length (cm) 0.7 cm 09/08/20 1118   Wound Width (cm) 0.7 cm 09/08/20 1118   Wound Depth (cm) 0.1 cm 09/08/20 1118   Wound Surface Area (cm^2) 0.49 cm^2 09/08/20 1118   Change in Wound Size % (l*w) 38.75 09/08/20 1118   Wound Volume (cm^3) 0.05 cm^3 09/08/20 1118   Wound Healing % 38 09/08/20 1118   Post-Procedure Length (cm) 0.7 cm 09/08/20 1123   Post-Procedure Width (cm) 0.7 cm 09/08/20 1123   Post-Procedure Depth (cm) 0.1 cm 09/08/20 1123   Post-Procedure Surface Area (cm^2) 0.49 cm^2 09/08/20 1123   Post-Procedure Volume (cm^3) 0.05 cm^3 09/08/20 1123   Distance Tunneling (cm) 0 cm 09/08/20 1118   Tunneling Position ___ O'Clock 0 09/08/20 1118   Undermining Starts ___ O'Clock 0 09/08/20 1118   Undermining Ends___ O'Clock 0 09/08/20 1118   Undermining Maxium Distance (cm) 0 09/08/20 1118   Wound Assessment Red;Purple 09/08/20 1118   Drainage Amount Moderate 09/08/20 1118   Drainage Description Serosanguinous 09/08/20 1118   Odor None 09/08/20 1118   Margins Attached edges 09/08/20 1118   Audelia-wound Assessment Intact 09/08/20 1118   Non-staged Wound Description Not applicable 58/64/77 2269   [place]%Wound Bed 10 09/08/20 1118   Red%Wound Bed 80 09/08/20 1118   Yellow%Wound Bed 10 09/08/20 1118   Black%Wound Bed 0

## 2020-09-10 ENCOUNTER — OFFICE VISIT (OUTPATIENT)
Dept: NEUROLOGY | Age: 50
End: 2020-09-10
Payer: MEDICARE

## 2020-09-10 VITALS
DIASTOLIC BLOOD PRESSURE: 59 MMHG | HEART RATE: 69 BPM | SYSTOLIC BLOOD PRESSURE: 113 MMHG | BODY MASS INDEX: 21.62 KG/M2 | WEIGHT: 146 LBS | HEIGHT: 69 IN

## 2020-09-10 PROCEDURE — G8427 DOCREV CUR MEDS BY ELIG CLIN: HCPCS | Performed by: PSYCHIATRY & NEUROLOGY

## 2020-09-10 PROCEDURE — G8420 CALC BMI NORM PARAMETERS: HCPCS | Performed by: PSYCHIATRY & NEUROLOGY

## 2020-09-10 PROCEDURE — 1036F TOBACCO NON-USER: CPT | Performed by: PSYCHIATRY & NEUROLOGY

## 2020-09-10 PROCEDURE — 99213 OFFICE O/P EST LOW 20 MIN: CPT | Performed by: PSYCHIATRY & NEUROLOGY

## 2020-09-10 RX ORDER — CETIRIZINE HYDROCHLORIDE 10 MG/1
10 TABLET ORAL DAILY
Status: ON HOLD | COMMUNITY

## 2020-09-10 RX ORDER — LEVETIRACETAM 500 MG/1
500 TABLET ORAL NIGHTLY
Qty: 90 TABLET | Refills: 3 | Status: SHIPPED | OUTPATIENT
Start: 2020-09-10 | End: 2021-02-02

## 2020-09-10 NOTE — PROGRESS NOTES
Chief Complaint   Patient presents with    Multiple Sclerosis     3 month follow up, pt states she has been having a lot of headaches        Ajit Werner is a 53y.o. year old female who is seen for evaluation of multiple sclerosis. The patient indicates that she was diagnosed with multiple sclerosis in 1994. At that time to develop some visual disturbances including blurred vision and double vision. Within eight months she was in a wheelchair. She currently has no movement of the lower extremities. She does have some increased Spasticity in the legs worse in the arms. She underwent a baclofen pump with Dr. Chandni Sullivan with some complications. She is doing better from this. She currently sees Dr. nichols for her pump management. She does have some cognitive issues. She denies diplopia, dysarthria, or dysphagia. She does have some incontinence of bladder. She does have pain in the hips and lower extremities. She also has headaches with pain behind both eyes. She was followed by Dr. Huan Amezcua of neurology. She follows up today for evaluation. She is currently in a wheelchair. Since her last visit she is doing better with physical therapy. She had an accidental overdose with her baclofen pump and had a seizure. Doing better on. Recently admitted with seizure. Was off Chantix a few weeks and had a UTI. Had seizure in oct 2014 with seizure due to baclofen overose. This was her second seizure. On Keppra. no more seizures. Off Ocrevus. Occassionally gets shooting pain in head.     Active Ambulatory Problems     Diagnosis Date Noted    Muscle spasticity     Peripheral vascular disease ([facility] 02/01/2016    MS (multiple sclerosis) ([facility] 06/08/2016    Pain in both upper arms 08/04/2016    Numbness 08/04/2016    Other fatigue 08/04/2016    Weakness 08/04/2016    Chronic pain 08/19/2016    Hx of seizure disorder 08/19/2016    Insomnia 06/28/2017    Decubitus ulcer of sacral region 06/28/2017    Pain, neck 09/06/2017    Venous stasis ulcer of left lower extremity ([address] 11/26/2018    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 layer exposed ([address] 11/26/2018    S/P transmetatarsal amputation of foot, left ([address] 04/17/2019    Chronic constipation 05/02/2019    Colostomy in place [facility]) 05/02/2019    Surgical wound breakdown 07/24/2019    Pressure injury of sacral region, stage 4 ([address] 10/17/2019    Paraplegia ([address] 11/05/2019    Arthralgia of hip 12/03/2019    Neuropathic foot ulcer, left, limited to breakdown of skin ([address] 05/05/2020    Seizure ([address] 05/26/2020    Type 2 diabetes mellitus without complication ([address] 70/50/0283    Smoking 08/25/2020     Resolved Ambulatory Problems     Diagnosis Date Noted    Neuropathic ulcer of right foot, limited to breakdown of skin ([address] 02/01/2016    Sepsis ([address] 08/19/2016    Urinary tract infection 08/19/2016    Hypokalemia 08/20/2016    Hypokalemia 08/20/2016    Abnormal EKG     Encephalopathy 08/25/2016    Elevated troponin level     Neuropathic ulcer of left foot, limited to breakdown of skin ([address] 04/10/2017    Acute bronchitis 06/28/2017    Acute sinusitis 06/28/2017    Open wound of ankle 06/28/2017    Vitamin D deficiency 06/28/2017    Back pain 06/28/2017    Breakdown (mechanical) of cranial or spinal infusion catheter, initial encounter 10/25/2016    CAFL (chronic airflow limitation) ([address] 06/28/2017    Cough 06/28/2017    Encounter for screening for other disorder 06/28/2017    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    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    Difficult or painful urination 06/28/2017    Headache 06/28/2017    Hyperlipidemia 06/28/2017    Influenza 06/28/2017    Breast lump 06/28/2017    Patient overweight 06/28/2017    Hay fever 06/28/2017    Cobalamin deficiency 06/28/2017    Disease caused by fungus 06/28/2017    Colostomy and enterostomy malfunction ([facility] 09/06/2017    Atherosclerosis of native arteries of right leg with ulceration of calf ([facility] 11/26/2018    Open wound of second toe of left foot 12/11/2018    Type 2 diabetes mellitus with left diabetic foot ulcer ([facility] 04/04/2019    Encounter for screening colonoscopy 05/02/2019    Family history of colon cancer 05/02/2019     Past Medical History:   Diagnosis Date    Ankle wound     Asthma        Past Surgical History:   Procedure Laterality Date    BACLOFEN PUMP IMPLANTATION      COLONOSCOPY N/A 9/27/2019    Dr Layton Patel ileum through ostomy-patent and healthy appearing anastomosis in the right colon-entero colonic anastomosis-10 yr recall    COLOSTOMY      [address]      Right    FOOT AMPUTATION Left 4/4/2019    LEFT TRANSMET AMPUTATION performed [facility]ia [facility]nutson [facility] at [facility] OR    HYSTERECTOMY      OTHER SURGICAL HISTORY      urostomy    OTHER SURGICAL HISTORY      pain pump    MT INSJ PRPH CTR VAD W/SUBQ PORT UNDER 5 YR N/A 6/26/2018    SINGLE LUMEN PORT PLACEMENT WITH FLUORO performed by Yary Sandoval MD at [facility] TOE AMPUTATION      L last toe    TONSILLECTOMY      URETEROTOMY         Family History   Problem Relation Age of Onset    Cancer Mother         breast    Colon Cancer Mother     Colon Polyps Mother     Diabetes Father     High Blood Pressure Father     Cancer Paternal Aunt         breast    Liver Cancer Paternal Aunt     Heart Disease Paternal Grandmother     Esophageal Cancer Neg Hx     Liver Disease Neg Hx     Rectal Cancer Neg Hx     Stomach Cancer Neg Hx        Allergies   Allergen Reactions    Darvocet [Propoxyphene N-Acetaminophen]      Talking out of her head    Morphine And Related Itching and Swelling    Propoxyphene Swelling       Social History     Socioeconomic History    Marital status:      Spouse name: Not on file    Number of children: Not on file    Years of education: Not on file    Highest education level: Not on file   Occupational History    Not on file severe anxiety or nervousness. All other review of systems are negative. Current Outpatient Medications   Medication Sig Dispense Refill    cetirizine (ZYRTEC) 10 MG tablet Take 10 mg by mouth daily      levETIRAcetam (KEPPRA) 500 MG tablet Take 1 tablet by mouth nightly 90 tablet 3    methylphenidate (RITALIN) 20 MG tablet TAKE 1 TABLET BY MOUTH DAILY 30 tablet 0    HYDROcodone-acetaminophen (NORCO)  MG per tablet TAKE 1 TABLET BY MOUTH3 TIMES A DAY AS NEEDED FOR PAIN REDUCE DOSES TAKEN AS PAIN BECOMES MANAGEABLE) 90 tablet 0    Catheters MISC Catheter supplies and lubricant 5 times daily G82.20  to [facility] [phone] 450 each 3    varenicline (CHANTIX STARTING MONTH PAK) 0.5 MG X 11 & 1 MG X 42 tablet Take by mouth. 1 box 0    nitrofurantoin (MACRODANTIN) 50 MG capsule TAKE 1 CAPSULE BY MOUTH DAILY 30 capsule 5    pilocarpine (SALAGEN) 7.5 MG tablet TAKE 1 TABLET BY MOUTH THREE TIMES DAILY. 90 tablet 5    hydroCHLOROthiazide (HYDRODIURIL) 25 MG tablet TAKE 1 TABLET BY MOUTH ONCE DAILY AS NEEDED 30 tablet 0    levETIRAcetam (KEPPRA) 500 MG tablet Take 1 tablet by mouth nightly 30 tablet 5    azelastine (ASTELIN) 0.1 % nasal spray USE 2 SPRAYS IN EACH NOSTRIL TWICE DAILY AS DIRECTED 30 mL 0    glucose monitoring kit (FREESTYLE) monitoring kit 1 kit by Does not apply route daily 1 kit 0    blood glucose monitor strips Test 1 times a day & as needed for symptoms of irregular blood glucose. 100 strip 3    DULoxetine (CYMBALTA) 30 MG extended release capsule Take 1 capsule by mouth daily 30 capsule 11    tiZANidine (ZANAFLEX) 4 MG tablet 3 tabs prn 180 tablet 5    docusate sodium (COLACE) 100 MG capsule Take 200 mg by mouth as needed for Constipation      PROAIR  (90 Base) MCG/ACT inhaler INHALE 1 PUFF INTO THE LUNGS EVERY 6 HOURS AS NEEDED FOR WHEEZING OR SHORTNESS OF BREATH 8.5 g 5    Diapers & Supplies MISC Diapers, chucks, wipes, and gloves for incontinence.   FAX [phone] 100 each 11    Omega-3 Fatty Acids (OMEGA-3 FISH OIL PO) Take by mouth daily       Heparin Lock Flush (HEPARIN FLUSH, 100 UNITS/ML,) 100 UNIT/ML injection 3 mLs by Intercatheter route every 30 days No every 30 days but every 4-6 weeks. Flush port with 300 units heparin with 20 cc normal saline for ocrevus infusion for Multiple sclerosis and NS 20CC 1 Syringe 5    ipratropium-albuterol (DUONEB) 0.5-2.5 (3) MG/3ML SOLN nebulizer solution USE 1 UNIT DOSE IN NEBULIZER EVERY 4 TO 6 HOURS AS NEEDED. 450 mL 11    BACLOFEN, PAIN PUMP REFILL CHARGE, by Implant route continuous Indications: every 3 months      b complex vitamins capsule Take 1 capsule by mouth daily      Multiple Vitamins-Minerals (THERAPEUTIC MULTIVITAMIN-MINERALS) tablet Take 1 tablet by mouth daily      Multiple Vitamins-Minerals (HAIR SKIN & NAILS ADVANCED PO) Take 1 tablet by mouth daily      glucose monitoring kit (FREESTYLE) monitoring kit 1 kit by Does not apply route daily 1 kit 0    blood glucose monitor strips Test 1 times a day & as needed for symptoms of irregular blood glucose. 100 strip 0    baclofen (LIORESAL) 10 MG tablet Take 1 tablet by mouth 2 times daily as needed (muscle spasms) 60 tablet 5    Sodium Chloride Flush (SALINE FLUSH) 0.9 % SOLN Infuse 20 mLs intravenously every 30 days Not every 30 days but every 4-6 weeks as need with 300 units of heparin to flush port for Infusion of Ocrevus for multiple sclerosis 20 mL 5    lactulose (CHRONULAC) 10 GM/15ML solution Take 30 mLs by mouth 3 times daily (Patient taking differently: Take 20 g by mouth 2 times daily as needed ) 1 Bottle 5    pregabalin (LYRICA) 300 MG capsule TAKE 1 CAPSULE BY MOUTH 2 TIMES A DAY 60 capsule 5     No current facility-administered medications for this visit. BP (!) 113/59   Pulse 69   Ht 5' 9\" (1.753 m)   Wt 146 lb (66.2 kg)   BMI 21.56 kg/m²     Constitutional - well developed, well nourished.     Eyes - conjunctiva normal. Pupils react to light  Ear, nose, throat -hearing intact to finger rub No scars, masses, or lesions over external nose or ears, no atrophy of tongue  Neck-symmetric, no masses noted, no jugular vein distension  Respiration- chest wall appears symmetric, good expansion,   normal effort without use of accessory muscles  Musculoskeletal - no significant wasting of muscles noted, no bony deformities, gait no gross ataxia  Extremities-no clubbing, cyanosis or edema  Skin - warm, dry, and intact. No rash, erythema, or pallor.   Psychiatric - mood, affect, and behavior appear normal.      Neurological exam  Awake, alert, fluent oriented x 3 appropriate affect  Attention and concentration appear appropriate  Recent and remote memory appears unremarkable  Speech normal without dysarthria  No clear issues with language of fund of knowledge    Cranial Nerve Exam   CN II- Visual fields grossly unremarkable  CN III, IV,VI-EOMI, No nystagmus, conjugate eye movements, no ptosis    CN VII-no facial assymetry    Motor Exam  Antigravity in arms    Sensory Exam  Sensation reduced     Reflexes     No clonus ankles  No Perez's sign bilateral hands    Tremors- no tremors in hands or head noted    Gait  In wheelchair    Coordination  Finger to nose-unremarkable    Lab Results   Component Value Date    [id number] 387 05/02/2019     Lab Results   Component Value Date    WBC 5.1 06/10/2020    HGB 12.0 06/10/2020    HCT 39.0 06/10/2020    MCV 98.5 06/10/2020     06/10/2020     Lab Results   Component Value Date     06/10/2020    K 3.9 06/10/2020     06/10/2020    CO2 25 06/10/2020    BUN 15 06/10/2020    CREATININE <0.5 06/10/2020    GLUCOSE 135 (H) 06/10/2020    CALCIUM 8.9 06/10/2020    PROT 6.2 (L) 06/10/2020    LABALBU 4.0 06/10/2020    BILITOT <0.2 06/10/2020    ALKPHOS 94 06/10/2020    AST 16 06/10/2020    ALT 17 06/10/2020    LABGLOM >60 06/10/2020    GLOB 2.7 08/19/2016     Impression   1.. No foci of abnormal T2 signal or enhancement within the cervical   cord to suggest plaques of multiple sclerosis or mass. 2. Mild bulging of the disc at C5-C6 with uncinate spurring   contributing to neural foraminal narrowing. There is no central   stenosis. The remaining cervical disc levels are unremarkable. Signed by Dr Chantelle Bui on 8/14/2017 5:05 PM           Assessment    ICD-10-CM    1. MS (multiple sclerosis) ([facility]  G35    2. Pain, neck  M54.2    3. Pain in both upper arms  M79.621     M79.622    4. Muscle spasticity  M62.838    5. Other fatigue  R53.83    6. Arthralgia of hip, unspecified laterality  M25.559    7. Weakness  R53.1    8. Numbness  R20.0        Her neurological examination today was significant for no movement in the lower extremities. She had some altered sensation in the legs along with some spasticity. She's wheelchair-bound. Her MRI of the brain revealed no new lesions. There was extensive white matter lesions. , Her cervical, thoracic, and lumbosacral spine were relatively unremarkable. She was agreeable to be started on Gilenya with no issues. stable. She had been on Copaxone but came off of this on her own. She denies any clear relapses over the last several years. She'll be referred to physical therapy as well. She will have a CBC and CMP every 3 months. The patient indicated understanding of the management plan. At this time she will be referred to [facility] as per her wishes for some experimental treatments. She may consider decreasing her keppra to 1 tabs to bid. She is seeing Dr Molina Marte who manages baclofen pump. She will be continued  Ritalin to see if this helps at a future. She will be tried on Nuvgil. Lyrica restarted. She will be referred to [facility] for colostomy issues. .her hepatitis B panel was unremkarkable. Her EMG with NCs of the arms was suggestive of an ulnar neuropathy on the left. her MRI of the brain had stable white matter change.  Her MRI of the c spine had some minimal disc bulging but no MS plaques Her x rays of her hips due to pain were unremarkable except for bladder stone along with pin in right hip. 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 She is off Ocrevus due to [address] virus. She will be started on Cymbala for a few months She wants to continue  Cymbalta for now. .She's to follow-up with me in approximately 3 months and call with any further problems. Continue current care. Plan    No orders of the defined types were placed in this encounter. Orders Placed This Encounter   Medications    levETIRAcetam (KEPPRA) 500 MG tablet     Sig: Take 1 tablet by mouth nightly     Dispense:  90 tablet     Refill:  3       Return in about 3 months (around 12/10/2020).

## 2020-09-29 ENCOUNTER — HOSPITAL ENCOUNTER (OUTPATIENT)
Dept: WOUND CARE | Age: 50
Discharge: HOME | End: 2020-09-29
Payer: MEDICARE

## 2020-09-29 ENCOUNTER — TRANSCRIBE ORDERS (OUTPATIENT)
Dept: ADMINISTRATIVE | Facility: HOSPITAL | Age: 50
End: 2020-09-29

## 2020-09-29 VITALS
WEIGHT: 154.76 LBS | BODY MASS INDEX: 22.85 KG/M2 | SYSTOLIC BLOOD PRESSURE: 109 MMHG | HEART RATE: 68 BPM | TEMPERATURE: 98 F | DIASTOLIC BLOOD PRESSURE: 65 MMHG | RESPIRATION RATE: 16 BRPM

## 2020-09-29 DIAGNOSIS — Z01.818 PREOPERATIVE TESTING: Primary | ICD-10-CM

## 2020-09-29 PROCEDURE — 11042 DBRDMT SUBQ TIS 1ST 20SQCM/<: CPT | Performed by: SURGERY

## 2020-09-29 PROCEDURE — 11042 DBRDMT SUBQ TIS 1ST 20SQCM/<: CPT

## 2020-09-29 ASSESSMENT — PAIN SCALES - GENERAL: PAINLEVEL_OUTOF10: 0

## 2020-09-29 NOTE — PROGRESS NOTES
stasis ulcer of left lower extremity (HCC) (Chronic)    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 layer exposed ([address]    S/P transmetatarsal amputation of foot, left (HCC)          The patients pain isPain Level: 0  . Wound(s) has slightly improved. Please refer to nursing measurements and assessment regarding wound pre and post debridement. Wound 08/25/20 Coccyx WOUND 1 Coccyx Pressure Stage 3 (Active)   Wound Image   09/29/20 1131   Wound Pressure Stage  3 09/29/20 1131   Dressing Status Old drainage 09/29/20 1131   Dressing Changed Changed/New 09/08/20 1135   Dressing/Treatment ABD; Medipore 09/08/20 1135   Wound Cleansed Soap and water 09/29/20 1131   Wound Length (cm) 0.7 cm 09/29/20 1131   Wound Width (cm) 0.4 cm 09/29/20 1131   Wound Depth (cm) 0.1 cm 09/29/20 1131   Wound Surface Area (cm^2) 0.28 cm^2 09/29/20 1131   Change in Wound Size % (l*w) 65 09/29/20 1131   Wound Volume (cm^3) 0.03 cm^3 09/29/20 1131   Wound Healing % 62 09/29/20 1131   Post-Procedure Length (cm) 0.7 cm 09/29/20 1146   Post-Procedure Width (cm) 0.4 cm 09/29/20 1146   Post-Procedure Depth (cm) 0.1 cm 09/29/20 1146   Post-Procedure Surface Area (cm^2) 0.28 cm^2 09/29/20 1146   Post-Procedure Volume (cm^3) 0.03 cm^3 09/29/20 1146   Distance Tunneling (cm) 0 cm 09/29/20 1131   Tunneling Position ___ O'Clock 0 09/29/20 1131   Undermining Starts ___ O'Clock 0 09/29/20 1131   Undermining Ends___ O'Clock 0 09/29/20 1131   Undermining Maxium Distance (cm) 0 09/29/20 1131   Wound Assessment Red;Purple 09/29/20 1131   Drainage Amount Moderate 09/29/20 1131   Drainage Description Serosanguinous 09/29/20 1131   Odor None 09/29/20 1131   Margins Attached edges 09/29/20 1131   Audelia-wound Assessment Intact 09/29/20 1131   Non-staged Wound Description Not applicable 53/54/90 1852   Pink%Wound Bed 10 09/29/20 1131   Red%Wound Bed 75 09/29/20 1131   Yellow%Wound Bed 15 09/29/20 1131   Black%Wound Bed 0 09/29/20 1131 Purple%Wound Bed 0 09/29/20 1131   Other%Wound Bed 0 09/29/20 1131   Number of days: 35           Procedure Note  Indications:  Based on my examination of this patient's wound(s)/ulcer(s) today, debridement is required to promote healing and evaluate the wound base. Performed by: Vannesa Hill MD    Consent obtained:  Yes    Time out taken:  Yes    Pain Control: Anesthetic  Anesthetic: 2% Lidocaine Gel Topical       Debridement: Excisional Debridement    Using curette the wound(s)/ulcer(s) was/were sharply debrided down through and including the removal of epidermis, dermis and subcutaneous tissue. Devitalized Tissue Debrided:  fibrin, biofilm and slough    Pre Debridement Measurements:  Are located in the Wound/Ulcer Documentation Flow Sheet    Wound/Ulcer #: 1    Post Debridement Measurements:  Wound/Ulcer Descriptions are Pre Debridement except measurements:          Percent of Wound(s)/Ulcer(s) Debrided: 100%    Total Surface Area Debrided:  0.28 sq cm     Diabetic/Pressure/Non Pressure Ulcers only:  Ulcer: Pressure ulcer, Stage 3     Estimated Blood Loss:  Minimal    Hemostasis Achieved:  by pressure    Response to treatment:  Well tolerated by patient. Plan:          Plan for wound - Dress per physician order  Treatment:     Compression : No   Offloading : Yes   Dressing : see AVS   Additional Therapy : none     1. Discussed appropriate home care of this wound. Wound redressed. 2. Written patient dismissal instructions given to patient and signed by patient or POA. 3. Follow up: 2-3 week(s). No acute events or changes. Wound in the sacral area is healing in. She had a scab on her ankle she wanted me to look at. There was no wound under this. Continue with offloading and current dressing to her bottom. Follow up in 2-3 weeks.      Electronically signed by Vannesa Hill MD on 9/29/2020 at 11:51 AM

## 2020-09-29 NOTE — PLAN OF CARE
Problem: Wound:  Goal: Will show signs of wound healing; wound closure and no evidence of infection  Description: Will show signs of wound healing; wound closure and no evidence of infection  Outcome: Ongoing     Problem: Pressure Ulcer:  Goal: Signs of wound healing will improve  Description: Signs of wound healing will improve  Outcome: Ongoing  Goal: Absence of new pressure ulcer  Description: Absence of new pressure ulcer  Outcome: Ongoing  Goal: Will show no infection signs and symptoms  Description: Will show no infection signs and symptoms  Outcome: Ongoing     Problem: Smoking cessation:  Goal: Ability to formulate a plan to maintain a tobacco-free life will be supported  Description: Ability to formulate a plan to maintain a tobacco-free life will be supported  Outcome: Ongoing

## 2020-10-01 ENCOUNTER — LAB (OUTPATIENT)
Dept: LAB | Facility: HOSPITAL | Age: 50
End: 2020-10-01

## 2020-10-01 DIAGNOSIS — Z01.818 PREOPERATIVE TESTING: ICD-10-CM

## 2020-10-01 PROCEDURE — U0003 INFECTIOUS AGENT DETECTION BY NUCLEIC ACID (DNA OR RNA); SEVERE ACUTE RESPIRATORY SYNDROME CORONAVIRUS 2 (SARS-COV-2) (CORONAVIRUS DISEASE [COVID-19]), AMPLIFIED PROBE TECHNIQUE, MAKING USE OF HIGH THROUGHPUT TECHNOLOGIES AS DESCRIBED BY CMS-2020-01-R: HCPCS

## 2020-10-01 PROCEDURE — C9803 HOPD COVID-19 SPEC COLLECT: HCPCS

## 2020-10-02 LAB
COVID LABCORP PRIORITY: NORMAL
SARS-COV-2 RNA RESP QL NAA+PROBE: NOT DETECTED

## 2020-10-13 ENCOUNTER — NURSE ONLY (OUTPATIENT)
Dept: INTERNAL MEDICINE | Age: 50
End: 2020-10-13
Payer: MEDICARE

## 2020-10-13 ENCOUNTER — HOSPITAL ENCOUNTER (OUTPATIENT)
Dept: WOUND CARE | Age: 50
Discharge: HOME | End: 2020-10-13
Payer: MEDICARE

## 2020-10-13 VITALS
WEIGHT: 151.24 LBS | TEMPERATURE: 97.4 F | BODY MASS INDEX: 22.4 KG/M2 | HEART RATE: 63 BPM | HEIGHT: 69 IN | SYSTOLIC BLOOD PRESSURE: 106 MMHG | RESPIRATION RATE: 16 BRPM | DIASTOLIC BLOOD PRESSURE: 68 MMHG

## 2020-10-13 PROCEDURE — G0008 ADMIN INFLUENZA VIRUS VAC: HCPCS | Performed by: INTERNAL MEDICINE

## 2020-10-13 PROCEDURE — 99211 OFF/OP EST MAY X REQ PHY/QHP: CPT | Performed by: SURGERY

## 2020-10-13 PROCEDURE — 99212 OFFICE O/P EST SF 10 MIN: CPT

## 2020-10-13 PROCEDURE — 90686 IIV4 VACC NO PRSV 0.5 ML IM: CPT | Performed by: INTERNAL MEDICINE

## 2020-10-13 NOTE — PROGRESS NOTES
This Visit     Venous stasis ulcer of left lower extremity (HCC) (Chronic)    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 layer exposed ([address]    S/P transmetatarsal amputation of foot, left (HCC)          The patients pain isPain Level: 0  . Wound(s) is healed. Please refer to nursing measurements and assessment regarding wound pre and post debridement. Wound 08/25/20 Coccyx WOUND 1 Coccyx Pressure Stage 3 (Active)   Wound Image   10/13/20 1142   Wound Etiology Pressure Stage  3 10/13/20 1142   Wound Cleansed Soap and water 10/13/20 1142   Dressing/Treatment ABD; Medipore 09/08/20 1135   Wound Length (cm) 0.1 cm 10/13/20 1142   Wound Width (cm) 0.1 cm 10/13/20 1142   Wound Depth (cm) 0.1 cm 10/13/20 1142   Wound Surface Area (cm^2) 0.01 cm^2 10/13/20 1142   Change in Wound Size % (l*w) 98.75 10/13/20 1142   Wound Volume (cm^3) 0 cm^3 10/13/20 1142   Wound Healing % 100 10/13/20 1142   Post-Procedure Length (cm) 0 cm 10/13/20 1143   Post-Procedure Width (cm) 0 cm 10/13/20 1143   Post-Procedure Depth (cm) 0 cm 10/13/20 1143   Post-Procedure Surface Area (cm^2) 0 cm^2 10/13/20 1143   Post-Procedure Volume (cm^3) 0 cm^3 10/13/20 1143   Distance Tunneling (cm) 0 cm 10/13/20 1142   Tunneling Position ___ O'Clock 0 10/13/20 1142   Undermining Starts ___ O'Clock 0 10/13/20 1142   Undermining Ends___ O'Clock 0 10/13/20 1142   Undermining Maxium Distance (cm) 0 10/13/20 1142   Wound Assessment Hyper granulation tissue 10/13/20 1142   Drainage Amount None 10/13/20 1142   Odor None 10/13/20 1142   Audelia-wound Assessment Intact 10/13/20 1142   Margins Attached edges 10/13/20 1142   Wound Thickness Description not for Pressure Injury Not applicable 46/15/29 7449   Number of days: 49         Plan:          Plan for wound - Dress per physician order  Treatment:     Compression : No   Offloading : Yes   Dressing : see AVS   Additional Therapy : none     1. Discussed appropriate home care of this wound. Wound redressed. 2. Written patient dismissal instructions given to patient and signed by patient or POA. 3. Follow up: as needed    Ms. Baker's wound is healed. Discussed the need for her to continue offloading and using a barrier cream to protect her skin. Follow up in wound care as needed.     Electronically signed by Gisele Palma MD on 10/13/2020 at 12:09 PM

## 2020-10-13 NOTE — PLAN OF CARE
Problem: Wound:  Goal: Will show signs of wound healing; wound closure and no evidence of infection  Description: Will show signs of wound healing; wound closure and no evidence of infection  Outcome: Completed     Problem: Pressure Ulcer:  Goal: Signs of wound healing will improve  Description: Signs of wound healing will improve  Outcome: Completed  Goal: Absence of new pressure ulcer  Description: Absence of new pressure ulcer  Outcome: Completed  Goal: Will show no infection signs and symptoms  Description: Will show no infection signs and symptoms  Outcome: Completed     Problem: Smoking cessation:  Goal: Ability to formulate a plan to maintain a tobacco-free life will be supported  Description: Ability to formulate a plan to maintain a tobacco-free life will be supported  Outcome: Completed

## 2020-11-03 RX ORDER — HYDROCODONE BITARTRATE AND ACETAMINOPHEN 10; 325 MG/1; MG/1
TABLET ORAL
Qty: 90 TABLET | Refills: 0 | Status: SHIPPED | OUTPATIENT
Start: 2020-11-03 | End: 2020-12-01

## 2020-11-03 RX ORDER — METHYLPHENIDATE HYDROCHLORIDE 20 MG/1
TABLET ORAL
Qty: 30 TABLET | Refills: 0 | Status: SHIPPED | OUTPATIENT
Start: 2020-11-03 | End: 2020-12-01

## 2020-11-03 NOTE — TELEPHONE ENCOUNTER
Requested Prescriptions     Pending Prescriptions Disp Refills    methylphenidate (RITALIN) 20 MG tablet [Pharmacy Med Name: METHYLPHENIDAT 20MG TAB DD] 30 tablet 0     Sig: TAKE 1 TABLET BY MOUTH DAILY    HYDROcodone-acetaminophen ([facility])  MG per tablet [Pharmacy Med Name: HYDROCOD-APAP  MG] 90 tablet 0     Sig: TAKE 1 TABLET BY MOUTH 3 TIMES A DAY AS NEEDED FOR PAIN REDUCE DOSES T RAIZA AS PAIN BECOMES MANAGEABLE       Last Office Visit:  9/10/2020  Next Office Visit:  12/10/2020  Last Medication Refill:  [facility] [facility] - 10/3/20   Ritalin -10/4/20  Jim up to date:  9/4/20    *RX updated to reflect   Norco- 11/3/20 for both  fill date*

## 2020-12-01 ENCOUNTER — OFFICE VISIT (OUTPATIENT)
Dept: INTERNAL MEDICINE | Age: 50
End: 2020-12-01
Payer: MEDICARE

## 2020-12-01 ENCOUNTER — TELEPHONE (OUTPATIENT)
Dept: INTERNAL MEDICINE | Age: 50
End: 2020-12-01

## 2020-12-01 VITALS
OXYGEN SATURATION: 98 % | SYSTOLIC BLOOD PRESSURE: 106 MMHG | DIASTOLIC BLOOD PRESSURE: 70 MMHG | WEIGHT: 151 LBS | BODY MASS INDEX: 22.36 KG/M2 | HEART RATE: 70 BPM | HEIGHT: 69 IN

## 2020-12-01 DIAGNOSIS — R53.83 OTHER FATIGUE: ICD-10-CM

## 2020-12-01 DIAGNOSIS — E78.5 HYPERLIPIDEMIA, UNSPECIFIED HYPERLIPIDEMIA TYPE: ICD-10-CM

## 2020-12-01 DIAGNOSIS — R73.9 HYPERGLYCEMIA: ICD-10-CM

## 2020-12-01 DIAGNOSIS — E55.9 VITAMIN D DEFICIENCY: ICD-10-CM

## 2020-12-01 LAB
ALBUMIN SERPL-MCNC: 4.1 G/DL (ref 3.5–5.2)
ALP BLD-CCNC: 94 U/L (ref 35–104)
ALT SERPL-CCNC: 16 U/L (ref 5–33)
ANION GAP SERPL CALCULATED.3IONS-SCNC: 9 MMOL/L (ref 7–19)
AST SERPL-CCNC: 13 U/L (ref 5–32)
BASOPHILS ABSOLUTE: 0 K/UL (ref 0–0.2)
BASOPHILS RELATIVE PERCENT: 0.4 % (ref 0–1)
BILIRUB SERPL-MCNC: <0.2 MG/DL (ref 0.2–1.2)
BUN BLDV-MCNC: 14 MG/DL (ref 6–20)
CALCIUM SERPL-MCNC: 9 MG/DL (ref 8.6–10)
CHLORIDE BLD-SCNC: 111 MMOL/L (ref 98–111)
CHOLESTEROL, TOTAL: 144 MG/DL (ref 160–199)
CO2: 25 MMOL/L (ref 22–29)
CREAT SERPL-MCNC: 0.3 MG/DL (ref 0.5–0.9)
EOSINOPHILS ABSOLUTE: 0.2 K/UL (ref 0–0.6)
EOSINOPHILS RELATIVE PERCENT: 3.8 % (ref 0–5)
GFR AFRICAN AMERICAN: >59
GFR NON-AFRICAN AMERICAN: >60
GLUCOSE BLD-MCNC: 102 MG/DL (ref 74–109)
HBA1C MFR BLD: 5.1 % (ref 4–6)
HCT VFR BLD CALC: 40.8 % (ref 37–47)
HDLC SERPL-MCNC: 38 MG/DL (ref 65–121)
HEMOGLOBIN: 13.3 G/DL (ref 12–16)
IMMATURE GRANULOCYTES #: 0 K/UL
LDL CHOLESTEROL CALCULATED: 76 MG/DL
LYMPHOCYTES ABSOLUTE: 1.2 K/UL (ref 1.1–4.5)
LYMPHOCYTES RELATIVE PERCENT: 26.5 % (ref 20–40)
MCH RBC QN AUTO: 27.9 PG (ref 27–31)
MCHC RBC AUTO-ENTMCNC: 32.6 G/DL (ref 33–37)
MCV RBC AUTO: 85.7 FL (ref 81–99)
MONOCYTES ABSOLUTE: 0.3 K/UL (ref 0–0.9)
MONOCYTES RELATIVE PERCENT: 5.8 % (ref 0–10)
NEUTROPHILS ABSOLUTE: 3 K/UL (ref 1.5–7.5)
NEUTROPHILS RELATIVE PERCENT: 63.3 % (ref 50–65)
PDW BLD-RTO: 13.2 % (ref 11.5–14.5)
PLATELET # BLD: 197 K/UL (ref 130–400)
PMV BLD AUTO: 11.4 FL (ref 9.4–12.3)
POTASSIUM SERPL-SCNC: 4.1 MMOL/L (ref 3.5–5)
RBC # BLD: 4.76 M/UL (ref 4.2–5.4)
SODIUM BLD-SCNC: 145 MMOL/L (ref 136–145)
TOTAL PROTEIN: 6.2 G/DL (ref 6.6–8.7)
TRIGL SERPL-MCNC: 152 MG/DL (ref 0–149)
TSH SERPL DL<=0.05 MIU/L-ACNC: 3.07 UIU/ML (ref 0.27–4.2)
VITAMIN D 25-HYDROXY: 31.4 NG/ML
WBC # BLD: 4.7 K/UL (ref 4.8–10.8)

## 2020-12-01 PROCEDURE — 99214 OFFICE O/P EST MOD 30 MIN: CPT | Performed by: INTERNAL MEDICINE

## 2020-12-01 PROCEDURE — G8482 FLU IMMUNIZE ORDER/ADMIN: HCPCS | Performed by: INTERNAL MEDICINE

## 2020-12-01 PROCEDURE — G8427 DOCREV CUR MEDS BY ELIG CLIN: HCPCS | Performed by: INTERNAL MEDICINE

## 2020-12-01 PROCEDURE — 1036F TOBACCO NON-USER: CPT | Performed by: INTERNAL MEDICINE

## 2020-12-01 PROCEDURE — G8420 CALC BMI NORM PARAMETERS: HCPCS | Performed by: INTERNAL MEDICINE

## 2020-12-01 RX ORDER — METHYLPHENIDATE HYDROCHLORIDE 20 MG/1
TABLET ORAL
Qty: 30 TABLET | Refills: 0 | Status: SHIPPED | OUTPATIENT
Start: 2020-12-03 | End: 2020-12-31

## 2020-12-01 RX ORDER — HYDROCODONE BITARTRATE AND ACETAMINOPHEN 10; 325 MG/1; MG/1
TABLET ORAL
Qty: 90 TABLET | Refills: 0 | Status: SHIPPED | OUTPATIENT
Start: 2020-12-03 | End: 2020-12-31

## 2020-12-01 RX ORDER — PILOCARPINE HYDROCHLORIDE 7.5 MG/1
TABLET, FILM COATED ORAL
Qty: 90 TABLET | Refills: 5 | Status: SHIPPED | OUTPATIENT
Start: 2020-12-01 | End: 2021-07-02

## 2020-12-01 RX ORDER — NITROFURANTOIN MACROCRYSTALS 50 MG/1
50 CAPSULE ORAL DAILY
Qty: 30 CAPSULE | Refills: 5 | Status: SHIPPED | OUTPATIENT
Start: 2020-12-01 | End: 2021-01-11

## 2020-12-01 RX ORDER — PROMETHAZINE HYDROCHLORIDE 25 MG/1
25 TABLET ORAL 3 TIMES DAILY PRN
Qty: 12 TABLET | Refills: 0 | Status: SHIPPED | OUTPATIENT
Start: 2020-12-01 | End: 2021-02-01

## 2020-12-01 RX ORDER — VARENICLINE TARTRATE
KIT
Qty: 1 BOX | Refills: 0 | Status: SHIPPED | OUTPATIENT
Start: 2020-12-01 | End: 2021-01-11

## 2020-12-01 NOTE — PROGRESS NOTES
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 related to ms    Sepsis ([address] 8/19/2016    Weakness 8/4/2016      Past Surgical History:   Procedure Laterality Date    BACLOFEN PUMP IMPLANTATION      COLONOSCOPY N/A 9/27/2019    Dr Laura Guallpa ileum through ostomy-patent and healthy appearing anastomosis in the right colon-entero colonic anastomosis-10 yr recall    COLOSTOMY     [facility]      Right    FOOT AMPUTATION Left 4/4/2019    LEFT TRANSMET AMPUTATION performed by Erin Jaimes MD at [address]      urostomy    OTHER SURGICAL HISTORY      pain pump    UT INSJ PRPH CTR VAD W/SUBQ PORT UNDER 5 YR N/A 6/26/2018    SINGLE LUMEN PORT PLACEMENT WITH FLUORO performed by Ian Cortes MD at [facility] TOE AMPUTATION      L last toe    TONSILLECTOMY      URETEROTOMY        Social History     Socioeconomic History    Marital status:      Spouse name: Not on file    Number of children: Not on file    Years of education: Not on file    Highest education level: Not on file   Occupational History    Not on file   Social Needs    Financial resource strain: Not on file    Food insecurity     Worry: Not on file     Inability: Not on file    Transportation needs     Medical: Not on file     Non-medical: Not on file   Tobacco Use    Smoking status: Former Smoker     Packs/day: 0.00     Types: Cigarettes     Last attempt to quit: 1/8/2018     Years since quittin.8    Smokeless tobacco: Never Used    Tobacco comment: hasn't smoked since 21   Substance and Sexual Activity    Alcohol use: Yes     Comment: yaritza    Drug use: Yes     Types: Marijuana     Comment: daily     Sexual activity: Not on file   Lifestyle    Physical activity     Days per week: Not on file     Minutes per session: Not on file    Stress: Not on file   Relationships    Social connections     Talks on phone: Not on file     Gets together: Not on file     Attends Sabianist service: Not on file     Active member of club or organization: Not on file     Attends meetings of clubs or organizations: Not on file     Relationship status: Not on file    Intimate partner violence     Fear of current or ex partner: Not on file     Emotionally abused: Not on file     Physically abused: Not on file     Forced sexual activity: Not on file   Other Topics Concern    Not on file   Social History Narrative    Not on file      Family History   Problem Relation Age of Onset    Cancer Mother         breast    Colon Cancer Mother     Colon Polyps Mother     Diabetes Father     High Blood Pressure Father     Cancer Paternal Aunt         breast    Liver Cancer Paternal Aunt     Heart Disease Paternal Grandmother     Esophageal Cancer Neg Hx     Liver Disease Neg Hx     Rectal Cancer Neg Hx     Stomach Cancer Neg Hx         Current Outpatient Medications   Medication Sig Dispense Refill    varenicline (CHANTIX STARTING MONTH ) 0.5 MG X 11 & 1 MG X 42 tablet Take by mouth.  1 box 0    pilocarpine (SALAGEN) 7.5 MG tablet 1 tablet three times daily 90 tablet 5    nitrofurantoin (MACRODANTIN) 50 MG capsule Take 1 capsule by mouth daily 30 capsule 5    promethazine (PHENERGAN) 25 MG tablet Take 1 tablet by mouth 3 times daily as needed for Nausea 12 tablet 0    methylphenidate (RITALIN) 20 MG tablet TAKE 1 TABLET BY MOUTH DAILY 30 tablet 0    HYDROcodone-acetaminophen (NORCO)  MG per tablet TAKE 1 TABLET BY MOUTH 3 TIMES A DAY AS NEEDED FOR PAIN REDUCE DOSES T AKEN AS PAIN BECOMES MANAGEABLE 90 tablet 0    tiZANidine (ZANAFLEX) 4 MG tablet TAKE 3 TABLETS TWICE DAILY AS NEEDED 180 tablet 11    azelastine (ASTELIN) 0.1 % nasal spray USE 2 SPRAYS IN EACH NOSTRIL TWICE DAILY AS DIRECTED 30 mL 0    hydroCHLOROthiazide (HYDRODIURIL) 25 MG tablet TAKE 1 TABLET BY MOUTH ONCE DAILY AS NEEDED 30 tablet 0    cetirizine (ZYRTEC) 10 MG tablet Take 10 mg by mouth daily      levETIRAcetam (KEPPRA) 500 MG tablet Take 1 tablet by mouth nightly 90 tablet 3    Catheters MISC Catheter supplies and lubricant 5 times daily G82.20  to [facility] [phone] 450 each 3    levETIRAcetam (KEPPRA) 500 MG tablet Take 1 tablet by mouth nightly 30 tablet 5    glucose monitoring kit (FREESTYLE) monitoring kit 1 kit by Does not apply route daily 1 kit 0    blood glucose monitor strips Test 1 times a day & as needed for symptoms of irregular blood glucose. 100 strip 3    DULoxetine (CYMBALTA) 30 MG extended release capsule Take 1 capsule by mouth daily 30 capsule 11    docusate sodium (COLACE) 100 MG capsule Take 200 mg by mouth as needed for Constipation      PROAIR  (90 Base) MCG/ACT inhaler INHALE 1 PUFF INTO THE LUNGS EVERY 6 HOURS AS NEEDED FOR WHEEZING OR SHORTNESS OF BREATH 8.5 g 5    Diapers & Supplies MISC Diapers, chucks, wipes, and gloves for incontinence. [phone] each 11    Omega-3 Fatty Acids (OMEGA-3 FISH OIL PO) Take by mouth daily       Heparin Lock Flush (HEPARIN FLUSH, 100 UNITS/ML,) 100 UNIT/ML injection 3 mLs by Intercatheter route every 30 days No every 30 days but every 4-6 weeks.  Flush port with 300 units heparin with 20 cc normal saline for ocrevus infusion for Multiple sclerosis and NS 20CC 1 Syringe 5    ipratropium-albuterol (DUONEB) 0.5-2.5 (3) MG/3ML SOLN nebulizer solution USE 1 UNIT DOSE IN NEBULIZER EVERY 4 TO 6 HOURS AS NEEDED. 450 mL 11    BACLOFEN, PAIN PUMP REFILL CHARGE, by Implant route continuous Indications: every 3 months      b complex vitamins capsule Take 1 capsule by mouth daily      Multiple Vitamins-Minerals (THERAPEUTIC MULTIVITAMIN-MINERALS) tablet Take 1 tablet by mouth daily      Multiple Vitamins-Minerals (HAIR SKIN & NAILS ADVANCED PO) Take 1 tablet by mouth daily      glucose monitoring kit (FREESTYLE) monitoring kit 1 kit by Does not apply route daily 1 kit 0    blood glucose monitor strips Test 1 times a day & as needed for symptoms of irregular blood glucose. 100 strip 0    baclofen (LIORESAL) 10 MG tablet Take 1 tablet by mouth 2 times daily as needed (muscle spasms) 60 tablet 5    Sodium Chloride Flush (SALINE FLUSH) 0.9 % SOLN Infuse 20 mLs intravenously every 30 days Not every 30 days but every 4-6 weeks as need with 300 units of heparin to flush port for Infusion of Ocrevus for multiple sclerosis 20 mL 5    lactulose (CHRONULAC) 10 GM/15ML solution Take 30 mLs by mouth 3 times daily (Patient taking differently: Take 20 g by mouth 2 times daily as needed ) 1 Bottle 5    pregabalin (LYRICA) 300 MG capsule TAKE 1 CAPSULE BY MOUTH 2 TIMES A DAY 60 capsule 5     No current facility-administered medications for this visit.          Patient Active Problem List   Diagnosis    Muscle spasticity    Peripheral vascular disease ([address]    MS (multiple sclerosis) (HCC)    Pain in both upper arms    Numbness    Other fatigue    Weakness    Chronic pain    Hx of seizure disorder    Insomnia    Decubitus ulcer of sacral region    Pain, neck    Venous stasis ulcer of left lower extremity ([address]    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 layer exposed ([address]    S/P transmetatarsal amputation of foot, left (HCC)    Chronic constipation    Colostomy in place [facility])    Surgical wound breakdown    Pressure [facility]ral region, stage 4 [facility]    Paraplegia ([facility])    Arthralgia of hip    Neuropathic foot ulcer, left, limited to breakdown of skin ([address]    Seizure ([address]    Type 2 diabetes mellitus without complication ([address]    Smoking        Review of Systems   Constitutional: Positive for fatigue. Negative for activity change, appetite change, fever and unexpected weight change. HENT: Positive for ear pain. Negative for congestion, postnasal drip, rhinorrhea, sinus pressure, sore throat and tinnitus. Feels Like she may have a wax buildup in her ear. Eyes: Negative for photophobia, pain, discharge, redness, itching and visual disturbance. Respiratory: Negative for cough, chest tightness, shortness of breath and wheezing. Cardiovascular: Negative for chest pain, palpitations and leg swelling. Gastrointestinal: Negative for abdominal distention, abdominal pain, blood in stool, constipation, diarrhea, nausea and vomiting. Urinary and stool incontinence. Endocrine: Negative for cold intolerance, heat intolerance, polydipsia and polyuria. Genitourinary: Negative for decreased urine volume, difficulty urinating, dysuria, flank pain, frequency, hematuria and urgency. Musculoskeletal:        She has chronic back pain and muscle spasms. She is wheelchair-bound secondary to history of multiple sclerosis. Skin: Negative for color change, pallor, rash and wound. Allergic/Immunologic: Positive for environmental allergies. Negative for food allergies and immunocompromised state. Neurological: Negative for dizziness, seizures, syncope, speech difficulty, weakness, numbness and headaches. Occasionally gets sinus headaches   Hematological: Negative for adenopathy. Does not bruise/bleed easily. Psychiatric/Behavioral: Positive for dysphoric mood. Negative for agitation, confusion and decreased concentration. The patient is nervous/anxious. The patient is not hyperactive.          Anxiety and depression are stable       /70   Pulse 70   Ht 5' 9\" (1.753 m)   Wt 151 lb (68.5 kg)   SpO2 98%   BMI 22.30 kg/m²   Physical Exam  Vitals signs and nursing note reviewed. Constitutional:       General: She is not in acute distress. Appearance: Normal appearance. She is well-developed and normal weight. She is not ill-appearing, toxic-appearing or diaphoretic. HENT:      Head: Normocephalic and atraumatic. Right Ear: External ear normal. There is impacted cerumen. Left Ear: Tympanic membrane, ear canal and external ear normal. There is no impacted cerumen. Ears:      Comments: Minimal amount of cerumen in the right tympanic membrane. Ear lavage done today     Nose: Nose normal. No congestion or rhinorrhea. Mouth/Throat:      Mouth: Mucous membranes are moist.      Pharynx: Oropharynx is clear. No oropharyngeal exudate or posterior oropharyngeal erythema. Eyes:      General: No scleral icterus. Right eye: No discharge. Left eye: No discharge. Extraocular Movements: Extraocular movements intact. Conjunctiva/sclera: Conjunctivae normal.      Pupils: Pupils are equal, round, and reactive to light. Neck:      Musculoskeletal: Normal range of motion and neck supple. No neck rigidity or muscular tenderness. Thyroid: No thyromegaly. Vascular: No carotid bruit or JVD. Trachea: No tracheal deviation. Cardiovascular:      Rate and Rhythm: Normal rate and regular rhythm. Pulses: Normal pulses. Heart sounds: Normal heart sounds. No murmur. No friction rub. No gallop. Pulmonary:      Effort: Pulmonary effort is normal. No respiratory distress. Breath sounds: Normal breath sounds. No stridor. No wheezing, rhonchi or rales. Chest:      Chest wall: No tenderness. Abdominal:      General: Bowel sounds are normal. There is no distension. Palpations: Abdomen is soft. There is no mass. Tenderness: There is no abdominal tenderness. There is no right CVA tenderness, left CVA tenderness, guarding or rebound. Hernia: No hernia is present. Comments: Ostomy bags are intact   Musculoskeletal: Normal range of motion. General: No swelling, tenderness, deformity or signs of injury. Right lower leg: No edema. Left lower leg: No edema. Comments: She is wheelchair bound. She does have contractures of both of her feet. Lymphadenopathy:      Cervical: No cervical adenopathy. Skin:     General: Skin is warm and dry. Capillary Refill: Capillary refill takes less than 2 seconds. Coloration: Skin is not jaundiced or pale. Findings: No bruising, erythema or lesion. Neurological:      General: No focal deficit present. Mental Status: She is alert and oriented to person, place, and time. Mental status is at baseline. Cranial Nerves: No cranial nerve deficit. Motor: No weakness or abnormal muscle tone. Coordination: Coordination normal.      Gait: Gait normal.      Deep Tendon Reflexes: Reflexes are normal and symmetric. Reflexes normal.   Psychiatric:         Mood and Affect: Mood normal.         Behavior: Behavior normal.         Thought Content: Thought content normal.         Judgment: Judgment normal.         No diagnosis found. ASSESSMENT/PLAN:    46-year-old woman here for follow-up    1. Hypertension: Blood pressure well controlled on current medication regimen. Labs are pending. 2.  Chronic nausea: Discontinue Zofran and try Phenergan    3. Cerumen impaction: Very minimal amount of cerumen noted. Ear lavage done today    4. Dry mouth: Continue Salagen as prescribed    5. Smoking cessation: Continue Chantix    6. Frequent UTIs: Refill Macrodantin    7. Multiple sclerosis/spasticity: Stable on current medication regimen    8. Environmental allergies: Continue medications as prescribed    9. Seizure disorder: Continue Keppra as prescribed. No recent seizure activity noted    10. Hyperglycemia: Check A1c today.     11.  Depression: Continue Cymbalta as prescribed    12. Mild intermittent asthma: Stable    13. Daytime somnolence: Continue Ritalin as prescribed by Dr. Deng Zheng    14. Hyperlipidemia: Check lipid panel today    15 vitamin D deficiency: Check vitamin D level with next lab draw    There are no diagnoses linked to this encounter. No follow-ups on file. No orders of the defined types were placed in this encounter.       Jeff Jurado MD

## 2020-12-01 NOTE — TELEPHONE ENCOUNTER
Requested Prescriptions     Pending Prescriptions Disp Refills    methylphenidate (RITALIN) 20 MG tablet [Pharmacy Med Name: METHYLPHENIDAT 20MG TAB DD] 30 tablet 0     Sig: TAKE 1 TABLET BY MOUTH DAILY    HYDROcodone-acetaminophen (Cherylene Loveless)  MG per tablet [Pharmacy Med Name: HYDROCOD-APAP  MG] 90 tablet 0     Sig: TAKE 1 TABLET BY MOUTH 3 TIMES DAILY AS NEEDED FOR PAIN.  REDUCE DOSESAS PAIN BECOMES MANAGEABLE       Last Office Visit:  9/10/2020  Next Office Visit:  12/10/2020  Last Medication Refill:  Both filled on 11/3/20  Lukas Thompson up to date:  9/4/20    *RX updated to reflect 12/3/20 for both  fill date*

## 2020-12-01 NOTE — PATIENT INSTRUCTIONS
Patient Education        varenicline  Pronunciation:  adrianne Morales Lesser  Brand:  Chantix  What is the most important information I should know about varenicline? When you stop smoking, you may have nicotine withdrawal symptoms with or without using medication such as varenicline. This includes feeling restless, depressed, angry, frustrated, or irritated. Stop taking varenicline and call your doctor if you have if you feel depressed, agitated, hostile, aggressive, or have thoughts about suicide or hurting yourself. Do not drink large amounts alcohol. Varenicline can increase the effects of alcohol or change the way you react to it. What is varenicline? Varenicline is a smoking cessation medicine. It is used together with behavior modification and counseling support to help you stop smoking. Varenicline may also be used for purposes not listed in this medication guide. What should I discuss with my health care provider before taking varenicline? You should not use varenicline if you used it in the past and had:  · a serious allergic reaction --trouble breathing, swelling in your face (lips, tongue, throat) or neck; or  · a serious skin reaction --blisters in your mouth, peeling skin rash. Tell your doctor if you have ever had:  · depression or mental illness;  · a seizure;  · kidney disease (or if you are on dialysis);  · heart or blood vessel problems; or  · if you drink alcohol. Tell your doctor if you are pregnant. It is not known whether varenicline will harm an unborn baby if you use the medicine during pregnancy. However, smoking while you are pregnant can harm the unborn baby or cause birth defects. If you breast-feed while using this medicine, your baby may spit up or vomit more than normal, and may have a seizure. Varenicline is not approved for use by anyone younger than 25years old. How should I take varenicline?   Follow all directions on your prescription label and read all medication guides or instruction sheets. Use the medicine exactly as directed. When you first start taking varenicline, you will take a low dose and then gradually increase it over the first several days of treatment. Take varenicline regularly to get the most benefit. You may choose from 3 ways to use varenicline. Ask your doctor which method is best for you:  · Set a date to quit smoking and start taking varenicline 1 week before that date. Make sure to quit smoking on your planned quit date. Take varenicline for a total of 12 weeks. · Start taking varenicline before you set a planned quit date, and choose a quit date that is between 8 and 35 days after you start treatment. Take varenicline for a total of 12 weeks. · Start taking varenicline and gradually reduce the number of cigarettes you smoke each day over a 12-week period, until you no longer smoke at all. Then take varenicline for another 12 weeks, for a total of 24 weeks. Take varenicline after eating. Take the medicine with a full glass of water. When you stop smoking, you may have nicotine withdrawal symptoms with or without using medication such as varenicline. Withdrawal symptoms include: increased appetite, weight gain, trouble sleeping, slower heart rate, feeling anxious or restless, and having the urge to smoke. Smoking cessation may also cause new or worsening mental health problems, such as depression. Stop taking varenicline and call your doctor if you have if you feel depressed, agitated, hostile, aggressive, or have thoughts about suicide or hurting yourself. Store at room temperature away from moisture and heat. What happens if I miss a dose? Take the medicine as soon as you can, but skip the missed dose if it is almost time for your next dose. Do not take two doses at one time. Get your prescription refilled before you run out of medicine completely. What happens if I overdose?   Seek emergency medical attention or call the Poison Help line at 1-651.118.2800. What should I avoid while taking varenicline? Do not drink large amounts alcohol while taking this medicine. Varenicline can increase the effects of alcohol or change the way you react to it. Some people taking varenicline have had unusual or aggressive behavior or forgetfulness while drinking alcohol. Do not use other medicines to quit smoking, unless your doctor tells you to. Using varenicline while wearing a nicotine patch can cause unpleasant side effects. Avoid driving or hazardous activity until you know how this medicine will affect you. Your reactions could be impaired. What are the possible side effects of varenicline? Get emergency medical help if you have signs of an allergic reaction (hives, difficult breathing, swelling in your face or throat) or a severe skin reaction (fever, sore throat, burning eyes, skin pain, red or purple skin rash with blistering and peeling). Stop using varenicline and call your doctor at once if you have:  · a seizure (convulsions);  · thoughts about suicide or hurting yourself;  · strange dreams, sleepwalking, trouble sleeping;  · new or worsening mental health problems --mood or behavior changes, depression, agitation, hostility, aggression;  · heart attack symptoms --chest pain or pressure, pain spreading to your jaw or shoulder, nausea, sweating; o  · stroke symptoms --sudden numbness or weakness (especially on one side of the body), slurred speech, problems with vision or balance. Your family or other caregivers should also be alert to changes in your mood or behavior. Common side effects may include:  · nausea (may persist for several months), vomiting;  · constipation, gas;  · sleep problems (insomnia); or  · unusual dreams. This is not a complete list of side effects and others may occur. Call your doctor for medical advice about side effects. You may report side effects to FDA at 3-888-VNR-7547.   What other drugs will affect varenicline? After you stop smoking, your doctor may need to adjust the doses of certain medicines you take on a regular basis. Tell your doctor about all your current medicines and any medicine you start or stop using. This includes prescription and over-the-counter medicines, vitamins, and herbal products. Not all possible interactions are listed here. Where can I get more information? Your pharmacist can provide more information about varenicline. Remember, keep this and all other medicines out of the reach of children, never share your medicines with others, and use this medication only for the indication prescribed. Every effort has been made to ensure that the information provided by [facility] is accurate, up-to-date, and complete, but no guarantee is made to that effect. Drug information contained herein may be time sensitive. [facility] information has been compiled for use by healthcare practitioners and consumers in the [place] and therefore [facility] does not warrant that uses outside of the [place] are appropriate, unless specifically indicated otherwise. [facility]'s drug information does not endorse drugs, diagnose patients or recommend therapy. [facility] drug information is an informational resource designed to assist licensed healthcare practitioners in caring for their patients and/or to serve consumers viewing this service as a supplement to, and not a substitute for, the expertise, skill, knowledge and judgment of healthcare practitioners. The absence of a warning for a given drug or drug combination in no way should be construed to indicate that the drug or drug combination is safe, effective or appropriate for any given patient. [facility] does not assume any responsibility for any aspect of healthcare administered with the aid of information [facility] provides.  The information contained herein is not intended to cover all possible uses, directions, precautions, warnings, drug interactions, allergic reactions, or adverse effects. If you have questions about the drugs you are taking, check with your doctor, nurse or pharmacist.  Copyright 6483-0216 [address]. Version: 10.01. Revision date: 8/8/2018. Care instructions adapted under license by [facility] ([facility]). If you have questions about a medical condition or this instruction, always ask your healthcare professional. [place] [address] any warranty or liability for your use of this information.

## 2020-12-01 NOTE — TELEPHONE ENCOUNTER
Pt stated as she was leaving the office that she was diagnosed with TMJ and they gave her Biox (not sure if this is correct) I could not find it in the pt past medication list. She is wanting to know if there is something you can send her in place of this since they don't make it anymore.

## 2020-12-02 ASSESSMENT — ENCOUNTER SYMPTOMS
CHEST TIGHTNESS: 0
DIARRHEA: 0
ABDOMINAL PAIN: 0
NAUSEA: 0
ABDOMINAL DISTENTION: 0
VOMITING: 0
WHEEZING: 0
PHOTOPHOBIA: 0
EYE DISCHARGE: 0
SINUS PRESSURE: 0
EYE ITCHING: 0
CONSTIPATION: 0
SORE THROAT: 0
EYE PAIN: 0
RHINORRHEA: 0
EYE REDNESS: 0
COUGH: 0
SHORTNESS OF BREATH: 0
BLOOD IN STOOL: 0
COLOR CHANGE: 0

## 2020-12-08 ENCOUNTER — TELEPHONE (OUTPATIENT)
Dept: INTERNAL MEDICINE | Age: 50
End: 2020-12-08

## 2020-12-08 NOTE — TELEPHONE ENCOUNTER
Pt is needing an order for a port flush faxed to [facility] [address]. Dr. Samra Lynn had been the one ordering them, but the pt stated that she was told by Dr. Umm Narayanan office that she had to contact her PCP for this now.

## 2020-12-09 ENCOUNTER — TELEPHONE (OUTPATIENT)
Dept: INTERNAL MEDICINE | Age: 50
End: 2020-12-09

## 2020-12-09 NOTE — TELEPHONE ENCOUNTER
Detailed voicemail from Sima Bennett at [facility] sent to clinical staff.

## 2020-12-09 NOTE — TELEPHONE ENCOUNTER
Shy on call. Pt scheduled for port flush tomorrow at [facility]. Dr. Vivian Hogue had been writing the orders but is no longer writing them. I am not sure how to write those orders. See telephone encounter from yesterday aslo. They need orders faxed today to [phone] Please advise on directions for orders.

## 2020-12-10 ENCOUNTER — OFFICE VISIT (OUTPATIENT)
Dept: NEUROLOGY | Age: 50
End: 2020-12-10
Payer: MEDICARE

## 2020-12-10 ENCOUNTER — HOSPITAL ENCOUNTER (OUTPATIENT)
Dept: INFUSION THERAPY | Age: 50
Setting detail: INFUSION SERIES
Discharge: HOME | End: 2020-12-10
Payer: MEDICARE

## 2020-12-10 VITALS
WEIGHT: 151 LBS | HEIGHT: 69 IN | HEART RATE: 84 BPM | BODY MASS INDEX: 22.36 KG/M2 | SYSTOLIC BLOOD PRESSURE: 97 MMHG | DIASTOLIC BLOOD PRESSURE: 74 MMHG

## 2020-12-10 DIAGNOSIS — G35 MS (MULTIPLE SCLEROSIS) (HCC): Primary | ICD-10-CM

## 2020-12-10 DIAGNOSIS — Z45.2 ENCOUNTER FOR CARE RELATED TO VASCULAR ACCESS PORT: ICD-10-CM

## 2020-12-10 PROCEDURE — 99213 OFFICE O/P EST LOW 20 MIN: CPT | Performed by: PSYCHIATRY & NEUROLOGY

## 2020-12-10 PROCEDURE — 96523 IRRIG DRUG DELIVERY DEVICE: CPT

## 2020-12-10 PROCEDURE — 2580000003 HC RX 258: Performed by: INTERNAL MEDICINE

## 2020-12-10 PROCEDURE — G8420 CALC BMI NORM PARAMETERS: HCPCS | Performed by: PSYCHIATRY & NEUROLOGY

## 2020-12-10 PROCEDURE — G8482 FLU IMMUNIZE ORDER/ADMIN: HCPCS | Performed by: PSYCHIATRY & NEUROLOGY

## 2020-12-10 PROCEDURE — 1036F TOBACCO NON-USER: CPT | Performed by: PSYCHIATRY & NEUROLOGY

## 2020-12-10 PROCEDURE — 6360000002 HC RX W HCPCS

## 2020-12-10 PROCEDURE — G8427 DOCREV CUR MEDS BY ELIG CLIN: HCPCS | Performed by: PSYCHIATRY & NEUROLOGY

## 2020-12-10 RX ORDER — SODIUM CHLORIDE 0.9 % (FLUSH) 0.9 %
20 SYRINGE (ML) INJECTION PRN
Status: CANCELLED | OUTPATIENT
Start: 2020-12-10

## 2020-12-10 RX ORDER — HEPARIN SODIUM (PORCINE) LOCK FLUSH IV SOLN 100 UNIT/ML 100 UNIT/ML
SOLUTION INTRAVENOUS
Status: COMPLETED
Start: 2020-12-10 | End: 2020-12-10

## 2020-12-10 RX ORDER — SODIUM CHLORIDE 0.9 % (FLUSH) 0.9 %
20 SYRINGE (ML) INJECTION PRN
Status: DISCONTINUED | OUTPATIENT
Start: 2020-12-10 | End: 2020-12-11 | Stop reason: HOSPADM

## 2020-12-10 RX ORDER — HEPARIN SODIUM (PORCINE) LOCK FLUSH IV SOLN 100 UNIT/ML 100 UNIT/ML
500 SOLUTION INTRAVENOUS PRN
Status: DISCONTINUED | OUTPATIENT
Start: 2020-12-10 | End: 2020-12-11 | Stop reason: HOSPADM

## 2020-12-10 RX ORDER — HEPARIN SODIUM (PORCINE) LOCK FLUSH IV SOLN 100 UNIT/ML 100 UNIT/ML
500 SOLUTION INTRAVENOUS PRN
Status: CANCELLED | OUTPATIENT
Start: 2020-12-10

## 2020-12-10 RX ADMIN — SODIUM CHLORIDE, PRESERVATIVE FREE 20 ML: 5 INJECTION INTRAVENOUS at 15:27

## 2020-12-10 RX ADMIN — HEPARIN SODIUM (PORCINE) LOCK FLUSH IV SOLN 100 UNIT/ML 500 UNITS: 100 SOLUTION at 15:26

## 2020-12-10 RX ADMIN — HEPARIN 500 UNITS: 100 SYRINGE at 15:26

## 2020-12-10 NOTE — PROGRESS NOTES
Chief Complaint   Patient presents with    Multiple Sclerosis     3 month follow up, headaches are getting worse       Ramy Renteria is a 53y.o. year old female who is seen for evaluation of multiple sclerosis. The patient indicates that she was diagnosed with multiple sclerosis in 1994. At that time to develop some visual disturbances including blurred vision and double vision. Within eight months she was in a wheelchair. She currently has no movement of the lower extremities. She does have some increased Spasticity in the legs worse in the arms. She underwent a baclofen pump with Dr. Jared Gregg with some complications. She is doing better from this. She currently sees Dr. nichols for her pump management. She does have some cognitive issues. She denies diplopia, dysarthria, or dysphagia. She does have some incontinence of bladder. She does have pain in the hips and lower extremities. She also has headaches with pain behind both eyes. She was followed by Dr. Asaf Langley of neurology. She follows up today for evaluation. She is currently in a wheelchair. Since her last visit she is doing better with physical therapy. She had an accidental overdose with her baclofen pump and had a seizure. Doing better on. Recently admitted with seizure. Was off Chantix a few weeks and had a UTI. Had seizure in oct 2014 with seizure due to baclofen overose. This was her second seizure. On Keppra. no more seizures. Off Ocrevus. Occassionally gets shooting pain in head. Headache in the back of the head. Cymbalta helps mood.      Active Ambulatory Problems     Diagnosis Date Noted    Muscle spasticity     [facility] 02/01/2016    MS (multiple sclerosis) ([facility] 06/08/2016    Pain in both upper arms 08/04/2016    Numbness 08/04/2016    Other fatigue 08/04/2016    Weakness 08/04/2016    Chronic pain 08/19/2016    Hx of seizure disorder 08/19/2016    Insomnia 06/28/2017    Decubitus ulcer of sacral region 06/28/2017    Pain, neck 09/06/2017    Venous stasis ulcer of left lower extremity ([address] 11/26/2018    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 layer exposed ([address] 11/26/2018    S/P transmetatarsal amputation of foot, left (HCC) 04/17/2019    Chronic constipation 05/02/2019    Colostomy in place [facility]) 05/02/2019    Surgical wound breakdown 07/24/2019    Pressure injury of sacral region, stage 4 ([address] 10/17/2019    Paraplegia ([address] 11/05/2019    Arthralgia of hip 12/03/2019    Neuropathic foot ulcer, left, limited to breakdown of skin ([address] 05/05/2020    Seizure ([address] 05/26/2020    Type 2 diabetes mellitus without complication ([address] 38/28/2449    Smoking 08/25/2020    Encounter for care related to vascular access port 12/10/2020     Resolved Ambulatory Problems     Diagnosis Date Noted    Neuropathic ulcer of right foot, limited to breakdown of skin ([address] 02/01/2016    Sepsis ([address] 08/19/2016    Urinary tract infection 08/19/2016    Hypokalemia 08/20/2016    Hypokalemia 08/20/2016    Abnormal EKG     Encephalopathy 08/25/2016    Elevated troponin level     Neuropathic ulcer of left foot, limited to breakdown of skin ([address] 04/10/2017    Acute bronchitis 06/28/2017    Acute sinusitis 06/28/2017    Open wound of ankle 06/28/2017    Vitamin D deficiency 06/28/2017    Back pain 06/28/2017    Breakdown (mechanical) of cranial or spinal infusion catheter, initial encounter 10/25/2016    CAFL (chronic airflow limitation) ([address] 06/28/2017    Cough 06/28/2017    Encounter for screening for other disorder 06/28/2017    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    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    Difficult or painful urination 06/28/2017    Headache 06/28/2017    Hyperlipidemia 06/28/2017    Influenza 06/28/2017    Breast lump 06/28/2017    Patient overweight 06/28/2017    Hay fever 06/28/2017    Cobalamin deficiency 06/28/2017    Disease caused by fungus 06/28/2017    Colostomy and enterostomy malfunction ([facility] 09/06/2017    Atherosclerosis of native arteries of right leg with ulceration of calf ([facility] 11/26/2018    Open wound of second toe of left foot 12/11/2018    Type 2 diabetes mellitus with left diabetic foot ulcer ([facility] 04/04/2019    Encounter for screening colonoscopy 05/02/2019    Family history of colon cancer 05/02/2019     Past Medical History:   Diagnosis Date    Ankle wound     Asthma        Past Surgical History:   Procedure Laterality Date    BACLOFEN PUMP IMPLANTATION      COLONOSCOPY N/A 9/27/2019    Dr Martha Bass ileum through ostomy-patent and healthy appearing anastomosis in the right colon-entero colonic anastomosis-10 yr recall    COLOSTOMY      [address]      Right    FOOT AMPUTATION Left 4/4/2019    LEFT TRANSMET AMPUTATION [facility] OR    HYSTERECTOMY      OTHER SURGICAL HISTORY      urostomy    OTHER SURGICAL HISTORY      pain pump    MT INSJ PRPH CTR VAD W/SUBQ PORT UNDER 5 YR N/A 6/26/2018    SINGLE LUMEN PORT PLACEMENT WITH FLUORO performed by Hemal Barajas MD at [facility] TOE AMPUTATION      L last toe    TONSILLECTOMY      URETEROTOMY         Family History   Problem Relation Age of Onset    Cancer Mother         breast    Colon Cancer Mother     Colon Polyps Mother     Diabetes Father     High Blood Pressure Father     Cancer Paternal Aunt         breast    Liver Cancer Paternal Aunt     Heart Disease Paternal Grandmother     Esophageal Cancer Neg Hx     Liver Disease Neg Hx     Rectal Cancer Neg Hx     Stomach Cancer Neg Hx        Allergies   Allergen Reactions    Darvocet [Propoxyphene N-Acetaminophen]      Talking out of her head    Morphine And Related Itching and Swelling    Propoxyphene Swelling       Social History     Socioeconomic History    Marital status:      Spouse name: Not on file    Number of children: Not on file    Years of education: Not on file    Highest education level: Not on file   Occupational History    Not on file   Social Needs    Financial resource strain: Not on file    Food insecurity     Worry: Not on file     Inability: Not on file    Transportation needs     Medical: Not on file     Non-medical: Not on file   Tobacco Use    Smoking status: Former Smoker     Packs/day: 0.00     Types: Cigarettes     Last attempt to quit: 2018     Years since quittin.9    Smokeless tobacco: Never Used    Tobacco comment: hasn't smoked since 21   Substance and Sexual Activity    Alcohol use: Yes     Comment: yaritza    Drug use: Yes     Types: Marijuana     Comment: daily     Sexual activity: Not on file   Lifestyle    Physical activity     Days per week: Not on file     Minutes per session: Not on file    Stress: Not on file   Relationships    Social connections     Talks on phone: Not on file     Gets together: Not on file     Attends Hoahaoism service: Not on file     Active member of club or organization: Not on file     Attends meetings of clubs or organizations: Not on file     Relationship status: Not on file    Intimate partner violence     Fear of current or ex partner: Not on file     Emotionally abused: Not on file     Physically abused: Not on file     Forced sexual activity: Not on file   Other Topics Concern    Not on file   Social History Narrative    Not on file   Review of Systems     Constitutional - No fever or chills. No diaphoresis or significant fatigue. HENT -  No tinnitus or significant hearing loss. Eyes - no sudden vision change or eye pain  Respiratory - no significant shortness of breath or cough  Cardiovascular - no chest pain No palpitations or significant leg swelling  Gastrointestinal - no abdominal swelling or pain. Genitourinary - No difficulty urinating, dysuria  Musculoskeletal - no back pain or myalgia. Skin - no color change or rash  Neurologic - No seizures.   No lateralizing weakness. Hematologic - no easy bruising or excessive bleeding. Psychiatric - no severe anxiety or nervousness. All other review of systems are negative.     Current Outpatient Medications   Medication Sig Dispense Refill    methylphenidate (RITALIN) 20 MG tablet TAKE 1 TABLET BY MOUTH DAILY 30 tablet 0    HYDROcodone-acetaminophen (NORCO)  MG per tablet TAKE 1 TABLET BY MOUTH 3 TIMES DAILY AS NEEDED FOR PAIN. REDUCE DOSESAS PAIN BECOMES MANAGEABLE 90 tablet 0    varenicline (CHANTIX STARTING MONTH PAK) 0.5 MG X 11 & 1 MG X 42 tablet Take by mouth. 1 box 0    pilocarpine (SALAGEN) 7.5 MG tablet 1 tablet three times daily 90 tablet 5    nitrofurantoin (MACRODANTIN) 50 MG capsule Take 1 capsule by mouth daily 30 capsule 5    tiZANidine (ZANAFLEX) 4 MG tablet TAKE 3 TABLETS TWICE DAILY AS NEEDED 180 tablet 11    azelastine (ASTELIN) 0.1 % nasal spray USE 2 SPRAYS IN EACH NOSTRIL TWICE DAILY AS DIRECTED 30 mL 0    hydroCHLOROthiazide (HYDRODIURIL) 25 MG tablet TAKE 1 TABLET BY MOUTH ONCE DAILY AS NEEDED 30 tablet 0    cetirizine (ZYRTEC) 10 MG tablet Take 10 mg by mouth daily      levETIRAcetam (KEPPRA) 500 MG tablet Take 1 tablet by mouth nightly 90 tablet 3    Catheters MISC Catheter supplies and lubricant 5 times daily G82.20  to [facility] [phone] 450 each 3    levETIRAcetam (KEPPRA) 500 MG tablet Take 1 tablet by mouth nightly 30 tablet 5    glucose monitoring kit (FREESTYLE) monitoring kit 1 kit by Does not apply route daily 1 kit 0    blood glucose monitor strips Test 1 times a day & as needed for symptoms of irregular blood glucose.  100 strip 3    DULoxetine (CYMBALTA) 30 MG extended release capsule Take 1 capsule by mouth daily 30 capsule 11    docusate sodium (COLACE) 100 MG capsule Take 200 mg by mouth as needed for Constipation      PROAIR  (90 Base) MCG/ACT inhaler INHALE 1 PUFF INTO THE LUNGS EVERY 6 HOURS AS NEEDED FOR WHEEZING OR SHORTNESS OF BREATH 8.5 g 5    Diapers & Supplies MISC Diapers, chucks, wipes, and gloves for incontinence. [phone] each 11    Omega-3 Fatty Acids (OMEGA-3 FISH OIL PO) Take by mouth daily       Heparin Lock Flush (HEPARIN FLUSH, 100 UNITS/ML,) 100 UNIT/ML injection 3 mLs by Intercatheter route every 30 days No every 30 days but every 4-6 weeks. Flush port with 300 units heparin with 20 cc normal saline for ocrevus infusion for Multiple sclerosis and NS 20CC 1 Syringe 5    ipratropium-albuterol (DUONEB) 0.5-2.5 (3) MG/3ML SOLN nebulizer solution USE 1 UNIT DOSE IN NEBULIZER EVERY 4 TO 6 HOURS AS NEEDED. 450 mL 11    BACLOFEN, PAIN PUMP REFILL CHARGE, by Implant route continuous Indications: every 3 months      b complex vitamins capsule Take 1 capsule by mouth daily      Multiple Vitamins-Minerals (THERAPEUTIC MULTIVITAMIN-MINERALS) tablet Take 1 tablet by mouth daily      Multiple Vitamins-Minerals (HAIR SKIN & NAILS ADVANCED PO) Take 1 tablet by mouth daily      glucose monitoring kit (FREESTYLE) monitoring kit 1 kit by Does not apply route daily 1 kit 0    blood glucose monitor strips Test 1 times a day & as needed for symptoms of irregular blood glucose. 100 strip 0    baclofen (LIORESAL) 10 MG tablet Take 1 tablet by mouth 2 times daily as needed (muscle spasms) 60 tablet 5    Sodium Chloride Flush (SALINE FLUSH) 0.9 % SOLN Infuse 20 mLs intravenously every 30 days Not every 30 days but every 4-6 weeks as need with 300 units of heparin to flush port for Infusion of Ocrevus for multiple sclerosis 20 mL 5    lactulose (CHRONULAC) 10 GM/15ML solution Take 30 mLs by mouth 3 times daily (Patient taking differently: Take 20 g by mouth 2 times daily as needed ) 1 Bottle 5    pregabalin (LYRICA) 300 MG capsule TAKE 1 CAPSULE BY MOUTH 2 TIMES A DAY 60 capsule 5     No current facility-administered medications for this visit.         BP 97/74   Pulse 84   Ht 5' 9\" (1.753 m)   Wt 151 lb (68.5 kg)   BMI 22.30 kg/m²     Constitutional - well developed, well nourished. Eyes - conjunctiva normal.  Pupils react to light  Ear, nose, throat -hearing intact to finger rub No scars, masses, or lesions over external nose or ears, no atrophy of tongue  Neck-symmetric, no masses noted, no jugular vein distension  Respiration- chest wall appears symmetric, good expansion,   normal effort without use of accessory muscles  Musculoskeletal - no significant wasting of muscles noted, no bony deformities, gait no gross ataxia  Extremities-no clubbing, cyanosis or edema  Skin - warm, dry, and intact. No rash, erythema, or pallor.   Psychiatric - mood, affect, and behavior appear normal.      Neurological exam  Awake, alert, fluent oriented x 3 appropriate affect  Attention and concentration appear appropriate  Recent and remote memory appears unremarkable  Speech normal without dysarthria  No clear issues with language of fund of knowledge    Cranial Nerve Exam   CN II- Visual fields grossly unremarkable  CN III, IV,VI-EOMI, No nystagmus, conjugate eye movements, no ptosis    CN VII-no facial assymetry    Motor Exam  Antigravity in arms    Sensory Exam  Sensation reduced     Reflexes     No clonus ankles  No Perez's sign bilateral hands    Tremors- no tremors in hands or head noted    Gait  In wheelchair    Coordination  Finger to nose-unremarkable    Lab Results   Component Value Date    [id number] 05/02/2019     Lab Results   Component Value Date    WBC 4.7 (L) 12/01/2020    HGB 13.3 12/01/2020    HCT 40.8 12/01/2020    MCV 85.7 12/01/2020     12/01/2020     Lab Results   Component Value Date     12/01/2020    K 4.1 12/01/2020     12/01/2020    CO2 25 12/01/2020    BUN 14 12/01/2020    CREATININE 0.3 (L) 12/01/2020    GLUCOSE 102 12/01/2020    CALCIUM 9.0 12/01/2020    PROT 6.2 (L) 12/01/2020    LABALBU 4.1 12/01/2020    BILITOT <0.2 12/01/2020    ALKPHOS 94 12/01/2020    AST 13 12/01/2020    ALT 16 12/01/2020    LABGLOM >60 12/01/2020    GFRAA >59 12/01/2020    GLOB 2.7 08/19/2016     Impression   1.. No foci of abnormal T2 signal or enhancement within the cervical   cord to suggest plaques of multiple sclerosis or mass. 2. Mild bulging of the disc at C5-C6 with uncinate spurring   contributing to neural foraminal narrowing. There is no central   stenosis. The remaining cervical disc levels are unremarkable. Signed by Dr Shivani Bee on 8/14/2017 5:05 PM           Assessment    ICD-10-CM    1. MS (multiple sclerosis) ([facility]  G35    2. Pain, neck  M54.2    3. Pain in both upper arms  M79.621     M79.622    4. Muscle spasticity  M62.838    5. Other fatigue  R53.83    6. Arthralgia of hip, unspecified laterality  M25.559    7. Weakness  R53.1    8. Numbness  R20.0    9. Paraplegia (HCC)  G82.20        Her neurological examination today was significant for no movement in the lower extremities. She had some altered sensation in the legs along with some spasticity. She's wheelchair-bound. Her MRI of the brain revealed no new lesions. There was extensive white matter lesions. , Her cervical, thoracic, and lumbosacral spine were relatively unremarkable. She was agreeable to be started on Gilenya with no issues. stable. She had been on Copaxone but came off of this on her own. She denies any clear relapses over the last several years. She'll be referred to physical therapy as well. She will have a CBC and CMP every 3 months. The patient indicated understanding of the management plan. At this time she will be referred to [facility] as per her wishes for some experimental treatments. She may consider decreasing her keppra to 1 tabs to bid. She is seeing Dr Bisi Greene who manages baclofen pump. She will be continued  Ritalin to see if this helps at a future. She will be tried on Nuvgil. Lyrica restarted. She will be referred to [facility] for colostomy issues. .her hepatitis B panel was unremkarkable. Her EMG with NCs of the arms was suggestive of an ulnar neuropathy on the left. her MRI of the brain had stable white matter change. Her MRI of the c spine had some minimal disc bulging but no MS plaques Her x rays of her hips due to pain were unremarkable except for bladder stone along with pin in right hip. 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 She is off Ocrevus due to [address] virus. She will be continue on Cymbala for a few months She wants to continue  . She's to follow-up with me in approximately 3 months and call with any further problems. Continue present care as noted. Plan    No orders of the defined types were placed in this encounter. No orders of the defined types were placed in this encounter. Return in about 3 months (around 3/10/2021).

## 2020-12-23 ENCOUNTER — TELEPHONE (OUTPATIENT)
Dept: INTERNAL MEDICINE | Age: 50
End: 2020-12-23

## 2020-12-23 RX ORDER — PERMETHRIN 50 MG/G
CREAM TOPICAL
Qty: 1 TUBE | Refills: 0 | Status: SHIPPED | OUTPATIENT
Start: 2020-12-23 | End: 2021-01-11

## 2020-12-23 NOTE — TELEPHONE ENCOUNTER
Alyce called requesting a refill of the below medication which has been pended for you:     Requested Prescriptions     Pending Prescriptions Disp Refills    permethrin (ELIMITE) 5 % cream 1 Tube 0     Sig: Apply topically as directed       Last Appointment Date: 12/1/2020  Next Appointment Date: 6/9/2021    Allergies   Allergen Reactions    Darvocet [Propoxyphene N-Acetaminophen]      Talking out of her head    Morphine And Related Itching and Swelling    Propoxyphene Swelling

## 2020-12-28 RX ORDER — PIPERONYL BUTOXIDE AND PYRETHRUM EXTRACT 40; 3.3 MG/ML; MG/ML
SHAMPOO TOPICAL
Qty: 2 BOTTLE | Refills: 1 | Status: SHIPPED | OUTPATIENT
Start: 2020-12-28 | End: 2020-12-31

## 2020-12-28 NOTE — TELEPHONE ENCOUNTER
Lilian Burkett called to request a refill on her medication. Last office visit : 12/1/2020   Next office visit : 6/9/2021     Requested Prescriptions     Pending Prescriptions Disp Refills    Diapers & Supplies MISC 100 each 11     Sig: Diapers, chucks, wipes, and gloves for incontinence.   [phone]            Yoav Kenyon MA

## 2020-12-28 NOTE — TELEPHONE ENCOUNTER
Pt is calling and she still has head lice. She stated we called in a cream in the past, but she would like a shampoo to help get rid of the lice. We discussed also treating all furniture and anything else that is in the house. Please advise on what to send in.

## 2020-12-31 RX ORDER — VARENICLINE TARTRATE 1 MG/1
TABLET, FILM COATED ORAL
Qty: 56 TABLET | Refills: 0 | Status: SHIPPED | OUTPATIENT
Start: 2020-12-31 | End: 2021-03-01

## 2020-12-31 RX ORDER — HYDROCODONE BITARTRATE AND ACETAMINOPHEN 10; 325 MG/1; MG/1
TABLET ORAL
Qty: 90 TABLET | Refills: 0 | Status: SHIPPED | OUTPATIENT
Start: 2021-01-02 | End: 2021-02-01

## 2020-12-31 RX ORDER — METHYLPHENIDATE HYDROCHLORIDE 20 MG/1
TABLET ORAL
Qty: 30 TABLET | Refills: 0 | Status: SHIPPED | OUTPATIENT
Start: 2021-01-02 | End: 2021-02-01

## 2020-12-31 NOTE — TELEPHONE ENCOUNTER
Alyce called requesting a refill of the below medication which has been pended for you:     Requested Prescriptions     Pending Prescriptions Disp Refills    CHANTIX CONTINUING MONTH CHADWICK 1 MG tablet [Pharmacy Med Name: Lalo POE] 56 tablet 0     Sig: TAKE 1 TABLET BY MOUTH 2 TIMES A DAY       Last Appointment Date: 12/1/2020  Next Appointment Date: 6/9/2021    Allergies   Allergen Reactions    Darvocet [Propoxyphene N-Acetaminophen]      Talking out of her head    Morphine And Related Itching and Swelling    Propoxyphene Swelling

## 2020-12-31 NOTE — TELEPHONE ENCOUNTER
Requested Prescriptions     Pending Prescriptions Disp Refills    HYDROcodone-acetaminophen ([address])  MG per tablet [Pharmacy Med Name: HYDROCOD-APAP  MG] 90 tablet 0     Sig: TAKE 1 TABLET BY MOUTH 3 TIMES DAILY AS NEEDED FOR PAIN.  REDUCE DOSESA S PAIN BECOMES MANAGEABLE    methylphenidate (RITALIN) 20 MG tablet [Pharmacy Med Name: METHYLPHENIDAT 20MG TAB DD] 30 tablet 0     Sig: TAKE 1 TABLET BY MOUTH DAILY       Last Office Visit:  12/10/2020  Next Office Visit:  3/11/2021  Last Medication Refill:  Both 12/3/2020  Hollie Byrd up to date:  12/31/2020    *RX updated to reflect   01/02/2021  fill date*

## 2021-01-11 ENCOUNTER — HOSPITAL ENCOUNTER (OUTPATIENT)
Dept: WOUND CARE | Age: 51
Discharge: HOME | End: 2021-01-11
Payer: MEDICARE

## 2021-01-11 DIAGNOSIS — N39.45 CONTINUOUS LEAKAGE OF URINE: ICD-10-CM

## 2021-01-11 DIAGNOSIS — G35 MS (MULTIPLE SCLEROSIS) (HCC): Chronic | ICD-10-CM

## 2021-01-11 DIAGNOSIS — L89.153 PRESSURE INJURY OF SACRAL REGION, STAGE 3 (HCC): Primary | ICD-10-CM

## 2021-01-11 DIAGNOSIS — G82.20 PARAPLEGIA (HCC): Chronic | ICD-10-CM

## 2021-01-11 PROBLEM — T81.31XA SURGICAL WOUND BREAKDOWN: Chronic | Status: RESOLVED | Noted: 2019-07-24 | Resolved: 2021-01-11

## 2021-01-11 PROBLEM — E11.9 TYPE 2 DIABETES MELLITUS WITHOUT COMPLICATION (HCC): Status: RESOLVED | Noted: 2020-05-26 | Resolved: 2021-01-11

## 2021-01-11 PROBLEM — L97.521: Chronic | Status: RESOLVED | Noted: 2020-05-05 | Resolved: 2021-01-11

## 2021-01-11 PROBLEM — F17.200 SMOKING: Chronic | Status: RESOLVED | Noted: 2020-08-25 | Resolved: 2021-01-11

## 2021-01-11 PROBLEM — L97.812 NON-PRESSURE CHRONIC ULCER OF OTHER PART OF RIGHT LOWER LEG WITH FAT LAYER EXPOSED (HCC): Status: RESOLVED | Noted: 2018-11-26 | Resolved: 2021-01-11

## 2021-01-11 PROCEDURE — 97597 DBRDMT OPN WND 1ST 20 CM/<: CPT

## 2021-01-11 PROCEDURE — 99214 OFFICE O/P EST MOD 30 MIN: CPT

## 2021-01-11 PROCEDURE — 97597 DBRDMT OPN WND 1ST 20 CM/<: CPT | Performed by: NURSE PRACTITIONER

## 2021-01-11 RX ORDER — LIDOCAINE HYDROCHLORIDE 20 MG/ML
JELLY TOPICAL PRN
Status: DISCONTINUED | OUTPATIENT
Start: 2021-01-11 | End: 2021-01-13 | Stop reason: HOSPADM

## 2021-01-11 ASSESSMENT — VISUAL ACUITY: OU: 1

## 2021-01-11 NOTE — PROGRESS NOTES
Patient Care Team:  Ivone Morales MD as PCP - General (Family Medicine)  Ivone Morales MD as PCP - [facility]  Claude Bologna, MD as Neurologist (Neurology)  Claude Bologna, MD as Consulting Physician (Neurology)  EFREN Bustillo as Advanced Practice Nurse (Gastroenterology)    TODAY'S DATE:  1/11/2021     HISTORY of PRESENTILLNESS HPI   Ken Tompkins is a 49 y.o. female who presents today for wound evaluation, with a history of MS with spacticity and paraplegia. She presents with complaint of a re-opened wound on her coccyx. She states that this has been there for a week. She has had a wound in this area before- over 10 years ago she thinks, which was treated by Dr. Karen Pierce. Wound Type:pressure  Wound Location:coccyx  Modifying factors:chronic pressure, shear force and incontinence of urine    Patient Active Problem List   Diagnosis Code    Muscle spasticity M62.838    Peripheral vascular disease ([facility]) I73.9    MS (multiple sclerosis) ([facility]) G35    Pain in both upper arms M79.621, M79.622    Numbness R20.0    Other fatigue R53.83    Weakness R53.1    Chronic pain G89.29    Hx of seizure disorder Z86.69    Insomnia G47.00    Decubitus ulcer of sacral region L89.159    Pain, neck M54.2    Venous stasis ulcer of left lower extremity ([facility]) I83.029, L97.929    S/P transmetatarsal amputation of foot, left ([facility]) Z03.742    Chronic constipation K59.09    Colostomy in place ([facility]) Z93.3    Pressure injury of sacral region, stage 3 ([facility]) L89.153    Paraplegia ([facility]) G82.20    Arthralgia of hip M25.559    Seizure ([facility] R56.9    Encounter for care related to vascular access port Z45.2    Continuous leakage of urine N39.45       She reports she developed a wound on right coccyx. This started 1 week(s) ago. She believes this is not healing. She has been applying promogran and abd pad on the wound bed. She has not had  fever orchills. She has a history of paralagia.     Alyce O Stefan Singh is a 49 y.o. female with the following history reviewed and recorded in [facility]:    Current Outpatient Medications   Medication Sig Dispense Refill    CHANTIX CONTINUING MONTH CHADWICK 1 MG tablet TAKE 1 TABLET BY MOUTH 2 TIMES A DAY 56 tablet 0    HYDROcodone-acetaminophen (NORCO)  MG per tablet TAKE 1 TABLET BY MOUTH 3 TIMES DAILY AS NEEDED FOR PAIN. REDUCE DOSESA S PAIN BECOMES MANAGEABLE 90 tablet 0    methylphenidate (RITALIN) 20 MG tablet TAKE 1 TABLET BY MOUTH DAILY 30 tablet 0    pilocarpine (SALAGEN) 7.5 MG tablet 1 tablet three times daily 90 tablet 5    tiZANidine (ZANAFLEX) 4 MG tablet TAKE 3 TABLETS TWICE DAILY AS NEEDED 180 tablet 11    pregabalin (LYRICA) 300 MG capsule TAKE 1 CAPSULE BY MOUTH 2 TIMES A DAY 60 capsule 5    azelastine (ASTELIN) 0.1 % nasal spray USE 2 SPRAYS IN EACH NOSTRIL TWICE DAILY AS DIRECTED 30 mL 0    hydroCHLOROthiazide (HYDRODIURIL) 25 MG tablet TAKE 1 TABLET BY MOUTH ONCE DAILY AS NEEDED 30 tablet 0    cetirizine (ZYRTEC) 10 MG tablet Take 10 mg by mouth daily      levETIRAcetam (KEPPRA) 500 MG tablet Take 1 tablet by mouth nightly 90 tablet 3    levETIRAcetam (KEPPRA) 500 MG tablet Take 1 tablet by mouth nightly 30 tablet 5    DULoxetine (CYMBALTA) 30 MG extended release capsule Take 1 capsule by mouth daily 30 capsule 11    docusate sodium (COLACE) 100 MG capsule Take 200 mg by mouth as needed for Constipation      PROAIR  (90 Base) MCG/ACT inhaler INHALE 1 PUFF INTO THE LUNGS EVERY 6 HOURS AS NEEDED FOR WHEEZING OR SHORTNESS OF BREATH 8.5 g 5    Heparin Lock Flush (HEPARIN FLUSH, 100 UNITS/ML,) 100 UNIT/ML injection 3 mLs by Intercatheter route every 30 days No every 30 days but every 4-6 weeks.  Flush port with 300 units heparin with 20 cc normal saline for ocrevus infusion for Multiple sclerosis and NS 20CC 1 Syringe 5    ipratropium-albuterol (DUONEB) 0.5-2.5 (3) MG/3ML SOLN nebulizer solution USE 1 UNIT DOSE IN NEBULIZER EVERY 4 TO 6 HOURS AS NEEDED. 450 mL 11    BACLOFEN, PAIN PUMP REFILL CHARGE, by Implant route continuous Indications: every 3 months      b complex vitamins capsule Take 1 capsule by mouth daily      Multiple Vitamins-Minerals (THERAPEUTIC MULTIVITAMIN-MINERALS) tablet Take 1 tablet by mouth daily      baclofen (LIORESAL) 10 MG tablet Take 1 tablet by mouth 2 times daily as needed (muscle spasms) 60 tablet 5    Sodium Chloride Flush (SALINE FLUSH) 0.9 % SOLN Infuse 20 mLs intravenously every 30 days Not every 30 days but every 4-6 weeks as need with 300 units of heparin to flush port for Infusion of Ocrevus for multiple sclerosis 20 mL 5    Diapers & Supplies MISC Diapers, chucks, wipes, and gloves for incontinence. [phone] each 11    Catheters MISC Catheter supplies and lubricant 5 times daily G82.20  to [address] [facility] [phone] 450 each 3    glucose monitoring kit (FREESTYLE) monitoring kit 1 kit by Does not apply route daily 1 kit 0    blood glucose monitor strips Test 1 times a day & as needed for symptoms of irregular blood glucose. 100 strip 3    glucose monitoring kit (FREESTYLE) monitoring kit 1 kit by Does not apply route daily 1 kit 0    blood glucose monitor strips Test 1 times a day & as needed for symptoms of irregular blood glucose.  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 related to ms    Sepsis ([facility] 8/19/2016    Weakness 8/4/2016       Past Surgical History:   Procedure Laterality Date    BACLOFEN PUMP IMPLANTATION      COLONOSCOPY N/A 9/27/2019    Dr Pearl Monzon ileum through ostomy-patent and healthy appearing anastomosis in the right colon-entero colonic anastomosis-5 yr recall    COLOSTOMY     [facility]      Right    FOOT AMPUTATION Left 4/4/2019    LEFT TRANSMET AMPUTATION performed by Juan Manuel Heath MD at [address]      urostomy    OTHER SURGICAL HISTORY      pain pump    WA INSJ PRPH CTR VAD W/SUBQ PORT UNDER 5 YR N/A 6/26/2018    SINGLE LUMEN PORT PLACEMENT WITH FLUORO performed by Naga Sesay MD at [address] TOE AMPUTATION      L last toe    TONSILLECTOMY      URETEROTOMY       Family History   Problem Relation Age of Onset   [facility] Cancer Mother         breast    Colon Cancer Mother     Colon Polyps Mother     Diabetes Father     High Blood Pressure Father     Cancer Paternal Aunt         breast    Liver Cancer Paternal Aunt     Heart Disease Paternal Grandmother     Esophageal Cancer Neg Hx     Liver Disease Neg Hx     Rectal Cancer Neg Hx     Stomach Cancer Neg Hx      Social History     Tobacco Use    Smoking status: Former Smoker     Packs/day: 0.00     Types: Cigarettes     Quit date: 1/8/2018     Years since quitting: 3.0    Smokeless tobacco: Never Used    Tobacco comment: hasn't smoked since 9/5/20   Substance Use Topics    Alcohol use: Yes     Comment: yaritza         Review of Systems    Review of Systems   Skin: Positive for wound. All other systems reviewed and are negative. All other review of systems are negative.     Physical Exam    /78   Pulse 66   Temp 97.5 °F (36.4 °C) (Temporal)   Resp 18   Ht 5' 9\" (1.753 m)   Wt 148 lb (67.1 kg)   BMI 21.86 kg/m²     Physical Exam  Vitals signs reviewed. Exam conducted with a chaperone present. Constitutional:       Appearance: Normal appearance. She is normal weight. HENT:      Head: Normocephalic and atraumatic. Right Ear: External ear normal.      Left Ear: External ear normal.   Eyes:      General: Lids are normal. Lids are everted, no foreign bodies appreciated. Vision grossly intact. Gaze aligned appropriately. Cardiovascular:      Rate and Rhythm: Normal rate and regular rhythm. Pulses: Normal pulses. Heart sounds: Normal heart sounds. Pulmonary:      Effort: Pulmonary effort is normal.      Breath sounds: Normal breath sounds. Abdominal:      General: Bowel sounds are normal.      Comments: Colostomy present   Skin:     General: Skin is warm and dry. Capillary Refill: Capillary refill takes 2 to 3 seconds. Findings: Wound present. Neurological:      Mental Status: She is alert and oriented to person, place, and time. Psychiatric:         Mood and Affect: Mood normal.         Behavior: Behavior normal.         Thought Content: Thought content normal.         Judgment: Judgment normal.             Post Debridement Measurements and Assessment:    The patientspain isPain Level: 0  . Wound is has improved. Please refer to nursing measurements and assessment regarding wound pre and postdebridement.     Wound 01/11/21 Sacrum Right wound 1- coccyx right stage 3 (Active)   Wound Image      01/11/21 1403   Wound Etiology Pressure Stage  3 01/11/21 1403   Dressing Status Old drainage noted 01/11/21 1403   Wound Cleansed Soap and water 01/11/21 1403   Dressing/Treatment Alginate with Ag 01/11/21 1404   Wound Length (cm) 0.5 cm 01/11/21 1403   Wound Width (cm) 1 cm 01/11/21 1403   Wound Depth (cm) 0.2 cm 01/11/21 1403   Wound Surface Area (cm^2) 0.5 cm^2 01/11/21 1403   Wound Volume (cm^3) 0.1 cm^3 01/11/21 1403   Post-Procedure Length (cm) 0.5 cm 01/11/21 1404   Post-Procedure Width (cm) 1 cm 01/11/21 1404   Post-Procedure Depth (cm) 0.2 cm 01/11/21 1404   Post-Procedure Surface Area (cm^2) 0.5 cm^2 01/11/21 1404   Post-Procedure Volume (cm^3) 0.1 cm^3 01/11/21 1404   Wound Assessment Pink/red 01/11/21 1403   Drainage Amount Moderate 01/11/21 1403   Drainage Description Serosanguinous 01/11/21 1403   Odor None 01/11/21 1403   Audelia-wound Assessment Intact 01/11/21 1403   Margins Defined edges 01/11/21 1403   Wound Thickness Description not for Pressure Injury Full thickness 01/11/21 1403   Number of days: 0            Debridement: Selective Debridement/Non-Excisional Debridement    Using curette the wound(s)/ulcer(s) was/were sharply debrided down through and including the removal of epidermis and dermis. Devitalized Tissue Debrided:  fibrin, biofilm, slough and exudate    Pre Debridement Measurements:  Are located in the Wound/Ulcer Documentation Flow Sheet    Wound/Ulcer #: 1    Post Debridement Measurements:  Wound/Ulcer Descriptions are Pre Debridement except measurements:          Percent of Wound(s)/Ulcer(s) Debrided: 100%    Total Surface Area Debrided:  0.5 sq cm     Diabetic/Pressure/Non Pressure Ulcers only:  Ulcer: Pressure ulcer, Stage 3     Estimated Blood Loss:  Minimal    Hemostasis Achieved:  by pressure    Procedural Pain:  0  / 10     Post Procedural Pain:  0 / 10     Response to treatment:  Well tolerated by patient. Assessment    1. Pressure injury of sacral region, stage 3 (HCC)    2. MS (multiple sclerosis) ([address]    3. Paraplegia ([address]    4. Continuous leakage of urine          Plan    1. Follow up in 2 weeks    Plan for wound - Dress per physician order  Treatment:     Compression : No   Offloading : Yes    Dressing Orders:    Continue to Apply Barrier cream to any reddened areas.      Coccyx:     Wash with soap and water and gauze. Apply saline moistened aqua leda to wound bed. Cover with ABD. Secure with medipore tape or Hytape. Change daily       Discussed importance of nutrition, offloading, moisture control, wound care, and plan of care. Patient understanding and questions answered. I spent a total of 30 minutes face to face with the patient. Over 75% of that time was spent on counseling and care coordination. Patient was told that if symptoms worsen or new symptoms develop they are to go to the emergency department immediately. Patient was educated on diagnosis and treatment plan. All of patient's questions were answered, and the patient understands the discharge plan. Discussed appropriate home care of this wound. Wound redressed. Patient instructions were given.   Recommend no smoking  Offloading instructions given

## 2021-01-11 NOTE — HOME CARE
[address] [facility]. [address] Rojas MunroesonPurvinlaaneha 14 D:[phone] f:[phone]     Ordering Center:     [address] [place] [address],[facility] [[email or web address]] 210  1200 [facility] [address] [address] [phone]  WOUND CARE Dept: [address] [facility] [phone]    Patient Information:      Sarah   [phone]   : 1970  AGE: 49 y.o. GENDER: female   EPISODE DATE: 2021    Insurance:      PRIMARY INSURANCE:  Plan: MEDICARE PART A AND B  Coverage: MEDICARE  Effective Date: 2015  Group Number: [unfilled]  Subscriber Number: [id number] - (Medicare)    Payor/Plan Subscr  Sex Relation Sub. Ins. ID Effective Group Num   1. [facility] [place] 1970 Female Self [id number] 1/1/15                                    [address]    [address].  [place]ICAID [place] [id number] AJAY HERNANDEZ O 1970 Female Self [id number] 1/15/15                                    [address]       Patient Wound Information:      Problem List Items Addressed This Visit     MS (multiple sclerosis) ([address] (Chronic)    Paraplegia (HCC) (Chronic)    * (Principal) Pressure injury of sacral region, stage 3 ([address] - Primary    Continuous leakage of urine          WOUNDS REQUIRING DRESSING SUPPLIES:     Wound 21 Sacrum Right wound 1- coccyx right stage 3 (Active)   Wound Image      21 1403   Wound Etiology Pressure Stage  3 21 1403   Dressing Status Old drainage noted 21 1403   Wound Cleansed Soap and water 21 1403   Dressing/Treatment Alginate with Ag 21 1404   Wound Length (cm) 0.5 cm 21 1403   Wound Width (cm) 1 cm 21 1403   Wound Depth (cm) 0.2 cm 21 1403   Wound Surface Area (cm^2) 0.5 cm^2 21 1403   Wound Volume (cm^3) 0.1 cm^3 21 1403   Post-Procedure Length (cm) 0.5 cm 21 1404   Post-Procedure Width (cm) 1 cm 21 1404   Post-Procedure Depth (cm) 0.2 cm 01/11/21 1404   Post-Procedure Surface Area (cm^2) 0.5 cm^2 01/11/21 1404   Post-Procedure Volume (cm^3) 0.1 cm^3 01/11/21 1404   Wound Assessment Pink/red 01/11/21 1403   Drainage Amount Moderate 01/11/21 1403   Drainage Description Serosanguinous 01/11/21 1403   Odor None 01/11/21 1403   Audelia-wound Assessment Intact 01/11/21 1403   Margins Defined edges 01/11/21 1403   Wound Thickness Description not for Pressure Injury Full thickness 01/11/21 1403   Number of days: 0          Supplies Requested :      WOUND #: 1   PRIMARY DRESSING:  Alginate rope   Cover and Secure with: ABD pad     FREQUENCY OF DRESSING CHANGES:  Daily        ADDITIONAL ITEMS:  [] Gloves Small  [] Gloves Medium [x] Gloves Large [] Gloves XLarge  [] Tape 1\" [x] Tape 2\" [x] Tape 3\"  [x] Medipore Tape  [x] Saline  [] Skin Prep   [] Adhesive Remover   [x] Cotton Tip Applicators   [] Other:    Patient Wound(s) Debrided: [x] Yes if yes please add date 1/11/21   [] No    Debribement Type: Excisional/Sharp    Patient currently being seen by Home Health: [] Yes   [x] No    Duration for needed supplies:  []15  [x]30  []60  []90 Days    Electronically signed by EFREN Paul CNP on 1/11/2021 at 2:41 PM     Provider Information:      PROVIDER'S NAME: Luz Maria SCHWARTZ    NPI: [id number]

## 2021-01-12 VITALS
DIASTOLIC BLOOD PRESSURE: 78 MMHG | TEMPERATURE: 97.5 F | SYSTOLIC BLOOD PRESSURE: 118 MMHG | WEIGHT: 148 LBS | HEART RATE: 66 BPM | HEIGHT: 69 IN | BODY MASS INDEX: 21.92 KG/M2 | RESPIRATION RATE: 18 BRPM

## 2021-01-26 ENCOUNTER — HOSPITAL ENCOUNTER (OUTPATIENT)
Dept: WOUND CARE | Age: 51
Discharge: HOME | End: 2021-01-26
Payer: MEDICARE

## 2021-01-26 VITALS — WEIGHT: 148.15 LBS | BODY MASS INDEX: 21.94 KG/M2 | HEIGHT: 69 IN

## 2021-01-26 DIAGNOSIS — L89.153 PRESSURE INJURY OF SACRAL REGION, STAGE 3 (HCC): ICD-10-CM

## 2021-01-26 DIAGNOSIS — N39.45 CONTINUOUS LEAKAGE OF URINE: ICD-10-CM

## 2021-01-26 DIAGNOSIS — G82.20 PARAPLEGIA (HCC): Primary | Chronic | ICD-10-CM

## 2021-01-26 PROBLEM — I83.029 VENOUS STASIS ULCER OF LEFT LOWER EXTREMITY (HCC): Status: ACTIVE | Noted: 2018-11-26

## 2021-01-26 PROBLEM — L89.159 DECUBITUS ULCER OF SACRAL REGION: Chronic | Status: ACTIVE | Noted: 2017-06-28

## 2021-01-26 PROBLEM — L97.929 VENOUS STASIS ULCER OF LEFT LOWER EXTREMITY (HCC): Status: ACTIVE | Noted: 2018-11-26

## 2021-01-26 PROCEDURE — 97597 DBRDMT OPN WND 1ST 20 CM/<: CPT | Performed by: SURGERY

## 2021-01-26 PROCEDURE — 97597 DBRDMT OPN WND 1ST 20 CM/<: CPT

## 2021-01-26 NOTE — PROGRESS NOTES
117 [facility]. [address] Rojas Purvi Chi Davon Brienalexandrkingsleyneha 14 N:[phone] f:[phone]     Ordering Center:     [address] [place] [address],[facility] 210  1200 [facility], [facility] [address] [address] [phone]  WOUND CARE Dept: [address] [facility] [phone]    Patient Information:      Sarah   [phone]   : 1970  AGE: 49 y.o. GENDER: female   EPISODE DATE: 2021    Insurance:      PRIMARY INSURANCE:  Plan: MEDICARE PART A AND B  Coverage: MEDICARE  Effective Date: 2015  Group Number: [unfilled]  Subscriber Number: [id number] - (Medicare)    Payor/Plan Subscr  Sex Relation Sub. Ins. ID Effective Group Num   1. [address] [facility] 1970 Female Self [id number] 1/1/15                                    [address]    [address]  [address] [address] 1970 Female Self [id number] 1/15/15                                    [address]       Patient Wound Information:      Problem List Items Addressed This Visit     Paraplegia ([address] - Primary (Chronic)    Pressure injury of sacral region, stage 3 (HCC)    Continuous leakage of urine          WOUNDS REQUIRING DRESSING SUPPLIES:     Wound 21 Sacrum Right wound 1- coccyx right stage 3 (Active)   Wound Image   21 1325   Wound Etiology Pressure Stage  3 21 1325   Dressing Status Old drainage noted 21 1325   Wound Cleansed Soap and water 21 1403   Dressing/Treatment Alginate with Ag 21 1404   Wound Length (cm) 0.3 cm 21 1325   Wound Width (cm) 0.2 cm 21 1325   Wound Depth (cm) 0.1 cm 21 1325   Wound Surface Area (cm^2) 0.06 cm^2 21 1325   Change in Wound Size % (l*w) 88 21 1325   Wound Volume (cm^3) 0.01 cm^3 21 1325   Wound Healing % 90 21 1325   Post-Procedure Length (cm) 0.3 cm 21 1333   Post-Procedure Width (cm) 0.2 cm 21 Post-Procedure Depth (cm) 0.1 cm 01/26/21 1333   Post-Procedure Surface Area (cm^2) 0.06 cm^2 01/26/21 1333   Post-Procedure Volume (cm^3) 0.01 cm^3 01/26/21 1333   Distance Tunneling (cm) 0 cm 01/26/21 1325   Tunneling Position ___ O'Clock 0 01/26/21 1325   Undermining Starts ___ O'Clock 0 01/26/21 1325   Undermining Ends___ O'Clock 0 01/26/21 1325   Undermining Maxium Distance (cm) 0 01/26/21 1325   Wound Assessment Pink/red 01/26/21 1325   Drainage Amount Scant 01/26/21 1325   Drainage Description Serosanguinous 01/26/21 1325   Odor None 01/26/21 1325   Audelia-wound Assessment Intact 01/26/21 1325   Margins Attached edges 01/26/21 1325   Wound Thickness Description not for Pressure Injury Full thickness 01/11/21 1403   Number of days: 14          Supplies Requested :      WOUND #: 1   PRIMARY DRESSING:  Other: Aquacel AG   Cover and Secure with: ABD pad and 4\" Medline Medfix tape     FREQUENCY OF DRESSING CHANGES:  Daily       ADDITIONAL ITEMS:  [] Gloves Small  [x] Gloves Medium [] Gloves Large [] Gloves [place] Purple  [] Tape 1\" [] Tape 2\" [] Tape 3\"  [x] Medipore Tape  [x] Saline  [] Skin Prep   [] Adhesive Remover   [x] Cotton Tip Applicators   [] Other:    Patient Wound(s) Debrided: [x] Yes if yes please add date 1/26/21   [] No    Debribement Type: Excisional/Sharp    Patient currently being seen by Home Health: [] Yes   [x] No    Duration for needed supplies:  []15  []30  [x]60  []90 Days    Electronically signed by Ami Barajas RN on 1/26/2021 at 1:49 PM     Provider Information:      PROVIDER'S NAME: Dr Abel Lovell  NPI: [id number]

## 2021-01-26 NOTE — PROGRESS NOTES
[address]   Progress Note and Procedure Note      Cortez Larios  MEDICAL RECORD NUMBER:  [id number]  AGE: 49 y.o. GENDER: female  : 1970  EPISODE DATE:  2021    Subjective:     Chief Complaint   Patient presents with    Wound Check     Pt states wound might be healed         HISTORY of PRESENT ILLNESS HPI     Cortez Larios is a 49 y.o. female who presents today for wound/ulcer evaluation.    Wound Context: Pt with coccyx wound here fort eval/treat  Wound/Ulcer Pain Timing/Severity: none  Quality of pain: N/A  Severity:  0 / 10   Modifying Factors: None  Associated Signs/Symptoms: none    Ulcer Identification:  Ulcer Type: pressure  Contributing Factors: chronic pressure, decreased mobility and shear force    Wound: pressure        PAST MEDICAL HISTORY        Diagnosis Date    Ankle wound     and toe wound    Aptyalism 2017    Asthma     Back pain 2017    Binocular vision disorder with diplopia 2017    CAFL (chronic airflow limitation) ([facility]) 2017    Hay fever 2017    Headache 2017    MS (multiple sclerosis) ([facility])     Muscle spasticity     Neuropathic ulcer of left foot, limited to breakdown of skin ([address] 4/10/2017    Numbness 2016    Open wound of ankle 2017    Open wound of second toe of left foot 2018    Peripheral vascular disease ([facility])     Seizure ([facility])     occ. related to ms    Sepsis ([address] 2016    Weakness 2016       PAST SURGICAL HISTORY    Past Surgical History:   Procedure Laterality Date    BACLOFEN PUMP IMPLANTATION      COLONOSCOPY N/A 2019    Dr Genoveva Ramesh ileum through ostomy-patent and healthy appearing anastomosis in the right colon-entero colonic anastomosis-10 yr recall    COLOSTOMY      [address]      Right    FOOT AMPUTATION Left 2019    LEFT TRANSMET AMPUTATION performed by Mark Ragland MD at [address] [place] [address] Rd urostomy    OTHER SURGICAL HISTORY      pain pump    [place] [facility] VAD W/SUBQ PORT UNDER 5 YR N/A 6/26/2018    SINGLE LUMEN PORT PLACEMENT WITH FLUORO performed by Ivy Brown MD at [address] TOE AMPUTATION      L last toe    TONSILLECTOMY      URETEROTOMY         FAMILY HISTORY    Family History   Problem Relation Age of Onset   Cheryl Perla Cancer Mother         breast    Colon Cancer Mother     Colon Polyps Mother     Diabetes Father     High Blood Pressure Father     Cancer Paternal Aunt         breast    Liver Cancer Paternal Aunt     Heart Disease Paternal Grandmother     Esophageal Cancer Neg Hx     Liver Disease Neg Hx     Rectal Cancer Neg Hx     Stomach Cancer Neg Hx        SOCIAL HISTORY    Social History     Tobacco Use    Smoking status: Former Smoker     Packs/day: 0.00     Types: Cigarettes     Quit date: 1/8/2018     Years since quitting: 3.0    Smokeless tobacco: Never Used    Tobacco comment: hasn't smoked since 9/5/20   Substance Use Topics    Alcohol use: Yes     Comment: yaritza    Drug use: Yes     Types: Marijuana     Comment: daily        ALLERGIES    Allergies   Allergen Reactions    Darvocet [Propoxyphene N-Acetaminophen]      Talking out of her head    Morphine And Related Itching and Swelling    Propoxyphene Swelling       MEDICATIONS    Current Outpatient Medications on File Prior to Encounter   Medication Sig Dispense Refill    CHANTIX CONTINUING MONTH CHADWICK 1 MG tablet TAKE 1 TABLET BY MOUTH 2 TIMES A DAY 56 tablet 0    HYDROcodone-acetaminophen (NORCO)  MG per tablet TAKE 1 TABLET BY MOUTH 3 TIMES DAILY AS NEEDED FOR PAIN.  REDUCE DOSESA S PAIN BECOMES MANAGEABLE 90 tablet 0    methylphenidate (RITALIN) 20 MG tablet TAKE 1 TABLET BY MOUTH DAILY 30 tablet 0    pilocarpine (SALAGEN) 7.5 MG tablet 1 tablet three times daily 90 tablet 5    tiZANidine (ZANAFLEX) 4 MG tablet TAKE 3 TABLETS TWICE DAILY AS NEEDED 180 tablet 11    pregabalin (LYRICA) 300 MG Multiple sclerosis and NS 20CC 1 Syringe 5    BACLOFEN, PAIN PUMP REFILL CHARGE, by Implant route continuous Indications: every 3 months      glucose monitoring kit (FREESTYLE) monitoring kit 1 kit by Does not apply route daily 1 kit 0    blood glucose monitor strips Test 1 times a day & as needed for symptoms of irregular blood glucose. 100 strip 0    Sodium Chloride Flush (SALINE FLUSH) 0.9 % SOLN Infuse 20 mLs intravenously every 30 days Not every 30 days but every 4-6 weeks as need with 300 units of heparin to flush port for Infusion of Ocrevus for multiple sclerosis 20 mL 5     No current facility-administered medications on file prior to encounter. REVIEW OF SYSTEMS    A comprehensive review of systems was negative.     Objective:      Ht 5' 9\" (1.753 m)   BMI 21.86 kg/m²     Wt Readings from Last 3 Encounters:   01/12/21 148 lb (67.1 kg)   12/10/20 151 lb (68.5 kg)   12/01/20 151 lb (68.5 kg)       PHYSICAL EXAM    General Appearance: alert and oriented to person, place and time, well developed and well- nourished, in no acute distress  Skin: warm and dry, no rash or erythema  Head: normocephalic and atraumatic  Eyes: pupils equal, round, and reactive to light, extraocular eye movements intact, conjunctivae normal  ENT: tympanic membrane, external ear and ear canal normal bilaterally, nose without deformity, nasal mucosa and turbinates normal without polyps, lips teeth and gums normal  Neck: supple and non-tender without mass, no thyromegaly or thyroid nodules, no cervical lymphadenopathy  Pulmonary/Chest: clear to auscultation bilaterally- no wheezes, rales or rhonchi, normal air movement, no respiratory distress  Cardiovascular: normal rate, regular rhythm, normal S1 and S2, no murmurs, rubs, clicks, or gallops, distal pulses intact, no carotid bruits  Abdomen: soft, non-tender, non-distended, normal bowel sounds, no masses or organomegaly  Extremities: no cyanosis, clubbing or edema  Musculoskeletal: normal range of motion, no joint swelling, deformity or tenderness      Assessment:      Problem List Items Addressed This Visit     Paraplegia ([address] - Primary (Chronic)    Pressure injury of sacral region, stage 3 (HCC)    Continuous leakage of urine           Procedure Note  Indications:  Based on my examination of this patient's wound(s)/ulcer(s) today, debridement is required to promote healing and evaluate the wound base. Performed by: Don Mcguire MD    Consent obtained:  Yes    Time out taken:  Yes    Pain Control: Anesthetic  Anesthetic: 2% Lidocaine Gel Topical       Debridement:Non-excisional Debridement    Using curette the wound(s)/ulcer(s) was/were sharply debrided down through and including the removal of epidermis and dermis.         Devitalized Tissue Debrided:  fibrin, biofilm and slough      Pre Debridement Measurements:  Are located in the Wound/Ulcer Documentation Flow Sheet    Wound/Ulcer #: 1    Percent of Wound(s)/Ulcer(s) Debrided: 100%    Total Surface Area Debrided:  0.06 sq cm       Diabetic/Pressure/Non Pressure Ulcers only:  Ulcer: Pressure ulcer, Stage 3           Post Debridement Measurements:    Wound/Ulcer Descriptions are Pre Debridement --EXCEPT MEASUREMENTS    Wound 01/11/21 Sacrum Right wound 1- coccyx right stage 3 (Active)   Wound Image   01/26/21 1325   Wound Etiology Pressure Stage  3 01/26/21 1325   Dressing Status Old drainage noted 01/26/21 1325   Wound Cleansed Soap and water 01/11/21 1403   Dressing/Treatment Alginate with Ag 01/11/21 1404   Wound Length (cm) 0.3 cm 01/26/21 1325   Wound Width (cm) 0.2 cm 01/26/21 1325   Wound Depth (cm) 0.1 cm 01/26/21 1325   Wound Surface Area (cm^2) 0.06 cm^2 01/26/21 1325   Change in Wound Size % (l*w) 88 01/26/21 1325   Wound Volume (cm^3) 0.01 cm^3 01/26/21 1325   Wound Healing % 90 01/26/21 1325   Post-Procedure Length (cm) 0.3 cm 01/26/21 1333   Post-Procedure Width (cm) 0.2 cm 01/26/21 1333 Post-Procedure Depth (cm) 0.1 cm 01/26/21 1333   Post-Procedure Surface Area (cm^2) 0.06 cm^2 01/26/21 1333   Post-Procedure Volume (cm^3) 0.01 cm^3 01/26/21 1333   Distance Tunneling (cm) 0 cm 01/26/21 1325   Tunneling Position ___ O'Clock 0 01/26/21 1325   Undermining Starts ___ O'Clock 0 01/26/21 1325   Undermining Ends___ O'Clock 0 01/26/21 1325   Undermining Maxium Distance (cm) 0 01/26/21 1325   Wound Assessment Pink/red 01/26/21 1325   Drainage Amount Scant 01/26/21 1325   Drainage Description Serosanguinous 01/26/21 1325   Odor None 01/26/21 1325   Audelia-wound Assessment Intact 01/26/21 1325   Margins Attached edges 01/26/21 1325   Wound Thickness Description not for Pressure Injury Full thickness 01/11/21 1403   Number of days: 14             Estimated Blood Loss:  Minimal    Hemostasis Achieved:  by pressure    Procedural Pain:  0  / 10     Post Procedural Pain:  0 / 10     Response to treatment:  Well tolerated by patient. Plan:     Problem List Items Addressed This Visit     Paraplegia ([address] - Primary (Chronic)    Pressure injury of sacral region, stage 3 (HCC)    Continuous leakage of urine          Cont current care wound almost healed    Treatment Note please see attached Discharge Instructions    In my professional opinion this patient would benefit from HBO Therapy: No    Written patient dismissal instructions given to patient and signed by patient or POA.          Discharge [address] and Hyperbaric Oxygen Therapy   Physician Orders and Discharge Instructions  [facility]  [place], [address]  Telephone: [phone]    NAME:  Abe Fong:  1970  MEDICAL RECORD NUMBER:  [id number]  DATE:  1/26/2021    Discharge condition: Stable    Discharge to: Home    Left via:Private automobile    Accompanied by:  caregiver    [facility]F/[facility]: [facility]     Dressing Orders:    Continue to Apply Barrier cream to any reddened areas. Coccyx:     Wash with soap and water and gauze. Apply saline moistened aqua leda to wound bed. Cover with ABD. Secure with medipore tape or Hytape. Change daily       Treatment Orders:   Avoid Pressure to wound site. Turn frequently (turn at least every two hours when in bed)   While in chair reposition every 30 minutes   Patient advised to sit up no more than 30 minutes at a time for meals ONLY.       Please do not elevate the head of the bed higher than 30 degrees.       Off-load heels by applying heel-lift boots or \"float\" heels by placing pillows under calves so that heels do not touch mattress.       Continue Protein rich diet (unless restricted by your physician)   Take Multivitamin daily       Please use Roho cushion in chair   Please use low air loss bed    [facility] follow up visit ______2 weeks_______________________  (Please note your next appointment above and if you are unable to keep, kindly give a 24 hour notice. Thank you.)          If you experience any of the following, please call the [facility] during business hours:    * Increase in Pain  * Temperature over 101  * Increase in drainage from your wound  * Drainage with a foul odor  * Bleeding  * Increase in swelling  * Need for compression bandage changes due to slippage, breakthrough drainage. If you need medical attention outside of the business hours of the [facility] please contact your PCP or go to the nearest emergency room.           Electronically signed by Latrice Grimes MD on 1/26/2021 at 1:41 PM

## 2021-01-26 NOTE — PLAN OF CARE
Problem: Wound:  Goal: Will show signs of wound healing; wound closure and no evidence of infection  Description: Will show signs of wound healing; wound closure and no evidence of infection  Outcome: Ongoing     Problem: Pressure Ulcer:  Goal: Signs of wound healing will improve  Description: Signs of wound healing will improve  Outcome: Ongoing  Goal: Absence of new pressure ulcer  Description: Absence of new pressure ulcer  Outcome: Ongoing  Goal: Will show no infection signs and symptoms  Description: Will show no infection signs and symptoms  Outcome: Ongoing

## 2021-01-29 ENCOUNTER — APPOINTMENT (OUTPATIENT)
Dept: CT IMAGING | Age: 51
End: 2021-01-29
Payer: MEDICARE

## 2021-01-29 ENCOUNTER — HOSPITAL ENCOUNTER (EMERGENCY)
Age: 51
Discharge: HOME | End: 2021-01-29
Attending: EMERGENCY MEDICINE
Payer: MEDICARE

## 2021-01-29 ENCOUNTER — APPOINTMENT (OUTPATIENT)
Dept: GENERAL RADIOLOGY | Age: 51
End: 2021-01-29
Payer: MEDICARE

## 2021-01-29 VITALS
OXYGEN SATURATION: 96 % | SYSTOLIC BLOOD PRESSURE: 123 MMHG | HEART RATE: 71 BPM | RESPIRATION RATE: 14 BRPM | TEMPERATURE: 98.4 F | DIASTOLIC BLOOD PRESSURE: 58 MMHG

## 2021-01-29 DIAGNOSIS — N30.01 ACUTE CYSTITIS WITH HEMATURIA: ICD-10-CM

## 2021-01-29 DIAGNOSIS — R56.9 SEIZURE-LIKE ACTIVITY (HCC): Primary | ICD-10-CM

## 2021-01-29 LAB
ADENOVIRUS BY PCR: NOT DETECTED
ALBUMIN SERPL-MCNC: 4.2 G/DL (ref 3.5–5.2)
ALP BLD-CCNC: 86 U/L (ref 35–104)
ALT SERPL-CCNC: 21 U/L (ref 5–33)
AMPHETAMINE SCREEN, URINE: NEGATIVE
ANION GAP SERPL CALCULATED.3IONS-SCNC: 8 MMOL/L (ref 7–19)
AST SERPL-CCNC: 16 U/L (ref 5–32)
BACTERIA: ABNORMAL /HPF
BARBITURATE SCREEN URINE: NEGATIVE
BASOPHILS ABSOLUTE: 0 K/UL (ref 0–0.2)
BASOPHILS RELATIVE PERCENT: 0.5 % (ref 0–1)
BENZODIAZEPINE SCREEN, URINE: NEGATIVE
BILIRUB SERPL-MCNC: 0.3 MG/DL (ref 0.2–1.2)
BILIRUBIN URINE: NEGATIVE
BLOOD, URINE: ABNORMAL
BORDETELLA PARAPERTUSSIS BY PCR: NOT DETECTED
BORDETELLA PERTUSSIS BY PCR: NOT DETECTED
BUN BLDV-MCNC: 11 MG/DL (ref 6–20)
CALCIUM SERPL-MCNC: 9.1 MG/DL (ref 8.6–10)
CANNABINOID SCREEN URINE: POSITIVE
CHLAMYDOPHILIA PNEUMONIAE BY PCR: NOT DETECTED
CHLORIDE BLD-SCNC: 107 MMOL/L (ref 98–111)
CLARITY: ABNORMAL
CO2: 25 MMOL/L (ref 22–29)
COCAINE METABOLITE SCREEN URINE: NEGATIVE
COLOR: YELLOW
CORONAVIRUS 229E BY PCR: NOT DETECTED
CORONAVIRUS HKU1 BY PCR: NOT DETECTED
CORONAVIRUS NL63 BY PCR: NOT DETECTED
CORONAVIRUS OC43 BY PCR: NOT DETECTED
CREAT SERPL-MCNC: 0.3 MG/DL (ref 0.5–0.9)
CRYSTALS, UA: ABNORMAL /HPF
EOSINOPHILS ABSOLUTE: 0 K/UL (ref 0–0.6)
EOSINOPHILS RELATIVE PERCENT: 0.4 % (ref 0–5)
EPITHELIAL CELLS, UA: 0 /HPF (ref 0–5)
GFR AFRICAN AMERICAN: >59
GFR NON-AFRICAN AMERICAN: >60
GLUCOSE BLD-MCNC: 112 MG/DL (ref 74–109)
GLUCOSE URINE: NEGATIVE MG/DL
HCT VFR BLD CALC: 44.5 % (ref 37–47)
HEMOGLOBIN: 14.3 G/DL (ref 12–16)
HUMAN METAPNEUMOVIRUS BY PCR: NOT DETECTED
HUMAN RHINOVIRUS/ENTEROVIRUS BY PCR: NOT DETECTED
HYALINE CASTS: 4 /HPF (ref 0–8)
IMMATURE GRANULOCYTES #: 0 K/UL
INFLUENZA A BY PCR: NOT DETECTED
INFLUENZA B BY PCR: NOT DETECTED
KETONES, URINE: NEGATIVE MG/DL
LACTIC ACID, SEPSIS: 0.9 MG/DL (ref 0.5–1.9)
LEUKOCYTE ESTERASE, URINE: ABNORMAL
LYMPHOCYTES ABSOLUTE: 0.7 K/UL (ref 1.1–4.5)
LYMPHOCYTES RELATIVE PERCENT: 13.3 % (ref 20–40)
Lab: ABNORMAL
MCH RBC QN AUTO: 27.9 PG (ref 27–31)
MCHC RBC AUTO-ENTMCNC: 32.1 G/DL (ref 33–37)
MCV RBC AUTO: 86.7 FL (ref 81–99)
MONOCYTES ABSOLUTE: 0.2 K/UL (ref 0–0.9)
MONOCYTES RELATIVE PERCENT: 2.7 % (ref 0–10)
MYCOPLASMA PNEUMONIAE BY PCR: NOT DETECTED
NEUTROPHILS ABSOLUTE: 4.6 K/UL (ref 1.5–7.5)
NEUTROPHILS RELATIVE PERCENT: 82.7 % (ref 50–65)
NITRITE, URINE: POSITIVE
OPIATE SCREEN URINE: NEGATIVE
PARAINFLUENZA VIRUS 1 BY PCR: NOT DETECTED
PARAINFLUENZA VIRUS 2 BY PCR: NOT DETECTED
PARAINFLUENZA VIRUS 3 BY PCR: NOT DETECTED
PARAINFLUENZA VIRUS 4 BY PCR: NOT DETECTED
PDW BLD-RTO: 12.8 % (ref 11.5–14.5)
PH UA: 8 (ref 5–8)
PLATELET # BLD: 195 K/UL (ref 130–400)
PMV BLD AUTO: 10.8 FL (ref 9.4–12.3)
POTASSIUM REFLEX MAGNESIUM: 4.2 MMOL/L (ref 3.5–5)
PROTEIN UA: 30 MG/DL
RBC # BLD: 5.13 M/UL (ref 4.2–5.4)
RBC UA: 8 /HPF (ref 0–4)
RESPIRATORY SYNCYTIAL VIRUS BY PCR: NOT DETECTED
SARS-COV-2, PCR: NOT DETECTED
SODIUM BLD-SCNC: 140 MMOL/L (ref 136–145)
SPECIFIC GRAVITY UA: 1.01 (ref 1–1.03)
TOTAL PROTEIN: 6.1 G/DL (ref 6.6–8.7)
UROBILINOGEN, URINE: 0.2 E.U./DL
WBC # BLD: 5.6 K/UL (ref 4.8–10.8)
WBC UA: 65 /HPF (ref 0–5)

## 2021-01-29 PROCEDURE — 71045 X-RAY EXAM CHEST 1 VIEW: CPT

## 2021-01-29 PROCEDURE — 96368 THER/DIAG CONCURRENT INF: CPT

## 2021-01-29 PROCEDURE — 36415 COLL VENOUS BLD VENIPUNCTURE: CPT

## 2021-01-29 PROCEDURE — 80307 DRUG TEST PRSMV CHEM ANLYZR: CPT

## 2021-01-29 PROCEDURE — 87086 URINE CULTURE/COLONY COUNT: CPT

## 2021-01-29 PROCEDURE — 85025 COMPLETE CBC W/AUTO DIFF WBC: CPT

## 2021-01-29 PROCEDURE — 87040 BLOOD CULTURE FOR BACTERIA: CPT

## 2021-01-29 PROCEDURE — 6360000002 HC RX W HCPCS: Performed by: PHYSICIAN ASSISTANT

## 2021-01-29 PROCEDURE — 87186 SC STD MICRODIL/AGAR DIL: CPT

## 2021-01-29 PROCEDURE — 83605 ASSAY OF LACTIC ACID: CPT

## 2021-01-29 PROCEDURE — 81001 URINALYSIS AUTO W/SCOPE: CPT

## 2021-01-29 PROCEDURE — 80177 DRUG SCRN QUAN LEVETIRACETAM: CPT

## 2021-01-29 PROCEDURE — 96365 THER/PROPH/DIAG IV INF INIT: CPT

## 2021-01-29 PROCEDURE — 99284 EMERGENCY DEPT VISIT MOD MDM: CPT

## 2021-01-29 PROCEDURE — 2580000003 HC RX 258: Performed by: PHYSICIAN ASSISTANT

## 2021-01-29 PROCEDURE — 70450 CT HEAD/BRAIN W/O DYE: CPT

## 2021-01-29 PROCEDURE — 0202U NFCT DS 22 TRGT SARS-COV-2: CPT

## 2021-01-29 PROCEDURE — 80053 COMPREHEN METABOLIC PANEL: CPT

## 2021-01-29 RX ORDER — LEVETIRACETAM 10 MG/ML
1000 INJECTION INTRAVASCULAR ONCE
Status: COMPLETED | OUTPATIENT
Start: 2021-01-29 | End: 2021-01-29

## 2021-01-29 RX ORDER — CEPHALEXIN 500 MG/1
500 CAPSULE ORAL 2 TIMES DAILY
Qty: 14 CAPSULE | Refills: 0 | Status: SHIPPED | OUTPATIENT
Start: 2021-01-29 | End: 2021-02-05

## 2021-01-29 RX ADMIN — CEFTRIAXONE 1000 MG: 1 INJECTION, POWDER, FOR SOLUTION INTRAMUSCULAR; INTRAVENOUS at 17:46

## 2021-01-29 RX ADMIN — LEVETIRACETAM 1000 MG: 10 INJECTION INTRAVENOUS at 17:46

## 2021-01-29 ASSESSMENT — ENCOUNTER SYMPTOMS
EYE DISCHARGE: 0
EYE PAIN: 0
PHOTOPHOBIA: 0
RHINORRHEA: 0
COUGH: 0
NAUSEA: 0
BACK PAIN: 0
SHORTNESS OF BREATH: 0
SORE THROAT: 0
COLOR CHANGE: 0
ABDOMINAL DISTENTION: 0
APNEA: 0
ABDOMINAL PAIN: 0

## 2021-01-29 NOTE — ED PROVIDER NOTES
[facility] [place] [facility] [place] EMERGENCY DEPT  eMERGENCYdEPARTMENT eNCOUnter      Pt Name: Quinton Hoffman  MRN: [id number]  [place] 1970  Date of evaluation: 1/29/2021  Provider:LUI [place]    CHIEF COMPLAINT       Chief Complaint   Patient presents with    Seizures     hx of seizure         HISTORY OF PRESENT ILLNESS  (Location/Symptom, Timing/Onset, Context/Setting, Quality, Duration, Modifying Factors, Severity.)   Quinton Hoffman is a 49 y.o. female who presents to the emergency department with complaints of hx of seizure x 1 she takes keppra complaint here with  witnessed. She was unresponsive for a few seconds tonic-clonic described by caregiver. Patient is afebrile complaining of chills. This is a 2-day onset. No abdominal pain. She is followed by Dr. Amador Roch she takes 1 dose of 500 mg Keppra daily  says she is been on this now for 2 years. He tells me she does have seizures when she has UTI. She is baseline nonambulatory. HPI    Nursing Notes were reviewed and I agree. REVIEW OF SYSTEMS    (2-9 systems for level 4, 10 or more for level 5)     Review of Systems   Constitutional: Positive for chills. Negative for activity change, appetite change and fever. HENT: Negative for congestion, postnasal drip, rhinorrhea and sore throat. Eyes: Negative for photophobia, pain, discharge and visual disturbance. Respiratory: Negative for apnea, cough and shortness of breath. Cardiovascular: Negative for chest pain and leg swelling. Gastrointestinal: Negative for abdominal distention, abdominal pain and nausea. Genitourinary: Negative for vaginal bleeding. Musculoskeletal: Negative for arthralgias, back pain, joint swelling, neck pain and neck stiffness. Skin: Negative for color change and rash. Neurological: Positive for seizures. Negative for dizziness, syncope, facial asymmetry and headaches. Hematological: Negative for adenopathy. Does not bruise/bleed easily.    Psychiatric/Behavioral: Negative for agitation, behavioral problems and confusion. Except as noted above the remainder of the review of systems was reviewed and negative.        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 related to ms    Sepsis ([address] 8/19/2016    Weakness 8/4/2016         SURGICAL HISTORY       Past Surgical History:   Procedure Laterality Date    BACLOFEN PUMP IMPLANTATION      COLONOSCOPY N/A 9/27/2019    Dr aErl Mendoza ileum through ostomy-patent and healthy appearing anastomosis in the right colon-entero colonic anastomosis-10 yr recall    COLOSTOMY      [address]      Right    FOOT AMPUTATION Left 4/4/2019    LEFT TRANSMET AMPUTATION performed by Rigo Godinez MD at [address]      urostomy    OTHER SURGICAL HISTORY      pain pump    [place] INSJ PRPH CTR VAD W/SUBQ PORT UNDER 5 YR N/A 6/26/2018    SINGLE LUMEN PORT PLACEMENT WITH FLUORO performed by Abe Jennings MD at [facility] TOE AMPUTATION      L last toe    TONSILLECTOMY      URETEROTOMY           CURRENT MEDICATIONS       Discharge Medication List as of 1/29/2021  5:16 PM      CONTINUE these medications which have NOT CHANGED    Details   CHANTIX CONTINUING MONTH CHADWICK 1 MG tablet TAKE 1 TABLET BY MOUTH 2 TIMES A DAY, Disp-56 tablet, R-0Normal      HYDROcodone-acetaminophen (NORCO)  MG per tablet TAKE 1 TABLET BY MOUTH 3 TIMES DAILY AS NEEDED FOR PAIN. REDUCE DOSESA S PAIN BECOMES MANAGEABLE, Disp-90 tablet, R-0Normal      methylphenidate (RITALIN) 20 MG tablet TAKE 1 TABLET BY MOUTH DAILY, Disp-30 tablet, R-0Normal      Diapers & Supplies MISC Disp-100 each, R-11, PrintDiapers, chucks, wipes, and gloves for incontinence. [phone]      pilocarpine (SALAGEN) 7.5 MG tablet 1 tablet three times daily, Disp-90 tablet,R-5Normal      tiZANidine (ZANAFLEX) 4 MG tablet TAKE 3 TABLETS TWICE DAILY AS NEEDED, Disp-180 tablet,R-11Normal      pregabalin (LYRICA) 300 MG capsule TAKE 1 CAPSULE BY MOUTH 2 TIMES A DAY, Disp-60 capsule, R-5Normal      azelastine (ASTELIN) 0.1 % nasal spray USE 2 SPRAYS IN EACH NOSTRIL TWICE DAILY AS DIRECTED, Disp-30 mL,R-0Normal      hydroCHLOROthiazide (HYDRODIURIL) 25 MG tablet TAKE 1 TABLET BY MOUTH ONCE DAILY AS NEEDED, Disp-30 tablet,R-0Normal      cetirizine (ZYRTEC) 10 MG tablet Take 10 mg by mouth dailyHistorical Med      !! levETIRAcetam (KEPPRA) 500 MG tablet Take 1 tablet by mouth nightly, Disp-90 tablet,R-3Normal      Catheters MISC Disp-450 each,R-3, PrintCatheter supplies and lubricant 5 times daily G82.20  to [address] [facility] [phone]      !!  levETIRAcetam (KEPPRA) 500 MG tablet Take 1 tablet by mouth nightly, Disp-30 tablet, R-5Normal      !! glucose monitoring kit (FREESTYLE) monitoring kit DAILY Starting Tue 5/26/2020, Disp-1 kit, R-0, Normal      !! blood glucose monitor strips Test 1 times a day & as needed for symptoms of irregular blood glucose., Disp-100 strip, R-3, Normal      DULoxetine (CYMBALTA) 30 MG extended release capsule Take 1 capsule by mouth daily, Disp-30 capsule, R-11Normal      docusate sodium (COLACE) 100 MG capsule Take 200 mg by mouth as needed for ConstipationHistorical Med      PROAIR  (90 Base) MCG/ACT inhaler INHALE 1 PUFF INTO THE LUNGS EVERY 6 HOURS AS NEEDED FOR WHEEZING OR SHORTNESS OF BREATH, Disp-8.5 g, R-5Normal      Heparin Lock Flush (HEPARIN FLUSH, 100 UNITS/ML,) 100 UNIT/ML injection 3 mLs by Intercatheter route every 30 days No every 30 days but every 4-6 weeks. Flush port with 300 units heparin with 20 cc normal saline for ocrevus infusion for Multiple sclerosis and NS 20CC, Disp-1 Syringe, R-5Print      ipratropium-albuterol (DUONEB) 0.5-2.5 (3) MG/3ML SOLN nebulizer solution USE 1 UNIT DOSE IN NEBULIZER EVERY 4 TO 6 HOURS AS NEEDED., Disp-450 mL, R-11Normal      BACLOFEN, PAIN PUMP REFILL CHARGE, by Implant route continuous Indications: every 3 monthsHistorical Med      b complex vitamins capsule Take 1 capsule by mouth dailyHistorical Med      Multiple Vitamins-Minerals (THERAPEUTIC MULTIVITAMIN-MINERALS) tablet Take 1 tablet by mouth dailyHistorical Med      !! glucose monitoring kit (FREESTYLE) monitoring kit DAILY Starting Thu 11/15/2018, Disp-1 kit, R-0, Normal      !! blood glucose monitor strips Test 1 times a day & as needed for symptoms of irregular blood glucose., Disp-100 strip, R-0, Normal      baclofen (LIORESAL) 10 MG tablet Take 1 tablet by mouth 2 times daily as needed (muscle spasms), Disp-60 tablet, R-5Normal      Sodium Chloride Flush (SALINE FLUSH) 0.9 % SOLN Infuse 20 mLs intravenously every 30 days Not every 30 days but every 4-6 weeks as need with 300 units of heparin to flush port for Infusion of Ocrevus for multiple sclerosis, Disp-20 mL, R-5Print       !! - Potential duplicate medications found. Please discuss with provider.           ALLERGIES     Darvocet [propoxyphene n-acetaminophen], Morphine and related, and Propoxyphene    FAMILY HISTORY       Family History   Problem Relation Age of Onset   Dot Jillian Cancer Mother         breast    Colon Cancer Mother     Colon Polyps Mother     Diabetes Father     High Blood Pressure Father     Cancer Paternal Aunt         breast    Liver Cancer Paternal Aunt     Heart Disease Paternal Grandmother     Esophageal Cancer Neg Hx     Liver Disease Neg Hx     Rectal Cancer Neg Hx     Stomach Cancer Neg Hx           SOCIAL HISTORY       Social History     Socioeconomic History    Marital status:      Spouse name: None    Number of children: None    Years of education: None    Highest education level: None   Occupational History    None   Social Needs    Financial resource strain: None    Food insecurity     Worry: None     Inability: None    Transportation needs     Medical: None     Non-medical: None   Tobacco Use    Smoking status: Former Smoker     Packs/day: 0.00     Types: Cigarettes     Quit date: 1/8/2018     Years since quitting: 3.0    Smokeless tobacco: Never Used    Tobacco comment: hasn't smoked since 9/5/20   Substance and Sexual Activity    Alcohol use: Yes     Comment: rarley    Drug use: Yes     Types: Marijuana     Comment: daily     Sexual activity: None   Lifestyle    Physical activity     Days per week: None     Minutes per session: None    Stress: None   Relationships    Social connections     Talks on phone: None     Gets together: None     Attends Sikhism service: None     Active member of club or organization: None     Attends meetings of clubs or organizations: None     Relationship status: None    Intimate partner violence     Fear of current or ex partner: None     Emotionally abused: None     Physically abused: None     Forced sexual activity: None   Other Topics Concern    None   Social History Narrative    None       SCREENINGS    [place] Coma Scale  Eye Opening: Spontaneous  Best Verbal Response: Oriented  Best Motor Response: Obeys commands  [place] Coma Scale Score: 15      PHYSICAL EXAM    (up to 7 forlevel 4, 8 or more for level 5)     ED Triage Vitals [01/29/21 1340]   BP Temp Temp src Pulse Resp SpO2 Height Weight   119/82 98.4 °F (36.9 °C) -- 78 20 97 % -- --       Physical Exam  Vitals signs and nursing note reviewed. Constitutional:       General: She is not in acute distress. Appearance: Normal appearance. She is well-developed. She is not diaphoretic. HENT:      Head: Normocephalic and atraumatic. Right Ear: External ear normal.      Left Ear: External ear normal.      Mouth/Throat:      Pharynx: No oropharyngeal exudate. Eyes:      General:         Right eye: No discharge. Left eye: No discharge. Pupils: Pupils are equal, round, and reactive to light. Neck:      Musculoskeletal: Normal range of motion and neck supple. Thyroid: No thyromegaly. Cardiovascular:      Rate and Rhythm: Normal rate and regular rhythm. Heart sounds: Normal heart sounds. No murmur. No friction rub. Pulmonary:      Effort: Pulmonary effort is normal. No respiratory distress. Breath sounds: Normal breath sounds. No stridor. No wheezing. Abdominal:      General: Bowel sounds are normal. There is no distension. Palpations: Abdomen is soft. Tenderness: There is no abdominal tenderness. Musculoskeletal: Normal range of motion. Skin:     General: Skin is warm and dry. Capillary Refill: Capillary refill takes less than 2 seconds. Findings: No rash. Neurological:      General: No focal deficit present. Mental Status: She is alert and oriented to person, place, and time. Mental status is at baseline. Cranial Nerves: No cranial nerve deficit. Sensory: No sensory deficit. Coordination: Coordination normal.      Comments:  is present confirms baseline. Psychiatric:         Mood and Affect: Mood normal.         Behavior: Behavior normal.         Thought Content:  Thought content normal.         Judgment: Judgment normal.           DIAGNOSTIC RESULTS     RADIOLOGY:   Non-plain film images such as CT, Ultrasound and MRI are read by the radiologist. Plain radiographic images are visualized and preliminarilyinterpreted by No att. providers found with the below findings:      Interpretation per the Radiologist below, if available at the time of this note:    CT Head WO Contrast   Final Result   No acute intracranial abnormality. Chronic white matter ischemic changes. A small lacunar infarct in the left basal ganglia. Maxillary and ethmoid sinusitis. Signed by Dr Perry Hameed on 1/29/2021 4:48 PM      XR CHEST PORTABLE   Final Result   No active cardiopulmonary disease. Signed by Dr Perry Hameed on 1/29/2021 3:28 PM          LABS:  Labs Reviewed   CBC WITH AUTO DIFFERENTIAL - Abnormal; Notable for the following components:       Result Value    MCHC 32.1 (*)     Neutrophils % 82.7 (*)     Lymphocytes % 13.3 (*)     Lymphocytes Absolute 0.7 (*)     All other components within normal limits   URINE RT REFLEX TO CULTURE - Abnormal; Notable for the following components:    Clarity, UA CLOUDY (*)     Blood, Urine SMALL (*)     Protein, UA 30 (*)     Nitrite, Urine POSITIVE (*)     Leukocyte Esterase, Urine LARGE (*)     All other components within normal limits   URINE DRUG SCREEN - Abnormal; Notable for the following components:    Cannabinoid Scrn, Ur Positive (*)     All other components within normal limits   COMPREHENSIVE METABOLIC PANEL W/ REFLEX TO MG FOR LOW K - Abnormal; Notable for the following components:    Glucose 112 (*)     CREATININE 0.3 (*)     Total Protein 6.1 (*)     All other components within normal limits   MICROSCOPIC URINALYSIS - Abnormal; Notable for the following components:    Bacteria, UA 4+ (*)     Crystals, UA NEG (*)     WBC, UA 65 (*)     RBC, UA 8 (*)     All other components within normal limits   RESPIRATORY PANEL, MOLECULAR, WITH COVID-19   CULTURE, BLOOD 1   CULTURE, BLOOD 2   CULTURE, URINE   LACTATE, SEPSIS   LEVETIRACETAM LEVEL       All other labs were within normal range or notreturned as of this dictation.     RE-ASSESSMENT        EMERGENCY DEPARTMENT COURSE and DIFFERENTIAL DIAGNOSIS/MDM:   Vitals:    Vitals:    01/29/21 1746 01/29/21 1811 01/29/21 1838 01/29/21 1845   BP:       Pulse: 80 76 74 71   Resp: 22 13 14 Temp:       SpO2: 95% 96% 96%        MDM  Patient has no tonic-clonic or any seizure activity witnessed here. Findings consistent with acute cystitis without hematuria the plan will be for antibiotic coverage I have spoken with Dr. Atif Davila the on-call neurologist that also sees this patient recommends 1 g of Keppra here and 750 daily adjustment to follow closely next week. PROCEDURES:    Procedures      FINAL IMPRESSION      1. Seizure-like activity ([address]    2.  Acute cystitis with hematuria          DISPOSITION/PLAN   DISPOSITION Decision To Discharge 01/29/2021 08:36:25 PM      PATIENT REFERRED TO:  [facility] EMERGENCY DEPT  [facility]  [phone]    If symptoms worsen    MD Manuel [facility] [address] [phone]    Schedule an appointment as soon as possible for a visit         DISCHARGE MEDICATIONS:  Discharge Medication List as of 1/29/2021  5:16 PM      START taking these medications    Details   cephALEXin (KEFLEX) 500 MG capsule Take 1 capsule by mouth 2 times daily for 7 days, Disp-14 capsule, R-0Print             (Please note that portions of this note were completed with a voice recognition program.  Efforts were made to edit the dictations but occasionallywords are mis-transcribed.)    Harry Springer [address] [place] [address]  01/29/21 6305

## 2021-01-29 NOTE — ED NOTES
Bed: 17  Expected date: 1/29/21  Expected time:   Means of arrival: [facility]  Comments:  Priyanka Vizcaino RN  01/29/21 4432

## 2021-01-30 NOTE — ED NOTES
Notified [facility] about discharge home. They will send a crew to transfer asap.       Roxy Bryant RN  01/29/21 1664

## 2021-01-30 NOTE — ED NOTES
Pt to be discharged home. Waiting for ambulance service to transport.       Felisha Marley RN  01/29/21 8950

## 2021-01-30 NOTE — ED PROVIDER NOTES
See PA note. Reviewed HPI, PE, labs, imaging. PA in contact with Neuro. Agree with A/P.      Jamia Marshall MD  01/30/21 4088

## 2021-01-31 LAB
KEPPRA: 2 UG/ML (ref 12–46)
ORGANISM: ABNORMAL
URINE CULTURE, ROUTINE: ABNORMAL
URINE CULTURE, ROUTINE: ABNORMAL

## 2021-02-01 DIAGNOSIS — R53.83 OTHER FATIGUE: ICD-10-CM

## 2021-02-01 DIAGNOSIS — G35 MS (MULTIPLE SCLEROSIS) (HCC): ICD-10-CM

## 2021-02-01 DIAGNOSIS — M79.621 PAIN IN BOTH UPPER ARMS: ICD-10-CM

## 2021-02-01 DIAGNOSIS — M54.2 PAIN, NECK: ICD-10-CM

## 2021-02-01 DIAGNOSIS — M62.838 MUSCLE SPASTICITY: ICD-10-CM

## 2021-02-01 DIAGNOSIS — M79.622 PAIN IN BOTH UPPER ARMS: ICD-10-CM

## 2021-02-01 RX ORDER — HYDROCODONE/ACETAMINOPHEN 10MG-325MG
TABLET ORAL
Qty: 90 TABLET | Refills: 0 | Status: SHIPPED | OUTPATIENT
Start: 2021-02-01 | End: 2021-03-01

## 2021-02-01 RX ORDER — PROMETHAZINE HYDROCHLORIDE 25 MG/1
TABLET ORAL
Qty: 12 TABLET | Refills: 0 | Status: SHIPPED | OUTPATIENT
Start: 2021-02-01 | End: 2021-06-14

## 2021-02-01 RX ORDER — METHYLPHENIDATE HYDROCHLORIDE 20 MG/1
TABLET ORAL
Qty: 30 TABLET | Refills: 0 | Status: SHIPPED | OUTPATIENT
Start: 2021-02-01 | End: 2021-03-01

## 2021-02-01 NOTE — TELEPHONE ENCOUNTER
Alyce called requesting a refill of the below medication which has been pended for you:     Requested Prescriptions     Pending Prescriptions Disp Refills    promethazine (PHENERGAN) 25 MG tablet [Pharmacy Med Name: PROMETHAZINE 25 MG DD**] 12 tablet 0     Sig: TAKE ONE TABLET BY MOUTH 3 TIMES DAILY AS NEEDED FOR NAUSEA       Last Appointment Date: 12/1/2020  Next Appointment Date: 6/9/2021    Allergies   Allergen Reactions    Darvocet [Propoxyphene N-Acetaminophen]      Talking out of her head    Morphine And Related Itching and Swelling    Propoxyphene Swelling

## 2021-02-02 ENCOUNTER — VIRTUAL VISIT (OUTPATIENT)
Dept: NEUROLOGY | Age: 51
End: 2021-02-02
Payer: MEDICARE

## 2021-02-02 DIAGNOSIS — M54.2 PAIN, NECK: ICD-10-CM

## 2021-02-02 DIAGNOSIS — G82.20 PARAPLEGIA (HCC): ICD-10-CM

## 2021-02-02 DIAGNOSIS — M62.838 MUSCLE SPASTICITY: ICD-10-CM

## 2021-02-02 DIAGNOSIS — R53.1 WEAKNESS: ICD-10-CM

## 2021-02-02 DIAGNOSIS — G35 MS (MULTIPLE SCLEROSIS) (HCC): Primary | ICD-10-CM

## 2021-02-02 DIAGNOSIS — R20.0 NUMBNESS: ICD-10-CM

## 2021-02-02 DIAGNOSIS — R53.83 OTHER FATIGUE: ICD-10-CM

## 2021-02-02 DIAGNOSIS — R56.9 SEIZURE (HCC): ICD-10-CM

## 2021-02-02 PROCEDURE — G8427 DOCREV CUR MEDS BY ELIG CLIN: HCPCS | Performed by: PSYCHIATRY & NEUROLOGY

## 2021-02-02 PROCEDURE — 99214 OFFICE O/P EST MOD 30 MIN: CPT | Performed by: PSYCHIATRY & NEUROLOGY

## 2021-02-02 PROCEDURE — 3017F COLORECTAL CA SCREEN DOC REV: CPT | Performed by: PSYCHIATRY & NEUROLOGY

## 2021-02-02 NOTE — PROGRESS NOTES
Chief Complaint   Patient presents with    Multiple Sclerosis     1 month follow up        Ken Tompkins is a 49y.o. year old female who is seen for evaluation of multiple sclerosis. The patient indicates that she was diagnosed with multiple sclerosis in 1994. At that time to develop some visual disturbances including blurred vision and double vision. Within eight months she was in a wheelchair. She currently has no movement of the lower extremities. She does have some increased Spasticity in the legs worse in the arms. She underwent a baclofen pump with Dr. Jorge Lisa with some complications. She is doing better from this. She currently sees Dr. nichols for her pump management. She does have some cognitive issues. She denies diplopia, dysarthria, or dysphagia. She does have some incontinence of bladder. She does have pain in the hips and lower extremities. She also has headaches with pain behind both eyes. She was followed by Dr. Jeniffer Palacio of neurology. She follows up today for evaluation. She is currently in a wheelchair. Since her last visit she is doing better with physical therapy. She had an accidental overdose with her baclofen pump and had a seizure. Doing better on. Recently admitted with seizure. Was off Chantix a few weeks and had a UTI. Had seizure in oct 2014 with seizure due to baclofen overose. This was her second seizure. Off Ocrevus. Occassionally gets shooting pain in head. Headache in the back of the head. Cymbalta helps mood. Had a seizure on 1/29/21 and went to the ER. Found to have a UTI and placed on Keppra increased to 750 mg bid. Now with fatigue and being treated for UTI.     Active Ambulatory Problems     Diagnosis Date Noted    Muscle spasticity     Peripheral vascular disease ([facility] 02/01/2016    MS (multiple sclerosis) ([facility] 06/08/2016    Pain in both upper arms 08/04/2016    Numbness 08/04/2016    Other fatigue 08/04/2016    Weakness 08/04/2016    Chronic pain 08/19/2016    Hx of seizure disorder 08/19/2016    Insomnia 06/28/2017    Decubitus ulcer of sacral region 06/28/2017    Pain, neck 09/06/2017    Venous stasis ulcer of left lower extremity ([address] 11/26/2018    S/P transmetatarsal amputation of foot, left (HCC) 04/17/2019    Chronic constipation 05/02/2019    Colostomy in place ([address] 05/02/2019    Pressure injury of sacral region, stage 3 ([address] 10/17/2019    Paraplegia ([address] 11/05/2019    Arthralgia of hip 12/03/2019    Seizure ([address] 05/26/2020    Encounter for care related to vascular access port 12/10/2020    Continuous leakage of urine 01/11/2021     Resolved Ambulatory Problems     Diagnosis Date Noted    Neuropathic ulcer of right foot, limited to breakdown of skin ([address] 02/01/2016    Sepsis ([address] 08/19/2016    Urinary tract infection 08/19/2016    Hypokalemia 08/20/2016    Hypokalemia 08/20/2016    Abnormal EKG     Encephalopathy 08/25/2016    Elevated troponin level     Neuropathic ulcer of left foot, limited to breakdown of skin ([address] 04/10/2017    Acute bronchitis 06/28/2017    Acute sinusitis 06/28/2017    Open wound of ankle 06/28/2017    Vitamin D deficiency 06/28/2017    Back pain 06/28/2017    Breakdown (mechanical) of cranial or spinal infusion catheter, initial encounter 10/25/2016    CAFL (chronic airflow limitation) ([address] 06/28/2017    Cough 06/28/2017    Encounter for screening for other disorder 06/28/2017    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    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    Difficult or painful urination 06/28/2017    Headache 06/28/2017    Hyperlipidemia 06/28/2017    Influenza 06/28/2017    Breast lump 06/28/2017    Patient overweight 06/28/2017    Hay fever 06/28/2017    Cobalamin deficiency 06/28/2017    Disease caused by fungus 06/28/2017    Colostomy and enterostomy malfunction ([address] 09/06/2017    Atherosclerosis of native arteries of right leg with ulceration of calf ([address] 11/26/2018    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 layer exposed ([facility] 11/26/2018    Open wound of second toe of left foot 12/11/2018    Type 2 diabetes mellitus with left diabetic foot ulcer ([facility] 04/04/2019    Encounter for screening colonoscopy 05/02/2019    Family history of colon cancer 05/02/2019    Surgical wound breakdown 07/24/2019    Neuropathic foot ulcer, left, limited to breakdown of skin ([facility] 05/05/2020    Type 2 diabetes mellitus without complication ([facility] 19/01/7707    Smoking 08/25/2020     Past Medical History:   Diagnosis Date    Ankle wound     Asthma        Past Surgical History:   Procedure Laterality Date    BACLOFEN PUMP IMPLANTATION      COLONOSCOPY N/A 9/27/2019    Dr Pastora Tracey ileum through ostomy-patent and healthy appearing anastomosis in the right colon-entero colonic anastomosis-10 yr recall    COLOSTOMY     [facility]      Right    FOOT AMPUTATION Left 4/4/2019    LEFT TRANSMET AMPUTATION performed by Emilia De La Rosa MD at [address]      urostomy    OTHER SURGICAL HISTORY      pain pump    MN INSJ PRPH CTR VAD W/SUBQ PORT UNDER 5 YR N/A 6/26/2018    SINGLE LUMEN PORT PLACEMENT WITH FLUORO performed by Jahaira Grewal MD at [address] TOE AMPUTATION      L last toe    TONSILLECTOMY      URETEROTOMY         Family History   Problem Relation Age of Onset    Cancer Mother         breast    Colon Cancer Mother     Colon Polyps Mother     Diabetes Father     High Blood Pressure Father     Cancer Paternal Aunt         breast    Liver Cancer Paternal Aunt     Heart Disease Paternal Grandmother     Esophageal Cancer Neg Hx     Liver Disease Neg Hx     Rectal Cancer Neg Hx     Stomach Cancer Neg Hx        Allergies   Allergen Reactions    Darvocet [Propoxyphene N-Acetaminophen]      Talking out of her head    Morphine And Related Itching and Swelling    Propoxyphene Swelling       Social History Socioeconomic History    Marital status:      Spouse name: Not on file    Number of children: Not on file    Years of education: Not on file    Highest education level: Not on file   Occupational History    Not on file   Social Needs    Financial resource strain: Not on file    Food insecurity     Worry: Not on file     Inability: Not on file    Transportation needs     Medical: Not on file     Non-medical: Not on file   Tobacco Use    Smoking status: Former Smoker     Packs/day: 0.00     Types: Cigarettes     Quit date: 1/8/2018     Years since quitting: 3.0    Smokeless tobacco: Never Used    Tobacco comment: hasn't smoked since 9/5/20   Substance and Sexual Activity    Alcohol use: Yes     Comment: yaritza    Drug use: Yes     Types: Marijuana     Comment: daily     Sexual activity: Not on file   Lifestyle    Physical activity     Days per week: Not on file     Minutes per session: Not on file    Stress: Not on file   Relationships    Social connections     Talks on phone: Not on file     Gets together: Not on file     Attends Buddhist service: Not on file     Active member of club or organization: Not on file     Attends meetings of clubs or organizations: Not on file     Relationship status: Not on file    Intimate partner violence     Fear of current or ex partner: Not on file     Emotionally abused: Not on file     Physically abused: Not on file     Forced sexual activity: Not on file   Other Topics Concern    Not on file   Social History Narrative    Not on file     Review of Systems     Constitutional  No fever or chills. No diaphoresis or significant fatigue. HENT   No tinnitus or significant hearing loss. Eyes  no sudden vision change or eye pain  Respiratory  no significant shortness of breath or cough  Cardiovascular  no chest pain No palpitations or significant leg swelling  Gastrointestinal  no abdominal swelling or pain.     Genitourinary  No difficulty urinating, dysuria  Musculoskeletal  no back pain or myalgia. Skin  no color change or rash  Neurologic  No seizures. No lateralizing weakness. Hematologic  no easy bruising or excessive bleeding. Psychiatric  no severe anxiety or nervousness. All other review of systems are negative.       Current Outpatient Medications   Medication Sig Dispense Refill    methylphenidate (RITALIN) 20 MG tablet TAKE 1 TABLET BY MOUTH DAILY 30 tablet 0    NORCO  MG per tablet TAKE 1 TABLET BY MOUTH 3 TIMES DAILY AS NEEDED FOR PAIN. REDUCE DOSESA S PAIN BECOMES MANAGEABLE 90 tablet 0    promethazine (PHENERGAN) 25 MG tablet TAKE ONE TABLET BY MOUTH 3 TIMES DAILY AS NEEDED FOR NAUSEA 12 tablet 0    cephALEXin (KEFLEX) 500 MG capsule Take 1 capsule by mouth 2 times daily for 7 days 14 capsule 0    CHANTIX CONTINUING MONTH CHADWICK 1 MG tablet TAKE 1 TABLET BY MOUTH 2 TIMES A DAY 56 tablet 0    Diapers & Supplies MISC Diapers, chucks, wipes, and gloves for incontinence. [phone] each 11    pilocarpine (SALAGEN) 7.5 MG tablet 1 tablet three times daily 90 tablet 5    tiZANidine (ZANAFLEX) 4 MG tablet TAKE 3 TABLETS TWICE DAILY AS NEEDED 180 tablet 11    azelastine (ASTELIN) 0.1 % nasal spray USE 2 SPRAYS IN EACH NOSTRIL TWICE DAILY AS DIRECTED 30 mL 0    hydroCHLOROthiazide (HYDRODIURIL) 25 MG tablet TAKE 1 TABLET BY MOUTH ONCE DAILY AS NEEDED 30 tablet 0    cetirizine (ZYRTEC) 10 MG tablet Take 10 mg by mouth daily      Catheters MISC Catheter supplies and lubricant 5 times daily G82.20  to [employer] [phone] 450 each 3    levETIRAcetam (KEPPRA) 500 MG tablet Take 1 tablet by mouth nightly 30 tablet 5    glucose monitoring kit (FREESTYLE) monitoring kit 1 kit by Does not apply route daily 1 kit 0    blood glucose monitor strips Test 1 times a day & as needed for symptoms of irregular blood glucose.  100 strip 3    DULoxetine (CYMBALTA) 30 MG extended release capsule Take 1 capsule by mouth daily 30 capsule 11    docusate sodium (COLACE) 100 MG capsule Take 200 mg by mouth as needed for Constipation      PROAIR  (90 Base) MCG/ACT inhaler INHALE 1 PUFF INTO THE LUNGS EVERY 6 HOURS AS NEEDED FOR WHEEZING OR SHORTNESS OF BREATH 8.5 g 5    Heparin Lock Flush (HEPARIN FLUSH, 100 UNITS/ML,) 100 UNIT/ML injection 3 mLs by Intercatheter route every 30 days No every 30 days but every 4-6 weeks. Flush port with 300 units heparin with 20 cc normal saline for ocrevus infusion for Multiple sclerosis and NS 20CC 1 Syringe 5    ipratropium-albuterol (DUONEB) 0.5-2.5 (3) MG/3ML SOLN nebulizer solution USE 1 UNIT DOSE IN NEBULIZER EVERY 4 TO 6 HOURS AS NEEDED. 450 mL 11    BACLOFEN, PAIN PUMP REFILL CHARGE, by Implant route continuous Indications: every 3 months      b complex vitamins capsule Take 1 capsule by mouth daily      Multiple Vitamins-Minerals (THERAPEUTIC MULTIVITAMIN-MINERALS) tablet Take 1 tablet by mouth daily      glucose monitoring kit (FREESTYLE) monitoring kit 1 kit by Does not apply route daily 1 kit 0    blood glucose monitor strips Test 1 times a day & as needed for symptoms of irregular blood glucose. 100 strip 0    baclofen (LIORESAL) 10 MG tablet Take 1 tablet by mouth 2 times daily as needed (muscle spasms) 60 tablet 5    Sodium Chloride Flush (SALINE FLUSH) 0.9 % SOLN Infuse 20 mLs intravenously every 30 days Not every 30 days but every 4-6 weeks as need with 300 units of heparin to flush port for Infusion of Ocrevus for multiple sclerosis 20 mL 5    pregabalin (LYRICA) 300 MG capsule TAKE 1 CAPSULE BY MOUTH 2 TIMES A DAY 60 capsule 5     No current facility-administered medications for this visit. There were no vitals taken for this visit.       Lab Results   Component Value Date    [id number] 05/02/2019     Lab Results   Component Value Date    WBC 5.6 01/29/2021    HGB 14.3 01/29/2021    HCT 44.5 01/29/2021    MCV 86.7 01/29/2021     01/29/2021 Lab Results   Component Value Date     01/29/2021    K 4.2 01/29/2021     01/29/2021    CO2 25 01/29/2021    BUN 11 01/29/2021    CREATININE 0.3 (L) 01/29/2021    GLUCOSE 112 (H) 01/29/2021    CALCIUM 9.1 01/29/2021    PROT 6.1 (L) 01/29/2021    LABALBU 4.2 01/29/2021    BILITOT 0.3 01/29/2021    ALKPHOS 86 01/29/2021    AST 16 01/29/2021    ALT 21 01/29/2021    LABGLOM >60 01/29/2021    GFRAA >59 01/29/2021    GLOB 2.7 08/19/2016     Impression   1.. No foci of abnormal T2 signal or enhancement within the cervical   cord to suggest plaques of multiple sclerosis or mass. 2. Mild bulging of the disc at C5-C6 with uncinate spurring   contributing to neural foraminal narrowing. There is no central   stenosis. The remaining cervical disc levels are unremarkable. Signed by Dr Lawrence Alvares on 8/14/2017 5:05 PM           Assessment    ICD-10-CM    1. MS (multiple sclerosis) ([facility]  G35    2. Pain, neck  M54.2    3. Muscle spasticity  M62.838    4. Other fatigue  R53.83    5. Weakness  R53.1    6. Numbness  R20.0    7. Seizure ([address]  R56.9    8. Paraplegia ([facility])  G82.20        Her neurological examination previously was significant for no movement in the lower extremities. She had some altered sensation in the legs along with some spasticity. She's wheelchair-bound. Her MRI of the brain revealed no new lesions. There was extensive white matter lesions. , Her cervical, thoracic, and lumbosacral spine were relatively unremarkable. She was agreeable to be started on Gilenya with no issues. stable. She had been on Copaxone but came off of this on her own. She denies any clear relapses over the last several years. She'll be referred to physical therapy as well. She will have a CBC and CMP every 3 months. The patient indicated understanding of the management plan. At this time she will be referred to [facility] as per her wishes for some experimental treatments.  She is to reduce her Keppra to 750 mg daily as

## 2021-02-03 LAB
BLOOD CULTURE, ROUTINE: NORMAL
CULTURE, BLOOD 2: NORMAL

## 2021-02-04 NOTE — HOME CARE
[phone]     Ordering Center:     [address] [place] [address],[address]  [address] [facility] [address] [id number] [phone]  WOUND CARE Dept: [address] [facility] [phone]    Patient Information:      George Conley  [address] [place] [id number]   : 1970  AGE: 49 y.o. GENDER: female   EPISODE DATE: 2021    Insurance:      PRIMARY INSURANCE:  Plan: MEDICARE PART A AND B  Coverage: MEDICARE  Effective Date: 2015  Group Number: [unfilled]  Subscriber Number: [id number] - (Medicare)    Payor/Plan Subscr  Sex Relation Sub. Ins. ID Effective Group Num   1. [facility] [place] 1970 Female Self [id number] 1/1/15                                    [address]  [address] [address] 1970 Female Self [id number] 1/15/15                                    P.O.        Patient Wound Information:      Problem List Items Addressed This Visit     Paraplegia ([address] (Chronic)    MS (multiple sclerosis) ([facility])    * (Principal) Pressure injury of sacral region, stage 3 ([address] - Primary    Continuous leakage of urine          WOUNDS REQUIRING DRESSING SUPPLIES:     Wound 21 Sacrum Right wound 1- coccyx right stage 3 (Active)   Wound Image   21 1325   Wound Etiology Pressure Stage  3 21 1325   Dressing Status New dressing applied 21 1403   Wound Cleansed Soap and water 21 1403   Dressing/Treatment Alginate;ABD;Tape/Soft cloth adhesive tape 21 1403   Wound Length (cm) 0.3 cm 21 1325   Wound Width (cm) 0.2 cm 21 1325   Wound Depth (cm) 0.1 cm 21 1325   Wound Surface Area (cm^2) 0.06 cm^2 21 1325   Change in Wound Size % (l*w) 88 21 1325   Wound Volume (cm^3) 0.01 cm^3 21 1325   Wound Healing % 90 21 132   Post-Procedure Length (cm) 0.3 cm 01/26/21 1333   Post-Procedure Width (cm) 0.2 cm 01/26/21 1333   Post-Procedure Depth (cm) 0.1 cm 01/26/21 1333   Post-Procedure Surface Area (cm^2) 0.06 cm^2 01/26/21 1333   Post-Procedure Volume (cm^3) 0.01 cm^3 01/26/21 1333   Distance Tunneling (cm) 0 cm 01/26/21 1325   Tunneling Position ___ O'Clock 0 01/26/21 1325   Undermining Starts ___ O'Clock 0 01/26/21 1325   Undermining Ends___ O'Clock 0 01/26/21 1325   Undermining Maxium Distance (cm) 0 01/26/21 1325   Wound Assessment Pink/red 01/26/21 1325   Drainage Amount Scant 01/26/21 1325   Drainage Description Serosanguinous 01/26/21 1325   Odor None 01/26/21 1325   Audelia-wound Assessment Intact 01/26/21 1325   Margins Attached edges 01/26/21 1325   Wound Thickness Description not for Pressure Injury Full thickness 01/11/21 1403   Number of days: 23          Supplies Requested :      WOUND #: 1   PRIMARY DRESSING:  Alginate rope   Cover and Secure with:  Other ABD pad     FREQUENCY OF DRESSING CHANGES:  Daily         ADDITIONAL ITEMS:  [] Gloves Small  [x] Gloves Medium [] Gloves Large [] Gloves XLarge  [] Tape 1\" [x] Tape 2\" [] Tape 3\"  [x] Medipore Tape  [x] Saline  [] Skin Prep   [] Adhesive Remover   [x] Cotton Tip Applicators   [] Other:    Patient Wound(s) Debrided: [x] Yes if yes please add date 1/26/21   [] No    Debribement Type: Excisional/Sharp    Patient currently being seen by Home Health: [] Yes   [x] No    Duration for needed supplies:  []15  [x]30  []60  []90 Days    Electronically signed by Dorsey Homans, APRN - CNP on 1/11/2021 at 1:05 PM     Provider Information:      PROVIDER'S NAME: Sam SCHWARTZ    NPI: [id number]

## 2021-02-09 ENCOUNTER — TELEPHONE (OUTPATIENT)
Dept: INTERNAL MEDICINE | Age: 51
End: 2021-02-09

## 2021-02-09 DIAGNOSIS — N39.45 CONTINUOUS LEAKAGE OF URINE: Primary | ICD-10-CM

## 2021-02-09 NOTE — TELEPHONE ENCOUNTER
Alyce requests that [facility] nurse  return their call. The best time to reach her is Anytime. Pt called to schedule a ED follow up. Pt did not want to see anyone but Bryce Hollingsworth. I went ahead and scheduled it for next available. Please call pt back if she needs to come in sooner. Pt was in the ER for a UTI. Pt said she will be coming to the [facility] tomorrow for another appt if she can stop by and do another urine sample. Thank you.

## 2021-03-01 DIAGNOSIS — M62.838 MUSCLE SPASTICITY: ICD-10-CM

## 2021-03-01 DIAGNOSIS — M79.622 PAIN IN BOTH UPPER ARMS: ICD-10-CM

## 2021-03-01 DIAGNOSIS — M79.621 PAIN IN BOTH UPPER ARMS: ICD-10-CM

## 2021-03-01 DIAGNOSIS — M54.2 PAIN, NECK: ICD-10-CM

## 2021-03-01 DIAGNOSIS — G35 MS (MULTIPLE SCLEROSIS) (HCC): ICD-10-CM

## 2021-03-01 DIAGNOSIS — R53.83 OTHER FATIGUE: ICD-10-CM

## 2021-03-01 RX ORDER — METHYLPHENIDATE HYDROCHLORIDE 20 MG/1
TABLET ORAL
Qty: 30 TABLET | Refills: 0 | Status: SHIPPED | OUTPATIENT
Start: 2021-03-03 | End: 2021-04-01

## 2021-03-01 RX ORDER — HYDROCODONE BITARTRATE AND ACETAMINOPHEN 10; 325 MG/1; MG/1
TABLET ORAL
Qty: 90 TABLET | Refills: 0 | Status: SHIPPED | OUTPATIENT
Start: 2021-03-03 | End: 2021-04-01

## 2021-03-01 RX ORDER — VARENICLINE TARTRATE 1 MG/1
TABLET, FILM COATED ORAL
Qty: 56 TABLET | Refills: 0 | Status: SHIPPED | OUTPATIENT
Start: 2021-03-01 | End: 2021-04-01

## 2021-03-01 NOTE — TELEPHONE ENCOUNTER
Requested Prescriptions     Pending Prescriptions Disp Refills    HYDROcodone-acetaminophen ([address])  MG per tablet [Pharmacy Med Name: HYDROCOD-APAP  MG] 90 tablet 0     Sig: TAKE 1 TABLET BY MOUTH 3 TIMES DAILY AS NEEDED FOR PAIN.  REDUCE DOSESAS PAIN BECOMES MANAGEABLE    methylphenidate (RITALIN) 20 MG tablet [Pharmacy Med Name: METHYLPHENIDAT 20MG TAB DD] 30 tablet 0     Sig: TAKE 1 TABLET BY MOUTH DAILY       Last Office Visit:  2/2/2021  Next Office Visit:  5/4/2021  Last Medication Refill:  Both 2/1/2021   Falguni Sanchez up to date:  12/31/2020    *RX updated to reflect   3/3/2021  fill date*

## 2021-03-02 ENCOUNTER — HOSPITAL ENCOUNTER (OUTPATIENT)
Dept: WOUND CARE | Age: 51
Discharge: HOME | End: 2021-03-02
Payer: MEDICARE

## 2021-03-02 VITALS
WEIGHT: 146.39 LBS | DIASTOLIC BLOOD PRESSURE: 77 MMHG | RESPIRATION RATE: 18 BRPM | TEMPERATURE: 98.7 F | HEART RATE: 72 BPM | SYSTOLIC BLOOD PRESSURE: 120 MMHG | BODY MASS INDEX: 21.68 KG/M2 | HEIGHT: 69 IN

## 2021-03-02 DIAGNOSIS — G82.20 PARAPLEGIA (HCC): Primary | Chronic | ICD-10-CM

## 2021-03-02 DIAGNOSIS — L89.153 PRESSURE INJURY OF SACRAL REGION, STAGE 3 (HCC): ICD-10-CM

## 2021-03-02 DIAGNOSIS — N39.45 CONTINUOUS LEAKAGE OF URINE: ICD-10-CM

## 2021-03-02 PROCEDURE — 99214 OFFICE O/P EST MOD 30 MIN: CPT

## 2021-03-02 PROCEDURE — 99212 OFFICE O/P EST SF 10 MIN: CPT | Performed by: SURGERY

## 2021-03-02 RX ORDER — ONDANSETRON 4 MG/1
4 TABLET, FILM COATED ORAL EVERY 8 HOURS PRN
COMMUNITY
End: 2021-12-09

## 2021-03-02 ASSESSMENT — PAIN DESCRIPTION - LOCATION: LOCATION: NECK

## 2021-03-02 NOTE — PLAN OF CARE
Problem: Wound:  Goal: Will show signs of wound healing; wound closure and no evidence of infection  Description: Will show signs of wound healing; wound closure and no evidence of infection  Outcome: Completed     Problem: Pressure Ulcer:  Goal: Signs of wound healing will improve  Description: Signs of wound healing will improve  Outcome: Completed  Goal: Absence of new pressure ulcer  Description: Absence of new pressure ulcer  Outcome: Completed  Goal: Will show no infection signs and symptoms  Description: Will show no infection signs and symptoms  Outcome: Completed

## 2021-03-02 NOTE — PROGRESS NOTES
[address]   Progress Note and Procedure Note      Sebas Robins  MEDICAL RECORD NUMBER:  [id number]  AGE: 49 y.o. GENDER: female  : 1970  EPISODE DATE:  3/2/2021    Subjective:     Chief Complaint   Patient presents with    Wound Check     wound, has questions re: skin graft to help wound from reopening         HISTORY of PRESENT ILLNESS HPI     Sebas Robins is a 49 y.o. female who presents today for wound/ulcer evaluation.    History of Wound Context: Pt with sacral wound here for eval/treat  Wound/Ulcer Pain Timing/Severity: none  Quality of pain: N/A  Severity:  0 / 10   Modifying Factors: None  Associated Signs/Symptoms: none    Ulcer Identification:  Ulcer Type: pressure  Contributing Factors: chronic pressure, decreased mobility and shear force    Wound: pressure        PAST MEDICAL HISTORY        Diagnosis Date    Ankle wound     and toe wound    Aptyalism 2017    Asthma     Back pain 2017    Binocular vision disorder with diplopia 2017    [facility]FL [facility]chronic airflow limitation) ([facility]) 2017    Hay fever 2017    Headache 2017    MS (multiple sclerosis) ([address]     Muscle spasticity     Neuropathic ulcer of left foot, limited to breakdown of skin ([address] 4/10/2017    Numbness 2016    Open wound of ankle 2017    Open wound of second toe of left foot 2018    Peripheral vascular disease ([address]     Seizure ([address]     occ. related to ms    Sepsis ([address] 2016    Weakness 2016       PAST SURGICAL HISTORY    Past Surgical History:   Procedure Laterality Date    BACLOFEN PUMP IMPLANTATION      COLONOSCOPY N/A 2019    Dr Corinne Fruit ileum through ostomy-patent and healthy appearing anastomosis in the right colon-entero colonic anastomosis-10 yr recall    COLOSTOMY      FEMUR FRACTURE SURGERY      Right    FOOT AMPUTATION Left 2019    LEFT TRANSMET AMPUTATION performed by Myla Ponce MD at [address] [place] [address] HYSTERECTOMY      OTHER SURGICAL HISTORY      urostomy    OTHER SURGICAL HISTORY      pain pump    IN [facility] VAD W/SUBQ PORT UNDER 5 YR N/A 6/26/2018    SINGLE LUMEN PORT PLACEMENT WITH FLUORO performed by Edie Bejarano MD at [address] TOE AMPUTATION      L last toe    TONSILLECTOMY      URETEROTOMY         FAMILY HISTORY    Family History   Problem Relation Age of Onset   [place] Cancer Mother         breast    Colon Cancer Mother     Colon Polyps Mother     Diabetes Father     High Blood Pressure Father     Cancer Paternal Aunt         breast    Liver Cancer Paternal Aunt     Heart Disease Paternal Grandmother     Esophageal Cancer Neg Hx     Liver Disease Neg Hx     Rectal Cancer Neg Hx     Stomach Cancer Neg Hx        SOCIAL HISTORY    Social History     Tobacco Use    Smoking status: Former Smoker     Packs/day: 0.00     Types: Cigarettes     Quit date: 1/8/2018     Years since quitting: 3.1    Smokeless tobacco: Never Used    Tobacco comment: hasn't smoked since 9/5/20, taking chantix   Substance Use Topics    Alcohol use: Yes     Comment: yaritza    Drug use: Yes     Types: Marijuana     Comment: daily        ALLERGIES    Allergies   Allergen Reactions    Darvocet [Propoxyphene N-Acetaminophen]      Talking out of her head    Morphine And Related Itching and Swelling    Propoxyphene Swelling       MEDICATIONS    Current Outpatient Medications on File Prior to Encounter   Medication Sig Dispense Refill    ondansetron (ZOFRAN) 4 MG tablet Take 4 mg by mouth every 8 hours as needed for Nausea or Vomiting      CHANTIX CONTINUING MONTH CHADWICK 1 MG tablet TAKE 1 TABLET BY MOUTH 2 TIMES A DAY 56 tablet 0    [START ON 3/3/2021] HYDROcodone-acetaminophen (NORCO)  MG per tablet TAKE 1 TABLET BY MOUTH 3 TIMES DAILY AS NEEDED FOR PAIN.  REDUCE DOSESAS PAIN BECOMES MANAGEABLE 90 tablet 0    [START ON 3/3/2021] methylphenidate (RITALIN) 20 MG tablet TAKE 1 TABLET BY MOUTH DAILY 30 tablet 0  promethazine (PHENERGAN) 25 MG tablet TAKE ONE TABLET BY MOUTH 3 TIMES DAILY AS NEEDED FOR NAUSEA 12 tablet 0    Diapers & Supplies MISC Diapers, chucks, wipes, and gloves for incontinence. [phone] each 11    pilocarpine (SALAGEN) 7.5 MG tablet 1 tablet three times daily 90 tablet 5    tiZANidine (ZANAFLEX) 4 MG tablet TAKE 3 TABLETS TWICE DAILY AS NEEDED 180 tablet 11    pregabalin (LYRICA) 300 MG capsule TAKE 1 CAPSULE BY MOUTH 2 TIMES A DAY 60 capsule 5    azelastine (ASTELIN) 0.1 % nasal spray USE 2 SPRAYS IN EACH NOSTRIL TWICE DAILY AS DIRECTED 30 mL 0    hydroCHLOROthiazide (HYDRODIURIL) 25 MG tablet TAKE 1 TABLET BY MOUTH ONCE DAILY AS NEEDED 30 tablet 0    cetirizine (ZYRTEC) 10 MG tablet Take 10 mg by mouth daily      Catheters MISC Catheter supplies and lubricant 5 times daily G82.20  to [facility] [phone] 450 each 3    levETIRAcetam (KEPPRA) 500 MG tablet Take 1 tablet by mouth nightly (Patient taking differently: Take 750 mg by mouth nightly ) 30 tablet 5    glucose monitoring kit (FREESTYLE) monitoring kit 1 kit by Does not apply route daily 1 kit 0    blood glucose monitor strips Test 1 times a day & as needed for symptoms of irregular blood glucose. 100 strip 3    DULoxetine (CYMBALTA) 30 MG extended release capsule Take 1 capsule by mouth daily 30 capsule 11    docusate sodium (COLACE) 100 MG capsule Take 200 mg by mouth as needed for Constipation      PROAIR  (90 Base) MCG/ACT inhaler INHALE 1 PUFF INTO THE LUNGS EVERY 6 HOURS AS NEEDED FOR WHEEZING OR SHORTNESS OF BREATH 8.5 g 5    Heparin Lock Flush (HEPARIN FLUSH, 100 UNITS/ML,) 100 UNIT/ML injection 3 mLs by Intercatheter route every 30 days No every 30 days but every 4-6 weeks.  Flush port with 300 units heparin with 20 cc normal saline for ocrevus infusion for Multiple sclerosis and NS 20CC 1 Syringe 5    ipratropium-albuterol (DUONEB) 0.5-2.5 (3) MG/3ML SOLN nebulizer solution USE 1 UNIT DOSE IN NEBULIZER EVERY 4 TO 6 HOURS AS NEEDED. 450 mL 11    BACLOFEN, PAIN PUMP REFILL CHARGE, by Implant route continuous Indications: every 3 months      b complex vitamins capsule Take 1 capsule by mouth daily      Multiple Vitamins-Minerals (THERAPEUTIC MULTIVITAMIN-MINERALS) tablet Take 1 tablet by mouth daily      glucose monitoring kit (FREESTYLE) monitoring kit 1 kit by Does not apply route daily 1 kit 0    blood glucose monitor strips Test 1 times a day & as needed for symptoms of irregular blood glucose. 100 strip 0    baclofen (LIORESAL) 10 MG tablet Take 1 tablet by mouth 2 times daily as needed (muscle spasms) 60 tablet 5    Sodium Chloride Flush (SALINE FLUSH) 0.9 % SOLN Infuse 20 mLs intravenously every 30 days Not every 30 days but every 4-6 weeks as need with 300 units of heparin to flush port for Infusion of Ocrevus for multiple sclerosis 20 mL 5     No current facility-administered medications on file prior to encounter. REVIEW OF SYSTEMS    A comprehensive review of systems was negative.     Objective:      /77   Pulse 72   Temp 98.7 °F (37.1 °C) (Tympanic)   Resp 18   Ht 5' 9\" (1.753 m)   Wt 146 lb 6.2 oz (66.4 kg)   BMI 21.62 kg/m²     Wt Readings from Last 3 Encounters:   03/02/21 146 lb 6.2 oz (66.4 kg)   01/26/21 148 lb 2.4 oz (67.2 kg)   01/12/21 148 lb (67.1 kg)       PHYSICAL EXAM    General Appearance: alert and oriented to person, place and time, well developed and well- nourished, in no acute distress  Skin: warm and dry, no rash or erythema  Head: normocephalic and atraumatic  Eyes: pupils equal, round, and reactive to light, extraocular eye movements intact, conjunctivae normal  ENT: tympanic membrane, external ear and ear canal normal bilaterally, nose without deformity, nasal mucosa and turbinates normal without polyps, lips teeth and gums normal  Neck: supple and non-tender without mass, no thyromegaly or thyroid nodules, no cervical lymphadenopathy  Pulmonary/Chest: clear to auscultation bilaterally- no wheezes, rales or rhonchi, normal air movement, no respiratory distress  Cardiovascular: normal rate, regular rhythm, normal S1 and S2, no murmurs, rubs, clicks, or gallops, distal pulses intact, no carotid bruits  Abdomen: soft, non-tender, non-distended, normal bowel sounds, no masses or organomegaly  Extremities: no cyanosis, clubbing or edema  Musculoskeletal: normal range of motion, no joint swelling, deformity or tenderness      Assessment:      Patient Active Problem List   Diagnosis Code    Muscle spasticity M62.838    Peripheral vascular disease ([facility]) I73.9    MS (multiple sclerosis) ([facility]) G35    Pain in both upper arms M79.621, M79.622    Numbness R20.0    Other fatigue R53.83    Weakness R53.1    Chronic pain G89.29    Hx of seizure disorder Z86.69    Insomnia G47.00    Decubitus ulcer of sacral region L89.159    Pain, neck M54.2    Venous stasis ulcer of left lower extremity ([facility]) I83.029, L97.929    S/P transmetatarsal amputation of foot, left ([facility]) M50.471    Chronic constipation K59.09    Colostomy in place [facility]) Z93.3    Pressure injury of sacral region, stage 3 ([facility]) L89.153    Paraplegia ([facility]) G82.20    Arthralgia of hip M25.559    Seizure ([facility] R56.9    Encounter for care related to vascular access port Z45.2    Continuous leakage of urine N39.45             Wound 01/11/21 Sacrum Right wound 1- coccyx right stage 3 (Active)   Wound Image   03/02/21 1356   Wound Etiology Pressure Stage  3 03/02/21 1356   Dressing Status Clean;Dry; Intact 03/02/21 1356   Wound Cleansed Soap and water 03/02/21 1356   Dressing/Treatment Alginate;ABD;Tape/Soft cloth adhesive tape 01/26/21 1403   Wound Length (cm) 0 cm 03/02/21 1356   Wound Width (cm) 0 cm 03/02/21 1356   Wound Depth (cm) 0 cm 03/02/21 1356   Wound Surface Area (cm^2) 0 cm^2 03/02/21 1356   Change in Wound Size % (l*w) 100 03/02/21 1356   Wound Volume (cm^3) 0 cm^3 03/02/21 1356   Wound Healing % 100 03/02/21 1356   Post-Procedure Length (cm) 0.3 cm 01/26/21 1333   Post-Procedure Width (cm) 0.2 cm 01/26/21 1333   Post-Procedure Depth (cm) 0.1 cm 01/26/21 1333   Post-Procedure Surface Area (cm^2) 0.06 cm^2 01/26/21 1333   Post-Procedure Volume (cm^3) 0.01 cm^3 01/26/21 1333   Distance Tunneling (cm) 0 cm 01/26/21 1325   Tunneling Position ___ O'Clock 0 01/26/21 1325   Undermining Starts ___ O'Clock 0 01/26/21 1325   Undermining Ends___ O'Clock 0 01/26/21 1325   Undermining Maxium Distance (cm) 0 01/26/21 1325   Wound Assessment Epithelialization 03/02/21 1356   Drainage Amount None 03/02/21 1356   Drainage Description Serosanguinous 01/26/21 1325   Odor None 03/02/21 1356   Audelia-wound Assessment Intact 03/02/21 1356   Margins Attached edges 03/02/21 1356   Wound Thickness Description not for Pressure Injury Full thickness 01/11/21 1403   Number of days: 50             Plan:     Problem List Items Addressed This Visit     Paraplegia ([address] - Primary (Chronic)    Pressure injury of sacral region, stage 3 (HCC)    Continuous leakage of urine        Wound healed!! RTO PRN      Treatment Note please see attached Discharge Instructions    In my professional opinion this patient would benefit from HBO Therapy: No    Written patient dismissal instructions given to patient and signed by patient or POA. Discharge [address] and Hyperbaric Oxygen Therapy   Physician Orders and Discharge Instructions  [address] [place] [address]  Corine Esposito  Telephone: [phone]    NAME:  Hazel Urrutia:  1970  MEDICAL RECORD NUMBER:  [id number]  DATE:  3/2/2021    Discharge condition: Stable    Discharge to: Home    Left via:public transportation    Accompanied by:  caregiver    ECF/[facility]: [facility]     Dressing Orders:    Continue to Apply Barrier cream to any reddened areas. Treatment Orders:   Avoid Pressure to wound site. Turn frequently (turn at least every two hours when in bed)   While in chair reposition every 30 minutes   Patient advised to sit up no more than 30 minutes at a time for meals ONLY.       Please do not elevate the head of the bed higher than 30 degrees.       Off-load heels by applying heel-lift boots or \"float\" heels by placing pillows under calves so that heels do not touch mattress.       Continue Protein rich diet (unless restricted by your physician)   Take Multivitamin daily       Please use Roho cushion in chair   Please use low air loss bed      [address] [place] [address],[address] follow up visit ____Call if needed_________________________  (Please note your next appointment above and if you are unable to keep, kindly give a 24 hour notice. Thank you.)          If you experience any of the following, please call the [facility] during business hours:    * Increase in Pain  * Temperature over 101  * Increase in drainage from your wound  * Drainage with a foul odor  * Bleeding  * Increase in swelling  * Need for compression bandage changes due to slippage, breakthrough drainage. If you need medical attention outside of the business hours of the [facility] please contact your PCP or go to the nearest emergency room.           Electronically signed by Preston Girard MD on 3/2/2021 at 2:59 PM

## 2021-03-02 NOTE — PROGRESS NOTES
[address]   Progress Note and Procedure Note      Harpreet Carvajal  MEDICAL RECORD NUMBER:  [id number]  AGE: 49 y.o. GENDER: female  : 1970  EPISODE DATE:  3/2/2021    Subjective:     Chief Complaint   Patient presents with    Wound Check     wound, has questions re: skin graft to help wound from reopening         HISTORY of PRESENT ILLNESS HPI     Harpreet Carvajal is a 49 y.o. female who presents today for wound/ulcer evaluation.    History of Wound Context: Pt with coccyx wound here for eval/treat  Wound/Ulcer Pain Timing/Severity: none  Quality of pain: N/A  Severity:  0 / 10   Modifying Factors: None  Associated Signs/Symptoms: none    Ulcer Identification:  Ulcer Type: pressure  Contributing Factors: chronic pressure, decreased mobility and shear force    Wound: pressure        PAST MEDICAL HISTORY        Diagnosis Date    Ankle wound     and toe wound    Aptyalism 2017    Asthma     Back pain 2017    Binocular vision disorder with diplopia 2017    CAFL [facility]chronic airflow limitation) [facility]) 2017    Hay fever 2017    Headache 2017    MS (multiple sclerosis) ([address]     Muscle spasticity     Neuropathic ulcer of left foot, limited to breakdown of skin ([address] 4/10/2017    Numbness 2016    Open wound of ankle 2017    Open wound of second toe of left foot 2018    Peripheral vascular disease ([address]     Seizure ([address]     occ. related to ms    Sepsis ([address] 2016    Weakness 2016       PAST SURGICAL HISTORY    Past Surgical History:   Procedure Laterality Date    BACLOFEN PUMP IMPLANTATION      COLONOSCOPY N/A 2019    Dr Martín Walker ileum through ostomy-patent and healthy appearing anastomosis in the right colon-entero colonic anastomosis-10 yr recall    COLOSTOMY      FEMUR FRACTURE SURGERY      Right    FOOT AMPUTATION Left 2019    LEFT TRANSMET AMPUTATION performed by Melyssa Rivas MD at [address] [place] [address] HYSTERECTOMY      OTHER SURGICAL HISTORY      urostomy    OTHER SURGICAL HISTORY      pain pump    CT INSJ PRPH CTR VAD W/SUBQ PORT UNDER 5 YR N/A 6/26/2018    SINGLE LUMEN PORT PLACEMENT WITH FLUORO performed by Jesus Nicolas MD at [address] TOE AMPUTATION      L last toe    TONSILLECTOMY      URETEROTOMY         FAMILY HISTORY    Family History   Problem Relation Age of Onset   [facility] Cancer Mother         breast    Colon Cancer Mother     Colon Polyps Mother     Diabetes Father     High Blood Pressure Father     Cancer Paternal Aunt         breast    Liver Cancer Paternal Aunt     Heart Disease Paternal Grandmother     Esophageal Cancer Neg Hx     Liver Disease Neg Hx     Rectal Cancer Neg Hx     Stomach Cancer Neg Hx        SOCIAL HISTORY    Social History     Tobacco Use    Smoking status: Former Smoker     Packs/day: 0.00     Types: Cigarettes     Quit date: 1/8/2018     Years since quitting: 3.1    Smokeless tobacco: Never Used    Tobacco comment: hasn't smoked since 9/5/20, taking chantix   Substance Use Topics    Alcohol use: Yes     Comment: yaritza    Drug use: Yes     Types: Marijuana     Comment: daily        ALLERGIES    Allergies   Allergen Reactions    Darvocet [Propoxyphene N-Acetaminophen]      Talking out of her head    Morphine And Related Itching and Swelling    Propoxyphene Swelling       MEDICATIONS    Current Outpatient Medications on File Prior to Encounter   Medication Sig Dispense Refill    ondansetron (ZOFRAN) 4 MG tablet Take 4 mg by mouth every 8 hours as needed for Nausea or Vomiting      CHANTIX CONTINUING MONTH CHADWICK 1 MG tablet TAKE 1 TABLET BY MOUTH 2 TIMES A DAY 56 tablet 0    [START ON 3/3/2021] HYDROcodone-acetaminophen (NORCO)  MG per tablet TAKE 1 TABLET BY MOUTH 3 TIMES DAILY AS NEEDED FOR PAIN.  REDUCE DOSESAS PAIN BECOMES MANAGEABLE 90 tablet 0    [START ON 3/3/2021] methylphenidate (RITALIN) 20 MG tablet TAKE 1 TABLET BY MOUTH DAILY 30 tablet 0  promethazine (PHENERGAN) 25 MG tablet TAKE ONE TABLET BY MOUTH 3 TIMES DAILY AS NEEDED FOR NAUSEA 12 tablet 0    Diapers & Supplies MISC Diapers, chucks, wipes, and gloves for incontinence. [phone] each 11    pilocarpine (SALAGEN) 7.5 MG tablet 1 tablet three times daily 90 tablet 5    tiZANidine (ZANAFLEX) 4 MG tablet TAKE 3 TABLETS TWICE DAILY AS NEEDED 180 tablet 11    pregabalin (LYRICA) 300 MG capsule TAKE 1 CAPSULE BY MOUTH 2 TIMES A DAY 60 capsule 5    azelastine (ASTELIN) 0.1 % nasal spray USE 2 SPRAYS IN EACH NOSTRIL TWICE DAILY AS DIRECTED 30 mL 0    hydroCHLOROthiazide (HYDRODIURIL) 25 MG tablet TAKE 1 TABLET BY MOUTH ONCE DAILY AS NEEDED 30 tablet 0    cetirizine (ZYRTEC) 10 MG tablet Take 10 mg by mouth daily      Catheters MISC Catheter supplies and lubricant 5 times daily G82.20  to [facility] [phone] 450 each 3    levETIRAcetam (KEPPRA) 500 MG tablet Take 1 tablet by mouth nightly (Patient taking differently: Take 750 mg by mouth nightly ) 30 tablet 5    glucose monitoring kit (FREESTYLE) monitoring kit 1 kit by Does not apply route daily 1 kit 0    blood glucose monitor strips Test 1 times a day & as needed for symptoms of irregular blood glucose. 100 strip 3    DULoxetine (CYMBALTA) 30 MG extended release capsule Take 1 capsule by mouth daily 30 capsule 11    docusate sodium (COLACE) 100 MG capsule Take 200 mg by mouth as needed for Constipation      PROAIR  (90 Base) MCG/ACT inhaler INHALE 1 PUFF INTO THE LUNGS EVERY 6 HOURS AS NEEDED FOR WHEEZING OR SHORTNESS OF BREATH 8.5 g 5    Heparin Lock Flush (HEPARIN FLUSH, 100 UNITS/ML,) 100 UNIT/ML injection 3 mLs by Intercatheter route every 30 days No every 30 days but every 4-6 weeks.  Flush port with 300 units heparin with 20 cc normal saline for ocrevus infusion for Multiple sclerosis and NS 20CC 1 Syringe 5    ipratropium-albuterol (DUONEB) 0.5-2.5 (3) MG/3ML SOLN nebulizer solution USE 1 UNIT DOSE IN NEBULIZER EVERY 4 TO 6 HOURS AS NEEDED. 450 mL 11    BACLOFEN, PAIN PUMP REFILL CHARGE, by Implant route continuous Indications: every 3 months      b complex vitamins capsule Take 1 capsule by mouth daily      Multiple Vitamins-Minerals (THERAPEUTIC MULTIVITAMIN-MINERALS) tablet Take 1 tablet by mouth daily      glucose monitoring kit (FREESTYLE) monitoring kit 1 kit by Does not apply route daily 1 kit 0    blood glucose monitor strips Test 1 times a day & as needed for symptoms of irregular blood glucose. 100 strip 0    baclofen (LIORESAL) 10 MG tablet Take 1 tablet by mouth 2 times daily as needed (muscle spasms) 60 tablet 5    Sodium Chloride Flush (SALINE FLUSH) 0.9 % SOLN Infuse 20 mLs intravenously every 30 days Not every 30 days but every 4-6 weeks as need with 300 units of heparin to flush port for Infusion of Ocrevus for multiple sclerosis 20 mL 5     No current facility-administered medications on file prior to encounter. REVIEW OF SYSTEMS    A comprehensive review of systems was negative.     Objective:      /77   Pulse 72   Temp 98.7 °F (37.1 °C) (Tympanic)   Resp 18   Ht 5' 9\" (1.753 m)   Wt 146 lb 6.2 oz (66.4 kg)   BMI 21.62 kg/m²     Wt Readings from Last 3 Encounters:   03/02/21 146 lb 6.2 oz (66.4 kg)   01/26/21 148 lb 2.4 oz (67.2 kg)   01/12/21 148 lb (67.1 kg)       PHYSICAL EXAM    General Appearance: alert and oriented to person, place and time, well developed and well- nourished, in no acute distress  Skin: warm and dry, no rash or erythema  Head: normocephalic and atraumatic  Eyes: pupils equal, round, and reactive to light, extraocular eye movements intact, conjunctivae normal  ENT: tympanic membrane, external ear and ear canal normal bilaterally, nose without deformity, nasal mucosa and turbinates normal without polyps, lips teeth and gums normal  Neck: supple and non-tender without mass, no thyromegaly or thyroid nodules, no cervical lymphadenopathy  Pulmonary/Chest: clear to auscultation bilaterally- no wheezes, rales or rhonchi, normal air movement, no respiratory distress  Cardiovascular: normal rate, regular rhythm, normal S1 and S2, no murmurs, rubs, clicks, or gallops, distal pulses intact, no carotid bruits  Abdomen: soft, non-tender, non-distended, normal bowel sounds, no masses or organomegaly  Extremities: no cyanosis, clubbing or edema  Musculoskeletal: normal range of motion, no joint swelling, deformity or tenderness  Neurologic: reflexes normal and symmetric, no cranial nerve deficit, gait, coordination and speech normal, skin sensation normal      Assessment:      Patient Active Problem List   Diagnosis Code    Muscle spasticity M62.838    Peripheral vascular disease ([facility]) I73.9    MS (multiple sclerosis) ([facility]) G35    Pain in both upper arms M79.621, M79.622    Numbness R20.0    Other fatigue R53.83    Weakness R53.1    Chronic pain G89.29    Hx of seizure disorder Z86.69    Insomnia G47.00    Decubitus ulcer of sacral region L89.159    Pain, neck M54.2    Venous stasis ulcer of left lower extremity ([facility]) I83.029, L97.929    S/P transmetatarsal amputation of foot, left ([facility]) T31.535    Chronic constipation K59.09    Colostomy in place [facility]) Z93.3    Pressure injury of sacral region, stage 3 ([facility]) L89.153    Paraplegia ([facility]) G82.20    Arthralgia of hip M25.559    Seizure ([facility] R56.9    Encounter for care related to vascular access port Z45.2    Continuous leakage of urine N39.45             Wound 01/11/21 Sacrum Right wound 1- coccyx right stage 3 (Active)   Wound Image   03/02/21 1356   Wound Etiology Pressure Stage  3 03/02/21 1356   Dressing Status Clean;Dry; Intact 03/02/21 1356   Wound Cleansed Soap and water 03/02/21 1356   Dressing/Treatment Alginate;ABD;Tape/Soft cloth adhesive tape 01/26/21 1403   Wound Length (cm) 0 cm 03/02/21 1356   Wound Width (cm) 0 cm 03/02/21 1356   Wound Depth (cm) 0 cm 03/02/21 1356   Wound Surface Area (cm^2) 0 cm^2 03/02/21 1356   Change in Wound Size % (l*w) 100 03/02/21 1356   Wound Volume (cm^3) 0 cm^3 03/02/21 1356   Wound Healing % 100 03/02/21 1356   Post-Procedure Length (cm) 0.3 cm 01/26/21 1333   Post-Procedure Width (cm) 0.2 cm 01/26/21 1333   Post-Procedure Depth (cm) 0.1 cm 01/26/21 1333   Post-Procedure Surface Area (cm^2) 0.06 cm^2 01/26/21 1333   Post-Procedure Volume (cm^3) 0.01 cm^3 01/26/21 1333   Distance Tunneling (cm) 0 cm 01/26/21 1325   Tunneling Position ___ O'Clock 0 01/26/21 1325   Undermining Starts ___ O'Clock 0 01/26/21 1325   Undermining Ends___ O'Clock 0 01/26/21 1325   Undermining Maxium Distance (cm) 0 01/26/21 1325   Wound Assessment Epithelialization 03/02/21 1356   Drainage Amount None 03/02/21 1356   Drainage Description Serosanguinous 01/26/21 1325   Odor None 03/02/21 1356   Audelia-wound Assessment Intact 03/02/21 1356   Margins Attached edges 03/02/21 1356   Wound Thickness Description not for Pressure Injury Full thickness 01/11/21 1403   Number of days: 50             Plan:     Problem List Items Addressed This Visit     Paraplegia ([address] - Primary (Chronic)    Pressure injury of sacral region, stage 3 (HCC)    Continuous leakage of urine        Wound healed!!  RTO prn      Treatment Note please see attached Discharge Instructions    In my professional opinion this patient would benefit from HBO Therapy: No    Written patient dismissal instructions given to patient and signed by patient or POA.          Discharge [address] and Hyperbaric Oxygen Therapy   Physician Orders and Discharge Instructions  [address]  Telephone: [phone]    NAME:  Niurka Becker OF BIRTH:  1970  MEDICAL RECORD NUMBER:  [id number]  DATE:  3/2/2021    Discharge condition: Stable    Discharge to: Home    Left via:public transportation    Accompanied by:  caregiver    ECF/[facility]: [facility]     Dressing Orders:    Continue to Apply Barrier cream to any reddened areas. Treatment Orders:   Avoid Pressure to wound site. Turn frequently (turn at least every two hours when in bed)   While in chair reposition every 30 minutes   Patient advised to sit up no more than 30 minutes at a time for meals ONLY.       Please do not elevate the head of the bed higher than 30 degrees.       Off-load heels by applying heel-lift boots or \"float\" heels by placing pillows under calves so that heels do not touch mattress.       Continue Protein rich diet (unless restricted by your physician)   Take Multivitamin daily       Please use Roho cushion in chair   Please use low air loss bed      [address] [facility] follow up visit ____Call if needed_________________________  (Please note your next appointment above and if you are unable to keep, kindly give a 24 hour notice. Thank you.)          If you experience any of the following, please call the [facility] during business hours:    * Increase in Pain  * Temperature over 101  * Increase in drainage from your wound  * Drainage with a foul odor  * Bleeding  * Increase in swelling  * Need for compression bandage changes due to slippage, breakthrough drainage. If you need medical attention outside of the business hours of the [facility] please contact your PCP or go to the nearest emergency room.           Electronically signed by Frandy Galo MD on 3/2/2021 at 2:21 PM

## 2021-03-11 ENCOUNTER — HOSPITAL ENCOUNTER (OUTPATIENT)
Dept: INFUSION THERAPY | Age: 51
Setting detail: INFUSION SERIES
Discharge: HOME | End: 2021-03-11
Payer: MEDICARE

## 2021-03-11 DIAGNOSIS — G35 MS (MULTIPLE SCLEROSIS) (HCC): Primary | ICD-10-CM

## 2021-03-11 DIAGNOSIS — Z45.2 ENCOUNTER FOR CARE RELATED TO VASCULAR ACCESS PORT: ICD-10-CM

## 2021-03-11 PROCEDURE — 6360000002 HC RX W HCPCS: Performed by: INTERNAL MEDICINE

## 2021-03-11 PROCEDURE — 96523 IRRIG DRUG DELIVERY DEVICE: CPT

## 2021-03-11 PROCEDURE — 2580000003 HC RX 258: Performed by: INTERNAL MEDICINE

## 2021-03-11 RX ORDER — SODIUM CHLORIDE 0.9 % (FLUSH) 0.9 %
20 SYRINGE (ML) INJECTION PRN
Status: DISCONTINUED | OUTPATIENT
Start: 2021-03-11 | End: 2021-03-12 | Stop reason: HOSPADM

## 2021-03-11 RX ORDER — SODIUM CHLORIDE 0.9 % (FLUSH) 0.9 %
20 SYRINGE (ML) INJECTION PRN
Status: CANCELLED | OUTPATIENT
Start: 2021-03-11

## 2021-03-11 RX ORDER — HEPARIN SODIUM (PORCINE) LOCK FLUSH IV SOLN 100 UNIT/ML 100 UNIT/ML
500 SOLUTION INTRAVENOUS PRN
Status: DISCONTINUED | OUTPATIENT
Start: 2021-03-11 | End: 2021-03-12 | Stop reason: HOSPADM

## 2021-03-11 RX ORDER — HEPARIN SODIUM (PORCINE) LOCK FLUSH IV SOLN 100 UNIT/ML 100 UNIT/ML
500 SOLUTION INTRAVENOUS PRN
Status: CANCELLED | OUTPATIENT
Start: 2021-03-11

## 2021-03-11 RX ADMIN — SODIUM CHLORIDE, PRESERVATIVE FREE 20 ML: 5 INJECTION INTRAVENOUS at 14:25

## 2021-03-11 RX ADMIN — HEPARIN 500 UNITS: 100 SYRINGE at 14:25

## 2021-03-11 NOTE — PROGRESS NOTES
Pt here for routine port flush.  mediport accessed with 20 g 1 inch salomon needle. Good blood return noted. Flushes easily. Port flushed with 20 cc n/s, then 500 u heparin flush. Needle removed, bandaid applied. Pt released in satisfactory condition with d/c instructions.

## 2021-03-25 ENCOUNTER — TRANSCRIBE ORDERS (OUTPATIENT)
Dept: LAB | Facility: HOSPITAL | Age: 51
End: 2021-03-25

## 2021-03-25 DIAGNOSIS — Z01.818 PREOPERATIVE TESTING: Primary | ICD-10-CM

## 2021-04-01 ENCOUNTER — OFFICE VISIT (OUTPATIENT)
Dept: INTERNAL MEDICINE | Age: 51
End: 2021-04-01
Payer: MEDICARE

## 2021-04-01 VITALS
OXYGEN SATURATION: 100 % | HEART RATE: 94 BPM | BODY MASS INDEX: 21.92 KG/M2 | WEIGHT: 148 LBS | DIASTOLIC BLOOD PRESSURE: 62 MMHG | SYSTOLIC BLOOD PRESSURE: 102 MMHG | HEIGHT: 69 IN

## 2021-04-01 DIAGNOSIS — M54.2 PAIN, NECK: ICD-10-CM

## 2021-04-01 DIAGNOSIS — L89.154 PRESSURE ULCER OF SACRAL REGION, STAGE 4 (HCC): ICD-10-CM

## 2021-04-01 DIAGNOSIS — M79.622 PAIN IN BOTH UPPER ARMS: ICD-10-CM

## 2021-04-01 DIAGNOSIS — M62.838 MUSCLE SPASTICITY: ICD-10-CM

## 2021-04-01 DIAGNOSIS — G35 HISTORY OF MULTIPLE SCLEROSIS (HCC): ICD-10-CM

## 2021-04-01 DIAGNOSIS — N82.0 VESICOVAGINAL FISTULA: ICD-10-CM

## 2021-04-01 DIAGNOSIS — Z93.3 COLOSTOMY IN PLACE (HCC): ICD-10-CM

## 2021-04-01 DIAGNOSIS — N39.0 URINARY TRACT INFECTION WITHOUT HEMATURIA, SITE UNSPECIFIED: ICD-10-CM

## 2021-04-01 DIAGNOSIS — N39.0 URINARY TRACT INFECTION WITHOUT HEMATURIA, SITE UNSPECIFIED: Primary | ICD-10-CM

## 2021-04-01 DIAGNOSIS — I83.029 VENOUS STASIS ULCER OF LEFT LOWER EXTREMITY (HCC): ICD-10-CM

## 2021-04-01 DIAGNOSIS — G35 MS (MULTIPLE SCLEROSIS) (HCC): ICD-10-CM

## 2021-04-01 DIAGNOSIS — L97.929 VENOUS STASIS ULCER OF LEFT LOWER EXTREMITY (HCC): ICD-10-CM

## 2021-04-01 DIAGNOSIS — Z89.432 S/P TRANSMETATARSAL AMPUTATION OF FOOT, LEFT (HCC): ICD-10-CM

## 2021-04-01 DIAGNOSIS — M79.621 PAIN IN BOTH UPPER ARMS: ICD-10-CM

## 2021-04-01 DIAGNOSIS — R53.83 OTHER FATIGUE: ICD-10-CM

## 2021-04-01 PROBLEM — R82.90 MALODOROUS URINE: Status: ACTIVE | Noted: 2020-12-14

## 2021-04-01 PROBLEM — J45.909 ASTHMA: Status: ACTIVE | Noted: 2018-11-26

## 2021-04-01 PROBLEM — F32.A DEPRESSIVE DISORDER: Status: ACTIVE | Noted: 2020-05-04

## 2021-04-01 PROBLEM — R32 URINARY INCONTINENCE: Status: ACTIVE | Noted: 2020-05-04

## 2021-04-01 PROBLEM — M81.0 OSTEOPOROSIS: Status: ACTIVE | Noted: 2020-05-04

## 2021-04-01 PROBLEM — N21.0 URINARY BLADDER STONE: Status: ACTIVE | Noted: 2020-10-16

## 2021-04-01 PROBLEM — M19.90 ARTHRITIS: Status: ACTIVE | Noted: 2020-05-04

## 2021-04-01 LAB
ALBUMIN SERPL-MCNC: 4.1 G/DL (ref 3.5–5.2)
ALP BLD-CCNC: 92 U/L (ref 35–104)
ALT SERPL-CCNC: 21 U/L (ref 5–33)
ANION GAP SERPL CALCULATED.3IONS-SCNC: 11 MMOL/L (ref 7–19)
AST SERPL-CCNC: 16 U/L (ref 5–32)
BASOPHILS ABSOLUTE: 0 K/UL (ref 0–0.2)
BASOPHILS RELATIVE PERCENT: 0.9 % (ref 0–1)
BILIRUB SERPL-MCNC: 0.3 MG/DL (ref 0.2–1.2)
BUN BLDV-MCNC: 14 MG/DL (ref 6–20)
CALCIUM SERPL-MCNC: 9.2 MG/DL (ref 8.6–10)
CHLORIDE BLD-SCNC: 108 MMOL/L (ref 98–111)
CO2: 24 MMOL/L (ref 22–29)
CREAT SERPL-MCNC: 0.3 MG/DL (ref 0.5–0.9)
EOSINOPHILS ABSOLUTE: 0.2 K/UL (ref 0–0.6)
EOSINOPHILS RELATIVE PERCENT: 3.9 % (ref 0–5)
GFR AFRICAN AMERICAN: >59
GFR NON-AFRICAN AMERICAN: >60
GLUCOSE BLD-MCNC: 85 MG/DL (ref 74–109)
HCT VFR BLD CALC: 42.4 % (ref 37–47)
HEMOGLOBIN: 13.2 G/DL (ref 12–16)
IMMATURE GRANULOCYTES #: 0 K/UL
LYMPHOCYTES ABSOLUTE: 0.9 K/UL (ref 1.1–4.5)
LYMPHOCYTES RELATIVE PERCENT: 19.5 % (ref 20–40)
MCH RBC QN AUTO: 28.1 PG (ref 27–31)
MCHC RBC AUTO-ENTMCNC: 31.1 G/DL (ref 33–37)
MCV RBC AUTO: 90.4 FL (ref 81–99)
MONOCYTES ABSOLUTE: 0.3 K/UL (ref 0–0.9)
MONOCYTES RELATIVE PERCENT: 6.9 % (ref 0–10)
NEUTROPHILS ABSOLUTE: 3 K/UL (ref 1.5–7.5)
NEUTROPHILS RELATIVE PERCENT: 68.6 % (ref 50–65)
PDW BLD-RTO: 13.4 % (ref 11.5–14.5)
PLATELET # BLD: 204 K/UL (ref 130–400)
PMV BLD AUTO: 11 FL (ref 9.4–12.3)
POTASSIUM SERPL-SCNC: 4.7 MMOL/L (ref 3.5–5)
RBC # BLD: 4.69 M/UL (ref 4.2–5.4)
SODIUM BLD-SCNC: 143 MMOL/L (ref 136–145)
TOTAL PROTEIN: 6.5 G/DL (ref 6.6–8.7)
WBC # BLD: 4.4 K/UL (ref 4.8–10.8)

## 2021-04-01 PROCEDURE — 99214 OFFICE O/P EST MOD 30 MIN: CPT | Performed by: INTERNAL MEDICINE

## 2021-04-01 PROCEDURE — G8420 CALC BMI NORM PARAMETERS: HCPCS | Performed by: INTERNAL MEDICINE

## 2021-04-01 PROCEDURE — 3017F COLORECTAL CA SCREEN DOC REV: CPT | Performed by: INTERNAL MEDICINE

## 2021-04-01 PROCEDURE — 1036F TOBACCO NON-USER: CPT | Performed by: INTERNAL MEDICINE

## 2021-04-01 PROCEDURE — G8427 DOCREV CUR MEDS BY ELIG CLIN: HCPCS | Performed by: INTERNAL MEDICINE

## 2021-04-01 RX ORDER — METHYLPHENIDATE HYDROCHLORIDE 20 MG/1
TABLET ORAL
Qty: 30 TABLET | Refills: 0 | Status: SHIPPED | OUTPATIENT
Start: 2021-04-02 | End: 2021-05-03

## 2021-04-01 RX ORDER — DULOXETIN HYDROCHLORIDE 30 MG/1
30 CAPSULE, DELAYED RELEASE ORAL DAILY
Qty: 30 CAPSULE | Refills: 0 | Status: SHIPPED | OUTPATIENT
Start: 2021-04-01 | End: 2021-06-04

## 2021-04-01 RX ORDER — CEPHALEXIN 500 MG/1
500 CAPSULE ORAL 3 TIMES DAILY
Qty: 30 CAPSULE | Refills: 0 | Status: SHIPPED | OUTPATIENT
Start: 2021-04-01 | End: 2021-04-11

## 2021-04-01 RX ORDER — HYDROCODONE BITARTRATE AND ACETAMINOPHEN 10; 325 MG/1; MG/1
TABLET ORAL
Qty: 90 TABLET | Refills: 0 | Status: SHIPPED | OUTPATIENT
Start: 2021-04-02 | End: 2021-05-03

## 2021-04-01 RX ORDER — VARENICLINE TARTRATE 1 MG/1
TABLET, FILM COATED ORAL
Qty: 56 TABLET | Refills: 0 | Status: SHIPPED | OUTPATIENT
Start: 2021-04-01 | End: 2021-05-03

## 2021-04-01 RX ORDER — OXYBUTYNIN CHLORIDE 10 MG/1
10 TABLET, EXTENDED RELEASE ORAL DAILY
COMMUNITY
Start: 2020-10-08 | End: 2021-06-14

## 2021-04-01 ASSESSMENT — PATIENT HEALTH QUESTIONNAIRE - PHQ9
2. FEELING DOWN, DEPRESSED OR HOPELESS: 0
SUM OF ALL RESPONSES TO PHQ QUESTIONS 1-9: 0
1. LITTLE INTEREST OR PLEASURE IN DOING THINGS: 0

## 2021-04-01 NOTE — PROGRESS NOTES
Chief Complaint   Patient presents with    Follow-Up from [facility] Urinary Tract Infection       HPI: Guido Cranker is a 49 y.o. female is here for evaluation for a urinary tract infection. She was actually seen in the emergency room for this urinary tract infection in January 2021. She states that she initially had a seizure, which was attributed to the UTI. She does self-catheterization secondary to multiple sclerosis. She states that her urine is strong with a sediment. She has not had any fever or chills. She states that sometimes, her blood pressure will get as low as 90/60. Her urine culture on January 29 did grow out E. coli. It was sensitive to Keflex, which she was treated with. She seemed to improve after getting treated with the Keflex. She is also scheduled to have surgery at the [facility] for history of vesicovaginal fistula. She states that she goes back to [facility] on Tuesday for evaluation. The surgery may be done at that time. She does need to have basic lab work drawn including a CBC CMP and a urinalysis with a culture. She states that she did not bring any of her catheters with her. However, she will take a urine cup and bring back a specimen for us. She has had her Covid vaccine. She is going for COVID-19 test tomorrow. She also would like a new wheelchair. She does have a power chair. However, she needs one that raises up.   She does need a prescription for physical therapy faxed to [facility]. 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    Arthritis 5/4/2020    Asthma     Back pain 6/28/2017    Binocular vision disorder with diplopia 6/28/2017    CAFL (chronic airflow limitation) ([facility]) 6/28/2017    Hay fever 6/28/2017    Headache 6/28/2017    MS (multiple sclerosis) ([facility])     Muscle spasticity     Neuropathic ulcer of left foot, limited to breakdown of skin ([facility] [address] 4/10/2017    Numbness 8/4/2016    Open wound of ankle 6/28/2017    Open wound of second toe of left foot 12/11/2018    Peripheral vascular disease ([facility]     Seizure (HCC)     occ. related to ms    Sepsis ([facility] [address] [facility] 8/19/2016    Weakness 8/4/2016      Past Surgical History:   Procedure Laterality Date    BACLOFEN PUMP IMPLANTATION      COLONOSCOPY N/A 9/27/2019    Dr Cuauhtemoc Lara ileum through ostomy-patent and healthy appearing anastomosis in the right colon-entero colonic anastomosis-10 yr recall    COLOSTOMY     [facility]      Right    FOOT AMPUTATION Left 4/4/2019    LEFT TRANSMET AMPUTATION performed by Noe Rockwell MD at [address]      urostomy    OTHER SURGICAL HISTORY      pain pump    AR INSJ PRPH CTR VAD W/SUBQ PORT UNDER 5 YR N/A 6/26/2018    SINGLE LUMEN PORT PLACEMENT WITH FLUORO performed by Anabel Franklin MD at [address] TOE AMPUTATION      L last toe    TONSILLECTOMY      URETEROTOMY        Social History     Socioeconomic History    Marital status:      Spouse name: None    Number of children: None    Years of education: None    Highest education level: None   Occupational History    None   Social Needs    Financial resource strain: None    Food insecurity     Worry: None     Inability: None    Transportation needs     Medical: None     Non-medical: None   Tobacco Use    Smoking status: Former Smoker     Packs/day: 0.00     Types: Cigarettes     Quit date: 1/8/2018     Years since quitting: 3.2    Smokeless tobacco: Never Used    Tobacco comment: hasn't smoked since 9/5/20, taking chantix   Substance and Sexual Activity    Alcohol use: Yes     Comment: yaritza    Drug use: Yes     Types: Marijuana     Comment: daily     Sexual activity: None   Lifestyle    Physical activity     Days per week: None     Minutes per session: None    Stress: None   Relationships    Social connections     Talks on phone: None     Gets together: None     Attends Gnosticist service: None     Active member of club or organization: None     Attends meetings of clubs or organizations: None     Relationship status: None    Intimate partner violence     Fear of current or ex partner: None     Emotionally abused: None     Physically abused: None     Forced sexual activity: None   Other Topics Concern    None   Social History Narrative    None      Family History   Problem Relation Age of Onset    Cancer Mother         breast    Colon Cancer Mother     Colon Polyps Mother     Diabetes Father     High Blood Pressure Father     Cancer Paternal Aunt         breast    Liver Cancer Paternal Aunt     Heart Disease Paternal Grandmother     Esophageal Cancer Neg Hx     Liver Disease Neg Hx     Rectal Cancer Neg Hx     Stomach Cancer Neg Hx         Current Outpatient Medications   Medication Sig Dispense Refill    oxybutynin (DITROPAN-XL) 10 MG extended release tablet Take 10 mg by mouth daily      cephALEXin (KEFLEX) 500 MG capsule Take 1 capsule by mouth 3 times daily for 10 days 30 capsule 0    ondansetron (ZOFRAN) 4 MG tablet Take 4 mg by mouth every 8 hours as needed for Nausea or Vomiting      promethazine (PHENERGAN) 25 MG tablet TAKE ONE TABLET BY MOUTH 3 TIMES DAILY AS NEEDED FOR NAUSEA 12 tablet 0    Diapers & Supplies MISC Diapers, chucks, wipes, and gloves for incontinence.   [phone] each 11    pilocarpine (SALAGEN) 7.5 MG tablet 1 tablet three times daily 90 tablet 5    tiZANidine (ZANAFLEX) 4 MG tablet TAKE 3 TABLETS TWICE DAILY AS NEEDED 180 tablet 11    pregabalin (LYRICA) 300 MG capsule TAKE 1 CAPSULE BY MOUTH 2 TIMES A DAY 60 capsule 5    azelastine (ASTELIN) 0.1 % nasal spray USE 2 SPRAYS IN EACH NOSTRIL TWICE DAILY AS DIRECTED 30 mL 0    hydroCHLOROthiazide (HYDRODIURIL) 25 MG tablet TAKE 1 TABLET BY MOUTH ONCE DAILY AS NEEDED 30 tablet 0    cetirizine (ZYRTEC) 10 MG tablet Take 10 mg by mouth daily      Catheters MISC Catheter supplies and lubricant 5 times daily G82.20  to [facility] [phone] 450 each 3    levETIRAcetam (KEPPRA) 500 MG tablet Take 1 tablet by mouth nightly (Patient taking differently: Take 750 mg by mouth nightly ) 30 tablet 5    glucose monitoring kit (FREESTYLE) monitoring kit 1 kit by Does not apply route daily 1 kit 0    blood glucose monitor strips Test 1 times a day & as needed for symptoms of irregular blood glucose. 100 strip 3    docusate sodium (COLACE) 100 MG capsule Take 200 mg by mouth as needed for Constipation      PROAIR  (90 Base) MCG/ACT inhaler INHALE 1 PUFF INTO THE LUNGS EVERY 6 HOURS AS NEEDED FOR WHEEZING OR SHORTNESS OF BREATH 8.5 g 5    Heparin Lock Flush (HEPARIN FLUSH, 100 UNITS/ML,) 100 UNIT/ML injection 3 mLs by Intercatheter route every 30 days No every 30 days but every 4-6 weeks. Flush port with 300 units heparin with 20 cc normal saline for ocrevus infusion for Multiple sclerosis and NS 20CC 1 Syringe 5    ipratropium-albuterol (DUONEB) 0.5-2.5 (3) MG/3ML SOLN nebulizer solution USE 1 UNIT DOSE IN NEBULIZER EVERY 4 TO 6 HOURS AS NEEDED. 450 mL 11    BACLOFEN, PAIN PUMP REFILL CHARGE, by Implant route continuous Indications: every 3 months      b complex vitamins capsule Take 1 capsule by mouth daily      Multiple Vitamins-Minerals (THERAPEUTIC MULTIVITAMIN-MINERALS) tablet Take 1 tablet by mouth daily      glucose monitoring kit (FREESTYLE) monitoring kit 1 kit by Does not apply route daily 1 kit 0    blood glucose monitor strips Test 1 times a day & as needed for symptoms of irregular blood glucose.  100 strip 0    baclofen (LIORESAL) 10 MG tablet Take 1 tablet by mouth 2 times daily as needed (muscle spasms) 60 tablet 5    Sodium Chloride Flush (SALINE FLUSH) 0.9 % SOLN Infuse 20 mLs intravenously every 30 days Not every 30 days but every 4-6 weeks as need with 300 units of heparin to flush port for Infusion of Ocrevus for multiple sclerosis 20 mL 5    HYDROcodone-acetaminophen (NORCO)  MG per tablet TAKE 1 TABLET BY MOUTH 3 TIMES DAILY AS NEEDED FOR PAIN. REDUCE DOSESAS PAIN BECOMES MANAGEABLE 90 tablet 0    methylphenidate (RITALIN) 20 MG tablet TAKE 1 TABLET BY MOUTH DAILY 30 tablet 0    CHANTIX CONTINUING MONTH CHADWICK 1 MG tablet TAKE 1 TABLET BY MOUTH 2 TIMES A DAY 56 tablet 0    DULoxetine (CYMBALTA) 30 MG extended release capsule TAKE 1 CAPSULE BY MOUTH DAILY 30 capsule 0     No current facility-administered medications for this visit. Patient Active Problem List   Diagnosis    Muscle spasticity    Peripheral vascular disease ([address]    MS (multiple sclerosis) (HCC)    Pain in both upper arms    Numbness    Other fatigue    Weakness    Chronic pain    Hx of seizure disorder    Insomnia    Decubitus ulcer of sacral region    Pain, neck    Venous stasis ulcer of left lower extremity ([address]    Asthma    S/P transmetatarsal amputation of foot, left ([facility])    Chronic constipation    Colostomy in place ([address]    Pressure ulcer of sacral region, stage 4 ([address]    Paraplegia ([address]    Arthralgia of hip    Seizure ([address]    Encounter for care related to vascular access port    Urinary incontinence    Depressive disorder    Malodorous urine    Arthritis    Osteoporosis    Urinary bladder stone    Vesicovaginal fistula        Review of Systems   Constitutional: Positive for fatigue. Negative for activity change, appetite change, fever and unexpected weight change. HENT: Positive for ear pain. Negative for congestion, postnasal drip, rhinorrhea, sinus pressure, sore throat and tinnitus. Feels Like she may have a wax buildup in her ear. Eyes: Negative for photophobia, pain, discharge, redness, itching and visual disturbance. Respiratory: Negative for cough, chest tightness, shortness of breath and wheezing.     Cardiovascular: Negative for chest pain, palpitations and leg swelling. Gastrointestinal: Negative for abdominal distention, abdominal pain, blood in stool, constipation, diarrhea, nausea and vomiting. Urinary and stool incontinence. Endocrine: Negative for cold intolerance, heat intolerance, polydipsia and polyuria. Genitourinary: Negative for decreased urine volume, difficulty urinating, dysuria, flank pain, frequency, hematuria and urgency. States that her urine smells strong and has a sediment in it   Musculoskeletal:        She has chronic back pain and muscle spasms. She is wheelchair-bound secondary to history of multiple sclerosis. Skin: Negative for color change, pallor, rash and wound. Allergic/Immunologic: Positive for environmental allergies. Negative for food allergies and immunocompromised state. Neurological: Negative for dizziness, seizures, syncope, speech difficulty, weakness, numbness and headaches. Occasionally gets sinus headaches   Hematological: Negative for adenopathy. Does not bruise/bleed easily. Psychiatric/Behavioral: Positive for dysphoric mood. Negative for agitation, confusion and decreased concentration. The patient is nervous/anxious. The patient is not hyperactive. Anxiety and depression are stable       /62   Pulse 94   Ht 5' 9\" (1.753 m)   Wt 148 lb (67.1 kg)   SpO2 100%   BMI 21.86 kg/m²   Physical Exam  Vitals signs and nursing note reviewed. Constitutional:       General: She is not in acute distress. Appearance: Normal appearance. She is well-developed and normal weight. She is not ill-appearing, toxic-appearing or diaphoretic. HENT:      Head: Normocephalic and atraumatic. Right Ear: Tympanic membrane, ear canal and external ear normal. There is no impacted cerumen. Left Ear: Tympanic membrane, ear canal and external ear normal. There is no impacted cerumen. Nose: Nose normal. No congestion or rhinorrhea.       Mouth/Throat: Mouth: Mucous membranes are moist.      Pharynx: Oropharynx is clear. No oropharyngeal exudate or posterior oropharyngeal erythema. Eyes:      General: No scleral icterus. Right eye: No discharge. Left eye: No discharge. Extraocular Movements: Extraocular movements intact. Conjunctiva/sclera: Conjunctivae normal.      Pupils: Pupils are equal, round, and reactive to light. Neck:      Musculoskeletal: Normal range of motion and neck supple. No neck rigidity or muscular tenderness. Thyroid: No thyromegaly. Vascular: No carotid bruit or JVD. Trachea: No tracheal deviation. Cardiovascular:      Rate and Rhythm: Normal rate and regular rhythm. Pulses: Normal pulses. Heart sounds: Normal heart sounds. No murmur. No friction rub. No gallop. Pulmonary:      Effort: Pulmonary effort is normal. No respiratory distress. Breath sounds: Normal breath sounds. No stridor. No wheezing, rhonchi or rales. Chest:      Chest wall: No tenderness. Abdominal:      General: Bowel sounds are normal. There is no distension. Palpations: Abdomen is soft. There is no mass. Tenderness: There is no abdominal tenderness. There is no right CVA tenderness, left CVA tenderness, guarding or rebound. Hernia: No hernia is present. Comments: Ostomy bags are intact   Musculoskeletal: Normal range of motion. General: No swelling, tenderness, deformity or signs of injury. Right lower leg: No edema. Left lower leg: No edema. Comments: She is wheelchair bound. She does have contractures of both of her feet. Lymphadenopathy:      Cervical: No cervical adenopathy. Skin:     General: Skin is warm and dry. Capillary Refill: Capillary refill takes less than 2 seconds. Coloration: Skin is not jaundiced or pale. Findings: No bruising, erythema or lesion. Neurological:      General: No focal deficit present.       Mental Status: She is alert and oriented to person, place, and time. Mental status is at baseline. Cranial Nerves: No cranial nerve deficit. Motor: No weakness or abnormal muscle tone. Coordination: Coordination normal.      Gait: Gait normal.      Deep Tendon Reflexes: Reflexes are normal and symmetric. Reflexes normal.   Psychiatric:         Mood and Affect: Mood normal.         Behavior: Behavior normal.         Thought Content: Thought content normal.         Judgment: Judgment normal.         1. Urinary tract infection without hematuria, site unspecified    2. Pressure ulcer of sacral region, stage 4 ([facility])    3. Colostomy in place [facility])    4. S/P transmetatarsal amputation of foot, left ([facility] [address]    5. Venous stasis ulcer of left lower extremity ([facility] [address]    6. History of multiple sclerosis ([facility] [address]    7. Vesicovaginal fistula        ASSESSMENT/PLAN:    56-year-old woman here for follow-up    1. Urinary tract infection: Patient actually was diagnosed with a urinary tract infection on January 29, 2021. I am not quite sure why she is coming in for an ER follow-up at this particular time, but does sound like she very well could have a urinary tract infection at this point. We will check a CBC CMP and urinalysis. She is actually scheduled to have surgery for vesicovaginal fistula in the near future    2. Multiple sclerosis: Patient does have limited mobility. We will set her up for physical therapy and write for a power chair for her. Please see Mobility evaluation. 3.  History of pressure ulcer to sacral region:/Venous stasis ulcers to the left lower extremity with history of a transmetatarsal amputation of the left foot: At this time, she continues to go to wound care. Her wounds appear to be stable. 4.  History of colostomy: Stable    This visit took over an hour    Alyce was seen today for follow-up from hospital and urinary tract infection.     Diagnoses and all orders for this visit:    Urinary tract infection

## 2021-04-01 NOTE — TELEPHONE ENCOUNTER
Requested Prescriptions     Pending Prescriptions Disp Refills    HYDROcodone-acetaminophen ([address])  MG per tablet [Pharmacy Med Name: HYDROCOD-APAP  MG] 90 tablet 0     Sig: TAKE 1 TABLET BY MOUTH 3 TIMES DAILY AS NEEDED FOR PAIN.  REDUCE DOSESAS PAIN BECOMES MANAGEABLE    methylphenidate (RITALIN) 20 MG tablet [Pharmacy Med Name: METHYLPHENIDAT 20MG TAB DD] 30 tablet 0     Sig: TAKE 1 TABLET BY MOUTH DAILY    DULoxetine (CYMBALTA) 30 MG extended release capsule [Pharmacy Med Name: DULOXETINE  DR 30 MG CAP *DD] 30 capsule 0     Sig: TAKE 1 CAPSULE BY MOUTH DAILY       Last Office Visit:  2/2/2021  Next Office Visit:  5/4/2021  Last Medication Refill:  Karen Memory 3/3/2021, Ritalin 3/3/2021, Cymbalta 5/11/2020 11 refills   Seema Zamora up to date:  3/25/2021    *RX updated to reflect Norco and Ritalin 4/2/2021  fill date*

## 2021-04-02 ENCOUNTER — LAB (OUTPATIENT)
Dept: LAB | Facility: HOSPITAL | Age: 51
End: 2021-04-02

## 2021-04-02 ENCOUNTER — TELEPHONE (OUTPATIENT)
Dept: INTERNAL MEDICINE | Age: 51
End: 2021-04-02

## 2021-04-02 DIAGNOSIS — Z01.818 PREOPERATIVE TESTING: ICD-10-CM

## 2021-04-02 LAB
BACTERIA: ABNORMAL /HPF
BILIRUBIN URINE: NEGATIVE
BLOOD, URINE: NEGATIVE
CLARITY: ABNORMAL
COLOR: ABNORMAL
CRYSTALS, UA: ABNORMAL /HPF
EPITHELIAL CELLS, UA: ABNORMAL /HPF
GLUCOSE URINE: NEGATIVE MG/DL
KETONES, URINE: NEGATIVE MG/DL
LEUKOCYTE ESTERASE, URINE: ABNORMAL
NITRITE, URINE: POSITIVE
PH UA: 8 (ref 5–8)
PROTEIN UA: 30 MG/DL
RBC UA: ABNORMAL /HPF (ref 0–2)
SARS-COV-2 ORF1AB RESP QL NAA+PROBE: NOT DETECTED
SPECIFIC GRAVITY UA: 1.01 (ref 1–1.03)
UROBILINOGEN, URINE: 0.2 E.U./DL
WBC UA: ABNORMAL /HPF (ref 0–5)

## 2021-04-02 PROCEDURE — U0005 INFEC AGEN DETEC AMPLI PROBE: HCPCS

## 2021-04-02 PROCEDURE — C9803 HOPD COVID-19 SPEC COLLECT: HCPCS

## 2021-04-02 PROCEDURE — U0004 COV-19 TEST NON-CDC HGH THRU: HCPCS

## 2021-04-02 ASSESSMENT — ENCOUNTER SYMPTOMS
WHEEZING: 0
PHOTOPHOBIA: 0
ABDOMINAL PAIN: 0
EYE REDNESS: 0
CHEST TIGHTNESS: 0
BLOOD IN STOOL: 0
RHINORRHEA: 0
VOMITING: 0
COUGH: 0
SINUS PRESSURE: 0
EYE DISCHARGE: 0
COLOR CHANGE: 0
SHORTNESS OF BREATH: 0
CONSTIPATION: 0
DIARRHEA: 0
NAUSEA: 0
SORE THROAT: 0
EYE ITCHING: 0
EYE PAIN: 0
ABDOMINAL DISTENTION: 0

## 2021-04-02 NOTE — PROGRESS NOTES
Powermobility evaluation:    Date: 4/2/21   Dx:  Multiple Sclerosis   Diagnosis Orders   1. Urinary tract infection without hematuria, site unspecified  CBC Auto Differential    Comprehensive Metabolic Panel   2. Pressure ulcer of sacral region, stage 4 (HCC)     3. Colostomy in place [facility])     4. S/P transmetatarsal amputation of foot, left ([facility] [address]     5. Venous stasis ulcer of left lower extremity ([facility]     6. History of multiple sclerosis ([facility] [address]     7. Vesicovaginal fistula          Prognosis: Fair. Ambulatory Status: Patient is confined to a wheelchair. Hx of falls: Patient has had a history of falls. She requires the assistance of 1 for transfer. Chair confined     Why can't manualchair meet patients needs? Ms. Isaac Broderick suffers from multiple sclerosis. She has been wheelchair-bound since he was in her early 25s. She does have history of multiple sclerosis. She has been on Ocrevus therapy and does see a neurologist.  She lacks the strength in her hands to propel a manual wheelchair. She has contractures of her lower extremities and cannot ambulate to use a cane or a walker. She has also had a transmetatarsal amputation of the left foot. She also has a history of venous stasis ulcers to the left lower extremity. Her balance is very poor. She has been wheelchair-bound since she is been in her early 25s. She has not been able to walk in a number of years. Her home will not accommodate a scooter. She does require acquired assistance with transfers. A power wheelchair would definitely improve her ability to participate in activities of daily living and give her some independence mobility within her home. She currently does have a power wheelchair. However, she needs one that will help to accommodate transfers by raising up and down.   This will help with things such as toileting grooming self-care etc.  She desires to use it has the mental and physical capabilities to use the wheelchair within

## 2021-04-02 NOTE — TELEPHONE ENCOUNTER
Pt called and asked that her lab results be sent to [facility]. This [address] faxed them to [phone].

## 2021-04-04 LAB
ORGANISM: ABNORMAL
URINE CULTURE, ROUTINE: ABNORMAL
URINE CULTURE, ROUTINE: ABNORMAL

## 2021-04-06 DIAGNOSIS — M54.2 PAIN, NECK: ICD-10-CM

## 2021-04-06 RX ORDER — PREGABALIN 300 MG/1
CAPSULE ORAL
Qty: 60 CAPSULE | Refills: 5 | Status: SHIPPED | OUTPATIENT
Start: 2021-04-06 | End: 2021-10-08

## 2021-04-06 NOTE — TELEPHONE ENCOUNTER
Requested Prescriptions     Pending Prescriptions Disp Refills    pregabalin (LYRICA) 300 MG capsule 60 capsule 5     Sig: TAKE 1 CAPSULE BY MOUTH 2 TIMES A DAY       Last Office Visit:  2/2/2021  Next Office Visit:  5/4/2021  Last Medication Refill: 10/2/20 with 5 refills    Miranda Goff up to date:  3/25/21    *RX updated to reflect   4/6/21  fill date*

## 2021-04-25 ENCOUNTER — HOSPITAL ENCOUNTER (EMERGENCY)
Age: 51
Discharge: HOME | End: 2021-04-25
Attending: EMERGENCY MEDICINE
Payer: MEDICARE

## 2021-04-25 VITALS
RESPIRATION RATE: 18 BRPM | SYSTOLIC BLOOD PRESSURE: 114 MMHG | DIASTOLIC BLOOD PRESSURE: 72 MMHG | HEIGHT: 69 IN | TEMPERATURE: 98 F | WEIGHT: 144 LBS | OXYGEN SATURATION: 99 % | HEART RATE: 72 BPM | BODY MASS INDEX: 21.33 KG/M2

## 2021-04-25 DIAGNOSIS — T85.698A JP DRAIN, BROKEN, INITIAL ENCOUNTER: Primary | ICD-10-CM

## 2021-04-25 PROCEDURE — 99283 EMERGENCY DEPT VISIT LOW MDM: CPT

## 2021-04-25 NOTE — ED PROVIDER NOTES
[facility] EMERGENCY DEPT  eMERGENCY dEPARTMENT eNCOUnter      Pt Name: Ruth García  MRN: [id number]  [place] 1970  Date of evaluation: 4/25/2021  Provider: EFREN Burns    CHIEF COMPLAINT       Chief Complaint   Patient presents with    Other     bulb missing from G-tube         HISTORY OF PRESENT ILLNESS   (Location/Symptom, Timing/Onset,Context/Setting, Quality, Duration, Modifying Factors, Severity)  Note limiting factors. Brian Sequeira a 49 y.o. female who presents to the emergency department for evaluation of missing MAGDY drain. Pt has history of paraplegia. She had vesicovaginal fistula repair 2 weeks ago at [facility]. She tells me that surgery was complicated by a bowel perforation during the procedure. She has colostomy. She tells me that surgical drain MAGDY suction bulb has been missing since yesterday. She is currently at [facility]. She relates that she has had continued drainage from vaginal area anticipating follow up with her surgeon at [facility] in 3 days. HPI    Nursing Notes were reviewed. REVIEW OF SYSTEMS    (2-9 systems for level 4, 10 or more for level 5)     Review of Systems   Constitutional: Negative for fever. A complete review of systems was performed and is negative except as noted above in the HPI.        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    Arthritis 5/4/2020    Asthma     Back pain 6/28/2017    Binocular vision disorder with diplopia 6/28/2017    CAFL [facility]chronic airflow limitation) ([facility]) 6/28/2017    Hay fever 6/28/2017    Headache 6/28/2017    MS (multiple sclerosis) ([facility])     Muscle spasticity     Neuropathic ulcer of left foot, limited to breakdown of skin ([address] 4/10/2017    Numbness 8/4/2016    Open wound of ankle 6/28/2017    Open wound of second toe of left foot 12/11/2018    Peripheral vascular disease ([address]     Seizure ([address]     occ. related to ms    Sepsis ([address] 8/19/2016    Weakness 8/4/2016         SURGICAL HISTORY       Past Surgical History:   Procedure Laterality Date    BACLOFEN PUMP IMPLANTATION      COLONOSCOPY N/A 9/27/2019    Dr Leonela Calderón ileum through ostomy-patent and healthy appearing anastomosis in the right colon-entero colonic anastomosis-10 yr recall    COLOSTOMY      FEMUR FRACTURE SURGERY      Right    FOOT AMPUTATION Left 4/4/2019    LEFT TRANSMET AMPUTATION performed by Amirah Martin MD at [address]      urostomy    OTHER SURGICAL HISTORY      pain pump    RI INSJ PRPH CTR VAD W/SUBQ PORT UNDER 5 YR N/A 6/26/2018    SINGLE LUMEN PORT PLACEMENT WITH FLUORO performed by Malcolm Peters MD at [address] TOE AMPUTATION      L last toe    TONSILLECTOMY      URETEROTOMY           CURRENT MEDICATIONS       Discharge Medication List as of 4/25/2021  4:17 PM      CONTINUE these medications which have NOT CHANGED    Details   pregabalin (LYRICA) 300 MG capsule TAKE 1 CAPSULE BY MOUTH 2 TIMES A DAY, Disp-60 capsule, R-5Normal      HYDROcodone-acetaminophen (NORCO)  MG per tablet TAKE 1 TABLET BY MOUTH 3 TIMES DAILY AS NEEDED FOR PAIN.  REDUCE DOSESAS PAIN BECOMES MANAGEABLE, Disp-90 tablet, R-0Normal      methylphenidate (RITALIN) 20 MG tablet TAKE 1 TABLET BY MOUTH DAILY, Disp-30 tablet, R-0Normal      CHANTIX CONTINUING MONTH CHADWICK 1 MG tablet TAKE 1 TABLET BY MOUTH 2 TIMES A DAY, Disp-56 tablet, R-0Normal      DULoxetine (CYMBALTA) 30 MG extended release capsule TAKE 1 CAPSULE BY MOUTH DAILY, Disp-30 capsule, R-0Normal      oxybutynin (DITROPAN-XL) 10 MG extended release tablet Take 10 mg by mouth dailyHistorical Med      ondansetron (ZOFRAN) 4 MG tablet Take 4 mg by mouth every 8 hours as needed for Nausea or VomitingHistorical Med      promethazine (PHENERGAN) 25 MG tablet TAKE ONE TABLET BY MOUTH 3 TIMES DAILY AS NEEDED FOR NAUSEA, Disp-12 tablet, R-0Normal      Diapers & Supplies MISC Disp-100 each, R-11, PrintDiapers, chucks, wipes, and gloves for incontinence. [phone]      pilocarpine (SALAGEN) 7.5 MG tablet 1 tablet three times daily, Disp-90 tablet,R-5Normal      tiZANidine (ZANAFLEX) 4 MG tablet TAKE 3 TABLETS TWICE DAILY AS NEEDED, Disp-180 tablet,R-11Normal      azelastine (ASTELIN) 0.1 % nasal spray USE 2 SPRAYS IN EACH NOSTRIL TWICE DAILY AS DIRECTED, Disp-30 mL,R-0Normal      hydroCHLOROthiazide (HYDRODIURIL) 25 MG tablet TAKE 1 TABLET BY MOUTH ONCE DAILY AS NEEDED, Disp-30 tablet,R-0Normal      cetirizine (ZYRTEC) 10 MG tablet Take 10 mg by mouth dailyHistorical Med      Catheters MISC Disp-450 each,R-3, PrintCatheter supplies and lubricant 5 times daily G82.20  to [facility] [phone]      levETIRAcetam (KEPPRA) 500 MG tablet Take 1 tablet by mouth nightly, Disp-30 tablet, R-5Normal      !! glucose monitoring kit (FREESTYLE) monitoring kit DAILY Starting Tue 5/26/2020, Disp-1 kit, R-0, Normal      !! blood glucose monitor strips Test 1 times a day & as needed for symptoms of irregular blood glucose., Disp-100 strip, R-3, Normal      docusate sodium (COLACE) 100 MG capsule Take 200 mg by mouth as needed for ConstipationHistorical Med      PROAIR  (90 Base) MCG/ACT inhaler INHALE 1 PUFF INTO THE LUNGS EVERY 6 HOURS AS NEEDED FOR WHEEZING OR SHORTNESS OF BREATH, Disp-8.5 g, R-5Normal      Heparin Lock Flush (HEPARIN FLUSH, 100 UNITS/ML,) 100 UNIT/ML injection 3 mLs by Intercatheter route every 30 days No every 30 days but every 4-6 weeks.  Flush port with 300 units heparin with 20 cc normal saline for ocrevus infusion for Multiple sclerosis and NS 20CC, Disp-1 Syringe, R-5Print      ipratropium-albuterol (DUONEB) 0.5-2.5 (3) MG/3ML SOLN nebulizer solution USE 1 UNIT DOSE IN NEBULIZER EVERY 4 TO 6 HOURS AS NEEDED., Disp-450 mL, R-11Normal      BACLOFEN, PAIN PUMP REFILL CHARGE, by Implant route continuous Indications: every 3 monthsHistorical Med      b complex vitamins capsule Take 1 capsule by mouth dailyHistorical Med      Multiple Vitamins-Minerals (THERAPEUTIC MULTIVITAMIN-MINERALS) tablet Take 1 tablet by mouth dailyHistorical Med      !! glucose monitoring kit (FREESTYLE) monitoring kit DAILY Starting Thu 11/15/2018, Disp-1 kit, R-0, Normal      !! blood glucose monitor strips Test 1 times a day & as needed for symptoms of irregular blood glucose., Disp-100 strip, R-0, Normal      baclofen (LIORESAL) 10 MG tablet Take 1 tablet by mouth 2 times daily as needed (muscle spasms), Disp-60 tablet, R-5Normal      Sodium Chloride Flush (SALINE FLUSH) 0.9 % SOLN Infuse 20 mLs intravenously every 30 days Not every 30 days but every 4-6 weeks as need with 300 units of heparin to flush port for Infusion of Ocrevus for multiple sclerosis, Disp-20 mL, R-5Print       !! - Potential duplicate medications found. Please discuss with provider.           ALLERGIES     Codeine, Darvocet [propoxyphene n-acetaminophen], Morphine, Morphine and related, and Propoxyphene    FAMILY HISTORY       Family History   Problem Relation Age of Onset   Duy Osmanor Cancer Mother         breast    Colon Cancer Mother     Colon Polyps Mother     Diabetes Father     High Blood Pressure Father     Cancer Paternal Aunt         breast    Liver Cancer Paternal Aunt     Heart Disease Paternal Grandmother     Esophageal Cancer Neg Hx     Liver Disease Neg Hx     Rectal Cancer Neg Hx     Stomach Cancer Neg Hx           SOCIAL HISTORY       Social History     Socioeconomic History    Marital status:      Spouse name: Not on file    Number of children: Not on file    Years of education: Not on file    Highest education level: Not on file   Occupational History    Not on file   Social Needs    Financial resource strain: Not on file    Food insecurity     Worry: Not on file     Inability: Not on file    Transportation needs     Medical: Not on file     Non-medical: Not on file   Tobacco Use    Smoking status: Former Smoker     Packs/day: 0.00     Types: Cigarettes     Quit date: 1/8/2018     Years since quitting: 3.2    Smokeless tobacco: Never Used    Tobacco comment: hasn't smoked since 9/5/20, taking chantix   Substance and Sexual Activity    Alcohol use: Yes     Comment: yaritza    Drug use: Yes     Types: Marijuana     Comment: daily     Sexual activity: Not on file   Lifestyle    Physical activity     Days per week: Not on file     Minutes per session: Not on file    Stress: Not on file   Relationships    Social connections     Talks on phone: Not on file     Gets together: Not on file     Attends [employer] service: Not on file     Active member of club or organization: Not on file     Attends meetings of clubs or organizations: Not on file     Relationship status: Not on file    Intimate partner violence     Fear of current or ex partner: Not on file     Emotionally abused: Not on file     Physically abused: Not on file     Forced sexual activity: Not on file   Other Topics Concern    Not on file   Social History Narrative    Not on file       SCREENINGS    Soto Coma Scale  Eye Opening: Spontaneous  Best Verbal Response: Oriented  Best Motor Response: Obeys commands  Soto Coma Scale Score: 15        PHYSICAL EXAM    (up to 7 for level 4, 8 or more for level 5)     ED Triage Vitals   BP Temp Temp Source Pulse Resp SpO2 Height Weight   04/25/21 1418 04/25/21 1416 04/25/21 1416 04/25/21 1416 04/25/21 1416 04/25/21 1416 04/25/21 1416 04/25/21 1416   112/74 98 °F (36.7 °C) Temporal 71 18 99 % 5' 9\" (1.753 m) 144 lb (65.3 kg)       Physical Exam  Vitals signs reviewed. HENT:      Right Ear: External ear normal.      Left Ear: External ear normal.      Mouth/Throat:      Mouth: Mucous membranes are moist.   Cardiovascular:      Rate and Rhythm: Normal rate. Pulmonary:      Effort: Pulmonary effort is normal.   Abdominal:      General: Abdomen is flat. There is no distension. Comments: Left side of abdomen with ostomy with brown liquid stool in collection bag  Midline lower abdomen with wound vac dressing and tubing  Right lower abdomen with urostomy connected to byers bag draining yellow urine w/sedament    Musculoskeletal:      Comments: CVA area with clamped tubing bilaterally   Neurological:      Mental Status: She is alert and oriented to person, place, and time. DIAGNOSTIC RESULTS     EKG: All EKG's are interpreted by the Emergency Department Physician who either signs or Co-signs this chart in the absence of acardiologist.        RADIOLOGY:   Non-plain film images such as CT, Ultrasound andMRI are read by the radiologist. Plain radiographic images are visualized and preliminarily interpreted by the emergency physician with the below findings:        Interpretation per the Radiologist below, if available at the time of this note:    No orders to display         ED BEDSIDE ULTRASOUND:   Performed by ED Physician - none    LABS:  Labs Reviewed - No data to display    All other labs were within normal range or not returned as of this dictation. RE-ASSESSMENT     She relates that since she has been at the nursing home that the urine ostomy drain has not been irrigated. She tells me that she wants an order for this. Discussed that I would call her urologist at [facility] to see if they want to continue the irrigation of the byers tubing however she related that they would just call in the morning.          EMERGENCY DEPARTMENT COURSE and DIFFERENTIALDIAGNOSIS/MDM:   Vitals:    Vitals:    04/25/21 1416 04/25/21 1418 04/25/21 1810   BP:  112/74 114/72   Pulse: 71  72   Resp: 18  18   Temp: 98 °F (36.7 °C)  98 °F (36.7 °C)   TempSrc: Temporal  Temporal   SpO2: 99%  99%   Weight: 144 lb (65.3 kg)     Height: 5' 9\" (1.753 m)         MDM      CONSULTS:  None    PROCEDURES:  Unless otherwise notedbelow, none     Procedures    FINAL IMPRESSION     1. MAGDY drain, broken, initial encounter

## 2021-04-25 NOTE — ED PROVIDER NOTES
Attending Supervisory Note/Shared Visit    I have reviewed the mid-levels findings and agree. We have discussed the case I am in agreement with the plan and disposition.   Linda Roberts MD  Attending Emergency Physician        Nicolette Bey MD  04/25/21 [id number]

## 2021-05-03 DIAGNOSIS — M79.621 PAIN IN BOTH UPPER ARMS: ICD-10-CM

## 2021-05-03 DIAGNOSIS — G35 MS (MULTIPLE SCLEROSIS) (HCC): ICD-10-CM

## 2021-05-03 DIAGNOSIS — R53.83 OTHER FATIGUE: ICD-10-CM

## 2021-05-03 DIAGNOSIS — M54.2 PAIN, NECK: ICD-10-CM

## 2021-05-03 DIAGNOSIS — M79.622 PAIN IN BOTH UPPER ARMS: ICD-10-CM

## 2021-05-03 DIAGNOSIS — M62.838 MUSCLE SPASTICITY: ICD-10-CM

## 2021-05-03 RX ORDER — HYDROCODONE BITARTRATE AND ACETAMINOPHEN 10; 325 MG/1; MG/1
TABLET ORAL
Qty: 90 TABLET | Refills: 0 | Status: SHIPPED | OUTPATIENT
Start: 2021-05-03 | End: 2021-06-03

## 2021-05-03 RX ORDER — METHYLPHENIDATE HYDROCHLORIDE 20 MG/1
TABLET ORAL
Qty: 30 TABLET | Refills: 0 | Status: SHIPPED | OUTPATIENT
Start: 2021-05-03 | End: 2021-06-03

## 2021-05-03 RX ORDER — VARENICLINE TARTRATE 1 MG/1
TABLET, FILM COATED ORAL
Qty: 56 TABLET | Refills: 0 | Status: SHIPPED | OUTPATIENT
Start: 2021-05-03 | End: 2021-06-03

## 2021-05-03 NOTE — TELEPHONE ENCOUNTER
Requested Prescriptions     Pending Prescriptions Disp Refills    methylphenidate (RITALIN) 20 MG tablet [Pharmacy Med Name: METHYLPHENIDAT 20MG TAB DD] 30 tablet 0     Sig: TAKE 1 TABLET BY MOUTH DAILY    HYDROcodone-acetaminophen (Anh Jeremias)  MG per tablet [Pharmacy Med Name: HYDROCOD-APAP  MG] 90 tablet 0     Sig: TAKE 1 TABLET BY MOUTH 3 TIMES DAILY AS NEEDED FOR PAIN.  REDUCE DOSESA S PAIN BECOMES MANAGEABLE       Last Office Visit:  2/2/2021  Next Office Visit:  5/4/2021  Last Medication Refill:  Both 4/2/2021  Evelia Ham up to date:  3/25/2021    *RX updated to reflect   Both 5/3/2021  fill date*

## 2021-05-04 ENCOUNTER — VIRTUAL VISIT (OUTPATIENT)
Dept: NEUROLOGY | Age: 51
End: 2021-05-04
Payer: MEDICARE

## 2021-05-04 DIAGNOSIS — M79.621 PAIN IN BOTH UPPER ARMS: ICD-10-CM

## 2021-05-04 DIAGNOSIS — G35 MS (MULTIPLE SCLEROSIS) (HCC): Primary | ICD-10-CM

## 2021-05-04 DIAGNOSIS — M79.622 PAIN IN BOTH UPPER ARMS: ICD-10-CM

## 2021-05-04 DIAGNOSIS — G82.20 PARAPLEGIA (HCC): ICD-10-CM

## 2021-05-04 DIAGNOSIS — M62.838 MUSCLE SPASTICITY: ICD-10-CM

## 2021-05-04 DIAGNOSIS — R53.83 OTHER FATIGUE: ICD-10-CM

## 2021-05-04 DIAGNOSIS — R20.0 NUMBNESS: ICD-10-CM

## 2021-05-04 DIAGNOSIS — R56.9 SEIZURE (HCC): ICD-10-CM

## 2021-05-04 DIAGNOSIS — R53.1 WEAKNESS: ICD-10-CM

## 2021-05-04 DIAGNOSIS — M54.2 PAIN, NECK: ICD-10-CM

## 2021-05-04 PROCEDURE — 3017F COLORECTAL CA SCREEN DOC REV: CPT | Performed by: PSYCHIATRY & NEUROLOGY

## 2021-05-04 PROCEDURE — 99213 OFFICE O/P EST LOW 20 MIN: CPT | Performed by: PSYCHIATRY & NEUROLOGY

## 2021-05-04 PROCEDURE — G8428 CUR MEDS NOT DOCUMENT: HCPCS | Performed by: PSYCHIATRY & NEUROLOGY

## 2021-05-04 NOTE — PROGRESS NOTES
seizure disorder 08/19/2016    Insomnia 06/28/2017    Decubitus ulcer of sacral region 06/28/2017    Pain, neck 09/06/2017    Venous stasis ulcer of left lower extremity ([address] 11/26/2018    Asthma 11/26/2018    S/P transmetatarsal amputation of foot, left ([address] 04/17/2019    Chronic constipation 05/02/2019    Colostomy in place ([address] 05/02/2019    Pressure ulcer of sacral region, stage 4 ([address] 10/17/2019    Paraplegia ([address] 11/05/2019    Arthralgia of hip 12/03/2019    Seizure ([address] 05/26/2020    Encounter for care related to vascular access port 12/10/2020    Urinary incontinence 05/04/2020    Depressive disorder 05/04/2020    Malodorous urine 12/14/2020    Arthritis 05/04/2020    Osteoporosis 05/04/2020    Urinary bladder stone 10/16/2020    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/29/2020     Resolved Ambulatory Problems     Diagnosis Date Noted    Neuropathic ulcer of right foot, limited to breakdown of skin ([address] 02/01/2016    Sepsis ([address] 08/19/2016    Urinary tract infection 08/19/2016    Hypokalemia 08/20/2016    Hypokalemia 08/20/2016    Abnormal EKG     Encephalopathy 08/25/2016    Elevated troponin level     Neuropathic ulcer of left foot, limited to breakdown of skin ([address] 04/10/2017    Acute bronchitis 06/28/2017    Acute sinusitis 06/28/2017    Open wound of ankle 06/28/2017    Vitamin D deficiency 06/28/2017    Back pain 06/28/2017    Breakdown (mechanical) of cranial or spinal infusion catheter, initial encounter 10/25/2016    CAFL (chronic airflow limitation) ([address] 06/28/2017    Cough 06/28/2017    Encounter for screening for other disorder 06/28/2017    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    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    Difficult or painful urination 06/28/2017    Headache 06/28/2017    Hyperlipidemia 06/28/2017    Influenza 06/28/2017    Breast lump 06/28/2017    Patient overweight 06/28/2017    Hay fever 06/28/2017    Cobalamin deficiency 06/28/2017    N-Acetaminophen]      Talking out of her head    Morphine      Category: Allergy;     Morphine And Related Itching and Swelling    Propoxyphene Swelling       Social History     Socioeconomic History    Marital status:      Spouse name: Not on file    Number of children: Not on file    Years of education: Not on file    Highest education level: Not on file   Occupational History    Not on file   Social Needs    Financial resource strain: Not on file    Food insecurity     Worry: Not on file     Inability: Not on file    Transportation needs     Medical: Not on file     Non-medical: Not on file   Tobacco Use    Smoking status: Former Smoker     Packs/day: 0.00     Types: Cigarettes     Quit date: 1/8/2018     Years since quitting: 3.3    Smokeless tobacco: Never Used    Tobacco comment: hasn't smoked since 9/5/20, taking chantix   Substance and Sexual Activity    Alcohol use: Yes     Comment: yaritza    Drug use: Yes     Types: Marijuana     Comment: daily     Sexual activity: Not on file   Lifestyle    Physical activity     Days per week: Not on file     Minutes per session: Not on file    Stress: Not on file   Relationships    Social connections     Talks on phone: Not on file     Gets together: Not on file     Attends [employer] service: Not on file     Active member of club or organization: Not on file     Attends meetings of clubs or organizations: Not on file     Relationship status: Not on file    Intimate partner violence     Fear of current or ex partner: Not on file     Emotionally abused: Not on file     Physically abused: Not on file     Forced sexual activity: Not on file   Other Topics Concern    Not on file   Social History Narrative    Not on file     Review of Systems     Constitutional  No fever or chills. No diaphoresis or significant fatigue. HENT   No tinnitus or significant hearing loss.   Eyes  no sudden vision change or eye pain  Respiratory  no significant shortness of breath or cough  Cardiovascular  no chest pain No palpitations or significant leg swelling  Gastrointestinal  no abdominal swelling or pain. Genitourinary  No difficulty urinating, dysuria  Musculoskeletal  no back pain or myalgia. Skin  no color change or rash  Neurologic  No seizures. No lateralizing weakness. Hematologic  no easy bruising or excessive bleeding. Psychiatric  no severe anxiety or nervousness. All other review of systems are negative. Current Outpatient Medications   Medication Sig Dispense Refill    methylphenidate (RITALIN) 20 MG tablet TAKE 1 TABLET BY MOUTH DAILY 30 tablet 0    HYDROcodone-acetaminophen (NORCO)  MG per tablet TAKE 1 TABLET BY MOUTH 3 TIMES DAILY AS NEEDED FOR PAIN. REDUCE DOSESA S PAIN BECOMES MANAGEABLE 90 tablet 0    CHANTIX CONTINUING MONTH CHADWICK 1 MG tablet TAKE 1 TABLET BY MOUTH 2 TIMES A DAY 56 tablet 0    pregabalin (LYRICA) 300 MG capsule TAKE 1 CAPSULE BY MOUTH 2 TIMES A DAY 60 capsule 5    DULoxetine (CYMBALTA) 30 MG extended release capsule TAKE 1 CAPSULE BY MOUTH DAILY 30 capsule 0    oxybutynin (DITROPAN-XL) 10 MG extended release tablet Take 10 mg by mouth daily      ondansetron (ZOFRAN) 4 MG tablet Take 4 mg by mouth every 8 hours as needed for Nausea or Vomiting      promethazine (PHENERGAN) 25 MG tablet TAKE ONE TABLET BY MOUTH 3 TIMES DAILY AS NEEDED FOR NAUSEA 12 tablet 0    Diapers & Supplies MISC Diapers, chucks, wipes, and gloves for incontinence.   [phone] each 11    pilocarpine (SALAGEN) 7.5 MG tablet 1 tablet three times daily 90 tablet 5    tiZANidine (ZANAFLEX) 4 MG tablet TAKE 3 TABLETS TWICE DAILY AS NEEDED 180 tablet 11    azelastine (ASTELIN) 0.1 % nasal spray USE 2 SPRAYS IN EACH NOSTRIL TWICE DAILY AS DIRECTED 30 mL 0    hydroCHLOROthiazide (HYDRODIURIL) 25 MG tablet TAKE 1 TABLET BY MOUTH ONCE DAILY AS NEEDED 30 tablet 0    cetirizine (ZYRTEC) 10 MG tablet Take 10 mg by mouth daily  Catheters MISC Catheter supplies and lubricant 5 times daily G82.20  to [facility] [phone] 450 each 3    levETIRAcetam (KEPPRA) 500 MG tablet Take 1 tablet by mouth nightly (Patient taking differently: Take 750 mg by mouth nightly ) 30 tablet 5    glucose monitoring kit (FREESTYLE) monitoring kit 1 kit by Does not apply route daily 1 kit 0    blood glucose monitor strips Test 1 times a day & as needed for symptoms of irregular blood glucose. 100 strip 3    docusate sodium (COLACE) 100 MG capsule Take 200 mg by mouth as needed for Constipation      PROAIR  (90 Base) MCG/ACT inhaler INHALE 1 PUFF INTO THE LUNGS EVERY 6 HOURS AS NEEDED FOR WHEEZING OR SHORTNESS OF BREATH 8.5 g 5    Heparin Lock Flush (HEPARIN FLUSH, 100 UNITS/ML,) 100 UNIT/ML injection 3 mLs by Intercatheter route every 30 days No every 30 days but every 4-6 weeks. Flush port with 300 units heparin with 20 cc normal saline for ocrevus infusion for Multiple sclerosis and NS 20CC 1 Syringe 5    ipratropium-albuterol (DUONEB) 0.5-2.5 (3) MG/3ML SOLN nebulizer solution USE 1 UNIT DOSE IN NEBULIZER EVERY 4 TO 6 HOURS AS NEEDED. 450 mL 11    BACLOFEN, PAIN PUMP REFILL CHARGE, by Implant route continuous Indications: every 3 months      b complex vitamins capsule Take 1 capsule by mouth daily      Multiple Vitamins-Minerals (THERAPEUTIC MULTIVITAMIN-MINERALS) tablet Take 1 tablet by mouth daily      glucose monitoring kit (FREESTYLE) monitoring kit 1 kit by Does not apply route daily 1 kit 0    blood glucose monitor strips Test 1 times a day & as needed for symptoms of irregular blood glucose.  100 strip 0    baclofen (LIORESAL) 10 MG tablet Take 1 tablet by mouth 2 times daily as needed (muscle spasms) 60 tablet 5    Sodium Chloride Flush (SALINE FLUSH) 0.9 % SOLN Infuse 20 mLs intravenously every 30 days Not every 30 days but every 4-6 weeks as need with 300 units of heparin to flush port for Infusion of Ocrevus for had been on Copaxone but came off of this on her own. She denies any clear relapses over the last several years. She'll be referred to physical therapy as well. She will have a CBC and CMP every 3 months. The patient indicated understanding of the management plan. At this time she will be referred to [facility] as per her wishes for some experimental treatments. She is to reduce her Keppra to 750 mg daily as she wants to be at this dose due to fatigue,  She is seeing Dr Mehreen Cason who manages baclofen pump. She will be continued  Ritalin to see if this helps at a future. She will be tried on Nuvgil. Lyrica restarted. She will be referred to [facility] for colostomy issues. .her hepatitis B panel was unremkarkable. Her EMG with NCs of the arms was suggestive of an ulnar neuropathy on the left. her MRI of the brain had stable white matter change. Her MRI of the c spine had some minimal disc bulging but no MS plaques Her x rays of her hips due to pain were unremarkable except for bladder stone along with pin in right hip. 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 She is off Ocrevus due to [address] virus. She will be continue on Cymbala for a few months She wants to continue  . She's to follow-up with me in approximately 3 months and call with any further problems. Continue current care as noted. Telephone  call 15 minutes  Both parties in [facility] to E&M services    Plan    No orders of the defined types were placed in this encounter. No orders of the defined types were placed in this encounter. Return in about 3 months (around 8/4/2021).

## 2021-05-05 ENCOUNTER — TELEPHONE (OUTPATIENT)
Dept: INTERNAL MEDICINE CLINIC | Age: 51
End: 2021-05-05

## 2021-05-06 ENCOUNTER — TELEPHONE (OUTPATIENT)
Dept: INTERNAL MEDICINE | Age: 51
End: 2021-05-06

## 2021-05-06 ENCOUNTER — TELEPHONE (OUTPATIENT)
Dept: ADMINISTRATIVE | Age: 51
End: 2021-05-06

## 2021-05-06 NOTE — TELEPHONE ENCOUNTER
[facility] Transitions Initial Follow Up Call    Outreach made within 2 business days of discharge: Yes    Patient: Rosi Corley   Patient : 1970  MRN: [id number]    Reason for Admission: Admitted 21 to [facility] then discharged to [facility]   Discharge Date: 21 from [facility]   Requested discharge summary and d/c med list be faxed to us from [place] with: patient     Discharge department/facility: [facility]    TCM Interactive Patient Contact:  Was patient able to fill all prescriptions: Yes  Was patient instructed to bring all medications to the follow-up visit: Yes  Is patient taking all medications as directed in the discharge summary? Yes  Does patient understand their discharge instructions: Yes  Does patient have questions or concerns that need addressed prior to 7-14 day follow up office visit: no    I spoke with Mrs. Baker, she states she is home and doing ok. She states she has pressure on her stomach but the pain is tolerable. She states her appetite is good and she has plenty of food to eat. She state she has in home care around the clock. Home health is supposed to come out tomorrow to change her wound vac. She states she still has the nephrostomy and drains. She states her in home aids are caring for those. She states she has all of her medication and is taking it as prescribed. She denies any needs at this time and will follow up next week as scheduled. She states she has a ride booked with the transit system for next week.      Scheduled appointment with PCP within 7-14 days    Follow Up  Future Appointments   Date Time Provider Maurizio Hawkins   2021  2:00 PM [facility] RM 2 [facility]   2021 12:30 PM EFREN Price [facility] [place]   2021  2:15 PM MD MANNY Siddiqui [place]   2021  1:00 PM Niesha Abreu MD N [facility] NEURO [address] [place] [address]

## 2021-05-07 ENCOUNTER — HOSPITAL ENCOUNTER (OUTPATIENT)
Dept: CT IMAGING | Age: 51
Discharge: HOME | End: 2021-05-07
Payer: MEDICARE

## 2021-05-07 ENCOUNTER — TELEPHONE (OUTPATIENT)
Dept: INTERNAL MEDICINE | Age: 51
End: 2021-05-07

## 2021-05-07 ENCOUNTER — TELEPHONE (OUTPATIENT)
Dept: INTERNAL MEDICINE CLINIC | Age: 51
End: 2021-05-07

## 2021-05-07 DIAGNOSIS — N82.0 VESICOVAGINAL FISTULA: ICD-10-CM

## 2021-05-07 PROCEDURE — 72194 CT PELVIS W/O & W/DYE: CPT

## 2021-05-07 PROCEDURE — 6360000004 HC RX CONTRAST MEDICATION: Performed by: INTERNAL MEDICINE

## 2021-05-07 RX ADMIN — IOPAMIDOL 90 ML: 755 INJECTION, SOLUTION INTRAVENOUS at 14:28

## 2021-05-07 NOTE — TELEPHONE ENCOUNTER
Patient cancelled her appt on May 11 @ 1:15 pm with Dr. Paco Delgado. It was a nursing home follow up visit and she did not want to reschedule it. She has an appt in June with Dr. Paco Delgado and she said she would just keep that one. Just wanted to make you aware of the situation. Thank you.

## 2021-05-07 NOTE — TELEPHONE ENCOUNTER
[address] [id number] PT eval completed and recommending PT [id number], 3w1, 2w1 for deep breathing, trunk/core, and UE exercises; bed mobility, transfer training; sitting balance activities; home safety instruction.     Please advise if approved

## 2021-06-03 DIAGNOSIS — G35 MS (MULTIPLE SCLEROSIS) (HCC): ICD-10-CM

## 2021-06-03 DIAGNOSIS — M79.622 PAIN IN BOTH UPPER ARMS: ICD-10-CM

## 2021-06-03 DIAGNOSIS — M79.621 PAIN IN BOTH UPPER ARMS: ICD-10-CM

## 2021-06-03 DIAGNOSIS — R53.83 OTHER FATIGUE: ICD-10-CM

## 2021-06-03 DIAGNOSIS — M54.2 PAIN, NECK: ICD-10-CM

## 2021-06-03 DIAGNOSIS — M62.838 MUSCLE SPASTICITY: ICD-10-CM

## 2021-06-03 RX ORDER — VARENICLINE TARTRATE 1 MG/1
TABLET, FILM COATED ORAL
Qty: 56 TABLET | Refills: 0 | Status: SHIPPED | OUTPATIENT
Start: 2021-06-03 | End: 2021-06-08 | Stop reason: SDUPTHER

## 2021-06-03 NOTE — TELEPHONE ENCOUNTER
Requested Prescriptions     Pending Prescriptions Disp Refills    DULoxetine (CYMBALTA) 30 MG extended release capsule [Pharmacy Med Name: DULOXETINE  DR 30 MG CAP *DD] 30 capsule 0     Sig: TAKE 1 CAPSULE BY MOUTH DAILY    HYDROcodone-acetaminophen (Leandro Gavin)  MG per tablet [Pharmacy Med Name: HYDROCOD-APAP  MG] 90 tablet 0     Sig: TAKE 1 TABLET BY MOUTH 3 TIMES DAILY AS NEEDED FOR PAIN.  REDUCE DOSESAS PAIN BEOMES MANAGEABLE    methylphenidate (RITALIN) 20 MG tablet [Pharmacy Med Name: METHYLPHENIDAT 20MG TAB DD] 30 tablet 0     Sig: TAKE 1 TABLET BY MOUTH DAILY       Last Office Visit:  5/4/2021  Next Office Visit:  8/4/2021  Last Medication Refill:  Cymbalta 4/1/2021, Norco and Ritalin 5/3/2021  Jim up to date:  3/25/2021    *RX updated to reflect   Norco and Ritalin 6/3/2021  fill date*    *Dr. Diya De Oliveira patient

## 2021-06-04 RX ORDER — METHYLPHENIDATE HYDROCHLORIDE 20 MG/1
TABLET ORAL
Qty: 30 TABLET | Refills: 0 | Status: SHIPPED | OUTPATIENT
Start: 2021-06-04 | End: 2021-06-14

## 2021-06-04 RX ORDER — HYDROCODONE BITARTRATE AND ACETAMINOPHEN 10; 325 MG/1; MG/1
TABLET ORAL
Qty: 90 TABLET | Refills: 0 | Status: SHIPPED | OUTPATIENT
Start: 2021-06-04 | End: 2021-07-07

## 2021-06-04 RX ORDER — DULOXETIN HYDROCHLORIDE 30 MG/1
30 CAPSULE, DELAYED RELEASE ORAL DAILY
Qty: 30 CAPSULE | Refills: 5 | Status: SHIPPED | OUTPATIENT
Start: 2021-06-04 | End: 2021-12-09 | Stop reason: SDUPTHER

## 2021-06-08 ENCOUNTER — OFFICE VISIT (OUTPATIENT)
Dept: INTERNAL MEDICINE | Age: 51
End: 2021-06-08
Payer: MEDICARE

## 2021-06-08 VITALS
HEART RATE: 70 BPM | OXYGEN SATURATION: 98 % | SYSTOLIC BLOOD PRESSURE: 130 MMHG | WEIGHT: 144 LBS | HEIGHT: 69 IN | BODY MASS INDEX: 21.33 KG/M2 | DIASTOLIC BLOOD PRESSURE: 82 MMHG

## 2021-06-08 DIAGNOSIS — Z71.6 ENCOUNTER FOR SMOKING CESSATION COUNSELING: ICD-10-CM

## 2021-06-08 DIAGNOSIS — G40.909 SEIZURE DISORDER (HCC): ICD-10-CM

## 2021-06-08 DIAGNOSIS — Z00.00 MEDICARE ANNUAL WELLNESS VISIT, SUBSEQUENT: Primary | ICD-10-CM

## 2021-06-08 DIAGNOSIS — R73.9 HYPERGLYCEMIA: ICD-10-CM

## 2021-06-08 DIAGNOSIS — Z91.09 ENVIRONMENTAL ALLERGIES: ICD-10-CM

## 2021-06-08 DIAGNOSIS — F32.89 OTHER DEPRESSION: ICD-10-CM

## 2021-06-08 DIAGNOSIS — Z00.00 MEDICARE ANNUAL WELLNESS VISIT, SUBSEQUENT: ICD-10-CM

## 2021-06-08 DIAGNOSIS — Z12.31 ENCOUNTER FOR SCREENING MAMMOGRAM FOR MALIGNANT NEOPLASM OF BREAST: ICD-10-CM

## 2021-06-08 DIAGNOSIS — G35 MULTIPLE SCLEROSIS (HCC): ICD-10-CM

## 2021-06-08 DIAGNOSIS — Z00.00 ROUTINE HEALTH MAINTENANCE: ICD-10-CM

## 2021-06-08 DIAGNOSIS — R40.0 DAYTIME SOMNOLENCE: ICD-10-CM

## 2021-06-08 DIAGNOSIS — E55.9 VITAMIN D DEFICIENCY: ICD-10-CM

## 2021-06-08 DIAGNOSIS — G89.4 CHRONIC PAIN SYNDROME: ICD-10-CM

## 2021-06-08 DIAGNOSIS — T81.30XS ABDOMINAL WOUND DEHISCENCE, SEQUELA: ICD-10-CM

## 2021-06-08 DIAGNOSIS — N32.2 VESICAL FISTULA: ICD-10-CM

## 2021-06-08 DIAGNOSIS — I10 ESSENTIAL HYPERTENSION: ICD-10-CM

## 2021-06-08 DIAGNOSIS — Z13.9 SCREENING DUE: ICD-10-CM

## 2021-06-08 DIAGNOSIS — R68.2 DRY MOUTH: ICD-10-CM

## 2021-06-08 DIAGNOSIS — N32.81 OAB (OVERACTIVE BLADDER): ICD-10-CM

## 2021-06-08 LAB
ALBUMIN SERPL-MCNC: 3.4 G/DL (ref 3.5–5.2)
ALP BLD-CCNC: 118 U/L (ref 35–104)
ALT SERPL-CCNC: 11 U/L (ref 5–33)
ANION GAP SERPL CALCULATED.3IONS-SCNC: 6 MMOL/L (ref 7–19)
AST SERPL-CCNC: 10 U/L (ref 5–32)
BASOPHILS ABSOLUTE: 0 K/UL (ref 0–0.2)
BASOPHILS RELATIVE PERCENT: 0.6 % (ref 0–1)
BILIRUB SERPL-MCNC: <0.2 MG/DL (ref 0.2–1.2)
BUN BLDV-MCNC: 9 MG/DL (ref 6–20)
CALCIUM SERPL-MCNC: 8.9 MG/DL (ref 8.6–10)
CHLORIDE BLD-SCNC: 106 MMOL/L (ref 98–111)
CHOLESTEROL, TOTAL: 141 MG/DL (ref 160–199)
CO2: 30 MMOL/L (ref 22–29)
CREAT SERPL-MCNC: 0.4 MG/DL (ref 0.5–0.9)
EOSINOPHILS ABSOLUTE: 0.2 K/UL (ref 0–0.6)
EOSINOPHILS RELATIVE PERCENT: 3.2 % (ref 0–5)
GFR AFRICAN AMERICAN: >59
GFR NON-AFRICAN AMERICAN: >60
GLUCOSE BLD-MCNC: 94 MG/DL (ref 74–109)
HBA1C MFR BLD: 4.5 % (ref 4–6)
HCT VFR BLD CALC: 40.6 % (ref 37–47)
HDLC SERPL-MCNC: 41 MG/DL (ref 65–121)
HEMOGLOBIN: 12.1 G/DL (ref 12–16)
HEPATITIS C ANTIBODY INTERPRETATION: NORMAL
IMMATURE GRANULOCYTES #: 0 K/UL
LDL CHOLESTEROL CALCULATED: 66 MG/DL
LYMPHOCYTES ABSOLUTE: 1.7 K/UL (ref 1.1–4.5)
LYMPHOCYTES RELATIVE PERCENT: 33 % (ref 20–40)
MCH RBC QN AUTO: 26.1 PG (ref 27–31)
MCHC RBC AUTO-ENTMCNC: 29.8 G/DL (ref 33–37)
MCV RBC AUTO: 87.7 FL (ref 81–99)
MONOCYTES ABSOLUTE: 0.3 K/UL (ref 0–0.9)
MONOCYTES RELATIVE PERCENT: 6.1 % (ref 0–10)
NEUTROPHILS ABSOLUTE: 3 K/UL (ref 1.5–7.5)
NEUTROPHILS RELATIVE PERCENT: 56.9 % (ref 50–65)
PDW BLD-RTO: 14.2 % (ref 11.5–14.5)
PLATELET # BLD: 199 K/UL (ref 130–400)
PMV BLD AUTO: 11.7 FL (ref 9.4–12.3)
POTASSIUM SERPL-SCNC: 4.2 MMOL/L (ref 3.5–5)
RBC # BLD: 4.63 M/UL (ref 4.2–5.4)
SODIUM BLD-SCNC: 142 MMOL/L (ref 136–145)
TOTAL PROTEIN: 5.9 G/DL (ref 6.6–8.7)
TRIGL SERPL-MCNC: 170 MG/DL (ref 0–149)
TSH SERPL DL<=0.05 MIU/L-ACNC: 1.06 UIU/ML (ref 0.27–4.2)
VITAMIN D 25-HYDROXY: 35.2 NG/ML
WBC # BLD: 5.3 K/UL (ref 4.8–10.8)

## 2021-06-08 PROCEDURE — G0439 PPPS, SUBSEQ VISIT: HCPCS | Performed by: INTERNAL MEDICINE

## 2021-06-08 PROCEDURE — 3017F COLORECTAL CA SCREEN DOC REV: CPT | Performed by: INTERNAL MEDICINE

## 2021-06-08 RX ORDER — VARENICLINE TARTRATE 1 MG/1
TABLET, FILM COATED ORAL
Qty: 56 TABLET | Refills: 0 | Status: SHIPPED | OUTPATIENT
Start: 2021-06-08 | End: 2021-12-13

## 2021-06-08 RX ORDER — CEPHALEXIN 500 MG/1
CAPSULE ORAL
COMMUNITY
Start: 2021-06-04 | End: 2021-06-29 | Stop reason: ALTCHOICE

## 2021-06-08 ASSESSMENT — PATIENT HEALTH QUESTIONNAIRE - PHQ9
SUM OF ALL RESPONSES TO PHQ QUESTIONS 1-9: 0
1. LITTLE INTEREST OR PLEASURE IN DOING THINGS: 0
SUM OF ALL RESPONSES TO PHQ9 QUESTIONS 1 & 2: 0
2. FEELING DOWN, DEPRESSED OR HOPELESS: 0

## 2021-06-08 ASSESSMENT — LIFESTYLE VARIABLES: HOW OFTEN DO YOU HAVE A DRINK CONTAINING ALCOHOL: 0

## 2021-06-08 NOTE — PROGRESS NOTES
Chief Complaint   Patient presents with    Medicare AWV    Equipment Request     life protect necklace        HPI: Jag Mulligan is a 49 y.o. female is here for her annual Medicare wellness exam.  She continues to see Dr. Asuncion Fuller for a history of multiple sclerosis. EFREN Aguiar is her gastroenterologist.  She has been seeing Dr. Bala Meadows and Dr. Easton Mcmillan in [place] for history of a vesiculovaginal fistula. She has a complex urologic history secondary to her MS. She has had a cystectomy in the past with [place] pouch per Dr. King El at [facility] in . In , she had a diverting end colostomy. She had been experiencing persistent vaginal leakage. She was taken to surgery per Dr. Robert Jeans died in 2020 and had a clear pouch vaginal fistula which was cannulated. The fistula was seen at both the diversion and the vaginal side. She was later referred to Dr. Bala Meadows for evaluation of for a vaginal approach repair. Additionally, she had a 3 cm pouch stone. She was taken to the operating room recently per Dr. Bala Meadows for evaluation under anesthesia and possible repair. At that time, Dr. Bala Meadows was unable to identify her fistula. In 2021, she was taken to surgery. She tolerated the procedure well. However, postoperatively, she became febrile and tachycardic. Infectious disease work-up was initiated and was negative. She did not have any evidence of peritonitis and her abdominal exams remain benign. She did have a MAGDY creatinine drawn. This was consistent with a urine leak. She had a CT scan that demonstrated a leak from her [place] pouch, requiring bilateral nephrostomy tube placement. On the fifth day of hospitalization, her wound appeared to be infected from her suprapubic catheter and she had stool contents around the catheter.   This necessitated emergent surgical procedure with colorectal surgery which identified as bowel injury requiring bowel resection and repair of her [place] pouch along with replacement of her suprapubic tube. Her incision was managed with wound care. She did have some skin adhesions and eventually required a negative pressure wound VAC. She was later discharged home to a rehabilitation facility with her suprapubic tube, wound VAC, MAGDY drain and bilateral nephrostomy tubes. She recently had a pouchogram.  Was later reevaluated by a CT cystogram that showed no evidence of a leak from her vagina. She still has complaints of leakage at times. However, the cystogram did not show any evidence of a leak. She does have a wound to her abdomen, which is packed with gauze. She is getting wound care. She is currently on Keflex. She denies any complaints of fever or chills. Her labs are currently pending. She did cut back on her smoking. She would like to try Chantix once again. Her depression is stable. She does have some chronic pain issues secondary to her MS. She is on hydrocodone which seems to work relatively well. Ritalin is helpful for daytime somnolence. She also has a history of neuropathy which is stable on Lyrica. Her MS appears to be relatively stable. She is wheelchair-bound. She still has some bladder leakage even with oxybutynin. Salagen is helpful for her dry mouth. Her blood pressure does remain well controlled. She has not had any seizures. She does take Keppra at nighttime. She has had some episodes of hypoglycemia in the past.  She continues to monitor her blood sugar periodically. It has not been elevated as far as she knows. Again, her labs are pending. She is on a baclofen pump for chronic pain.  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    Arthritis 5/4/2020    Asthma     Back pain 6/28/2017    Binocular vision disorder with diplopia 6/28/2017    CAFL [facility]chronic airflow limitation) [facility] 6/28/2017    Hay fever 6/28/2017    Headache 6/28/2017    MS (multiple sclerosis) ([facility])     Muscle spasticity     Neuropathic ulcer of left foot, limited to breakdown of skin ([facility] 4/10/2017    Numbness 8/4/2016    Open wound of ankle 6/28/2017    Open wound of second toe of left foot 12/11/2018    Peripheral vascular disease ([facility]     Seizure ([facility]     occ. related to ms    Sepsis ([facility] 8/19/2016    Weakness 8/4/2016      Past Surgical History:   Procedure Laterality Date    BACLOFEN PUMP IMPLANTATION      COLONOSCOPY N/A 9/27/2019    Dr Gilberto Fuentes ileum through ostomy-patent and healthy appearing anastomosis in the right colon-entero colonic anastomosis-10 yr recall    COLOSTOMY     [facility]      Right    FOOT AMPUTATION Left 4/4/2019    LEFT TRANSMET AMPUTATION performed by Mike Henderson MD at [address]      urostomy    OTHER SURGICAL HISTORY      pain pump    WI INSJ PRPH CTR VAD W/SUBQ PORT UNDER 5 YR N/A 6/26/2018    SINGLE LUMEN PORT PLACEMENT WITH FLUORO performed by Harvey Naqvi MD at [facility] TOE AMPUTATION      L last toe    TONSILLECTOMY      URETEROTOMY        Social History     Socioeconomic History    Marital status:      Spouse name: Not on file    Number of children: Not on file    Years of education: Not on file    Highest education level: Not on file   Occupational History    Not on file   Tobacco Use    Smoking status: Former Smoker     Packs/day: 0.00     Types: Cigarettes     Quit date: 1/8/2018     Years since quitting: 3.4    Smokeless tobacco: Never Used    Tobacco comment: hasn't smoked since 9/5/20, taking chantix   Vaping Use    Vaping Use: Never used   Substance and Sexual Activity    Alcohol use: Yes     Comment: rarley    Drug use: Yes     Types: Marijuana     Comment: daily     Sexual activity: Not on file   Other Topics Concern    Not on file   Social History Narrative    Not on file     Social Determinants of Health     Financial Resource Strain:     Difficulty of Paying Living Expenses:    Food Insecurity:     Worried About Running Out of Food in the Last Year:     [address] in the Last Year:    Transportation Needs:     Lack of Transportation (Medical):  Lack of Transportation (Non-Medical):    Physical Activity:     Days of Exercise per Week:     Minutes of Exercise per Session:    Stress:     Feeling of Stress :    Social Connections:     Frequency of Communication with Friends and Family:     Frequency of Social Gatherings with Friends and Family:     Attends [employer] Services:     Active Member of Clubs or Organizations:     Attends Club or Organization Meetings:     Marital Status:    Intimate Partner Violence:     Fear of Current or Ex-Partner:     Emotionally Abused:     Physically Abused:     Sexually Abused:       Family History   Problem Relation Age of Onset    Cancer Mother         breast    Colon Cancer Mother     Colon Polyps Mother     Diabetes Father     High Blood Pressure Father     Cancer Paternal Aunt         breast    Liver Cancer Paternal Aunt     Heart Disease Paternal Grandmother     Esophageal Cancer Neg Hx     Liver Disease Neg Hx     Rectal Cancer Neg Hx     Stomach Cancer Neg Hx         Current Outpatient Medications   Medication Sig Dispense Refill    varenicline (CHANTIX CONTINUING MONTH CHADWICK) 1 MG tablet TAKE 1 TABLET BY MOUTH 2 TIMES A DAY 56 tablet 0    DULoxetine (CYMBALTA) 30 MG extended release capsule TAKE 1 CAPSULE BY MOUTH DAILY 30 capsule 5    HYDROcodone-acetaminophen (NORCO)  MG per tablet TAKE 1 TABLET BY MOUTH 3 TIMES DAILY AS NEEDED FOR PAIN.  REDUCE DOSESAS PAIN BEOMES MANAGEABLE 90 tablet 0    methylphenidate (RITALIN) 20 MG tablet TAKE 1 TABLET BY MOUTH DAILY 30 tablet 0    pregabalin (LYRICA) (DUONEB) 0.5-2.5 (3) MG/3ML SOLN nebulizer solution USE 1 UNIT DOSE IN NEBULIZER EVERY 4 TO 6 HOURS AS NEEDED. 450 mL 11    BACLOFEN, PAIN PUMP REFILL CHARGE, by Implant route continuous Indications: every 3 months      b complex vitamins capsule Take 1 capsule by mouth daily      Multiple Vitamins-Minerals (THERAPEUTIC MULTIVITAMIN-MINERALS) tablet Take 1 tablet by mouth daily      glucose monitoring kit (FREESTYLE) monitoring kit 1 kit by Does not apply route daily 1 kit 0    blood glucose monitor strips Test 1 times a day & as needed for symptoms of irregular blood glucose. 100 strip 0    baclofen (LIORESAL) 10 MG tablet Take 1 tablet by mouth 2 times daily as needed (muscle spasms) 60 tablet 5    Sodium Chloride Flush (SALINE FLUSH) 0.9 % SOLN Infuse 20 mLs intravenously every 30 days Not every 30 days but every 4-6 weeks as need with 300 units of heparin to flush port for Infusion of Ocrevus for multiple sclerosis 20 mL 5    varenicline (CHANTIX STARTING MONTH PAK) 0.5 MG X 11 & 1 MG X 42 tablet Take by mouth. 1 box 0    cephALEXin (KEFLEX) 500 MG capsule        No current facility-administered medications for this visit.         Patient Active Problem List   Diagnosis    Muscle spasticity    Peripheral vascular disease ([address]    MS (multiple sclerosis) (HCC)    Pain in both upper arms    Numbness    Other fatigue    Weakness    Chronic pain    Hx of seizure disorder    Insomnia    Decubitus ulcer of sacral region    Pain, neck    Venous stasis ulcer of left lower extremity ([address]    Asthma    S/P transmetatarsal amputation of foot, left (HCC)    Chronic constipation    Colostomy in place ([address]    Pressure ulcer of sacral region, stage 4 ([address]    Paraplegia ([address]    Arthralgia of hip    Seizure ([address]    Encounter for care related to vascular access port    Urinary incontinence    Depressive disorder    Malodorous urine    Arthritis    Osteoporosis    Urinary bladder stone    Vesicovaginal fistula        Review of Systems   Constitutional: Positive for fatigue. Negative for activity change, appetite change, fever and unexpected weight change. HENT: Negative for congestion, ear pain, postnasal drip, rhinorrhea, sinus pressure, sore throat and tinnitus. Eyes: Negative for photophobia, pain, discharge, redness, itching and visual disturbance. Respiratory: Negative for cough, chest tightness, shortness of breath and wheezing. Cardiovascular: Negative for chest pain, palpitations and leg swelling. Gastrointestinal: Negative for abdominal distention, abdominal pain, blood in stool, constipation, diarrhea, nausea and vomiting. Urinary and stool incontinence. Endocrine: Negative for cold intolerance, heat intolerance, polydipsia and polyuria. Genitourinary: Negative for decreased urine volume, difficulty urinating, dysuria, flank pain, frequency, hematuria and urgency. Musculoskeletal:        She has chronic back pain and muscle spasms. She is wheelchair-bound secondary to history of multiple sclerosis. Skin: Positive for wound. Negative for color change, pallor and rash. Abdominal incision dehiscence   Allergic/Immunologic: Positive for environmental allergies. Negative for food allergies and immunocompromised state. Neurological: Negative for dizziness, seizures, syncope, speech difficulty, weakness, numbness and headaches. Occasionally gets sinus headaches   Hematological: Negative for adenopathy. Does not bruise/bleed easily. Psychiatric/Behavioral: Positive for dysphoric mood. Negative for agitation, confusion and decreased concentration. The patient is nervous/anxious. The patient is not hyperactive. Anxiety and depression are stable       /82   Pulse 70   Ht 5' 9\" (1.753 m)   Wt 144 lb (65.3 kg)   SpO2 98%   BMI 21.27 kg/m²   Physical Exam  Vitals and nursing note reviewed.    Constitutional:       General: She is not in acute distress. Appearance: Normal appearance. She is well-developed and normal weight. She is not ill-appearing, toxic-appearing or diaphoretic. HENT:      Head: Normocephalic and atraumatic. Right Ear: Tympanic membrane, ear canal and external ear normal. There is no impacted cerumen. Left Ear: Tympanic membrane, ear canal and external ear normal. There is no impacted cerumen. Nose: Nose normal. No congestion or rhinorrhea. Mouth/Throat:      Mouth: Mucous membranes are moist.      Pharynx: Oropharynx is clear. No oropharyngeal exudate or posterior oropharyngeal erythema. Eyes:      General: No scleral icterus. Right eye: No discharge. Left eye: No discharge. Extraocular Movements: Extraocular movements intact. Conjunctiva/sclera: Conjunctivae normal.      Pupils: Pupils are equal, round, and reactive to light. Neck:      Thyroid: No thyromegaly. Vascular: No carotid bruit or JVD. Trachea: No tracheal deviation. Cardiovascular:      Rate and Rhythm: Normal rate and regular rhythm. Pulses: Normal pulses. Heart sounds: Normal heart sounds. No murmur heard. No friction rub. No gallop. Pulmonary:      Effort: Pulmonary effort is normal. No respiratory distress. Breath sounds: Normal breath sounds. No stridor. No wheezing, rhonchi or rales. Chest:      Chest wall: No tenderness. Abdominal:      General: Bowel sounds are normal. There is no distension. Palpations: Abdomen is soft. There is no mass. Tenderness: There is no abdominal tenderness. There is no right CVA tenderness, left CVA tenderness, guarding or rebound. Hernia: No hernia is present. Comments: Ostomy bags are intact   Musculoskeletal:         General: No swelling, tenderness, deformity or signs of injury. Normal range of motion. Cervical back: Normal range of motion and neck supple. No rigidity. No muscular tenderness.       Right lower leg: No Doing well with Cymbalta    4. Chronic pain issues secondary to multiple sclerosis: Continue hydrocodone as prescribed. Continue Lyrica as prescribed. Chronic back pain: Zanaflex as needed. She does have a baclofen pump in place    5. Daytime somnolence: Continue Ventolin    6. Overactive bladder: Continue oxybutynin    7. Vesiculovaginal fistula: Care as per urology    8. Abdominal wound: Patient plans to get wound care. She is on Keflex. She denies any complaints of fever or chills    9. Hypertension: Blood pressure well controlled on current medication regimen    10. Seizure disorder: Continue Keppra as prescribed    11. Environmental allergies: Continue Zyrtec as prescribed    12. Dry mouth: Continue Salagen    13. Hyperglycemia: A1c is pending    14. Vitamin D deficiency: Check a vitamin D level with next lab draw    Alyce was seen today for medicare awv and equipment request.    Diagnoses and all orders for this visit:    Medicare annual wellness visit, subsequent  -     CBC Auto Differential; Future  -     Comprehensive Metabolic Panel; Future  -     Lipid Panel; Future  -     TSH without Reflex; Future  -     Vitamin D 25 Hydroxy; Future  -     Hepatitis C Antibody; Future  -     Levetiracetam Level; Future  -     Hemoglobin A1C; Future  -     CBC Auto Differential; Future  -     Comprehensive Metabolic Panel; Future  -     Lipid Panel; Future  -     TSH without Reflex; Future  -     Levetiracetam Level; Future    Vitamin D deficiency   -     Vitamin D 25 Hydroxy; Future  -     Vitamin D 25 Hydroxy; Future    Screening due  -     DK DIGITAL SCREEN W OR WO CAD BILATERAL; Future    Encounter for screening mammogram for malignant neoplasm of breast   -     DK DIGITAL SCREEN W OR WO CAD BILATERAL;  Future    Routine health maintenance    Encounter for smoking cessation counseling    Other depression    Chronic pain syndrome    Multiple sclerosis (HCC)    Daytime somnolence    OAB (overactive bladder)    Vesical fistula    Abdominal wound dehiscence, sequela    Essential hypertension    Seizure disorder (HCC)    Environmental allergies    Dry mouth    Hyperglycemia    Other orders  -     varenicline (CHANTIX CONTINUING MONTH CHADWICK) 1 MG tablet; TAKE 1 TABLET BY MOUTH 2 TIMES A DAY          Return in about 6 months (around 12/8/2021) for medication check. Orders Placed This Encounter   Procedures    DK DIGITAL SCREEN W OR WO CAD BILATERAL     Standing Status:   Future     Standing Expiration Date:   8/8/2022     Order Specific Question:   Reason for exam:     Answer:   screening breast cancer     Order Specific Question:   Does this patient have implants? Answer:   No     Order Specific Question:   Does this patient have a personal history of breast cancer? Answer:   No     Order Specific Question:   Where was last mammogram performed? Answer:   [facility] Specific Question:   When was last mammogram performed?      Answer:   6/8/2019    CBC Auto Differential     Standing Status:   Future     Number of Occurrences:   1     Standing Expiration Date:   6/8/2022    Comprehensive Metabolic Panel     Standing Status:   Future     Number of Occurrences:   1     Standing Expiration Date:   6/8/2022    Lipid Panel     Standing Status:   Future     Number of Occurrences:   1     Standing Expiration Date:   6/8/2022     Order Specific Question:   Is Patient Fasting?/# of Hours     Answer:   NA    TSH without Reflex     Standing Status:   Future     Number of Occurrences:   1     Standing Expiration Date:   6/8/2022    Vitamin D 25 Hydroxy     Standing Status:   Future     Number of Occurrences:   1     Standing Expiration Date:   6/8/2022    Hepatitis C Antibody     Standing Status:   Future     Number of Occurrences:   1     Standing Expiration Date:   6/8/2022    Levetiracetam Level     Standing Status:   Future     Number of Occurrences:   1     Standing Expiration Date:   6/8/2022  Hemoglobin A1C     Standing Status:   Future     Number of Occurrences:   1     Standing Expiration Date:   2022    CBC Auto Differential     Fast 12 hours     Standing Status:   Future     Standing Expiration Date:   2022    Comprehensive Metabolic Panel     Fasting 12 hours     Standing Status:   Future     Standing Expiration Date:   2022    Lipid Panel     Standing Status:   Future     Standing Expiration Date:   2022     Order Specific Question:   Is Patient Fasting?/# of Hours     Answer:   12    TSH without Reflex     Fast 12 hours     Standing Status:   Future     Standing Expiration Date:   2022    Vitamin D 25 Hydroxy     Standing Status:   Future     Standing Expiration Date:   2022    Levetiracetam Level     Standing Status:   Future     Standing Expiration Date:   2022       Mariam Ashley MD     Medicare Annual Wellness Visit - Subsequent    Name: Rojelio Cruz Date: 2021   MRN: [id number] Sex: Female   Age: 49 y.o. Ethnicity: Non-/Non    : 1970 Race: Shobha Kellogg is here for   Chief Complaint   Patient presents with   [facility]    Equipment Request     life protect necklace         Screenings for behavioral, psychosocial and functional/safety risks, and cognitive dysfunction are all negative except as indicated below. These results, as well as other patient data from the [address] [facility] [address] form, are documented in Flowsheets linked to this Encounter. Allergies   Allergen Reactions    Codeine      Category: Allergy;    Felicity Abhilash Darvocet [Propoxyphene N-Acetaminophen]      Talking out of her head    Morphine      Category: Allergy;     Morphine And Related Itching and Swelling    Propoxyphene Swelling       Prior to Visit Medications    Medication Sig Taking?  Authorizing Provider   varenicline (CHANTIX CONTINUING MONTH CHADWICK) 1 MG tablet TAKE 1 TABLET BY MOUTH 2 TIMES A DAY Yes Mariam Ashley MD DULoxetine (CYMBALTA) 30 MG extended release capsule TAKE 1 CAPSULE BY MOUTH DAILY Yes George oTlliver MD   HYDROcodone-acetaminophen (NORCO)  MG per tablet TAKE 1 TABLET BY MOUTH 3 TIMES DAILY AS NEEDED FOR PAIN. REDUCE DOSESAS PAIN BEOMES MANAGEABLE Yes George Tolliver MD   methylphenidate (RITALIN) 20 MG tablet TAKE 1 TABLET BY MOUTH DAILY Yes George Tolliver MD   pregabalin (LYRICA) 300 MG capsule TAKE 1 CAPSULE BY MOUTH 2 TIMES A DAY Yes Linda Orellana MD   oxybutynin (DITROPAN-XL) 10 MG extended release tablet Take 10 mg by mouth daily Yes Historical Provider, MD   ondansetron (ZOFRAN) 4 MG tablet Take 4 mg by mouth every 8 hours as needed for Nausea or Vomiting Yes Historical Provider, MD   promethazine (PHENERGAN) 25 MG tablet TAKE ONE TABLET BY MOUTH 3 TIMES DAILY AS NEEDED FOR NAUSEA Yes Kristi Dobson MD   Diapers & Supplies MISC Diapers, chucks, wipes, and gloves for incontinence.   [phone] Yes Kristi Dobson MD   pilocarpine (SALAGEN) 7.5 MG tablet 1 tablet three times daily Yes Kristi Dobson MD   tiZANidine (ZANAFLEX) 4 MG tablet TAKE 3 TABLETS TWICE DAILY AS NEEDED Yes Linda Orellana MD   azelastine (ASTELIN) 0.1 % nasal spray USE 2 SPRAYS IN EACH NOSTRIL TWICE DAILY AS DIRECTED Yes Kristi Dobson MD   hydroCHLOROthiazide (HYDRODIURIL) 25 MG tablet TAKE 1 TABLET BY MOUTH ONCE DAILY AS NEEDED Yes Kristi Dobson MD   cetirizine (ZYRTEC) 10 MG tablet Take 10 mg by mouth daily Yes Historical Provider, MD   Catheters MISC Catheter supplies and lubricant 5 times daily G82.20  to [address] [facility] [phone] Yes Kristi Dobson MD   levETIRAcetam (KEPPRA) 500 MG tablet Take 1 tablet by mouth nightly  Patient taking differently: Take 750 mg by mouth nightly  Yes Linda Orellana MD   glucose monitoring kit (FREESTYLE) monitoring kit 1 kit by Does not apply route daily Yes Kristi Dobson MD   blood glucose monitor strips Test 1 times a day & as needed for symptoms of irregular blood glucose. Yes Niraj Cotto MD   docusate sodium (COLACE) 100 MG capsule Take 200 mg by mouth as needed for Constipation Yes Historical Provider, MD   PROAIR  (90 Base) MCG/ACT inhaler INHALE 1 PUFF INTO THE LUNGS EVERY 6 HOURS AS NEEDED FOR WHEEZING OR SHORTNESS OF BREATH Yes Niraj Cotto MD   Heparin Lock Flush (HEPARIN FLUSH, 100 UNITS/ML,) 100 UNIT/ML injection 3 mLs by Intercatheter route every 30 days No every 30 days but every 4-6 weeks. Flush port with 300 units heparin with 20 cc normal saline for ocrevus infusion for Multiple sclerosis and NS 20CC Yes Goran Martinez MD   ipratropium-albuterol (DUONEB) 0.5-2.5 (3) MG/3ML SOLN nebulizer solution USE 1 UNIT DOSE IN NEBULIZER EVERY 4 TO 6 HOURS AS NEEDED. Yes Niraj Cotto MD   BACLOFEN, PAIN PUMP REFILL CHARGE, by Implant route continuous Indications: every 3 months Yes Historical Provider, MD   b complex vitamins capsule Take 1 capsule by mouth daily Yes Historical Provider, MD   Multiple Vitamins-Minerals (THERAPEUTIC MULTIVITAMIN-MINERALS) tablet Take 1 tablet by mouth daily Yes Historical Provider, MD   glucose monitoring kit (FREESTYLE) monitoring kit 1 kit by Does not apply route daily Yes Niraj Cotto MD   blood glucose monitor strips Test 1 times a day & as needed for symptoms of irregular blood glucose. Yes Niraj Cotto MD   baclofen (LIORESAL) 10 MG tablet Take 1 tablet by mouth 2 times daily as needed (muscle spasms) Yes Goran Martinez MD   Sodium Chloride Flush (SALINE FLUSH) 0.9 % SOLN Infuse 20 mLs intravenously every 30 days Not every 30 days but every 4-6 weeks as need with 300 units of heparin to flush port for Infusion of Ocrevus for multiple sclerosis Yes Goran Martinez MD   varenicline (CHANTIX STARTING MONTH PAK) 0.5 MG X 11 & 1 MG X 42 tablet Take by mouth.   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    Arthritis 5/4/2020    Asthma     Back pain 6/28/2017    Binocular vision disorder with diplopia 6/28/2017    [facility]ch[facility] limitation) [facility] 6/28/2017    Hay fever 6/28/2017    Headache 6/28/2017    MS (multiple sclerosis) ([address]     Muscle spasticity     Neuropathic ulcer of left foot, limited to breakdown of skin ([address] 4/10/2017    Numbness 8/4/2016    Open wound of ankle 6/28/2017    Open wound of second toe of left foot 12/11/2018    Peripheral vascular disease ([address]     Seizure ([address]     occ. related to ms    Sepsis ([address] 8/19/2016    Weakness 8/4/2016       Past Surgical History:   Procedure Laterality Date    BACLOFEN PUMP IMPLANTATION      COLONOSCOPY N/A 9/27/2019    Dr Mary Alexis ileum through ostomy-patent and healthy appearing anastomosis in the right colon-entero colonic anastomosis-10 yr recall    COLOSTOMY     [facility]      Right    FOOT AMPUTATION Left 4/4/2019    LEFT TRANSMET AMPUTATION performed by Gaviota Mathis MD at [address]      urostomy    OTHER SURGICAL HISTORY      pain pump    [facility] CTR VAD W/SUBQ PORT UNDER 5 YR N/A 6/26/2018    SINGLE LUMEN PORT PLACEMENT WITH FLUORO performed by Rimma Montano MD at [facility] TOE AMPUTATION      L last toe    TONSILLECTOMY      URETEROTOMY         Family History   Problem Relation Age of Onset   Marika Manual Cancer Mother         breast    Colon Cancer Mother     Colon Polyps Mother     Diabetes Father     High Blood Pressure Father     Cancer Paternal Aunt         breast    Liver Cancer Paternal Aunt     Heart Disease Paternal Grandmother     Esophageal Cancer Neg Hx     Liver Disease Neg Hx     Rectal Cancer Neg Hx     Stomach Cancer Neg Hx        CareTeam (Including outside providers/suppliers regularly involved in providing care):   Patient Care Team:  Rao Gould MD as PCP - General (Family Medicine)  Samira Thakkar MD as PCP - [facility] Marlene Garg Provider  Varsha Avery MD as Neurologist (Neurology)  Varsha Avery MD as Consulting Physician (Neurology)  EFREN Reina as Advanced Practice Nurse (Gastroenterology)    Wt Readings from Last 3 Encounters:   06/08/21 144 lb (65.3 kg)   04/25/21 144 lb (65.3 kg)   04/01/21 148 lb (67.1 kg)     Vitals:    06/08/21 1410   BP: 130/82   Pulse: 70   SpO2: 98%   Weight: 144 lb (65.3 kg)   Height: 5' 9\" (1.753 m)           The following problems were reviewed today and where indicated follow up appointments were made and/or referrals ordered. Risk Factor Screenings with Interventions     Fall Risk:  Timed Up and Go Test > 12 seconds?  (Complete if either Fall Risk answers are Yes): no  2 or more falls in past year?: no  Fall with injury in past year?: no  Fall Risk Interventions:    · Home safety tips provided    Depression:  PHQ-2 Score: 0  Depression Interventions:  · Relaxation techniques discussed    Anxiety:     Anxiety Interventions:  · Relaxation techniques discussed    Cognitive:  Clock Drawing Test (CDT) Score: Normal  Cognitive Impairment Interventions:  · Patient declines any further evaluation/treatment for cognitive impairment    Substance Abuse:  Social History     Socioeconomic History    Marital status:      Spouse name: Not on file    Number of children: Not on file    Years of education: Not on file    Highest education level: Not on file   Occupational History    Not on file   Tobacco Use    Smoking status: Former Smoker     Packs/day: 0.00     Types: Cigarettes     Quit date: 1/8/2018     Years since quitting: 3.4    Smokeless tobacco: Never Used    Tobacco comment: hasn't smoked since 9/5/20, taking chantix   Vaping Use    Vaping Use: Never used   Substance and Sexual Activity    Alcohol use: Yes     Comment: rarley    Drug use: Yes     Types: Marijuana     Comment: daily     Sexual activity: Not on file   Other Topics Concern    Not on file   Social History Narrative    Not on file     Social Determinants of Health     Financial Resource Strain:     Difficulty of Paying Living Expenses:    Food Insecurity:     Worried About Running Out of Food in the Last Year:     [address] in the Last Year:    Transportation Needs:     Lack of Transportation (Medical):  Lack of Transportation (Non-Medical):    Physical Activity:     Days of Exercise per Week:     Minutes of Exercise per Session:    Stress:     Feeling of Stress :    Social Connections:     Frequency of Communication with Friends and Family:     Frequency of Social Gatherings with Friends and Family:     Attends [employer] Services:     Active Member of Clubs or Organizations:     Attends Club or Organization Meetings:     Marital Status:    Intimate Partner Violence:     Fear of Current or Ex-Partner:     Emotionally Abused:     Physically Abused:     Sexually Abused: Audit Questionnaire: Screen for Alcohol Misuse  How often do you have a drink containing alcohol?: Never  Substance Abuse Interventions:  · Chantix ordered    Health Risk Assessment:     General  In general, how would you say your health is?: Good  In the past 7 days, have you experienced any of the following? New or Increased Pain, New or Increased Fatigue, Loneliness, Social Isolation, Stress or Anger?: (!) Stress, Anger  Do you get the social and emotional support that you need?: Yes  Do you have a Living Will?: (!) No  General Health Risk Interventions:  · declines intervention    Health Habits/Nutrition  Do you exercise for at least 20 minutes 2-3 times per week?: (!) No  Have you lost any weight without trying in the past 3 months?: (!) Yes  Do you eat only one meal per day?: No  Have you seen the dentist within the past year?: (!) No  Body mass index is 21.27 kg/m².   Health Habits/Nutrition Interventions:  · declines intervention    Hearing/Vision  Do you or your family notice any trouble with your hearing that hasn't been managed with hearing aids?: No  Do you have difficulty driving, watching TV, or doing any of your daily activities because of your eyesight?: (!) Yes  Have you had an eye exam within the past year?: Yes  Hearing/Vision Interventions:  · declines intervention    Safety  Do you have working smoke detectors?: Yes  Have all throw rugs been removed or fastened?: Yes  Do you have non-slip mats or surfaces in all bathtubs/showers?: Yes  Do all of your stairways have a railing or banister?: Yes  Are your doorways, halls and stairs free of clutter?: Yes  Do you always fasten your seatbelt when you are in a car?: Yes  Safety Interventions:  · Patient declines any further evaluation/treatment for this issue    ADLs  In the past 7 days, did you need help from others to perform any of the following everyday activities? Eating, dressing, grooming, bathing, toileting, or walking/balance?: (!) Dressing, Grooming, Bathing, Toileting  In the past 7 days, did you need help from others to take care of any of the following?  Laundry, housekeeping, banking/finances, shopping, telephone use, food preparation, transportation, or taking medications?: [employer], Housekeeping, Shopping, Food Preparation, Transportation, Taking Medications  ADL Interventions:  · Patient declines any further evaluation/treatment for this issue    Personalized Preventive Plan   Current Health Maintenance Status  Immunization History   Administered Date(s) Administered    Influenza Virus Vaccine 09/24/2015    Influenza, High Dose (Fluzone 65 yrs and older) 09/24/2015    Influenza, Balwinder Hough, 6 mo and older, IM, PF (Flulaval, Fluarix) 11/15/2018    Influenza, Quadv, IM, PF (6 mo and older Fluzone, Flulaval, Fluarix, and 3 yrs and older Afluria) 10/17/2019, 10/13/2020    Pneumococcal Polysaccharide ([id number]) 10/09/2014, 09/05/2017    Tdap (Boostrix, Adacel) 06/28/2017        Health Maintenance   Topic Date Due    COVID-19 Vaccine (1) Never done    Hepatitis B vaccine (1 of 3 - Risk 3-dose series) Never done    Cervical cancer screen  Never done    Annual Wellness Visit (AWV)  Never done    Shingles Vaccine (1 of 2) Never done    Breast cancer screen  05/28/2021    Potassium monitoring  06/08/2022    Creatinine monitoring  06/08/2022    Lipid screen  06/08/2026    DTaP/Tdap/Td vaccine (2 - Td or Tdap) 06/28/2027    Colon cancer screen colonoscopy  09/27/2029    Pneumococcal 0-64 years Vaccine (2 of 2) 01/08/2036    Flu vaccine  Completed    Hepatitis C screen  Completed    HIV screen  Completed    Hepatitis A vaccine  Aged Out    Hib vaccine  Aged Out    Meningococcal (ACWY) vaccine  Aged Out       Recommendations for [employer] Due: see orders.   Recommended screening schedule for the next 5-10 years is provided to the patient in written form: see Patient Instructions/AVS.

## 2021-06-10 LAB — KEPPRA: 12 UG/ML (ref 12–46)

## 2021-06-11 RX ORDER — VARENICLINE TARTRATE
KIT
Qty: 1 BOX | Refills: 0 | Status: SHIPPED | OUTPATIENT
Start: 2021-06-11 | End: 2021-07-13 | Stop reason: ALTCHOICE

## 2021-06-11 NOTE — TELEPHONE ENCOUNTER
Rebecca Bellamy called to request a refill on her medication. Last office visit : 6/8/2021   Next office visit : Visit date not found     Requested Prescriptions     Pending Prescriptions Disp Refills    varenicline (CHANTIX STARTING MONTH PAK) 0.5 MG X 11 & 1 MG X 42 tablet 1 box 0     Sig: Take by mouth.             Sofy Jauregui MA [phone]

## 2021-06-13 ASSESSMENT — ENCOUNTER SYMPTOMS
COUGH: 0
SINUS PRESSURE: 0
PHOTOPHOBIA: 0
EYE ITCHING: 0
EYE DISCHARGE: 0
SORE THROAT: 0
VOMITING: 0
ABDOMINAL DISTENTION: 0
BLOOD IN STOOL: 0
COLOR CHANGE: 0
CHEST TIGHTNESS: 0
CONSTIPATION: 0
RHINORRHEA: 0
EYE REDNESS: 0
SHORTNESS OF BREATH: 0
EYE PAIN: 0
NAUSEA: 0
ABDOMINAL PAIN: 0
WHEEZING: 0
DIARRHEA: 0

## 2021-06-14 ENCOUNTER — HOSPITAL ENCOUNTER (OUTPATIENT)
Dept: WOUND CARE | Age: 51
Discharge: HOME | End: 2021-06-14
Payer: MEDICARE

## 2021-06-14 VITALS
WEIGHT: 143.08 LBS | BODY MASS INDEX: 21.19 KG/M2 | HEART RATE: 67 BPM | SYSTOLIC BLOOD PRESSURE: 105 MMHG | TEMPERATURE: 98.2 F | HEIGHT: 69 IN | RESPIRATION RATE: 20 BRPM | DIASTOLIC BLOOD PRESSURE: 59 MMHG

## 2021-06-14 DIAGNOSIS — G35 MS (MULTIPLE SCLEROSIS) (HCC): ICD-10-CM

## 2021-06-14 DIAGNOSIS — N82.0 VESICOVAGINAL FISTULA: Primary | ICD-10-CM

## 2021-06-14 DIAGNOSIS — L98.492 SKIN ULCER OF ABDOMINAL WALL, WITH FAT LAYER EXPOSED (HCC): ICD-10-CM

## 2021-06-14 DIAGNOSIS — G82.20 PARAPLEGIA (HCC): Chronic | ICD-10-CM

## 2021-06-14 PROCEDURE — 97597 DBRDMT OPN WND 1ST 20 CM/<: CPT | Performed by: NURSE PRACTITIONER

## 2021-06-14 PROCEDURE — 99214 OFFICE O/P EST MOD 30 MIN: CPT

## 2021-06-14 PROCEDURE — 97597 DBRDMT OPN WND 1ST 20 CM/<: CPT

## 2021-06-14 RX ORDER — METHYLPHENIDATE HYDROCHLORIDE 10 MG/1
10 TABLET ORAL 2 TIMES DAILY
COMMUNITY
End: 2021-07-13 | Stop reason: SDUPTHER

## 2021-06-14 RX ORDER — NITROFURANTOIN MACROCRYSTALS 50 MG/1
50 CAPSULE ORAL DAILY
COMMUNITY
Start: 2021-04-01 | End: 2021-07-02

## 2021-06-16 PROBLEM — L89.159 DECUBITUS ULCER OF SACRAL REGION: Chronic | Status: RESOLVED | Noted: 2017-06-28 | Resolved: 2021-06-16

## 2021-06-16 PROBLEM — L89.154 PRESSURE ULCER OF SACRAL REGION, STAGE 4 (HCC): Status: RESOLVED | Noted: 2019-10-17 | Resolved: 2021-06-16

## 2021-06-16 PROBLEM — L98.492: Status: ACTIVE | Noted: 2021-06-16

## 2021-06-16 PROBLEM — L97.929 VENOUS STASIS ULCER OF LEFT LOWER EXTREMITY (HCC): Status: RESOLVED | Noted: 2018-11-26 | Resolved: 2021-06-16

## 2021-06-16 PROBLEM — I83.029 VENOUS STASIS ULCER OF LEFT LOWER EXTREMITY (HCC): Status: RESOLVED | Noted: 2018-11-26 | Resolved: 2021-06-16

## 2021-06-16 ASSESSMENT — VISUAL ACUITY: OU: 1

## 2021-06-16 NOTE — PROGRESS NOTES
Patient Care Team:  Julissa Mills MD as PCP - General (Family Medicine)  Julissa Mills MD [facility]CP [facility]  Daniel Miner MD as Neurologist (Neurology)  Daniel Miner MD as Consulting Physician (Neurology)  EFREN Gallardo as Advanced Practice Nurse (Gastroenterology)    TODAY'S DATE:  6/14/2021     HISTORY of PRESENTILLNESS HPI   Sue Nj is a 49 y.o. female who presents today for wound evaluation. Ms Av Self has complex urologic history due to MS of a Simple Cystectomy with [place] by Dr Veronica Guevara at [facility] and a Diverting End Colostomy at the same institution 2002 and 2014 respectively who has been experiencing persistent vaginal leakage. She was taken on 8/14/20 with Dr Teto Jackson and had a clear Pouch-Vaginal Fistula which was cannulated and seen at both the diversion and the vaginal side. She was referred to Dr Nata Harding for evaluation for a vaginal approach repair. In addition she had a ~3 cm pouch stone. She was taken to the OR for a EUA and possible repair with Dr Nata Harding who was then unable to identify her fistula. She was taken to surgery and admitted post-operatively on 4/6/21. They initially tolerated the procedure well, however, post-operatively, the patient was noted to be febrile and tachycardic. Infectious work-up was initiated and was negative. Patient was without evidence of peritonitis and had benign abdominal exams. She had a MAGDY creatinine drawn which was consistent with a urine leak which prompted a CT scan on hospital day 3 which demonstrated a leak from her [place] pouch requiring bilateral nephrostomy tube placement. On hospital day 5, her wound was noted to appear infected and her suprapubic catheter appeared to have stool contents around her catheter. This necessitated an emergent procedure with colorectal surgery which identified a bowel injury requiring bowel resection and repair of her [place] Pouch along with replacement of her suprapubic tube.  Her incision was then managed with local wound care due to skin dehiscence and eventually required a negative pressure wound vacuum. She was discharged home to a rehabilitation facility with her suprapubic tube, wound vacuum, MAGDY drain, and bilateral nephrostomy tubes that were capped if there was evidence of a urine leak. She presents today with the healing incision line and previous MAGDY drain wound still open due to home health without wound care instructions. Wound Type:surgical  Wound Location:midline and left abdominal wall  Modifying factors:malnutrition    Patient Active Problem List   Diagnosis Code    Muscle spasticity M62.838    Peripheral vascular disease ([facility]) I73.9    MS (multiple sclerosis) ([facility]) G35    Pain in both upper arms M79.621, M79.622    Numbness R20.0    Other fatigue R53.83    Weakness R53.1    Chronic pain G89.29    Hx of seizure disorder Z86.69    Insomnia G47.00    Pain, neck M54.2    Asthma J45.909    S/P transmetatarsal amputation of foot, left ([facility]) E09.210    Chronic constipation K59.09    Colostomy in place ([facility]) Z93.3    Paraplegia ([facility]) G82.20    Arthralgia of hip M25.559    Seizure ([address] R56.9    Encounter for care related to vascular access port Z45.2    Urinary incontinence R32    Depressive disorder F32.9    Malodorous urine R82.90    Arthritis M19.90    Osteoporosis M81.0    Urinary bladder stone N21.0    Vesicovaginal fistula N82.0    Skin ulcer of abdominal wall, with fat layer exposed ([address] L98.492     Jose J Orelalna is a 49 y.o. female with the following history reviewed and recorded in [facility]:    Current Outpatient Medications   Medication Sig Dispense Refill    methylphenidate (RITALIN) 10 MG tablet Take 10 mg by mouth 2 times daily.  nitrofurantoin (MACRODANTIN) 50 MG capsule Take 50 mg by mouth daily      varenicline (CHANTIX STARTING MONTH [place]) 0.5 MG X 11 & 1 MG X 42 tablet Take by mouth.  1 box 0    cephALEXin (KEFLEX) 500 MG capsule       DULoxetine (CYMBALTA) 30 MG extended release capsule TAKE 1 CAPSULE BY MOUTH DAILY 30 capsule 5    HYDROcodone-acetaminophen (NORCO)  MG per tablet TAKE 1 TABLET BY MOUTH 3 TIMES DAILY AS NEEDED FOR PAIN. REDUCE DOSESAS PAIN BEOMES MANAGEABLE 90 tablet 0    pregabalin (LYRICA) 300 MG capsule TAKE 1 CAPSULE BY MOUTH 2 TIMES A DAY 60 capsule 5    ondansetron (ZOFRAN) 4 MG tablet Take 4 mg by mouth every 8 hours as needed for Nausea or Vomiting      pilocarpine (SALAGEN) 7.5 MG tablet 1 tablet three times daily 90 tablet 5    tiZANidine (ZANAFLEX) 4 MG tablet TAKE 3 TABLETS TWICE DAILY AS NEEDED 180 tablet 11    azelastine (ASTELIN) 0.1 % nasal spray USE 2 SPRAYS IN EACH NOSTRIL TWICE DAILY AS DIRECTED 30 mL 0    hydroCHLOROthiazide (HYDRODIURIL) 25 MG tablet TAKE 1 TABLET BY MOUTH ONCE DAILY AS NEEDED 30 tablet 0    cetirizine (ZYRTEC) 10 MG tablet Take 10 mg by mouth daily      levETIRAcetam (KEPPRA) 500 MG tablet Take 1 tablet by mouth nightly (Patient taking differently: Take 750 mg by mouth nightly ) 30 tablet 5    docusate sodium (COLACE) 100 MG capsule Take 200 mg by mouth as needed for Constipation      PROAIR  (90 Base) MCG/ACT inhaler INHALE 1 PUFF INTO THE LUNGS EVERY 6 HOURS AS NEEDED FOR WHEEZING OR SHORTNESS OF BREATH 8.5 g 5    Heparin Lock Flush (HEPARIN FLUSH, 100 UNITS/ML,) 100 UNIT/ML injection 3 mLs by Intercatheter route every 30 days No every 30 days but every 4-6 weeks.  Flush port with 300 units heparin with 20 cc normal saline for ocrevus infusion for Multiple sclerosis and NS 20CC 1 Syringe 5    ipratropium-albuterol (DUONEB) 0.5-2.5 (3) MG/3ML SOLN nebulizer solution USE 1 UNIT DOSE IN NEBULIZER EVERY 4 TO 6 HOURS AS NEEDED. 450 mL 11    Multiple Vitamins-Minerals (THERAPEUTIC MULTIVITAMIN-MINERALS) tablet Take 1 tablet by mouth daily      baclofen (LIORESAL) 10 MG tablet Take 1 tablet by mouth 2 times daily as needed (muscle spasms) 60 tablet 5    varenicline (CHANTIX CONTINUING MONTH PAK) 1 MG tablet TAKE 1 TABLET BY MOUTH 2 TIMES A DAY 56 tablet 0    Diapers & Supplies MISC Diapers, chucks, wipes, and gloves for incontinence. [phone] each 11    Catheters MISC Catheter supplies and lubricant 5 times daily G82.20  to [address] [facility] [phone] 450 each 3    glucose monitoring kit (FREESTYLE) monitoring kit 1 kit by Does not apply route daily 1 kit 0    blood glucose monitor strips Test 1 times a day & as needed for symptoms of irregular blood glucose. 100 strip 3    BACLOFEN, PAIN PUMP REFILL CHARGE, by Implant route continuous Indications: every 3 months      glucose monitoring kit (FREESTYLE) monitoring kit 1 kit by Does not apply route daily 1 kit 0    blood glucose monitor strips Test 1 times a day & as needed for symptoms of irregular blood glucose. 100 strip 0    Sodium Chloride Flush (SALINE FLUSH) 0.9 % SOLN Infuse 20 mLs intravenously every 30 days Not every 30 days but every 4-6 weeks as need with 300 units of heparin to flush port for Infusion of Ocrevus for multiple sclerosis 20 mL 5     No current facility-administered medications for this encounter.      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 related to ms    Sepsis ([address] 8/19/2016    Weakness 8/4/2016       Past Surgical History:   Procedure Laterality Date    BACLOFEN PUMP IMPLANTATION      COLONOSCOPY N/A 9/27/2019    Dr Sue Spangler ileum through ostomy-patent and healthy appearing anastomosis in the right colon-entero colonic anastomosis-5 yr recall    COLOSTOMY      FEMUR FRACTURE SURGERY      Right    FOOT AMPUTATION Left 4/4/2019    LEFT TRANSMET AMPUTATION performed by Alirio Jean MD at [address]      urostomy    OTHER SURGICAL HISTORY      pain pump    IN INSJ PRPH CTR VAD W/SUBQ PORT UNDER 5 YR N/A 6/26/2018    SINGLE LUMEN PORT PLACEMENT WITH FLUORO performed by Paige Bloch, MD at [facility] TOE AMPUTATION      L last toe    TONSILLECTOMY      URETEROTOMY       Family History   Problem Relation Age of Onset   [facility] Cancer Mother         breast    Colon Cancer Mother     Colon Polyps Mother     Diabetes Father     High Blood Pressure Father     Cancer Paternal Aunt         breast    Liver Cancer Paternal Aunt     Heart Disease Paternal Grandmother     Esophageal Cancer Neg Hx     Liver Disease Neg Hx     Rectal Cancer Neg Hx     Stomach Cancer Neg Hx      Social History     Tobacco Use    Smoking status: Former Smoker     Packs/day: 0.00     Types: Cigarettes     Quit date: 1/8/2018     Years since quitting: 3.4    Smokeless tobacco: Never Used    Tobacco comment: hasn't smoked since 9/5/20, taking chantix   Substance Use Topics    Alcohol use: Yes     Comment: yaritza         Review of Systems    Review of Systems   Skin: Positive for wound. All other systems reviewed and are negative. All other review of systems are negative. Physical Exam    BP (!) 105/59   Pulse 67   Temp 98.2 °F (36.8 °C) (Temporal)   Resp 20   Ht 5' 9\" (1.753 m)   Wt 143 lb 1.3 oz (64.9 kg)   BMI 21.13 kg/m²     Physical Exam  Vitals reviewed. Exam conducted with a chaperone present.    Constitutional:       Appearance: Normal appearance. She is normal weight. HENT:      Head: Normocephalic and atraumatic. Right Ear: External ear normal.      Left Ear: External ear normal.   Eyes:      General: Lids are normal. Lids are everted, no foreign bodies appreciated. Vision grossly intact. Gaze aligned appropriately. Cardiovascular:      Rate and Rhythm: Normal rate and regular rhythm. Pulses: Normal pulses. Heart sounds: Normal heart sounds. Pulmonary:      Effort: Pulmonary effort is normal.      Breath sounds: Normal breath sounds. Abdominal:      General: Bowel sounds are normal.   Musculoskeletal:         General: Normal range of motion. Skin:     General: Skin is warm and dry. Findings: Wound present. Neurological:      Mental Status: She is alert. Psychiatric:         Mood and Affect: Mood normal.         Behavior: Behavior normal.         Thought Content: Thought content normal.         Judgment: Judgment normal.             Post Debridement Measurements and Assessment:    The patientspain isPain Level: 0  . Wound is has improved. Please refer to nursing measurements and assessment regarding wound pre and postdebridement. Wound 06/14/21 Abdomen Anterior; Left wound 3- left abdominal wall post surgical (Active)   Wound Image    06/14/21 1509   Wound Etiology Surgical 06/14/21 1509   Dressing Status Old drainage noted 06/14/21 1509   Wound Cleansed Soap and water 06/14/21 1509   Dressing/Treatment Moist to dry 06/15/21 1139   Wound Length (cm) 2.8 cm 06/14/21 1509   Wound Width (cm) 0.4 cm 06/14/21 1509   Wound Depth (cm) 0.5 cm 06/14/21 1509   Wound Surface Area (cm^2) 1.12 cm^2 06/14/21 1509   Wound Volume (cm^3) 0.56 cm^3 06/14/21 1509   Post-Procedure Length (cm) 2.8 cm 06/15/21 1139   Post-Procedure Width (cm) 0.4 cm 06/15/21 1139   Post-Procedure Depth (cm) 0.5 cm 06/15/21 1139   Post-Procedure Surface Area (cm^2) 1.12 cm^2 06/15/21 1139   Post-Procedure Volume (cm^3) 0.56 cm^3 06/15/21 [address] 06/14/21 1509   Drainage Amount Moderate 06/14/21 1509   Drainage Description Serosanguinous 06/14/21 1509   Odor None 06/14/21 1509   Audelia-wound Assessment Intact 06/14/21 1509   Margins Defined edges 06/14/21 1509   Number of days: 1       Wound 06/14/21 Abdomen Medial wound 2- abdominal wall midline (Active)   Wound Image   06/14/21 1509   Wound Etiology Surgical 06/14/21 1509   Dressing Status Old drainage noted 06/14/21 1509   Wound Cleansed Soap and water 06/14/21 1509   Dressing/Treatment Moist to dry 06/15/21 1139   Wound Length (cm) 0.9 cm 06/14/21 1509   Wound Width (cm) 1 cm 06/14/21 1509   Wound Depth (cm) 3.8 cm 06/14/21 1509   Wound Surface Area (cm^2) 0.9 cm^2 06/14/21 1509   Wound Volume (cm^3) 3.42 cm^3 06/14/21 1509   Post-Procedure Length (cm) 0.9 cm 06/15/21 1139   Post-Procedure Width (cm) 1 cm 06/15/21 1139   Post-Procedure Depth (cm) 3.8 cm 06/15/21 1139   Post-Procedure Surface Area (cm^2) 0.9 cm^2 06/15/21 1139   Post-Procedure Volume (cm^3) 3.42 cm^3 06/15/21 1139   Wound Assessment Slough;Pink/red 06/14/21 1509   Drainage Amount Moderate 06/14/21 1509   Drainage Description Serosanguinous 06/14/21 1509   Odor None 06/14/21 1509   Audelia-wound Assessment Intact 06/14/21 1509   Margins Defined edges 06/14/21 1509   Number of days: 1            Debridement: Selective Debridement/Non-Excisional Debridement    Using curette, #15 blade scalpel, forceps and silver nitrate the wound(s)/ulcer(s) was/were sharply debrided down through and including the removal of epidermis and dermis.         Devitalized Tissue Debrided:  fibrin, biofilm, slough and exudate    Pre Debridement Measurements:  Are located in the Wound/Ulcer Documentation Flow Sheet    Wound/Ulcer #: 2 and 3    Post Debridement Measurements:  Wound/Ulcer Descriptions are Pre Debridement except measurements:          Percent of Wound(s)/Ulcer(s) Debrided: 100%    Total Surface Area Debrided:  2.02 sq cm Diabetic/Pressure/Non Pressure Ulcers only:  Ulcer: N/A     Estimated Blood Loss:  Minimal    Hemostasis Achieved:  by pressure    Procedural Pain:  0  / 10     Post Procedural Pain:  0 / 10     Response to treatment:  Well tolerated by patient. Assessment    1. Vesicovaginal fistula    2. Skin ulcer of abdominal wall, with fat layer exposed ([facility]    3. MS (multiple sclerosis) ([facility]    4. Paraplegia ([facility]          Plan    1. Obtain records from [facility] for wound - Dress per physician order  Treatment:     Compression : No   Offloading : No  Dressing Orders:   Midline and left abdominal wall wound: Soap and water wash, tuck saline moistened gauze into the wound bed, secure with dry gauze and tape change daily. Discussed importance of nutrition, wound care, and plan of care. Patient understanding and questions answered. I spent a total of 20 minutes face to face with the patient. Over 75% of that time was spent on counseling and care coordination. Patient was told that if symptoms worsen or new symptoms develop they are to go to the emergency department immediately. Patient was educated on diagnosis and treatment plan. All of patient's questions were answered, and the patient understands the discharge plan. Discussed appropriate home care of this wound. Wound redressed. Patient instructions were given.   Recommend no smoking  Offloading instructions given

## 2021-06-17 ENCOUNTER — HOSPITAL ENCOUNTER (OUTPATIENT)
Dept: WOMENS IMAGING | Age: 51
Discharge: HOME | End: 2021-06-17
Payer: MEDICARE

## 2021-06-17 DIAGNOSIS — Z12.31 ENCOUNTER FOR SCREENING MAMMOGRAM FOR MALIGNANT NEOPLASM OF BREAST: ICD-10-CM

## 2021-06-17 DIAGNOSIS — Z13.9 SCREENING DUE: ICD-10-CM

## 2021-06-17 PROCEDURE — 77063 BREAST TOMOSYNTHESIS BI: CPT

## 2021-06-21 ENCOUNTER — HOSPITAL ENCOUNTER (OUTPATIENT)
Dept: ULTRASOUND IMAGING | Age: 51
Discharge: HOME | End: 2021-06-21
Payer: MEDICARE

## 2021-06-21 DIAGNOSIS — N82.0 VESICOVAGINAL FISTULA: ICD-10-CM

## 2021-06-24 ENCOUNTER — HOSPITAL ENCOUNTER (OUTPATIENT)
Dept: ULTRASOUND IMAGING | Age: 51
Discharge: HOME | End: 2021-06-24
Payer: MEDICARE

## 2021-06-24 DIAGNOSIS — N82.0 VESICOVAGINAL FISTULA: ICD-10-CM

## 2021-06-24 PROCEDURE — 76770 US EXAM ABDO BACK WALL COMP: CPT

## 2021-06-29 ENCOUNTER — HOSPITAL ENCOUNTER (OUTPATIENT)
Dept: WOUND CARE | Age: 51
Discharge: HOME | End: 2021-06-29
Payer: MEDICARE

## 2021-06-29 VITALS
RESPIRATION RATE: 18 BRPM | WEIGHT: 143 LBS | DIASTOLIC BLOOD PRESSURE: 61 MMHG | TEMPERATURE: 96.6 F | BODY MASS INDEX: 21.18 KG/M2 | HEIGHT: 69 IN | HEART RATE: 79 BPM | SYSTOLIC BLOOD PRESSURE: 97 MMHG

## 2021-06-29 DIAGNOSIS — L98.492 SKIN ULCER OF ABDOMINAL WALL, WITH FAT LAYER EXPOSED (HCC): Primary | ICD-10-CM

## 2021-06-29 DIAGNOSIS — G82.20 PARAPLEGIA (HCC): Chronic | ICD-10-CM

## 2021-06-29 PROCEDURE — 11042 DBRDMT SUBQ TIS 1ST 20SQCM/<: CPT | Performed by: SURGERY

## 2021-06-29 PROCEDURE — 11042 DBRDMT SUBQ TIS 1ST 20SQCM/<: CPT

## 2021-06-29 PROCEDURE — 6370000000 HC RX 637 (ALT 250 FOR IP): Performed by: SURGERY

## 2021-06-29 RX ORDER — BACITRACIN ZINC AND POLYMYXIN B SULFATE 500; 1000 [USP'U]/G; [USP'U]/G
OINTMENT TOPICAL ONCE
Status: CANCELLED | OUTPATIENT
Start: 2021-06-29 | End: 2021-06-29

## 2021-06-29 RX ORDER — BETAMETHASONE DIPROPIONATE 0.05 %
OINTMENT (GRAM) TOPICAL ONCE
Status: CANCELLED | OUTPATIENT
Start: 2021-06-29 | End: 2021-06-29

## 2021-06-29 RX ORDER — CLOBETASOL PROPIONATE 0.5 MG/G
OINTMENT TOPICAL ONCE
Status: CANCELLED | OUTPATIENT
Start: 2021-06-29 | End: 2021-06-29

## 2021-06-29 RX ORDER — BACITRACIN, NEOMYCIN, POLYMYXIN B 400; 3.5; 5 [USP'U]/G; MG/G; [USP'U]/G
OINTMENT TOPICAL ONCE
Status: CANCELLED | OUTPATIENT
Start: 2021-06-29 | End: 2021-06-29

## 2021-06-29 RX ORDER — LIDOCAINE HYDROCHLORIDE 20 MG/ML
JELLY TOPICAL ONCE
Status: CANCELLED | OUTPATIENT
Start: 2021-06-29 | End: 2021-06-29

## 2021-06-29 RX ORDER — GINSENG 100 MG
CAPSULE ORAL ONCE
Status: CANCELLED | OUTPATIENT
Start: 2021-06-29 | End: 2021-06-29

## 2021-06-29 RX ORDER — GENTAMICIN SULFATE 1 MG/G
OINTMENT TOPICAL ONCE
Status: CANCELLED | OUTPATIENT
Start: 2021-06-29 | End: 2021-06-29

## 2021-06-29 RX ORDER — LIDOCAINE 40 MG/G
CREAM TOPICAL ONCE
Status: CANCELLED | OUTPATIENT
Start: 2021-06-29 | End: 2021-06-29

## 2021-06-29 RX ORDER — LIDOCAINE HYDROCHLORIDE 40 MG/ML
SOLUTION TOPICAL ONCE
Status: CANCELLED | OUTPATIENT
Start: 2021-06-29 | End: 2021-06-29

## 2021-06-29 RX ORDER — LIDOCAINE 50 MG/G
OINTMENT TOPICAL ONCE
Status: CANCELLED | OUTPATIENT
Start: 2021-06-29 | End: 2021-06-29

## 2021-06-29 RX ORDER — LIDOCAINE HYDROCHLORIDE 20 MG/ML
JELLY TOPICAL ONCE
Status: COMPLETED | OUTPATIENT
Start: 2021-06-29 | End: 2021-06-29

## 2021-06-29 RX ADMIN — LIDOCAINE HYDROCHLORIDE: 20 JELLY TOPICAL at 14:23

## 2021-06-29 NOTE — PROGRESS NOTES
[address]   Progress Note and Procedure Note      Gael Quiroz  MEDICAL RECORD NUMBER:  [id number]  AGE: 49 y.o. GENDER: female  : 1970  EPISODE DATE:  2021    Subjective:     Chief Complaint   Patient presents with    Wound Check         HISTORY of PRESENT ILLNESS HPI     Gael Quiroz is a 49 y.o. female who presents today for wound/ulcer evaluation.    Wound Context: Pt with LLQ abdominal wound here for eval/treat  Wound/Ulcer Pain Timing/Severity: none  Quality of pain: N/A  Severity:  0 / 10   Modifying Factors: None  Associated Signs/Symptoms: none    Ulcer Identification:  Ulcer Type: non-healing surgical  Contributing Factors: decreased mobility    Wound: surgical        PAST MEDICAL HISTORY        Diagnosis Date    Ankle wound     and toe wound    Aptyalism 2017    Arthritis 2020    Asthma     Back pain 2017    Binocular vision disorder with diplopia 2017    [facility]FL [facility]chronic [facility]flow limitation) [facility]) 2017    Hay fever 2017    Headache 2017    MS (multiple sclerosis) ([address]     Muscle spasticity     Neuropathic ulcer of left foot, limited to breakdown of skin ([address] 4/10/2017    Numbness 2016    Open wound of ankle 2017    Open wound of second toe of left foot 2018    Peripheral vascular disease ([address]     Seizure ([address]     occ. related to ms    Sepsis ([address] 2016    Weakness 2016       PAST SURGICAL HISTORY    Past Surgical History:   Procedure Laterality Date    BACLOFEN PUMP IMPLANTATION      BREAST BIOPSY Right     neg    COLONOSCOPY N/A 2019    Dr Azeb Sterling ileum through ostomy-patent and healthy appearing anastomosis in the right colon-entero colonic anastomosis-10 yr recall    COLOSTOMY      [address]      Right    FOOT AMPUTATION Left 2019    LEFT TRANSMET AMPUTATION performed by Darrin Colorado MD at [address] [place] [address] Rd urostomy    OTHER SURGICAL HISTORY      pain pump    NE INSJ PRPH CTR VAD W/SUBQ PORT UNDER 5 YR N/A 6/26/2018    SINGLE LUMEN PORT PLACEMENT WITH FLUORO performed by Bobo Hendrix MD at [address] TOE AMPUTATION      L last toe    TONSILLECTOMY      URETEROTOMY         FAMILY HISTORY    Family History   Problem Relation Age of Onset   [facility] Cancer Mother         breast    Colon Cancer Mother     Colon Polyps Mother     Breast Cancer Mother     Diabetes Father     High Blood Pressure Father     Cancer Paternal Aunt         breast    Liver Cancer Paternal Aunt     Heart Disease Paternal Grandmother     Esophageal Cancer Neg Hx     Liver Disease Neg Hx     Rectal Cancer Neg Hx     Stomach Cancer Neg Hx        SOCIAL HISTORY    Social History     Tobacco Use    Smoking status: Former Smoker     Packs/day: 0.00     Types: Cigarettes     Quit date: 1/8/2018     Years since quitting: 3.4    Smokeless tobacco: Never Used    Tobacco comment: hasn't smoked since 9/5/20, taking chantix   Vaping Use    Vaping Use: Never used   Substance Use Topics    Alcohol use: Yes     Comment: rarley    Drug use: Yes     Types: Marijuana     Comment: daily        ALLERGIES    Allergies   Allergen Reactions    Codeine      Category: Allergy;    [facility] Darvocet [Propoxyphene N-Acetaminophen]      Talking out of her head    Morphine      Category: Allergy;     Morphine And Related Itching and Swelling    Propoxyphene Swelling       MEDICATIONS    Current Outpatient Medications on File Prior to Encounter   Medication Sig Dispense Refill    methylphenidate (RITALIN) 10 MG tablet Take 10 mg by mouth 2 times daily.       nitrofurantoin (MACRODANTIN) 50 MG capsule Take 50 mg by mouth daily      varenicline (CHANTIX CONTINUING MONTH CHADWICK) 1 MG tablet TAKE 1 TABLET BY MOUTH 2 TIMES A DAY 56 tablet 0    DULoxetine (CYMBALTA) 30 MG extended release capsule TAKE 1 CAPSULE BY MOUTH DAILY 30 capsule 5    HYDROcodone-acetaminophen (NORCO)  MG per tablet TAKE 1 TABLET BY MOUTH 3 TIMES DAILY AS NEEDED FOR PAIN. REDUCE DOSESAS PAIN BEOMES MANAGEABLE 90 tablet 0    pregabalin (LYRICA) 300 MG capsule TAKE 1 CAPSULE BY MOUTH 2 TIMES A DAY 60 capsule 5    ondansetron (ZOFRAN) 4 MG tablet Take 4 mg by mouth every 8 hours as needed for Nausea or Vomiting      pilocarpine (SALAGEN) 7.5 MG tablet 1 tablet three times daily 90 tablet 5    tiZANidine (ZANAFLEX) 4 MG tablet TAKE 3 TABLETS TWICE DAILY AS NEEDED 180 tablet 11    azelastine (ASTELIN) 0.1 % nasal spray USE 2 SPRAYS IN EACH NOSTRIL TWICE DAILY AS DIRECTED 30 mL 0    hydroCHLOROthiazide (HYDRODIURIL) 25 MG tablet TAKE 1 TABLET BY MOUTH ONCE DAILY AS NEEDED 30 tablet 0    cetirizine (ZYRTEC) 10 MG tablet Take 10 mg by mouth daily      levETIRAcetam (KEPPRA) 500 MG tablet Take 1 tablet by mouth nightly (Patient taking differently: Take 750 mg by mouth nightly ) 30 tablet 5    docusate sodium (COLACE) 100 MG capsule Take 200 mg by mouth as needed for Constipation      PROAIR  (90 Base) MCG/ACT inhaler INHALE 1 PUFF INTO THE LUNGS EVERY 6 HOURS AS NEEDED FOR WHEEZING OR SHORTNESS OF BREATH 8.5 g 5    Heparin Lock Flush (HEPARIN FLUSH, 100 UNITS/ML,) 100 UNIT/ML injection 3 mLs by Intercatheter route every 30 days No every 30 days but every 4-6 weeks.  Flush port with 300 units heparin with 20 cc normal saline for ocrevus infusion for Multiple sclerosis and NS 20CC 1 Syringe 5    ipratropium-albuterol (DUONEB) 0.5-2.5 (3) MG/3ML SOLN nebulizer solution USE 1 UNIT DOSE IN NEBULIZER EVERY 4 TO 6 HOURS AS NEEDED. 450 mL 11    BACLOFEN, PAIN PUMP REFILL CHARGE, by Implant route continuous Indications: every 3 months      Multiple Vitamins-Minerals (THERAPEUTIC MULTIVITAMIN-MINERALS) tablet Take 1 tablet by mouth daily      Sodium Chloride Flush (SALINE FLUSH) 0.9 % SOLN Infuse 20 mLs intravenously every 30 days Not every 30 days but every 4-6 weeks as need with 300 units of heparin to flush port for Infusion of Ocrevus for multiple sclerosis 20 mL 5    varenicline (CHANTIX STARTING MONTH CHADWICK) 0.5 MG X 11 & 1 MG X 42 tablet Take by mouth. (Patient taking differently: Indications: currently not using this one Take by mouth.) 1 box 0    Diapers & Supplies MISC Diapers, chucks, wipes, and gloves for incontinence. [phone] each 11    Catheters MISC Catheter supplies and lubricant 5 times daily G82.20  to [address] [facility] [place], [place] [phone] each 3    glucose monitoring kit (FREESTYLE) monitoring kit 1 kit by Does not apply route daily 1 kit 0    blood glucose monitor strips Test 1 times a day & as needed for symptoms of irregular blood glucose. 100 strip 3    glucose monitoring kit (FREESTYLE) monitoring kit 1 kit by Does not apply route daily 1 kit 0    blood glucose monitor strips Test 1 times a day & as needed for symptoms of irregular blood glucose. 100 strip 0    baclofen (LIORESAL) 10 MG tablet Take 1 tablet by mouth 2 times daily as needed (muscle spasms) (Patient taking differently: Take 10 mg by mouth 2 times daily as needed (muscle spasms) Indications: using pump ) 60 tablet 5     No current facility-administered medications on file prior to encounter. REVIEW OF SYSTEMS    A comprehensive review of systems was negative.     Objective:      BP 97/61   Pulse 79   Temp 96.6 °F (35.9 °C) (Temporal)   Resp 18   Ht 5' 9\" (1.753 m)   Wt 143 lb (64.9 kg)   BMI 21.12 kg/m²     Wt Readings from Last 3 Encounters:   06/29/21 143 lb (64.9 kg)   06/14/21 143 lb 1.3 oz (64.9 kg)   06/08/21 144 lb (65.3 kg)       PHYSICAL EXAM    General Appearance: alert and oriented to person, place and time, well developed and well- nourished, in no acute distress  Skin: warm and dry, no rash or erythema  Head: normocephalic and atraumatic  Eyes: pupils equal, round, and reactive to light, extraocular eye movements intact, conjunctivae normal  ENT: tympanic membrane, external ear and ear canal normal bilaterally, nose without deformity, nasal mucosa and turbinates normal without polyps, lips teeth and gums normal  Neck: supple and non-tender without mass, no thyromegaly or thyroid nodules, no cervical lymphadenopathy  Pulmonary/Chest: clear to auscultation bilaterally- no wheezes, rales or rhonchi, normal air movement, no respiratory distress  Cardiovascular: normal rate, regular rhythm, normal S1 and S2, no murmurs, rubs, clicks, or gallops, distal pulses intact, no carotid bruits  Abdomen: soft, non-tender, non-distended, normal bowel sounds, no masses or organomegaly  Extremities: no cyanosis, clubbing or edema  Musculoskeletal: normal range of motion, no joint swelling, deformity or tenderness      Assessment:      Problem List Items Addressed This Visit     Paraplegia (HCC) (Chronic)    * (Principal) Skin ulcer of abdominal wall, with fat layer exposed ([address] - Primary (Chronic)    Relevant Orders    Initiate Outpatient Wound Care Protocol           Procedure Note  Indications:  Based on my examination of this patient's wound(s)/ulcer(s) today, debridement is required to promote healing and evaluate the wound base. Performed by: Maddy Arshad MD    Consent obtained:  Yes    Time out taken:  Yes    Pain Control: Anesthetic  Anesthetic: 2% Lidocaine Gel Topical       Debridement:Excisional Debridement    Using curette the wound(s)/ulcer(s) was/were sharply debrided down through and including the removal of epidermis, dermis and subcutaneous tissue.         Devitalized Tissue Debrided:  fibrin, biofilm, slough, necrotic/eschar and exudate      Pre Debridement Measurements:  Are located in the Wound/Ulcer Documentation Flow Sheet    Wound/Ulcer #: 3    Percent of Wound(s)/Ulcer(s) Debrided: 100%    Total Surface Area Debrided:  0.56 sq cm       Diabetic/Pressure/Non Pressure Ulcers only:  Ulcer: Non-Pressure ulcer, fat layer exposed             Post Debridement Measurements:    Wound/Ulcer Descriptions are Pre Debridement --EXCEPT MEASUREMENTS    Wound 06/14/21 Abdomen Anterior; Left wound 3- left abdominal wall post surgical (Active)   Wound Image   06/29/21 1423   Wound Etiology Surgical 06/29/21 1423   Dressing Status Old drainage noted 06/29/21 1423   Wound Cleansed Cleansed with saline 06/29/21 1423   Dressing/Treatment Moist to dry 06/15/21 1139   Wound Length (cm) 0.7 cm 06/29/21 1423   Wound Width (cm) 0.8 cm 06/29/21 1423   Wound Depth (cm) 2.4 cm 06/29/21 1423   Wound Surface Area (cm^2) 0.56 cm^2 06/29/21 1423   Change in Wound Size % (l*w) 50 06/29/21 1423   Wound Volume (cm^3) 1.344 cm^3 06/29/21 1423   Wound Healing % -140 06/29/21 1423   Post-Procedure Length (cm) 0.7 cm 06/29/21 1514   Post-Procedure Width (cm) 0.8 cm 06/29/21 1514   Post-Procedure Depth (cm) 1.5 cm 06/29/21 1514   Post-Procedure Surface Area (cm^2) 0.56 cm^2 06/29/21 1514   Post-Procedure Volume (cm^3) 0.84 cm^3 06/29/21 1514   Distance Tunneling (cm) 0 cm 06/29/21 1423   Tunneling Position ___ O'Clock 0 06/29/21 1423   Undermining Starts ___ O'Clock 0 06/29/21 1423   Undermining Ends___ O'Clock 0 06/29/21 1423   Undermining Maxium Distance (cm) 0 06/29/21 1423   Wound Assessment Pink/red;Slough 06/29/21 1423   Drainage Amount Moderate 06/29/21 1423   Drainage Description Serosanguinous 06/29/21 1423   Odor None 06/29/21 1423   Audelia-wound Assessment Intact 06/29/21 1423   Margins Defined edges 06/29/21 1423   Number of days: 15       Wound 06/14/21 Abdomen Medial wound 2- abdominal wall midline (Active)   Wound Image   06/29/21 1423   Wound Etiology Surgical 06/29/21 1423   Dressing Status Dry; Intact 06/29/21 1423   Wound Cleansed Cleansed with saline 06/29/21 1423   Dressing/Treatment Moist to dry 06/15/21 1139   Wound Length (cm) 0 cm 06/29/21 1423   Wound Width (cm) 0 cm 06/29/21 1423   Wound Depth (cm) 0 cm 06/29/21 1423   Wound Surface Area (cm^2) 0 cm^2 06/29/21 1423   Change in Wound Size % (l*w) 100 06/29/21 1423   Wound Volume (cm^3) 0 cm^3 06/29/21 1423   Wound Healing % 100 06/29/21 1423   Post-Procedure Length (cm) 0.9 cm 06/15/21 1139   Post-Procedure Width (cm) 1 cm 06/15/21 1139   Post-Procedure Depth (cm) 3.8 cm 06/15/21 1139   Post-Procedure Surface Area (cm^2) 0.9 cm^2 06/15/21 1139   Post-Procedure Volume (cm^3) 3.42 cm^3 06/15/21 1139   Distance Tunneling (cm) 0 cm 06/29/21 1423   Tunneling Position ___ O'Clock 0 06/29/21 1423   Undermining Starts ___ O'Clock 0 06/29/21 1423   Undermining Ends___ O'Clock 0 06/29/21 1423   Undermining Maxium Distance (cm) 0 06/29/21 1423   Wound Assessment Epithelialization 06/29/21 1423   Drainage Amount None 06/29/21 1423   Drainage Description Serosanguinous 06/14/21 1509   Odor None 06/29/21 1423   Audelia-wound Assessment Intact 06/29/21 1423   Margins Attached edges 06/29/21 1423   Number of days: 15             Estimated Blood Loss:  Minimal    Hemostasis Achieved:  by pressure    Procedural Pain:  0  / 10     Post Procedural Pain:  0 / 10     Response to treatment:  Well tolerated by patient. Plan:     Problem List Items Addressed This Visit     Paraplegia ([address] (Chronic)    * (Principal) Skin ulcer of abdominal wall, with fat layer exposed ([address] - Primary (Chronic)    Relevant Orders    Initiate Outpatient Wound Care Protocol          Cont current care. Midline healed    Treatment Note please see attached Discharge Instructions    In my professional opinion this patient would benefit from HBO Therapy: No    Written patient dismissal instructions given to patient and signed by patient or POA.          Discharge [address] and Hyperbaric Oxygen Therapy   Physician Orders and Discharge Instructions  [address] [facility] [place]  [place], [address]  Telephone: [phone]    NAME:  Carla Hillman OF BIRTH:  1970  MEDICAL RECORD NUMBER:  [id number]  DATE: 6/29/2021    Discharge condition: Stable    Discharge to: Home    Left via:Private automobile    Accompanied by:  self    [facility]: [facility]     Dressing Orders:  Mid line: Moisturize with Aquaphor daily. Left abdominal wall wound: Soap and water wash, tuck saline moistened gauze into the wound bed, secure with dry gauze and tape change daily.     Treatment Orders:  Protein rich diet  Multivitamin    [facility] follow up visit _____2 weeks________________________  (Please note your next appointment above and if you are unable to keep, kindly give a 24 hour notice. Thank you.)          If you experience any of the following, please call the [facility] during business hours:    * Increase in Pain  * Temperature over 101  * Increase in drainage from your wound  * Drainage with a foul odor  * Bleeding  * Increase in swelling  * Need for compression bandage changes due to slippage, breakthrough drainage. If you need medical attention outside of the business hours of the [facility] please contact your PCP or go to the nearest emergency room.           Electronically signed by Nai Orlando MD on 6/29/2021 at 3:30 PM

## 2021-07-02 RX ORDER — PILOCARPINE HYDROCHLORIDE 7.5 MG/1
TABLET, FILM COATED ORAL
Qty: 90 TABLET | Refills: 0 | Status: SHIPPED | OUTPATIENT
Start: 2021-07-02 | End: 2021-07-29

## 2021-07-02 RX ORDER — NITROFURANTOIN MACROCRYSTALS 50 MG/1
CAPSULE ORAL
Qty: 30 CAPSULE | Refills: 0 | Status: SHIPPED | OUTPATIENT
Start: 2021-07-02 | End: 2021-07-29

## 2021-07-06 RX ORDER — LEVETIRACETAM 500 MG/1
TABLET ORAL
Qty: 90 TABLET | Refills: 3 | Status: SHIPPED | OUTPATIENT
Start: 2021-07-06 | End: 2022-04-11

## 2021-07-06 NOTE — TELEPHONE ENCOUNTER
Requested Prescriptions     Pending Prescriptions Disp Refills    levETIRAcetam (KEPPRA) 500 MG tablet [Pharmacy Med Name: LEVETIRACETAM 500 MG TABLET] 90 tablet 0     Sig: TAKE 1 TABLET BY MOUTH DAILY       Last Office Visit: 5/4/2021  Next Office Visit: 8/4/2021  Last Medication Refill:

## 2021-07-07 DIAGNOSIS — G35 MS (MULTIPLE SCLEROSIS) (HCC): ICD-10-CM

## 2021-07-07 DIAGNOSIS — M79.621 PAIN IN BOTH UPPER ARMS: ICD-10-CM

## 2021-07-07 DIAGNOSIS — R53.83 OTHER FATIGUE: ICD-10-CM

## 2021-07-07 DIAGNOSIS — M62.838 MUSCLE SPASTICITY: ICD-10-CM

## 2021-07-07 DIAGNOSIS — M54.2 PAIN, NECK: ICD-10-CM

## 2021-07-07 DIAGNOSIS — M79.622 PAIN IN BOTH UPPER ARMS: ICD-10-CM

## 2021-07-07 RX ORDER — HYDROCODONE BITARTRATE AND ACETAMINOPHEN 10; 325 MG/1; MG/1
TABLET ORAL
Qty: 90 TABLET | Refills: 0 | Status: SHIPPED | OUTPATIENT
Start: 2021-07-07 | End: 2021-08-02

## 2021-07-07 RX ORDER — METHYLPHENIDATE HYDROCHLORIDE 20 MG/1
TABLET ORAL
Qty: 30 TABLET | Refills: 0 | Status: SHIPPED | OUTPATIENT
Start: 2021-07-07 | End: 2021-08-02

## 2021-07-07 NOTE — TELEPHONE ENCOUNTER
Requested Prescriptions     Pending Prescriptions Disp Refills    HYDROcodone-acetaminophen ([address])  MG per tablet [Pharmacy Med Name: HYDROCOD-APAP  MG] 90 tablet 0     Sig: TAKE 1 TABLET BY MOUTH 3 TIMES DAILY AS NEEDED FOR PAIN.  REDUCE DOSESAS PAIN BEOMES MANAGEABLE    methylphenidate (RITALIN) 20 MG tablet [Pharmacy Med Name: METHYLPHENIDAT 20MG TAB DD] 30 tablet 0     Sig: TAKE 1 TABLET BY MOUTH DAILY       Last Office Visit:  5/4/2021  Next Office Visit:  8/4/2021  Last Medication Refill:  Both 6/4/2021   Jim up to date:  7/7/2021    *RX updated to reflect   Both 7/7/2021  fill date*

## 2021-07-13 ENCOUNTER — HOSPITAL ENCOUNTER (OUTPATIENT)
Dept: WOUND CARE | Age: 51
Discharge: HOME | End: 2021-07-13
Payer: MEDICARE

## 2021-07-13 VITALS
BODY MASS INDEX: 21.18 KG/M2 | HEIGHT: 69 IN | DIASTOLIC BLOOD PRESSURE: 62 MMHG | WEIGHT: 143 LBS | RESPIRATION RATE: 18 BRPM | HEART RATE: 84 BPM | TEMPERATURE: 97 F | SYSTOLIC BLOOD PRESSURE: 91 MMHG

## 2021-07-13 DIAGNOSIS — L98.492 SKIN ULCER OF ABDOMINAL WALL, WITH FAT LAYER EXPOSED (HCC): Primary | ICD-10-CM

## 2021-07-13 PROCEDURE — 11042 DBRDMT SUBQ TIS 1ST 20SQCM/<: CPT

## 2021-07-13 PROCEDURE — 6370000000 HC RX 637 (ALT 250 FOR IP): Performed by: SURGERY

## 2021-07-13 PROCEDURE — 11042 DBRDMT SUBQ TIS 1ST 20SQCM/<: CPT | Performed by: SURGERY

## 2021-07-13 RX ORDER — LIDOCAINE HYDROCHLORIDE 20 MG/ML
JELLY TOPICAL ONCE
Status: CANCELLED | OUTPATIENT
Start: 2021-07-13 | End: 2021-07-13

## 2021-07-13 RX ORDER — LIDOCAINE HYDROCHLORIDE 40 MG/ML
SOLUTION TOPICAL ONCE
Status: CANCELLED | OUTPATIENT
Start: 2021-07-13 | End: 2021-07-13

## 2021-07-13 RX ORDER — BACITRACIN ZINC AND POLYMYXIN B SULFATE 500; 1000 [USP'U]/G; [USP'U]/G
OINTMENT TOPICAL ONCE
Status: CANCELLED | OUTPATIENT
Start: 2021-07-13 | End: 2021-07-13

## 2021-07-13 RX ORDER — LIDOCAINE 40 MG/G
CREAM TOPICAL ONCE
Status: CANCELLED | OUTPATIENT
Start: 2021-07-13 | End: 2021-07-13

## 2021-07-13 RX ORDER — LIDOCAINE 50 MG/G
OINTMENT TOPICAL ONCE
Status: CANCELLED | OUTPATIENT
Start: 2021-07-13 | End: 2021-07-13

## 2021-07-13 RX ORDER — CLOBETASOL PROPIONATE 0.5 MG/G
OINTMENT TOPICAL ONCE
Status: CANCELLED | OUTPATIENT
Start: 2021-07-13 | End: 2021-07-13

## 2021-07-13 RX ORDER — GINSENG 100 MG
CAPSULE ORAL ONCE
Status: CANCELLED | OUTPATIENT
Start: 2021-07-13 | End: 2021-07-13

## 2021-07-13 RX ORDER — BACITRACIN, NEOMYCIN, POLYMYXIN B 400; 3.5; 5 [USP'U]/G; MG/G; [USP'U]/G
OINTMENT TOPICAL ONCE
Status: CANCELLED | OUTPATIENT
Start: 2021-07-13 | End: 2021-07-13

## 2021-07-13 RX ORDER — BETAMETHASONE DIPROPIONATE 0.05 %
OINTMENT (GRAM) TOPICAL ONCE
Status: CANCELLED | OUTPATIENT
Start: 2021-07-13 | End: 2021-07-13

## 2021-07-13 RX ORDER — LIDOCAINE HYDROCHLORIDE 20 MG/ML
JELLY TOPICAL ONCE
Status: COMPLETED | OUTPATIENT
Start: 2021-07-13 | End: 2021-07-13

## 2021-07-13 RX ORDER — GENTAMICIN SULFATE 1 MG/G
OINTMENT TOPICAL ONCE
Status: CANCELLED | OUTPATIENT
Start: 2021-07-13 | End: 2021-07-13

## 2021-07-13 RX ADMIN — LIDOCAINE HYDROCHLORIDE: 20 JELLY TOPICAL at 13:49

## 2021-07-13 ASSESSMENT — PAIN DESCRIPTION - LOCATION: LOCATION: BACK;NECK

## 2021-07-13 ASSESSMENT — PAIN DESCRIPTION - DESCRIPTORS: DESCRIPTORS: ACHING

## 2021-07-13 ASSESSMENT — PAIN DESCRIPTION - PROGRESSION: CLINICAL_PROGRESSION: NOT CHANGED

## 2021-07-13 ASSESSMENT — PAIN DESCRIPTION - PAIN TYPE: TYPE: ACUTE PAIN

## 2021-07-13 ASSESSMENT — PAIN DESCRIPTION - FREQUENCY: FREQUENCY: INTERMITTENT

## 2021-07-13 ASSESSMENT — PAIN SCALES - GENERAL: PAINLEVEL_OUTOF10: 5

## 2021-07-13 ASSESSMENT — PAIN DESCRIPTION - ONSET: ONSET: ON-GOING

## 2021-07-13 NOTE — PROGRESS NOTES
[address]   Progress Note and Procedure Note      Sheela Copeland  MEDICAL RECORD NUMBER:  [id number]  AGE: 49 y.o. GENDER: female  : 1970  EPISODE DATE:  2021    Subjective:     Chief Complaint   Patient presents with    Wound Check     follow up         HISTORY of PRESENT ILLNESS HPI     Sheela Copeland is a 49 y.o. female who presents today for wound/ulcer evaluation.    Wound Context: Pt with abdominal wound here for eval/treat  Wound/Ulcer Pain Timing/Severity: none  Quality of pain: N/A  Severity:  0 / 10   Modifying Factors: None  Associated Signs/Symptoms: none    Ulcer Identification:  Ulcer Type: pressure  Contributing Factors: shear force    Wound: surgical        PAST MEDICAL HISTORY        Diagnosis Date    Ankle wound     and toe wound    Aptyalism 2017    Arthritis 2020    Asthma     Back pain 2017    Binocular vision disorder with diplopia 2017    [facility]FL [facility]flow limitation) [facility] 2017    Hay fever 2017    Headache 2017    MS (multiple sclerosis) ([address]     Muscle spasticity     Neuropathic ulcer of left foot, limited to breakdown of skin ([address] 4/10/2017    Numbness 2016    Open wound of ankle 2017    Open wound of second toe of left foot 2018    Peripheral vascular disease ([address]     Seizure ([address]     occ. related to ms    Sepsis ([address] 2016    Weakness 2016       PAST SURGICAL HISTORY    Past Surgical History:   Procedure Laterality Date    BACLOFEN PUMP IMPLANTATION      BREAST BIOPSY Right     neg    COLONOSCOPY N/A 2019    Dr Trujillo Caul ileum through ostomy-patent and healthy appearing anastomosis in the right colon-entero colonic anastomosis-10 yr recall    COLOSTOMY      FEMUR FRACTURE SURGERY      Right    FOOT AMPUTATION Left 2019    LEFT TRANSMET AMPUTATION performed by Roberto Rockwell MD at [address] [place] [address] MOUTH 3 TIMES DAILY 90 tablet 0    varenicline (CHANTIX CONTINUING MONTH CHADWICK) 1 MG tablet TAKE 1 TABLET BY MOUTH 2 TIMES A DAY 56 tablet 0    DULoxetine (CYMBALTA) 30 MG extended release capsule TAKE 1 CAPSULE BY MOUTH DAILY 30 capsule 5    pregabalin (LYRICA) 300 MG capsule TAKE 1 CAPSULE BY MOUTH 2 TIMES A DAY 60 capsule 5    ondansetron (ZOFRAN) 4 MG tablet Take 4 mg by mouth every 8 hours as needed for Nausea or Vomiting      tiZANidine (ZANAFLEX) 4 MG tablet TAKE 3 TABLETS TWICE DAILY AS NEEDED 180 tablet 11    azelastine (ASTELIN) 0.1 % nasal spray USE 2 SPRAYS IN EACH NOSTRIL TWICE DAILY AS DIRECTED 30 mL 0    hydroCHLOROthiazide (HYDRODIURIL) 25 MG tablet TAKE 1 TABLET BY MOUTH ONCE DAILY AS NEEDED 30 tablet 0    cetirizine (ZYRTEC) 10 MG tablet Take 10 mg by mouth daily      levETIRAcetam (KEPPRA) 500 MG tablet Take 1 tablet by mouth nightly (Patient taking differently: Take 750 mg by mouth nightly ) 30 tablet 5    docusate sodium (COLACE) 100 MG capsule Take 200 mg by mouth as needed for Constipation      PROAIR  (90 Base) MCG/ACT inhaler INHALE 1 PUFF INTO THE LUNGS EVERY 6 HOURS AS NEEDED FOR WHEEZING OR SHORTNESS OF BREATH 8.5 g 5    ipratropium-albuterol (DUONEB) 0.5-2.5 (3) MG/3ML SOLN nebulizer solution USE 1 UNIT DOSE IN NEBULIZER EVERY 4 TO 6 HOURS AS NEEDED. 450 mL 11    BACLOFEN, PAIN PUMP REFILL CHARGE, by Implant route continuous Indications: every 3 months      Multiple Vitamins-Minerals (THERAPEUTIC MULTIVITAMIN-MINERALS) tablet Take 1 tablet by mouth daily      baclofen (LIORESAL) 10 MG tablet Take 1 tablet by mouth 2 times daily as needed (muscle spasms) (Patient taking differently: Take 10 mg by mouth 2 times daily as needed (muscle spasms) Indications: using pump ) 60 tablet 5    nitrofurantoin (MACRODANTIN) 50 MG capsule TAKE ONE CAPSULE BY MOUTH DAILY 30 capsule 0    Diapers & Supplies MISC Diapers, chucks, wipes, and gloves for incontinence.   [phone] each 11    Catheters MISC Catheter supplies and lubricant 5 times daily G82.20  to [address] [facility] [phone] each 3    glucose monitoring kit (FREESTYLE) monitoring kit 1 kit by Does not apply route daily 1 kit 0    blood glucose monitor strips Test 1 times a day & as needed for symptoms of irregular blood glucose. 100 strip 3    Heparin Lock Flush (HEPARIN FLUSH, 100 UNITS/ML,) 100 UNIT/ML injection 3 mLs by Intercatheter route every 30 days No every 30 days but every 4-6 weeks. Flush port with 300 units heparin with 20 cc normal saline for ocrevus infusion for Multiple sclerosis and NS 20CC 1 Syringe 5    glucose monitoring kit (FREESTYLE) monitoring kit 1 kit by Does not apply route daily 1 kit 0    blood glucose monitor strips Test 1 times a day & as needed for symptoms of irregular blood glucose. 100 strip 0    Sodium Chloride Flush (SALINE FLUSH) 0.9 % SOLN Infuse 20 mLs intravenously every 30 days Not every 30 days but every 4-6 weeks as need with 300 units of heparin to flush port for Infusion of Ocrevus for multiple sclerosis 20 mL 5     No current facility-administered medications on file prior to encounter. REVIEW OF SYSTEMS    A comprehensive review of systems was negative.     Objective:      BP 91/62   Pulse 84   Temp 97 °F (36.1 °C) (Temporal)   Resp 18   Ht 5' 9\" (1.753 m)   Wt 143 lb (64.9 kg)   BMI 21.12 kg/m²     Wt Readings from Last 3 Encounters:   07/13/21 143 lb (64.9 kg)   06/29/21 143 lb (64.9 kg)   06/14/21 143 lb 1.3 oz (64.9 kg)       PHYSICAL EXAM    General Appearance: alert and oriented to person, place and time, well developed and well- nourished, in no acute distress  Skin: warm and dry, no rash or erythema  Head: normocephalic and atraumatic  Eyes: pupils equal, round, and reactive to light, extraocular eye movements intact, conjunctivae normal  ENT: tympanic membrane, external ear and ear canal normal bilaterally, nose without deformity, nasal mucosa and turbinates normal without polyps, lips teeth and gums normal  Neck: supple and non-tender without mass, no thyromegaly or thyroid nodules, no cervical lymphadenopathy  Pulmonary/Chest: clear to auscultation bilaterally- no wheezes, rales or rhonchi, normal air movement, no respiratory distress  Cardiovascular: normal rate, regular rhythm, normal S1 and S2, no murmurs, rubs, clicks, or gallops, distal pulses intact, no carotid bruits  Abdomen: soft, non-tender, non-distended, normal bowel sounds, no masses or organomegaly  Extremities: no cyanosis, clubbing or edema  Musculoskeletal: normal range of motion, no joint swelling, deformity or tenderness  Neurologic: reflexes normal and symmetric, no cranial nerve deficit, gait, coordination and speech normal, skin sensation normal      Assessment:      Problem List Items Addressed This Visit     * (Principal) Skin ulcer of abdominal wall, with fat layer exposed ([facility] - Primary (Chronic)    Relevant Orders    Initiate Outpatient Wound Care Protocol           Procedure Note  Indications:  Based on my examination of this patient's wound(s)/ulcer(s) today, debridement is required to promote healing and evaluate the wound base. Performed by: Tamia Yeboah MD    Consent obtained:  Yes    Time out taken:  Yes    Pain Control: Anesthetic  Anesthetic: 2% Lidocaine Gel Topical       Debridement:Excisional Debridement    Using curette the wound(s)/ulcer(s) was/were sharply debrided down through and including the removal of epidermis, dermis and subcutaneous tissue.         Devitalized Tissue Debrided:  fibrin, biofilm, slough, necrotic/eschar and exudate      Pre Debridement Measurements:  Are located in the Wound/Ulcer Documentation Flow Sheet    Wound/Ulcer #: 3    Percent of Wound(s)/Ulcer(s) Debrided: 100%    Total Surface Area Debrided:  0.35 sq cm       Diabetic/Pressure/Non Pressure Ulcers only:  Ulcer: Non-Pressure ulcer, fat layer exposed             Post Debridement Measurements:    Wound/Ulcer Descriptions are Pre Debridement --EXCEPT MEASUREMENTS    Wound 06/14/21 Abdomen Anterior; Left wound 3- left abdominal wall/ inguinal area -  post surgical (Active)   Wound Image   07/13/21 1343   Wound Etiology Surgical 07/13/21 1343   Dressing Status New drainage noted 07/13/21 1343   Wound Cleansed Cleansed with saline 07/13/21 1343   Dressing/Treatment Moist to moist;Moisten with saline;Packing;Gauze dressing/dressing sponge;Tape/Soft cloth adhesive tape 06/29/21 1548   Wound Length (cm) 0.7 cm 07/13/21 1343   Wound Width (cm) 0.5 cm 07/13/21 1343   Wound Depth (cm) 1.7 cm 07/13/21 1343   Wound Surface Area (cm^2) 0.35 cm^2 07/13/21 1343   Change in Wound Size % (l*w) 68.75 07/13/21 1343   Wound Volume (cm^3) 0.595 cm^3 07/13/21 1343   Wound Healing % -6 07/13/21 1343   Post-Procedure Length (cm) 0.7 cm 07/13/21 1420   Post-Procedure Width (cm) 0.5 cm 07/13/21 1420   Post-Procedure Depth (cm) 1.7 cm 07/13/21 1420   Post-Procedure Surface Area (cm^2) 0.35 cm^2 07/13/21 1420   Post-Procedure Volume (cm^3) 0.595 cm^3 07/13/21 1420   Distance Tunneling (cm) 0 cm 07/13/21 1343   Tunneling Position ___ O'Clock 0 07/13/21 1343   Undermining Starts ___ O'Clock 0 07/13/21 1343   Undermining Ends___ O'Clock 0 07/13/21 1343   Undermining Maxium Distance (cm) 0 07/13/21 1343   Wound Assessment Pink/red;Slough 07/13/21 1343   Drainage Amount Moderate 07/13/21 1343   Drainage Description Serosanguinous 07/13/21 1343   Odor None 07/13/21 1343   Audelia-wound Assessment Intact 07/13/21 1343   Margins Defined edges 07/13/21 1343   Number of days: 28       Wound 07/13/21 Vagina Wound 2.   Vaginal (Active)   Wound Image   07/13/21 1343   Wound Etiology Other 07/13/21 1343   Dressing Status Old drainage noted 07/13/21 1343   Wound Length (cm) 1 cm 07/13/21 1343   Wound Width (cm) 1 cm 07/13/21 1343   Wound Depth (cm) 0.1 cm 07/13/21 1343   Wound Surface Area (cm^2) 1 cm^2 07/13/21 1343   Wound Volume (cm^3) 0.1 cm^3 07/13/21 1343   Distance Tunneling (cm) 0 cm 07/13/21 1343   Tunneling Position ___ O'Clock 0 07/13/21 1343   Undermining Starts ___ O'Clock 0 07/13/21 1343   Undermining Ends___ O'Clock 0 07/13/21 1343   Undermining Maxium Distance (cm) 0 07/13/21 1343   Wound Assessment Pink/red 07/13/21 1343   Drainage Amount Moderate 07/13/21 1343   Drainage Description Serosanguinous; Serous 07/13/21 1343   Odor None 07/13/21 1343   Margins Defined edges 07/13/21 1343   Number of days: 0             Estimated Blood Loss:  Minimal    Hemostasis Achieved:  by pressure    Procedural Pain:  0  / 10     Post Procedural Pain:  0 / 10     Response to treatment:  Well tolerated by patient. Plan:     Problem List Items Addressed This Visit     * (Principal) Skin ulcer of abdominal wall, with fat layer exposed ([facility] - Primary (Chronic)    Relevant Orders    Initiate Outpatient Wound Care Protocol          Cont current care Antifungal for vaginal area. RTO 2 weeks    Treatment Note please see attached Discharge Instructions    In my professional opinion this patient would benefit from HBO Therapy: No    Written patient dismissal instructions given to patient and signed by patient or POA. Discharge [address] and Hyperbaric Oxygen Therapy   Physician Orders and Discharge Instructions  [facility] [facility]ine Maldonado  Telephone: [phone]    NAME:  Avis Alert:  1970  MEDICAL RECORD NUMBER:  [id number]  DATE:  7/13/2021    Discharge condition: Stable    Discharge to: Home    Left via:Private automobile    Accompanied by:  caregiver    [facility]: [facility]     Dressing Orders:  Vaginal: Apply antifungal cream daily.  Continue for 2 weeks after redness disappears     Left abdominal wall wound: Soap and water wash, tuck saline moistened gauze into the wound bed, secure with dry gauze and tape change daily.     Treatment Orders:  Protein rich diet  Multivitamin      [facility] follow up visit ______2 weeks_______________________  (Please note your next appointment above and if you are unable to keep, kindly give a 24 hour notice. Thank you.)          If you experience any of the following, please call the [address] [place] [address] [facility] during business hours:    * Increase in Pain  * Temperature over 101  * Increase in drainage from your wound  * Drainage with a foul odor  * Bleeding  * Increase in swelling  * Need for compression bandage changes due to slippage, breakthrough drainage. If you need medical attention outside of the business hours of the [address] [place] [address] [place] [address] please contact your PCP or go to the nearest emergency room.           Electronically signed by Tyrese Montesinos MD on 7/13/2021 at 2:24 PM

## 2021-07-13 NOTE — PROGRESS NOTES
[address] [facility]. [address] Rojas MunroePurvi walsh [address] C:[phone]     Ordering Center:     [address] [place] [address],[address]  [address] [facility], PDADY [address] [address] [phone]  WOUND CARE Dept: [address] [facility] [address] [facility] [address] [facility] [phone]    Patient Information:      Sheela Copeland  [address]   [phone]   : 1970  AGE: 49 y.o. GENDER: female   EPISODE DATE: 2021    Insurance:      PRIMARY INSURANCE:  Plan: MEDICARE PART A AND B  Coverage: MEDICARE  Effective Date: 2015  Group Number: [unfilled]  Subscriber Number: [id number] - (Medicare)    Payor/Plan Subscr  Sex Relation Sub. Ins. ID Effective Group Num   1. [facility] 1970 Female Self [id number] 1/1/15                                    [address]    2. [place]ICAID [place] -* AJAY HERNANDEZ O 1970 Female Self [id number] 1/15/15                                    [address]        Patient Wound Information:      Problem List Items Addressed This Visit     * (Principal) Skin ulcer of abdominal wall, with fat layer exposed ([address] - Primary (Chronic)    Relevant Orders    Initiate Outpatient Wound Care Protocol          WOUNDS REQUIRING DRESSING SUPPLIES:     Wound 21 Abdomen Anterior; Left wound 3- left abdominal wall/ inguinal area -  post surgical (Active)   Wound Image   21 1343   Wound Etiology Surgical 21 1343   Dressing Status New drainage noted 21 1343   Wound Cleansed Cleansed with saline 21 1343   Dressing/Treatment Moist to moist;Moisten with saline;Packing;Gauze dressing/dressing sponge;Tape/Soft cloth adhesive tape 21 1548   Wound Length (cm) 0.7 cm 21 1343   Wound Width (cm) 0.5 cm 21 1343   Wound Depth (cm) 1.7 cm 21 1343   Wound Surface Area (cm^2) 0.35 cm^2 21 1343   Change in Wound Size % (l*w) 68.75 21 1343   Wound Volume (cm^3) 0.595 cm^3 07/13/21 1343   Wound Healing % -6 07/13/21 1343   Post-Procedure Length (cm) 0.7 cm 07/13/21 1420   Post-Procedure Width (cm) 0.5 cm 07/13/21 1420   Post-Procedure Depth (cm) 1.7 cm 07/13/21 1420   Post-Procedure Surface Area (cm^2) 0.35 cm^2 07/13/21 1420   Post-Procedure Volume (cm^3) 0.595 cm^3 07/13/21 1420   Distance Tunneling (cm) 0 cm 07/13/21 1343   Tunneling Position ___ O'Clock 0 07/13/21 1343   Undermining Starts ___ O'Clock 0 07/13/21 1343   Undermining Ends___ O'Clock 0 07/13/21 1343   Undermining Maxium Distance (cm) 0 07/13/21 1343   Wound Assessment Pink/red;Slough 07/13/21 1343   Drainage Amount Moderate 07/13/21 1343   Drainage Description Serosanguinous 07/13/21 1343   Odor None 07/13/21 1343   Audelia-wound Assessment Intact 07/13/21 1343   Margins Defined edges 07/13/21 1343   Number of days: 28       Wound 07/13/21 Vagina Wound 2. Vaginal (Active)   Wound Image   07/13/21 1343   Wound Etiology Other 07/13/21 1343   Dressing Status Old drainage noted 07/13/21 1343   Wound Length (cm) 1 cm 07/13/21 1343   Wound Width (cm) 1 cm 07/13/21 1343   Wound Depth (cm) 0.1 cm 07/13/21 1343   Wound Surface Area (cm^2) 1 cm^2 07/13/21 1343   Wound Volume (cm^3) 0.1 cm^3 07/13/21 1343   Distance Tunneling (cm) 0 cm 07/13/21 1343   Tunneling Position ___ O'Clock 0 07/13/21 1343   Undermining Starts ___ O'Clock 0 07/13/21 1343   Undermining Ends___ O'Clock 0 07/13/21 1343   Undermining Maxium Distance (cm) 0 07/13/21 1343   Wound Assessment Pink/red 07/13/21 1343   Drainage Amount Moderate 07/13/21 1343   Drainage Description Serosanguinous; Serous 07/13/21 1343   Odor None 07/13/21 1343   Margins Defined edges 07/13/21 1343   Number of days: 0          Supplies Requested :      WOUND #: 3   PRIMARY DRESSING:  Plain packing    Cover and Secure with: 4X4 gauze pad     FREQUENCY OF DRESSING CHANGES:  Daily     ADDITIONAL ITEMS:  [] Gloves Small  [x] Gloves Medium [] Gloves Large [] Gloves Mirza Roy  [] Tape 1\" [x] Tape 2\" [] Tape 3\"  [x] Medipore Tape  [x] Saline  [] Skin Prep   [] Adhesive Remover   [x] Cotton Tip Applicators   [] Other:    Patient Wound(s) Debrided: [x] Yes if yes please add date 7/13/21   [] No    Debribement Type: Excisional/Sharp    Patient currently being seen by Home Health: [] Yes   [x] No    Duration for needed supplies:  []15  [x]30  []60  []90 Days    Electronically signed by Ericka Ferraro RN on 7/13/2021 at 2:31 PM     Provider Information:      PROVIDER'S NAME: Dr Venessa Hussein    NPI: [id number]

## 2021-07-21 ENCOUNTER — TELEPHONE (OUTPATIENT)
Dept: INTERNAL MEDICINE | Age: 51
End: 2021-07-21

## 2021-07-21 DIAGNOSIS — B37.31 YEAST INFECTION INVOLVING THE VAGINA AND SURROUNDING AREA: Primary | ICD-10-CM

## 2021-07-21 RX ORDER — FLUCONAZOLE 100 MG/1
100 TABLET ORAL DAILY
Qty: 3 TABLET | Refills: 0 | Status: SHIPPED | OUTPATIENT
Start: 2021-07-21 | End: 2021-07-24

## 2021-07-21 NOTE — TELEPHONE ENCOUNTER
Pt called and she has a yeast infection from an antibiotic she took. SHe has tried using the otc medications and it is just not working. If it is ok for the pt to have diflucan please sign pended medication.

## 2021-07-27 ENCOUNTER — HOSPITAL ENCOUNTER (OUTPATIENT)
Dept: WOUND CARE | Age: 51
Discharge: HOME | End: 2021-07-27
Payer: MEDICARE

## 2021-07-27 VITALS
HEART RATE: 42 BPM | SYSTOLIC BLOOD PRESSURE: 100 MMHG | DIASTOLIC BLOOD PRESSURE: 70 MMHG | RESPIRATION RATE: 18 BRPM | HEIGHT: 69 IN | BODY MASS INDEX: 21.42 KG/M2 | TEMPERATURE: 96.9 F | WEIGHT: 144.62 LBS

## 2021-07-27 DIAGNOSIS — L98.492 SKIN ULCER OF ABDOMINAL WALL, WITH FAT LAYER EXPOSED (HCC): Primary | ICD-10-CM

## 2021-07-27 DIAGNOSIS — G82.20 PARAPLEGIA (HCC): Chronic | ICD-10-CM

## 2021-07-27 PROCEDURE — 6370000000 HC RX 637 (ALT 250 FOR IP): Performed by: SURGERY

## 2021-07-27 PROCEDURE — 99213 OFFICE O/P EST LOW 20 MIN: CPT

## 2021-07-27 PROCEDURE — 99212 OFFICE O/P EST SF 10 MIN: CPT | Performed by: SURGERY

## 2021-07-27 RX ORDER — BETAMETHASONE DIPROPIONATE 0.05 %
OINTMENT (GRAM) TOPICAL ONCE
Status: CANCELLED | OUTPATIENT
Start: 2021-07-27 | End: 2021-07-27

## 2021-07-27 RX ORDER — BACITRACIN ZINC AND POLYMYXIN B SULFATE 500; 1000 [USP'U]/G; [USP'U]/G
OINTMENT TOPICAL ONCE
Status: CANCELLED | OUTPATIENT
Start: 2021-07-27 | End: 2021-07-27

## 2021-07-27 RX ORDER — LIDOCAINE 40 MG/G
CREAM TOPICAL ONCE
Status: CANCELLED | OUTPATIENT
Start: 2021-07-27 | End: 2021-07-27

## 2021-07-27 RX ORDER — LIDOCAINE HYDROCHLORIDE 20 MG/ML
JELLY TOPICAL ONCE
Status: COMPLETED | OUTPATIENT
Start: 2021-07-27 | End: 2021-07-27

## 2021-07-27 RX ORDER — CLOBETASOL PROPIONATE 0.5 MG/G
OINTMENT TOPICAL ONCE
Status: CANCELLED | OUTPATIENT
Start: 2021-07-27 | End: 2021-07-27

## 2021-07-27 RX ORDER — LIDOCAINE HYDROCHLORIDE 20 MG/ML
JELLY TOPICAL ONCE
Status: CANCELLED | OUTPATIENT
Start: 2021-07-27 | End: 2021-07-27

## 2021-07-27 RX ORDER — BACITRACIN, NEOMYCIN, POLYMYXIN B 400; 3.5; 5 [USP'U]/G; MG/G; [USP'U]/G
OINTMENT TOPICAL ONCE
Status: CANCELLED | OUTPATIENT
Start: 2021-07-27 | End: 2021-07-27

## 2021-07-27 RX ORDER — LIDOCAINE 50 MG/G
OINTMENT TOPICAL ONCE
Status: CANCELLED | OUTPATIENT
Start: 2021-07-27 | End: 2021-07-27

## 2021-07-27 RX ORDER — LIDOCAINE HYDROCHLORIDE 40 MG/ML
SOLUTION TOPICAL ONCE
Status: CANCELLED | OUTPATIENT
Start: 2021-07-27 | End: 2021-07-27

## 2021-07-27 RX ORDER — GINSENG 100 MG
CAPSULE ORAL ONCE
Status: CANCELLED | OUTPATIENT
Start: 2021-07-27 | End: 2021-07-27

## 2021-07-27 RX ORDER — GENTAMICIN SULFATE 1 MG/G
OINTMENT TOPICAL ONCE
Status: CANCELLED | OUTPATIENT
Start: 2021-07-27 | End: 2021-07-27

## 2021-07-27 RX ADMIN — LIDOCAINE HYDROCHLORIDE: 20 JELLY TOPICAL at 15:06

## 2021-07-27 ASSESSMENT — PAIN SCALES - GENERAL: PAINLEVEL_OUTOF10: 0

## 2021-07-27 NOTE — PLAN OF CARE
Problem: Wound:  Goal: Will show signs of wound healing; wound closure and no evidence of infection  Description: Will show signs of wound healing; wound closure and no evidence of infection  Outcome: Ongoing     Problem: Smoking cessation:  Goal: Ability to formulate a plan to maintain a tobacco-free life will be supported  Description: Ability to formulate a plan to maintain a tobacco-free life will be supported  Outcome: Ongoing   Patient smokes 1 cigeratte occasionally,on Chantix, states she has quit but smokes 1 occassionally

## 2021-07-27 NOTE — PROGRESS NOTES
[address]   Progress Note and Procedure Note      Karen Camacho  MEDICAL RECORD NUMBER:  [id number]  AGE: 49 y.o. GENDER: female  : 1970  EPISODE DATE:  2021    Subjective:     Chief Complaint   Patient presents with    Wound Check     Patient thinks she is healed         HISTORY of PRESENT ILLNESS HPI     Karen Camacho is a 49 y.o. female who presents today for wound/ulcer evaluation.    History of Wound Context: Pt with abdominal and labial wounds here for eval/treat  Wound/Ulcer Pain Timing/Severity: none  Quality of pain: N/A  Severity:  0 / 10   Modifying Factors: None  Associated Signs/Symptoms: none    Ulcer Identification:  Ulcer Type: non-healing surgical  Contributing Factors: none    Wound: surgical        PAST MEDICAL HISTORY        Diagnosis Date    Ankle wound     and toe wound    Aptyalism 2017    Arthritis 2020    Asthma     Back pain 2017    Binocular vision disorder with diplopia 2017    [facility]chronic [facility]flow limitation) [facility] 2017    Hay fever 2017    Headache 2017    MS (multiple sclerosis) ([address]     Muscle spasticity     Neuropathic ulcer of left foot, limited to breakdown of skin ([address] 4/10/2017    Numbness 2016    Open wound of ankle 2017    Open wound of second toe of left foot 2018    Peripheral vascular disease ([address]     Seizure ([address]     occ. related to ms    Sepsis ([address] 2016    Weakness 2016       PAST SURGICAL HISTORY    Past Surgical History:   Procedure Laterality Date    BACLOFEN PUMP IMPLANTATION      BREAST BIOPSY Right     neg    COLONOSCOPY N/A 2019    Dr Martha Bass ileum through ostomy-patent and healthy appearing anastomosis in the right colon-entero colonic anastomosis-10 yr recall    COLOSTOMY      FEMUR FRACTURE SURGERY      Right    FOOT AMPUTATION Left 2019    LEFT TRANSMET AMPUTATION performed by Zain Garber MD at [address] [place] [address] HYSTERECTOMY      OTHER SURGICAL HISTORY      urostomy    OTHER SURGICAL HISTORY      pain pump    [place] [facility] CTR VAD W/SUBQ PORT UNDER 5 YR N/A 6/26/2018    SINGLE LUMEN PORT PLACEMENT WITH FLUORO performed by Avelina Victoria MD at [address] TOE AMPUTATION      L last toe    TONSILLECTOMY      URETEROTOMY         FAMILY HISTORY    Family History   Problem Relation Age of Onset   José Luis Michaud Cancer Mother         breast    Colon Cancer Mother     Colon Polyps Mother     Breast Cancer Mother     Diabetes Father     High Blood Pressure Father     Cancer Paternal Aunt         breast    Liver Cancer Paternal Aunt     Heart Disease Paternal Grandmother     Esophageal Cancer Neg Hx     Liver Disease Neg Hx     Rectal Cancer Neg Hx     Stomach Cancer Neg Hx        SOCIAL HISTORY    Social History     Tobacco Use    Smoking status: Former Smoker     Packs/day: 0.00     Types: Cigarettes     Quit date: 1/8/2018     Years since quitting: 3.5    Smokeless tobacco: Never Used    Tobacco comment: hasn't smoked since 9/5/20, taking chantix   Vaping Use    Vaping Use: Never used   Substance Use Topics    Alcohol use: Yes     Comment: rarley    Drug use: Yes     Types: Marijuana     Comment: daily        ALLERGIES    Allergies   Allergen Reactions    Codeine      Category: Allergy;    José Luis Salm Darvocet [Propoxyphene N-Acetaminophen]      Talking out of her head    Morphine      Category: Allergy;     Morphine And Related Itching and Swelling    Propoxyphene Swelling       MEDICATIONS    Current Outpatient Medications on File Prior to Encounter   Medication Sig Dispense Refill    HYDROcodone-acetaminophen (NORCO)  MG per tablet TAKE 1 TABLET BY MOUTH 3 TIMES DAILY AS NEEDED FOR PAIN.  REDUCE DOSESAS PAIN BEOMES MANAGEABLE 90 tablet 0    methylphenidate (RITALIN) 20 MG tablet TAKE 1 TABLET BY MOUTH DAILY 30 tablet 0    levETIRAcetam (KEPPRA) 500 MG tablet TAKE 1 TABLET BY MOUTH DAILY 90 tablet 3    pilocarpine (SALAGEN) 7.5 MG tablet TAKE ONE TABLET BY MOUTH 3 TIMES DAILY 90 tablet 0    nitrofurantoin (MACRODANTIN) 50 MG capsule TAKE ONE CAPSULE BY MOUTH DAILY 30 capsule 0    varenicline (CHANTIX CONTINUING MONTH CHADWICK) 1 MG tablet TAKE 1 TABLET BY MOUTH 2 TIMES A DAY 56 tablet 0    DULoxetine (CYMBALTA) 30 MG extended release capsule TAKE 1 CAPSULE BY MOUTH DAILY 30 capsule 5    pregabalin (LYRICA) 300 MG capsule TAKE 1 CAPSULE BY MOUTH 2 TIMES A DAY 60 capsule 5    tiZANidine (ZANAFLEX) 4 MG tablet TAKE 3 TABLETS TWICE DAILY AS NEEDED 180 tablet 11    azelastine (ASTELIN) 0.1 % nasal spray USE 2 SPRAYS IN EACH NOSTRIL TWICE DAILY AS DIRECTED 30 mL 0    cetirizine (ZYRTEC) 10 MG tablet Take 10 mg by mouth daily      Multiple Vitamins-Minerals (THERAPEUTIC MULTIVITAMIN-MINERALS) tablet Take 1 tablet by mouth daily      baclofen (LIORESAL) 10 MG tablet Take 1 tablet by mouth 2 times daily as needed (muscle spasms) (Patient taking differently: Take 10 mg by mouth 2 times daily as needed (muscle spasms) Indications: using pump ) 60 tablet 5    ondansetron (ZOFRAN) 4 MG tablet Take 4 mg by mouth every 8 hours as needed for Nausea or Vomiting      Diapers & Supplies MISC Diapers, chucks, wipes, and gloves for incontinence. [phone] each 11    hydroCHLOROthiazide (HYDRODIURIL) 25 MG tablet TAKE 1 TABLET BY MOUTH ONCE DAILY AS NEEDED 30 tablet 0    Catheters MISC Catheter supplies and lubricant 5 times daily G82.20  to [employer] [phone] 450 each 3    levETIRAcetam (KEPPRA) 500 MG tablet Take 1 tablet by mouth nightly (Patient taking differently: Take 750 mg by mouth nightly ) 30 tablet 5    glucose monitoring kit (FREESTYLE) monitoring kit 1 kit by Does not apply route daily 1 kit 0    blood glucose monitor strips Test 1 times a day & as needed for symptoms of irregular blood glucose.  100 strip 3    docusate sodium (COLACE) 100 MG capsule Take 200 mg by mouth as needed for Constipation  PROAIR  (90 Base) MCG/ACT inhaler INHALE 1 PUFF INTO THE LUNGS EVERY 6 HOURS AS NEEDED FOR WHEEZING OR SHORTNESS OF BREATH 8.5 g 5    Heparin Lock Flush (HEPARIN FLUSH, 100 UNITS/ML,) 100 UNIT/ML injection 3 mLs by Intercatheter route every 30 days No every 30 days but every 4-6 weeks. Flush port with 300 units heparin with 20 cc normal saline for ocrevus infusion for Multiple sclerosis and NS 20CC 1 Syringe 5    ipratropium-albuterol (DUONEB) 0.5-2.5 (3) MG/3ML SOLN nebulizer solution USE 1 UNIT DOSE IN NEBULIZER EVERY 4 TO 6 HOURS AS NEEDED. 450 mL 11    BACLOFEN, PAIN PUMP REFILL CHARGE, by Implant route continuous Indications: every 3 months      glucose monitoring kit (FREESTYLE) monitoring kit 1 kit by Does not apply route daily 1 kit 0    blood glucose monitor strips Test 1 times a day & as needed for symptoms of irregular blood glucose. 100 strip 0    Sodium Chloride Flush (SALINE FLUSH) 0.9 % SOLN Infuse 20 mLs intravenously every 30 days Not every 30 days but every 4-6 weeks as need with 300 units of heparin to flush port for Infusion of Ocrevus for multiple sclerosis 20 mL 5     No current facility-administered medications on file prior to encounter. REVIEW OF SYSTEMS    A comprehensive review of systems was negative.     Objective:      /70   Pulse (!) 42   Temp 96.9 °F (36.1 °C) (Temporal)   Resp 18   Ht 5' 9\" (1.753 m)   Wt 144 lb 10 oz (65.6 kg)   BMI 21.36 kg/m²     Wt Readings from Last 3 Encounters:   07/27/21 144 lb 10 oz (65.6 kg)   07/13/21 143 lb (64.9 kg)   06/29/21 143 lb (64.9 kg)       PHYSICAL EXAM    General Appearance: alert and oriented to person, place and time, well developed and well- nourished, in no acute distress  Skin: warm and dry, no rash or erythema  Head: normocephalic and atraumatic  Eyes: pupils equal, round, and reactive to light, extraocular eye movements intact, conjunctivae normal  ENT: tympanic membrane, external ear and ear canal normal bilaterally, nose without deformity, nasal mucosa and turbinates normal without polyps, lips teeth and gums normal  Neck: supple and non-tender without mass, no thyromegaly or thyroid nodules, no cervical lymphadenopathy  Pulmonary/Chest: clear to auscultation bilaterally- no wheezes, rales or rhonchi, normal air movement, no respiratory distress  Cardiovascular: normal rate, regular rhythm, normal S1 and S2, no murmurs, rubs, clicks, or gallops, distal pulses intact, no carotid bruits  Abdomen: soft, non-tender, non-distended, normal bowel sounds, no masses or organomegaly  Extremities: no cyanosis, clubbing or edema  Musculoskeletal: normal range of motion, no joint swelling, deformity or tenderness      Assessment:      Patient Active Problem List   Diagnosis Code    Muscle spasticity M62.838    Peripheral vascular disease ([facility]) I73.9    MS (multiple sclerosis) ([facility]) G35    Pain in both upper arms M79.621, M79.622    Numbness R20.0    Other fatigue R53.83    Weakness R53.1    Chronic pain G89.29    Hx of seizure disorder Z86.69    Insomnia G47.00    Pain, neck M54.2    Asthma J45.909    S/P transmetatarsal amputation of foot, left ([facility]) I73.250    Chronic constipation K59.09    Colostomy in place ([address] Z93.3    Paraplegia ([facility]) G82.20    Arthralgia of hip M25.559    Seizure ([address] R56.9    Encounter for care related to vascular access port Z45.2    Urinary incontinence R32    Depressive disorder F32.9    Malodorous urine R82.90    Arthritis M19.90    Osteoporosis M81.0    Urinary bladder stone N21.0    Vesicovaginal fistula N82.0    Skin ulcer of abdominal wall, with fat layer exposed ([address] L98.492             Wound 06/14/21 Abdomen Anterior; Left wound 3- left abdominal wall/ inguinal area -  post surgical (Active)   Wound Image   07/27/21 1506   Wound Etiology Surgical 07/27/21 1506   Dressing Status Dry 07/27/21 1506   Wound Cleansed Cleansed with saline 07/13/21 1343 Dressing/Treatment Gauze dressing/dressing sponge;Moisten with saline;Tape/Soft cloth adhesive tape 07/13/21 1450   Wound Length (cm) 0 cm 07/27/21 1506   Wound Width (cm) 0 cm 07/27/21 1506   Wound Depth (cm) 0 cm 07/27/21 1506   Wound Surface Area (cm^2) 0 cm^2 07/27/21 1506   Change in Wound Size % (l*w) 100 07/27/21 1506   Wound Volume (cm^3) 0 cm^3 07/27/21 1506   Wound Healing % 100 07/27/21 1506   Post-Procedure Length (cm) 0.7 cm 07/13/21 1420   Post-Procedure Width (cm) 0.5 cm 07/13/21 1420   Post-Procedure Depth (cm) 1.7 cm 07/13/21 1420   Post-Procedure Surface Area (cm^2) 0.35 cm^2 07/13/21 1420   Post-Procedure Volume (cm^3) 0.595 cm^3 07/13/21 1420   Distance Tunneling (cm) 0 cm 07/27/21 1506   Tunneling Position ___ O'Clock 0 07/27/21 1506   Undermining Starts ___ O'Clock 0 07/27/21 1506   Undermining Ends___ O'Clock 0 07/27/21 1506   Undermining Maxium Distance (cm) 0 07/27/21 1506   Wound Assessment Epithelialization 07/27/21 1506   Drainage Amount None 07/27/21 1506   Drainage Description Serosanguinous 07/13/21 1343   Odor None 07/27/21 1506   Audelia-wound Assessment Intact 07/27/21 1506   Margins Defined edges 07/13/21 1343   Number of days: 43       Wound 07/13/21  Left Wound 2.   Trauma, L. Labia (Active)   Wound Image   07/27/21 1506   Wound Etiology Traumatic 07/27/21 1506   Dressing Status Old drainage noted 07/27/21 1506   Wound Length (cm) 1.5 cm 07/27/21 1506   Wound Width (cm) 0.8 cm 07/27/21 1506   Wound Depth (cm) 0.1 cm 07/27/21 1506   Wound Surface Area (cm^2) 1.2 cm^2 07/27/21 1506   Change in Wound Size % (l*w) -20 07/27/21 1506   Wound Volume (cm^3) 0.12 cm^3 07/27/21 1506   Wound Healing % -20 07/27/21 1506   Distance Tunneling (cm) 0 cm 07/27/21 1506   Tunneling Position ___ O'Clock 0 07/27/21 1506   Undermining Starts ___ O'Clock 0 07/27/21 1506   Undermining Ends___ O'Clock 0 07/27/21 1506   Undermining Maxium Distance (cm) 0 07/27/21 1506   Wound Assessment Pink/red 07/27/21 1506   Drainage Amount Small 07/27/21 1506   Drainage Description Serosanguinous 07/27/21 1506   Odor None 07/27/21 1506   Audelia-wound Assessment Intact 07/27/21 1506   Margins Attached edges 07/27/21 1506   Wound Thickness Description not for Pressure Injury Full thickness 07/27/21 1506   Number of days: 14             Plan:     Problem List Items Addressed This Visit     Skin ulcer of abdominal wall, with fat layer exposed ([address] - Primary (Chronic)    Relevant Orders    Initiate Outpatient Wound Care Protocol        Abdomen healed and labial wound improved. Cont current care of barrier cream      Treatment Note please see attached Discharge Instructions    In my professional opinion this patient would benefit from HBO Therapy: No    Written patient dismissal instructions given to patient and signed by patient or POA. Discharge [address] and Hyperbaric Oxygen Therapy   Physician Orders and Discharge Instructions  [address] [place] [address]  Telephone: [phone]    NAME:  Jose R Sebas:  1970  MEDICAL RECORD NUMBER:  [id number]  DATE:  7/27/2021    Discharge condition: Stable    Discharge to: Home    Left via:Private automobile    Accompanied by:  none    [facility]: [facility]     Dressing Orders:  Vaginal: Apply skin barrier cream daily. Do not scrub off. Wash soilage and re-apply.      Left abdominal wall wound: Soap and water wash, tuck saline moistened gauze into the wound bed, secure with dry gauze and tape change daily.     Treatment Orders:  Protein rich diet  Multivitamin    [facility] follow up visit _______3 weeks______________________  (Please note your next appointment above and if you are unable to keep, kindly give a 24 hour notice.  Thank you.)          If you experience any of the following, please call the [address] during business hours:    * Increase in Pain  * Temperature over 101  * Increase in drainage from your wound  * Drainage with a foul odor  * Bleeding  * Increase in swelling  * Need for compression bandage changes due to slippage, breakthrough drainage. If you need medical attention outside of the business hours of the [address] [place] [address] Road please contact your PCP or go to the nearest emergency room.           Electronically signed by Karl Carrasco MD on 7/27/2021 at 4:13 PM

## 2021-07-29 RX ORDER — VARENICLINE TARTRATE
KIT
Qty: 53 TABLET | Refills: 0 | Status: SHIPPED | OUTPATIENT
Start: 2021-07-29 | End: 2021-12-13

## 2021-07-29 RX ORDER — NITROFURANTOIN MACROCRYSTALS 50 MG/1
CAPSULE ORAL
Qty: 30 CAPSULE | Refills: 3 | Status: SHIPPED | OUTPATIENT
Start: 2021-07-29 | End: 2021-12-06

## 2021-07-29 RX ORDER — PILOCARPINE HYDROCHLORIDE 7.5 MG/1
TABLET, FILM COATED ORAL
Qty: 90 TABLET | Refills: 3 | Status: SHIPPED | OUTPATIENT
Start: 2021-07-29 | End: 2021-12-06

## 2021-07-29 NOTE — TELEPHONE ENCOUNTER
Alyce called requesting a refill of the below medication which has been pended for you:     Requested Prescriptions     Pending Prescriptions Disp Refills    nitrofurantoin (MACRODANTIN) 50 MG capsule [Pharmacy Med Name: NITROFURANT MCR 50 MG CAP *DD] 30 capsule 0     Sig: TAKE ONE CAPSULE BY MOUTH DAILY    pilocarpine (SALAGEN) 7.5 MG tablet [Pharmacy Med Name: PILOCARPINE HCL 7.5 MG TABLET] 90 tablet 0     Sig: TAKE ONE TABLET BY MOUTH 3 TIMES DAILY       Last Appointment Date: 6/8/2021  Next Appointment Date: 12/9/2021    Allergies   Allergen Reactions    Codeine      Category: Allergy;    Rosemary Ladd Darvocet [Propoxyphene N-Acetaminophen]      Talking out of her head    Morphine      Category:  Allergy;     Morphine And Related Itching and Swelling    Propoxyphene Swelling

## 2021-07-29 NOTE — TELEPHONE ENCOUNTER
Alyce called requesting a refill of the below medication which has been pended for you:     Requested Prescriptions     Pending Prescriptions Disp Refills    varenicline (CHANTIX STARTING MONTH CHADWICK) 0.5 MG X 11 & 1 MG X 42 tablet [Pharmacy Med Name: Mp Hawley MONTH CHADWICK] 53 tablet 0     Sig: TAKE BY MOUTH AS DIRECTED       Last Appointment Date: 6/8/2021  Next Appointment Date: 12/9/2021    Allergies   Allergen Reactions    Codeine      Category: Allergy;    [employer] Darvocet [Propoxyphene N-Acetaminophen]      Talking out of her head    Morphine      Category:  Allergy;     Morphine And Related Itching and Swelling    Propoxyphene Swelling

## 2021-08-02 DIAGNOSIS — M62.838 MUSCLE SPASTICITY: ICD-10-CM

## 2021-08-02 DIAGNOSIS — M54.2 PAIN, NECK: ICD-10-CM

## 2021-08-02 DIAGNOSIS — R53.83 OTHER FATIGUE: ICD-10-CM

## 2021-08-02 DIAGNOSIS — M79.621 PAIN IN BOTH UPPER ARMS: ICD-10-CM

## 2021-08-02 DIAGNOSIS — M79.622 PAIN IN BOTH UPPER ARMS: ICD-10-CM

## 2021-08-02 DIAGNOSIS — G35 MS (MULTIPLE SCLEROSIS) (HCC): ICD-10-CM

## 2021-08-02 RX ORDER — METHYLPHENIDATE HYDROCHLORIDE 20 MG/1
TABLET ORAL
Qty: 30 TABLET | Refills: 0 | Status: SHIPPED | OUTPATIENT
Start: 2021-08-06 | End: 2021-09-05

## 2021-08-02 RX ORDER — HYDROCODONE BITARTRATE AND ACETAMINOPHEN 10; 325 MG/1; MG/1
TABLET ORAL
Qty: 90 TABLET | Refills: 0 | Status: SHIPPED | OUTPATIENT
Start: 2021-08-06 | End: 2021-09-03

## 2021-08-04 ENCOUNTER — HOSPITAL ENCOUNTER (OUTPATIENT)
Dept: INFUSION THERAPY | Age: 51
Setting detail: INFUSION SERIES
Discharge: HOME | End: 2021-08-04
Payer: MEDICARE

## 2021-08-04 ENCOUNTER — OFFICE VISIT (OUTPATIENT)
Dept: NEUROLOGY | Age: 51
End: 2021-08-04
Payer: MEDICARE

## 2021-08-04 VITALS
HEART RATE: 77 BPM | WEIGHT: 144 LBS | DIASTOLIC BLOOD PRESSURE: 61 MMHG | SYSTOLIC BLOOD PRESSURE: 91 MMHG | HEIGHT: 69 IN | BODY MASS INDEX: 21.33 KG/M2

## 2021-08-04 DIAGNOSIS — R53.83 OTHER FATIGUE: ICD-10-CM

## 2021-08-04 DIAGNOSIS — G35 MS (MULTIPLE SCLEROSIS) (HCC): Primary | ICD-10-CM

## 2021-08-04 DIAGNOSIS — M54.2 PAIN, NECK: ICD-10-CM

## 2021-08-04 DIAGNOSIS — Z45.2 ENCOUNTER FOR CARE RELATED TO VASCULAR ACCESS PORT: ICD-10-CM

## 2021-08-04 DIAGNOSIS — R56.9 SEIZURE (HCC): ICD-10-CM

## 2021-08-04 DIAGNOSIS — R20.0 NUMBNESS: ICD-10-CM

## 2021-08-04 DIAGNOSIS — M25.559 ARTHRALGIA OF HIP, UNSPECIFIED LATERALITY: ICD-10-CM

## 2021-08-04 DIAGNOSIS — M79.621 PAIN IN BOTH UPPER ARMS: ICD-10-CM

## 2021-08-04 DIAGNOSIS — M62.838 MUSCLE SPASTICITY: ICD-10-CM

## 2021-08-04 DIAGNOSIS — G82.20 PARAPLEGIA (HCC): ICD-10-CM

## 2021-08-04 DIAGNOSIS — R53.1 WEAKNESS: ICD-10-CM

## 2021-08-04 DIAGNOSIS — M79.622 PAIN IN BOTH UPPER ARMS: ICD-10-CM

## 2021-08-04 PROCEDURE — 6360000002 HC RX W HCPCS: Performed by: INTERNAL MEDICINE

## 2021-08-04 PROCEDURE — G8427 DOCREV CUR MEDS BY ELIG CLIN: HCPCS | Performed by: PSYCHIATRY & NEUROLOGY

## 2021-08-04 PROCEDURE — 96523 IRRIG DRUG DELIVERY DEVICE: CPT

## 2021-08-04 PROCEDURE — 3017F COLORECTAL CA SCREEN DOC REV: CPT | Performed by: PSYCHIATRY & NEUROLOGY

## 2021-08-04 PROCEDURE — 4004F PT TOBACCO SCREEN RCVD TLK: CPT | Performed by: PSYCHIATRY & NEUROLOGY

## 2021-08-04 PROCEDURE — G8420 CALC BMI NORM PARAMETERS: HCPCS | Performed by: PSYCHIATRY & NEUROLOGY

## 2021-08-04 PROCEDURE — 99214 OFFICE O/P EST MOD 30 MIN: CPT | Performed by: PSYCHIATRY & NEUROLOGY

## 2021-08-04 RX ORDER — SODIUM CHLORIDE 0.9 % (FLUSH) 0.9 %
20 SYRINGE (ML) INJECTION PRN
Status: DISCONTINUED | OUTPATIENT
Start: 2021-08-04 | End: 2021-08-05 | Stop reason: HOSPADM

## 2021-08-04 RX ORDER — HEPARIN SODIUM (PORCINE) LOCK FLUSH IV SOLN 100 UNIT/ML 100 UNIT/ML
500 SOLUTION INTRAVENOUS PRN
Status: DISCONTINUED | OUTPATIENT
Start: 2021-08-04 | End: 2021-08-05 | Stop reason: HOSPADM

## 2021-08-04 RX ORDER — HEPARIN SODIUM (PORCINE) LOCK FLUSH IV SOLN 100 UNIT/ML 100 UNIT/ML
500 SOLUTION INTRAVENOUS PRN
Status: CANCELLED | OUTPATIENT
Start: 2021-08-04

## 2021-08-04 RX ORDER — SODIUM CHLORIDE 0.9 % (FLUSH) 0.9 %
20 SYRINGE (ML) INJECTION PRN
Status: CANCELLED | OUTPATIENT
Start: 2021-08-04

## 2021-08-04 RX ADMIN — HEPARIN 500 UNITS: 100 SYRINGE at 14:22

## 2021-08-04 NOTE — PROGRESS NOTES
Chief Complaint   Patient presents with    Multiple Sclerosis     follow up        Clint Ga is a 49y.o. year old female who is seen for evaluation of multiple sclerosis. The patient indicates that she was diagnosed with multiple sclerosis in 1994. At that time to develop some visual disturbances including blurred vision and double vision. Within eight months she was in a wheelchair. She currently has no movement of the lower extremities. She does have some increased Spasticity in the legs worse in the arms. She underwent a baclofen pump with Dr. Lisha Perez with some complications. She is doing better from this. She currently sees Dr. nichols for her pump management. She does have some cognitive issues. She denies diplopia, dysarthria, or dysphagia. She does have some incontinence of bladder. She does have pain in the hips and lower extremities. She also has headaches with pain behind both eyes. She was followed by Dr. Gino Mukherjee of neurology. She follows up today for evaluation. She is currently in a wheelchair. Since her last visit she is doing better with physical therapy. She had an accidental overdose with her baclofen pump and had a seizure. Doing better on. Recently admitted with seizure. Was off Chantix a few weeks and had a UTI. Had seizure in oct 2014 with seizure due to baclofen overose. This was her second seizure. Off Ocrevus. Occassionally gets shooting pain in head. Headache in the back of the head. Cymbalta helps mood. Had a seizure on 1/29/21 and went to the ER. Found to have a UTI and placed on Keppra increased to 750 mg bid. Went to [facility] due surgery for ileostomy. Overall stable. No seizures.     Active Ambulatory Problems     Diagnosis Date Noted    Muscle spasticity     Peripheral vascular disease ([facility] [address] 02/01/2016    MS (multiple sclerosis) ([facility] [address] 06/08/2016    Pain in both upper arms 08/04/2016    Numbness 08/04/2016    Other fatigue 08/04/2016    Weakness 08/04/2016    Chronic pain 08/19/2016    Hx of seizure disorder 08/19/2016    Insomnia 06/28/2017    Pain, neck 09/06/2017    Asthma 11/26/2018    S/P transmetatarsal amputation of foot, left ([address] 04/17/2019    Chronic constipation 05/02/2019    Colostomy in place ([address] 05/02/2019    Paraplegia ([address] 11/05/2019    Arthralgia of hip 12/03/2019    Seizure ([address] 05/26/2020    Encounter for care related to vascular access port 12/10/2020    Urinary incontinence 05/04/2020    Depressive disorder 05/04/2020    Malodorous urine 12/14/2020    Arthritis 05/04/2020    Osteoporosis 05/04/2020    Urinary bladder stone 10/16/2020    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/29/2020    Skin ulcer of abdominal wall, with fat layer exposed ([address] 06/16/2021     Resolved Ambulatory Problems     Diagnosis Date Noted    Neuropathic ulcer of right foot, limited to breakdown of skin ([address] 02/01/2016    Sepsis ([address] 08/19/2016    Urinary tract infection 08/19/2016    Hypokalemia 08/20/2016    Hypokalemia 08/20/2016    Abnormal EKG     Encephalopathy 08/25/2016    Elevated troponin level     Neuropathic ulcer of left foot, limited to breakdown of skin ([address] 04/10/2017    Acute bronchitis 06/28/2017    Acute sinusitis 06/28/2017    Open wound of ankle 06/28/2017    Vitamin D deficiency 06/28/2017    Back pain 06/28/2017    Breakdown (mechanical) of cranial or spinal infusion catheter, initial encounter 10/25/2016    CAFL (chronic airflow limitation) ([address] 06/28/2017    Cough 06/28/2017    Encounter for screening for other disorder 06/28/2017    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    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    Difficult or painful urination 06/28/2017    Headache 06/28/2017    Hyperlipidemia 06/28/2017    Influenza 06/28/2017    Breast lump 06/28/2017    Patient overweight 06/28/2017    Hay fever 06/28/2017    Decubitus ulcer of sacral region 06/28/2017    Cobalamin deficiency 06/28/2017    Disease caused by fungus 06/28/2017    Colostomy and enterostomy malfunction ([facility] 09/06/2017    Atherosclerosis of native arteries of right leg with ulceration of calf (HCC) 11/26/2018    Venous stasis ulcer of left lower extremity ([address] 11/26/2018    Open wound of second toe of left foot 12/11/2018    Type 2 diabetes mellitus with left diabetic foot ulcer ([address] 04/04/2019    Encounter for screening colonoscopy 05/02/2019    Family history of colon cancer 05/02/2019    Surgical wound breakdown 07/24/2019    Pressure ulcer of sacral region, stage 4 ([facility] 10/17/2019    Neuropathic foot ulcer, left, limited to breakdown of skin ([facility] [facility] 05/05/2020    Type 2 diabetes mellitus without complication ([facility] 08/73/5459    Smoking 08/25/2020     Past Medical History:   Diagnosis Date    Ankle wound        Past Surgical History:   Procedure Laterality Date    BACLOFEN PUMP IMPLANTATION      BREAST BIOPSY Right     neg    COLONOSCOPY N/A 9/27/2019    Dr Messi Hawkins ileum through ostomy-patent and healthy appearing anastomosis in the right colon-entero colonic anastomosis-10 yr recall    COLOSTOMY      [address]      Right    FOOT AMPUTATION Left 4/4/2019    LEFT TRANSMET AMPUTATION performed by Niles Cabrera MD at [address]      urostomy    OTHER SURGICAL HISTORY      pain pump    MD [facility] CTR VAD W/SUBQ PORT UNDER 5 YR N/A 6/26/2018    SINGLE LUMEN PORT PLACEMENT WITH FLUORO performed by Vandana Garcia MD at [address] TOE AMPUTATION      L last toe    TONSILLECTOMY      URETEROTOMY         Family History   Problem Relation Age of Onset   Miguel Nelson Cancer Mother         breast    Colon Cancer Mother     Colon Polyps Mother     Breast Cancer Mother     Diabetes Father     High Blood Pressure Father     Cancer Paternal Aunt         breast    Liver Cancer Paternal Aunt     Heart Disease Paternal Grandmother     Esophageal Cancer Neg Hx     Liver Disease Neg Hx     Rectal Cancer Neg Hx     Stomach Cancer Neg Hx        Allergies   Allergen Reactions    Codeine      Category: Allergy;    [employer] Darvocet [Propoxyphene N-Acetaminophen]      Talking out of her head    Morphine      Category: Allergy;     Morphine And Related Itching and Swelling    Propoxyphene Swelling       Social History     Socioeconomic History    Marital status:      Spouse name: Not on file    Number of children: Not on file    Years of education: Not on file    Highest education level: Not on file   Occupational History    Not on file   Tobacco Use    Smoking status: Current Every Day Smoker     Packs/day: 0.25     Types: Cigarettes     Last attempt to quit: 1/8/2018     Years since quitting: 3.5    Smokeless tobacco: Never Used    Tobacco comment: taking chantix    Vaping Use    Vaping Use: Never used   Substance and Sexual Activity    Alcohol use: Yes     Comment: yaritza    Drug use: Yes     Types: Marijuana     Comment: daily     Sexual activity: Not on file   Other Topics Concern    Not on file   Social History Narrative    Not on file     Social Determinants of Health     Financial Resource Strain:     Difficulty of Paying Living Expenses:    Food Insecurity:     Worried About Running Out of Food in the Last Year:     [address] in the Last Year:    Transportation Needs:     Lack of Transportation (Medical):      Lack of Transportation (Non-Medical):    Physical Activity:     Days of Exercise per Week:     Minutes of Exercise per Session:    Stress:     Feeling of Stress :    Social Connections:     Frequency of Communication with Friends and Family:     Frequency of Social Gatherings with Friends and Family:     Attends Judaism Services:     Active Member of Clubs or Organizations:     Attends Club or Organization Meetings:     Marital Status:    Intimate Partner Violence:     Fear of Current or Ex-Partner:     Emotionally Abused:     Physically Abused: incontinence. -[phone] each 11    tiZANidine (ZANAFLEX) 4 MG tablet TAKE 3 TABLETS TWICE DAILY AS NEEDED 180 tablet 11    azelastine (ASTELIN) 0.1 % nasal spray USE 2 SPRAYS IN EACH NOSTRIL TWICE DAILY AS DIRECTED 30 mL 0    hydroCHLOROthiazide (HYDRODIURIL) 25 MG tablet TAKE 1 TABLET BY MOUTH ONCE DAILY AS NEEDED 30 tablet 0    cetirizine (ZYRTEC) 10 MG tablet Take 10 mg by mouth daily      Catheters MISC Catheter supplies and lubricant 5 times daily G82.20  to [facility] [phone] 450 each 3    levETIRAcetam (KEPPRA) 500 MG tablet Take 1 tablet by mouth nightly (Patient taking differently: Take 750 mg by mouth nightly ) 30 tablet 5    glucose monitoring kit (FREESTYLE) monitoring kit 1 kit by Does not apply route daily 1 kit 0    blood glucose monitor strips Test 1 times a day & as needed for symptoms of irregular blood glucose. 100 strip 3    docusate sodium (COLACE) 100 MG capsule Take 200 mg by mouth as needed for Constipation      PROAIR  (90 Base) MCG/ACT inhaler INHALE 1 PUFF INTO THE LUNGS EVERY 6 HOURS AS NEEDED FOR WHEEZING OR SHORTNESS OF BREATH 8.5 g 5    Heparin Lock Flush (HEPARIN FLUSH, 100 UNITS/ML,) 100 UNIT/ML injection 3 mLs by Intercatheter route every 30 days No every 30 days but every 4-6 weeks.  Flush port with 300 units heparin with 20 cc normal saline for ocrevus infusion for Multiple sclerosis and NS 20CC 1 Syringe 5    ipratropium-albuterol (DUONEB) 0.5-2.5 (3) MG/3ML SOLN nebulizer solution USE 1 UNIT DOSE IN NEBULIZER EVERY 4 TO 6 HOURS AS NEEDED. 450 mL 11    BACLOFEN, PAIN PUMP REFILL CHARGE, by Implant route continuous Indications: every 3 months      Multiple Vitamins-Minerals (THERAPEUTIC MULTIVITAMIN-MINERALS) tablet Take 1 tablet by mouth daily      glucose monitoring kit (FREESTYLE) monitoring kit 1 kit by Does not apply route daily 1 kit 0    blood glucose monitor strips Test 1 times a day & as needed for symptoms of irregular blood  06/08/2021     Lab Results   Component Value Date     06/08/2021    K 4.2 06/08/2021     06/08/2021    CO2 30 (H) 06/08/2021    BUN 9 06/08/2021    CREATININE 0.4 (L) 06/08/2021    GLUCOSE 94 06/08/2021    CALCIUM 8.9 06/08/2021    PROT 5.9 (L) 06/08/2021    LABALBU 3.4 (L) 06/08/2021    BILITOT <0.2 06/08/2021    ALKPHOS 118 (H) 06/08/2021    AST 10 06/08/2021    ALT 11 06/08/2021    LABGLOM >60 06/08/2021    GFRAA >59 06/08/2021    GLOB 2.7 08/19/2016     Impression   1.. No foci of abnormal T2 signal or enhancement within the cervical   cord to suggest plaques of multiple sclerosis or mass. 2. Mild bulging of the disc at C5-C6 with uncinate spurring   contributing to neural foraminal narrowing. There is no central   stenosis. The remaining cervical disc levels are unremarkable. Signed by Dr Lesley Davila on 8/14/2017 5:05 PM           Assessment    ICD-10-CM    1. MS (multiple sclerosis) ([facility]  G35    2. Pain, neck  M54.2    3. Pain in both upper arms  M79.621     M79.622    4. Muscle spasticity  M62.838    5. Other fatigue  R53.83    6. Weakness  R53.1    7. Numbness  R20.0    8. Seizure ([address]  R56.9    9. Paraplegia ([facility])  G82.20    10. Arthralgia of hip, unspecified laterality  M25.559        Her neurological examination previously was significant for no movement in the lower extremities. She had some altered sensation in the legs along with some spasticity. She's wheelchair-bound. Her MRI of the brain revealed no new lesions. There was extensive white matter lesions. , Her cervical, thoracic, and lumbosacral spine were relatively unremarkable. She was agreeable to be started on Gilenya with no issues. stable. She had been on Copaxone but came off of this on her own. She denies any clear relapses over the last several years. She'll be referred to physical therapy as well. She will have a CBC and CMP every 3 months. The patient indicated understanding of the management plan.  At this

## 2021-09-03 DIAGNOSIS — M79.621 PAIN IN BOTH UPPER ARMS: ICD-10-CM

## 2021-09-03 DIAGNOSIS — R53.83 OTHER FATIGUE: ICD-10-CM

## 2021-09-03 DIAGNOSIS — M79.622 PAIN IN BOTH UPPER ARMS: ICD-10-CM

## 2021-09-03 DIAGNOSIS — M62.838 MUSCLE SPASTICITY: ICD-10-CM

## 2021-09-03 DIAGNOSIS — G35 MS (MULTIPLE SCLEROSIS) (HCC): ICD-10-CM

## 2021-09-03 DIAGNOSIS — M54.2 PAIN, NECK: ICD-10-CM

## 2021-09-03 RX ORDER — HYDROCODONE BITARTRATE AND ACETAMINOPHEN 10; 325 MG/1; MG/1
TABLET ORAL
Qty: 90 TABLET | Refills: 0 | Status: SHIPPED | OUTPATIENT
Start: 2021-09-05 | End: 2021-09-30

## 2021-09-03 NOTE — TELEPHONE ENCOUNTER
Requested Prescriptions     Pending Prescriptions Disp Refills    HYDROcodone-acetaminophen (NORCO)  MG per tablet [Pharmacy Med Name: HYDROCODONE BITARTRATE/ACETAMINOPHEN 325MG-10MG TABLET] 90 tablet 0     Sig: TAKE 1 TABLET BY MOUTH 3 TIMES DAILY AS NEEDED FOR PAIN.  REDUCE DOSES AS PAIN BECOMES MANAGEABLE       Last Office Visit:  8/4/2021  Next Office Visit:  11/4/2021  Last Medication Refill: 8/6/21   Jennifer Kaye up to date:  7/7/21    *RX updated to reflect   9/5/21  fill date*

## 2021-09-08 DIAGNOSIS — R53.83 OTHER FATIGUE: Primary | ICD-10-CM

## 2021-09-08 RX ORDER — METHYLPHENIDATE HYDROCHLORIDE 20 MG/1
TABLET ORAL
Qty: 30 TABLET | Refills: 0 | Status: SHIPPED | OUTPATIENT
Start: 2021-09-08 | End: 2021-09-30

## 2021-09-08 NOTE — TELEPHONE ENCOUNTER
Requested Prescriptions     Pending Prescriptions Disp Refills    methylphenidate (RITALIN) 20 MG tablet [Pharmacy Med Name: METHYLPHENIDATE HYDROCHLORIDE 20MG TABLET] 30 tablet 0     Sig: TAKE 1 TABLET BY MOUTH DAILY       Last Office Visit:  8/4/2021  Next Office Visit:  11/4/2021  Last Medication Refill:  8/6/21  Hilda Cooper up to date:  7/7/21    *RX updated to reflect   9/8/21  fill date*

## 2021-09-30 DIAGNOSIS — M79.622 PAIN IN BOTH UPPER ARMS: ICD-10-CM

## 2021-09-30 DIAGNOSIS — R53.83 OTHER FATIGUE: ICD-10-CM

## 2021-09-30 DIAGNOSIS — M62.838 MUSCLE SPASTICITY: ICD-10-CM

## 2021-09-30 DIAGNOSIS — M79.621 PAIN IN BOTH UPPER ARMS: ICD-10-CM

## 2021-09-30 DIAGNOSIS — G35 MS (MULTIPLE SCLEROSIS) (HCC): ICD-10-CM

## 2021-09-30 DIAGNOSIS — M54.2 PAIN, NECK: ICD-10-CM

## 2021-09-30 NOTE — TELEPHONE ENCOUNTER
Faye Gurrola has requested a refill on her medication. Last office visit : 8/4/2021   Next office visit : 11/4/2021   Last medication refill : 9/8/21, 9/5/21  Dalia Richards : up to date       Requested Prescriptions     Pending Prescriptions Disp Refills    HYDROcodone-acetaminophen ([facility])  MG per tablet [Pharmacy Med Name: HYDROCODONE BITARTRATE/ACETAMINOPHEN 325MG-10MG TABLET] 90 tablet 0     Sig: TAKE 1 TABLET BY MOUTH 3 TIMES DAILY AS NEEDED FOR PAIN.  REDUCE DOSES AS PAIN BECOMES MANAGEABLE    methylphenidate (RITALIN) 20 MG tablet [Pharmacy Med Name: METHYLPHENIDATE HYDROCHLORIDE 20MG TABLET] 30 tablet 0     Sig: TAKE 1 TABLET BY MOUTH DAILY    tiZANidine (ZANAFLEX) 4 MG tablet [Pharmacy Med Name: TIZANIDINE HYDROCHLORIDE 4MG TABLET] 180 tablet 11     Sig: TAKE 3 TABLETS TWICE DAILY AS NEEDED

## 2021-10-07 RX ORDER — METHYLPHENIDATE HYDROCHLORIDE 20 MG/1
TABLET ORAL
Qty: 30 TABLET | Refills: 0 | Status: SHIPPED | OUTPATIENT
Start: 2021-10-08 | End: 2021-11-04 | Stop reason: SDUPTHER

## 2021-10-07 RX ORDER — TIZANIDINE 4 MG/1
TABLET ORAL
Qty: 180 TABLET | Refills: 11 | Status: SHIPPED | OUTPATIENT
Start: 2021-10-07 | End: 2022-11-02 | Stop reason: SDUPTHER

## 2021-10-07 RX ORDER — HYDROCODONE BITARTRATE AND ACETAMINOPHEN 10; 325 MG/1; MG/1
TABLET ORAL
Qty: 90 TABLET | Refills: 0 | Status: SHIPPED | OUTPATIENT
Start: 2021-10-07 | End: 2021-11-04 | Stop reason: SDUPTHER

## 2021-10-08 DIAGNOSIS — M54.2 PAIN, NECK: ICD-10-CM

## 2021-10-08 NOTE — TELEPHONE ENCOUNTER
Malini Almonte has requested a refill on her medication. Last office visit : 8/4/2021   Next office visit : 11/4/2021   Last medication refill :  Yashira Enciso :     Patient has a follow up appointment with Dr Meet Núñez 11-4-21. Yashira Enciso 7-21.   Requested Prescriptions     Pending Prescriptions Disp Refills    pregabalin (LYRICA) 300 MG capsule [Pharmacy Med Name: PREGABALIN 300MG CAPSULE] 60 capsule 0     Sig: TAKE 1 CAPSULE BY MOUTH 2 TIMES A DAY

## 2021-10-10 RX ORDER — PREGABALIN 300 MG/1
CAPSULE ORAL
Qty: 60 CAPSULE | Refills: 0 | Status: SHIPPED | OUTPATIENT
Start: 2021-10-10 | End: 2021-11-04 | Stop reason: SDUPTHER

## 2021-10-15 ENCOUNTER — TELEPHONE (OUTPATIENT)
Dept: INTERNAL MEDICINE | Age: 51
End: 2021-10-15

## 2021-10-25 ENCOUNTER — TELEPHONE (OUTPATIENT)
Dept: INTERNAL MEDICINE | Age: 51
End: 2021-10-25

## 2021-10-25 DIAGNOSIS — F17.200 NICOTINE DEPENDENCE, UNCOMPLICATED, UNSPECIFIED NICOTINE PRODUCT TYPE: Primary | ICD-10-CM

## 2021-10-26 RX ORDER — NICOTINE 21 MG/24HR
1 PATCH, TRANSDERMAL 24 HOURS TRANSDERMAL DAILY
Qty: 42 PATCH | Refills: 0 | Status: SHIPPED | OUTPATIENT
Start: 2021-10-26 | End: 2022-04-11

## 2021-11-04 ENCOUNTER — OFFICE VISIT (OUTPATIENT)
Dept: NEUROLOGY | Age: 51
End: 2021-11-04
Payer: MEDICARE

## 2021-11-04 VITALS
HEIGHT: 69 IN | HEART RATE: 76 BPM | SYSTOLIC BLOOD PRESSURE: 107 MMHG | WEIGHT: 144 LBS | BODY MASS INDEX: 21.33 KG/M2 | DIASTOLIC BLOOD PRESSURE: 74 MMHG

## 2021-11-04 DIAGNOSIS — M79.622 PAIN IN BOTH UPPER ARMS: ICD-10-CM

## 2021-11-04 DIAGNOSIS — M54.2 PAIN, NECK: ICD-10-CM

## 2021-11-04 DIAGNOSIS — R53.83 OTHER FATIGUE: ICD-10-CM

## 2021-11-04 DIAGNOSIS — M79.621 PAIN IN BOTH UPPER ARMS: ICD-10-CM

## 2021-11-04 DIAGNOSIS — G35 MS (MULTIPLE SCLEROSIS) (HCC): ICD-10-CM

## 2021-11-04 DIAGNOSIS — M62.838 MUSCLE SPASTICITY: ICD-10-CM

## 2021-11-04 PROCEDURE — G8484 FLU IMMUNIZE NO ADMIN: HCPCS | Performed by: PSYCHIATRY & NEUROLOGY

## 2021-11-04 PROCEDURE — G8427 DOCREV CUR MEDS BY ELIG CLIN: HCPCS | Performed by: PSYCHIATRY & NEUROLOGY

## 2021-11-04 PROCEDURE — 3017F COLORECTAL CA SCREEN DOC REV: CPT | Performed by: PSYCHIATRY & NEUROLOGY

## 2021-11-04 PROCEDURE — 4004F PT TOBACCO SCREEN RCVD TLK: CPT | Performed by: PSYCHIATRY & NEUROLOGY

## 2021-11-04 PROCEDURE — 99214 OFFICE O/P EST MOD 30 MIN: CPT | Performed by: PSYCHIATRY & NEUROLOGY

## 2021-11-04 PROCEDURE — G8420 CALC BMI NORM PARAMETERS: HCPCS | Performed by: PSYCHIATRY & NEUROLOGY

## 2021-11-04 RX ORDER — PREGABALIN 300 MG/1
CAPSULE ORAL
Qty: 60 CAPSULE | Refills: 5 | Status: SHIPPED | OUTPATIENT
Start: 2021-11-10 | End: 2022-04-06 | Stop reason: SDUPTHER

## 2021-11-04 RX ORDER — HYDROCODONE BITARTRATE AND ACETAMINOPHEN 10; 325 MG/1; MG/1
TABLET ORAL
Qty: 90 TABLET | Refills: 0 | Status: SHIPPED | OUTPATIENT
Start: 2021-11-06 | End: 2021-12-06

## 2021-11-04 RX ORDER — METHYLPHENIDATE HYDROCHLORIDE 20 MG/1
TABLET ORAL
Qty: 30 TABLET | Refills: 0 | Status: SHIPPED | OUTPATIENT
Start: 2021-11-06 | End: 2021-12-06

## 2021-11-04 RX ORDER — NALOXONE HYDROCHLORIDE 4 MG/.1ML
1 SPRAY NASAL PRN
Qty: 1 EACH | Refills: 5 | Status: SHIPPED | OUTPATIENT
Start: 2021-11-04 | End: 2022-04-11

## 2021-11-04 NOTE — PROGRESS NOTES
Chief Complaint   Patient presents with    Multiple Sclerosis     follow up        Wil Cain is a 49y.o. year old female who is seen for evaluation of multiple sclerosis. The patient indicates that she was diagnosed with multiple sclerosis in 1994. At that time to develop some visual disturbances including blurred vision and double vision. Within eight months she was in a wheelchair. She currently has no movement of the lower extremities. She does have some increased Spasticity in the legs worse in the arms. She underwent a baclofen pump with Dr. Alesha Lovell with some complications. She is doing better from this. She currently sees Dr. nichols for her pump management. She does have some cognitive issues. She denies diplopia, dysarthria, or dysphagia. She does have some incontinence of bladder. She does have pain in the hips and lower extremities. She also has headaches with pain behind both eyes. She was followed by Dr. Salma Bedoya of neurology. She follows up today for evaluation. She is currently in a wheelchair. Since her last visit she is doing better with physical therapy. She had an accidental overdose with her baclofen pump and had a seizure. Doing better on. Recently admitted with seizure. Was off Chantix a few weeks and had a UTI. Had seizure in oct 2014 with seizure due to baclofen overose. This was her second seizure. Off Ocrevus. Occassionally gets shooting pain in head. Headache in the back of the head. Cymbalta helps mood. Had a seizure on 1/29/21 and went to the ER. Found to have a UTI and placed on Keppra increased to 750 mg bid. Went to [facility] due surgery for ileostomy. No seizures. Overall stable.      Active Ambulatory Problems     Diagnosis Date Noted    Muscle spasticity     Peripheral vascular disease ([facility] 02/01/2016    MS (multiple sclerosis) ([facility] 06/08/2016    Pain in both upper arms 08/04/2016    Numbness 08/04/2016    Other fatigue 08/04/2016    Weakness 08/04/2016    Chronic pain 08/19/2016    Hx of seizure disorder 08/19/2016    Insomnia 06/28/2017    Pain, neck 09/06/2017    Asthma 11/26/2018    S/P transmetatarsal amputation of foot, left ([address] 04/17/2019    Chronic constipation 05/02/2019    Colostomy in place ([address] 05/02/2019    Paraplegia ([address] 11/05/2019    Arthralgia of hip 12/03/2019    Seizure ([address] 05/26/2020    Encounter for care related to vascular access port 12/10/2020    Urinary incontinence 05/04/2020    Depressive disorder 05/04/2020    Malodorous urine 12/14/2020    Arthritis 05/04/2020    Osteoporosis 05/04/2020    Urinary bladder stone 10/16/2020    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/29/2020    Skin ulcer of abdominal wall, with fat layer exposed ([address] 06/16/2021     Resolved Ambulatory Problems     Diagnosis Date Noted    Neuropathic ulcer of right foot, limited to breakdown of skin ([address] 02/01/2016    Sepsis ([address] 08/19/2016    Urinary tract infection 08/19/2016    Hypokalemia 08/20/2016    Hypokalemia 08/20/2016    Abnormal EKG     Encephalopathy 08/25/2016    Elevated troponin level     Neuropathic ulcer of left foot, limited to breakdown of skin ([address] 04/10/2017    Acute bronchitis 06/28/2017    Acute sinusitis 06/28/2017    Open wound of ankle 06/28/2017    Vitamin D deficiency 06/28/2017    Back pain 06/28/2017    Breakdown (mechanical) of cranial or spinal infusion catheter, initial encounter 10/25/2016    CAFL (chronic airflow limitation) ([address] 06/28/2017    Cough 06/28/2017    Encounter for screening for other disorder 06/28/2017    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    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    Difficult or painful urination 06/28/2017    Headache 06/28/2017    Hyperlipidemia 06/28/2017    Influenza 06/28/2017    Breast lump 06/28/2017    Patient overweight 06/28/2017    Hay fever 06/28/2017    Decubitus ulcer of sacral region 06/28/2017    Cobalamin deficiency 06/28/2017    Disease caused by fungus 06/28/2017    Colostomy and enterostomy malfunction ([facility] 09/06/2017    Atherosclerosis of native arteries of right leg with ulceration of calf (HCC) 11/26/2018    Venous stasis ulcer of left lower extremity ([address] 11/26/2018    Open wound of second toe of left foot 12/11/2018    Type 2 diabetes mellitus with left diabetic foot ulcer ([address] 04/04/2019    Encounter for screening colonoscopy 05/02/2019    Family history of colon cancer 05/02/2019    Surgical wound breakdown 07/24/2019    Pressure ulcer of sacral region, stage 4 ([address] 10/17/2019    Neuropathic foot ulcer, left, limited to breakdown of skin ([address] 05/05/2020    Type 2 diabetes mellitus without complication ([facility] 47/24/6397    Smoking 08/25/2020     Past Medical History:   Diagnosis Date    Ankle wound        Past Surgical History:   Procedure Laterality Date    BACLOFEN PUMP IMPLANTATION      BREAST BIOPSY Right     neg    COLONOSCOPY N/A 9/27/2019    Dr Underwood Nickel ileum through ostomy-patent and healthy appearing anastomosis in the right colon-entero colonic anastomosis-10 yr recall    COLOSTOMY      [address]      Right    FOOT AMPUTATION Left 4/4/2019    LEFT TRANSMET AMPUTATION performed by Miller Lincoln MD at [address]      urostomy    OTHER SURGICAL HISTORY      pain pump    NE INSJ PRPH CTR VAD W/SUBQ PORT UNDER 5 YR N/A 6/26/2018    SINGLE LUMEN PORT PLACEMENT WITH FLUORO performed by Amrita Rios MD at [address] TOE AMPUTATION      L last toe    TONSILLECTOMY      URETEROTOMY         Family History   Problem Relation Age of Onset   Verl Raw Cancer Mother         breast    Colon Cancer Mother     Colon Polyps Mother     Breast Cancer Mother     Diabetes Father     High Blood Pressure Father     Cancer Paternal Aunt         breast    Liver Cancer Paternal Aunt     Heart Disease Paternal Grandmother     Esophageal Cancer Neg Hx     Liver Disease Neg Hx     Rectal Cancer Neg Hx     Stomach Cancer Neg Hx        Allergies   Allergen Reactions    Codeine      Category: Allergy;    [facility] Darvocet [Propoxyphene N-Acetaminophen]      Talking out of her head    Morphine      Category: Allergy;     Morphine And Related Itching and Swelling    Propoxyphene Swelling       Social History     Socioeconomic History    Marital status:      Spouse name: Not on file    Number of children: Not on file    Years of education: Not on file    Highest education level: Not on file   Occupational History    Not on file   Tobacco Use    Smoking status: Current Every Day Smoker     Packs/day: 0.25     Types: Cigarettes     Last attempt to quit: 1/8/2018     Years since quitting: 3.8    Smokeless tobacco: Never Used    Tobacco comment: taking chantix    Vaping Use    Vaping Use: Never used   Substance and Sexual Activity    Alcohol use: Yes     Comment: yaritza    Drug use: Yes     Types: Marijuana Vishal Blow)     Comment: daily     Sexual activity: Not on file   Other Topics Concern    Not on file   Social History Narrative    Not on file     Social Determinants of Health     Financial Resource Strain:     Difficulty of Paying Living Expenses:    Food Insecurity:     Worried About Running Out of Food in the Last Year:     [address] in the Last Year:    Transportation Needs:     Lack of Transportation (Medical):      Lack of Transportation (Non-Medical):    Physical Activity:     Days of Exercise per Week:     Minutes of Exercise per Session:    Stress:     Feeling of Stress :    Social Connections:     Frequency of Communication with Friends and Family:     Frequency of Social Gatherings with Friends and Family:     Attends Hinduism Services:     Active Member of Clubs or Organizations:     Attends Club or Organization Meetings:     Marital Status:    Intimate Partner Violence:     Fear of Current or Ex-Partner:     Emotionally Abused:     Physically Abused:     Sexually Abused:      Review of Systems     Constitutional - No fever or chills. No diaphoresis or significant fatigue. HENT -  No tinnitus or significant hearing loss. Eyes - no sudden vision change or eye pain  Respiratory - no significant shortness of breath or cough  Cardiovascular - no chest pain No palpitations or significant leg swelling  Gastrointestinal - no abdominal swelling or pain. Genitourinary - No difficulty urinating, dysuria  Musculoskeletal - no back pain or myalgia. Skin - no color change or rash  Neurologic - No seizures. No lateralizing weakness. Hematologic - no easy bruising or excessive bleeding. Psychiatric - no severe anxiety or nervousness. All other review of systems are negative. Current Outpatient Medications   Medication Sig Dispense Refill    [START ON 11/6/2021] HYDROcodone-acetaminophen (NORCO)  MG per tablet TAKE 1 TABLET BY MOUTH 3 TIMES DAILY AS NEEDED FOR PAIN.  REDUCE DOSES AS PAIN BECOMES MANAGEABLE 90 tablet 0    [START ON 11/10/2021] pregabalin (LYRICA) 300 MG capsule 1 bid 60 capsule 5    [START ON 11/6/2021] methylphenidate (RITALIN) 20 MG tablet TAKE 1 TABLET BY MOUTH DAILY 30 tablet 0    naloxone 4 MG/0.1ML LIQD nasal spray 1 spray by Nasal route as needed for Opioid Reversal 1 each 5    nicotine (NICODERM CQ) 21 MG/24HR Place 1 patch onto the skin daily 42 patch 0    tiZANidine (ZANAFLEX) 4 MG tablet TAKE 3 TABLETS TWICE DAILY AS NEEDED  180 tablet 11    nitrofurantoin (MACRODANTIN) 50 MG capsule TAKE ONE CAPSULE BY MOUTH DAILY 30 capsule 3    pilocarpine (SALAGEN) 7.5 MG tablet TAKE ONE TABLET BY MOUTH 3 TIMES DAILY 90 tablet 3    varenicline (CHANTIX STARTING MONTH CHADWICK) 0.5 MG X 11 & 1 MG X 42 tablet TAKE BY MOUTH AS DIRECTED 53 tablet 0    levETIRAcetam (KEPPRA) 500 MG tablet TAKE 1 TABLET BY MOUTH DAILY 90 tablet 3    varenicline (CHANTIX CONTINUING MONTH CHADWICK) 1 MG tablet TAKE 1 TABLET BY MOUTH 2 TIMES A DAY 56 tablet 0  DULoxetine (CYMBALTA) 30 MG extended release capsule TAKE 1 CAPSULE BY MOUTH DAILY 30 capsule 5    ondansetron (ZOFRAN) 4 MG tablet Take 4 mg by mouth every 8 hours as needed for Nausea or Vomiting      Diapers & Supplies MISC Diapers, chucks, wipes, and gloves for incontinence. [phone] each 11    azelastine (ASTELIN) 0.1 % nasal spray USE 2 SPRAYS IN EACH NOSTRIL TWICE DAILY AS DIRECTED 30 mL 0    hydroCHLOROthiazide (HYDRODIURIL) 25 MG tablet TAKE 1 TABLET BY MOUTH ONCE DAILY AS NEEDED 30 tablet 0    cetirizine (ZYRTEC) 10 MG tablet Take 10 mg by mouth daily      Catheters MISC Catheter supplies and lubricant 5 times daily G82.20  to [employer] [phone] 450 each 3    levETIRAcetam (KEPPRA) 500 MG tablet Take 1 tablet by mouth nightly (Patient taking differently: Take 750 mg by mouth nightly ) 30 tablet 5    glucose monitoring kit (FREESTYLE) monitoring kit 1 kit by Does not apply route daily 1 kit 0    blood glucose monitor strips Test 1 times a day & as needed for symptoms of irregular blood glucose. 100 strip 3    docusate sodium (COLACE) 100 MG capsule Take 200 mg by mouth as needed for Constipation      PROAIR  (90 Base) MCG/ACT inhaler INHALE 1 PUFF INTO THE LUNGS EVERY 6 HOURS AS NEEDED FOR WHEEZING OR SHORTNESS OF BREATH 8.5 g 5    Heparin Lock Flush (HEPARIN FLUSH, 100 UNITS/ML,) 100 UNIT/ML injection 3 mLs by Intercatheter route every 30 days No every 30 days but every 4-6 weeks.  Flush port with 300 units heparin with 20 cc normal saline for ocrevus infusion for Multiple sclerosis and NS 20CC 1 Syringe 5    ipratropium-albuterol (DUONEB) 0.5-2.5 (3) MG/3ML SOLN nebulizer solution USE 1 UNIT DOSE IN NEBULIZER EVERY 4 TO 6 HOURS AS NEEDED. 450 mL 11    BACLOFEN, PAIN PUMP REFILL CHARGE, by Implant route continuous Indications: every 3 months      Multiple Vitamins-Minerals (THERAPEUTIC MULTIVITAMIN-MINERALS) tablet Take 1 tablet by mouth daily      glucose monitoring kit (FREESTYLE) monitoring kit 1 kit by Does not apply route daily 1 kit 0    blood glucose monitor strips Test 1 times a day & as needed for symptoms of irregular blood glucose. 100 strip 0    baclofen (LIORESAL) 10 MG tablet Take 1 tablet by mouth 2 times daily as needed (muscle spasms) (Patient taking differently: Take 10 mg by mouth 2 times daily as needed (muscle spasms) Indications: using pump ) 60 tablet 5    Sodium Chloride Flush (SALINE FLUSH) 0.9 % SOLN Infuse 20 mLs intravenously every 30 days Not every 30 days but every 4-6 weeks as need with 300 units of heparin to flush port for Infusion of Ocrevus for multiple sclerosis 20 mL 5     No current facility-administered medications for this visit. /74   Pulse 76   Ht 5' 9\" (1.753 m)   Wt 144 lb (65.3 kg)   BMI 21.27 kg/m²     Constitutional - well developed, well nourished. Eyes - conjunctiva normal.  Ear, nose, throat - No scars, masses, or lesions over external nose or ears, no atrophy of tongue  Neck-symmetric, no masses noted, no jugular vein distension  Respiration- chest wall appears symmetric, good expansion,   normal effort without use of accessory muscles  Musculoskeletal - no significant wasting of muscles noted, no bony deformities  Extremities-no clubbing, cyanosis or edema  Skin - warm, dry, and intact. No rash, erythema, or pallor.   Psychiatric - mood, affect, and behavior appear normal.      Neurological exam  Awake, alert, fluent oriented  appropriate affect  Attention and concentration appear appropriate  Recent and remote memory appears unremarkable  Speech normal without dysarthria  No clear issues with language of fund of knowledge    Cranial Nerve Exam     CN III, IV,VI-EOMI, No nystagmus, conjugate eye movements, no ptosis  CN VII-no facial assymetry        Motor Exam  antigravity throughout upper extremities bilaterally    Tremors- no tremors in hands or head noted    Gait  In wheelchair    Lab Results   Component Value Date    [id number] [id number] 05/02/2019     Lab Results   Component Value Date    WBC 5.3 06/08/2021    HGB 12.1 06/08/2021    HCT 40.6 06/08/2021    MCV 87.7 06/08/2021     06/08/2021     Lab Results   Component Value Date     06/08/2021    K 4.2 06/08/2021     06/08/2021    CO2 30 (H) 06/08/2021    BUN 9 06/08/2021    CREATININE 0.4 (L) 06/08/2021    GLUCOSE 94 06/08/2021    CALCIUM 8.9 06/08/2021    PROT 5.9 (L) 06/08/2021    LABALBU 3.4 (L) 06/08/2021    BILITOT <0.2 06/08/2021    ALKPHOS 118 (H) 06/08/2021    AST 10 06/08/2021    ALT 11 06/08/2021    LABGLOM >60 06/08/2021    GFRAA >59 06/08/2021    GLOB 2.7 08/19/2016     Impression   1.. No foci of abnormal T2 signal or enhancement within the cervical   cord to suggest plaques of multiple sclerosis or mass. 2. Mild bulging of the disc at C5-C6 with uncinate spurring   contributing to neural foraminal narrowing. There is no central   stenosis. The remaining cervical disc levels are unremarkable. Signed by Dr Power Dobson on 8/14/2017 5:05 PM           Assessment    ICD-10-CM    1. MS (multiple sclerosis) (HCC)  G35 HYDROcodone-acetaminophen (NORCO)  MG per tablet   2. Pain, neck  M54.2 HYDROcodone-acetaminophen (NORCO)  MG per tablet     pregabalin (LYRICA) 300 MG capsule   3. Pain in both upper arms  M79.621 HYDROcodone-acetaminophen (NORCO)  MG per tablet    M79.622    4. Muscle spasticity  M62.838 HYDROcodone-acetaminophen (NORCO)  MG per tablet   5. Other fatigue  R53.83 HYDROcodone-acetaminophen (NORCO)  MG per tablet     methylphenidate (RITALIN) 20 MG tablet       Her neurological examination previously was significant for no movement in the lower extremities. She had some altered sensation in the legs along with some spasticity. She's wheelchair-bound. Her MRI of the brain revealed no new lesions. There was extensive white matter lesions. , Her cervical, thoracic, and lumbosacral spine were relatively unremarkable. She was agreeable to be started on Gilenya with no issues. stable. She had been on Copaxone but came off of this on her own. She denies any clear relapses over the last several years. She'll be referred to physical therapy as well. She will have a CBC and CMP every 3 months. The patient indicated understanding of the management plan. At this time she will be referred to [facility] as per her wishes for some experimental treatments. She is to reduce her Keppra to 750 mg daily as she wants to be at this dose due to fatigue,  She is seeing Dr Sunnie Sandhoff who manages baclofen pump. She will be continued  Ritalin to see if this helps at a future. She will be tried on Nuvgil. Lyrica restarted. She will be referred to [facility] for colostomy issues. .her hepatitis B panel was unremkarkable. Her EMG with NCs of the arms was suggestive of an ulnar neuropathy on the left. her MRI of the brain had stable white matter change. Her MRI of the c spine had some minimal disc bulging but no MS plaques Her x rays of her hips due to pain were unremarkable except for bladder stone along with pin in right hip. Her DEYSI virus titers were positive in 2019. She is off Ocrevus due to [address] virus. Has reduced Cymbala. .She's to follow-up with me in approximately 3 months and call with any further problems. Continue current care as noted as medicine continue to help. Plan    No orders of the defined types were placed in this encounter. Orders Placed This Encounter   Medications    HYDROcodone-acetaminophen (NORCO)  MG per tablet     Sig: TAKE 1 TABLET BY MOUTH 3 TIMES DAILY AS NEEDED FOR PAIN.  REDUCE DOSES AS PAIN BECOMES MANAGEABLE     Dispense:  90 tablet     Refill:  0     Reduce doses taken as pain becomes manageable    pregabalin (LYRICA) 300 MG capsule     Si bid     Dispense:  60 capsule     Refill:  5    methylphenidate (RITALIN) 20 MG tablet     Sig: TAKE 1 TABLET BY MOUTH DAILY Dispense:  30 tablet     Refill:  0    naloxone 4 MG/0.1ML LIQD nasal spray     Si spray by Nasal route as needed for Opioid Reversal     Dispense:  1 each     Refill:  5       Return in about 3 months (around 2022).

## 2021-11-15 RX ORDER — HEPARIN SODIUM (PORCINE) LOCK FLUSH IV SOLN 100 UNIT/ML 100 UNIT/ML
500 SOLUTION INTRAVENOUS PRN
Status: CANCELLED | OUTPATIENT
Start: 2021-11-15

## 2021-11-15 RX ORDER — SODIUM CHLORIDE 0.9 % (FLUSH) 0.9 %
5-40 SYRINGE (ML) INJECTION PRN
Status: CANCELLED | OUTPATIENT
Start: 2021-11-15

## 2021-11-19 ENCOUNTER — TRANSCRIBE ORDERS (OUTPATIENT)
Dept: ADMINISTRATIVE | Facility: HOSPITAL | Age: 51
End: 2021-11-19

## 2021-11-19 DIAGNOSIS — N82.0 VESICOVAGINAL FISTULA: Primary | ICD-10-CM

## 2021-12-04 DIAGNOSIS — M79.621 PAIN IN BOTH UPPER ARMS: ICD-10-CM

## 2021-12-04 DIAGNOSIS — M79.622 PAIN IN BOTH UPPER ARMS: ICD-10-CM

## 2021-12-04 DIAGNOSIS — G35 MS (MULTIPLE SCLEROSIS) (HCC): ICD-10-CM

## 2021-12-04 DIAGNOSIS — M62.838 MUSCLE SPASTICITY: ICD-10-CM

## 2021-12-04 DIAGNOSIS — M54.2 PAIN, NECK: ICD-10-CM

## 2021-12-04 DIAGNOSIS — R53.83 OTHER FATIGUE: ICD-10-CM

## 2021-12-06 RX ORDER — METHYLPHENIDATE HYDROCHLORIDE 20 MG/1
TABLET ORAL
Qty: 30 TABLET | Refills: 0 | Status: SHIPPED | OUTPATIENT
Start: 2021-12-06 | End: 2022-01-04

## 2021-12-06 RX ORDER — NITROFURANTOIN MACROCRYSTALS 50 MG/1
CAPSULE ORAL
Qty: 30 CAPSULE | Refills: 3 | Status: SHIPPED | OUTPATIENT
Start: 2021-12-06 | End: 2022-04-04

## 2021-12-06 RX ORDER — HYDROCODONE BITARTRATE AND ACETAMINOPHEN 10; 325 MG/1; MG/1
TABLET ORAL
Qty: 90 TABLET | Refills: 0 | Status: SHIPPED | OUTPATIENT
Start: 2021-12-06 | End: 2022-01-04

## 2021-12-06 RX ORDER — PILOCARPINE HYDROCHLORIDE 7.5 MG/1
TABLET, FILM COATED ORAL
Qty: 90 TABLET | Refills: 3 | Status: SHIPPED | OUTPATIENT
Start: 2021-12-06 | End: 2022-04-11

## 2021-12-06 NOTE — TELEPHONE ENCOUNTER
Jayjay Ring has requested a refill on her medication. Last office visit : 11/4/2021   Next office visit : 2/8/2022   Last medication refill : 11/6/21  Dimitrios Gums : up to date       Requested Prescriptions     Pending Prescriptions Disp Refills    methylphenidate (RITALIN) 20 MG tablet [Pharmacy Med Name: METHYLPHENIDATE HYDROCHLORIDE 20MG TABLET] 30 tablet 0     Sig: TAKE 1 TABLET BY MOUTH DAILY    HYDROcodone-acetaminophen (Jayson Lay)  MG per tablet [Pharmacy Med Name: HYDROCODONE BITARTRATE/ACETAMINOPHEN 325MG-10MG TABLET] 90 tablet 0     Sig: TAKE 1 TABLET BY MOUTH 3 TIMES DAILY AS NEEDED FOR PAIN.  REDUCE DOSES AS PAIN BECOMES MANAGEABLE

## 2021-12-06 NOTE — TELEPHONE ENCOUNTER
Alyce called requesting a refill of the below medication which has been pended for you:     Requested Prescriptions     Pending Prescriptions Disp Refills    nitrofurantoin (MACRODANTIN) 50 MG capsule [Pharmacy Med Name: NITROFURANTOIN MACROCRYSTALS 50MG CAPSULE] 30 capsule 3     Sig: TAKE ONE CAPSULE BY MOUTH DAILY    pilocarpine (SALAGEN) 7.5 MG tablet [Pharmacy Med Name: PILOCARPINE HYDROCHLORIDE 7.5MG TABLET] 90 tablet 3     Sig: TAKE ONE TABLET BY MOUTH 3 TIMES DAILY       Last Appointment Date: 6/8/2021  Next Appointment Date: 12/9/2021    Allergies   Allergen Reactions    Codeine      Category: Allergy;    [facility] Darvocet [Propoxyphene N-Acetaminophen]      Talking out of her head    Morphine      Category:  Allergy;     Morphine And Related Itching and Swelling    Propoxyphene Swelling

## 2021-12-07 DIAGNOSIS — E55.9 VITAMIN D DEFICIENCY: ICD-10-CM

## 2021-12-07 DIAGNOSIS — Z00.00 MEDICARE ANNUAL WELLNESS VISIT, SUBSEQUENT: ICD-10-CM

## 2021-12-07 LAB
ALBUMIN SERPL-MCNC: 4.2 G/DL (ref 3.5–5.2)
ALP BLD-CCNC: 108 U/L (ref 35–104)
ALT SERPL-CCNC: 15 U/L (ref 5–33)
ANION GAP SERPL CALCULATED.3IONS-SCNC: 9 MMOL/L (ref 7–19)
AST SERPL-CCNC: 13 U/L (ref 5–32)
BASOPHILS ABSOLUTE: 0 K/UL (ref 0–0.2)
BASOPHILS RELATIVE PERCENT: 0.6 % (ref 0–1)
BILIRUB SERPL-MCNC: 0.4 MG/DL (ref 0.2–1.2)
BUN BLDV-MCNC: 12 MG/DL (ref 6–20)
CALCIUM SERPL-MCNC: 9.4 MG/DL (ref 8.6–10)
CHLORIDE BLD-SCNC: 109 MMOL/L (ref 98–111)
CHOLESTEROL, TOTAL: 204 MG/DL (ref 160–199)
CO2: 26 MMOL/L (ref 22–29)
CREAT SERPL-MCNC: 0.3 MG/DL (ref 0.5–0.9)
EOSINOPHILS ABSOLUTE: 0.2 K/UL (ref 0–0.6)
EOSINOPHILS RELATIVE PERCENT: 2.6 % (ref 0–5)
GFR AFRICAN AMERICAN: >59
GFR NON-AFRICAN AMERICAN: >60
GLUCOSE BLD-MCNC: 88 MG/DL (ref 74–109)
HCT VFR BLD CALC: 47.2 % (ref 37–47)
HDLC SERPL-MCNC: 44 MG/DL (ref 65–121)
HEMOGLOBIN: 14.3 G/DL (ref 12–16)
IMMATURE GRANULOCYTES #: 0 K/UL
LDL CHOLESTEROL CALCULATED: 123 MG/DL
LYMPHOCYTES ABSOLUTE: 1.4 K/UL (ref 1.1–4.5)
LYMPHOCYTES RELATIVE PERCENT: 21.2 % (ref 20–40)
MCH RBC QN AUTO: 27 PG (ref 27–31)
MCHC RBC AUTO-ENTMCNC: 30.3 G/DL (ref 33–37)
MCV RBC AUTO: 89.1 FL (ref 81–99)
MONOCYTES ABSOLUTE: 0.3 K/UL (ref 0–0.9)
MONOCYTES RELATIVE PERCENT: 4.5 % (ref 0–10)
NEUTROPHILS ABSOLUTE: 4.5 K/UL (ref 1.5–7.5)
NEUTROPHILS RELATIVE PERCENT: 70.8 % (ref 50–65)
PDW BLD-RTO: 14.4 % (ref 11.5–14.5)
PLATELET # BLD: 209 K/UL (ref 130–400)
PMV BLD AUTO: 11.5 FL (ref 9.4–12.3)
POTASSIUM SERPL-SCNC: 4.1 MMOL/L (ref 3.5–5)
RBC # BLD: 5.3 M/UL (ref 4.2–5.4)
SODIUM BLD-SCNC: 144 MMOL/L (ref 136–145)
TOTAL PROTEIN: 6.6 G/DL (ref 6.6–8.7)
TRIGL SERPL-MCNC: 184 MG/DL (ref 0–149)
TSH SERPL DL<=0.05 MIU/L-ACNC: 1.87 UIU/ML (ref 0.27–4.2)
VITAMIN D 25-HYDROXY: 36.6 NG/ML
WBC # BLD: 6.4 K/UL (ref 4.8–10.8)

## 2021-12-09 ENCOUNTER — OFFICE VISIT (OUTPATIENT)
Dept: INTERNAL MEDICINE | Age: 51
End: 2021-12-09
Payer: MEDICARE

## 2021-12-09 ENCOUNTER — TELEPHONE (OUTPATIENT)
Dept: INTERNAL MEDICINE | Age: 51
End: 2021-12-09

## 2021-12-09 VITALS
SYSTOLIC BLOOD PRESSURE: 110 MMHG | DIASTOLIC BLOOD PRESSURE: 78 MMHG | HEART RATE: 82 BPM | HEIGHT: 69 IN | BODY MASS INDEX: 21.27 KG/M2 | OXYGEN SATURATION: 99 %

## 2021-12-09 DIAGNOSIS — N39.0 FREQUENT UTI: ICD-10-CM

## 2021-12-09 DIAGNOSIS — G35 MULTIPLE SCLEROSIS (HCC): ICD-10-CM

## 2021-12-09 DIAGNOSIS — F32.89 OTHER DEPRESSION: Primary | ICD-10-CM

## 2021-12-09 DIAGNOSIS — E55.9 VITAMIN D DEFICIENCY: ICD-10-CM

## 2021-12-09 DIAGNOSIS — G89.4 CHRONIC PAIN SYNDROME: ICD-10-CM

## 2021-12-09 DIAGNOSIS — Z87.891 SMOKING HISTORY: ICD-10-CM

## 2021-12-09 DIAGNOSIS — I10 PRIMARY HYPERTENSION: ICD-10-CM

## 2021-12-09 DIAGNOSIS — G40.909 SEIZURE DISORDER (HCC): ICD-10-CM

## 2021-12-09 DIAGNOSIS — R73.9 HYPERGLYCEMIA: ICD-10-CM

## 2021-12-09 DIAGNOSIS — E78.5 HYPERLIPIDEMIA, UNSPECIFIED HYPERLIPIDEMIA TYPE: ICD-10-CM

## 2021-12-09 DIAGNOSIS — Z23 NEED FOR VACCINATION: ICD-10-CM

## 2021-12-09 DIAGNOSIS — N39.0 FREQUENT UTI: Primary | ICD-10-CM

## 2021-12-09 DIAGNOSIS — R40.0 DAYTIME SOMNOLENCE: ICD-10-CM

## 2021-12-09 LAB
AMORPHOUS: ABNORMAL /HPF
BACTERIA: ABNORMAL /HPF
BILIRUBIN URINE: NEGATIVE
BLOOD, URINE: ABNORMAL
CLARITY: ABNORMAL
COLOR: YELLOW
EPITHELIAL CELLS, UA: ABNORMAL /HPF
GLUCOSE URINE: NEGATIVE MG/DL
KETONES, URINE: NEGATIVE MG/DL
LEUKOCYTE ESTERASE, URINE: ABNORMAL
NITRITE, URINE: POSITIVE
PH UA: 7 (ref 5–8)
PROTEIN UA: 30 MG/DL
RBC UA: ABNORMAL /HPF (ref 0–2)
SPECIFIC GRAVITY UA: 1.01 (ref 1–1.03)
UROBILINOGEN, URINE: 0.2 E.U./DL
WBC UA: ABNORMAL /HPF (ref 0–5)

## 2021-12-09 PROCEDURE — G8427 DOCREV CUR MEDS BY ELIG CLIN: HCPCS | Performed by: INTERNAL MEDICINE

## 2021-12-09 PROCEDURE — 3017F COLORECTAL CA SCREEN DOC REV: CPT | Performed by: INTERNAL MEDICINE

## 2021-12-09 PROCEDURE — 90674 CCIIV4 VAC NO PRSV 0.5 ML IM: CPT | Performed by: INTERNAL MEDICINE

## 2021-12-09 PROCEDURE — G8420 CALC BMI NORM PARAMETERS: HCPCS | Performed by: INTERNAL MEDICINE

## 2021-12-09 PROCEDURE — 4004F PT TOBACCO SCREEN RCVD TLK: CPT | Performed by: INTERNAL MEDICINE

## 2021-12-09 PROCEDURE — 99214 OFFICE O/P EST MOD 30 MIN: CPT | Performed by: INTERNAL MEDICINE

## 2021-12-09 PROCEDURE — G0008 ADMIN INFLUENZA VIRUS VAC: HCPCS | Performed by: INTERNAL MEDICINE

## 2021-12-09 PROCEDURE — G8482 FLU IMMUNIZE ORDER/ADMIN: HCPCS | Performed by: INTERNAL MEDICINE

## 2021-12-09 RX ORDER — NICOTINE 21 MG/24HR
1 PATCH, TRANSDERMAL 24 HOURS TRANSDERMAL DAILY
Qty: 42 PATCH | Refills: 0 | Status: SHIPPED | OUTPATIENT
Start: 2021-12-09 | End: 2022-04-06

## 2021-12-09 RX ORDER — ONDANSETRON 4 MG/1
4 TABLET, ORALLY DISINTEGRATING ORAL 3 TIMES DAILY PRN
Qty: 30 TABLET | Refills: 0 | Status: SHIPPED | OUTPATIENT
Start: 2021-12-09 | End: 2022-07-19 | Stop reason: SDUPTHER

## 2021-12-09 RX ORDER — CEFDINIR 300 MG/1
300 CAPSULE ORAL 2 TIMES DAILY
Qty: 14 CAPSULE | Refills: 0 | Status: SHIPPED | OUTPATIENT
Start: 2021-12-09 | End: 2021-12-16

## 2021-12-09 RX ORDER — DULOXETIN HYDROCHLORIDE 30 MG/1
30 CAPSULE, DELAYED RELEASE ORAL DAILY
Qty: 30 CAPSULE | Refills: 5 | Status: SHIPPED | OUTPATIENT
Start: 2021-12-09 | End: 2022-06-06

## 2021-12-09 SDOH — ECONOMIC STABILITY: FOOD INSECURITY: WITHIN THE PAST 12 MONTHS, THE FOOD YOU BOUGHT JUST DIDN'T LAST AND YOU DIDN'T HAVE MONEY TO GET MORE.: NEVER TRUE

## 2021-12-09 SDOH — ECONOMIC STABILITY: FOOD INSECURITY: WITHIN THE PAST 12 MONTHS, YOU WORRIED THAT YOUR FOOD WOULD RUN OUT BEFORE YOU GOT MONEY TO BUY MORE.: NEVER TRUE

## 2021-12-09 ASSESSMENT — SOCIAL DETERMINANTS OF HEALTH (SDOH): HOW HARD IS IT FOR YOU TO PAY FOR THE VERY BASICS LIKE FOOD, HOUSING, MEDICAL CARE, AND HEATING?: NOT HARD AT ALL

## 2021-12-09 NOTE — PROGRESS NOTES
After obtaining consent, and per orders of Dr. Dottie Mora, injection of Flu given in Left deltoid by Korin Choi MA. Patient instructed to remain in clinic for 20 minutes afterwards, and to report any adverse reaction to me immediately.

## 2021-12-09 NOTE — PROGRESS NOTES
Chief Complaint   Patient presents with    6 Month Follow-Up       HPI: Malini Almonte is a 49 y.o. female is here for follow-up regarding depression, attempts to stop smoking, multiple sclerosis, chronic urinary tract infections, neuropathy, seizure disorder. She states her multiple sclerosis is stable. She is no longer taking the Ocrevus therapy. She had side effects of it. She states that this time, she is not taking anything for her multiple sclerosis. She does have a history of daytime somnolence. She is on Ritalin therapy. She states that her pain medications seem to work fairly well. She has had a pressure ulcer to her right heel. She plans to see wound care next week. She would like a flu vaccine today. She does have a history of frequent urinary tract infections. She is on nitrofurantoin for prophylaxis. She would like for us to get a urinalysis today. She has had a urostomy tube in place. She is trying to stop smoking. However, her Chantix was recalled. She would like to try the nicotine patch. She has not had any seizures. She feels that her depression is stable. Her neuropathy is fairly well controlled.     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 related to ms    Sepsis ([address] 8/19/2016    Weakness 8/4/2016      Past Surgical History:   Procedure Laterality Date    BACLOFEN PUMP IMPLANTATION      BREAST BIOPSY Right     neg    COLONOSCOPY N/A 9/27/2019    Dr Mosquera Grooms ileum through ostomy-patent and healthy appearing anastomosis in the right colon-entero colonic anastomosis-10 yr recall    COLOSTOMY     [address]      Right    FOOT AMPUTATION Left 4/4/2019    LEFT TRANSMET AMPUTATION performed by Francesco Hernandez MD at [address]      urostomy    OTHER SURGICAL HISTORY      pain pump    CA INSJ PRPH CTR VAD W/SUBQ PORT UNDER 5 YR N/A 6/26/2018    SINGLE LUMEN PORT PLACEMENT WITH FLUORO performed by Bartolo Rao MD at [address] TOE AMPUTATION      L last toe    TONSILLECTOMY      URETEROTOMY        Social History     Socioeconomic History    Marital status:      Spouse name: None    Number of children: None    Years of education: None    Highest education level: None   Occupational History    None   Tobacco Use    Smoking status: Current Every Day Smoker     Packs/day: 0.25     Types: Cigarettes     Last attempt to quit: 1/8/2018     Years since quitting: 3.9    Smokeless tobacco: Never Used    Tobacco comment: taking chantix    Vaping Use    Vaping Use: Never used   Substance and Sexual Activity    Alcohol use: Yes     Comment: yaritza    Drug use: Yes     Types: Marijuana Missael Fagan)     Comment: daily     Sexual activity: None   Other Topics Concern    None   Social History Narrative    None     Social Determinants of Health     Financial Resource Strain: Low Risk     Difficulty of Paying Living Expenses: Not hard at all   Food Insecurity: No Food Insecurity    Worried About Running Out of Food in the Last Year: Never true    Meagan of Food in the Last Year: Never true   Transportation Needs:     Lack of Transportation (Medical): Not on file    Lack of Transportation (Non-Medical):  Not on file   Physical Activity:     Days of Exercise per Week: Not on file    Minutes of Exercise per Session: Not on file   Stress:     Feeling of Stress : Not on file   Social Connections:     Frequency of Communication with Friends and Family: Not on file    Frequency of Social Gatherings with Friends and Family: Not on file    Attends Protestant Services: Not on file    Active Member of Clubs or Organizations: Not on file    Attends Club or Organization Meetings: Not on file    Marital Status: Not on file   Intimate Partner Violence:     Fear of Current or Ex-Partner: Not on file    Emotionally Abused: Not on file    Physically Abused: Not on file    Sexually Abused: Not on file   Housing Stability:     Unable to Pay for Housing in the Last Year: Not on file    Number of [place] in the Last Year: Not on file    Unstable Housing in the Last Year: Not on file      Family History   Problem Relation Age of Onset    Cancer Mother         breast    Colon Cancer Mother     Colon Polyps Mother     Breast Cancer Mother     Diabetes Father     High Blood Pressure Father     Cancer Paternal Aunt         breast    Liver Cancer Paternal Aunt     Heart Disease Paternal Grandmother     Esophageal Cancer Neg Hx     Liver Disease Neg Hx     Rectal Cancer Neg Hx     Stomach Cancer Neg Hx         Current Outpatient Medications   Medication Sig Dispense Refill    ondansetron (ZOFRAN-ODT) 4 MG disintegrating tablet Take 1 tablet by mouth 3 times daily as needed for Nausea or Vomiting 30 tablet 0    DULoxetine (CYMBALTA) 30 MG extended release capsule Take 1 capsule by mouth daily 30 capsule 5    nicotine (NICODERM CQ) 21 MG/24HR Place 1 patch onto the skin daily 42 patch 0    methylphenidate (RITALIN) 20 MG tablet TAKE 1 TABLET BY MOUTH DAILY 30 tablet 0    HYDROcodone-acetaminophen (NORCO)  MG per tablet TAKE 1 TABLET BY MOUTH 3 TIMES DAILY AS NEEDED FOR PAIN.  REDUCE DOSES AS PAIN BECOMES MANAGEABLE 90 tablet 0    nitrofurantoin (MACRODANTIN) 50 MG capsule TAKE ONE CAPSULE BY MOUTH DAILY 30 capsule 3    pilocarpine (SALAGEN) 7.5 MG tablet TAKE ONE TABLET BY MOUTH 3 TIMES DAILY 90 tablet 3    pregabalin (LYRICA) 300 MG capsule 1 bid 60 capsule 5    naloxone 4 MG/0.1ML LIQD nasal spray 1 spray by Nasal route as needed for Opioid Reversal 1 each 5    tiZANidine (ZANAFLEX) 4 MG tablet TAKE 3 TABLETS TWICE DAILY AS NEEDED  180 tablet 11    Diapers & Supplies MISC Diapers, chucks, wipes, and gloves for incontinence. [phone] each 11    azelastine (ASTELIN) 0.1 % nasal spray USE 2 SPRAYS IN EACH NOSTRIL TWICE DAILY AS DIRECTED 30 mL 0    hydroCHLOROthiazide (HYDRODIURIL) 25 MG tablet TAKE 1 TABLET BY MOUTH ONCE DAILY AS NEEDED 30 tablet 0    cetirizine (ZYRTEC) 10 MG tablet Take 10 mg by mouth daily      Catheters MISC Catheter supplies and lubricant 5 times daily G82.20  to [facility] [phone] 450 each 3    levETIRAcetam (KEPPRA) 500 MG tablet Take 1 tablet by mouth nightly (Patient taking differently: Take 750 mg by mouth nightly ) 30 tablet 5    glucose monitoring kit (FREESTYLE) monitoring kit 1 kit by Does not apply route daily 1 kit 0    blood glucose monitor strips Test 1 times a day & as needed for symptoms of irregular blood glucose.  100 strip 3    docusate sodium (COLACE) 100 MG capsule Take 200 mg by mouth as needed for Constipation      PROAIR  (90 Base) MCG/ACT inhaler INHALE 1 PUFF INTO THE LUNGS EVERY 6 HOURS AS NEEDED FOR WHEEZING OR SHORTNESS OF BREATH 8.5 g 5    ipratropium-albuterol (DUONEB) 0.5-2.5 (3) MG/3ML SOLN nebulizer solution USE 1 UNIT DOSE IN NEBULIZER EVERY 4 TO 6 HOURS AS NEEDED. 450 mL 11    BACLOFEN, PAIN PUMP REFILL CHARGE, by Implant route continuous Indications: every 3 months      Multiple Vitamins-Minerals (THERAPEUTIC MULTIVITAMIN-MINERALS) tablet Take 1 tablet by mouth daily      glucose monitoring kit (FREESTYLE) monitoring kit 1 kit by Does not apply route daily 1 kit 0    blood glucose monitor strips Test 1 times a day & as needed for symptoms of irregular blood glucose. 100 strip 0    baclofen (LIORESAL) 10 MG tablet Take 1 tablet by mouth 2 times daily as needed (muscle spasms) (Patient taking differently: Take 10 mg by mouth 2 times daily as needed (muscle spasms) Indications: using pump ) 60 tablet 5    Sodium Chloride Flush (SALINE FLUSH) 0.9 % SOLN Infuse 20 mLs intravenously every 30 days Not every 30 days but every 4-6 weeks as need with 300 units of heparin to flush port for Infusion of Ocrevus for multiple sclerosis 20 mL 5    cefdinir (OMNICEF) 300 MG capsule Take 1 capsule by mouth 2 times daily for 7 days 14 capsule 0    nicotine (NICODERM CQ) 21 MG/24HR Place 1 patch onto the skin daily (Patient not taking: Reported on 12/9/2021) 42 patch 0    varenicline (CHANTIX STARTING MONTH CHADWICK) 0.5 MG X 11 & 1 MG X 42 tablet TAKE BY MOUTH AS DIRECTED (Patient not taking: Reported on 12/9/2021) 53 tablet 0    levETIRAcetam (KEPPRA) 500 MG tablet TAKE 1 TABLET BY MOUTH DAILY (Patient not taking: Reported on 12/9/2021) 90 tablet 3    varenicline (CHANTIX CONTINUING MONTH CHADWICK) 1 MG tablet TAKE 1 TABLET BY MOUTH 2 TIMES A DAY (Patient not taking: Reported on 12/9/2021) 56 tablet 0    Heparin Lock Flush (HEPARIN FLUSH, 100 UNITS/ML,) 100 UNIT/ML injection 3 mLs by Intercatheter route every 30 days No every 30 days but every 4-6 weeks. Flush port with 300 units heparin with 20 cc normal saline for ocrevus infusion for Multiple sclerosis and NS 20CC (Patient not taking: Reported on 12/9/2021) 1 Syringe 5     No current facility-administered medications for this visit.         Patient Active Problem List   Diagnosis    Muscle spasticity    [facility]    Multiple sclerosis (HCC)    Pain in both upper arms    Numbness    Other fatigue    Weakness    Chronic pain    Hx of seizure disorder    Insomnia    Pain, neck    Asthma    S/P transmetatarsal amputation of foot, left (HCC)    Chronic constipation    Col[facility] in place ([facility]    Paraplegia ([facility]    Arthralgia of hip    Seizure ([facility]    Encounter for care related to vascular access port    Urinary incontinence    Depressive disorder    Malodorous urine    Arthritis    Osteoporosis    Urinary bladder stone    Vesicovaginal fistula    Skin ulcer of abdominal wall, with fat layer exposed ([facility]        Review of Systems   Constitutional: Positive for fatigue. Negative for activity change, appetite change, fever and unexpected weight change. HENT: Negative for congestion, ear pain, postnasal drip, rhinorrhea, sinus pressure, sore throat and tinnitus. Eyes: Negative for photophobia, pain, discharge, redness, itching and visual disturbance. Respiratory: Negative for cough, chest tightness, shortness of breath and wheezing. Cardiovascular: Negative for chest pain, palpitations and leg swelling. Gastrointestinal: Negative for abdominal distention, abdominal pain, blood in stool, constipation, diarrhea, nausea and vomiting. Urinary and stool incontinence. Endocrine: Negative for cold intolerance, heat intolerance, polydipsia and polyuria. Genitourinary: Negative for decreased urine volume, difficulty urinating, dysuria, flank pain, frequency, hematuria and urgency. Musculoskeletal:        She has chronic back pain and muscle spasms. She is wheelchair-bound secondary to history of multiple sclerosis. Skin: Positive for wound. Negative for color change, pallor and rash. Has an ulcer to her right heel. Allergic/Immunologic: Positive for environmental allergies. Negative for food allergies and immunocompromised state. Neurological: Negative for dizziness, seizures, syncope, speech difficulty, weakness, numbness and headaches. Occasionally gets sinus headaches   Hematological: Negative for adenopathy. Does not bruise/bleed easily. Psychiatric/Behavioral: Positive for dysphoric mood.  Negative for agitation, confusion and decreased concentration. The patient is nervous/anxious. The patient is not hyperactive. Anxiety and depression are stable       /78   Pulse 82   Ht 5' 9\" (1.753 m)   SpO2 99%   BMI 21.27 kg/m²   Physical Exam  Vitals and nursing note reviewed. Constitutional:       General: She is not in acute distress. Appearance: Normal appearance. She is well-developed and normal weight. She is not ill-appearing, toxic-appearing or diaphoretic. HENT:      Head: Normocephalic and atraumatic. Right Ear: Tympanic membrane, ear canal and external ear normal. There is no impacted cerumen. Left Ear: Tympanic membrane, ear canal and external ear normal. There is no impacted cerumen. Nose: Nose normal. No congestion or rhinorrhea. Mouth/Throat:      Mouth: Mucous membranes are moist.      Pharynx: Oropharynx is clear. No oropharyngeal exudate or posterior oropharyngeal erythema. Eyes:      General: No scleral icterus. Right eye: No discharge. Left eye: No discharge. Extraocular Movements: Extraocular movements intact. Conjunctiva/sclera: Conjunctivae normal.      Pupils: Pupils are equal, round, and reactive to light. Neck:      Thyroid: No thyromegaly. Vascular: No carotid bruit or JVD. Trachea: No tracheal deviation. Cardiovascular:      Rate and Rhythm: Normal rate and regular rhythm. Pulses: Normal pulses. Heart sounds: Normal heart sounds. No murmur heard. No friction rub. No gallop. Pulmonary:      Effort: Pulmonary effort is normal. No respiratory distress. Breath sounds: Normal breath sounds. No stridor. No wheezing, rhonchi or rales. Chest:      Chest wall: No tenderness. Abdominal:      General: Bowel sounds are normal. There is no distension. Palpations: Abdomen is soft. There is no mass. Tenderness: There is no abdominal tenderness. There is no right CVA tenderness, left CVA tenderness, guarding or rebound. Hernia: No hernia is present. Comments: Ostomy bags are intact   Musculoskeletal:         General: No swelling, tenderness, deformity or signs of injury. Normal range of motion. Cervical back: Normal range of motion and neck supple. No rigidity. No muscular tenderness. Right lower leg: No edema. Left lower leg: No edema. Comments: She is wheelchair bound. She does have contractures of both of her feet. Lymphadenopathy:      Cervical: No cervical adenopathy. Skin:     General: Skin is warm and dry. Capillary Refill: Capillary refill takes less than 2 seconds. Coloration: Skin is not jaundiced or pale. Findings: No bruising, erythema or lesion. Comments: Pressure ulcer to right heel. Neurological:      General: No focal deficit present. Mental Status: She is alert and oriented to person, place, and time. Mental status is at baseline. Cranial Nerves: No cranial nerve deficit. Motor: No weakness or abnormal muscle tone. Coordination: Coordination normal.      Gait: Gait normal.      Deep Tendon Reflexes: Reflexes are normal and symmetric. Reflexes normal.   Psychiatric:         Mood and Affect: Mood normal.         Behavior: Behavior normal.         Thought Content: Thought content normal.         Judgment: Judgment normal.         1. Other depression    2. Need for vaccination    3. Frequent UTI    4. Hyperglycemia    5. Hyperlipidemia, unspecified hyperlipidemia type     6. Daytime somnolence    7. Vitamin D deficiency     8. Multiple sclerosis ([address]    9. Smoking history    10. Chronic pain syndrome    11. Seizure disorder ([address]    12. Primary hypertension        ASSESSMENT/PLAN:    28-year-old woman here for follow-up    1. Depression: Doing well with Cymbalta    2. Multiple sclerosis: As per neurology. Appears to be relatively stable at this time    3. Frequent UTIs: Check a urinalysis    4.   Smoking history: Nicotine patch as prescribed    5. Flu vaccine given today    6. Daytime somnolence: Continue Ritalin as prescribed    7. Chronic pain syndrome: Continue Lyrica and hydrocodone as per pain management    8. Seizure disorder: Continue Keppra    9. Hypertension: Blood pressure stable    10. Hyperglycemia: Check an A1c with next lab draw  Alyce was seen today for 6 month follow-up. Diagnoses and all orders for this visit:    Other depression    Need for vaccination  -     INFLUENZA, MDCK QUADV, 2 YRS AND OLDER, IM, PF, PREFILL SYR OR SDV, 0.5ML (FLUCELVAX QUADV, PF)    Frequent UTI  -     Urinalysis Reflex to Culture; Future  -     Urinalysis Reflex to Culture; Future    Hyperglycemia  -     CBC Auto Differential; Future  -     Comprehensive Metabolic Panel; Future  -     Hemoglobin A1C; Future  -     Lipid Panel; Future  -     TSH without Reflex; Future  -     Urinalysis Reflex to Culture; Future  -     Levetiracetam Level; Future  -     Vitamin D 25 Hydroxy; Future    Hyperlipidemia, unspecified hyperlipidemia type   -     Lipid Panel; Future    Daytime somnolence  -     TSH without Reflex; Future    Vitamin D deficiency   -     Vitamin D 25 Hydroxy; Future    Multiple sclerosis (HCC)    Smoking history    Chronic pain syndrome    Se[facility] disorder ([facility]    Primary hypertension    Other orders  -     ondansetron (ZOFRAN-ODT) 4 MG disintegrating tablet; Take 1 tablet by mouth 3 times daily as needed for Nausea or Vomiting  -     DULoxetine (CYMBALTA) 30 MG extended release capsule; Take 1 capsule by mouth daily  -     nicotine (NICODERM CQ) 21 MG/24HR;  Place 1 patch onto the skin daily  -     Urinalysis Reflex to Culture  -     Microscopic Urinalysis  -     Culture, Urine          Return in about 6 months (around 6/9/2022), or physical.     Orders Placed This Encounter   Procedures    Culture, Urine    INFLUENZA, MDCK QUADV, 2 YRS AND OLDER, IM, PF, PREFILL SYR OR SDV, 0.5ML (FLUCELVAX QUADV, PF)    Urinalysis Reflex to Culture     Standing Status:   Future     Number of Occurrences:   1     Standing Expiration Date:   12/9/2022     Order Specific Question:   SPECIFY(EX-CATH,MIDSTREAM,CYSTO,ETC)? Answer:   12    CBC Auto Differential     Fast 12 hours     Standing Status:   Future     Standing Expiration Date:   12/9/2022    Comprehensive Metabolic Panel     Fasting 12 hours     Standing Status:   Future     Standing Expiration Date:   12/9/2022    Hemoglobin A1C     Fast 12 hours     Standing Status:   Future     Standing Expiration Date:   12/9/2022    Lipid Panel     Standing Status:   Future     Standing Expiration Date:   12/9/2022     Order Specific Question:   Is Patient Fasting?/# of Hours     Answer:   12    TSH without Reflex     Fast 12 hours     Standing Status:   Future     Standing Expiration Date:   12/9/2022    Urinalysis Reflex to Culture     Standing Status:   Future     Standing Expiration Date:   12/9/2022     Order Specific Question:   SPECIFY(EX-CATH,MIDSTREAM,CYSTO,ETC)?      Answer:   12    Levetiracetam Level     Standing Status:   Future     Standing Expiration Date:   12/9/2022    Vitamin D 25 Hydroxy     Standing Status:   Future     Standing Expiration Date:   12/9/2022    Urinalysis Reflex to Culture    Microscopic Urinalysis       Tejas Lopez MD

## 2021-12-11 LAB — KEPPRA: 10 UG/ML (ref 12–46)

## 2021-12-11 ASSESSMENT — ENCOUNTER SYMPTOMS
EYE PAIN: 0
EYE DISCHARGE: 0
WHEEZING: 0
SHORTNESS OF BREATH: 0
CONSTIPATION: 0
ABDOMINAL DISTENTION: 0
EYE REDNESS: 0
EYE ITCHING: 0
ABDOMINAL PAIN: 0
SORE THROAT: 0
DIARRHEA: 0
NAUSEA: 0
COLOR CHANGE: 0
RHINORRHEA: 0
VOMITING: 0
PHOTOPHOBIA: 0
SINUS PRESSURE: 0
BLOOD IN STOOL: 0
CHEST TIGHTNESS: 0
COUGH: 0

## 2021-12-12 LAB
ORGANISM: ABNORMAL
URINE CULTURE, ROUTINE: ABNORMAL
URINE CULTURE, ROUTINE: ABNORMAL

## 2021-12-13 ENCOUNTER — HOSPITAL ENCOUNTER (OUTPATIENT)
Dept: WOUND CARE | Age: 51
Discharge: HOME | End: 2021-12-13
Payer: MEDICARE

## 2021-12-13 VITALS
HEART RATE: 68 BPM | RESPIRATION RATE: 20 BRPM | DIASTOLIC BLOOD PRESSURE: 90 MMHG | WEIGHT: 147.27 LBS | TEMPERATURE: 97.2 F | SYSTOLIC BLOOD PRESSURE: 127 MMHG | HEIGHT: 69 IN | BODY MASS INDEX: 21.81 KG/M2

## 2021-12-13 DIAGNOSIS — G82.20 PARAPLEGIA (HCC): Primary | Chronic | ICD-10-CM

## 2021-12-13 DIAGNOSIS — L97.512 CHRONIC ULCER OF RIGHT GREAT TOE, WITH FAT LAYER EXPOSED (HCC): Chronic | ICD-10-CM

## 2021-12-13 DIAGNOSIS — L97.412 ULCER OF RIGHT HEEL, WITH FAT LAYER EXPOSED (HCC): Chronic | ICD-10-CM

## 2021-12-13 PROCEDURE — 97597 DBRDMT OPN WND 1ST 20 CM/<: CPT | Performed by: SURGERY

## 2021-12-13 PROCEDURE — 99214 OFFICE O/P EST MOD 30 MIN: CPT | Performed by: SURGERY

## 2021-12-13 PROCEDURE — 97597 DBRDMT OPN WND 1ST 20 CM/<: CPT

## 2021-12-13 PROCEDURE — 99214 OFFICE O/P EST MOD 30 MIN: CPT

## 2021-12-13 RX ORDER — CLOBETASOL PROPIONATE 0.5 MG/G
OINTMENT TOPICAL ONCE
Status: CANCELLED | OUTPATIENT
Start: 2021-12-13 | End: 2021-12-13

## 2021-12-13 RX ORDER — LIDOCAINE HYDROCHLORIDE 40 MG/ML
SOLUTION TOPICAL ONCE
Status: CANCELLED | OUTPATIENT
Start: 2021-12-13 | End: 2021-12-13

## 2021-12-13 RX ORDER — BETAMETHASONE DIPROPIONATE 0.05 %
OINTMENT (GRAM) TOPICAL ONCE
Status: CANCELLED | OUTPATIENT
Start: 2021-12-13 | End: 2021-12-13

## 2021-12-13 RX ORDER — LIDOCAINE 40 MG/G
CREAM TOPICAL ONCE
Status: CANCELLED | OUTPATIENT
Start: 2021-12-13 | End: 2021-12-13

## 2021-12-13 RX ORDER — BACITRACIN, NEOMYCIN, POLYMYXIN B 400; 3.5; 5 [USP'U]/G; MG/G; [USP'U]/G
OINTMENT TOPICAL ONCE
Status: CANCELLED | OUTPATIENT
Start: 2021-12-13 | End: 2021-12-13

## 2021-12-13 RX ORDER — GENTAMICIN SULFATE 1 MG/G
OINTMENT TOPICAL ONCE
Status: CANCELLED | OUTPATIENT
Start: 2021-12-13 | End: 2021-12-13

## 2021-12-13 RX ORDER — LIDOCAINE HYDROCHLORIDE 20 MG/ML
JELLY TOPICAL ONCE
Status: CANCELLED | OUTPATIENT
Start: 2021-12-13 | End: 2021-12-13

## 2021-12-13 RX ORDER — GINSENG 100 MG
CAPSULE ORAL ONCE
Status: CANCELLED | OUTPATIENT
Start: 2021-12-13 | End: 2021-12-13

## 2021-12-13 RX ORDER — BACITRACIN ZINC AND POLYMYXIN B SULFATE 500; 1000 [USP'U]/G; [USP'U]/G
OINTMENT TOPICAL ONCE
Status: CANCELLED | OUTPATIENT
Start: 2021-12-13 | End: 2021-12-13

## 2021-12-13 RX ORDER — LIDOCAINE 50 MG/G
OINTMENT TOPICAL ONCE
Status: CANCELLED | OUTPATIENT
Start: 2021-12-13 | End: 2021-12-13

## 2021-12-13 NOTE — PROGRESS NOTES
Deandre-er [address] f: [phone] f: [phone] p: [phone] [email or web address]      Ordering Center:     [address]  [address] [address] [phone]  WOUND CARE Dept: [address] [facility] [phone]    Patient Information:      Eryn Pedro  [address] [phone]   : 1970  AGE: 49 y.o. GENDER: female   EPISODE DATE: 2021    Insurance:      PRIMARY INSURANCE:  Plan: MEDICARE PART A AND B  Coverage: MEDICARE  Effective Date: 2015  Group Number: [unfilled]  Subscriber Number: [id number] - (Medicare)    Payor/Plan Subscr  Sex Relation Sub. Ins. ID Effective Group Num   1. [facility] 1970 Female Self [id number] 1/1/15                                    [address] [address]   2. [place]ICAID [place] -* AJAY HERNANDEZ O 1970 Female Self [id number] 1/15/15                                    [address]        Patient Wound Information:      Problem List Items Addressed This Visit     Paraplegia ([address] - Primary (Chronic)    * (Principal) Ulcer of right heel, with fat layer exposed ([address] (Chronic)    Chronic ulcer of right great toe, with fat layer exposed ([address] (Chronic)          WOUNDS REQUIRING DRESSING SUPPLIES:     Wound 21 Heel Right; Posterior Wound 1, Pressure Stage 3, R.  Posterior Heel (Active)   Wound Image    21 1408   Wound Etiology Pressure Stage  3 21 1408   Dressing Status Old drainage noted 21 140   Wound Cleansed Not Cleansed 21 1408   Wound Length (cm) 2.4 cm 21 1408   Wound Width (cm) 2 cm 21 1408   Wound Depth (cm) 0.1 cm 21 1408   Wound Surface Area (cm^2) 4.8 cm^2 21 1408   Wound Volume (cm^3) 0.48 cm^3 21 1408   Post-Procedure Length (cm) 2.4 cm 21 1454   Post-Procedure Width (cm) 2 cm 21 1454 Post-Procedure Depth (cm) 0.1 cm 12/13/21 1454   Post-Procedure Surface Area (cm^2) 4.8 cm^2 12/13/21 1454   Post-Procedure Volume (cm^3) 0.48 cm^3 12/13/21 1454   Distance Tunneling (cm) 0 cm 12/13/21 1408   Tunneling Position ___ O'Clock 0 12/13/21 1408   Undermining Starts ___ O'Clock 0 12/13/21 1408   Undermining Ends___ O'Clock 0 12/13/21 1408   Undermining Maxium Distance (cm) 0 12/13/21 1408   Wound Assessment Purple/maroon; [facility] 12/13/21 1408   Drainage Amount Small 12/13/21 1408   Drainage Description Serosanguinous 12/13/21 1408   Odor None 12/13/21 1408   Audelia-wound Assessment Intact 12/13/21 1408   Margins Attached edges 12/13/21 1408   Number of days: 0       Wound 12/13/21 Toe (Comment  which one) Right Wound 2, Traumatic,R. anterior Great toe (Active)   Wound Image   12/13/21 1408   Wound Etiology Traumatic 12/13/21 1408   Dressing Status Old drainage noted 12/13/21 1408   Wound Cleansed Not Cleansed 12/13/21 1408   Wound Length (cm) 0.6 cm 12/13/21 1408   Wound Width (cm) 0.6 cm 12/13/21 1408   Wound Depth (cm) 0.1 cm 12/13/21 1408   Wound Surface Area (cm^2) 0.36 cm^2 12/13/21 1408   Wound Volume (cm^3) 0.036 cm^3 12/13/21 1408   Post-Procedure Length (cm) 0.6 cm 12/13/21 1454   Post-Procedure Width (cm) 0.6 cm 12/13/21 1454   Post-Procedure Depth (cm) 0.1 cm 12/13/21 1454   Post-Procedure Surface Area (cm^2) 0.36 cm^2 12/13/21 1454   Post-Procedure Volume (cm^3) 0.036 cm^3 12/13/21 1454   Distance Tunneling (cm) 0 cm 12/13/21 1408   Tunneling Position ___ O'Clock 0 12/13/21 1408   Undermining Starts ___ O'Clock 0 12/13/21 1408   Undermining Ends___ O'Clock 0 12/13/21 1408   Undermining Maxium Distance (cm) 0 12/13/21 1408   Wound Assessment Slough; [place]/red 12/13/21 1408   Drainage Amount Moderate 12/13/21 1408   Drainage Description Serosanguinous 12/13/21 1408   Odor None 12/13/21 1408   Audelia-wound Assessment Ecchymosis 12/13/21 1408   Margins Attached edges 12/13/21 1408   Wound Thickness Description not for Pressure Injury Full thickness 12/13/21 1408   Number of days: 0       Wound 12/13/21 Coccyx Wound 3, Pressure Stage 2, Coccyx (Active)   Wound Image   12/13/21 1408   Wound Etiology Pressure Stage  2 12/13/21 1408   Wound Cleansed Soap and water 12/13/21 1408   Wound Length (cm) 0.1 cm 12/13/21 1408   Wound Width (cm) 0.1 cm 12/13/21 1408   Wound Depth (cm) 0.1 cm 12/13/21 1408   Wound Surface Area (cm^2) 0.01 cm^2 12/13/21 1408   Wound Volume (cm^3) 0.001 cm^3 12/13/21 1408   Post-Procedure Length (cm) 0 cm 12/13/21 1454   Post-Procedure Width (cm) 0 cm 12/13/21 1454   Post-Procedure Depth (cm) 0 cm 12/13/21 1454   Post-Procedure Surface Area (cm^2) 0 cm^2 12/13/21 1454   Post-Procedure Volume (cm^3) 0 cm^3 12/13/21 1454   Distance Tunneling (cm) 0 cm 12/13/21 1408   Tunneling Position ___ O'Clock 0 12/13/21 1408   Undermining Starts ___ O'Clock 0 12/13/21 1408   Undermining Ends___ O'Clock 0 12/13/21 1408   Undermining Maxium Distance (cm) 0 12/13/21 1408   Wound Assessment [place]/red; [facility] 12/13/21 1408   Drainage Amount None 12/13/21 1408   Odor None 12/13/21 1408   Audelia-wound Assessment Dry/flaky 12/13/21 1408   Margins Attached edges 12/13/21 1408   Number of days: 0          Supplies Requested :      WOUND #: Wound 1 and 2   PRIMARY DRESSING:  Xeroform   4x4, Roll Gauze, Medipore tape     FREQUENCY OF DRESSING CHANGES:  daily       ADDITIONAL ITEMS:  [x] Gloves Small  [] Gloves Medium [] Gloves Large [] Gloves XLarge  [] Tape 1\" [x] Tape 2\" [] Tape 3\"  [x] Medipore Tape  [x] Saline  [] Skin Prep   [] Adhesive Remover   [] Cotton Tip Applicators   [] Other:    Patient Wound(s) Debrided: [] Yes if yes please add date 12/13   [] No    Debribement Type: Excisional/Sharp    Patient currently being seen by Home Health: [] Yes   [x] No    Duration for needed supplies:  []15  [x]30  []60  []90 Days    Electronically signed by Ernie Griffin RN on 12/13/2021 at 3:02 PM     Provider Information: PROVIDER'S NAME: Dr Ceci Meyer: [phone]

## 2021-12-13 NOTE — PROGRESS NOTES
WOUND CARE HISTORY AND PHYSICAL    Patient Care Team:  Tesha Quintanilla MD as PCP - General (Family Medicine)  Tesha Quintanilla MD as PCP - [facility]  Silvia العراقي MD as Neurologist (Neurology)  Silvia العراقي MD as Consulting Physician (Neurology)  EFREN Sandoval as Advanced Practice Nurse (Gastroenterology)     CC: Barbara Renee is a 49 y.o. female who presents today for wound evaluation. Wound Type: pressure  Wound Location: 1st toe on the right and R heel  Modifying factors: chronic pressure, decreased mobility and shear force    Patient Active Problem List   Diagnosis Code    Muscle spasticity M62.838    Peripheral vascular disease ([facility]) I73.9    Multiple sclerosis ([facility]) G35    Pain in both upper arms M79.621, M79.622    Numbness R20.0    Other fatigue R53.83    Weakness R53.1    Chronic pain G89.29    Hx of seizure disorder Z86.69    Insomnia G47.00    Pain, neck M54.2    Asthma J45.909    S/P transmetatarsal amputation of foot, left ([facility]) N68.431    Chronic constipation K59.09    Colostomy in place ([address] Z93.3    Paraplegia ([facility]) G82.20    Arthralgia of hip M25.559    Seizure ([address] R56.9    Encounter for care related to vascular access port Z45.2    Urinary incontinence R32    Depressive disorder F32.9    Malodorous urine R82.90    Arthritis M19.90    Osteoporosis M81.0    Urinary bladder stone N21.0    Vesicovaginal fistula N82.0    Skin ulcer of abdominal wall, with fat layer exposed ([address] L98.492    Ulcer of right heel, with fat layer exposed ([address] L97.412    Chronic ulcer of right great toe, with fat layer exposed ([address] L97.512       HPI:  She reports she developed a wound on right heel and R great toe. This started 2 week(s) ago. She believes this is  healing. She has been applying nothing. She has not had  fever or chills. She has/with MS.     Barbara Renee is a 49 y.o. female with the following history reviewed and recorded in EpicCare:  Patient Active Problem List    Diagnosis Date Noted    Multiple sclerosis ([facility] 06/08/2016     Priority: Medium    Ulcer of right heel, with fat layer exposed ([address] 12/13/2021    Chronic ulcer of right great toe, with fat layer exposed ([address] 12/13/2021    Skin ulcer of abdominal wall, with fat layer exposed ([facility] 06/16/2021    Malodorous urine 12/14/2020    Encounter for care related to vascular access port 12/10/2020    Urinary bladder stone 10/16/2020    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/29/2020    Seizure ([address] 05/26/2020    Urinary incontinence 05/04/2020    Depressive disorder 05/04/2020    Arthritis 05/04/2020    Osteoporosis 05/04/2020    Arthralgia of hip 12/03/2019    Paraplegia ([facility] 11/05/2019    Chronic constipation 05/02/2019    Colostomy in place ([facility] 05/02/2019    S/P transmetatarsal amputation of foot, left (HCC) 04/17/2019    Asthma 11/26/2018    Pain, neck 09/06/2017    Insomnia 06/28/2017    Chronic pain 08/19/2016    Hx of seizure disorder 08/19/2016    Pain in both upper arms 08/04/2016    Numbness 08/04/2016    Other fatigue 08/04/2016    Weakness 08/04/2016    Peripheral vascular disease ([facility] 02/01/2016     Updating Deprecated Diagnoses      Muscle spasticity      Current Outpatient Medications   Medication Sig Dispense Refill    DULoxetine (CYMBALTA) 30 MG extended release capsule Take 1 capsule by mouth daily (Patient taking differently: Take 30 mg by mouth nightly ) 30 capsule 5    cefdinir (OMNICEF) 300 MG capsule Take 1 capsule by mouth 2 times daily for 7 days 14 capsule 0    methylphenidate (RITALIN) 20 MG tablet TAKE 1 TABLET BY MOUTH DAILY 30 tablet 0    HYDROcodone-acetaminophen (NORCO)  MG per tablet TAKE 1 TABLET BY MOUTH 3 TIMES DAILY AS NEEDED FOR PAIN.  REDUCE DOSES AS PAIN BECOMES MANAGEABLE 90 tablet 0    nitrofurantoin (MACRODANTIN) 50 MG capsule TAKE ONE CAPSULE BY MOUTH DAILY (Patient taking differently: Take 50 mg by mouth nightly to [address] [facility] [phone] each 3    glucose monitoring kit (FREESTYLE) monitoring kit 1 kit by Does not apply route daily 1 kit 0    blood glucose monitor strips Test 1 times a day & as needed for symptoms of irregular blood glucose. 100 strip 3    docusate sodium (COLACE) 100 MG capsule Take 200 mg by mouth as needed for Constipation      Heparin Lock Flush (HEPARIN FLUSH, 100 UNITS/ML,) 100 UNIT/ML injection 3 mLs by Intercatheter route every 30 days No every 30 days but every 4-6 weeks. Flush port with 300 units heparin with 20 cc normal saline for ocrevus infusion for Multiple sclerosis and NS 20CC (Patient not taking: Reported on 12/9/2021) 1 Syringe 5    glucose monitoring kit (FREESTYLE) monitoring kit 1 kit by Does not apply route daily 1 kit 0    blood glucose monitor strips Test 1 times a day & as needed for symptoms of irregular blood glucose. 100 strip 0    baclofen (LIORESAL) 10 MG tablet Take 1 tablet by mouth 2 times daily as needed (muscle spasms) (Patient taking differently: Take 10 mg by mouth 2 times daily as needed (muscle spasms) Indications: using pump ) 60 tablet 5    Sodium Chloride Flush (SALINE FLUSH) 0.9 % SOLN Infuse 20 mLs intravenously every 30 days Not every 30 days but every 4-6 weeks as need with 300 units of heparin to flush port for Infusion of Ocrevus for multiple sclerosis 20 mL 5     No current facility-administered medications for this encounter.      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 related to ms    Sepsis ([facility] 8/19/2016    Weakness 8/4/2016       Past Surgical History:   Procedure Laterality Date    BACLOFEN PUMP IMPLANTATION      BREAST BIOPSY Right     neg    COLONOSCOPY N/A 9/27/2019    Dr Youngblood Borders ileum through ostomy-patent and healthy appearing anastomosis in the right colon-entero colonic anastomosis-10 yr recall    COLOSTOMY     [facility]      Right    FOOT AMPUTATION Left 4/4/2019    LEFT TRANSMET AMPUTATION performed by Cali Chao MD at [address]      urostomy    OTHER SURGICAL HISTORY      pain pump    KS INSJ PRPH CTR VAD W/SUBQ PORT UNDER 5 YR N/A 6/26/2018    SINGLE LUMEN PORT PLACEMENT WITH FLUORO performed by Fredy Jay MD at [address] TOE AMPUTATION      L last toe    TONSILLECTOMY      URETEROTOMY       Family History   Problem Relation Age of Onset    Cancer Mother         breast    Colon Cancer Mother     Colon Polyps Mother     Breast Cancer Mother     Diabetes Father     High Blood Pressure Father     Cancer Paternal Aunt         breast    Liver Cancer Paternal Aunt     Heart Disease Paternal Grandmother     Esophageal Cancer Neg Hx     Liver Disease Neg Hx     Rectal Cancer Neg Hx     Stomach Cancer Neg Hx      Social History     Tobacco Use    Smoking status: Current Some Day Smoker     Packs/day: 0.25     Types: Cigarettes    Smokeless tobacco: Never Used    Tobacco comment: 5 cigs   Substance Use Topics    Alcohol use: Yes     Comment: yaritza         Review of Systems    Constitutional - no significant activity change, appetite change, or unexpected weight change. No fever or chills. No diaphoresis or significant fatigue.   HENT - no significant rhinorrhea or epistaxis. No tinnitus or significant hearing loss. Eyes - no sudden vision change or amaurosis. Respiratory - no significant shortness of breath, wheezing, or stridor. No apnea, cough, or chest tightness associated with shortness of breath. Cardiovascular - no chest pain, syncope, or significant dizziness. No palpitations. Patient reports no claudication. Gastrointestinal - no abdominal swelling or pain. No blood in stool. No severe constipation, diarrhea, nausea, or vomiting. Genitourinary - No difficulty urinating, dysuria, frequency, or urgency. No flank pain or hematuria. Musculoskeletal - no back pain, gait disturbance, or myalgia. Skin - no color change, rash, pallor. Neurologic - no dizziness, facial asymmetry, or light headedness. No seizures. No speech difficulty or lateralizing weakness. Hematologic - no easy bruising or excessive bleeding. Psychiatric - no severe anxiety or nervousness. No confusion. All other review of systems are negative. Physical Exam    BP (!) 127/90   Pulse 68   Temp 97.2 °F (36.2 °C) (Temporal)   Resp 20   Ht 5' 9\" (1.753 m)   Wt 147 lb 4.3 oz (66.8 kg)   BMI 21.75 kg/m²     Constitutional - well developed, well nourished. No diaphoresis or acute distress. HENT - head normocephalic. Right external ear canal appears normal.  Left external ear canal appears normal.  Septum appears midline. Lips,teeth,gums normal  Eyes - conjunctiva normal.  EOMS normal.  No exudate. No icterus. PERRLA  Neck- ROM appears normal, no tracheal deviation. Cardiovascular - Regular rate and rhythm. Heart sounds are normal.  No murmur, rub, or gallop. Carotid pulses are 2+ to palpation bilaterally without bruit. Extremities - Radial and brachial pulses are 2+ to palpation bilaterally.  Femoral pulses: present 2+bilaterally;Popliteal pulses: present 2+bilaterally Right DP: present 2+; Right PT present 2+; Left DP: present 2+; Left PT: present 2+  No cyanosis, clubbing. min edema. No signs atheroembolic event. Pulmonary - effort appears normal.  No respiratory distress. Lungs - Breath sounds normal. No wheezes or rales. GI - Abdomen - soft, non tender, bowel sounds X 4 quadrants. No guarding or rebound tenderness. No distension or palpable mass. Genitourinary - deferred. Musculoskeletal - ROM appears normal. min edema. Skin: warm,dry  Neurologic - alert and oriented X 3. Sensation normal  Psychiatric - mood, affect, and behavior appear normal.  Judgment and thought processes appear normal.  Wound - right great toe and R heel    Wound Picture:                Assessment     right great toe and R heel    1. Paraplegia ([address]    2. Ulcer of right heel, with fat layer exposed ([address]    3. Chronic ulcer of right great toe, with fat layer exposed ([address]          Procedure Note:    Debridement: After risks benefits and expected outcomes were discussed with the patient an Non-excisional Debridement was performed on 1 and 2 . Anesthetic: lidocaine gel    Using curette the wound was sharply debrided    down through and including the removal of viable and non-viable epidermis and dermis. Devitalized Tissue Debrided:  fibrin, biofilm, slough, necrotic/eschar, exudate and callusand epidermis and dermis. Percent of Wound Debrided: 100%    Total Surface Area Debrided:  5.1 sq cm     Post Debridement Measurements and Assessment:  Wound 12/13/21 Heel Right; Posterior Wound 1, Pressure Stage 3, R.  Posterior Heel (Active)   Wound Image    12/13/21 1408   Wound Etiology Pressure Stage  3 12/13/21 1408   Dressing Status Old drainage noted 12/13/21 1408   Wound Cleansed Not Cleansed 12/13/21 1408   Wound Length (cm) 2.4 cm 12/13/21 1408   Wound Width (cm) 2 cm 12/13/21 1408   Wound Depth (cm) 0.1 cm 12/13/21 1408   Wound Surface Area (cm^2) 4.8 cm^2 12/13/21 1408   Wound Volume (cm^3) 0.48 cm^3 12/13/21 1408   Post-Procedure Length (cm) 2.4 cm 12/13/21 1454 Attached edges 12/13/21 1408   Wound Thickness Description not for Pressure Injury Full thickness 12/13/21 1408   Number of days: 0       Wound 12/13/21 Coccyx Wound 3, Pressure Stage 2, Coccyx (Active)   Wound Image   12/13/21 1408   Wound Etiology Pressure Stage  2 12/13/21 1408   Wound Cleansed Soap and water 12/13/21 1408   Wound Length (cm) 0.1 cm 12/13/21 1408   Wound Width (cm) 0.1 cm 12/13/21 1408   Wound Depth (cm) 0.1 cm 12/13/21 1408   Wound Surface Area (cm^2) 0.01 cm^2 12/13/21 1408   Wound Volume (cm^3) 0.001 cm^3 12/13/21 1408   Post-Procedure Length (cm) 0 cm 12/13/21 1454   Post-Procedure Width (cm) 0 cm 12/13/21 1454   Post-Procedure Depth (cm) 0 cm 12/13/21 1454   Post-Procedure Surface Area (cm^2) 0 cm^2 12/13/21 1454   Post-Procedure Volume (cm^3) 0 cm^3 12/13/21 1454   Distance Tunneling (cm) 0 cm 12/13/21 1408   Tunneling Position ___ O'Clock 0 12/13/21 1408   Undermining Starts ___ O'Clock 0 12/13/21 1408   Undermining Ends___ O'Clock 0 12/13/21 1408   Undermining Maxium Distance (cm) 0 12/13/21 1408   Wound Assessment [place]/red; [facility] 12/13/21 1408   Drainage Amount None 12/13/21 1408   Odor None 12/13/21 1408   Audelia-wound Assessment Dry/flaky 12/13/21 1408   Margins Attached edges 12/13/21 1408   Number of days: 0          The patients pain is    0. Please refer to nursing measurements and assessment regarding wound pre and post debridement. Bleeding: Minimal    Hemostasis:   by pressure    Response to treatment:  Well tolerated by patient. Plan for wound - Dress per physician order    Treatment:     Compression : No   Offloading : Yes   Dressing : SEE AVS   Additional Therapy : xeroform    Discussed appropriate home care of this wound. Wound redressed. Patient instructions were given. Follow up: 1 week.   Recommend no smoking  Offloading instructions given    Discharge Instructions         [address] and [facility]   Physician Orders and Discharge Instructions  [address] [facility]  Telephone: [phone]    NAME:  Lakesha Radha:  1970  MEDICAL RECORD NUMBER:  [id number]  DATE:  12/13/2021    Discharge condition: Stable    Discharge to: Home    Left via:Private automobile    Accompanied by: Caregiver     ECF/HHA: None    Dressing Orders: Right Heel and Great Toe Wounds  Xeroform to open area (cut to fit), Cover with dry gauze, roll gauze and tape  Change once daily  Wear heel lift boots     Dressing Orders: Coccxy Wound  Moisturize and protect    [address] [facility] follow up visit _____________1 week________________  (Please note your next appointment above and if you are unable to keep, kindly give a 24 hour notice. Thank you.)    If you experience any of the following, please call the [email or web address] [employer] during business hours:    * Increase in Pain  * Temperature over 101  * Increase in drainage from your wound  * Drainage with a foul odor  * Bleeding  * Increase in swelling  * Need for compression bandage changes due to slippage, breakthrough drainage. If you need medical attention outside of the business hours of the [facility] please contact your PCP or go to the nearest emergency room.         Electronically signed by Zannie Cranker, MD on 12/13/21 at 2:56 PM CST

## 2021-12-14 ENCOUNTER — HOSPITAL ENCOUNTER (OUTPATIENT)
Dept: CT IMAGING | Facility: HOSPITAL | Age: 51
End: 2021-12-14

## 2021-12-20 ENCOUNTER — HOSPITAL ENCOUNTER (OUTPATIENT)
Dept: WOUND CARE | Age: 51
Discharge: HOME | End: 2021-12-20
Payer: MEDICARE

## 2021-12-20 VITALS
HEIGHT: 69 IN | BODY MASS INDEX: 21.77 KG/M2 | RESPIRATION RATE: 18 BRPM | DIASTOLIC BLOOD PRESSURE: 62 MMHG | SYSTOLIC BLOOD PRESSURE: 93 MMHG | HEART RATE: 98 BPM | WEIGHT: 147 LBS

## 2021-12-20 DIAGNOSIS — G82.20 PARAPLEGIA (HCC): Primary | Chronic | ICD-10-CM

## 2021-12-20 DIAGNOSIS — L97.412 ULCER OF RIGHT HEEL, WITH FAT LAYER EXPOSED (HCC): ICD-10-CM

## 2021-12-20 DIAGNOSIS — L97.512 CHRONIC ULCER OF RIGHT GREAT TOE, WITH FAT LAYER EXPOSED (HCC): ICD-10-CM

## 2021-12-20 PROCEDURE — 97597 DBRDMT OPN WND 1ST 20 CM/<: CPT | Performed by: SURGERY

## 2021-12-20 PROCEDURE — 97597 DBRDMT OPN WND 1ST 20 CM/<: CPT

## 2021-12-20 NOTE — PROGRESS NOTES
[address]   Progress Note and Procedure Note      Rosemary Alvarez  MEDICAL RECORD NUMBER:  [id number]  AGE: 49 y.o. GENDER: female  : 1970  EPISODE DATE:  2021    Subjective:     Chief Complaint   Patient presents with    Wound Check         HISTORY of PRESENT ILLNESS HPI     Rosemary Alvarez is a 49 y.o. female who presents today for wound/ulcer evaluation.    Wound Context: Pt with wounds of her R heel and R great toe here for eval/treat  Wound/Ulcer Pain Timing/Severity: none  Quality of pain: N/A  Severity:  0 / 10   Modifying Factors: None  Associated Signs/Symptoms: none    Ulcer Identification:  Ulcer Type: pressure  Contributing Factors: chronic pressure, decreased mobility and shear force    Wound: pressure        PAST MEDICAL HISTORY        Diagnosis Date    Ankle wound     and toe wound    Aptyalism 2017    Arthritis 2020    Asthma     Back pain 2017    Binocular vision disorder with diplopia 2017    [facility]FL [facility]chronic airflow limitation) [facility] 2017    Hay fever 2017    Headache 2017    MS (multiple sclerosis) ([address]     Muscle spasticity     Neuropathic ulcer of left foot, limited to breakdown of skin ([address] 4/10/2017    Numbness 2016    Open wound of ankle 2017    Open wound of second toe of left foot 2018    Peripheral vascular disease ([address]     Seizure ([address]     occ. related to ms    Sepsis ([address] 2016    Weakness 2016       PAST SURGICAL HISTORY    Past Surgical History:   Procedure Laterality Date    BACLOFEN PUMP IMPLANTATION      BREAST BIOPSY Right     neg    COLONOSCOPY N/A 2019    Dr Lasha Jones ileum through ostomy-patent and healthy appearing anastomosis in the right colon-entero colonic anastomosis-10 yr recall    COLOSTOMY      FEMUR FRACTURE SURGERY      Right    FOOT AMPUTATION Left 2019    LEFT TRANSMET AMPUTATION performed by Catrachita Bejarano MD at [address] [place] [address] HYSTERECTOMY      OTHER SURGICAL HISTORY      urostomy    OTHER SURGICAL HISTORY      pain pump    [place] [facility] VAD W/SUBQ PORT UNDER 5 YR N/A 6/26/2018    SINGLE LUMEN PORT PLACEMENT WITH FLUORO performed by Hunter Colby MD at [address] TOE AMPUTATION      L last toe    TONSILLECTOMY      URETEROTOMY         FAMILY HISTORY    Family History   Problem Relation Age of Onset    Cancer Mother         breast    Colon Cancer Mother     Colon Polyps Mother     Breast Cancer Mother     Diabetes Father     High Blood Pressure Father     Cancer Paternal Aunt         breast    Liver Cancer Paternal Aunt     Heart Disease Paternal Grandmother     Esophageal Cancer Neg Hx     Liver Disease Neg Hx     Rectal Cancer Neg Hx     Stomach Cancer Neg Hx        SOCIAL HISTORY    Social History     Tobacco Use    Smoking status: Current Some Day Smoker     Packs/day: 0.25     Types: Cigarettes    Smokeless tobacco: Never Used    Tobacco comment: 1 cigs today   Vaping Use    Vaping Use: Never used   Substance Use Topics    Alcohol use: Yes     Comment: yaritza    Drug use: Yes     Types: Marijuana (Weed)     Comment: daily        ALLERGIES    Allergies   Allergen Reactions    Codeine Nausea Only     Category: Allergy;    Ananya Kee Darvocet [Propoxyphene N-Acetaminophen]      Talking out of her head    Morphine      Category:  Allergy;     Morphine And Related Itching and Swelling    Propoxyphene Swelling       MEDICATIONS    Current Outpatient Medications on File Prior to Encounter   Medication Sig Dispense Refill    ondansetron (ZOFRAN-ODT) 4 MG disintegrating tablet Take 1 tablet by mouth 3 times daily as needed for Nausea or Vomiting 30 tablet 0    DULoxetine (CYMBALTA) 30 MG extended release capsule Take 1 capsule by mouth daily (Patient taking differently: Take 30 mg by mouth nightly ) 30 capsule 5    methylphenidate (RITALIN) 20 MG tablet TAKE 1 TABLET BY MOUTH DAILY 30 tablet 0    HYDROcodone-acetaminophen (NORCO)  MG per tablet TAKE 1 TABLET BY MOUTH 3 TIMES DAILY AS NEEDED FOR PAIN. REDUCE DOSES AS PAIN BECOMES MANAGEABLE 90 tablet 0    nitrofurantoin (MACRODANTIN) 50 MG capsule TAKE ONE CAPSULE BY MOUTH DAILY (Patient taking differently: Take 50 mg by mouth nightly ) 30 capsule 3    pilocarpine (SALAGEN) 7.5 MG tablet TAKE ONE TABLET BY MOUTH 3 TIMES DAILY 90 tablet 3    pregabalin (LYRICA) 300 MG capsule 1 bid (Patient taking differently: Take 300 mg by mouth nightly. 1 bid) 60 capsule 5    tiZANidine (ZANAFLEX) 4 MG tablet TAKE 3 TABLETS TWICE DAILY AS NEEDED  180 tablet 11    levETIRAcetam (KEPPRA) 500 MG tablet TAKE 1 TABLET BY MOUTH DAILY 90 tablet 3    Diapers & Supplies MISC Diapers, chucks, wipes, and gloves for incontinence. [phone] each 11    azelastine (ASTELIN) 0.1 % nasal spray USE 2 SPRAYS IN EACH NOSTRIL TWICE DAILY AS DIRECTED 30 mL 0    hydroCHLOROthiazide (HYDRODIURIL) 25 MG tablet TAKE 1 TABLET BY MOUTH ONCE DAILY AS NEEDED 30 tablet 0    cetirizine (ZYRTEC) 10 MG tablet Take 10 mg by mouth daily      Catheters MISC Catheter supplies and lubricant 5 times daily G82.20  to [facility] [phone] 450 each 3    levETIRAcetam (KEPPRA) 500 MG tablet Take 1 tablet by mouth nightly (Patient taking differently: Take 750 mg by mouth nightly ) 30 tablet 5    glucose monitoring kit (FREESTYLE) monitoring kit 1 kit by Does not apply route daily 1 kit 0    blood glucose monitor strips Test 1 times a day & as needed for symptoms of irregular blood glucose.  100 strip 3    PROAIR  (90 Base) MCG/ACT inhaler INHALE 1 PUFF INTO THE LUNGS EVERY 6 HOURS AS NEEDED FOR WHEEZING OR SHORTNESS OF BREATH 8.5 g 5    ipratropium-albuterol (DUONEB) 0.5-2.5 (3) MG/3ML SOLN nebulizer solution USE 1 UNIT DOSE IN NEBULIZER EVERY 4 TO 6 HOURS AS NEEDED. 450 mL 11    Multiple Vitamins-Minerals (THERAPEUTIC MULTIVITAMIN-MINERALS) tablet Take 1 tablet by mouth daily      glucose monitoring kit (FREESTYLE) monitoring kit 1 kit by Does not apply route daily 1 kit 0    baclofen (LIORESAL) 10 MG tablet Take 1 tablet by mouth 2 times daily as needed (muscle spasms) (Patient taking differently: Take 10 mg by mouth 2 times daily as needed (muscle spasms) Indications: using pump ) 60 tablet 5    Sodium Chloride Flush (SALINE FLUSH) 0.9 % SOLN Infuse 20 mLs intravenously every 30 days Not every 30 days but every 4-6 weeks as need with 300 units of heparin to flush port for Infusion of Ocrevus for multiple sclerosis 20 mL 5    nicotine (NICODERM CQ) 21 MG/24HR Place 1 patch onto the skin daily 42 patch 0    naloxone 4 MG/0.1ML LIQD nasal spray 1 spray by Nasal route as needed for Opioid Reversal 1 each 5    nicotine (NICODERM CQ) 21 MG/24HR Place 1 patch onto the skin daily (Patient not taking: Reported on 12/9/2021) 42 patch 0    docusate sodium (COLACE) 100 MG capsule Take 200 mg by mouth as needed for Constipation      Heparin Lock Flush (HEPARIN FLUSH, 100 UNITS/ML,) 100 UNIT/ML injection 3 mLs by Intercatheter route every 30 days No every 30 days but every 4-6 weeks. Flush port with 300 units heparin with 20 cc normal saline for ocrevus infusion for Multiple sclerosis and NS 20CC (Patient not taking: Reported on 12/9/2021) 1 Syringe 5    BACLOFEN, PAIN PUMP REFILL CHARGE, by Implant route continuous Indications: every 3 months      blood glucose monitor strips Test 1 times a day & as needed for symptoms of irregular blood glucose. 100 strip 0     No current facility-administered medications on file prior to encounter. REVIEW OF SYSTEMS    A comprehensive review of systems was negative.     Objective:      BP 93/62   Pulse 98   Resp 18   Ht 5' 9\" (1.753 m)   Wt 147 lb (66.7 kg)   BMI 21.71 kg/m²     Wt Readings from Last 3 Encounters:   12/20/21 147 lb (66.7 kg)   12/13/21 147 lb 4.3 oz (66.8 kg)   11/04/21 144 lb (65.3 kg)       PHYSICAL EXAM    General Appearance: alert and oriented to person, place and time, well developed and well- nourished, in no acute distress  Skin: warm and dry, no rash or erythema  Head: normocephalic and atraumatic  Eyes: pupils equal, round, and reactive to light, extraocular eye movements intact, conjunctivae normal  ENT: tympanic membrane, external ear and ear canal normal bilaterally, nose without deformity, nasal mucosa and turbinates normal without polyps, lips teeth and gums normal  Neck: supple and non-tender without mass, no thyromegaly or thyroid nodules, no cervical lymphadenopathy  Pulmonary/Chest: clear to auscultation bilaterally- no wheezes, rales or rhonchi, normal air movement, no respiratory distress  Cardiovascular: normal rate, regular rhythm, normal S1 and S2, no murmurs, rubs, clicks, or gallops, distal pulses intact, no carotid bruits  Abdomen: soft, non-tender, non-distended, normal bowel sounds, no masses or organomegaly  Extremities: no cyanosis, clubbing or edema  Musculoskeletal: normal range of motion, no joint swelling, deformity or tenderness      Assessment:      Problem List Items Addressed This Visit     Paraplegia (HCC) - Primary (Chronic)    * (Principal) Ulcer of right heel, 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)    Chronic ulcer of right great toe, 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)           Procedure Note  Indications:  Based on my examination of this patient's wound(s)/ulcer(s) today, debridement is required to promote healing and evaluate the wound base. Performed by: Mio Kruse MD    Consent obtained:  Yes    Time out taken:  Yes    Pain Control: Anesthetic  Anesthetic: None       Debridement:Non-excisional Debridement    Using curette the wound(s)/ulcer(s) was/were sharply debrided down through and including the removal of epidermis and dermis.         Devitalized Tissue Debrided:  fibrin, biofilm, slough, necrotic/eschar and exudate      Pre Debridement Measurements:  Are located in the Wound/Ulcer Documentation Flow Sheet    Wound/Ulcer #: 1 and 2    Percent of Wound(s)/Ulcer(s) Debrided: 100%    Total Surface Area Debrided:  3 sq cm       Diabetic/Pressure/Non Pressure Ulcers only:  Ulcer: Non-Pressure ulcer, fat layer exposed             Post Debridement Measurements:    Wound/Ulcer Descriptions are Pre Debridement --EXCEPT MEASUREMENTS    Wound 12/13/21 Heel Right; Posterior Wound 1, Pressure Stage 3, R. Posterior Heel (Active)   Wound Image   12/20/21 1454   Wound Etiology Pressure Stage  3 12/20/21 1454   Dressing Status Old drainage noted 12/20/21 1454   Wound Cleansed Soap and water 12/20/21 1454   Dressing/Treatment Gauze dressing/dressing sponge;Roll gauze;Tape/Soft cloth adhesive tape;Xeroform 12/13/21 1530   Offloading for Diabetic Foot Ulcers Yes (type); Offloading boot 12/20/21 1454   Wound Length (cm) 1.4 cm 12/20/21 1454   Wound Width (cm) 2 cm 12/20/21 1454   Wound Depth (cm) 0.1 cm 12/20/21 1454   Wound Surface Area (cm^2) 2.8 cm^2 12/20/21 1454   Change in Wound Size % (l*w) 41.67 12/20/21 1454   Wound Volume (cm^3) 0.28 cm^3 12/20/21 1454   Wound Healing % 42 12/20/21 1454   Post-Procedure Length (cm) 1.4 cm 12/20/21 1521   Post-Procedure Width (cm) 2 cm 12/20/21 1521   Post-Procedure Depth (cm) 0.1 cm 12/20/21 1521   Post-Procedure Surface Area (cm^2) 2.8 cm^2 12/20/21 1521   Post-Procedure Volume (cm^3) 0.28 cm^3 12/20/21 1521   Distance Tunneling (cm) 0 cm 12/20/21 1454   Tunneling Position ___ O'Clock 0 12/20/21 1454   Undermining Starts ___ O'Clock 0 12/20/21 1454   Undermining Ends___ O'Clock 0 12/20/21 1454   Undermining Maxium Distance (cm) 0 12/20/21 1454   Wound Assessment Fibrin;Pink/red 12/20/21 1454   Drainage Amount Small 12/20/21 1454   Drainage Description Serosanguinous 12/20/21 1454   Odor None 12/20/21 1454   Audelia-wound Assessment Intact 12/20/21 1454   Margins Attached edges 12/20/21 1454   Number of days: 7       Wound 12/13/21 Toe (Comment  which one) Right Wound 2, Traumatic,R. anterior Great toe (Active)   Wound Image   12/20/21 1454   Wound Etiology Traumatic 12/20/21 1454   Dressing Status Old drainage noted 12/20/21 1454   Wound Cleansed Soap and water 12/20/21 1454   Dressing/Treatment Gauze dressing/dressing sponge;Roll gauze;Tape/Soft cloth adhesive tape;Xeroform 12/13/21 1530   Wound Length (cm) 0.4 cm 12/20/21 1454   Wound Width (cm) 0.4 cm 12/20/21 1454   Wound Depth (cm) 0.1 cm 12/20/21 1454   Wound Surface Area (cm^2) 0.16 cm^2 12/20/21 1454   Change in Wound Size % (l*w) 55.56 12/20/21 1454   Wound Volume (cm^3) 0.016 cm^3 12/20/21 1454   Wound Healing % 56 12/20/21 1454   Post-Procedure Length (cm) 0.4 cm 12/20/21 1521   Post-Procedure Width (cm) 0.4 cm 12/20/21 1521   Post-Procedure Depth (cm) 0.1 cm 12/20/21 1521   Post-Procedure Surface Area (cm^2) 0.16 cm^2 12/20/21 1521   Post-Procedure Volume (cm^3) 0.016 cm^3 12/20/21 1521   Distance Tunneling (cm) 0 cm 12/20/21 1454   Tunneling Position ___ O'Clock 0 12/20/21 1454   Undermining Starts ___ O'Clock 0 12/20/21 1454   Undermining Ends___ O'Clock 0 12/20/21 1454   Undermining Maxium Distance (cm) 0 12/20/21 1454   Wound Assessment Pink/red;Slough 12/20/21 1454   Drainage Amount Small 12/20/21 1454   Drainage Description Serosanguinous 12/20/21 1454   Odor None 12/20/21 1454   Audelia-wound Assessment Dry/flaky 12/20/21 1454   Margins Attached edges 12/20/21 1454   Wound Thickness Description not for Pressure Injury Full thickness 12/20/21 1454   Number of days: 7             Estimated Blood Loss:  Minimal    Hemostasis Achieved:  by pressure    Procedural Pain:  0  / 10     Post Procedural Pain:  0 / 10     Response to treatment:  Well tolerated by patient.          Plan:     Problem List Items Addressed This Visit     Paraplegia ([address] - Primary (Chronic)    * (Principal) Ulcer of right heel, with fat layer exposed ([address] (Chronic)    Chronic ulcer of right great toe, with fat layer exposed ([address]

## 2021-12-30 ENCOUNTER — HOSPITAL ENCOUNTER (OUTPATIENT)
Dept: CT IMAGING | Facility: HOSPITAL | Age: 51
Discharge: HOME | End: 2021-12-30
Admitting: UROLOGY

## 2021-12-30 DIAGNOSIS — N82.0 VESICOVAGINAL FISTULA: ICD-10-CM

## 2021-12-30 LAB — CREAT BLDA-MCNC: 0.4 MG/DL (ref 0.6–1.3)

## 2021-12-30 PROCEDURE — 25010000002 IOPAMIDOL 61 % SOLUTION: Performed by: UROLOGY

## 2021-12-30 PROCEDURE — 74178 CT ABD&PLV WO CNTR FLWD CNTR: CPT

## 2021-12-30 PROCEDURE — 82565 ASSAY OF CREATININE: CPT

## 2021-12-30 RX ADMIN — IOPAMIDOL 100 ML: 612 INJECTION, SOLUTION INTRAVENOUS at 13:24

## 2022-01-03 ENCOUNTER — HOSPITAL ENCOUNTER (OUTPATIENT)
Dept: WOUND CARE | Age: 52
Discharge: HOME | End: 2022-01-03
Payer: MEDICARE

## 2022-01-03 VITALS
RESPIRATION RATE: 18 BRPM | TEMPERATURE: 96.6 F | HEIGHT: 69 IN | HEART RATE: 82 BPM | SYSTOLIC BLOOD PRESSURE: 87 MMHG | WEIGHT: 147 LBS | DIASTOLIC BLOOD PRESSURE: 65 MMHG | BODY MASS INDEX: 21.77 KG/M2

## 2022-01-03 DIAGNOSIS — G35 MS (MULTIPLE SCLEROSIS) (HCC): ICD-10-CM

## 2022-01-03 DIAGNOSIS — L97.412 ULCER OF RIGHT HEEL, WITH FAT LAYER EXPOSED (HCC): Primary | ICD-10-CM

## 2022-01-03 DIAGNOSIS — M54.2 PAIN, NECK: ICD-10-CM

## 2022-01-03 DIAGNOSIS — M79.622 PAIN IN BOTH UPPER ARMS: ICD-10-CM

## 2022-01-03 DIAGNOSIS — M79.621 PAIN IN BOTH UPPER ARMS: ICD-10-CM

## 2022-01-03 DIAGNOSIS — R53.83 OTHER FATIGUE: ICD-10-CM

## 2022-01-03 DIAGNOSIS — L97.512 CHRONIC ULCER OF RIGHT GREAT TOE, WITH FAT LAYER EXPOSED (HCC): ICD-10-CM

## 2022-01-03 DIAGNOSIS — G82.20 PARAPLEGIA (HCC): Chronic | ICD-10-CM

## 2022-01-03 DIAGNOSIS — M62.838 MUSCLE SPASTICITY: ICD-10-CM

## 2022-01-03 PROCEDURE — 97597 DBRDMT OPN WND 1ST 20 CM/<: CPT

## 2022-01-03 PROCEDURE — 97597 DBRDMT OPN WND 1ST 20 CM/<: CPT | Performed by: SURGERY

## 2022-01-03 RX ORDER — GENTAMICIN SULFATE 1 MG/G
OINTMENT TOPICAL ONCE
Status: CANCELLED | OUTPATIENT
Start: 2022-01-03 | End: 2022-01-03

## 2022-01-03 RX ORDER — CLOBETASOL PROPIONATE 0.5 MG/G
OINTMENT TOPICAL ONCE
Status: CANCELLED | OUTPATIENT
Start: 2022-01-03 | End: 2022-01-03

## 2022-01-03 RX ORDER — BACITRACIN ZINC AND POLYMYXIN B SULFATE 500; 1000 [USP'U]/G; [USP'U]/G
OINTMENT TOPICAL ONCE
Status: CANCELLED | OUTPATIENT
Start: 2022-01-03 | End: 2022-01-03

## 2022-01-03 RX ORDER — LIDOCAINE HYDROCHLORIDE 40 MG/ML
SOLUTION TOPICAL ONCE
Status: CANCELLED | OUTPATIENT
Start: 2022-01-03 | End: 2022-01-03

## 2022-01-03 RX ORDER — BACITRACIN, NEOMYCIN, POLYMYXIN B 400; 3.5; 5 [USP'U]/G; MG/G; [USP'U]/G
OINTMENT TOPICAL ONCE
Status: CANCELLED | OUTPATIENT
Start: 2022-01-03 | End: 2022-01-03

## 2022-01-03 RX ORDER — LIDOCAINE 40 MG/G
CREAM TOPICAL ONCE
Status: CANCELLED | OUTPATIENT
Start: 2022-01-03 | End: 2022-01-03

## 2022-01-03 RX ORDER — GINSENG 100 MG
CAPSULE ORAL ONCE
Status: CANCELLED | OUTPATIENT
Start: 2022-01-03 | End: 2022-01-03

## 2022-01-03 RX ORDER — LIDOCAINE HYDROCHLORIDE 20 MG/ML
JELLY TOPICAL ONCE
Status: CANCELLED | OUTPATIENT
Start: 2022-01-03 | End: 2022-01-03

## 2022-01-03 RX ORDER — BETAMETHASONE DIPROPIONATE 0.05 %
OINTMENT (GRAM) TOPICAL ONCE
Status: CANCELLED | OUTPATIENT
Start: 2022-01-03 | End: 2022-01-03

## 2022-01-03 RX ORDER — LIDOCAINE 50 MG/G
OINTMENT TOPICAL ONCE
Status: CANCELLED | OUTPATIENT
Start: 2022-01-03 | End: 2022-01-03

## 2022-01-03 NOTE — PROGRESS NOTES
[address]   Progress Note and Procedure Note      Mehul Wall  MEDICAL RECORD NUMBER:  [id number]  AGE: 49 y.o. GENDER: female  : 1970  EPISODE DATE:  1/3/2022    Subjective:     Chief Complaint   Patient presents with    Wound Check     Pt thinks her wound needs debrided         HISTORY of PRESENT ILLNESS HPI     Mehul Wall is a 49 y.o. female who presents today for wound/ulcer evaluation.    Wound Context: Pt with R heel and R great toe wounds here for eval/treat  Wound/Ulcer Pain Timing/Severity: none  Quality of pain: N/A  Severity:  0 / 10   Modifying Factors: None  Associated Signs/Symptoms: none    Ulcer Identification:  Ulcer Type: pressure  Contributing Factors: chronic pressure    Wound: pressure        PAST MEDICAL HISTORY        Diagnosis Date    Ankle wound     and toe wound    Aptyalism 2017    Arthritis 2020    Asthma     Back pain 2017    Binocular vision disorder with diplopia 2017    [facility] limitation[facility] 2017    Hay fever 2017    Headache 2017    MS (multiple sclerosis) ([address]     Muscle spasticity     Neuropathic ulcer of left foot, limited to breakdown of skin ([address] 4/10/2017    Numbness 2016    Open wound of ankle 2017    Open wound of second toe of left foot 2018    Peripheral vascular disease ([address]     Seizure ([address]     occ. related to ms    Sepsis ([address] 2016    Weakness 2016       PAST SURGICAL HISTORY    Past Surgical History:   Procedure Laterality Date    BACLOFEN PUMP IMPLANTATION      BREAST BIOPSY Right     neg    COLONOSCOPY N/A 2019    Dr Jenny Simon ileum through ostomy-patent and healthy appearing anastomosis in the right colon-entero colonic anastomosis-10 yr recall    COLOSTOMY      FEMUR FRACTURE SURGERY      Right    FOOT AMPUTATION Left 2019    LEFT TRANSMET AMPUTATION performed by Linette Cortez MD at [address] [place] [address] HYSTERECTOMY      OTHER SURGICAL HISTORY      urostomy    OTHER SURGICAL HISTORY      pain pump    ND INSJ PRPH CTR VAD W/SUBQ PORT UNDER 5 YR N/A 6/26/2018    SINGLE LUMEN PORT PLACEMENT WITH FLUORO performed by Neetu Walls MD at [address] TOE AMPUTATION      L last toe    TONSILLECTOMY      URETEROTOMY         FAMILY HISTORY    Family History   Problem Relation Age of Onset    Cancer Mother         breast    Colon Cancer Mother     Colon Polyps Mother     Breast Cancer Mother     Diabetes Father     High Blood Pressure Father     Cancer Paternal Aunt         breast    Liver Cancer Paternal Aunt     Heart Disease Paternal Grandmother     Esophageal Cancer Neg Hx     Liver Disease Neg Hx     Rectal Cancer Neg Hx     Stomach Cancer Neg Hx        SOCIAL HISTORY    Social History     Tobacco Use    Smoking status: Current Some Day Smoker     Packs/day: 0.25     Types: Cigarettes    Smokeless tobacco: Never Used    Tobacco comment: 1 cigs today   Vaping Use    Vaping Use: Never used   Substance Use Topics    Alcohol use: Yes     Comment: yaritza    Drug use: Yes     Types: Marijuana (Weed)     Comment: daily        ALLERGIES    Allergies   Allergen Reactions    Codeine Nausea Only     Category: Allergy;    [place] Darvocet [Propoxyphene N-Acetaminophen]      Talking out of her head    Morphine      Category: Allergy;     Morphine And Related Itching and Swelling    Propoxyphene Swelling       MEDICATIONS    Current Outpatient Medications on File Prior to Encounter   Medication Sig Dispense Refill    DULoxetine (CYMBALTA) 30 MG extended release capsule Take 1 capsule by mouth daily (Patient taking differently: Take 30 mg by mouth nightly ) 30 capsule 5    methylphenidate (RITALIN) 20 MG tablet TAKE 1 TABLET BY MOUTH DAILY 30 tablet 0    HYDROcodone-acetaminophen (NORCO)  MG per tablet TAKE 1 TABLET BY MOUTH 3 TIMES DAILY AS NEEDED FOR PAIN.  REDUCE DOSES AS PAIN BECOMES MANAGEABLE 90 tablet 0    nitrofurantoin (MACRODANTIN) 50 MG capsule TAKE ONE CAPSULE BY MOUTH DAILY (Patient taking differently: Take 50 mg by mouth nightly ) 30 capsule 3    pilocarpine (SALAGEN) 7.5 MG tablet TAKE ONE TABLET BY MOUTH 3 TIMES DAILY 90 tablet 3    pregabalin (LYRICA) 300 MG capsule 1 bid (Patient taking differently: Take 300 mg by mouth nightly. 1 bid) 60 capsule 5    tiZANidine (ZANAFLEX) 4 MG tablet TAKE 3 TABLETS TWICE DAILY AS NEEDED  180 tablet 11    azelastine (ASTELIN) 0.1 % nasal spray USE 2 SPRAYS IN EACH NOSTRIL TWICE DAILY AS DIRECTED 30 mL 0    cetirizine (ZYRTEC) 10 MG tablet Take 10 mg by mouth daily      levETIRAcetam (KEPPRA) 500 MG tablet Take 1 tablet by mouth nightly (Patient taking differently: Take 750 mg by mouth nightly ) 30 tablet 5    Multiple Vitamins-Minerals (THERAPEUTIC MULTIVITAMIN-MINERALS) tablet Take 1 tablet by mouth daily      baclofen (LIORESAL) 10 MG tablet Take 1 tablet by mouth 2 times daily as needed (muscle spasms) (Patient taking differently: Take 10 mg by mouth 2 times daily as needed (muscle spasms) Indications: using pump ) 60 tablet 5    ondansetron (ZOFRAN-ODT) 4 MG disintegrating tablet Take 1 tablet by mouth 3 times daily as needed for Nausea or Vomiting 30 tablet 0    nicotine (NICODERM CQ) 21 MG/24HR Place 1 patch onto the skin daily 42 patch 0    naloxone 4 MG/0.1ML LIQD nasal spray 1 spray by Nasal route as needed for Opioid Reversal 1 each 5    nicotine (NICODERM CQ) 21 MG/24HR Place 1 patch onto the skin daily (Patient not taking: Reported on 12/9/2021) 42 patch 0    levETIRAcetam (KEPPRA) 500 MG tablet TAKE 1 TABLET BY MOUTH DAILY 90 tablet 3    Diapers & Supplies MISC Diapers, chucks, wipes, and gloves for incontinence.   [phone] each 11    hydroCHLOROthiazide (HYDRODIURIL) 25 MG tablet TAKE 1 TABLET BY MOUTH ONCE DAILY AS NEEDED 30 tablet 0    Catheters MISC Catheter supplies and lubricant 5 times daily G82.20  to [employer] [phone] 450 each 3    glucose monitoring kit (FREESTYLE) monitoring kit 1 kit by Does not apply route daily 1 kit 0    blood glucose monitor strips Test 1 times a day & as needed for symptoms of irregular blood glucose. 100 strip 3    docusate sodium (COLACE) 100 MG capsule Take 200 mg by mouth as needed for Constipation      PROAIR  (90 Base) MCG/ACT inhaler INHALE 1 PUFF INTO THE LUNGS EVERY 6 HOURS AS NEEDED FOR WHEEZING OR SHORTNESS OF BREATH 8.5 g 5    Heparin Lock Flush (HEPARIN FLUSH, 100 UNITS/ML,) 100 UNIT/ML injection 3 mLs by Intercatheter route every 30 days No every 30 days but every 4-6 weeks. Flush port with 300 units heparin with 20 cc normal saline for ocrevus infusion for Multiple sclerosis and NS 20CC (Patient not taking: Reported on 12/9/2021) 1 Syringe 5    ipratropium-albuterol (DUONEB) 0.5-2.5 (3) MG/3ML SOLN nebulizer solution USE 1 UNIT DOSE IN NEBULIZER EVERY 4 TO 6 HOURS AS NEEDED. 450 mL 11    BACLOFEN, PAIN PUMP REFILL CHARGE, by Implant route continuous Indications: every 3 months      glucose monitoring kit (FREESTYLE) monitoring kit 1 kit by Does not apply route daily 1 kit 0    blood glucose monitor strips Test 1 times a day & as needed for symptoms of irregular blood glucose. 100 strip 0    Sodium Chloride Flush (SALINE FLUSH) 0.9 % SOLN Infuse 20 mLs intravenously every 30 days Not every 30 days but every 4-6 weeks as need with 300 units of heparin to flush port for Infusion of Ocrevus for multiple sclerosis 20 mL 5     No current facility-administered medications on file prior to encounter. REVIEW OF SYSTEMS    A comprehensive review of systems was negative.     Objective:      BP 87/65   Pulse 82   Temp 96.6 °F (35.9 °C) (Bladder)   Resp 18   Ht 5' 9\" (1.753 m)   Wt 147 lb (66.7 kg)   BMI 21.71 kg/m²     Wt Readings from Last 3 Encounters:   01/03/22 147 lb (66.7 kg)   12/20/21 147 lb (66.7 kg)   12/13/21 147 lb 4.3 oz (66.8 kg) PHYSICAL EXAM    General Appearance: alert and oriented to person, place and time, well developed and well- nourished, in no acute distress  Skin: warm and dry, no rash or erythema  Head: normocephalic and atraumatic  Eyes: pupils equal, round, and reactive to light, extraocular eye movements intact, conjunctivae normal  ENT: tympanic membrane, external ear and ear canal normal bilaterally, nose without deformity, nasal mucosa and turbinates normal without polyps, lips teeth and gums normal  Neck: supple and non-tender without mass, no thyromegaly or thyroid nodules, no cervical lymphadenopathy  Pulmonary/Chest: clear to auscultation bilaterally- no wheezes, rales or rhonchi, normal air movement, no respiratory distress  Cardiovascular: normal rate, regular rhythm, normal S1 and S2, no murmurs, rubs, clicks, or gallops, distal pulses intact, no carotid bruits  Abdomen: soft, non-tender, non-distended, normal bowel sounds, no masses or organomegaly  Extremities: no cyanosis, clubbing or edema  Musculoskeletal: normal range of motion, no joint swelling, deformity or tenderness      Assessment:      Problem List Items Addressed This Visit     Paraplegia (HCC) (Chronic)    * (Principal) Ulcer of right heel, with fat layer exposed ([address] - Primary (Chronic)    Relevant Orders    Initiate Outpatient Wound Care Protocol    Chronic ulcer of right great toe, with fat layer exposed ([address] (Chronic)    Relevant Orders    Initiate Outpatient Wound Care Protocol           Procedure Note  Indications:  Based on my examination of this patient's wound(s)/ulcer(s) today, debridement is required to promote healing and evaluate the wound base.     Performed by: Daniel Kemp MD    Consent obtained:  Yes    Time out taken:  Yes    Pain Control: Anesthetic  Anesthetic: None       Debridement:Non-excisional Debridement    Using #15 blade scalpel and forceps the wound(s)/ulcer(s) was/were sharply debrided down through and including the removal of epidermis and dermis. Devitalized Tissue Debrided:  fibrin, biofilm, slough, necrotic/eschar and exudate      Pre Debridement Measurements:  Are located in the Wound/Ulcer Documentation Flow Sheet    Wound/Ulcer #: 1 and 2    Percent of Wound(s)/Ulcer(s) Debrided: 100%    Total Surface Area Debrided:  0.6 sq cm       Diabetic/Pressure/Non Pressure Ulcers only:  Ulcer: Pressure ulcer, Stage 3             Post Debridement Measurements:    Wound/Ulcer Descriptions are Pre Debridement --EXCEPT MEASUREMENTS    Wound 12/13/21 Heel Right; Posterior Wound 1, Pressure Stage 3, R.  Posterior Heel (Active)   Wound Image   01/03/22 1435   Wound Etiology Pressure Stage  3 01/03/22 1435   Dressing Status Old drainage noted 01/03/22 1435   Wound Cleansed Soap and water 12/20/21 1454   Dressing/Treatment Gauze dressing/dressing sponge;Roll gauze;Tape/Soft cloth adhesive tape;Xeroform 12/20/21 1549   Offloading for Diabetic Foot Ulcers Yes (type) 01/03/22 1435   Wound Length (cm) 0.8 cm 01/03/22 1435   Wound Width (cm) 0.7 cm 01/03/22 1435   Wound Depth (cm) 0.1 cm 01/03/22 1435   Wound Surface Area (cm^2) 0.56 cm^2 01/03/22 1435   Change in Wound Size % (l*w) 88.33 01/03/22 1435   Wound Volume (cm^3) 0.056 cm^3 01/03/22 1435   Wound Healing % 88 01/03/22 1435   Post-Procedure Length (cm) 0.8 cm 01/03/22 1445   Post-Procedure Width (cm) 0.7 cm 01/03/22 1445   Post-Procedure Depth (cm) 0.1 cm 01/03/22 1445   Post-Procedure Surface Area (cm^2) 0.56 cm^2 01/03/22 1445   Post-Procedure Volume (cm^3) 0.056 cm^3 01/03/22 1445   Distance Tunneling (cm) 0 cm 01/03/22 1435   Tunneling Position ___ O'Clock 0 01/03/22 1435   Undermining Starts ___ O'Clock 0 01/03/22 1435   Undermining Ends___ O'Clock 0 01/03/22 1435   Undermining [place] Distance (cm) 0 01/03/22 1435   Wound Assessment Slough;Pink/red 01/03/22 1435   Drainage Amount Small 01/03/22 1435   Drainage Description Serosanguinous 01/03/22 1435   Odor None 01/03/22 1435   [place]-wound Assessment Dry/flaky 01/03/22 1435   Margins Attached edges 01/03/22 1435   Number of days: 21       Wound 12/13/21 Toe (Comment  which one) Right Wound 2, Traumatic,R. anterior Great toe (Active)   Wound Image   01/03/22 1435   Wound Etiology Traumatic 01/03/22 1435   Dressing Status Old drainage noted 01/03/22 1435   Wound Cleansed Soap and water 01/03/22 1435   Dressing/Treatment Gauze dressing/dressing sponge;Roll gauze;Tape/Soft cloth adhesive tape;Xeroform 12/20/21 1549   Wound Length (cm) 0.2 cm 01/03/22 1435   Wound Width (cm) 0.2 cm 01/03/22 1435   Wound Depth (cm) 0.1 cm 01/03/22 1435   Wound Surface Area (cm^2) 0.04 cm^2 01/03/22 1435   Change in Wound Size % (l*w) 88.89 01/03/22 1435   Wound Volume (cm^3) 0.004 cm^3 01/03/22 1435   Wound Healing % 89 01/03/22 1435   Post-Procedure Length (cm) 0.2 cm 01/03/22 1445   Post-Procedure Width (cm) 0.2 cm 01/03/22 1445   Post-Procedure Depth (cm) 0.1 cm 01/03/22 1445   Post-Procedure Surface Area (cm^2) 0.04 cm^2 01/03/22 1445   Post-Procedure Volume (cm^3) 0.004 cm^3 01/03/22 1445   Distance Tunneling (cm) 0 cm 01/03/22 1435   Tunneling Position ___ O'Clock 0 01/03/22 1435   Undermining Starts ___ O'Clock 0 01/03/22 1435   Undermining Ends___ O'Clock 0 01/03/22 1435   Undermining Max[place] Distance (cm) 0 01/03/22 1435   Wound Assessment [place]/red;Slough 01/03/22 1435   Drainage Amount Scant 01/03/22 1435   Drainage Description Serosanguinous 01/03/22 1435   Odor None 01/03/22 1435   [place]-wound Assessment Intact 01/03/22 1435   Margins Attached edges 01/03/22 1435   Wound Thickness Description not for Pressure Injury Full thickness 01/03/22 1435   Number of days: 21             Estimated Blood Loss:  Minimal    Hemostasis Achieved:  by pressure    Procedural Pain:  0  / 10     Post Procedural Pain:  0 / 10     Response to treatment:  Well tolerated by patient.          Plan:     Problem List Items Addressed This Visit     Paraplegia ([facility] (Chronic) * (Principal) Ulcer of right heel, with fat layer exposed ([address] - Primary (Chronic)    Relevant Orders    Initiate Outpatient Wound Care Protocol    Chronic ulcer of right great toe, with fat layer exposed ([address] (Chronic)    Relevant Orders    Initiate Outpatient Wound Care Protocol          Wounds almost healed Cont current care RTO 3 weeks    Treatment Note please see attached Discharge Instructions    In my professional opinion this patient would benefit from HBO Therapy: No    Written patient dismissal instructions given to patient and signed by patient or POA. Discharge [address] and Hyperbaric Oxygen Therapy   Physician Orders and Discharge Instructions  [address] [address]  Telephone: [phone]    NAME:  Eve Dow:  1970  MEDICAL RECORD NUMBER:  [id number]  DATE:  1/3/2022    Discharge condition: Stable    Discharge to: Home    Left via:Private automobile    Accompanied by: Caregiver     ECF/HHA: None     Dressing Orders: Right Heel and Great Toe Wounds  Xeroform to open area (cut to fit), Cover with dry gauze, roll gauze and tape  Change once daily  Wear heel lift boots     [facility] follow up visit ____________3 weeks_________________  (Please note your next appointment above and if you are unable to keep, kindly give a 24 hour notice. Thank you.)    If you experience any of the following, please call the [facility] during business hours:    * Increase in Pain  * Temperature over 101  * Increase in drainage from your wound  * Drainage with a foul odor  * Bleeding  * Increase in swelling  * Need for compression bandage changes due to slippage, breakthrough drainage. If you need medical attention outside of the business hours of the [facility] please contact your PCP or go to the nearest emergency room.         Electronically signed by Shirley Pierson MD on 1/3/2022 at 2:53 PM

## 2022-01-04 ENCOUNTER — TELEPHONE (OUTPATIENT)
Dept: NEUROLOGY | Age: 52
End: 2022-01-04

## 2022-01-04 RX ORDER — METHYLPHENIDATE HYDROCHLORIDE 20 MG/1
TABLET ORAL
Qty: 30 TABLET | Refills: 0 | Status: SHIPPED | OUTPATIENT
Start: 2022-01-06 | End: 2022-02-01

## 2022-01-04 RX ORDER — HYDROCODONE BITARTRATE AND ACETAMINOPHEN 10; 325 MG/1; MG/1
TABLET ORAL
Qty: 90 TABLET | Refills: 0 | Status: SHIPPED | OUTPATIENT
Start: 2022-01-06 | End: 2022-02-01

## 2022-01-04 NOTE — TELEPHONE ENCOUNTER
Called and spoke with patient to let her know that her appointment for 02-08-22 with Dr. Connor Santiago had to be rescheduled due to the provider is out of the office. Patient is aware of when I have her appointment rescheduled too.

## 2022-01-10 ENCOUNTER — TELEPHONE (OUTPATIENT)
Dept: INTERNAL MEDICINE | Age: 52
End: 2022-01-10

## 2022-01-10 NOTE — TELEPHONE ENCOUNTER
S/w , pt needs refills on gloves, wipes, bed pads, and diapers sent to [facility] office in [address] [facility].

## 2022-01-11 DIAGNOSIS — R15.9 FULL INCONTINENCE OF FECES: Primary | ICD-10-CM

## 2022-01-11 DIAGNOSIS — R32 URINARY INCONTINENCE, UNSPECIFIED TYPE: ICD-10-CM

## 2022-01-24 ENCOUNTER — HOSPITAL ENCOUNTER (OUTPATIENT)
Dept: WOUND CARE | Age: 52
Discharge: HOME | End: 2022-01-24
Payer: MEDICARE

## 2022-01-24 VITALS
HEIGHT: 69 IN | WEIGHT: 147 LBS | DIASTOLIC BLOOD PRESSURE: 72 MMHG | HEART RATE: 71 BPM | SYSTOLIC BLOOD PRESSURE: 112 MMHG | BODY MASS INDEX: 21.77 KG/M2 | RESPIRATION RATE: 18 BRPM | TEMPERATURE: 97.5 F

## 2022-01-24 DIAGNOSIS — L97.412 ULCER OF RIGHT HEEL, WITH FAT LAYER EXPOSED (HCC): Primary | ICD-10-CM

## 2022-01-24 DIAGNOSIS — L97.512 CHRONIC ULCER OF RIGHT GREAT TOE, WITH FAT LAYER EXPOSED (HCC): ICD-10-CM

## 2022-01-24 DIAGNOSIS — G82.20 PARAPLEGIA (HCC): Chronic | ICD-10-CM

## 2022-01-24 PROCEDURE — 97597 DBRDMT OPN WND 1ST 20 CM/<: CPT

## 2022-01-24 PROCEDURE — 6370000000 HC RX 637 (ALT 250 FOR IP): Performed by: SURGERY

## 2022-01-24 PROCEDURE — 97597 DBRDMT OPN WND 1ST 20 CM/<: CPT | Performed by: SURGERY

## 2022-01-24 RX ORDER — LIDOCAINE HYDROCHLORIDE 20 MG/ML
JELLY TOPICAL ONCE
Status: COMPLETED | OUTPATIENT
Start: 2022-01-24 | End: 2022-01-24

## 2022-01-24 RX ORDER — LIDOCAINE 40 MG/G
CREAM TOPICAL ONCE
Status: CANCELLED | OUTPATIENT
Start: 2022-01-24 | End: 2022-01-24

## 2022-01-24 RX ORDER — GENTAMICIN SULFATE 1 MG/G
OINTMENT TOPICAL ONCE
Status: CANCELLED | OUTPATIENT
Start: 2022-01-24 | End: 2022-01-24

## 2022-01-24 RX ORDER — CLOBETASOL PROPIONATE 0.5 MG/G
OINTMENT TOPICAL ONCE
Status: CANCELLED | OUTPATIENT
Start: 2022-01-24 | End: 2022-01-24

## 2022-01-24 RX ORDER — GINSENG 100 MG
CAPSULE ORAL ONCE
Status: CANCELLED | OUTPATIENT
Start: 2022-01-24 | End: 2022-01-24

## 2022-01-24 RX ORDER — LIDOCAINE 50 MG/G
OINTMENT TOPICAL ONCE
Status: CANCELLED | OUTPATIENT
Start: 2022-01-24 | End: 2022-01-24

## 2022-01-24 RX ORDER — BACITRACIN ZINC AND POLYMYXIN B SULFATE 500; 1000 [USP'U]/G; [USP'U]/G
OINTMENT TOPICAL ONCE
Status: CANCELLED | OUTPATIENT
Start: 2022-01-24 | End: 2022-01-24

## 2022-01-24 RX ORDER — LIDOCAINE HYDROCHLORIDE 40 MG/ML
SOLUTION TOPICAL ONCE
Status: CANCELLED | OUTPATIENT
Start: 2022-01-24 | End: 2022-01-24

## 2022-01-24 RX ORDER — BETAMETHASONE DIPROPIONATE 0.05 %
OINTMENT (GRAM) TOPICAL ONCE
Status: CANCELLED | OUTPATIENT
Start: 2022-01-24 | End: 2022-01-24

## 2022-01-24 RX ORDER — BACITRACIN, NEOMYCIN, POLYMYXIN B 400; 3.5; 5 [USP'U]/G; MG/G; [USP'U]/G
OINTMENT TOPICAL ONCE
Status: CANCELLED | OUTPATIENT
Start: 2022-01-24 | End: 2022-01-24

## 2022-01-24 RX ORDER — LIDOCAINE HYDROCHLORIDE 20 MG/ML
JELLY TOPICAL ONCE
Status: CANCELLED | OUTPATIENT
Start: 2022-01-24 | End: 2022-01-24

## 2022-01-24 RX ADMIN — LIDOCAINE HYDROCHLORIDE: 20 JELLY TOPICAL at 13:45

## 2022-01-24 ASSESSMENT — PAIN SCALES - GENERAL: PAINLEVEL_OUTOF10: 0

## 2022-01-24 NOTE — PROGRESS NOTES
[address]   Progress Note and Procedure Note      Sunni Perrin  MEDICAL RECORD NUMBER:  [id number]  AGE: 47 y.o. GENDER: female  : 1970  EPISODE DATE:  2022    Subjective:     Chief Complaint   Patient presents with    Wound Check         HISTORY of PRESENT ILLNESS HPI     Sunni Perrin is a 47 y.o. female who presents today for wound/ulcer evaluation.    Wound Context: Pt with R hel ,R great toe and R ankle wounds here for eval/treat  Wound/Ulcer Pain Timing/Severity: none  Quality of pain: N/A  Severity:  0 / 10   Modifying Factors: None  Associated Signs/Symptoms: none    Ulcer Identification:  Ulcer Type: pressure  Contributing Factors: edema and chronic pressure    Wound: pressure        PAST MEDICAL HISTORY        Diagnosis Date    Ankle wound     and toe wound    Aptyalism 2017    Arthritis 2020    Asthma     Back pain 2017    Binocular vision disorder with diplopia 2017    CAFL (chronic airflow limitation) ([facility]) 2017    Hay fever 2017    Headache 2017    MS (multiple sclerosis) ([facility])     Muscle spasticity     Neuropathic ulcer of left foot, limited to breakdown of skin ([facility] 4/10/2017    Numbness 2016    Open wound of ankle 2017    Open wound of second toe of left foot 2018    Peripheral vascular disease ([facility])     Seizure ([facility])     occ. related to ms    Sepsis ([facility] 2016    Weakness 2016       PAST SURGICAL HISTORY    Past Surgical History:   Procedure Laterality Date    BACLOFEN PUMP IMPLANTATION      BREAST BIOPSY Right     neg    COLONOSCOPY N/A 2019    Dr Colin Patella ileum through ostomy-patent and healthy appearing anastomosis in the right colon-entero colonic anastomosis-10 yr recall    COLOSTOMY      FEMUR FRACTURE SURGERY      Right    FOOT AMPUTATION Left 2019    LEFT TRANSMET AMPUTATION performed by Ki Greene MD at [facility] SURGICAL HISTORY      urostomy    OTHER SURGICAL HISTORY      pain pump    [facility] V[facility] W/SUBQ PORT UNDER 5 YR N/A 6/26/2018    SINGLE LUMEN PORT PLACEMENT WITH FLUORO performed by Adonis Seip, MD at [address] TOE AMPUTATION      L last toe    TONSILLECTOMY      URETEROTOMY         FAMILY HISTORY    Family History   Problem Relation Age of Onset    Cancer Mother         breast    Colon Cancer Mother     Colon Polyps Mother     Breast Cancer Mother     Diabetes Father     High Blood Pressure Father     Cancer Paternal Aunt         breast    Liver Cancer Paternal Aunt     Heart Disease Paternal Grandmother     Esophageal Cancer Neg Hx     Liver Disease Neg Hx     Rectal Cancer Neg Hx     Stomach Cancer Neg Hx        SOCIAL HISTORY    Social History     Tobacco Use    Smoking status: Current Some Day Smoker     Packs/day: 0.25     Types: Cigarettes    Smokeless tobacco: Never Used    Tobacco comment: less than 10 cigs today   Vaping Use    Vaping Use: Never used   Substance Use Topics    Alcohol use: Yes     Comment: yaritza    Drug use: Yes     Types: Marijuana (Weed)     Comment: daily        ALLERGIES    Allergies   Allergen Reactions    Codeine Nausea Only     Category: Allergy;    [de-identified] Darvocet [Propoxyphene N-Acetaminophen]      Talking out of her head    Morphine      Category: Allergy;     Morphine And Related Itching and Swelling    Propoxyphene Swelling       MEDICATIONS    Current Outpatient Medications on File Prior to Encounter   Medication Sig Dispense Refill    Diapers & Supplies MISC Indications: FX: [id number] Diapers, Chucks, Wipes, and Gloves. 100 each 3    methylphenidate (RITALIN) 20 MG tablet TAKE 1 TABLET BY MOUTH DAILY 30 tablet 0    HYDROcodone-acetaminophen (NORCO)  MG per tablet TAKE 1 TABLET BY MOUTH 3 TIMES DAILY AS NEEDED FOR PAIN.  REDUCE DOSES AS PAIN BECOMES MANAGEABLE 90 tablet 0    DULoxetine (CYMBALTA) 30 MG extended release capsule Take 1 capsule by mouth daily (Patient taking differently: Take 30 mg by mouth nightly ) 30 capsule 5    nitrofurantoin (MACRODANTIN) 50 MG capsule TAKE ONE CAPSULE BY MOUTH DAILY (Patient taking differently: Take 50 mg by mouth nightly ) 30 capsule 3    pilocarpine (SALAGEN) 7.5 MG tablet TAKE ONE TABLET BY MOUTH 3 TIMES DAILY 90 tablet 3    pregabalin (LYRICA) 300 MG capsule 1 bid (Patient taking differently: Take 300 mg by mouth nightly. 1 bid) 60 capsule 5    tiZANidine (ZANAFLEX) 4 MG tablet TAKE 3 TABLETS TWICE DAILY AS NEEDED  180 tablet 11    levETIRAcetam (KEPPRA) 500 MG tablet TAKE 1 TABLET BY MOUTH DAILY 90 tablet 3    azelastine (ASTELIN) 0.1 % nasal spray USE 2 SPRAYS IN EACH NOSTRIL TWICE DAILY AS DIRECTED 30 mL 0    hydroCHLOROthiazide (HYDRODIURIL) 25 MG tablet TAKE 1 TABLET BY MOUTH ONCE DAILY AS NEEDED 30 tablet 0    cetirizine (ZYRTEC) 10 MG tablet Take 10 mg by mouth daily      Catheters MISC Catheter supplies and lubricant 5 times daily G82.20  to [facility] [phone] 450 each 3    levETIRAcetam (KEPPRA) 500 MG tablet Take 1 tablet by mouth nightly (Patient taking differently: Take 750 mg by mouth nightly ) 30 tablet 5    glucose monitoring kit (FREESTYLE) monitoring kit 1 kit by Does not apply route daily 1 kit 0    blood glucose monitor strips Test 1 times a day & as needed for symptoms of irregular blood glucose.  100 strip 3    docusate sodium (COLACE) 100 MG capsule Take 200 mg by mouth as needed for Constipation      PROAIR  (90 Base) MCG/ACT inhaler INHALE 1 PUFF INTO THE LUNGS EVERY 6 HOURS AS NEEDED FOR WHEEZING OR SHORTNESS OF BREATH 8.5 g 5    ipratropium-albuterol (DUONEB) 0.5-2.5 (3) MG/3ML SOLN nebulizer solution USE 1 UNIT DOSE IN NEBULIZER EVERY 4 TO 6 HOURS AS NEEDED. 450 mL 11    BACLOFEN, PAIN PUMP REFILL CHARGE, by Implant route continuous Indications: every 3 months      Multiple Vitamins-Minerals (THERAPEUTIC MULTIVITAMIN-MINERALS) tablet Take 1 Appearance: alert and oriented to person, place and time, well developed and well- nourished, in no acute distress  Skin: warm and dry, no rash or erythema  Head: normocephalic and atraumatic  Eyes: pupils equal, round, and reactive to light, extraocular eye movements intact, conjunctivae normal  ENT: tympanic membrane, external ear and ear canal normal bilaterally, nose without deformity, nasal mucosa and turbinates normal without polyps, lips teeth and gums normal  Neck: supple and non-tender without mass, no thyromegaly or thyroid nodules, no cervical lymphadenopathy  Pulmonary/Chest: clear to auscultation bilaterally- no wheezes, rales or rhonchi, normal air movement, no respiratory distress  Cardiovascular: normal rate, regular rhythm, normal S1 and S2, no murmurs, rubs, clicks, or gallops, distal pulses intact, no carotid bruits  Abdomen: soft, non-tender, non-distended, normal bowel sounds, no masses or organomegaly  Extremities: no cyanosis, clubbing or edema  Musculoskeletal: normal range of motion, no joint swelling, deformity or tenderness      Assessment:      Problem List Items Addressed This Visit     Paraplegia (HCC) (Chronic)    * (Principal) Ulcer of right heel, with fat layer exposed ([address] - Primary (Chronic)    Relevant Orders    Initiate Outpatient Wound Care Protocol    Chronic ulcer of right great toe, with fat layer exposed ([address] (Chronic)    Relevant Orders    Initiate Outpatient Wound Care Protocol           Procedure Note  Indications:  Based on my examination of this patient's wound(s)/ulcer(s) today, debridement is required to promote healing and evaluate the wound base.     Performed by: Franky Kuo MD    Consent obtained:  Yes    Time out taken:  Yes    Pain Control: Anesthetic  Anesthetic: 2% Lidocaine Gel Topical       Debridement:Non-excisional Debridement    Using curette the wound(s)/ulcer(s) was/were sharply debrided down through and including the removal of epidermis and dermis. Devitalized Tissue Debrided:  fibrin, biofilm, slough, necrotic/eschar, exudate and callus      Pre Debridement Measurements:  Are located in the Wound/Ulcer Documentation Flow Sheet    Wound/Ulcer #: 1, 2 and 3    Percent of Wound(s)/Ulcer(s) Debrided: 100%    Total Surface Area Debrided:  0.7 sq cm       Diabetic/Pressure/Non Pressure Ulcers only:  Ulcer: Pressure ulcer, Stage 3           Post Debridement Measurements:    Wound/Ulcer Descriptions are Pre Debridement --EXCEPT MEASUREMENTS    Wound 12/13/21 Heel Right; Posterior Wound 1, Pressure Stage 3, R. Posterior Heel (Active)   Wound Image   01/24/22 1345   Wound Etiology Pressure Stage  3 01/24/22 1345   Dressing Status Old drainage noted 01/24/22 1345   Wound Cleansed Soap and water 01/24/22 1345   Dressing/Treatment Gauze dressing/dressing sponge;Roll gauze;Tape/Soft cloth adhesive tape;Xeroform 01/03/22 1450   Offloading for Diabetic Foot Ulcers Yes (type); Other (comment) 01/24/22 1345   Wound Length (cm) 0.7 cm 01/24/22 1345   Wound Width (cm) 0.6 cm 01/24/22 1345   Wound Depth (cm) 0.1 cm 01/24/22 1345   Wound Surface Area (cm^2) 0.42 cm^2 01/24/22 1345   Change in Wound Size % (l*w) 91.25 01/24/22 1345   Wound Volume (cm^3) 0.042 cm^3 01/24/22 1345   Wound Healing % 91 01/24/22 1345   Post-Procedure Length (cm) 0.7 cm 01/24/22 1409   Post-Procedure Width (cm) 0.6 cm 01/24/22 1409   Post-Procedure Depth (cm) 0.1 cm 01/24/22 1409   Post-Procedure Surface Area (cm^2) 0.42 cm^2 01/24/22 1409   Post-Procedure Volume (cm^3) 0.042 cm^3 01/24/22 1409   Distance Tunneling (cm) 0 cm 01/24/22 1345   Tunneling Position ___ O'Clock 0 01/24/22 1345   Undermining Starts ___ O'Clock 0 01/24/22 1345   Undermining Ends___ O'Clock 0 01/24/22 1345   Undermining Maxium Distance (cm) 0 01/24/22 1345   Wound Assessment Pink/red;Slough 01/24/22 1345   Drainage Amount Small 01/24/22 1345   Drainage Description Serosanguinous 01/24/22 1345   Odor None 01/24/22 [address] Dry/flaky 01/24/22 1345   Margins Attached edges 01/24/22 1345   Number of days: 42       Wound 12/13/21 Toe (Comment  which one) Right Wound 2, Traumatic,R. anterior Great toe (Active)   Wound Image   01/24/22 1345   Wound Etiology Traumatic 01/24/22 1345   Dressing Status Old drainage noted 01/24/22 1345   Wound Cleansed Soap and water 01/24/22 1345   Dressing/Treatment Gauze dressing/dressing sponge;Roll gauze;Tape/Soft cloth adhesive tape;Xeroform 12/20/21 1549   Wound Length (cm) 0.5 cm 01/24/22 1345   Wound Width (cm) 0.3 cm 01/24/22 1345   Wound Depth (cm) 0.1 cm 01/24/22 1345   Wound Surface Area (cm^2) 0.15 cm^2 01/24/22 1345   Change in Wound Size % (l*w) 58.33 01/24/22 1345   Wound Volume (cm^3) 0.015 cm^3 01/24/22 1345   Wound Healing % 58 01/24/22 1345   Post-Procedure Length (cm) 0.5 cm 01/24/22 1409   Post-Procedure Width (cm) 0.3 cm 01/24/22 1409   Post-Procedure Depth (cm) 0.1 cm 01/24/22 1409   Post-Procedure Surface Area (cm^2) 0.15 cm^2 01/24/22 1409   Post-Procedure Volume (cm^3) 0.015 cm^3 01/24/22 1409   Distance Tunneling (cm) 0 cm 01/24/22 1345   Tunneling Position ___ O'Clock 0 01/24/22 1345   Undermining Starts ___ O'Clock 0 01/24/22 1345   Undermining Ends___ O'Clock 0 01/24/22 1345   Undermining Maxium Distance (cm) 0 01/24/22 1345   Wound Assessment Pink/red;Slough 01/24/22 1345   Drainage Amount Scant 01/24/22 1345   Drainage Description Serosanguinous 01/24/22 1345   Odor None 01/24/22 1345   Audelia-wound Assessment Intact 01/24/22 1345   Margins Attached edges 01/24/22 1345   Wound Thickness Description not for Pressure Injury Full thickness 01/24/22 1345   Number of days: 42       Wound 01/24/22 Ankle Anterior;Right #3 Right Anterior foot (traumatic) (Active)   Wound Image   01/24/22 1345   Wound Etiology Traumatic 01/24/22 1345   Dressing Status Old drainage noted 01/24/22 1345   Wound Cleansed Soap and water 01/24/22 1345   Wound Length (cm) 0.2 cm 01/24/22 1345   Wound Width (cm) 0.3 cm 01/24/22 1345   Wound Depth (cm) 0.1 cm 01/24/22 1345   Wound Surface Area (cm^2) 0.06 cm^2 01/24/22 1345   Wound Volume (cm^3) 0.006 cm^3 01/24/22 1345   Post-Procedure Length (cm) 0.2 cm 01/24/22 1409   Post-Procedure Width (cm) 0.3 cm 01/24/22 1409   Post-Procedure Depth (cm) 0.1 cm 01/24/22 1409   Post-Procedure Surface Area (cm^2) 0.06 cm^2 01/24/22 1409   Post-Procedure Volume (cm^3) 0.006 cm^3 01/24/22 1409   Distance Tunneling (cm) 0 cm 01/24/22 1345   Tunneling Position ___ O'Clock 0 01/24/22 1345   Undermining Starts ___ O'Clock 0 01/24/22 1345   Undermining Ends___ O'Clock 0 01/24/22 1345   Undermining Maxium Distance (cm) 0 01/24/22 1345   Wound Assessment Pink/red;Slough 01/24/22 1345   Drainage Amount Small 01/24/22 1345   Drainage Description Serosanguinous 01/24/22 1345   Odor None 01/24/22 1345   Audelia-wound Assessment Dry/flaky 01/24/22 1345   Margins Attached edges 01/24/22 1345   Wound Thickness Description not for Pressure Injury Partial thickness 01/24/22 1345   Number of days: 0             Estimated Blood Loss:  Minimal    Hemostasis Achieved:  by pressure    Procedural Pain:  0  / 10     Post Procedural Pain:  0 / 10     Response to treatment:  Well tolerated by patient. Plan:     Problem List Items Addressed This Visit     Paraplegia ([address] (Chronic)    * (Principal) Ulcer of right heel, with fat layer exposed ([address] - Primary (Chronic)    Relevant Orders    Initiate Outpatient Wound Care Protocol    Chronic ulcer of right great toe, with fat layer exposed ([address] (Chronic)    Relevant Orders    Initiate Outpatient Wound Care Protocol          Cont current care RTO 2 weeks    Treatment Note please see attached Discharge Instructions    In my professional opinion this patient would benefit from HBO Therapy: No    Written patient dismissal instructions given to patient and signed by patient or POA.          Discharge [address] [facility]   Physician Orders and Discharge Instructions  [facility]  [facility] [place]   Telephone: [phone]     NAME:  Christiano Barry OF BIRTH:  1970  MEDICAL RECORD NUMBER:  [id number]  DATE:  1/24/2022    Discharge condition: Stable    Discharge to: Home    Left via:Private automobile    Accompanied by: [address] f: [phone] f: [phone] p: [phone] [email or web address]       Dressing Orders: Left Foot and Toe Wounds   Xeroform to open area (cut to fit), Cover with dry gauze, roll gauze and tape  Change once daily  Wear heel lift boots     [facility] follow up visit ____________2 weeks_________________  (Please note your next appointment above and if you are unable to keep, kindly give a 24 hour notice. Thank you.)    If you experience any of the following, please call the [facility] during business hours:    * Increase in Pain  * Temperature over 101  * Increase in drainage from your wound  * Drainage with a foul odor  * Bleeding  * Increase in swelling  * Need for compression bandage changes due to slippage, breakthrough drainage. If you need medical attention outside of the business hours of the [facility] please contact your PCP or go to the nearest emergency room.         Electronically signed by Amadou Birch MD on 1/24/2022 at 2:39 PM

## 2022-01-24 NOTE — PROGRESS NOTES
[address] f: [phone] f: [phone] p: [phone] [email or web address]      Ordering Center:     [address]  [address] [phone]  WOUND CARE Dept: [facility] [facility] [address] NUMBER [phone]    Patient Information:      José Miguel Patel  [address]   [phone]   : 1970  AGE: 47 y.o. GENDER: female   EPISODE DATE: 2022    Insurance:      PRIMARY INSURANCE:  Plan: MEDICARE PART A AND B  Coverage: MEDICARE  Effective Date: 2015  Group Number: [unfilled]  Subscriber Number: [id number] - (Medicare)    Payor/Plan Subscr  Sex Relation Sub. Ins. ID Effective Group Num   1. [address] [facility]\"" 1970 Female Self [id number] 1/1/15                                    [address]    2. MEDICAID [place] -* AJAY HERNANDEZ O 1970 Female Self [id number] 1/15/15                                    [address]        Patient Wound Information:      Problem List Items Addressed This Visit     Paraplegia ([address] (Chronic)    * (Principal) Ulcer of right heel, with fat layer exposed ([address] - Primary (Chronic)    Relevant Orders    Initiate Outpatient Wound Care Protocol    Chronic ulcer of right great toe, with fat layer exposed ([address] (Chronic)    Relevant Orders    Initiate Outpatient Wound Care Protocol          WOUNDS REQUIRING DRESSING SUPPLIES:     Wound 21 Heel Right; Posterior Wound 1, Pressure Stage 3, R. Posterior Heel (Active)   Wound Image   22 1345   Wound Etiology Pressure Stage  3 22 1345   Dressing Status Old drainage noted 22 1345   Wound Cleansed Soap and water 22 1345   Dressing/Treatment Gauze dressing/dressing sponge;Roll gauze;Tape/Soft cloth adhesive tape;Xeroform 22 1450   Offloading for Diabetic Foot Ulcers Yes (type); Other (comment) 22 1345   Wound Length (cm) 0.7 cm 01/24/22 1345   Wound Width (cm) 0.6 cm 01/24/22 1345   Wound Depth (cm) 0.1 cm 01/24/22 1345   Wound Surface Area (cm^2) 0.42 cm^2 01/24/22 1345   Change in Wound Size % (l*w) 91.25 01/24/22 1345   Wound Volume (cm^3) 0.042 cm^3 01/24/22 1345   Wound Healing % 91 01/24/22 1345   Post-Procedure Length (cm) 0.7 cm 01/24/22 1409   Post-Procedure Width (cm) 0.6 cm 01/24/22 1409   Post-Procedure Depth (cm) 0.1 cm 01/24/22 1409   Post-Procedure Surface Area (cm^2) 0.42 cm^2 01/24/22 1409   Post-Procedure Volume (cm^3) 0.042 cm^3 01/24/22 1409   Distance Tunneling (cm) 0 cm 01/24/22 1345   Tunneling Position ___ O'Clock 0 01/24/22 1345   Undermining Starts ___ O'Clock 0 01/24/22 1345   Undermining Ends___ O'Clock 0 01/24/22 1345   Undermining Maxium Distance (cm) 0 01/24/22 1345   Wound Assessment Pink/red;Slough 01/24/22 1345   Drainage Amount Small 01/24/22 1345   Drainage Description Serosanguinous 01/24/22 1345   Odor None 01/24/22 1345   Audelia-wound Assessment Dry/flaky 01/24/22 1345   Margins Attached edges 01/24/22 1345   Number of days: 42       Wound 12/13/21 Toe (Comment  which one) Right Wound 2, Traumatic,R. anterior Great toe (Active)   Wound Image   01/24/22 1345   Wound Etiology Traumatic 01/24/22 1345   Dressing Status Old drainage noted 01/24/22 1345   Wound Cleansed Soap and water 01/24/22 1345   Dressing/Treatment Gauze dressing/dressing sponge;Roll gauze;Tape/Soft cloth adhesive tape;Xeroform 12/20/21 1549   Wound Length (cm) 0.5 cm 01/24/22 1345   Wound Width (cm) 0.3 cm 01/24/22 1345   Wound Depth (cm) 0.1 cm 01/24/22 1345   Wound Surface Area (cm^2) 0.15 cm^2 01/24/22 1345   Change in Wound Size % (l*w) 58.33 01/24/22 1345   Wound Volume (cm^3) 0.015 cm^3 01/24/22 1345   Wound Healing % 58 01/24/22 1345   Post-Procedure Length (cm) 0.5 cm 01/24/22 1409   Post-Procedure Width (cm) 0.3 cm 01/24/22 1409   Post-Procedure Depth (cm) 0.1 cm 01/24/22 1409   Post-Procedure Surface Area (cm^2) 0.15 cm^2 01/24/22 1409   Post-Procedure Volume (cm^3) 0.015 cm^3 01/24/22 1409   Distance Tunneling (cm) 0 cm 01/24/22 1345   Tunneling Position ___ O'Clock 0 01/24/22 1345   Undermining Starts ___ O'Clock 0 01/24/22 1345   Undermining Ends___ O'Clock 0 01/24/22 1345   Undermining Maxium Distance (cm) 0 01/24/22 1345   Wound Assessment Pink/red;Slough 01/24/22 1345   Drainage Amount Scant 01/24/22 1345   Drainage Description Serosanguinous 01/24/22 1345   Odor None 01/24/22 1345   Audelia-wound Assessment Intact 01/24/22 1345   Margins Attached edges 01/24/22 1345   Wound Thickness Description not for Pressure Injury Full thickness 01/24/22 1345   Number of days: 42       Wound 01/24/22 Ankle Anterior;Right #3 Right Anterior foot (traumatic) (Active)   Wound Image   01/24/22 1345   Wound Etiology Traumatic 01/24/22 1345   Dressing Status Old drainage noted 01/24/22 1345   Wound Cleansed Soap and water 01/24/22 1345   Wound Length (cm) 0.2 cm 01/24/22 1345   Wound Width (cm) 0.3 cm 01/24/22 1345   Wound Depth (cm) 0.1 cm 01/24/22 1345   Wound Surface Area (cm^2) 0.06 cm^2 01/24/22 1345   Wound Volume (cm^3) 0.006 cm^3 01/24/22 1345   Distance Tunneling (cm) 0 cm 01/24/22 1345   Tunneling Position ___ O'Clock 0 01/24/22 1345   Undermining Starts ___ O'Clock 0 01/24/22 1345   Undermining Ends___ O'Clock 0 01/24/22 1345   Undermining Maxium Distance (cm) 0 01/24/22 1345   Wound Assessment Pink/red;Slough 01/24/22 1345   Drainage Amount Small 01/24/22 1345   Drainage Description Serosanguinous 01/24/22 1345   Odor None 01/24/22 1345   Audelia-wound Assessment Dry/flaky 01/24/22 1345   Margins Attached edges 01/24/22 1345   Wound Thickness Description not for Pressure Injury Partial thickness 01/24/22 1345   Number of days: 0          Supplies Requested :      WOUNDS, # 1,2,and 3   PRIMARY DRESSING:  Xeroform, Dry gauze, Roll gauze, Medipore tape        FREQUENCY OF DRESSING CHANGES:  Daily ADDITIONAL ITEMS:  [x] Gloves Small  [] Gloves Medium [] Gloves Large [] Gloves Delayne Oris  [] Tape 1\" [x] Tape 2\" [] Tape 3\"  [x] Medipore Tape  [] Saline  [] Skin Prep   [] Adhesive Remover   [] Cotton Tip Applicators   [] Other:    Patient Wound(s) Debrided: [x] Yes if yes please add date 1/24  [] No    Debribement Type: Mechanical     Is the patient currently on an antibiotic for their Wound(s): [] Yes if yes please add name and dose   [x] No    Patient currently being seen by Home Health: [] Yes   [x] No    Duration for needed supplies:  []15  [x]30  []60  []90 Days    Electronically signed by Julius Soler RN on 1/24/2022 at 2:18 PM     Provider Information:      PROVIDER'S NAME: Dr Bradley Maynard     NPI: [id number]

## 2022-01-31 NOTE — TELEPHONE ENCOUNTER
S/w  again, these were sent to [address] [place] [address] but they need to be sent to the [place] [address] office; fax # [phone].

## 2022-02-01 ENCOUNTER — HOSPITAL ENCOUNTER (OUTPATIENT)
Dept: INFUSION THERAPY | Age: 52
Setting detail: INFUSION SERIES
Discharge: HOME | End: 2022-02-01
Payer: MEDICARE

## 2022-02-01 DIAGNOSIS — M79.621 PAIN IN BOTH UPPER ARMS: ICD-10-CM

## 2022-02-01 DIAGNOSIS — G35 MULTIPLE SCLEROSIS (HCC): Primary | ICD-10-CM

## 2022-02-01 DIAGNOSIS — Z45.2 ENCOUNTER FOR CARE RELATED TO VASCULAR ACCESS PORT: ICD-10-CM

## 2022-02-01 DIAGNOSIS — R53.83 OTHER FATIGUE: ICD-10-CM

## 2022-02-01 DIAGNOSIS — M79.622 PAIN IN BOTH UPPER ARMS: ICD-10-CM

## 2022-02-01 DIAGNOSIS — G35 MS (MULTIPLE SCLEROSIS) (HCC): ICD-10-CM

## 2022-02-01 DIAGNOSIS — M54.2 PAIN, NECK: ICD-10-CM

## 2022-02-01 DIAGNOSIS — M62.838 MUSCLE SPASTICITY: ICD-10-CM

## 2022-02-01 PROCEDURE — 6360000002 HC RX W HCPCS: Performed by: INTERNAL MEDICINE

## 2022-02-01 PROCEDURE — 96523 IRRIG DRUG DELIVERY DEVICE: CPT

## 2022-02-01 PROCEDURE — 2580000003 HC RX 258: Performed by: INTERNAL MEDICINE

## 2022-02-01 RX ORDER — HEPARIN SODIUM (PORCINE) LOCK FLUSH IV SOLN 100 UNIT/ML 100 UNIT/ML
500 SOLUTION INTRAVENOUS PRN
Status: DISCONTINUED | OUTPATIENT
Start: 2022-02-01 | End: 2022-02-02 | Stop reason: HOSPADM

## 2022-02-01 RX ORDER — HYDROCODONE BITARTRATE AND ACETAMINOPHEN 10; 325 MG/1; MG/1
TABLET ORAL
Qty: 90 TABLET | Refills: 0 | Status: SHIPPED | OUTPATIENT
Start: 2022-02-05 | End: 2022-03-03

## 2022-02-01 RX ORDER — SODIUM CHLORIDE 0.9 % (FLUSH) 0.9 %
5-40 SYRINGE (ML) INJECTION PRN
Status: DISCONTINUED | OUTPATIENT
Start: 2022-02-01 | End: 2022-02-02 | Stop reason: HOSPADM

## 2022-02-01 RX ORDER — SODIUM CHLORIDE 0.9 % (FLUSH) 0.9 %
5-40 SYRINGE (ML) INJECTION PRN
Status: CANCELLED | OUTPATIENT
Start: 2022-02-01

## 2022-02-01 RX ORDER — METHYLPHENIDATE HYDROCHLORIDE 20 MG/1
TABLET ORAL
Qty: 30 TABLET | Refills: 0 | Status: SHIPPED | OUTPATIENT
Start: 2022-02-05 | End: 2022-03-03

## 2022-02-01 RX ORDER — HEPARIN SODIUM (PORCINE) LOCK FLUSH IV SOLN 100 UNIT/ML 100 UNIT/ML
500 SOLUTION INTRAVENOUS PRN
Status: CANCELLED | OUTPATIENT
Start: 2022-02-01

## 2022-02-01 RX ADMIN — SODIUM CHLORIDE, PRESERVATIVE FREE 10 ML: 5 INJECTION INTRAVENOUS at 13:41

## 2022-02-01 RX ADMIN — HEPARIN 500 UNITS: 100 SYRINGE at 13:40

## 2022-02-01 NOTE — TELEPHONE ENCOUNTER
Kailash Abraham has requested a refill on her medication. Last office visit : 11/4/2021   Next office visit : 2/3/2022   Last medication refill :1/6/2022  Ieshamaci Mely : 1/2/2022      Requested Prescriptions     Pending Prescriptions Disp Refills    HYDROcodone-acetaminophen (Coreen )  MG per tablet [Pharmacy Med Name: HYDROCODONE BITARTRATE/ACETAMINOPHEN 325MG-10MG TABLET] 90 tablet 0     Sig: TAKE 1 TABLET BY MOUTH 3 TIMES DAILY AS NEEDED FOR PAIN.  REDUCE DOSES AS PAIN BECOMES MANAGEABLE    methylphenidate (RITALIN) 20 MG tablet [Pharmacy Med Name: METHYLPHENIDATE HYDROCHLORIDE 20MG TABLET] 30 tablet 0     Sig: TAKE 1 TABLET BY MOUTH DAILY       **Scripts updated to reflect fill date of 2/5/2022

## 2022-02-07 ENCOUNTER — HOSPITAL ENCOUNTER (OUTPATIENT)
Dept: WOUND CARE | Age: 52
Discharge: HOME | End: 2022-02-07
Payer: MEDICARE

## 2022-02-07 ENCOUNTER — TELEPHONE (OUTPATIENT)
Dept: INTERNAL MEDICINE | Age: 52
End: 2022-02-07

## 2022-02-07 VITALS
BODY MASS INDEX: 21.77 KG/M2 | RESPIRATION RATE: 18 BRPM | DIASTOLIC BLOOD PRESSURE: 89 MMHG | WEIGHT: 147 LBS | HEIGHT: 69 IN | HEART RATE: 82 BPM | TEMPERATURE: 98.1 F | SYSTOLIC BLOOD PRESSURE: 122 MMHG

## 2022-02-07 DIAGNOSIS — L97.512 CHRONIC ULCER OF RIGHT GREAT TOE, WITH FAT LAYER EXPOSED (HCC): Chronic | ICD-10-CM

## 2022-02-07 DIAGNOSIS — L97.412 ULCER OF RIGHT HEEL, WITH FAT LAYER EXPOSED (HCC): Chronic | ICD-10-CM

## 2022-02-07 DIAGNOSIS — G82.20 PARAPLEGIA (HCC): Primary | Chronic | ICD-10-CM

## 2022-02-07 DIAGNOSIS — L97.312 NON-PRESSURE CHRONIC ULCER OF RIGHT ANKLE WITH FAT LAYER EXPOSED (HCC): Chronic | ICD-10-CM

## 2022-02-07 PROCEDURE — 97597 DBRDMT OPN WND 1ST 20 CM/<: CPT | Performed by: SURGERY

## 2022-02-07 PROCEDURE — 6370000000 HC RX 637 (ALT 250 FOR IP): Performed by: SURGERY

## 2022-02-07 PROCEDURE — 97597 DBRDMT OPN WND 1ST 20 CM/<: CPT

## 2022-02-07 RX ORDER — GENTAMICIN SULFATE 1 MG/G
OINTMENT TOPICAL ONCE
Status: CANCELLED | OUTPATIENT
Start: 2022-02-07 | End: 2022-02-07

## 2022-02-07 RX ORDER — LIDOCAINE HYDROCHLORIDE 20 MG/ML
JELLY TOPICAL ONCE
Status: CANCELLED | OUTPATIENT
Start: 2022-02-07 | End: 2022-02-07

## 2022-02-07 RX ORDER — GINSENG 100 MG
CAPSULE ORAL ONCE
Status: CANCELLED | OUTPATIENT
Start: 2022-02-07 | End: 2022-02-07

## 2022-02-07 RX ORDER — CLOBETASOL PROPIONATE 0.5 MG/G
OINTMENT TOPICAL ONCE
Status: CANCELLED | OUTPATIENT
Start: 2022-02-07 | End: 2022-02-07

## 2022-02-07 RX ORDER — LIDOCAINE 50 MG/G
OINTMENT TOPICAL ONCE
Status: CANCELLED | OUTPATIENT
Start: 2022-02-07 | End: 2022-02-07

## 2022-02-07 RX ORDER — BACITRACIN ZINC AND POLYMYXIN B SULFATE 500; 1000 [USP'U]/G; [USP'U]/G
OINTMENT TOPICAL ONCE
Status: CANCELLED | OUTPATIENT
Start: 2022-02-07 | End: 2022-02-07

## 2022-02-07 RX ORDER — LIDOCAINE HYDROCHLORIDE 40 MG/ML
SOLUTION TOPICAL ONCE
Status: CANCELLED | OUTPATIENT
Start: 2022-02-07 | End: 2022-02-07

## 2022-02-07 RX ORDER — METHYLPREDNISOLONE 4 MG/1
TABLET ORAL
Qty: 1 KIT | Refills: 0 | Status: SHIPPED | OUTPATIENT
Start: 2022-02-07 | End: 2022-02-13

## 2022-02-07 RX ORDER — BACITRACIN, NEOMYCIN, POLYMYXIN B 400; 3.5; 5 [USP'U]/G; MG/G; [USP'U]/G
OINTMENT TOPICAL ONCE
Status: CANCELLED | OUTPATIENT
Start: 2022-02-07 | End: 2022-02-07

## 2022-02-07 RX ORDER — LIDOCAINE HYDROCHLORIDE 20 MG/ML
JELLY TOPICAL ONCE
Status: COMPLETED | OUTPATIENT
Start: 2022-02-07 | End: 2022-02-07

## 2022-02-07 RX ORDER — LIDOCAINE 40 MG/G
CREAM TOPICAL ONCE
Status: CANCELLED | OUTPATIENT
Start: 2022-02-07 | End: 2022-02-07

## 2022-02-07 RX ORDER — DOXYCYCLINE HYCLATE 100 MG
100 TABLET ORAL 2 TIMES DAILY
Qty: 20 TABLET | Refills: 0 | Status: SHIPPED | OUTPATIENT
Start: 2022-02-07 | End: 2022-02-17

## 2022-02-07 RX ORDER — BETAMETHASONE DIPROPIONATE 0.05 %
OINTMENT (GRAM) TOPICAL ONCE
Status: CANCELLED | OUTPATIENT
Start: 2022-02-07 | End: 2022-02-07

## 2022-02-07 RX ADMIN — LIDOCAINE HYDROCHLORIDE: 20 JELLY TOPICAL at 14:41

## 2022-02-07 ASSESSMENT — PAIN SCALES - GENERAL: PAINLEVEL_OUTOF10: 0

## 2022-02-07 NOTE — TELEPHONE ENCOUNTER
Pt called and her and her boyfriend have had covid symptoms. Pt stated they both test negative. She would like a steroid pack and antibiotic called in please.

## 2022-02-07 NOTE — PROGRESS NOTES
[address]   Progress Note and Procedure Note      Lisha Phelps  MEDICAL RECORD NUMBER:  [id number]  AGE: 47 y.o. GENDER: female  : 1970  EPISODE DATE:  2022    Subjective:     Chief Complaint   Patient presents with    Wound Check         HISTORY of PRESENT ILLNESS HPI     Lisha Phelps is a 47 y.o. female who presents today for wound/ulcer evaluation.    Wound Context: Pt with R heel,foot and great toe wounds here for eval/treat  Wound/Ulcer Pain Timing/Severity: none  Quality of pain: N/A  Severity:  0 / 10   Modifying Factors: None  Associated Signs/Symptoms: none    Ulcer Identification:  Ulcer Type: pressure  Contributing Factors: decreased mobility and shear force    Wound: pressure        PAST MEDICAL HISTORY        Diagnosis Date    Ankle wound     and toe wound    Aptyalism 2017    Arthritis 2020    Asthma     Back pain 2017    Binocular vision disorder with diplopia 2017    [facility]flow limitation[facility]) 2017    Hay fever 2017    Headache 2017    MS (multiple sclerosis) ([address]     Muscle spasticity     Neuropathic ulcer of left foot, limited to breakdown of skin ([address] 4/10/2017    Numbness 2016    Open wound of ankle 2017    Open wound of second toe of left foot 2018    Peripheral vascular disease ([address]     Seizure ([address]     occ. related to ms    Sepsis ([address] 2016    Weakness 2016       PAST SURGICAL HISTORY    Past Surgical History:   Procedure Laterality Date    BACLOFEN PUMP IMPLANTATION      BREAST BIOPSY Right     neg    COLONOSCOPY N/A 2019    Dr Sue Spangler ileum through ostomy-patent and healthy appearing anastomosis in the right colon-entero colonic anastomosis-10 yr recall    COLOSTOMY      FEMUR FRACTURE SURGERY      Right    FOOT AMPUTATION Left 2019    LEFT TRANSMET AMPUTATION performed [facility]io [facility] at [facility] SURGICAL HISTORY      urostomy    OTHER SURGICAL HISTORY      pain pump    WA INSJ PRPH CTR VAD W/SUBQ PORT UNDER 5 YR N/A 6/26/2018    SINGLE LUMEN PORT PLACEMENT WITH FLUORO performed by Ke Corley MD at [address] TOE AMPUTATION      L last toe    TONSILLECTOMY      URETEROTOMY         FAMILY HISTORY    Family History   Problem Relation Age of Onset    Cancer Mother         breast    Colon Cancer Mother     Colon Polyps Mother     Breast Cancer Mother     Diabetes Father     High Blood Pressure Father     Cancer Paternal Aunt         breast    Liver Cancer Paternal Aunt     Heart Disease Paternal Grandmother     Esophageal Cancer Neg Hx     Liver Disease Neg Hx     Rectal Cancer Neg Hx     Stomach Cancer Neg Hx        SOCIAL HISTORY    Social History     Tobacco Use    Smoking status: Current Some Day Smoker     Packs/day: 0.25     Types: Cigarettes    Smokeless tobacco: Never Used    Tobacco comment: less than 10 cigs today   Vaping Use    Vaping Use: Never used   Substance Use Topics    Alcohol use: Yes     Comment: yaritza    Drug use: Yes     Types: Marijuana (Weed)     Comment: daily        ALLERGIES    Allergies   Allergen Reactions    Codeine Nausea Only     Category: Allergy;    [place] Darvocet [Propoxyphene N-Acetaminophen]      Talking out of her head    Morphine      Category: Allergy;     Morphine And Related Itching and Swelling    Propoxyphene Swelling       MEDICATIONS    Current Outpatient Medications on File Prior to Encounter   Medication Sig Dispense Refill    HYDROcodone-acetaminophen (NORCO)  MG per tablet TAKE 1 TABLET BY MOUTH 3 TIMES DAILY AS NEEDED FOR PAIN. REDUCE DOSES AS PAIN BECOMES MANAGEABLE 90 tablet 0    methylphenidate (RITALIN) 20 MG tablet TAKE 1 TABLET BY MOUTH DAILY 30 tablet 0    Diapers & Supplies MISC Indications: FX: [id number] Diapers, Chucks, Wipes, and Gloves.  100 each 3    ondansetron (ZOFRAN-ODT) 4 MG disintegrating tablet Take 1 tablet by mouth 3 times daily as needed for Nausea or Vomiting 30 tablet 0    DULoxetine (CYMBALTA) 30 MG extended release capsule Take 1 capsule by mouth daily (Patient taking differently: Take 30 mg by mouth nightly ) 30 capsule 5    nicotine (NICODERM CQ) 21 MG/24HR Place 1 patch onto the skin daily 42 patch 0    nitrofurantoin (MACRODANTIN) 50 MG capsule TAKE ONE CAPSULE BY MOUTH DAILY (Patient taking differently: Take 50 mg by mouth nightly ) 30 capsule 3    pilocarpine (SALAGEN) 7.5 MG tablet TAKE ONE TABLET BY MOUTH 3 TIMES DAILY 90 tablet 3    pregabalin (LYRICA) 300 MG capsule 1 bid (Patient taking differently: Take 300 mg by mouth nightly. 1 bid) 60 capsule 5    naloxone 4 MG/0.1ML LIQD nasal spray 1 spray by Nasal route as needed for Opioid Reversal 1 each 5    nicotine (NICODERM CQ) 21 MG/24HR Place 1 patch onto the skin daily (Patient not taking: Reported on 12/9/2021) 42 patch 0    tiZANidine (ZANAFLEX) 4 MG tablet TAKE 3 TABLETS TWICE DAILY AS NEEDED  180 tablet 11    levETIRAcetam (KEPPRA) 500 MG tablet TAKE 1 TABLET BY MOUTH DAILY 90 tablet 3    azelastine (ASTELIN) 0.1 % nasal spray USE 2 SPRAYS IN EACH NOSTRIL TWICE DAILY AS DIRECTED 30 mL 0    hydroCHLOROthiazide (HYDRODIURIL) 25 MG tablet TAKE 1 TABLET BY MOUTH ONCE DAILY AS NEEDED 30 tablet 0    cetirizine (ZYRTEC) 10 MG tablet Take 10 mg by mouth daily      Catheters MISC Catheter supplies and lubricant 5 times daily G82.20  to [facility] [phone] 450 each 3    levETIRAcetam (KEPPRA) 500 MG tablet Take 1 tablet by mouth nightly (Patient taking differently: Take 750 mg by mouth nightly ) 30 tablet 5    glucose monitoring kit (FREESTYLE) monitoring kit 1 kit by Does not apply route daily 1 kit 0    blood glucose monitor strips Test 1 times a day & as needed for symptoms of irregular blood glucose.  100 strip 3    docusate sodium (COLACE) 100 MG capsule Take 200 mg by mouth as needed for Constipation      PROAIR  (90 Base) MCG/ACT inhaler INHALE 1 PUFF INTO THE LUNGS EVERY 6 HOURS AS NEEDED FOR WHEEZING OR SHORTNESS OF BREATH 8.5 g 5    Heparin Lock Flush (HEPARIN FLUSH, 100 UNITS/ML,) 100 UNIT/ML injection 3 mLs by Intercatheter route every 30 days No every 30 days but every 4-6 weeks. Flush port with 300 units heparin with 20 cc normal saline for ocrevus infusion for Multiple sclerosis and NS 20CC (Patient not taking: Reported on 12/9/2021) 1 Syringe 5    ipratropium-albuterol (DUONEB) 0.5-2.5 (3) MG/3ML SOLN nebulizer solution USE 1 UNIT DOSE IN NEBULIZER EVERY 4 TO 6 HOURS AS NEEDED. 450 mL 11    BACLOFEN, PAIN PUMP REFILL CHARGE, by Implant route continuous Indications: every 3 months      Multiple Vitamins-Minerals (THERAPEUTIC MULTIVITAMIN-MINERALS) tablet Take 1 tablet by mouth daily      glucose monitoring kit (FREESTYLE) monitoring kit 1 kit by Does not apply route daily 1 kit 0    blood glucose monitor strips Test 1 times a day & as needed for symptoms of irregular blood glucose. 100 strip 0    baclofen (LIORESAL) 10 MG tablet Take 1 tablet by mouth 2 times daily as needed (muscle spasms) (Patient taking differently: Take 10 mg by mouth 2 times daily as needed (muscle spasms) Indications: using pump ) 60 tablet 5    Sodium Chloride Flush (SALINE FLUSH) 0.9 % SOLN Infuse 20 mLs intravenously every 30 days Not every 30 days but every 4-6 weeks as need with 300 units of heparin to flush port for Infusion of Ocrevus for multiple sclerosis 20 mL 5     No current facility-administered medications on file prior to encounter. REVIEW OF SYSTEMS    A comprehensive review of systems was negative.     Objective:      /89   Pulse 82   Temp 98.1 °F (36.7 °C) (Temporal)   Resp 18   Ht 5' 9\" (1.753 m)   Wt 147 lb (66.7 kg)   BMI 21.71 kg/m²     Wt Readings from Last 3 Encounters:   02/07/22 147 lb (66.7 kg)   01/24/22 147 lb (66.7 kg)   01/03/22 147 lb (66.7 kg)       PHYSICAL EXAM    General Appearance: alert and oriented to person, place and time, well developed and well- nourished, in no acute distress  Skin: warm and dry, no rash or erythema  Head: normocephalic and atraumatic  Eyes: pupils equal, round, and reactive to light, extraocular eye movements intact, conjunctivae normal  ENT: tympanic membrane, external ear and ear canal normal bilaterally, nose without deformity, nasal mucosa and turbinates normal without polyps, lips teeth and gums normal  Neck: supple and non-tender without mass, no thyromegaly or thyroid nodules, no cervical lymphadenopathy  Pulmonary/Chest: clear to auscultation bilaterally- no wheezes, rales or rhonchi, normal air movement, no respiratory distress  Cardiovascular: normal rate, regular rhythm, normal S1 and S2, no murmurs, rubs, clicks, or gallops, distal pulses intact, no carotid bruits  Abdomen: soft, non-tender, non-distended, normal bowel sounds, no masses or organomegaly  Extremities: no cyanosis, clubbing or edema  Musculoskeletal: normal range of motion, no joint swelling, deformity or tenderness      Assessment:      Problem List Items Addressed This Visit     Paraplegia (HCC) - Primary (Chronic)    Ulcer of right heel, 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)    * (Principal) Chronic ulcer of right great toe, 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)    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 Performed by: Nai Orlando MD    Consent obtained:  Yes    Time out taken:  Yes    Pain Control: Anesthetic  Anesthetic: 2% Lidocaine Gel Topical       Debridement:Non-excisional Debridement    Using curette the wound(s)/ulcer(s) was/were sharply debrided down through and including the removal of epidermis and dermis.         Devitalized Tissue Debrided:  fibrin, biofilm, slough, necrotic/eschar and exudate      Pre Debridement Measurements:  Are located in the Wound/Ulcer Documentation Flow Sheet    Wound/Ulcer #: 1, 2 and 3    Percent of Wound(s)/Ulcer(s) Debrided: 100%    Total Surface Area Debrided:  0.36 sq cm       Diabetic/Pressure/Non Pressure Ulcers only:  Ulcer: Pressure ulcer, Stage 3             Post Debridement Measurements:    Wound/Ulcer Descriptions are Pre Debridement --EXCEPT MEASUREMENTS    Wound 12/13/21 Heel Right; Posterior Wound 1, Pressure Stage 3, R. Posterior Heel (Active)   Wound Image   02/07/22 1407   Wound Etiology Pressure Stage  3 02/07/22 1407   Dressing Status New dressing applied 02/07/22 1407   Wound Cleansed Soap and water 02/07/22 1407   Dressing/Treatment Xeroform;Gauze dressing/dressing sponge;Roll gauze;Tape/Soft cloth adhesive tape 01/24/22 1430   Offloading for Diabetic Foot Ulcers Yes (type); Offloading boot 02/07/22 1407   Wound Length (cm) 0.4 cm 02/07/22 1407   Wound Width (cm) 0.5 cm 02/07/22 1407   Wound Depth (cm) 0.1 cm 02/07/22 1407   Wound Surface Area (cm^2) 0.2 cm^2 02/07/22 1407   Change in Wound Size % (l*w) 95.83 02/07/22 1407   Wound Volume (cm^3) 0.02 cm^3 02/07/22 1407   Wound Healing % 96 02/07/22 1407   Post-Procedure Length (cm) 0.4 cm 02/07/22 1426   Post-Procedure Width (cm) 0.5 cm 02/07/22 1426   Post-Procedure Depth (cm) 0.1 cm 02/07/22 1426   Post-Procedure Surface Area (cm^2) 0.2 cm^2 02/07/22 1426   Post-Procedure Volume (cm^3) 0.02 cm^3 02/07/22 1426   Distance Tunneling (cm) 0 cm 02/07/22 1407   Tunneling Position ___ O'Clock 0 02/07/22 1407   Undermining Starts ___ O'Clock 0 02/07/22 1407   Undermining Ends___ O'Clock 0 02/07/22 1407   Undermining Maxium Distance (cm) 0 02/07/22 1407   Wound Assessment Pink/red;Slough 02/07/22 1407   Drainage Amount Small 02/07/22 1407   Drainage Description Serosanguinous 02/07/22 1407   Odor None 02/07/22 1407   Audelia-wound Assessment Dry/flaky 02/07/22 1407   Margins Attached edges 02/07/22 1407   Number of days: 56       Wound 12/13/21 Toe (Comment  which one) Right Wound 2, Traumatic,R. anterior Great toe (Active)   Wound Image   02/07/22 1407   Wound Etiology Traumatic 02/07/22 1407   Dressing Status Old drainage noted 02/07/22 1407   Wound Cleansed Soap and water 02/07/22 1407   Dressing/Treatment Xeroform;Gauze dressing/dressing sponge;Roll gauze;Tape/Soft cloth adhesive tape 01/24/22 1430   Wound Length (cm) 0.3 cm 02/07/22 1407   Wound Width (cm) 0.5 cm 02/07/22 1407   Wound Depth (cm) 0.1 cm 02/07/22 1407   Wound Surface Area (cm^2) 0.15 cm^2 02/07/22 1407   Change in Wound Size % (l*w) 58.33 02/07/22 1407   Wound Volume (cm^3) 0.015 cm^3 02/07/22 1407   Wound Healing % 58 02/07/22 1407   Post-Procedure Length (cm) 0.5 cm 02/07/22 1426   Post-Procedure Width (cm) 0.3 cm 02/07/22 1426   Post-Procedure Depth (cm) 0.1 cm 02/07/22 1426   Post-Procedure Surface Area (cm^2) 0.15 cm^2 02/07/22 1426   Post-Procedure Volume (cm^3) 0.015 cm^3 02/07/22 1426   Distance Tunneling (cm) 0 cm 02/07/22 1407   Tunneling Position ___ O'Clock 0 02/07/22 1407   Undermining Starts ___ O'Clock 0 02/07/22 1407   Undermining Ends___ O'Clock 0 02/07/22 1407   Undermining Maxium Distance (cm) 0 02/07/22 1407   Wound Assessment Pink/red;Slough 02/07/22 1407   Drainage Amount Scant 02/07/22 1407   Drainage Description Serosanguinous 02/07/22 1407   Odor None 02/07/22 1407   Audelia-wound Assessment Intact 02/07/22 1407   Margins Attached edges 02/07/22 1407   Wound Thickness Description not for Pressure Injury Full thickness 02/07/22 1407   Number of days: 56       Wound 01/24/22 Ankle Anterior;Right #3 Right Anterior foot (traumatic) (Active)   Wound Image   02/07/22 1407   Wound Etiology Traumatic 02/07/22 1407   Dressing Status Old drainage noted 02/07/22 1407   Wound Cleansed Soap and water 02/07/22 1407   Dressing/Treatment Xeroform;Gauze dressing/dressing sponge;Roll gauze;Tape/Soft cloth adhesive tape 01/24/22 1430   Wound Length (cm) 0.4 cm 02/07/22 1407   Wound Width (cm) 0.5 cm 02/07/22 1407   Wound Depth (cm) 0.2 cm 02/07/22 1407   Wound Surface Area (cm^2) 0.2 cm^2 02/07/22 1407   Change in Wound Size % (l*w) -233.33 02/07/22 1407   Wound Volume (cm^3) 0.04 cm^3 02/07/22 1407   Wound Healing % -567 02/07/22 1407   Post-Procedure Length (cm) 0.2 cm 02/07/22 1426   Post-Procedure Width (cm) 0.2 cm 02/07/22 1426   Post-Procedure Depth (cm) 0.1 cm 02/07/22 1426   Post-Procedure Surface Area (cm^2) 0.04 cm^2 02/07/22 1426   Post-Procedure Volume (cm^3) 0.004 cm^3 02/07/22 1426   Distance Tunneling (cm) 0 cm 02/07/22 1407   Tunneling Position ___ O'Clock 0 02/07/22 1407   Undermining Starts ___ O'Clock 0 02/07/22 1407   Undermining Ends___ O'Clock 0 02/07/22 1407   Undermining Maxium Distance (cm) 0 02/07/22 1407   Wound Assessment Pink/red;Slough 02/07/22 1407   Drainage Amount Small 02/07/22 1407   Drainage Description Serosanguinous 02/07/22 1407   Odor None 02/07/22 1407   Audelia-wound Assessment Dry/flaky 02/07/22 1407   Margins Attached edges 02/07/22 1407   Wound Thickness Description not for Pressure Injury Partial thickness 02/07/22 1407   Number of days: 14             Estimated Blood Loss:  Minimal    Hemostasis Achieved:  by pressure    Procedural Pain:  0  / 10     Post Procedural Pain:  0 / 10     Response to treatment:  Well tolerated by patient.          Plan:     Problem List Items Addressed This Visit     Paraplegia ([address] - Primary (Chronic)    Ulcer of right heel, with fat layer exposed ([address] (Chronic)    * (Principal) Chronic ulcer of right great toe, with fat layer exposed ([address] (Chronic)    Non-pressure chronic ulcer of right ankle with fat layer exposed ([address] (Chronic)          Cont current care RTO 2 weeks    Treatment Note please see attached Discharge Instructions    In my professional opinion this patient would benefit from HBO Therapy: No    Written patient dismissal instructions given to patient and signed by patient or POA. Discharge [address] and Hyperbaric Oxygen Therapy   Physician Orders and Discharge Instructions  [address] [facility] [address], [address]  Telephone: [phone]    NAME:  Lis Alvarado OF BIRTH:  1970  MEDICAL RECORD NUMBER:  [id number]  DATE:  2/7/2022    Discharge condition: Stable    Discharge to: Home    Left via:Private automobile    Accompanied by: [address], [address] [place] [address] f: [phone] f: [phone] p: [phone] [email or web address]     Dressing Orders: Left Foot and Toe Wounds   Xeroform to open area (cut to fit), Cover with dry gauze, roll gauze and tape  Change once daily  Wear heel lift boots     [facility] follow up visit _____________2 weeks________________  (Please note your next appointment above and if you are unable to keep, kindly give a 24 hour notice. Thank you.)    If you experience any of the following, please call the [address] [place] [address] Road during business hours:    * Increase in Pain  * Temperature over 101  * Increase in drainage from your wound  * Drainage with a foul odor  * Bleeding  * Increase in swelling  * Need for compression bandage changes due to slippage, breakthrough drainage. If you need medical attention outside of the business hours of the [address] [place] [address] Road please contact your PCP or go to the nearest emergency room.         Electronically signed by Liz Vasquez MD on 2/7/2022 at 2:28 PM

## 2022-02-21 ENCOUNTER — HOSPITAL ENCOUNTER (OUTPATIENT)
Dept: WOUND CARE | Age: 52
Discharge: HOME | End: 2022-02-21
Payer: MEDICARE

## 2022-02-21 VITALS
BODY MASS INDEX: 21.77 KG/M2 | WEIGHT: 147 LBS | SYSTOLIC BLOOD PRESSURE: 136 MMHG | DIASTOLIC BLOOD PRESSURE: 97 MMHG | HEIGHT: 69 IN | TEMPERATURE: 97.1 F | RESPIRATION RATE: 18 BRPM | HEART RATE: 71 BPM

## 2022-02-21 DIAGNOSIS — L97.312 NON-PRESSURE CHRONIC ULCER OF RIGHT ANKLE WITH FAT LAYER EXPOSED (HCC): Primary | ICD-10-CM

## 2022-02-21 DIAGNOSIS — L97.412 ULCER OF RIGHT HEEL, WITH FAT LAYER EXPOSED (HCC): ICD-10-CM

## 2022-02-21 DIAGNOSIS — L97.512 CHRONIC ULCER OF RIGHT GREAT TOE, WITH FAT LAYER EXPOSED (HCC): ICD-10-CM

## 2022-02-21 DIAGNOSIS — G82.20 PARAPLEGIA (HCC): Chronic | ICD-10-CM

## 2022-02-21 PROCEDURE — 99213 OFFICE O/P EST LOW 20 MIN: CPT

## 2022-02-21 PROCEDURE — 99212 OFFICE O/P EST SF 10 MIN: CPT | Performed by: SURGERY

## 2022-02-21 RX ORDER — GINSENG 100 MG
CAPSULE ORAL ONCE
Status: CANCELLED | OUTPATIENT
Start: 2022-02-21 | End: 2022-02-21

## 2022-02-21 RX ORDER — LIDOCAINE HYDROCHLORIDE 20 MG/ML
JELLY TOPICAL ONCE
Status: CANCELLED | OUTPATIENT
Start: 2022-02-21 | End: 2022-02-21

## 2022-02-21 RX ORDER — LIDOCAINE 40 MG/G
CREAM TOPICAL ONCE
Status: CANCELLED | OUTPATIENT
Start: 2022-02-21 | End: 2022-02-21

## 2022-02-21 RX ORDER — CLOBETASOL PROPIONATE 0.5 MG/G
OINTMENT TOPICAL ONCE
Status: CANCELLED | OUTPATIENT
Start: 2022-02-21 | End: 2022-02-21

## 2022-02-21 RX ORDER — BACITRACIN, NEOMYCIN, POLYMYXIN B 400; 3.5; 5 [USP'U]/G; MG/G; [USP'U]/G
OINTMENT TOPICAL ONCE
Status: CANCELLED | OUTPATIENT
Start: 2022-02-21 | End: 2022-02-21

## 2022-02-21 RX ORDER — BETAMETHASONE DIPROPIONATE 0.05 %
OINTMENT (GRAM) TOPICAL ONCE
Status: CANCELLED | OUTPATIENT
Start: 2022-02-21 | End: 2022-02-21

## 2022-02-21 RX ORDER — GENTAMICIN SULFATE 1 MG/G
OINTMENT TOPICAL ONCE
Status: CANCELLED | OUTPATIENT
Start: 2022-02-21 | End: 2022-02-21

## 2022-02-21 RX ORDER — BACITRACIN ZINC AND POLYMYXIN B SULFATE 500; 1000 [USP'U]/G; [USP'U]/G
OINTMENT TOPICAL ONCE
Status: CANCELLED | OUTPATIENT
Start: 2022-02-21 | End: 2022-02-21

## 2022-02-21 RX ORDER — LIDOCAINE HYDROCHLORIDE 40 MG/ML
SOLUTION TOPICAL ONCE
Status: CANCELLED | OUTPATIENT
Start: 2022-02-21 | End: 2022-02-21

## 2022-02-21 RX ORDER — LIDOCAINE 50 MG/G
OINTMENT TOPICAL ONCE
Status: CANCELLED | OUTPATIENT
Start: 2022-02-21 | End: 2022-02-21

## 2022-02-21 ASSESSMENT — PAIN SCALES - GENERAL: PAINLEVEL_OUTOF10: 0

## 2022-02-21 NOTE — PLAN OF CARE
Problem: Wound:  Goal: Will show signs of wound healing; wound closure and no evidence of infection  Description: Will show signs of wound healing; wound closure and no evidence of infection  2/21/2022 1408 by Jessica Ariza RN  Outcome: Completed  2/21/2022 1345 by Jessica Ariza RN  Outcome: Met This Shift     Problem: Pressure Ulcer:  Goal: Signs of wound healing will improve  Description: Signs of wound healing will improve  2/21/2022 1408 by Jessica Ariza RN  Outcome: Completed  2/21/2022 1345 by Jessica Ariza RN  Outcome: Met This Shift  Goal: Absence of new pressure ulcer  Description: Absence of new pressure ulcer  2/21/2022 1408 by Jessica Ariza RN  Outcome: Completed  2/21/2022 1345 by Jessica Ariza RN  Outcome: Met This Shift  Goal: Will show no infection signs and symptoms  Description: Will show no infection signs and symptoms  2/21/2022 1408 by Jessica Ariza RN  Outcome: Completed  2/21/2022 1345 by Jessica Ariza RN  Outcome: Met This Shift     Problem: Smoking cessation:  Goal: Ability to formulate a plan to maintain a tobacco-free life will be supported  Description: Ability to formulate a plan to maintain a tobacco-free life will be supported  2/21/2022 1408 by Jessica Ariza RN  Outcome: Completed  2/21/2022 1345 by Jessica Ariza RN  Outcome: Met This Shift

## 2022-02-21 NOTE — PROGRESS NOTES
[address]   Progress Note and Procedure Note      Dago Shirley  MEDICAL RECORD NUMBER:  [id number]  AGE: 47 y.o. GENDER: female  : 1970  EPISODE DATE:  2022    Subjective:     Chief Complaint   Patient presents with    Wound Check     Everything is looking good, no new concerns         HISTORY of PRESENT ILLNESS HPI     Dago Shirley is a 47 y.o. female who presents today for wound/ulcer evaluation. History of Wound Context: Pt here for eval/treat chronic pressure wounds.   Wound/Ulcer Pain Timing/Severity: none  Quality of pain: N/A  Severity:  0 / 10   Modifying Factors: None  Associated Signs/Symptoms: none    Ulcer Identification:  Ulcer Type: pressure  Contributing Factors: chronic pressure    Wound: pressure        PAST MEDICAL HISTORY        Diagnosis Date    Ankle wound     and toe wound    Aptyalism 2017    Arthritis 2020    Asthma     Back pain 2017    Binocular vision disorder with diplopia 2017    [facility]FL [facility]ch[facility]flow limitation[facility] 2017    Hay fever 2017    Headache 2017    MS (multiple sclerosis) ([facility])     Muscle spasticity     Neuropathic ulcer of left foot, limited to breakdown of skin ([address] 4/10/2017    Numbness 2016    Open wound of ankle 2017    Open wound of second toe of left foot 2018    Peripheral vascular disease ([address]     Seizure ([address]     occ. related to ms    Sepsis ([address] 2016    Weakness 2016       PAST SURGICAL HISTORY    Past Surgical History:   Procedure Laterality Date    BACLOFEN PUMP IMPLANTATION      BREAST BIOPSY Right     neg    COLONOSCOPY N/A 2019    Dr Selvin Jc ileum through ostomy-patent and healthy appearing anastomosis in the right colon-entero colonic anastomosis-10 yr recall    COLOSTOMY      FEMUR FRACTURE SURGERY      Right    FOOT AMPUTATION Left 2019    LEFT TRANSMET AMPUTATION performed by Marissa Rogel MD at [address] [place] [address]  HYSTERECTOMY      OTHER SURGICAL HISTORY      urostomy    OTHER SURGICAL HISTORY      pain pump    UT INSJ [facility] CTR VAD W/SUBQ PORT UNDER 5 YR N/A 6/26/2018    SINGLE LUMEN PORT PLACEMENT WITH FLUORO performed by Cielo Rojas MD at [address] TOE AMPUTATION      L last toe    TONSILLECTOMY      URETEROTOMY         FAMILY HISTORY    Family History   Problem Relation Age of Onset    Cancer Mother         breast    Colon Cancer Mother     Colon Polyps Mother     Breast Cancer Mother     Diabetes Father     High Blood Pressure Father     Cancer Paternal Aunt         breast    Liver Cancer Paternal Aunt     Heart Disease Paternal Grandmother     Esophageal Cancer Neg Hx     Liver Disease Neg Hx     Rectal Cancer Neg Hx     Stomach Cancer Neg Hx        SOCIAL HISTORY    Social History     Tobacco Use    Smoking status: Current Some Day Smoker     Packs/day: 0.25     Types: Cigarettes    Smokeless tobacco: Never Used    Tobacco comment: less than 10 cigs today   Vaping Use    Vaping Use: Never used   Substance Use Topics    Alcohol use: Yes     Comment: yaritza    Drug use: Yes     Types: Marijuana Franco Dozier)     Comment: daily        ALLERGIES    Allergies   Allergen Reactions    Darvocet [Propoxyphene N-Acetaminophen]      Talking out of her head    Morphine      Category: Allergy;     Morphine And Related Itching and Swelling    Propoxyphene Swelling       MEDICATIONS    Current Outpatient Medications on File Prior to Encounter   Medication Sig Dispense Refill    HYDROcodone-acetaminophen (NORCO)  MG per tablet TAKE 1 TABLET BY MOUTH 3 TIMES DAILY AS NEEDED FOR PAIN.  REDUCE DOSES AS PAIN BECOMES MANAGEABLE 90 tablet 0    methylphenidate (RITALIN) 20 MG tablet TAKE 1 TABLET BY MOUTH DAILY 30 tablet 0    DULoxetine (CYMBALTA) 30 MG extended release capsule Take 1 capsule by mouth daily (Patient taking differently: Take 30 mg by mouth nightly ) 30 capsule 5    nitrofurantoin times a day & as needed for symptoms of irregular blood glucose. 100 strip 3    docusate sodium (COLACE) 100 MG capsule Take 200 mg by mouth as needed for Constipation      PROAIR  (90 Base) MCG/ACT inhaler INHALE 1 PUFF INTO THE LUNGS EVERY 6 HOURS AS NEEDED FOR WHEEZING OR SHORTNESS OF BREATH 8.5 g 5    Heparin Lock Flush (HEPARIN FLUSH, 100 UNITS/ML,) 100 UNIT/ML injection 3 mLs by Intercatheter route every 30 days No every 30 days but every 4-6 weeks. Flush port with 300 units heparin with 20 cc normal saline for ocrevus infusion for Multiple sclerosis and NS 20CC (Patient not taking: Reported on 12/9/2021) 1 Syringe 5    ipratropium-albuterol (DUONEB) 0.5-2.5 (3) MG/3ML SOLN nebulizer solution USE 1 UNIT DOSE IN NEBULIZER EVERY 4 TO 6 HOURS AS NEEDED. 450 mL 11    glucose monitoring kit (FREESTYLE) monitoring kit 1 kit by Does not apply route daily 1 kit 0    blood glucose monitor strips Test 1 times a day & as needed for symptoms of irregular blood glucose. 100 strip 0    baclofen (LIORESAL) 10 MG tablet Take 1 tablet by mouth 2 times daily as needed (muscle spasms) (Patient taking differently: Take 10 mg by mouth 2 times daily as needed (muscle spasms) Indications: using pump ) 60 tablet 5    Sodium Chloride Flush (SALINE FLUSH) 0.9 % SOLN Infuse 20 mLs intravenously every 30 days Not every 30 days but every 4-6 weeks as need with 300 units of heparin to flush port for Infusion of Ocrevus for multiple sclerosis 20 mL 5     No current facility-administered medications on file prior to encounter. REVIEW OF SYSTEMS    A comprehensive review of systems was negative.     Objective:      BP (!) 136/97   Pulse 71   Temp 97.1 °F (36.2 °C) (Temporal)   Resp 18   Ht 5' 9\" (1.753 m)   Wt 147 lb (66.7 kg)   BMI 21.71 kg/m²     Wt Readings from Last 3 Encounters:   02/21/22 147 lb (66.7 kg)   02/07/22 147 lb (66.7 kg)   01/24/22 147 lb (66.7 kg)       PHYSICAL EXAM    General Appearance: alert and oriented to person, place and time, well developed and well- nourished, in no acute distress  Skin: warm and dry, no rash or erythema  Head: normocephalic and atraumatic  Eyes: pupils equal, round, and reactive to light, extraocular eye movements intact, conjunctivae normal  ENT: tympanic membrane, external ear and ear canal normal bilaterally, nose without deformity, nasal mucosa and turbinates normal without polyps, lips teeth and gums normal  Neck: supple and non-tender without mass, no thyromegaly or thyroid nodules, no cervical lymphadenopathy  Pulmonary/Chest: clear to auscultation bilaterally- no wheezes, rales or rhonchi, normal air movement, no respiratory distress  Cardiovascular: normal rate, regular rhythm, normal S1 and S2, no murmurs, rubs, clicks, or gallops, distal pulses intact, no carotid bruits  Abdomen: soft, non-tender, non-distended, normal bowel sounds, no masses or organomegaly  Extremities: no cyanosis, clubbing or edema  Musculoskeletal: normal range of motion, no joint swelling, deformity or tenderness      Assessment:      Patient Active Problem List   Diagnosis Code    Muscle spasticity M62.838    Peripheral vascular disease ([facility]) I73.9    Multiple sclerosis ([facility]) G35    Pain in both upper arms M79.621, M79.622    Numbness R20.0    Other fatigue R53.83    Weakness R53.1    Chronic pain G89.29    Hx of seizure disorder Z86.69    Insomnia G47.00    Pain, neck M54.2    Asthma J45.909    S/P transmetatarsal amputation of foot, left ([facility]) N74.832    Chronic constipation K59.09    Colostomy in place ([facility]) Z93.3    Paraplegia ([facility]) G82.20    Arthralgia of hip M25.559    Seizure [facility] R56.9    Encounter for care related to vascular access port Z45.2    Urinary incontinence R32    Depressive disorder F32.9    Malodorous urine R82.90    Arthritis M19.90    Osteoporosis M81.0    Urinary bladder stone N21.0    Vesicovaginal fistula N82.0    Skin ulcer of abdominal wall, with fat layer exposed ([address] L98.492    Ulcer of right heel, with fat layer exposed ([address] L97.412    Chronic ulcer of right great toe, with fat layer exposed ([address] L97.512    Non-pressure chronic ulcer of right ankle with fat layer exposed ([address] L97.312             Wound 12/13/21 Heel Right; Posterior Wound 1, Pressure Stage 3, R. Posterior Heel (Active)   Wound Image   02/21/22 1340   Wound Etiology Pressure Stage  3 02/21/22 1340   Dressing Status Dry 02/21/22 1340   Wound Cleansed Soap and water 02/07/22 1407   Dressing/Treatment Gauze dressing/dressing sponge;Roll gauze;Tape/Soft cloth adhesive tape;Xeroform 02/07/22 1440   Offloading for Diabetic Foot Ulcers Yes (type); Offloading boot 02/07/22 1407   Wound Length (cm) 0 cm 02/21/22 1340   Wound Width (cm) 0 cm 02/21/22 1340   Wound Depth (cm) 0 cm 02/21/22 1340   Wound Surface Area (cm^2) 0 cm^2 02/21/22 1340   Change in Wound Size % (l*w) 100 02/21/22 1340   Wound Volume (cm^3) 0 cm^3 02/21/22 1340   Wound Healing % 100 02/21/22 1340   Post-Procedure Length (cm) 0.4 cm 02/07/22 1426   Post-Procedure Width (cm) 0.5 cm 02/07/22 1426   Post-Procedure Depth (cm) 0.1 cm 02/07/22 1426   Post-Procedure Surface Area (cm^2) 0.2 cm^2 02/07/22 1426   Post-Procedure Volume (cm^3) 0.02 cm^3 02/07/22 1426   Distance Tunneling (cm) 0 cm 02/21/22 1340   Tunneling Position ___ O'Clock 0 02/21/22 1340   Undermining Starts ___ O'Clock 0 02/21/22 1340   Undermining Ends___ O'Clock 0 02/21/22 1340   Undermining Maxium Distance (cm) 0 02/21/22 1340   Wound Assessment Epithelialization 02/21/22 1340   Drainage Amount None 02/21/22 1340   Drainage Description Serosanguinous 02/07/22 1407   Odor None 02/21/22 1340   Audelia-wound Assessment Excoriated 02/21/22 1340   Margins Attached edges 02/07/22 1407   Number of days: 69       Wound 12/13/21 Toe (Comment  which one) Right Wound 2, Traumatic,R. anterior Great toe (Active)   Wound Image   02/21/22 1340   Wound Etiology Traumatic 02/21/22 1340   Dressing Status Dry 02/21/22 1340   Wound Cleansed Soap and water 02/07/22 1407   Dressing/Treatment Gauze dressing/dressing sponge;Roll gauze;Tape/Soft cloth adhesive tape;Xeroform 02/07/22 1440   Wound Length (cm) 0 cm 02/21/22 1340   Wound Width (cm) 0 cm 02/21/22 1340   Wound Depth (cm) 0 cm 02/21/22 1340   Wound Surface Area (cm^2) 0 cm^2 02/21/22 1340   Change in Wound Size % (l*w) 100 02/21/22 1340   Wound Volume (cm^3) 0 cm^3 02/21/22 1340   Wound Healing % 100 02/21/22 1340   Post-Procedure Length (cm) 0.5 cm 02/07/22 1426   Post-Procedure Width (cm) 0.3 cm 02/07/22 1426   Post-Procedure Depth (cm) 0.1 cm 02/07/22 1426   Post-Procedure Surface Area (cm^2) 0.15 cm^2 02/07/22 1426   Post-Procedure Volume (cm^3) 0.015 cm^3 02/07/22 1426   Distance Tunneling (cm) 0 cm 02/21/22 1340   Tunneling Position ___ O'Clock 0 02/21/22 1340   Undermining Starts ___ O'Clock 0 02/21/22 1340   Undermining Ends___ O'Clock 0 02/21/22 1340   Undermining Maxium Distance (cm) 0 02/21/22 1340   Wound Assessment Epithelialization 02/21/22 1340   Drainage Amount None 02/21/22 1340   Drainage Description Serosanguinous 02/07/22 1407   Odor None 02/21/22 1340   Audelia-wound Assessment Intact 02/21/22 1340   Margins Attached edges 02/07/22 1407   Wound Thickness Description not for Pressure Injury Full thickness 02/07/22 1407   Number of days: 69       Wound 01/24/22 Ankle Anterior;Right #3 Right Anterior foot (traumatic) (Active)   Wound Image   02/21/22 1340   Wound Etiology Traumatic 02/21/22 1340   Dressing Status Dry 02/21/22 1340   Wound Cleansed Soap and water 02/07/22 1407   Dressing/Treatment Gauze dressing/dressing sponge;Roll gauze;Tape/Soft cloth adhesive tape;Xeroform 02/07/22 1440   Wound Length (cm) 0 cm 02/21/22 1340   Wound Width (cm) 0 cm 02/21/22 1340   Wound Depth (cm) 0 cm 02/21/22 1340   Wound Surface Area (cm^2) 0 cm^2 02/21/22 1340   Change in Wound Size % (l*w) 100 02/21/22 1340   Wound Volume (cm^3) 0 cm^3 02/21/22 1340   Wound Healing % 100 02/21/22 1340   Post-Procedure Length (cm) 0.2 cm 02/07/22 1426   Post-Procedure Width (cm) 0.2 cm 02/07/22 1426   Post-Procedure Depth (cm) 0.1 cm 02/07/22 1426   Post-Procedure Surface Area (cm^2) 0.04 cm^2 02/07/22 1426   Post-Procedure Volume (cm^3) 0.004 cm^3 02/07/22 1426   Distance Tunneling (cm) 0 cm 02/21/22 1340   Tunneling Position ___ O'Clock 0 02/21/22 1340   Undermining Starts ___ O'Clock 0 02/21/22 1340   Undermining Ends___ O'Clock 0 02/21/22 1340   Undermining Maxium Distance (cm) 0 02/21/22 1340   Wound Assessment Epithelialization 02/21/22 1340   Drainage Amount None 02/21/22 1340   Drainage Description Serosanguinous 02/07/22 1407   Odor None 02/21/22 1340   Audelia-wound Assessment Intact 02/21/22 1340   Margins Attached edges 02/07/22 1407   Wound Thickness Description not for Pressure Injury Partial thickness 02/07/22 1407   Number of days: 27             Plan:     Problem List Items Addressed This Visit     Paraplegia ([address] (Chronic)    * (Principal) Ulcer of right heel, with fat layer exposed ([address] (Chronic)    Relevant Orders    Initiate Outpatient Wound Care Protocol    Chronic ulcer of right great toe, with fat layer exposed ([address] (Chronic)    Relevant Orders    Initiate Outpatient Wound Care Protocol    Non-pressure chronic ulcer of right ankle with fat layer exposed ([address] - Primary (Chronic)    Relevant Orders    Initiate Outpatient Wound Care Protocol        Wounds healed!! RTO prn                      Treatment Note please see attached Discharge Instructions    In my professional opinion this patient would benefit from HBO Therapy: No    Written patient dismissal instructions given to patient and signed by patient or POA.          Discharge [address] and Hyperbaric Oxygen Therapy   Physician Orders and Discharge Instructions  [facility] [address] [address]  Telephone: [phone]    NAME:  Guy Graham OF BIRTH:  1970  MEDICAL RECORD NUMBER:  [id number]  DATE:  2/21/2022    Discharge condition: Stable    Discharge to: Home    Left via:Private automobile    Accompanied by: Caregiver      [employer]     Dressing Orders: Left Foot and Toe Wounds   Healed, Moisturize and Protect  Wear heel lift boots   Follow up with Dr Gordy Rogel as instructed     [address] [place] [address],[address] follow up visit ____________PRN_________________  (Please note your next appointment above and if you are unable to keep, kindly give a 24 hour notice. Thank you.)    If you experience any of the following, please call the [facility] during business hours:    * Increase in Pain  * Temperature over 101  * Increase in drainage from your wound  * Drainage with a foul odor  * Bleeding  * Increase in swelling  * Need for compression bandage changes due to slippage, breakthrough drainage. If you need medical attention outside of the business hours of the [facility] please contact your PCP or go to the nearest emergency room.         Electronically signed by Bryan Winter MD on 2/21/2022 at 1:55 PM

## 2022-02-21 NOTE — PLAN OF CARE
Problem: Wound:  Goal: Will show signs of wound healing; wound closure and no evidence of infection  Description: Will show signs of wound healing; wound closure and no evidence of infection  Outcome: Met This Shift     Problem: Pressure Ulcer:  Goal: Signs of wound healing will improve  Description: Signs of wound healing will improve  Outcome: Met This Shift  Goal: Absence of new pressure ulcer  Description: Absence of new pressure ulcer  Outcome: Met This Shift  Goal: Will show no infection signs and symptoms  Description: Will show no infection signs and symptoms  Outcome: Met This Shift     Problem: Smoking cessation:  Goal: Ability to formulate a plan to maintain a tobacco-free life will be supported  Description: Ability to formulate a plan to maintain a tobacco-free life will be supported  Outcome: Met This Shift

## 2022-02-22 ENCOUNTER — TELEPHONE (OUTPATIENT)
Dept: INTERNAL MEDICINE | Age: 52
End: 2022-02-22

## 2022-02-22 DIAGNOSIS — B37.9 YEAST INFECTION: Primary | ICD-10-CM

## 2022-02-22 RX ORDER — FLUCONAZOLE 100 MG/1
200 TABLET ORAL DAILY
Qty: 6 TABLET | Refills: 0 | Status: SHIPPED | OUTPATIENT
Start: 2022-02-22 | End: 2022-02-25

## 2022-02-22 NOTE — TELEPHONE ENCOUNTER
Pt just finished a 10 day round of antibiotics, and she has a bad yeast infection please sign pended medication.

## 2022-03-03 DIAGNOSIS — G35 MS (MULTIPLE SCLEROSIS) (HCC): ICD-10-CM

## 2022-03-03 DIAGNOSIS — M62.838 MUSCLE SPASTICITY: ICD-10-CM

## 2022-03-03 DIAGNOSIS — R53.83 OTHER FATIGUE: ICD-10-CM

## 2022-03-03 DIAGNOSIS — M54.2 PAIN, NECK: ICD-10-CM

## 2022-03-03 DIAGNOSIS — M79.622 PAIN IN BOTH UPPER ARMS: ICD-10-CM

## 2022-03-03 DIAGNOSIS — M79.621 PAIN IN BOTH UPPER ARMS: ICD-10-CM

## 2022-03-03 RX ORDER — HYDROCODONE BITARTRATE AND ACETAMINOPHEN 10; 325 MG/1; MG/1
TABLET ORAL
Qty: 90 TABLET | Refills: 0 | Status: SHIPPED | OUTPATIENT
Start: 2022-03-07 | End: 2022-04-04

## 2022-03-03 RX ORDER — METHYLPHENIDATE HYDROCHLORIDE 20 MG/1
TABLET ORAL
Qty: 30 TABLET | Refills: 0 | Status: SHIPPED | OUTPATIENT
Start: 2022-03-07 | End: 2022-04-04

## 2022-03-03 RX ORDER — LEVETIRACETAM 750 MG/1
750 TABLET ORAL DAILY
Qty: 30 TABLET | Refills: 5 | Status: SHIPPED | OUTPATIENT
Start: 2022-03-03 | End: 2022-09-09

## 2022-03-03 NOTE — TELEPHONE ENCOUNTER
Pt is on 750mg per last office note on 11/4/21  so I have teed up for her to take 1 tablet daily instead of multiple tablets daily per the patients request.     Requested Prescriptions     Pending Prescriptions Disp Refills    levETIRAcetam (KEPPRA) 750 MG tablet 30 tablet 5     Sig: Take 1 tablet by mouth daily       Last Office Visit: 11/4/2021  Next Office Visit: 4/6/2022

## 2022-03-03 NOTE — TELEPHONE ENCOUNTER
Requested Prescriptions     Pending Prescriptions Disp Refills    HYDROcodone-acetaminophen (NORCO)  MG per tablet [Pharmacy Med Name: HYDROCODONE BITARTRATE/ACETAMINOPHEN 325MG-10MG TABLET] 90 tablet 0     Sig: TAKE 1 TABLET BY MOUTH 3 TIMES DAILY AS NEEDED FOR PAIN.  REDUCE DOSES AS PAIN BECOMES MANAGEABLE    methylphenidate (RITALIN) 20 MG tablet [Pharmacy Med Name: METHYLPHENIDATE HYDROCHLORIDE 20MG TABLET] 30 tablet 0     Sig: TAKE 1 TABLET BY MOUTH DAILY       Last Office Visit:  11/4/2021  Next Office Visit:  4/6/2022  Last Medication Refill: 2/5/22   Lupe Sullivan up to date: 3/3/22     *RX updated to reflect   3/7/22 for both  fill date*

## 2022-03-15 ENCOUNTER — TELEPHONE (OUTPATIENT)
Dept: INTERNAL MEDICINE | Age: 52
End: 2022-03-15

## 2022-03-15 NOTE — TELEPHONE ENCOUNTER
Advised pt that in order for insurance to pay for the hospital bed she will need to have a face to face visit for this. Pt said she would call back tomorrow once she looks at her calendar of appts to schedule this appt.

## 2022-03-15 NOTE — TELEPHONE ENCOUNTER
----- Message from Avtar Madrid sent at 3/15/2022  1:36 PM CDT -----  Subject: Message to Provider    QUESTIONS  Information for Provider? Patient calling to get a script for a hospital   bed and for a physical therapist. call her back about this.   ---------------------------------------------------------------------------  --------------  [address]  What is the best way for the office to contact you? OK to leave message on   voicemail  Preferred Call Back Phone Number? [phone]  ---------------------------------------------------------------------------  --------------  SCRIPT ANSWERS  Relationship to Patient?  Self

## 2022-04-04 DIAGNOSIS — G35 MS (MULTIPLE SCLEROSIS) (HCC): ICD-10-CM

## 2022-04-04 DIAGNOSIS — R53.83 OTHER FATIGUE: ICD-10-CM

## 2022-04-04 DIAGNOSIS — M62.838 MUSCLE SPASTICITY: ICD-10-CM

## 2022-04-04 DIAGNOSIS — M79.622 PAIN IN BOTH UPPER ARMS: ICD-10-CM

## 2022-04-04 DIAGNOSIS — M54.2 PAIN, NECK: ICD-10-CM

## 2022-04-04 DIAGNOSIS — M79.621 PAIN IN BOTH UPPER ARMS: ICD-10-CM

## 2022-04-04 RX ORDER — NITROFURANTOIN MACROCRYSTALS 50 MG/1
50 CAPSULE ORAL NIGHTLY
Qty: 90 CAPSULE | Refills: 1 | Status: SHIPPED | OUTPATIENT
Start: 2022-04-04 | End: 2022-10-19

## 2022-04-04 NOTE — TELEPHONE ENCOUNTER
Alyce called requesting a refill of the below medication which has been pended for you:     Requested Prescriptions     Pending Prescriptions Disp Refills    nitrofurantoin (MACRODANTIN) 50 MG capsule [Pharmacy Med Name: NITROFURANTOIN MACROCRYSTALS 50MG CAPSULE] 90 capsule 1     Sig: Take 1 capsule by mouth nightly       Last Appointment Date: 12/9/2021  Next Appointment Date: 4/15/2022    Allergies   Allergen Reactions    Darvocet [Propoxyphene N-Acetaminophen]      Talking out of her head    Morphine      Category:  Allergy;     Morphine And Related Itching and Swelling    Propoxyphene Swelling

## 2022-04-05 RX ORDER — METHYLPHENIDATE HYDROCHLORIDE 20 MG/1
TABLET ORAL
Qty: 30 TABLET | Refills: 0 | Status: SHIPPED | OUTPATIENT
Start: 2022-04-06 | End: 2022-05-09

## 2022-04-05 RX ORDER — HYDROCODONE BITARTRATE AND ACETAMINOPHEN 10; 325 MG/1; MG/1
TABLET ORAL
Qty: 90 TABLET | Refills: 0 | Status: SHIPPED | OUTPATIENT
Start: 2022-04-06 | End: 2022-05-09

## 2022-04-06 ENCOUNTER — OFFICE VISIT (OUTPATIENT)
Dept: NEUROLOGY | Age: 52
End: 2022-04-06
Payer: MEDICARE

## 2022-04-06 ENCOUNTER — HOSPITAL ENCOUNTER (OUTPATIENT)
Dept: INFUSION THERAPY | Age: 52
Setting detail: INFUSION SERIES
Discharge: HOME | End: 2022-04-06
Payer: MEDICARE

## 2022-04-06 VITALS
HEART RATE: 62 BPM | BODY MASS INDEX: 21.71 KG/M2 | DIASTOLIC BLOOD PRESSURE: 73 MMHG | SYSTOLIC BLOOD PRESSURE: 107 MMHG | HEIGHT: 69 IN

## 2022-04-06 DIAGNOSIS — G35 MULTIPLE SCLEROSIS (HCC): Primary | ICD-10-CM

## 2022-04-06 DIAGNOSIS — M79.621 PAIN IN BOTH UPPER ARMS: ICD-10-CM

## 2022-04-06 DIAGNOSIS — G40.909 SEIZURE DISORDER (HCC): ICD-10-CM

## 2022-04-06 DIAGNOSIS — M54.2 PAIN, NECK: ICD-10-CM

## 2022-04-06 DIAGNOSIS — R53.1 WEAKNESS: ICD-10-CM

## 2022-04-06 DIAGNOSIS — R20.0 NUMBNESS: ICD-10-CM

## 2022-04-06 DIAGNOSIS — R56.9 SEIZURE (HCC): ICD-10-CM

## 2022-04-06 DIAGNOSIS — G82.20 PARAPLEGIA (HCC): ICD-10-CM

## 2022-04-06 DIAGNOSIS — G35 MS (MULTIPLE SCLEROSIS) (HCC): Primary | ICD-10-CM

## 2022-04-06 DIAGNOSIS — Z45.2 ENCOUNTER FOR CARE RELATED TO VASCULAR ACCESS PORT: ICD-10-CM

## 2022-04-06 DIAGNOSIS — M62.838 MUSCLE SPASTICITY: ICD-10-CM

## 2022-04-06 DIAGNOSIS — M79.622 PAIN IN BOTH UPPER ARMS: ICD-10-CM

## 2022-04-06 DIAGNOSIS — R53.83 OTHER FATIGUE: ICD-10-CM

## 2022-04-06 PROCEDURE — 6360000002 HC RX W HCPCS: Performed by: INTERNAL MEDICINE

## 2022-04-06 PROCEDURE — 2580000003 HC RX 258: Performed by: INTERNAL MEDICINE

## 2022-04-06 PROCEDURE — 99213 OFFICE O/P EST LOW 20 MIN: CPT | Performed by: PSYCHIATRY & NEUROLOGY

## 2022-04-06 PROCEDURE — G8420 CALC BMI NORM PARAMETERS: HCPCS | Performed by: PSYCHIATRY & NEUROLOGY

## 2022-04-06 PROCEDURE — 96523 IRRIG DRUG DELIVERY DEVICE: CPT

## 2022-04-06 PROCEDURE — 3017F COLORECTAL CA SCREEN DOC REV: CPT | Performed by: PSYCHIATRY & NEUROLOGY

## 2022-04-06 PROCEDURE — 4004F PT TOBACCO SCREEN RCVD TLK: CPT | Performed by: PSYCHIATRY & NEUROLOGY

## 2022-04-06 PROCEDURE — G8427 DOCREV CUR MEDS BY ELIG CLIN: HCPCS | Performed by: PSYCHIATRY & NEUROLOGY

## 2022-04-06 RX ORDER — HEPARIN SODIUM (PORCINE) LOCK FLUSH IV SOLN 100 UNIT/ML 100 UNIT/ML
500 SOLUTION INTRAVENOUS PRN
Status: DISCONTINUED | OUTPATIENT
Start: 2022-04-06 | End: 2022-04-07 | Stop reason: HOSPADM

## 2022-04-06 RX ORDER — HEPARIN SODIUM (PORCINE) LOCK FLUSH IV SOLN 100 UNIT/ML 100 UNIT/ML
500 SOLUTION INTRAVENOUS PRN
Status: CANCELLED | OUTPATIENT
Start: 2022-04-06

## 2022-04-06 RX ORDER — SODIUM CHLORIDE 0.9 % (FLUSH) 0.9 %
5-40 SYRINGE (ML) INJECTION PRN
Status: CANCELLED | OUTPATIENT
Start: 2022-04-06

## 2022-04-06 RX ORDER — PREGABALIN 300 MG/1
CAPSULE ORAL
Qty: 60 CAPSULE | Refills: 5 | Status: SHIPPED | OUTPATIENT
Start: 2022-04-06 | End: 2022-09-06

## 2022-04-06 RX ORDER — SODIUM CHLORIDE 0.9 % (FLUSH) 0.9 %
5-40 SYRINGE (ML) INJECTION PRN
Status: DISCONTINUED | OUTPATIENT
Start: 2022-04-06 | End: 2022-04-07 | Stop reason: HOSPADM

## 2022-04-06 RX ADMIN — SODIUM CHLORIDE, PRESERVATIVE FREE 10 ML: 5 INJECTION INTRAVENOUS at 14:05

## 2022-04-06 RX ADMIN — HEPARIN 500 UNITS: 100 SYRINGE at 14:06

## 2022-04-06 NOTE — PROGRESS NOTES
USING STERILE TECHNIQUE ACCESSED  LEFT CHEST PORT AND FLUSHED WITH NORMAL SALINE AND HEPARIN. BRISK BLOOD RETURN NOTED. PT STATES SHE WILL CALL AND SCHEDULE HER NEXT APPOINTMENT AT [facility].  DISCHARGE PAPERWORK GIVEN TO PATIENT

## 2022-04-06 NOTE — PROGRESS NOTES
Chief Complaint   Patient presents with    Multiple Sclerosis     follow up        Suki Gallardo is a 47y.o. year old female who is seen for evaluation of multiple sclerosis. The patient indicates that she was diagnosed with multiple sclerosis in 1994. At that time to develop some visual disturbances including blurred vision and double vision. Within eight months she was in a wheelchair. She currently has no movement of the lower extremities. She does have some increased Spasticity in the legs worse in the arms. She underwent a baclofen pump with Dr. Fatemeh Zarate with some complications. She is doing better from this. She currently sees Dr. nichols for her pump management. She does have some cognitive issues. She denies diplopia, dysarthria, or dysphagia. She does have some incontinence of bladder. She does have pain in the hips and lower extremities. She also has headaches with pain behind both eyes. She was followed by Dr. Jason Mccracken of neurology. She follows up today for evaluation. She is currently in a wheelchair. Since her last visit she is doing better with physical therapy. She had an accidental overdose with her baclofen pump and had a seizure. Doing better on. Recently admitted with seizure. Was off Chantix a few weeks and had a UTI. Had seizure in oct 2014 with seizure due to baclofen overose. This was her second seizure. Off Ocrevus. Occassionally gets shooting pain in head. Headache in the back of the head. Cymbalta helps mood. Had a seizure on 1/29/21 and went to the ER. Found to have a UTI and placed on Keppra increased to 750 mg bid. Went to [facility] due surgery for ileostomy. No seizures. Feels overall stable.      Active Ambulatory Problems     Diagnosis Date Noted    Muscle spasticity     Peripheral vascular disease ([facility] 02/01/2016    Multiple sclerosis ([facility] 06/08/2016    Pain in both upper arms 08/04/2016    Numbness 08/04/2016    Other fatigue 08/04/2016    Weakness 08/04/2016    Chronic pain 08/19/2016    Hx of seizure disorder 08/19/2016    Insomnia 06/28/2017    Pain, neck 09/06/2017    Asthma 11/26/2018    S/P transmetatarsal amputation of foot, left ([address] 04/17/2019    Chronic constipation 05/02/2019    Colostomy in place ([address] 05/02/2019    Paraplegia ([address] 11/05/2019    Arthralgia of hip 12/03/2019    Seizure ([address] 05/26/2020    Encounter for care related to vascular access port 12/10/2020    Urinary incontinence 05/04/2020    Depressive disorder 05/04/2020    Malodorous urine 12/14/2020    Arthritis 05/04/2020    Osteoporosis 05/04/2020    Urinary bladder stone 10/16/2020    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/29/2020    Skin ulcer of abdominal wall, with fat layer exposed ([address] 06/16/2021    Ulcer of right heel, with fat layer exposed ([address] 12/13/2021    Chronic ulcer of right great toe, with fat layer exposed ([address] 12/13/2021    Non-pressure chronic ulcer of right ankle with fat layer exposed ([address] 02/07/2022     Resolved Ambulatory Problems     Diagnosis Date Noted    Neuropathic ulcer of right foot, limited to breakdown of skin ([address] 02/01/2016    Sepsis ([address] 08/19/2016    Urinary tract infection 08/19/2016    Hypokalemia 08/20/2016    Hypokalemia 08/20/2016    Abnormal EKG     Encephalopathy 08/25/2016    Elevated troponin level     Neuropathic ulcer of left foot, limited to breakdown of skin ([address] 04/10/2017    Acute bronchitis 06/28/2017    Acute sinusitis 06/28/2017    Open wound of ankle 06/28/2017    Vitamin D deficiency 06/28/2017    Back pain 06/28/2017    Breakdown (mechanical) of cranial or spinal infusion catheter, initial encounter 10/25/2016    CAFL (chronic airflow limitation) ([address] 06/28/2017    Cough 06/28/2017    Encounter for screening for other disorder 06/28/2017    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    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    Difficult or painful urination 06/28/2017    Headache 06/28/2017    Hyperlipidemia 06/28/2017    Influenza 06/28/2017    Breast lump 06/28/2017    Patient overweight 06/28/2017    Hay fever 06/28/2017    Decubitus ulcer of sacral region 06/28/2017    Cobalamin deficiency 06/28/2017    Disease caused by fungus 06/28/2017    Colostomy and enterostomy malfunction ([address] 09/06/2017    Atherosclerosis of native arteries of right leg with ulceration of calf ([address] 11/26/2018    Venous stasis ulcer of left lower extremity ([address] 11/26/2018    Open wound of second toe of left foot 12/11/2018    Type 2 diabetes mellitus with left diabetic foot ulcer ([address] 04/04/2019    Encounter for screening colonoscopy 05/02/2019    Family history of colon cancer 05/02/2019    Surgical wound breakdown 07/24/2019    Pressure ulcer of sacral region, stage 4 ([address] 10/17/2019    Neuropathic foot ulcer, left, limited to breakdown of skin ([address] 05/05/2020    Type 2 diabetes mellitus without complication ([address] 97/91/0429    Smoking 08/25/2020     Past Medical History:   Diagnosis Date    Ankle wound     MS (multiple sclerosis) (HCC)        Past Surgical History:   Procedure Laterality Date    BACLOFEN PUMP IMPLANTATION      BREAST BIOPSY Right     neg    COLONOSCOPY N/A 9/27/2019    Dr Jaclyn Lee ileum through ostomy-patent and healthy appearing anastomosis in the right colon-entero colonic anastomosis-10 yr recall    COLOSTOMY     [facility]      Right    FOOT AMPUTATION Left 4/4/2019    LEFT TRANSMET AMPUTATION performed by Best Hernández MD at [address]      urostomy    OTHER SURGICAL HISTORY      pain pump    ME INSJ PRPH CTR VAD W/SUBQ PORT UNDER 5 YR N/A 6/26/2018    SINGLE LUMEN PORT PLACEMENT WITH FLUORO performed by Katey Camacho MD at [address] TOE AMPUTATION      L last toe    TONSILLECTOMY      URETEROTOMY         Family History   Problem Relation Age of Onset    Cancer Mother         breast    Colon Cancer Mother     Colon Polyps Mother     Breast Cancer Mother     Diabetes Father     High Blood Pressure Father     Cancer Paternal Aunt         breast    Liver Cancer Paternal Aunt     Heart Disease Paternal Grandmother     Esophageal Cancer Neg Hx     Liver Disease Neg Hx     Rectal Cancer Neg Hx     Stomach Cancer Neg Hx        Allergies   Allergen Reactions    Darvocet [Propoxyphene N-Acetaminophen]      Talking out of her head    Morphine      Category: Allergy;     Morphine And Related Itching and Swelling    Propoxyphene Swelling       Social History     Socioeconomic History    Marital status:      Spouse name: Not on file    Number of children: Not on file    Years of education: Not on file    Highest education level: Not on file   Occupational History    Not on file   Tobacco Use    Smoking status: Current Some Day Smoker     Packs/day: 0.25     Types: Cigarettes    Smokeless tobacco: Never Used    Tobacco comment: maybe smokes 1 a day    Vaping Use    Vaping Use: Never used   Substance and Sexual Activity    Alcohol use: Yes     Comment: yaritza    Drug use: Yes     Types: Marijuana Gaynelle Fountain)     Comment: daily     Sexual activity: Not on file   Other Topics Concern    Not on file   Social History Narrative    Not on file     Social Determinants of Health     Financial Resource Strain: Low Risk     Difficulty of Paying Living Expenses: Not hard at all   Food Insecurity: No Food Insecurity    Worried About Running Out of Food in the Last Year: Never true    Meagan of Food in the Last Year: Never true   Transportation Needs:     Lack of Transportation (Medical): Not on file    Lack of Transportation (Non-Medical):  Not on file   Physical Activity:     Days of Exercise per Week: Not on file    Minutes of Exercise per Session: Not on file   Stress:     Feeling of Stress : Not on file   Social Connections:     Frequency of Communication with Friends and Family: Not on file    Frequency of Social Gatherings with Friends and Family: Not on file    Attends Denominational Services: Not on file    Active Member of Clubs or Organizations: Not on file    Attends Club or Organization Meetings: Not on file    Marital Status: Not on file   Intimate Partner Violence:     Fear of Current or Ex-Partner: Not on file    Emotionally Abused: Not on file    Physically Abused: Not on file    Sexually Abused: Not on file   Housing Stability:     Unable to Pay for Housing in the Last Year: Not on file    Number of [place] in the Last Year: Not on file    Unstable Housing in the Last Year: Not on file     Review of Systems     Constitutional - No fever or chills. No diaphoresis or significant fatigue. HENT -  No tinnitus or significant hearing loss. Eyes - no sudden vision change or eye pain  Respiratory - no significant shortness of breath or cough  Cardiovascular - no chest pain No palpitations or significant leg swelling  Gastrointestinal - no abdominal swelling or pain. Genitourinary - No difficulty urinating, dysuria  Musculoskeletal - yes back pain or myalgia. Skin - no color change or rash  Neurologic - No seizures. No lateralizing weakness. Hematologic - no easy bruising or excessive bleeding. Psychiatric - no severe anxiety or nervousness. All other review of systems are negative.       Current Outpatient Medications   Medication Sig Dispense Refill    pregabalin (LYRICA) 300 MG capsule 1 bid 60 capsule 5    methylphenidate (RITALIN) 20 MG tablet TAKE 1 TABLET BY MOUTH DAILY 30 tablet 0    HYDROcodone-acetaminophen (NORCO)  MG per tablet TAKE 1 TABLET BY MOUTH 3 TIMES DAILY AS NEEDED FOR PAIN.  REDUCE DOSES AS PAIN BECOMES MANAGEABLE 90 tablet 0    nitrofurantoin (MACRODANTIN) 50 MG capsule Take 1 capsule by mouth nightly 90 capsule 1    levETIRAcetam (KEPPRA) 750 MG tablet Take 1 tablet by mouth daily 30 tablet 5    Diapers & Supplies MISC Indications: FX: [id number] Diapers, Chucks, Wipes, and Gloves. 100 each 3    ondansetron (ZOFRAN-ODT) 4 MG disintegrating tablet Take 1 tablet by mouth 3 times daily as needed for Nausea or Vomiting 30 tablet 0    DULoxetine (CYMBALTA) 30 MG extended release capsule Take 1 capsule by mouth daily (Patient taking differently: Take 30 mg by mouth nightly ) 30 capsule 5    pilocarpine (SALAGEN) 7.5 MG tablet TAKE ONE TABLET BY MOUTH 3 TIMES DAILY 90 tablet 3    naloxone 4 MG/0.1ML LIQD nasal spray 1 spray by Nasal route as needed for Opioid Reversal 1 each 5    nicotine (NICODERM CQ) 21 MG/24HR Place 1 patch onto the skin daily (Patient not taking: Reported on 12/9/2021) 42 patch 0    tiZANidine (ZANAFLEX) 4 MG tablet TAKE 3 TABLETS TWICE DAILY AS NEEDED  180 tablet 11    levETIRAcetam (KEPPRA) 500 MG tablet TAKE 1 TABLET BY MOUTH DAILY 90 tablet 3    azelastine (ASTELIN) 0.1 % nasal spray USE 2 SPRAYS IN EACH NOSTRIL TWICE DAILY AS DIRECTED 30 mL 0    hydroCHLOROthiazide (HYDRODIURIL) 25 MG tablet TAKE 1 TABLET BY MOUTH ONCE DAILY AS NEEDED 30 tablet 0    cetirizine (ZYRTEC) 10 MG tablet Take 10 mg by mouth daily      Catheters MISC Catheter supplies and lubricant 5 times daily G82.20  to  [phone] 450 each 3    levETIRAcetam (KEPPRA) 500 MG tablet Take 1 tablet by mouth nightly (Patient taking differently: Take 750 mg by mouth nightly ) 30 tablet 5    glucose monitoring kit (FREESTYLE) monitoring kit 1 kit by Does not apply route daily 1 kit 0    blood glucose monitor strips Test 1 times a day & as needed for symptoms of irregular blood glucose.  100 strip 3    docusate sodium (COLACE) 100 MG capsule Take 200 mg by mouth as needed for Constipation      PROAIR  (90 Base) MCG/ACT inhaler INHALE 1 PUFF INTO THE LUNGS EVERY 6 HOURS AS NEEDED FOR WHEEZING OR SHORTNESS OF BREATH 8.5 g 5    Heparin Lock Flush (HEPARIN FLUSH, 100 UNITS/ML,) 100 UNIT/ML injection 3 mLs by Intercatheter route every 30 days No every 30 days but every 4-6 weeks. Flush port with 300 units heparin with 20 cc normal saline for ocrevus infusion for Multiple sclerosis and NS 20CC (Patient not taking: Reported on 12/9/2021) 1 Syringe 5    ipratropium-albuterol (DUONEB) 0.5-2.5 (3) MG/3ML SOLN nebulizer solution USE 1 UNIT DOSE IN NEBULIZER EVERY 4 TO 6 HOURS AS NEEDED. 450 mL 11    BACLOFEN, PAIN PUMP REFILL CHARGE, by Implant route continuous Indications: every 3 months      Multiple Vitamins-Minerals (THERAPEUTIC MULTIVITAMIN-MINERALS) tablet Take 1 tablet by mouth daily      glucose monitoring kit (FREESTYLE) monitoring kit 1 kit by Does not apply route daily 1 kit 0    blood glucose monitor strips Test 1 times a day & as needed for symptoms of irregular blood glucose. 100 strip 0    baclofen (LIORESAL) 10 MG tablet Take 1 tablet by mouth 2 times daily as needed (muscle spasms) (Patient taking differently: Take 10 mg by mouth 2 times daily as needed (muscle spasms) Indications: using pump ) 60 tablet 5    Sodium Chloride Flush (SALINE FLUSH) 0.9 % SOLN Infuse 20 mLs intravenously every 30 days Not every 30 days but every 4-6 weeks as need with 300 units of heparin to flush port for Infusion of Ocrevus for multiple sclerosis 20 mL 5     No current facility-administered medications for this visit. Facility-Administered Medications Ordered in Other Visits   Medication Dose Route Frequency Provider Last Rate Last Admin    sodium chloride flush 0.9 % injection 5-40 mL  5-40 mL IntraVENous PRN Whitney Hill MD   10 mL at 04/06/22 1405    heparin flush 100 UNIT/ML injection 500 Units  500 Units IntraCATHeter PRN Whitney Hill MD   500 Units at 04/06/22 1406       /73   Pulse 62   Ht 5' 9\" (1.753 m)   BMI 21.71 kg/m²     Constitutional - well developed, well nourished.     Eyes - conjunctiva normal.  Ear, nose, throat - No scars, masses, or lesions over external nose or ears, no atrophy of tongue  Neck-symmetric, no masses noted, no jugular vein distension  Respiration- chest wall appears symmetric, good expansion,   normal effort without use of accessory muscles  Musculoskeletal - no significant wasting of muscles noted, no bony deformities  Extremities-no clubbing, cyanosis or edema  Skin - warm, dry, and intact. No rash, erythema, or pallor. Psychiatric - mood, affect, and behavior appear normal.      Neurological exam  Awake, alert, fluent oriented  appropriate affect  Attention and concentration appear appropriate  Recent and remote memory appears unremarkable  Speech normal without dysarthria  No clear issues with language of fund of knowledge    Cranial Nerve Exam     CN III, IV,VI-EOMI, No nystagmus, conjugate eye movements, no ptosis  CN VII-no facial assymetry        Motor Exam  antigravity throughout upper extremities bilaterally    Tremors- no tremors in hands or head noted    Gait  In wheelchair    Lab Results   Component Value Date    [id number] 05/02/2019     Lab Results   Component Value Date    WBC 6.4 12/07/2021    HGB 14.3 12/07/2021    HCT 47.2 (H) 12/07/2021    MCV 89.1 12/07/2021     12/07/2021     Lab Results   Component Value Date     12/07/2021    K 4.1 12/07/2021     12/07/2021    CO2 26 12/07/2021    BUN 12 12/07/2021    CREATININE 0.3 (L) 12/07/2021    GLUCOSE 88 12/07/2021    CALCIUM 9.4 12/07/2021    PROT 6.6 12/07/2021    LABALBU 4.2 12/07/2021    BILITOT 0.4 12/07/2021    ALKPHOS 108 (H) 12/07/2021    AST 13 12/07/2021    ALT 15 12/07/2021    LABGLOM >60 12/07/2021    GFRAA >59 12/07/2021    GLOB 2.7 08/19/2016     Impression   1.. No foci of abnormal T2 signal or enhancement within the cervical   cord to suggest plaques of multiple sclerosis or mass. 2. Mild bulging of the disc at C5-C6 with uncinate spurring   contributing to neural foraminal narrowing. There is no central   stenosis. The remaining cervical disc levels are unremarkable.    Signed by Dr Damion Horne on 8/14/2017 5:05 PM           Assessment    ICD-10-CM    1. MS (multiple sclerosis) ([facility]  G35    2. Seizure disorder ([address]  G40.909    3. Other fatigue  R53.83    4. Pain, neck  M54.2 pregabalin (LYRICA) 300 MG capsule   5. Pain in both upper arms  M79.621     M79.622    6. Muscle spasticity  M62.838    7. Weakness  R53.1    8. Numbness  R20.0    9. Seizure ([facility]  R56.9    10. Paraplegia ([facility]  G82.20        Her neurological examination previously was significant for no movement in the lower extremities. She had some altered sensation in the legs along with some spasticity. She's wheelchair-bound. Her MRI of the brain revealed no new lesions. There was extensive white matter lesions. , Her cervical, thoracic, and lumbosacral spine were relatively unremarkable. She was agreeable to be started on Gilenya with no issues. stable. She had been on Copaxone but came off of this on her own. She denies any clear relapses over the last several years. She'll be referred to physical therapy as well. She will have a CBC and CMP every 3 months. The patient indicated understanding of the management plan. At this time she will be referred to [facility] as per her wishes for some experimental treatments. She is to reduce her Keppra to 750 mg daily as she wants to be at this dose due to fatigue,  She is seeing Dr Galdino Patel who manages baclofen pump. She will be continued  Ritalin to see if this helps at a future. She will be tried on Nuvgil. Lyrica restarted. She will be referred to [facility] for colostomy issues. .her hepatitis B panel was unremkarkable. Her EMG with NCs of the arms was suggestive of an ulnar neuropathy on the left. her MRI of the brain had stable white matter change. Her MRI of the c spine had some minimal disc bulging but no MS plaques Her x rays of her hips due to pain were unremarkable except for bladder stone along with pin in right hip. 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 She is off Ocrevus due to [address] virus.  Has reduced Cymbala. .She's to follow-up with me in approximately 3 months and call with any further problems. Continue present care as noted as medicine continue to help. Plan    No orders of the defined types were placed in this encounter. Orders Placed This Encounter   Medications    pregabalin (LYRICA) 300 MG capsule     Si bid     Dispense:  60 capsule     Refill:  5       Return in about 3 months (around 2022).

## 2022-04-11 ENCOUNTER — HOSPITAL ENCOUNTER (OUTPATIENT)
Dept: WOUND CARE | Age: 52
Discharge: HOME | End: 2022-04-11
Payer: MEDICARE

## 2022-04-11 VITALS
RESPIRATION RATE: 20 BRPM | HEIGHT: 69 IN | DIASTOLIC BLOOD PRESSURE: 63 MMHG | HEART RATE: 70 BPM | BODY MASS INDEX: 21.71 KG/M2 | TEMPERATURE: 96.9 F | SYSTOLIC BLOOD PRESSURE: 92 MMHG

## 2022-04-11 DIAGNOSIS — I73.9 PERIPHERAL VASCULAR DISEASE (HCC): ICD-10-CM

## 2022-04-11 DIAGNOSIS — F17.200 SMOKING: Chronic | ICD-10-CM

## 2022-04-11 DIAGNOSIS — L97.512 NEUROPATHIC ULCER OF RIGHT FOOT WITH FAT LAYER EXPOSED (HCC): ICD-10-CM

## 2022-04-11 DIAGNOSIS — G82.20 PARAPLEGIA (HCC): Chronic | ICD-10-CM

## 2022-04-11 DIAGNOSIS — G35 MULTIPLE SCLEROSIS (HCC): ICD-10-CM

## 2022-04-11 DIAGNOSIS — M21.371 RIGHT FOOT DROP: Primary | ICD-10-CM

## 2022-04-11 PROBLEM — L98.492: Status: RESOLVED | Noted: 2021-06-16 | Resolved: 2022-04-11

## 2022-04-11 PROBLEM — L97.412 ULCER OF RIGHT HEEL, WITH FAT LAYER EXPOSED (HCC): Chronic | Status: RESOLVED | Noted: 2021-12-13 | Resolved: 2022-04-11

## 2022-04-11 PROBLEM — L97.312 NON-PRESSURE CHRONIC ULCER OF RIGHT ANKLE WITH FAT LAYER EXPOSED (HCC): Chronic | Status: RESOLVED | Noted: 2022-02-07 | Resolved: 2022-04-11

## 2022-04-11 PROCEDURE — 11042 DBRDMT SUBQ TIS 1ST 20SQCM/<: CPT

## 2022-04-11 PROCEDURE — 11042 DBRDMT SUBQ TIS 1ST 20SQCM/<: CPT | Performed by: NURSE PRACTITIONER

## 2022-04-11 PROCEDURE — 99214 OFFICE O/P EST MOD 30 MIN: CPT

## 2022-04-11 PROCEDURE — 99215 OFFICE O/P EST HI 40 MIN: CPT | Performed by: NURSE PRACTITIONER

## 2022-04-11 RX ORDER — LIDOCAINE HYDROCHLORIDE 20 MG/ML
JELLY TOPICAL PRN
Status: DISCONTINUED | OUTPATIENT
Start: 2022-04-11 | End: 2022-04-13 | Stop reason: HOSPADM

## 2022-04-11 RX ORDER — FLUOCINOLONE ACETONIDE 0.1 MG/ML
10 SOLUTION TOPICAL NIGHTLY
Status: ON HOLD | COMMUNITY
Start: 2022-02-18

## 2022-04-11 ASSESSMENT — VISUAL ACUITY: OU: 1

## 2022-04-11 NOTE — HOME CARE
[address]:     [facility] [address] [facility], [address]  p: [phone] f: [phone]     Ordering Center:     [address]  [address], [place] [address] [address] [phone]  WOUND CARE Dept: [address] [facility] [phone]    Patient Information:      Anna Gregorio  [address] [facility] [address] [place] [address]   [phone]   : 1970  AGE: 47 y.o. GENDER: female   EPISODE DATE: 2022    Insurance:      PRIMARY INSURANCE:  Plan: MEDICARE PART A AND B  Coverage: MEDICARE  Effective Date: 2015  Group Number: [unfilled]  Subscriber Number: [id number] - (Medicare)    Payor/Plan Subscr  Sex Relation Sub. Ins. ID Effective Group Num   1. [facility] 1970 Female Self [id number] 1/1/15                                    [address] [address]   2.  [place]ICAID [place] [id number] AJAY HERNANDEZ O 1970 Female Self [id number] 1/15/15                                    [address]       Patient Wound Information:      Problem List Items Addressed This Visit     Paraplegia ([address] (Chronic)    Smoking (Chronic)    Multiple sclerosis ([address]    Peripheral vascular disease ([address]    * (Principal) Neuropathic 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 ([address] - Primary          WOUNDS REQUIRING DRESSING SUPPLIES:     Wound 22 Ankle Anterior;Right wound 1- right foot neuropathic ulcer (Active)   Wound Image    22 1406   Wound Etiology Other 22 1406   Dressing Status Old drainage noted 22 1406   Wound Cleansed Soap and water 22 1406   Dressing/Treatment Hydrofera blue 22 1701   Wound Length (cm) 1 cm 22 1406   Wound Width (cm) 1 cm 22 1406   Wound Depth (cm) 0.1 cm 22 1406   Wound Surface Area (cm^2) 1 cm^2 22 1406   Wound Volume (cm^3) 0.1 cm^3 22 1406   Post-Procedure Length (cm) 1 cm 22 1701   Post-Procedure Width (cm) 1 cm 04/11/22 1701   Post-Procedure Depth (cm) 0.2 cm 04/11/22 1701   Post-Procedure Surface Area (cm^2) 1 cm^2 04/11/22 1701   Post-Procedure Volume (cm^3) 0.2 cm^3 04/11/22 1701   Wound Assessment Slough; Purple/maroon 04/11/22 1406   Drainage Amount Moderate 04/11/22 1406   Drainage Description Serosanguinous 04/11/22 1406   Odor None 04/11/22 1406   Audelia-wound Assessment Intact 04/11/22 1406   Margins Defined edges 04/11/22 1406   Wound Thickness Description not for Pressure Injury Full thickness 04/11/22 1406   Number of days: 0          Supplies Requested :      WOUND #: 1   PRIMARY DRESSING:  Other: hydrafera blue silicone border   Cover and Secure with: None     FREQUENCY OF DRESSING CHANGES:  Every other day       ADDITIONAL ITEMS:  [] Gloves Small  [] Gloves Medium [x] Gloves Large [] Gloves XLarge  [] Tape 1\" [] Tape 2\" [] Tape 3\"  [] Medipore Tape  [] Saline  [] Skin Prep   [] Adhesive Remover   [] Cotton Tip Applicators   [] Other:    Patient Wound(s) Debrided: [x] Yes if yes please add date 4/11/22   [] No    Debribement Type: Excisional/Sharp and Mechanical     Is the patient currently on an antibiotic for their Wound(s): [] Yes if yes please add name and dose    [x] No    Patient currently being seen by Home Health: [] Yes   [x] No    Duration for needed supplies:  []15  [x]30  []60  []90 Days    Electronically signed by EFREN Duvall CNP on 4/11/2022 at 5:11 PM     Provider Information:      PROVIDER'S NAME: Waldron Hamman APRN    NPI: [id number]

## 2022-04-11 NOTE — PROGRESS NOTES
Patient Care Team:  Rosy Ritter MD as PCP - General (Family Medicine)  Rosy Ritter MD as PCP - [facility] Provider  Oliver Mcguire MD as Neurologist (Neurology)  Oliver Mcguire MD as Consulting Physician (Neurology)  EFREN Zamarripa as Advanced Practice Nurse (Gastroenterology)    TODAY'S DATE:  4/11/2022     HISTORY of PRESENTILLNESS HPI   Benton Lo is a 47 y.o. female who presents today for wound evaluation. She reports she developed a wound on right foot. This started 5 week(s) ago. She believes this is not healing. She has been applying antibiotic ointment. She has not had  fever or chills. She has a history of MS causing neuropathy.   Wound Type:neuropathic  Wound Location:right foot  Modifying factors:chronic pressure, shear force, smoking and neuropathy    Patient Active Problem List   Diagnosis Code    Muscle spasticity M62.838    Peripheral vascular disease ([facility]) I73.9    Multiple sclerosis ([facility]) G35    Pain in both upper arms M79.621, M79.622    Numbness R20.0    Other fatigue R53.83    Weakness R53.1    Chronic pain G89.29    Hx of seizure disorder Z86.69    Insomnia G47.00    Pain, neck M54.2    Asthma J45.909    S/P transmetatarsal amputation of foot, left ([facility]) R83.744    Chronic constipation K59.09    Colostomy in place ([facility]) Z93.3    Paraplegia ([facility]) G82.20    Arthralgia of hip M25.559    Seizure ([facility]) R56.9    Smoking F17.200    Encounter for care related to vascular access port Z45.2    Urinary incontinence R32    Depressive disorder F32.9    Malodorous urine R82.90    Arthritis M19.90    Osteoporosis M81.0    Urinary bladder stone N21.0    Vesicovaginal fistula N82.0    Neuropathic 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 ([address] L97.512     Benton Lo is a 47 y.o. female with the following history reviewed and recorded in [facility]:    Current Outpatient Medications   Medication Sig Dispense Refill    fluocinolone acetonide (SYNALAR) 0.01 % external solution Apply 10 drops topically at bedtime      pregabalin (LYRICA) 300 MG capsule 1 bid (Patient taking differently: Take 300 mg by mouth daily. 1 bid) 60 capsule 5    methylphenidate (RITALIN) 20 MG tablet TAKE 1 TABLET BY MOUTH DAILY 30 tablet 0    HYDROcodone-acetaminophen (NORCO)  MG per tablet TAKE 1 TABLET BY MOUTH 3 TIMES DAILY AS NEEDED FOR PAIN. REDUCE DOSES AS PAIN BECOMES MANAGEABLE 90 tablet 0    nitrofurantoin (MACRODANTIN) 50 MG capsule Take 1 capsule by mouth nightly 90 capsule 1    levETIRAcetam (KEPPRA) 750 MG tablet Take 1 tablet by mouth daily 30 tablet 5    Diapers & Supplies MISC Indications: FX: [id number] Diapers, Chucks, Wipes, and Gloves. 100 each 3    ondansetron (ZOFRAN-ODT) 4 MG disintegrating tablet Take 1 tablet by mouth 3 times daily as needed for Nausea or Vomiting 30 tablet 0    DULoxetine (CYMBALTA) 30 MG extended release capsule Take 1 capsule by mouth daily (Patient taking differently: Take 30 mg by mouth nightly ) 30 capsule 5    tiZANidine (ZANAFLEX) 4 MG tablet TAKE 3 TABLETS TWICE DAILY AS NEEDED  180 tablet 11    azelastine (ASTELIN) 0.1 % nasal spray USE 2 SPRAYS IN EACH NOSTRIL TWICE DAILY AS DIRECTED 30 mL 0    hydroCHLOROthiazide (HYDRODIURIL) 25 MG tablet TAKE 1 TABLET BY MOUTH ONCE DAILY AS NEEDED 30 tablet 0    cetirizine (ZYRTEC) 10 MG tablet Take 10 mg by mouth daily      Catheters MISC Catheter supplies and lubricant 5 times daily G82.20  to [address] [facility] [phone] 450 each 3    glucose monitoring kit (FREESTYLE) monitoring kit 1 kit by Does not apply route daily 1 kit 0    blood glucose monitor strips Test 1 times a day & as needed for symptoms of irregular blood glucose.  100 strip 3    docusate sodium (COLACE) 100 MG capsule Take 200 mg by mouth as needed for Constipation      PROAIR  (90 Base) MCG/ACT inhaler INHALE 1 PUFF INTO THE LUNGS EVERY 6 HOURS AS NEEDED FOR WHEEZING OR SHORTNESS OF BREATH 8.5 g 5    Heparin Lock Flush (HEPARIN FLUSH, 100 UNITS/ML,) 100 UNIT/ML injection 3 mLs by Intercatheter route every 30 days No every 30 days but every 4-6 weeks. Flush port with 300 units heparin with 20 cc normal saline for ocrevus infusion for Multiple sclerosis and NS 20CC (Patient not taking: Reported on 12/9/2021) 1 Syringe 5    ipratropium-albuterol (DUONEB) 0.5-2.5 (3) MG/3ML SOLN nebulizer solution USE 1 UNIT DOSE IN NEBULIZER EVERY 4 TO 6 HOURS AS NEEDED. 450 mL 11    BACLOFEN, PAIN PUMP REFILL CHARGE, by Implant route continuous Indications: every 3 months      Multiple Vitamins-Minerals (THERAPEUTIC MULTIVITAMIN-MINERALS) tablet Take 1 tablet by mouth daily      glucose monitoring kit (FREESTYLE) monitoring kit 1 kit by Does not apply route daily 1 kit 0    blood glucose monitor strips Test 1 times a day & as needed for symptoms of irregular blood glucose.  100 strip 0    baclofen (LIORESAL) 10 MG tablet Take 1 tablet by mouth 2 times daily as needed (muscle spasms) (Patient taking differently: Take 10 mg by mouth 2 times daily as needed (muscle spasms) Indications: using pump ) 60 tablet 5    Sodium Chloride Flush (SALINE FLUSH) 0.9 % SOLN Infuse 20 mLs intravenously every 30 days Not every 30 days but every 4-6 weeks as need with 300 units of heparin to flush port for Infusion of Ocrevus for multiple sclerosis 20 mL 5     Current Facility-Administered Medications   Medication Dose Route Frequency Provider Last Rate Last Admin    lidocaine (XYLOCAINE) 2 % uro-jet   Topical PRN EFREN Harrison - CNP         Allergies: Darvocet [propoxyphene n-acetaminophen], Morphine, Morphine and related, and Propoxyphene  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    Arthritis 5/4/2020    Asthma     Back pain 6/28/2017    Binocular vision disorder with diplopia 6/28/2017    CAFL (chronic airflow limitation) ([facility]) 6/28/2017    Hay fever 6/28/2017    Headache 6/28/2017    MS (multiple sclerosis) (HCC)     Muscle spasticity     Neuropathic ulcer of left foot, limited to breakdown of skin ([facility] 4/10/2017    Numbness 8/4/2016    Open wound of ankle 6/28/2017    Open wound of second toe of left foot 12/11/2018    Peripheral vascular disease ([facility]     Seizure ([facility]     occ. related to ms    Sepsis ([facility] 8/19/2016    Weakness 8/4/2016       Past Surgical History:   Procedure Laterality Date    BACLOFEN PUMP IMPLANTATION      BREAST BIOPSY Right     neg    COLONOSCOPY N/A 9/27/2019    Dr Forrest Dubin ileum through ostomy-patent and healthy appearing anastomosis in the right colon-entero colonic anastomosis-5 yr recall    COLOSTOMY     [facility]      Right    FOOT AMPUTATION Left 4/4/2019    LEFT TRANSMET AMPUTATION performed by Richard Diaz MD at [address]      urostomy    OTHER SURGICAL HISTORY      pain pump    AL INSJ PRPH CTR VAD W/SUBQ PORT UNDER 5 YR N/A 6/26/2018    SINGLE LUMEN PORT PLACEMENT WITH FLUORO performed by Lamont Phelps MD at [facility] TOE AMPUTATION      L last toe    TONSILLECTOMY      URETEROTOMY       Family History   Problem Relation Age of Onset    Cancer Mother         breast    Colon Cancer Mother     Colon Polyps Mother     Breast Cancer Mother     Diabetes Father     High Blood Pressure Father     Cancer Paternal Aunt         breast    Liver Cancer Paternal Aunt     Heart Disease Paternal Grandmother     Esophageal Cancer Neg Hx     Liver Disease Neg Hx     Rectal Cancer Neg Hx     Stomach Cancer Neg Hx      Social History     Tobacco Use    Smoking status: Current Some Day Smoker     Packs/day: 0.50     Types: Cigarettes    Smokeless tobacco: Never Used   Substance Use Topics    Alcohol use: Yes     Comment: yaritza         Review of Systems    Review of Systems   Unable to perform ROS: Acuity of condition   Constitutional:        Paraplegic from MS   Skin: Positive for wound. All other systems reviewed and are negative. All other review of systems are negative. Physical Exam    BP 92/63   Pulse 70   Temp 96.9 °F (36.1 °C) (Temporal)   Resp 20   Ht 5' 9\" (1.753 m)   BMI 21.71 kg/m²     Physical Exam  Vitals reviewed. Exam conducted with a chaperone present. Constitutional:       Appearance: Normal appearance. She is normal weight. HENT:      Head: Normocephalic and atraumatic. Eyes:      General: Lids are normal. Lids are everted, no foreign bodies appreciated. Vision grossly intact. Gaze aligned appropriately. Cardiovascular:      Rate and Rhythm: Normal rate and regular rhythm. Pulses:           Dorsalis pedis pulses are detected w/ Doppler on the right side and detected w/ Doppler on the left side. Posterior tibial pulses are detected w/ Doppler on the right side and detected w/ Doppler on the left side. Heart sounds: Normal heart sounds. Pulmonary:      Effort: Pulmonary effort is normal.      Breath sounds: Normal breath sounds. Abdominal:      General: Bowel sounds are normal.      Comments: Colostomy present   Genitourinary:     Comments: Urostomy present  Musculoskeletal:        Feet:       Comments: paraplegic and left sided weakness   Feet:      Right foot:      Skin integrity: Ulcer present. Skin:     General: Skin is warm and dry. Capillary Refill: Capillary refill takes 2 to 3 seconds. Neurological:      Mental Status: She is alert and oriented to person, place, and time. Psychiatric:         Mood and Affect: Mood normal.         Behavior: Behavior normal.         Thought Content: Thought content normal.         Judgment: Judgment normal.             Post Debridement Measurements and Assessment:    The patientspain isPain Level: 0  . Wound is has improved. Please refer to nursing measurements and assessment regarding wound pre and postdebridement.     Wound 04/11/22 Ankle Anterior;Right wound 1- right foot neuropathic ulcer (Active)   Wound Image    04/11/22 1406   Wound Etiology Other 04/11/22 1406   Dressing Status Old drainage noted 04/11/22 1406   Wound Cleansed Soap and water 04/11/22 1406   Dressing/Treatment Hydrofera blue 04/11/22 1701   Wound Length (cm) 1 cm 04/11/22 1406   Wound Width (cm) 1 cm 04/11/22 1406   Wound Depth (cm) 0.1 cm 04/11/22 1406   Wound Surface Area (cm^2) 1 cm^2 04/11/22 1406   Wound Volume (cm^3) 0.1 cm^3 04/11/22 1406   Post-Procedure Length (cm) 1 cm 04/11/22 1701   Post-Procedure Width (cm) 1 cm 04/11/22 1701   Post-Procedure Depth (cm) 0.2 cm 04/11/22 1701   Post-Procedure Surface Area (cm^2) 1 cm^2 04/11/22 1701   Post-Procedure Volume (cm^3) 0.2 cm^3 04/11/22 1701   Wound Assessment Slough; Purple/maroon 04/11/22 1406   Drainage Amount Moderate 04/11/22 1406   Drainage Description Serosanguinous 04/11/22 1406   Odor None 04/11/22 1406   Audelia-wound Assessment Intact 04/11/22 1406   Margins Defined edges 04/11/22 1406   Wound Thickness Description not for Pressure Injury Full thickness 04/11/22 1406   Number of days: 0            Debridement: Excisional Debridement    Using curette, #15 blade and forceps the wound(s)/ulcer(s) was/were sharply debrided down through and including the removal of epidermis, dermis and subcutaneous tissue.         Devitalized Tissue Debrided:  fibrin, biofilm, slough and exudate    Pre Debridement Measurements:  Are located in the Wound/Ulcer Documentation Flow Sheet    Wound/Ulcer #: 1    Post Debridement Measurements:  Wound/Ulcer Descriptions are Pre Debridement except measurements:          Percent of Wound(s)/Ulcer(s) Debrided: 100%    Total Surface Area Debrided:  1 sq cm     Diabetic/Pressure/Non Pressure Ulcers only:  Ulcer: N/A     Estimated Blood Loss:  Minimal    Hemostasis Achieved:  by pressure    Procedural Pain:  0  / 10     Post Procedural Pain:  0 / 10     Response to treatment:  Well tolerated by patient. Assessment    1. Neuropathic 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 ([facility]    2. Multiple sclerosis ([facility]    3. Paraplegia ([facility]    4. Peripheral vascular disease ([facility]    5. Smoking          Plan    1. CELESTINA in clinic    Plan for wound - Dress per physician order  Treatment:     Compression : No   Offloading : Yes   Dressing : Right foot wound: Soap and water wash, apply hydrafera blue silicone border change every 3 days. Alternative dressing: moisten the hydrafera blue and cut to fit into the wound, secure with dry gauze and medipore tape, change daily    Discussed importance of offloading, nutrition, smoking cessation, wound care, and plan of care. Patient understanding and questions answered. I spent a total of 60 minutes face to face with the patient, documenting, and reviewing chart. Over 75% of that time was spent on counseling and care coordination. Patient was told that if symptoms worsen or new symptoms develop they are to go to the emergency department immediately. Patient was educated on diagnosis and treatment plan. All of patient's questions were answered, and the patient understands the discharge plan. Discussed appropriate home care of this wound. Wound redressed. Patient instructions were given. Recommend no smoking  Offloading instructions given    [address] and Hyper[facility]ic Oxygen Therapy   Physician Orders and Discharge Instructions  [address] [place] [address] Corine flores   Telephone: [phone]    NAME:  Curt Severs:  1970  MEDICAL RECORD NUMBER:  [id number]  DATE:  4/11/2022    Discharge condition: Stable    Discharge to: Home    Left via:Private automobile    Accompanied by:  caregiver    ECF/HHA: [facility]    Dressing Orders:  Right foot wound: Soap and water wash, apply hydrafera blue silicone border change every 3 days.   Alternative dressing: moisten the hydrafera blue and cut to fit into the wound, secure with dry gauze and medipore tape, change daily    Left heel: apply Desitin barrier cream    Treatment Orders:  Protein rich diet  Multivitamin  Stop smoking! Wear offloading heel boot- place lambs wool between dressing and boot strap    [facility] follow up visit ___2 weeks __________________________  (Please note your next appointment above and if you are unable to keep, kindly give a 24 hour notice. Thank you.)          If you experience any of the following, please call the [facility] during business hours:    * Increase in Pain  * Temperature over 101  * Increase in drainage from your wound  * Drainage with a foul odor  * Bleeding  * Increase in swelling  * Need for compression bandage changes due to slippage, breakthrough drainage. If you need medical attention outside of the business hours of the [facility] please contact your PCP or go to the nearest emergency room.

## 2022-04-13 ENCOUNTER — TELEPHONE (OUTPATIENT)
Dept: WOUND CARE | Age: 52
End: 2022-04-13

## 2022-04-15 ENCOUNTER — OFFICE VISIT (OUTPATIENT)
Dept: INTERNAL MEDICINE | Age: 52
End: 2022-04-15
Payer: MEDICARE

## 2022-04-15 VITALS
HEIGHT: 69 IN | OXYGEN SATURATION: 98 % | BODY MASS INDEX: 21.77 KG/M2 | SYSTOLIC BLOOD PRESSURE: 108 MMHG | WEIGHT: 147 LBS | HEART RATE: 58 BPM | DIASTOLIC BLOOD PRESSURE: 62 MMHG

## 2022-04-15 DIAGNOSIS — R56.9 SEIZURE (HCC): ICD-10-CM

## 2022-04-15 DIAGNOSIS — L97.512 NEUROPATHIC ULCER OF RIGHT FOOT WITH FAT LAYER EXPOSED (HCC): ICD-10-CM

## 2022-04-15 DIAGNOSIS — Z91.09 ENVIRONMENTAL ALLERGIES: ICD-10-CM

## 2022-04-15 DIAGNOSIS — G89.29 CHRONIC LOW BACK PAIN, UNSPECIFIED BACK PAIN LATERALITY, UNSPECIFIED WHETHER SCIATICA PRESENT: ICD-10-CM

## 2022-04-15 DIAGNOSIS — E43 UNSPECIFIED SEVERE PROTEIN-CALORIE MALNUTRITION (HCC): ICD-10-CM

## 2022-04-15 DIAGNOSIS — G35 MULTIPLE SCLEROSIS (HCC): Primary | ICD-10-CM

## 2022-04-15 DIAGNOSIS — G82.20 PARAPLEGIA (HCC): Chronic | ICD-10-CM

## 2022-04-15 DIAGNOSIS — R73.9 HYPERGLYCEMIA: ICD-10-CM

## 2022-04-15 DIAGNOSIS — F32.89 OTHER DEPRESSION: ICD-10-CM

## 2022-04-15 DIAGNOSIS — L97.509: ICD-10-CM

## 2022-04-15 DIAGNOSIS — N39.0 URINARY TRACT INFECTION WITHOUT HEMATURIA, SITE UNSPECIFIED: ICD-10-CM

## 2022-04-15 DIAGNOSIS — I10 PRIMARY HYPERTENSION: ICD-10-CM

## 2022-04-15 DIAGNOSIS — L89.153 PRESSURE INJURY OF SACRAL REGION, STAGE 3 (HCC): ICD-10-CM

## 2022-04-15 DIAGNOSIS — Z89.432 S/P TRANSMETATARSAL AMPUTATION OF FOOT, LEFT (HCC): ICD-10-CM

## 2022-04-15 DIAGNOSIS — M54.50 CHRONIC LOW BACK PAIN, UNSPECIFIED BACK PAIN LATERALITY, UNSPECIFIED WHETHER SCIATICA PRESENT: ICD-10-CM

## 2022-04-15 DIAGNOSIS — Z93.3 COLOSTOMY PRESENT (HCC): ICD-10-CM

## 2022-04-15 DIAGNOSIS — G62.9 NEUROPATHY: ICD-10-CM

## 2022-04-15 DIAGNOSIS — I73.9 PERIPHERAL VASCULAR DISEASE (HCC): ICD-10-CM

## 2022-04-15 DIAGNOSIS — R25.2 SPASTICITY: ICD-10-CM

## 2022-04-15 DIAGNOSIS — Z93.3 COLOSTOMY IN PLACE (HCC): ICD-10-CM

## 2022-04-15 DIAGNOSIS — N82.0 VESICOVAGINAL FISTULA: ICD-10-CM

## 2022-04-15 PROBLEM — K59.00 CONSTIPATION DUE TO NEUROGENIC BOWEL: Status: ACTIVE | Noted: 2019-05-02

## 2022-04-15 PROBLEM — M62.838 MUSCLE SPASTICITY: Status: ACTIVE | Noted: 2021-04-29

## 2022-04-15 PROBLEM — F41.8 MIXED ANXIETY AND DEPRESSIVE DISORDER: Status: ACTIVE | Noted: 2020-05-04

## 2022-04-15 PROBLEM — N20.0 RENAL STONE: Status: ACTIVE | Noted: 2021-04-29

## 2022-04-15 PROBLEM — Z99.3 WHEELCHAIR BOUND: Status: ACTIVE | Noted: 2021-04-29

## 2022-04-15 PROBLEM — N31.9 NEUROGENIC BLADDER: Status: ACTIVE | Noted: 2021-04-29

## 2022-04-15 PROBLEM — R06.00 DYSPNEA: Status: ACTIVE | Noted: 2021-04-29

## 2022-04-15 PROBLEM — K59.2 CONSTIPATION DUE TO NEUROGENIC BOWEL: Status: ACTIVE | Noted: 2019-05-02

## 2022-04-15 PROBLEM — J30.2 SEASONAL ALLERGIC RHINITIS: Status: ACTIVE | Noted: 2018-11-26

## 2022-04-15 PROBLEM — F12.90 MARIJUANA USER: Status: ACTIVE | Noted: 2021-04-29

## 2022-04-15 PROBLEM — Z97.8 PRESENCE OF OTHER SPECIFIED DEVICES: Status: ACTIVE | Noted: 2020-12-10

## 2022-04-15 PROCEDURE — 3017F COLORECTAL CA SCREEN DOC REV: CPT | Performed by: INTERNAL MEDICINE

## 2022-04-15 PROCEDURE — G8427 DOCREV CUR MEDS BY ELIG CLIN: HCPCS | Performed by: INTERNAL MEDICINE

## 2022-04-15 PROCEDURE — 99214 OFFICE O/P EST MOD 30 MIN: CPT | Performed by: INTERNAL MEDICINE

## 2022-04-15 PROCEDURE — 4004F PT TOBACCO SCREEN RCVD TLK: CPT | Performed by: INTERNAL MEDICINE

## 2022-04-15 PROCEDURE — G8420 CALC BMI NORM PARAMETERS: HCPCS | Performed by: INTERNAL MEDICINE

## 2022-04-15 RX ORDER — DOXYCYCLINE HYCLATE 100 MG/1
CAPSULE ORAL
COMMUNITY
Start: 2022-02-07 | End: 2022-04-15 | Stop reason: ALTCHOICE

## 2022-04-15 RX ORDER — CEFDINIR 300 MG/1
300 CAPSULE ORAL 2 TIMES DAILY
Qty: 20 CAPSULE | Refills: 0 | Status: SHIPPED | OUTPATIENT
Start: 2022-04-15 | End: 2022-04-25

## 2022-04-15 RX ORDER — FLUCONAZOLE 100 MG/1
100 TABLET ORAL DAILY
Qty: 3 TABLET | Refills: 0 | Status: SHIPPED | OUTPATIENT
Start: 2022-04-15 | End: 2022-04-18

## 2022-04-15 NOTE — PROGRESS NOTES
Chief Complaint   Patient presents with    3 Month Follow-Up       HPI: Juan Miguel Clay is a 47 y.o. female is here for follow-up of multiple sclerosis, hypertension, seizure disorder, depression, environmental allergies, hyperglycemia and neuropathic pain. She states her MS is relatively stable. She is being followed by neurology. Her blood pressure is well controlled. She denies any complaints of chest pain, chest pressure or shortness of breath. She has not had any seizures. Her allergies are stable. She feels that her medications are working well for depression. Her blood sugars are currently well controlled, averaging about 80-90. Lyrica is helpful for her neuropathic pain. She does think that she may have a urinary tract infection. She complains of a 2-week history of burning. Is she states that she does not have time to do a urinalysis today. We can go ahead and send her in some Omnicef. We can also send her in some fluconazole. However, she states that her major reason for making this appointment today was that she needs a hospital bed. She states that her old hospital bed is over 21years old. She states that is very difficult for her to pull up in the bed anymore. So the mechanisms have stopped working. She also would like a referral to physical therapy so that her insurance will pay for the hospital bed.   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    Arthritis 5/4/2020    Asthma     Back pain 6/28/2017    Binocular vision disorder with diplopia 6/28/2017    CAFL [facility]chronic airflow limitation) [facility] 6/28/2017    Hay fever 6/28/2017    Headache 6/28/2017    [facility]     Muscle spasticity     Neuropathic ulcer of left foot, limited to breakdown of skin ([facility] 4/10/2017    Numbness 8/4/2016    Open wound of ankle 6/28/2017    Open wound of second toe of left foot 12/11/2018    Peripheral vascular disease ([facility]     Seizure ([facility]     occ. related to ms    Sepsis ([facility] 8/19/2016    Weakness 8/4/2016      Past Surgical History:   Procedure Laterality Date    BACLOFEN PUMP IMPLANTATION      BREAST BIOPSY Right     neg    COLONOSCOPY N/A 9/27/2019    Dr Laura Guallpa ileum through ostomy-patent and healthy appearing anastomosis in the right colon-entero colonic anastomosis-10 yr recall    COLOSTOMY     [address]      Right    FOOT AMPUTATION Left 4/4/2019    LEFT TRANSMET AMPUTATION performed by Erin Jaimes MD at [address]      urostomy    OTHER SURGICAL HISTORY      pain pump    MD [facility] CTR VAD W/SUBQ PORT UNDER 5 YR N/A 6/26/2018    SINGLE LUMEN PORT PLACEMENT WITH FLUORO performed by Ian Cortes MD at [facility] TOE AMPUTATION      L last toe    TONSILLECTOMY      URETEROTOMY        Social History     Socioeconomic History    Marital status:      Spouse name: None    Number of children: None    Years of education: None    Highest education level: None   Occupational History    None   Tobacco Use    Smoking status: Current Some Day Smoker     Packs/day: 0.50     Years: 15.00     Pack years: 7.50     Types: Cigarettes    Smokeless tobacco: Never Used   Vaping Use    Vaping Use: Never used   Substance and Sexual Activity    Alcohol use: Yes     Comment: rarley    Drug use: Yes     Types: Marijuana (Weed)     Comment: daily     Sexual activity: None   Other Topics Concern    None   Social History Narrative    None     Social Determinants of Health     Financial Resource Strain: Low Risk     Difficulty of Paying Living Expenses: Not hard at all   Food Insecurity: No Food Insecurity    Worried About Running Out of Food in the Last Year: Never true    Meagan of Food in the Last Year: Never true   Transportation Needs:     Lack of Transportation (Medical):  Not on file    Lack of Transportation (Non-Medical): Not on file   Physical Activity:     Days of Exercise per Week: Not on file    Minutes of Exercise per Session: Not on file   Stress:     Feeling of Stress : Not on file   Social Connections:     Frequency of Communication with Friends and Family: Not on file    Frequency of Social Gatherings with Friends and Family: Not on file    Attends Synagogue Services: Not on file    Active Member of [address] [place] [address] [email or web address] or Organizations: Not on file    Attends Club or Organization Meetings: Not on file    Marital Status: Not on file   Intimate Partner Violence:     Fear of Current or Ex-Partner: Not on file    Emotionally Abused: Not on file    Physically Abused: Not on file    Sexually Abused: Not on file   Housing Stability:     Unable to Pay for Housing in the Last Year: Not on file    Number of Places Lived in the Last Year: Not on file    Unstable Housing in the Last Year: Not on file      Family History   Problem Relation Age of Onset    Cancer Mother         breast    Colon Cancer Mother     Colon Polyps Mother     Breast Cancer Mother     Diabetes Father     High Blood Pressure Father     Cancer Paternal Aunt         breast    Liver Cancer Paternal Aunt     Heart Disease Paternal Grandmother     Esophageal Cancer Neg Hx     Liver Disease Neg Hx     Rectal Cancer Neg Hx     Stomach Cancer Neg Hx         Current Outpatient Medications   Medication Sig Dispense Refill    fluconazole (DIFLUCAN) 100 MG tablet Take 1 tablet by mouth daily for 3 days 3 tablet 0    cefdinir (OMNICEF) 300 MG capsule Take 1 capsule by mouth 2 times daily for 10 days 20 capsule 0    fluocinolone acetonide (SYNALAR) 0.01 % external solution Apply 10 drops topically at bedtime      pregabalin (LYRICA) 300 MG capsule 1 bid (Patient taking differently: Take 300 mg by mouth daily.  1 bid) 60 capsule 5    methylphenidate (RITALIN) 20 MG tablet TAKE 1 TABLET BY MOUTH DAILY 30 tablet 0    HYDROcodone-acetaminophen (NORCO)  MG per tablet TAKE 1 TABLET BY MOUTH 3 TIMES DAILY AS NEEDED FOR PAIN. REDUCE DOSES AS PAIN BECOMES MANAGEABLE 90 tablet 0    nitrofurantoin (MACRODANTIN) 50 MG capsule Take 1 capsule by mouth nightly 90 capsule 1    levETIRAcetam (KEPPRA) 750 MG tablet Take 1 tablet by mouth daily 30 tablet 5    Diapers & Supplies MISC Indications: FX: [id number] Diapers, Chucks, Wipes, and Gloves. 100 each 3    ondansetron (ZOFRAN-ODT) 4 MG disintegrating tablet Take 1 tablet by mouth 3 times daily as needed for Nausea or Vomiting 30 tablet 0    DULoxetine (CYMBALTA) 30 MG extended release capsule Take 1 capsule by mouth daily (Patient taking differently: Take 30 mg by mouth nightly ) 30 capsule 5    tiZANidine (ZANAFLEX) 4 MG tablet TAKE 3 TABLETS TWICE DAILY AS NEEDED  180 tablet 11    azelastine (ASTELIN) 0.1 % nasal spray USE 2 SPRAYS IN EACH NOSTRIL TWICE DAILY AS DIRECTED 30 mL 0    hydroCHLOROthiazide (HYDRODIURIL) 25 MG tablet TAKE 1 TABLET BY MOUTH ONCE DAILY AS NEEDED 30 tablet 0    cetirizine (ZYRTEC) 10 MG tablet Take 10 mg by mouth daily      Catheters MISC Catheter supplies and lubricant 5 times daily G82.20  to [address] [facility] [phone] each 3    glucose monitoring kit (FREESTYLE) monitoring kit 1 kit by Does not apply route daily 1 kit 0    blood glucose monitor strips Test 1 times a day & as needed for symptoms of irregular blood glucose. 100 strip 3    docusate sodium (COLACE) 100 MG capsule Take 200 mg by mouth as needed for Constipation      PROAIR  (90 Base) MCG/ACT inhaler INHALE 1 PUFF INTO THE LUNGS EVERY 6 HOURS AS NEEDED FOR WHEEZING OR SHORTNESS OF BREATH 8.5 g 5    Heparin Lock Flush (HEPARIN FLUSH, 100 UNITS/ML,) 100 UNIT/ML injection 3 mLs by Intercatheter route every 30 days No every 30 days but every 4-6 weeks.  Flush port with 300 units heparin with 20 cc normal saline for ocrevus infusion for Multiple sclerosis and NS 20CC 1 Syringe 5    ipratropium-albuterol (Anh Beat) 0.5-2.5 (3) MG/3ML SOLN nebulizer solution USE 1 UNIT DOSE IN NEBULIZER EVERY 4 TO 6 HOURS AS NEEDED. 450 mL 11    BACLOFEN, PAIN PUMP REFILL CHARGE, by Implant route continuous Indications: every 3 months      Multiple Vitamins-Minerals (THERAPEUTIC MULTIVITAMIN-MINERALS) tablet Take 1 tablet by mouth daily      glucose monitoring kit (FREESTYLE) monitoring kit 1 kit by Does not apply route daily 1 kit 0    blood glucose monitor strips Test 1 times a day & as needed for symptoms of irregular blood glucose. 100 strip 0    baclofen (LIORESAL) 10 MG tablet Take 1 tablet by mouth 2 times daily as needed (muscle spasms) (Patient taking differently: Take 10 mg by mouth 2 times daily as needed (muscle spasms) Indications: using pump ) 60 tablet 5    Sodium Chloride Flush (SALINE FLUSH) 0.9 % SOLN Infuse 20 mLs intravenously every 30 days Not every 30 days but every 4-6 weeks as need with 300 units of heparin to flush port for Infusion of Ocrevus for multiple sclerosis 20 mL 5     No current facility-administered medications for this visit.         Patient Active Problem List   Diagnosis    Muscle spasticity    Peripheral vascular disease ([address]    Multiple sclerosis (HCC)    Pain in both upper arms    Numbness    Other fatigue    Weakness    Chronic pain    Hx of seizure disorder    Insomnia    Pressure ulcer of sacral region    Neck pain    Seasonal allergic rhinitis    S/P transmetatarsal amputation of foot, left (HCC)    Constipation due to neurogenic bowel    Colostomy present ([address]    Paraplegia ([address]    Hip pain    Seizure ([address]    Smoking    Encounter for care related to vascular access port    Urinary incontinence    Mixed anxiety and depressive disorder    Malodorous urine    Arthritis    Osteoporosis    Urinary bladder stone    Vesicovaginal fistula    Neuropathic 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 ([address]    Dyspnea    Marijuana user    Neurogenic bladder    Other specified misadventures during surgical and medical care    Presence of other specified devices    Renal stone    Ulcer of great toe ([facility]    Wheelchair bound    Pressure injury of sacral region, stage 3 ([facility]    Unspecified severe protein-calorie malnutrition ([facility]        Review of Systems   Constitutional: Positive for fatigue. Negative for activity change, appetite change, fever and unexpected weight change. HENT: Negative for congestion, ear pain, postnasal drip, rhinorrhea, sinus pressure, sore throat and tinnitus. Eyes: Negative for photophobia, pain, discharge, redness, itching and visual disturbance. Respiratory: Negative for cough, chest tightness, shortness of breath and wheezing. Cardiovascular: Negative for chest pain, palpitations and leg swelling. Gastrointestinal: Negative for abdominal distention, abdominal pain, blood in stool, constipation, diarrhea, nausea and vomiting. Urinary and stool incontinence. Endocrine: Negative for cold intolerance, heat intolerance, polydipsia and polyuria. Genitourinary: Negative for decreased urine volume, difficulty urinating, dysuria, flank pain, frequency, hematuria and urgency. Musculoskeletal:        She has chronic back pain and muscle spasms. She is wheelchair-bound secondary to history of multiple sclerosis. Skin: Positive for wound. Negative for color change, pallor and rash. Has an ulcer to her right heel. Allergic/Immunologic: Positive for environmental allergies. Negative for food allergies and immunocompromised state. Neurological: Negative for dizziness, seizures, syncope, speech difficulty, weakness, numbness and headaches. Occasionally gets sinus headaches   Hematological: Negative for adenopathy. Does not bruise/bleed easily. Psychiatric/Behavioral: Positive for dysphoric mood. Negative for agitation, confusion and decreased concentration. The patient is nervous/anxious.  The patient is not hyperactive. Anxiety and depression are stable       /62   Pulse 58   Ht 5' 9\" (1.753 m)   Wt 147 lb (66.7 kg) Comment: Pt reported, she is unable to get on scale  SpO2 98%   BMI 21.71 kg/m²   Physical Exam  Vitals and nursing note reviewed. Constitutional:       General: She is not in acute distress. Appearance: Normal appearance. She is well-developed and normal weight. She is not ill-appearing, toxic-appearing or diaphoretic. HENT:      Head: Normocephalic and atraumatic. Right Ear: Tympanic membrane, ear canal and external ear normal. There is no impacted cerumen. Left Ear: Tympanic membrane, ear canal and external ear normal. There is no impacted cerumen. Nose: Nose normal. No congestion or rhinorrhea. Mouth/Throat:      Mouth: Mucous membranes are moist.      Pharynx: Oropharynx is clear. No oropharyngeal exudate or posterior oropharyngeal erythema. Eyes:      General: No scleral icterus. Right eye: No discharge. Left eye: No discharge. Extraocular Movements: Extraocular movements intact. Conjunctiva/sclera: Conjunctivae normal.      Pupils: Pupils are equal, round, and reactive to light. Neck:      Thyroid: No thyromegaly. Vascular: No carotid bruit or JVD. Trachea: No tracheal deviation. Cardiovascular:      Rate and Rhythm: Normal rate and regular rhythm. Pulses: Normal pulses. Heart sounds: Normal heart sounds. No murmur heard. No friction rub. No gallop. Pulmonary:      Effort: Pulmonary effort is normal. No respiratory distress. Breath sounds: Normal breath sounds. No stridor. No wheezing, rhonchi or rales. Chest:      Chest wall: No tenderness. Abdominal:      General: Bowel sounds are normal. There is no distension. Palpations: Abdomen is soft. There is no mass. Tenderness: There is no abdominal tenderness. There is no right CVA tenderness, left CVA tenderness, guarding or rebound. Hernia: No hernia is present. Comments: Ostomy bags are intact   Musculoskeletal:         General: No swelling, tenderness, deformity or signs of injury. Normal range of motion. Cervical back: Normal range of motion and neck supple. No rigidity. No muscular tenderness. Right lower leg: No edema. Left lower leg: No edema. Comments: She is wheelchair bound. She does have contractures of both of her feet. Lymphadenopathy:      Cervical: No cervical adenopathy. Skin:     General: Skin is warm and dry. Capillary Refill: Capillary refill takes less than 2 seconds. Coloration: Skin is not jaundiced or pale. Findings: No bruising, erythema or lesion. Comments: Pressure ulcer to right heel. Neurological:      General: No focal deficit present. Mental Status: She is alert and oriented to person, place, and time. Mental status is at baseline. Cranial Nerves: No cranial nerve deficit. Motor: No weakness or abnormal muscle tone. Coordination: Coordination normal.      Gait: Gait normal.      Deep Tendon Reflexes: Reflexes are normal and symmetric. Reflexes normal.   Psychiatric:         Mood and Affect: Mood normal.         Behavior: Behavior normal.         Thought Content: Thought content normal.         Judgment: Judgment normal.         1. Multiple sclerosis ([address]    2. Pressure injury of sacral region, stage 3 ([address]    3. Unspecified severe protein-calorie malnutrition ([address]    4. Colostomy in place ([address]    5. S/P transmetatarsal amputation of foot, left ([address]    6. Seizure ([address]    7. Paraplegia ([address]    8. Peripheral vascular disease ([address]    9. Colostomy present ([address]    10. Neuropathic 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 ([address]    11. Ulcer of great toe, unspecified laterality, unspecified ulcer stage ([address]    12. Other depression    13. Chronic low back pain, unspecified back pain laterality, unspecified whether sciatica present    14. Spasticity    15. Neuropathy    16. Environmental allergies    17. Primary hypertension    18. Hyperglycemia    19. Urinary tract infection without hematuria, site unspecified    20. Vesicovaginal fistula        ASSESSMENT/PLAN:    56-year-old woman here for follow-up of multiple sclerosis    1. Multiple sclerosis: Patient is wheelchair-bound. She is paraplegic secondary to complications of her MS. She does need a new hospital bed. He states that her old hospital bed is approximately 21years old. Will refer her to physical therapy order a new hospital bed. 2.  Depression: Stable on Cymbalta    3. Chronic back pain/spasticity: Continue Zanaflex. Patient does have a baclofen pain pump in place    4. Neuropathy: Stable on Lyrica    5. Environmental allergies: Stable with Astelin nasal spray and proair. 6.  Hypertension: Blood pressure well controlled on hydrochlorothiazide    7. Peripheral vascular disease with history of transmetatarsal amputation of left foot and right foot ulcer: Patient also has an ulcer of her right great toe: She is followed by wound care. 8.  Hyperglycemia: Check an A1c with next lab draw    9. Protein calorie malnutrition: Stable she states that her appetite is good    10. Seizure disorder: Patient denies any history of seizure activity. Continue Keppra    11. UTI: Omnicef prescribed. 12  History of vesiculovaginal fistula: Ostomy in place. She has undergone a simple cystectomy with placement of [place] pouch. She also has a history of a diverting end colostomy. She is followed by [facility] physicians. 13.  History of pressure ulcer to sacral region: Improved    Alyce was seen today for 3 month follow-up.     Diagnoses and all orders for this visit:    Multiple sclerosis [facility])  -     [facility]    Pressure injury of sacral region, stage 3 ([facility]    Unspecified severe protein-calorie malnutrition ([address]    Colostomy in place ([address]    S/P transmetatarsal amputation of foot, left ([address]    Seizure ([address]    Paraplegia ([address]    Peripheral vascular disease (HCC)    Colostomy present ([address]    Neuropathic 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 ([address]    Ulcer of great toe, unspecified laterality, unspecified ulcer stage ([address]    Other depression    Chronic low back pain, unspecified back pain laterality, unspecified whether sciatica present    Spasticity    Neuropathy    Environmental allergies    Primary hypertension    Hyperglycemia    Urinary tract infection without hematuria, site unspecified    Vesicovaginal fistula    Other orders  -     fluconazole (DIFLUCAN) 100 MG tablet; Take 1 tablet by mouth daily for 3 days  -     cefdinir (OMNICEF) 300 MG capsule; Take 1 capsule by mouth 2 times daily for 10 days          No follow-ups on file.      Orders Placed This Encounter   Procedures   [facility]     Referral Priority:   Routine     Referral Type:   Eval and Treat     Referral Reason:   Specialty Services Required     Requested Specialty:   Physical Therapy     Number of Visits Requested:   1       Claudia Lima MD

## 2022-04-18 ASSESSMENT — ENCOUNTER SYMPTOMS
DIARRHEA: 0
CHEST TIGHTNESS: 0
SORE THROAT: 0
VOMITING: 0
SHORTNESS OF BREATH: 0
EYE ITCHING: 0
SINUS PRESSURE: 0
COUGH: 0
EYE REDNESS: 0
COLOR CHANGE: 0
RHINORRHEA: 0
EYE DISCHARGE: 0
CONSTIPATION: 0
WHEEZING: 0
BLOOD IN STOOL: 0
NAUSEA: 0
EYE PAIN: 0
ABDOMINAL PAIN: 0
PHOTOPHOBIA: 0
ABDOMINAL DISTENTION: 0

## 2022-04-21 ENCOUNTER — TELEPHONE (OUTPATIENT)
Dept: INTERNAL MEDICINE | Age: 52
End: 2022-04-21

## 2022-04-21 NOTE — TELEPHONE ENCOUNTER
[facility] called and LM asking that we call back. The number they left [phone] is not the correct number.

## 2022-04-22 ENCOUNTER — HOSPITAL ENCOUNTER (OUTPATIENT)
Dept: PHYSICAL THERAPY | Age: 52
Setting detail: THERAPIES SERIES
Discharge: HOME | End: 2022-04-22
Payer: MEDICARE

## 2022-04-22 PROCEDURE — 97163 PT EVAL HIGH COMPLEX 45 MIN: CPT

## 2022-04-22 PROCEDURE — 97530 THERAPEUTIC ACTIVITIES: CPT

## 2022-04-22 NOTE — PROGRESS NOTES
Physical Therapy  Initial Assessment/Discharge  Date: 2022  Patient Name: Deya Sykes  MRN: [id number]  : 1970     Treatment Diagnosis: MS    Restrictions       Subjective  Patient is here today to be evaluated for need of new hospital bed, stating that she has a hospital bed and that it's 21years old and is no longer working. She has had lonstanding diagnosis of MS since her 20's. General mobility is limited and she has to use a sliding board and trapeze bar to transfer and she requires help to make the transfer. She is not able to stand nor ambulate and there is no LE strength (paraplegic). She requires a bed with a hand control and is unable to operate the bed without one (current one is well-worn and difficult to use. She has had to \"patch up\" her current bed and basically it is no longer working. She has had  pressure sores on her coccx in past and on her heels and is high risk for development of sores due to decreased level of sensation. She is using an electric wheelchair and can independently operate her chair's joystick with right hand. She requires use of a ROHO cushion while in wheelchair with a cutout for the coccyx. She has had an air loss mattress for her bed, currently not in good condition. She requires help for rolling in bed due to the mattress being worn so it has her sinking to the middle. She has a caretaker who stays with her until about 4 o'clock and later has a fiance that is with her night. She requires help with dressing and care from the waist down. She has weak trunk strength which permits her little independent unsupported sitting and basically has no strength from the waist down, is at high risk for developing pressure ulcers due to decreased sensation and has an established history of pressure ulcers. Time spent in bed is between 12-14 hours daily, when out of bed usually sits up all day in her chair. She has a colostomy and ureterotomy.   General  Additional Pertinent Hx: 47year old female referred to PT with primary history of MS. Diagnosis: multiple sclerosis (G35)  PT Visit Information  PT Insurance Information: Medicare (no auth required), [place] Medicaid (Auth waived)  Total # of Visits Approved: 1  Total # of Visits to Date: 1       Vision/Hearing  Vision  Vision: Within Functional Limits  Hearing  Hearing: Within functional limits    Orientation  Orientation  Overall Orientation Status: Within Normal Limits  Patient affect[de-identified] Normal  Follows Commands: Within Functional Limits    Social/Functional History  Social/Functional History  Lives With: Significant other  Type of Home: House  Home Access: Ramped entrance  Home Equipment: Electric scooter  Receives Help From: Family;Personal care attendant  ADL Assistance: Needs assistance  Dressing: Moderate assistance  Feeding: Independent  Toileting: Needs assistance  Mode of Transportation: [facility]  Occupation: On disability  Additional Comments: Requires public transportation to appointments. Objective          PROM RLE (degrees)  RLE General PROM: Patient has WNL bilateral LE ROM except for ankle dorsiflexion (she is wearing pressure reducing soft ankle boots, left foot transmetatarsal amputaion, heel ulceration)    Strength RUE  R Shoulder Flexion: 4+/5  R Shoulder ABduction: 4+/5  R Shoulder Internal Rotation: 4+/5  R Shoulder External Rotation: 4+/5  R Elbow Flexion: 4+/5;5/5  R Elbow Extension: 4+/5;5/5  R Wrist Flexion: 4+/5  R Wrist Extension: 4+/5  Strength LUE  L Shoulder Flexion: 4+/5  L Shoulder ABduction: 4-/5  L Shoulder Internal Rotation: 4+/5  L Shoulder External Rotation: 4/5  L Elbow Flexion: 4+/5  L Elbow Extension: 4+/5  L Wrist Flexion: 4+/5  L Wrist Extension: 4+/5  Strength Other  Other: Patient does not have active movement in her LE's. Uses UE's to pull on triangle bar for bed and to push downward in chair or sliding board (requires assist).                 Social History      Lives With Significant other Type of [place] entrance   [facility] --   [address] --   Home Equipment Electric scooter               Wheelchair Activities  Wheelchair Size: Patient has electric wheelchair which she can independently operate with her hands, wears seat belt. She does not have good trunk control, is partially reclined in chair. Wheelchair Type: Power tilt  Wheelchair Cushion: Pressure Relieving  Pressure Relief Type: Tilt back power              Functional Status     Prior level of function -- Prior level of function mod A; max A Current level of function -- Occupation On disability Type of Occupation -- Job Duties Sedentary Leisure & Hobbies -- IADL Comments -- Receives Help From Family; Personal care attendant ADL Assistance Needs assistance Bath -- Dressing Moderate assistance Grooming -- Feeding Independent Toileting Needs assistance [address] -- Homemaking Responsibilities -- Ambulation Assistance -- Transfer Assistance -- Active  -- [facility] -- Additional Comments Requires public transportation to appointments. Assessment   Conditions Requiring Skilled Therapeutic Intervention  Body Structures, Functions, Activity Limitations Requiring Skilled Therapeutic Intervention: Decreased functional mobility ; Decreased ADL status; Decreased ROM; Decreased body mechanics; Decreased strength;Decreased endurance;Decreased balance;Decreased sensation;Decreased posture;Decreased coordination;Decreased fine motor control  Assessment: Ms. Marisa Ruiz has been seen today for a one-time visits PT evaluation to determine her need for asquisition of a new hospital bed. She has a long history of MS and numerous complications that are associated with her diagnosis, including lower extremity paraplegia, dependence for assistance with transfers, ADL's, bed mobility.  Her upper Therapy Time   Individual Concurrent Group Co-treatment   Time In 6924         Time Out 1525         Minutes 60         Timed Code Treatment Minutes: 60 Minutes  Electronically signed by True Paul PT on 4/22/2022 at 6:32 PM

## 2022-04-25 ENCOUNTER — TELEPHONE (OUTPATIENT)
Dept: INTERNAL MEDICINE | Age: 52
End: 2022-04-25

## 2022-04-25 DIAGNOSIS — R82.90 FOUL SMELLING URINE: Primary | ICD-10-CM

## 2022-04-25 NOTE — TELEPHONE ENCOUNTER
Pt requesting UA. She said Dr. Mehdi Valiente prescribed a an abx, but she wants to make sure it is the right abx to treat a UTI because she has foul smelling urine. UA ordered.

## 2022-04-26 ENCOUNTER — HOSPITAL ENCOUNTER (OUTPATIENT)
Dept: WOUND CARE | Age: 52
Discharge: HOME | End: 2022-04-26
Payer: MEDICARE

## 2022-04-26 VITALS
SYSTOLIC BLOOD PRESSURE: 100 MMHG | WEIGHT: 141.06 LBS | RESPIRATION RATE: 20 BRPM | HEART RATE: 60 BPM | DIASTOLIC BLOOD PRESSURE: 71 MMHG | HEIGHT: 69 IN | TEMPERATURE: 98.1 F | BODY MASS INDEX: 20.89 KG/M2

## 2022-04-26 DIAGNOSIS — E43 UNSPECIFIED SEVERE PROTEIN-CALORIE MALNUTRITION (HCC): ICD-10-CM

## 2022-04-26 DIAGNOSIS — R82.90 FOUL SMELLING URINE: ICD-10-CM

## 2022-04-26 DIAGNOSIS — G82.20 PARAPLEGIA (HCC): Chronic | ICD-10-CM

## 2022-04-26 DIAGNOSIS — F17.200 SMOKING: Chronic | ICD-10-CM

## 2022-04-26 DIAGNOSIS — L97.512 NEUROPATHIC ULCER OF RIGHT FOOT WITH FAT LAYER EXPOSED (HCC): Primary | ICD-10-CM

## 2022-04-26 PROBLEM — L97.509 ULCER OF GREAT TOE (HCC): Status: RESOLVED | Noted: 2021-12-13 | Resolved: 2022-04-26

## 2022-04-26 LAB
BACTERIA: ABNORMAL /HPF
BILIRUBIN URINE: NEGATIVE
BLOOD, URINE: ABNORMAL
CLARITY: ABNORMAL
COLOR: YELLOW
CRYSTALS, UA: ABNORMAL /HPF
EPITHELIAL CELLS, UA: 0 /HPF (ref 0–5)
GLUCOSE URINE: NEGATIVE MG/DL
HYALINE CASTS: 30 /HPF (ref 0–8)
KETONES, URINE: NEGATIVE MG/DL
LEUKOCYTE ESTERASE, URINE: ABNORMAL
NITRITE, URINE: POSITIVE
PH UA: 7 (ref 5–8)
PROTEIN UA: 30 MG/DL
RBC UA: 7 /HPF (ref 0–4)
SPECIFIC GRAVITY UA: 1.01 (ref 1–1.03)
UROBILINOGEN, URINE: 0.2 E.U./DL
WBC UA: 33 /HPF (ref 0–5)

## 2022-04-26 PROCEDURE — 11042 DBRDMT SUBQ TIS 1ST 20SQCM/<: CPT

## 2022-04-26 PROCEDURE — 11042 DBRDMT SUBQ TIS 1ST 20SQCM/<: CPT | Performed by: NURSE PRACTITIONER

## 2022-04-26 RX ORDER — LIDOCAINE HYDROCHLORIDE 20 MG/ML
JELLY TOPICAL PRN
Status: DISCONTINUED | OUTPATIENT
Start: 2022-04-26 | End: 2022-04-28 | Stop reason: HOSPADM

## 2022-04-26 RX ORDER — GINSENG 100 MG
CAPSULE ORAL ONCE
Status: CANCELLED | OUTPATIENT
Start: 2022-04-26 | End: 2022-04-26

## 2022-04-26 RX ORDER — LIDOCAINE HYDROCHLORIDE 20 MG/ML
JELLY TOPICAL PRN
Status: CANCELLED
Start: 2022-04-26

## 2022-04-26 NOTE — PROGRESS NOTES
[address]   Progress Note and Procedure Note      Grecia Rossi  MEDICAL RECORD NUMBER:  [id number]  AGE: 47 y.o. GENDER: female  : 1970  EPISODE DATE:  2022    Subjective:     Chief Complaint   Patient presents with    Wound Check     right foot wound      HISTORY of PRESENT ILLNESS HPI     Grecia Rossi is a 47 y.o. female who presents today for wound/ulcer evaluation.    History of Wound Context: right foot wound follow up/eval and treat  Ulcer Identification:  Ulcer Type: neuropathic  Contributing Factors: chronic pressure, shear force and smoking    Wound: N/A        PAST MEDICAL HISTORY        Diagnosis Date    Ankle wound     and toe wound    Aptyalism 2017    Arthritis 2020    Asthma     Back pain 2017    Binocular vision disorder with diplopia 2017    [facility]flow limitation[facility]) 2017    Hay fever 2017    Headache 2017    MS (multiple sclerosis) ([address]     Muscle spasticity     Neuropathic ulcer of left foot, limited to breakdown of skin ([address] 4/10/2017    Numbness 2016    Open wound of ankle 2017    Open wound of second toe of left foot 2018    Peripheral vascular disease ([address]     Seizure ([address]     occ. related to ms    Sepsis ([address] 2016    Weakness 2016       PAST SURGICAL HISTORY    Past Surgical History:   Procedure Laterality Date    BACLOFEN PUMP IMPLANTATION      BREAST BIOPSY Right     neg    COLONOSCOPY N/A 2019    Dr Laura Guallpa ileum through ostomy-patent and healthy appearing anastomosis in the right colon-entero colonic anastomosis-10 yr recall    COLOSTOMY      [facility]      Right    FOOT AMPUTATION Left 2019    LEFT TRANSMET AMPUTATION performed by Erin Jaimes MD at [address]      urostomy    OTHER SURGICAL HISTORY      pain pump    PA INSJ PRPH CTR VAD W/SUBQ PORT UNDER 5 YR N/A 6/26/2018    SINGLE LUMEN PORT PLACEMENT WITH FLUORO performed by Jackie Bains MD at [address] TOE AMPUTATION      L last toe    TONSILLECTOMY      URETEROTOMY         FAMILY HISTORY    Family History   Problem Relation Age of Onset   Blase Liming Cancer Mother         breast    Colon Cancer Mother     Colon Polyps Mother     Breast Cancer Mother     Diabetes Father     High Blood Pressure Father     Cancer Paternal Aunt         breast    Liver Cancer Paternal Aunt     Heart Disease Paternal Grandmother     Esophageal Cancer Neg Hx     Liver Disease Neg Hx     Rectal Cancer Neg Hx     Stomach Cancer Neg Hx        SOCIAL HISTORY    Social History     Tobacco Use    Smoking status: Current Some Day Smoker     Packs/day: 0.50     Years: 15.00     Pack years: 7.50     Types: Cigarettes    Smokeless tobacco: Never Used   Vaping Use    Vaping Use: Never used   Substance Use Topics    Alcohol use: Yes     Comment: yaritza    Drug use: Yes     Types: Marijuana Darliss Meeter)     Comment: daily        ALLERGIES    Allergies   Allergen Reactions    Darvocet [Propoxyphene N-Acetaminophen]      Talking out of her head    Morphine      Category: Allergy;     Morphine And Related Itching and Swelling    Propoxyphene Swelling       MEDICATIONS    Current Outpatient Medications on File Prior to Encounter   Medication Sig Dispense Refill    fluocinolone acetonide (SYNALAR) 0.01 % external solution Apply 10 drops topically at bedtime      pregabalin (LYRICA) 300 MG capsule 1 bid (Patient taking differently: Take 300 mg by mouth daily. 1 bid) 60 capsule 5    methylphenidate (RITALIN) 20 MG tablet TAKE 1 TABLET BY MOUTH DAILY 30 tablet 0    HYDROcodone-acetaminophen (NORCO)  MG per tablet TAKE 1 TABLET BY MOUTH 3 TIMES DAILY AS NEEDED FOR PAIN.  REDUCE DOSES AS PAIN BECOMES MANAGEABLE 90 tablet 0    nitrofurantoin (MACRODANTIN) 50 MG capsule Take 1 capsule by mouth nightly 90 capsule 1    levETIRAcetam (KEPPRA) 750 MG tablet Take 1 tablet by mouth daily 30 tablet 5    Diapers & Supplies MISC Indications: FX: [id number] Diapers, Chucks, Wipes, and Gloves. 100 each 3    ondansetron (ZOFRAN-ODT) 4 MG disintegrating tablet Take 1 tablet by mouth 3 times daily as needed for Nausea or Vomiting 30 tablet 0    DULoxetine (CYMBALTA) 30 MG extended release capsule Take 1 capsule by mouth daily (Patient taking differently: Take 30 mg by mouth nightly ) 30 capsule 5    tiZANidine (ZANAFLEX) 4 MG tablet TAKE 3 TABLETS TWICE DAILY AS NEEDED  180 tablet 11    azelastine (ASTELIN) 0.1 % nasal spray USE 2 SPRAYS IN EACH NOSTRIL TWICE DAILY AS DIRECTED 30 mL 0    hydroCHLOROthiazide (HYDRODIURIL) 25 MG tablet TAKE 1 TABLET BY MOUTH ONCE DAILY AS NEEDED 30 tablet 0    cetirizine (ZYRTEC) 10 MG tablet Take 10 mg by mouth daily      Catheters MISC Catheter supplies and lubricant 5 times daily G82.20  to [address] [facility] [phone] each 3    glucose monitoring kit (FREESTYLE) monitoring kit 1 kit by Does not apply route daily 1 kit 0    blood glucose monitor strips Test 1 times a day & as needed for symptoms of irregular blood glucose. 100 strip 3    docusate sodium (COLACE) 100 MG capsule Take 200 mg by mouth as needed for Constipation      PROAIR  (90 Base) MCG/ACT inhaler INHALE 1 PUFF INTO THE LUNGS EVERY 6 HOURS AS NEEDED FOR WHEEZING OR SHORTNESS OF BREATH 8.5 g 5    Heparin Lock Flush (HEPARIN FLUSH, 100 UNITS/ML,) 100 UNIT/ML injection 3 mLs by Intercatheter route every 30 days No every 30 days but every 4-6 weeks.  Flush port with 300 units heparin with 20 cc normal saline for ocrevus infusion for Multiple sclerosis and NS 20CC 1 Syringe 5    ipratropium-albuterol (DUONEB) 0.5-2.5 (3) MG/3ML SOLN nebulizer solution USE 1 UNIT DOSE IN NEBULIZER EVERY 4 TO 6 HOURS AS NEEDED. 450 mL 11    BACLOFEN, PAIN PUMP REFILL CHARGE, by Implant route continuous Indications: every 3 months      Multiple Vitamins-Minerals (THERAPEUTIC MULTIVITAMIN-MINERALS) tablet Take 1 tablet by mouth daily      glucose monitoring kit (FREESTYLE) monitoring kit 1 kit by Does not apply route daily 1 kit 0    blood glucose monitor strips Test 1 times a day & as needed for symptoms of irregular blood glucose. 100 strip 0    baclofen (LIORESAL) 10 MG tablet Take 1 tablet by mouth 2 times daily as needed (muscle spasms) (Patient taking differently: Take 10 mg by mouth 2 times daily as needed (muscle spasms) Indications: using pump ) 60 tablet 5    Sodium Chloride Flush (SALINE FLUSH) 0.9 % SOLN Infuse 20 mLs intravenously every 30 days Not every 30 days but every 4-6 weeks as need with 300 units of heparin to flush port for Infusion of Ocrevus for multiple sclerosis 20 mL 5     No current facility-administered medications on file prior to encounter. REVIEW OF SYSTEMS    Pertinent items are noted in HPI.     Objective:      /71   Pulse 60   Temp 98.1 °F (36.7 °C) (Temporal)   Resp 20   Ht 5' 9\" (1.753 m)   Wt 141 lb 1 oz (64 kg)   BMI 20.83 kg/m²     Wt Readings from Last 3 Encounters:   04/26/22 141 lb 1 oz (64 kg)   04/15/22 147 lb (66.7 kg)   02/21/22 147 lb (66.7 kg)       PHYSICAL EXAM    General Appearance: alert and oriented to person, place and time, well developed and well- nourished, in no acute distress  Skin: warm and dry, no rash or erythema  Head: normocephalic and atraumatic  Eyes: pupils equal, round, and reactive to light, extraocular eye movements intact, conjunctivae normal  ENT: tympanic membrane, external ear and ear canal normal bilaterally, nose without deformity, nasal mucosa and turbinates normal without polyps  Neck: supple and non-tender without mass, no thyromegaly or thyroid nodules, no cervical lymphadenopathy  Pulmonary/Chest: clear to auscultation bilaterally- no wheezes, rales or rhonchi, normal air movement, no respiratory distress  Extremities: no cyanosis, clubbing or edema      Assessment:      Patient Active Problem List   Diagnosis Code    Muscle spasticity M62.838    Peripheral vascular disease ([facility]) I73.9    Multiple sclerosis ([facility]) G35    Pain in both upper arms M79.621, M79.622    Numbness R20.0    Other fatigue R53.83    Weakness R53.1    Chronic pain G89.29    Hx of seizure disorder Z86.69    Insomnia G47.00    Pressure ulcer of sacral region L89.159    Neck pain M54.2    Seasonal allergic rhinitis J30.2    S/P transmetatarsal amputation of foot, left ([facility]) G82.381    Constipation due to neurogenic bowel K59.00, K59.2    Colostomy present ([facility]) Z93.3    Paraplegia ([facility]) G82.20    Hip pain M25.559    Seizure ([facility]) R56.9    Smoking F17.200    Encounter for care related to vascular access port Z45.2    Urinary incontinence R32    Mixed anxiety and depressive disorder F41.8    Malodorous urine R82.90    Arthritis M19.90    Osteoporosis M81.0    Urinary bladder stone N21.0    Vesicovaginal fistula 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.0    Neuropathic 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 ([address] L97.512    Dyspnea R06.00    Marijuana user F12.90    Neurogenic bladder N31.9    Other specified misadventures during surgical and medical care Y65.8    Presence of other specified devices Z97.8    Renal stone N20.0    Wheelchair bound Z99.3    Pressure injury of sacral region, stage 3 ([address] L89.153    Unspecified severe protein-calorie malnutrition ([address] E43        Procedure Note  Indications:  Based on my examination of this patient's wound(s)/ulcer(s) today, debridement is required to promote healing and evaluate the wound base.     Performed by: EFREN Rey CNP    Consent obtained:  Yes    Time out taken:  Yes    Pain Control: Anesthetic  Anesthetic: 2% Lidocaine Gel Topical       Debridement:Excisional Debridement    Using curette the wound(s)/ulcer(s) was/were sharply debrided down through and including the removal of epidermis, dermis and subcutaneous tissue. Devitalized Tissue Debrided:  fibrin, biofilm, slough and exudate    Pre Debridement Measurements:  Are located in the Wound/Ulcer Documentation Flow Sheet    Wound/Ulcer #: 1    Post Debridement Measurements:  Wound/Ulcer Descriptions are Pre Debridement except measurements:      Percent of Wound/Ulcer Debrided: 100%    Total Surface Area Debrided:  1 sq cm     Wound 04/11/22 Ankle Anterior;Right wound 1- right foot neuropathic ulcer (Active)   Wound Image   04/26/22 1425   Wound Etiology Other 04/26/22 1425   Dressing Status Old drainage noted 04/26/22 1425   Wound Cleansed Soap and water 04/26/22 1425   Dressing/Treatment Hydrofera blue 04/26/22 1432   Wound Length (cm) 1 cm 04/26/22 1425   Wound Width (cm) 1 cm 04/26/22 1425   Wound Depth (cm) 0.2 cm 04/26/22 1425   Wound Surface Area (cm^2) 1 cm^2 04/26/22 1425   Change in Wound Size % (l*w) 0 04/26/22 1425   Wound Volume (cm^3) 0.2 cm^3 04/26/22 1425   Wound Healing % -100 04/26/22 1425   Post-Procedure Length (cm) 1 cm 04/26/22 1432   Post-Procedure Width (cm) 1 cm 04/26/22 1432   Post-Procedure Depth (cm) 0.2 cm 04/26/22 1432   Post-Procedure Surface Area (cm^2) 1 cm^2 04/26/22 1432   Post-Procedure Volume (cm^3) 0.2 cm^3 04/26/22 1432   Wound Assessment Pine/red;Slough 04/26/22 1425   Drainage Amount Moderate 04/26/22 1425   Drainage Description Serosanguinous 04/26/22 1425   Odor None 04/26/22 1425   Audelia-wound Assessment Intact 04/26/22 1425   Margins Defined edges 04/26/22 1425   Wound Thickness Description not for Pressure Injury Full thickness 04/26/22 1425   Number of days: 15            Diabetic/Pressure/Non Pressure Ulcers only:  Ulcer: N/A      Estimated Blood Loss:  Minimal    Hemostasis Achieved:  by pressure    Procedural Pain:  0  / 10     Post Procedural Pain:  0 / 10     Response to treatment:  Well tolerated by patient.        Plan:     Problem List Items Addressed This Visit        Other    Paraplegia ([address] (Chronic) Smoking (Chronic)    * (Principal) Neuropathic 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 ([address] - Primary    Unspecified severe protein-calorie malnutrition ([address]          Treatment Note please see attached Discharge Instructions    In my professional opinion this patient would benefit from HBO Therapy: No    Written patient dismissal instructions given to patient and signed by patient or POA. Ms. Walter Dodson is getting fit for a boot through [place] [id number]. She is healing well!   Electronically signed by EFREN Chase CNP on 4/26/2022 at 2:37 PM

## 2022-04-26 NOTE — HOME CARE
04/26/22 1425   Post-Procedure Length (cm) 1 cm 04/26/22 1432   Post-Procedure Width (cm) 1 cm 04/26/22 1432   Post-Procedure Depth (cm) 0.2 cm 04/26/22 1432   Post-Procedure Surface Area (cm^2) 1 cm^2 04/26/22 1432   Post-Procedure Volume (cm^3) 0.2 cm^3 04/26/22 1432   Wound Assessment [place]/red;Slough 04/26/22 1425   Drainage Amount Moderate 04/26/22 1425   Drainage Description Serosanguinous 04/26/22 1425   Odor None 04/26/22 1425   Audelia-wound Assessment Intact 04/26/22 1425   Margins Defined edges 04/26/22 1425   Wound Thickness Description not for Pressure Injury Full thickness 04/26/22 1425   Number of days: 15          Supplies Requested :      WOUND #: 1   PRIMARY DRESSING:  Other: hydrafera blue ready silicone border   Cover and Secure with: None     FREQUENCY OF DRESSING CHANGES:  Every other day     ADDITIONAL ITEMS:  [] Gloves Small  [x] Gloves Medium [x] Gloves Large [] Gloves XLarge  [] Tape 1\" [] Tape 2\" [] Tape 3\"  [] Medipore Tape  [] Saline  [] Skin Prep   [] Adhesive Remover   [] Cotton Tip Applicators   [] Other:    Patient Wound(s) Debrided: [x] Yes if yes please add date 4/26/22   [] No    Debribement Type: Excisional/Sharp and Mechanical     Is the patient currently on an antibiotic for their Wound(s): [] Yes if yes please add name and dose    [x] No    Patient currently being seen by Home Health: [] Yes   [x] No    Duration for needed supplies:  []15  [x]30  []60  []90 Days    Electronically signed by EFREN Cardona CNP on 4/26/2022 at 2:39 PM     Provider Information:      PROVIDER'S NAME: Dany SCHWARTZ    NPI: [id number]

## 2022-04-27 ENCOUNTER — TELEPHONE (OUTPATIENT)
Dept: INTERNAL MEDICINE | Age: 52
End: 2022-04-27

## 2022-04-27 DIAGNOSIS — N39.0 URINARY TRACT INFECTION WITHOUT HEMATURIA, SITE UNSPECIFIED: Primary | ICD-10-CM

## 2022-04-27 RX ORDER — CEFDINIR 300 MG/1
300 CAPSULE ORAL 2 TIMES DAILY
Qty: 20 CAPSULE | Refills: 0 | Status: SHIPPED | OUTPATIENT
Start: 2022-04-27 | End: 2022-05-07

## 2022-04-27 NOTE — TELEPHONE ENCOUNTER
----- Message from Fazal Rosenberg MD sent at 4/26/2022  3:20 PM CDT -----  Does have a urinary tract infection.   Omnicef 300 mg p.o. twice daily for 10 days

## 2022-04-28 LAB
ORGANISM: ABNORMAL
URINE CULTURE, ROUTINE: ABNORMAL
URINE CULTURE, ROUTINE: ABNORMAL

## 2022-05-09 DIAGNOSIS — M62.838 MUSCLE SPASTICITY: ICD-10-CM

## 2022-05-09 DIAGNOSIS — R53.83 OTHER FATIGUE: ICD-10-CM

## 2022-05-09 DIAGNOSIS — G35 MS (MULTIPLE SCLEROSIS) (HCC): ICD-10-CM

## 2022-05-09 DIAGNOSIS — M54.2 PAIN, NECK: ICD-10-CM

## 2022-05-09 DIAGNOSIS — M79.621 PAIN IN BOTH UPPER ARMS: ICD-10-CM

## 2022-05-09 DIAGNOSIS — M79.622 PAIN IN BOTH UPPER ARMS: ICD-10-CM

## 2022-05-09 NOTE — TELEPHONE ENCOUNTER
Requested Prescriptions     Pending Prescriptions Disp Refills    HYDROcodone-acetaminophen (NORCO)  MG per tablet [Pharmacy Med Name: HYDROCODONE BITARTRATE/ACETAMINOPHEN 325MG-10MG TABLET] 90 tablet 0     Sig: TAKE 1 TABLET BY MOUTH 3 TIMES DAILY AS NEEDED FOR PAIN.  REDUCE DOSES AS PAIN BECOMES MANAGEABLE    methylphenidate (RITALIN) 20 MG tablet [Pharmacy Med Name: METHYLPHENIDATE HYDROCHLORIDE 20MG TABLET] 30 tablet 0     Sig: TAKE 1 TABLET BY MOUTH DAILY       Last Office Visit:  4/6/2022  Next Office Visit:  7/6/2022  Last Medication Refill: 4/6/22 for both   Gayatri Huitron up to date: 3/3/22     *RX updated to reflect   5/9/22 for both  fill date*

## 2022-05-10 ENCOUNTER — HOSPITAL ENCOUNTER (OUTPATIENT)
Dept: WOUND CARE | Age: 52
Discharge: HOME | End: 2022-05-10
Payer: MEDICARE

## 2022-05-10 VITALS
DIASTOLIC BLOOD PRESSURE: 60 MMHG | TEMPERATURE: 97.6 F | HEIGHT: 69 IN | BODY MASS INDEX: 20.88 KG/M2 | HEART RATE: 76 BPM | RESPIRATION RATE: 20 BRPM | SYSTOLIC BLOOD PRESSURE: 100 MMHG | WEIGHT: 141 LBS

## 2022-05-10 DIAGNOSIS — L97.512 NEUROPATHIC ULCER OF RIGHT FOOT WITH FAT LAYER EXPOSED (HCC): Primary | Chronic | ICD-10-CM

## 2022-05-10 DIAGNOSIS — F17.200 SMOKING: Chronic | ICD-10-CM

## 2022-05-10 DIAGNOSIS — G82.20 PARAPLEGIA (HCC): Chronic | ICD-10-CM

## 2022-05-10 PROCEDURE — 97597 DBRDMT OPN WND 1ST 20 CM/<: CPT

## 2022-05-10 PROCEDURE — 97597 DBRDMT OPN WND 1ST 20 CM/<: CPT | Performed by: SURGERY

## 2022-05-10 RX ORDER — METHYLPHENIDATE HYDROCHLORIDE 20 MG/1
TABLET ORAL
Qty: 30 TABLET | Refills: 0 | Status: SHIPPED | OUTPATIENT
Start: 2022-05-10 | End: 2022-06-06

## 2022-05-10 RX ORDER — HYDROCODONE BITARTRATE AND ACETAMINOPHEN 10; 325 MG/1; MG/1
TABLET ORAL
Qty: 90 TABLET | Refills: 0 | Status: SHIPPED | OUTPATIENT
Start: 2022-05-10 | End: 2022-06-06

## 2022-05-10 RX ORDER — PILOCARPINE HYDROCHLORIDE 7.5 MG/1
TABLET, FILM COATED ORAL
Qty: 90 TABLET | Refills: 3 | Status: SHIPPED | OUTPATIENT
Start: 2022-05-10 | End: 2022-09-06

## 2022-05-10 NOTE — PLAN OF CARE
Problem: Chronic Conditions and Co-morbidities  Goal: Patient's chronic conditions and co-morbidity symptoms are monitored and maintained or improved  Outcome: Progressing     Problem: Discharge Planning  Goal: Discharge to home or other facility with appropriate resources  Outcome: Progressing     Problem: Skin/Tissue Integrity  Goal: Absence of new skin breakdown  Description: 1. Monitor for areas of redness and/or skin breakdown  2. Assess vascular access sites hourly  3. Every 4-6 hours minimum:  Change oxygen saturation probe site  4. Every 4-6 hours:  If on nasal continuous positive airway pressure, respiratory therapy assess nares and determine need for appliance change or resting period.   Outcome: Progressing     Problem: Chronic Conditions and Co-morbidities  Goal: Patient's chronic conditions and co-morbidity symptoms are monitored and maintained or improved  Outcome: Progressing     Problem: Wound:  Goal: Will show signs of wound healing; wound closure and no evidence of infection  Description: Will show signs of wound healing; wound closure and no evidence of infection  Outcome: Progressing

## 2022-05-10 NOTE — PROGRESS NOTES
[address]   Progress Note and Procedure Note      Froylan Boone  MEDICAL RECORD NUMBER:  [id number]  AGE: 47 y.o. GENDER: female  : 1970  EPISODE DATE:  5/10/2022    Subjective:     Chief Complaint   Patient presents with    Wound Check         HISTORY of PRESENT ILLNESS HPI     Froylan Boone is a 47 y.o. female who presents today for wound/ulcer evaluation.    Wound Context: Pt with R foot wound here for eval/treat  Wound/Ulcer Pain Timing/Severity: none  Quality of pain: N/A  Severity:  0 / 10   Modifying Factors: None  Associated Signs/Symptoms: none    Ulcer Identification:  Ulcer Type: pressure  Contributing Factors: chronic pressure, decreased mobility and shear force    Wound: shear force        PAST MEDICAL HISTORY        Diagnosis Date    Ankle wound     and toe wound    Aptyalism 2017    Arthritis 2020    Asthma     Back pain 2017    Binocular vision disorder with diplopia 2017    CAFL [facility]ch[facility] airflow limitation) [facility] 2017    Hay fever 2017    Headache 2017    MS (multiple sclerosis) ([facility])     Muscle spasticity     Neuropathic ulcer of left foot, limited to breakdown of skin ([facility] 4/10/2017    Numbness 2016    Open wound of ankle 2017    Open wound of second toe of left foot 2018    Peripheral vascular disease ([facility])     Seizure ([facility])     occ. related to ms    Sepsis ([facility] 2016    Weakness 2016       PAST SURGICAL HISTORY    Past Surgical History:   Procedure Laterality Date    BACLOFEN PUMP IMPLANTATION      BREAST BIOPSY Right     neg    COLONOSCOPY N/A 2019    Dr Clara Paris ileum through ostomy-patent and healthy appearing anastomosis in the right colon-entero colonic anastomosis-10 yr recall    COLOSTOMY      FEMUR FRACTURE SURGERY      Right    FOOT AMPUTATION Left 2019    LEFT TRANSMET AMPUTATION performed by Carmen Sesay MD at [facility] SURGICAL HISTORY      urostomy    OTHER SURGICAL HISTORY      pain pump    SD INSJ PRPH CTR VAD W/SUBQ PORT UNDER 5 YR N/A 6/26/2018    SINGLE LUMEN PORT PLACEMENT WITH FLUORO performed by Julio Parikh MD at [address] TOE AMPUTATION      L last toe    TONSILLECTOMY      URETEROTOMY         FAMILY HISTORY    Family History   Problem Relation Age of Onset   [place] Cancer Mother         breast    Colon Cancer Mother     Colon Polyps Mother     Breast Cancer Mother     Diabetes Father     High Blood Pressure Father     Cancer Paternal Aunt         breast    Liver Cancer Paternal Aunt     Heart Disease Paternal Grandmother     Esophageal Cancer Neg Hx     Liver Disease Neg Hx     Rectal Cancer Neg Hx     Stomach Cancer Neg Hx        SOCIAL HISTORY    Social History     Tobacco Use    Smoking status: Current Some Day Smoker     Packs/day: 0.50     Years: 15.00     Pack years: 7.50     Types: Cigarettes    Smokeless tobacco: Never Used   Vaping Use    Vaping Use: Never used   Substance Use Topics    Alcohol use: Yes     Comment: yaritza    Drug use: Yes     Types: Marijuana Sesar Divine)     Comment: daily        ALLERGIES    Allergies   Allergen Reactions    Darvocet [Propoxyphene N-Acetaminophen]      Talking out of her head    Morphine      Category: Allergy;     Morphine And Related Itching and Swelling    Propoxyphene Swelling       MEDICATIONS    Current Outpatient Medications on File Prior to Encounter   Medication Sig Dispense Refill    HYDROcodone-acetaminophen (NORCO)  MG per tablet TAKE 1 TABLET BY MOUTH 3 TIMES DAILY AS NEEDED FOR PAIN. REDUCE DOSES AS PAIN BECOMES MANAGEABLE 90 tablet 0    methylphenidate (RITALIN) 20 MG tablet TAKE 1 TABLET BY MOUTH DAILY 30 tablet 0    fluocinolone acetonide (SYNALAR) 0.01 % external solution Apply 10 drops topically at bedtime      pregabalin (LYRICA) 300 MG capsule 1 bid (Patient taking differently: Take 300 mg by mouth daily.  1 bid) 60 capsule 5  nitrofurantoin (MACRODANTIN) 50 MG capsule Take 1 capsule by mouth nightly 90 capsule 1    levETIRAcetam (KEPPRA) 750 MG tablet Take 1 tablet by mouth daily 30 tablet 5    Diapers & Supplies MISC Indications: FX: [id number] Diapers, Chucks, Wipes, and Gloves. 100 each 3    ondansetron (ZOFRAN-ODT) 4 MG disintegrating tablet Take 1 tablet by mouth 3 times daily as needed for Nausea or Vomiting 30 tablet 0    DULoxetine (CYMBALTA) 30 MG extended release capsule Take 1 capsule by mouth daily (Patient taking differently: Take 30 mg by mouth nightly ) 30 capsule 5    tiZANidine (ZANAFLEX) 4 MG tablet TAKE 3 TABLETS TWICE DAILY AS NEEDED  180 tablet 11    azelastine (ASTELIN) 0.1 % nasal spray USE 2 SPRAYS IN EACH NOSTRIL TWICE DAILY AS DIRECTED 30 mL 0    hydroCHLOROthiazide (HYDRODIURIL) 25 MG tablet TAKE 1 TABLET BY MOUTH ONCE DAILY AS NEEDED 30 tablet 0    cetirizine (ZYRTEC) 10 MG tablet Take 10 mg by mouth daily      Catheters MISC Catheter supplies and lubricant 5 times daily G82.20  to [address] [facility] [phone] each 3    glucose monitoring kit (FREESTYLE) monitoring kit 1 kit by Does not apply route daily 1 kit 0    blood glucose monitor strips Test 1 times a day & as needed for symptoms of irregular blood glucose. 100 strip 3    docusate sodium (COLACE) 100 MG capsule Take 200 mg by mouth as needed for Constipation      PROAIR  (90 Base) MCG/ACT inhaler INHALE 1 PUFF INTO THE LUNGS EVERY 6 HOURS AS NEEDED FOR WHEEZING OR SHORTNESS OF BREATH 8.5 g 5    Heparin Lock Flush (HEPARIN FLUSH, 100 UNITS/ML,) 100 UNIT/ML injection 3 mLs by Intercatheter route every 30 days No every 30 days but every 4-6 weeks.  Flush port with 300 units heparin with 20 cc normal saline for ocrevus infusion for Multiple sclerosis and NS 20CC 1 Syringe 5    ipratropium-albuterol (DUONEB) 0.5-2.5 (3) MG/3ML SOLN nebulizer solution USE 1 UNIT DOSE IN NEBULIZER EVERY 4 TO 6 HOURS AS NEEDED. 450 mL 11    BACLOFEN, PAIN PUMP REFILL CHARGE, by Implant route continuous Indications: every 3 months      Multiple Vitamins-Minerals (THERAPEUTIC MULTIVITAMIN-MINERALS) tablet Take 1 tablet by mouth daily      glucose monitoring kit (FREESTYLE) monitoring kit 1 kit by Does not apply route daily 1 kit 0    blood glucose monitor strips Test 1 times a day & as needed for symptoms of irregular blood glucose. 100 strip 0    baclofen (LIORESAL) 10 MG tablet Take 1 tablet by mouth 2 times daily as needed (muscle spasms) (Patient taking differently: Take 10 mg by mouth 2 times daily as needed (muscle spasms) Indications: using pump ) 60 tablet 5    Sodium Chloride Flush (SALINE FLUSH) 0.9 % SOLN Infuse 20 mLs intravenously every 30 days Not every 30 days but every 4-6 weeks as need with 300 units of heparin to flush port for Infusion of Ocrevus for multiple sclerosis 20 mL 5     No current facility-administered medications on file prior to encounter. REVIEW OF SYSTEMS    A comprehensive review of systems was negative.     Objective:      /60   Pulse 76   Temp 97.6 °F (36.4 °C) (Tympanic)   Resp 20   Ht 5' 9\" (1.753 m)   Wt 141 lb (64 kg)   BMI 20.82 kg/m²     Wt Readings from Last 3 Encounters:   05/10/22 141 lb (64 kg)   04/26/22 141 lb 1 oz (64 kg)   04/15/22 147 lb (66.7 kg)       PHYSICAL EXAM    General Appearance: alert and oriented to person, place and time, well developed and well- nourished, in no acute distress  Skin: warm and dry, no rash or erythema  Head: normocephalic and atraumatic  Eyes: pupils equal, round, and reactive to light, extraocular eye movements intact, conjunctivae normal  ENT: tympanic membrane, external ear and ear canal normal bilaterally, nose without deformity, nasal mucosa and turbinates normal without polyps, lips teeth and gums normal  Neck: supple and non-tender without mass, no thyromegaly or thyroid nodules, no cervical lymphadenopathy  Pulmonary/Chest: clear to auscultation bilaterally- no wheezes, rales or rhonchi, normal air movement, no respiratory distress  Cardiovascular: normal rate, regular rhythm, normal S1 and S2, no murmurs, rubs, clicks, or gallops, distal pulses intact, no carotid bruits  Abdomen: soft, non-tender, non-distended, normal bowel sounds, no masses or organomegaly  Extremities: no cyanosis, clubbing or edema  Musculoskeletal: normal range of motion, no joint swelling, deformity or tenderness      Assessment:      Problem List Items Addressed This Visit     Paraplegia (HCC) (Chronic)    Smoking (Chronic)    * (Principal) 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 Performed by: aKrl Carrasco MD    Consent obtained:  Yes    Time out taken:  Yes    Pain Control: Anesthetic  Anesthetic: 2% Lidocaine Gel Topical       Debridement:Non-excisional Debridement    Using #15 blade scalpel and forceps the wound(s)/ulcer(s) was/were sharply debrided down through and including the removal of epidermis and dermis.         Devitalized Tissue Debrided:  fibrin, biofilm, slough, necrotic/eschar and exudate      Pre Debridement Measurements:  Are located in the Wound/Ulcer Documentation Flow Sheet    Wound/Ulcer #: 1    Percent of Wound(s)/Ulcer(s) Debrided: 100%    Total Surface Area Debrided:  0.9 sq cm       Diabetic/Pressure/Non Pressure Ulcers only:  Ulcer: Pressure ulcer, Stage 3             Post Debridement Measurements:    Wound/Ulcer Descriptions are Pre Debridement --EXCEPT MEASUREMENTS    Wound 04/11/22 Ankle Anterior;Right wound 1- right foot neuropathic ulcer (Active)   Wound Image   05/10/22 1419   Wound Etiology Other 05/10/22 1419   Dressing Status Old drainage noted 05/10/22 1419   Wound Cleansed Soap and water 05/10/22 1419 Dressing/Treatment Hydrofera blue 04/26/22 1432   Wound Length (cm) 1 cm 05/10/22 1419   Wound Width (cm) 0.9 cm 05/10/22 1419   Wound Depth (cm) 0.2 cm 05/10/22 1419   Wound Surface Area (cm^2) 0.9 cm^2 05/10/22 1419   Change in Wound Size % (l*w) 10 05/10/22 1419   Wound Volume (cm^3) 0.18 cm^3 05/10/22 1419   Wound Healing % -80 05/10/22 1419   Post-Procedure Length (cm) 1 cm 05/10/22 1436   Post-Procedure Width (cm) 0.9 cm 05/10/22 1436   Post-Procedure Depth (cm) 0.2 cm 05/10/22 1436   Post-Procedure Surface Area (cm^2) 0.9 cm^2 05/10/22 1436   Post-Procedure Volume (cm^3) 0.18 cm^3 05/10/22 1436   Wound Assessment [place]/red;Slough 05/10/22 1419   Drainage Amount Moderate 05/10/22 1419   Drainage Description Serosanguinous 05/10/22 1419   Odor None 05/10/22 1419   Audelia-wound Assessment Intact 05/10/22 1419   Margins Defined edges 05/10/22 1419   Wound Thickness Description not for Pressure Injury Full thickness 05/10/22 1419   Number of days: 29             Estimated Blood Loss:  Minimal    Hemostasis Achieved:  by pressure    Procedural Pain:  0  / 10     Post Procedural Pain:  0 / 10     Response to treatment:  Well tolerated by patient. 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          Discharge [address] and Hyperbaric Oxygen Therapy   Physician Orders and Discharge Instructions  [address]  Telephone: [phone]    NAME:  Anna Gregorio  DATE OF BIRTH:  1970  MEDICAL RECORD NUMBER:  [id number]  DATE:  5/10/2022    Discharge condition: Stable    Discharge to: Home    Left via:Private automobile    Accompanied by: Self    ECF/[facility]: [facility]    Dressing Orders:  Right foot wound: Soap and water wash, apply hydrafera blue silicone border change every 3 days. Left heel: apply Desitin barrier cream    Treatment Orders:  Protein rich diet  Multivitamin  Stop smoking! Wear offloading heel boot- place lambs wool between dressing and boot strap    [facility] follow up visit ______________2 weeks_______________  (Please note your next appointment above and if you are unable to keep, kindly give a 24 hour notice. Thank you.)    If you experience any of the following, please call the [facility] during business hours:    * Increase in Pain  * Temperature over 101  * Increase in drainage from your wound  * Drainage with a foul odor  * Bleeding  * Increase in swelling  * Need for compression bandage changes due to slippage, breakthrough drainage. If you need medical attention outside of the business hours of the [facility] please contact your PCP or go to the nearest emergency room.         Electronically signed by Ishmael Mina MD on 5/10/2022 at 2:37 PM

## 2022-05-10 NOTE — TELEPHONE ENCOUNTER
Rogelio Blas called to request a refill on her medication.       Last office visit : 4/15/2022   Next office visit : 6/9/2022     Requested Prescriptions     Pending Prescriptions Disp Refills    pilocarpine (SALAGEN) 7.5 MG tablet [Pharmacy Med Name: PILOCARPINE HYDROCHLORIDE 7.5MG TABLET] 90 tablet 3     Sig: TAKE ONE TABLET BY MOUTH 3 TIMES DAILY            Ezio Whiteside

## 2022-05-10 NOTE — PROGRESS NOTES
[address]:     [facility] [address] [facility], [address]  p: [phone] f: [phone]     Ordering Center:     [address]  [address], [place] [address] [address] [phone]  WOUND CARE Dept: [address] [facility] [phone]    Patient Information:      Deya Sykes  3 [facility] [address] [place] [address]   [phone]   : 1970  AGE: 47 y.o. GENDER: female   EPISODE DATE: 5/10/2022    Insurance:      PRIMARY INSURANCE:  Plan: MEDICARE PART A AND B  Coverage: MEDICARE  Effective Date: 2015  Group Number: [unfilled]  Subscriber Number: [id number] - (Medicare)    Payor/Plan Subscr  Sex Relation Sub. Ins. ID Effective Group Num   1. [address] [facility] 1970 Female Self [id number] 1/1/15                                    [address] [address]   2.  [place]ICA[place] -* AJAY HERNANDEZ O 1970 Female Self [id number] 1/15/15                                    [address]        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 ([address] - Primary (Chronic)          WOUNDS REQUIRING DRESSING SUPPLIES:     Wound 22 Ankle Anterior;Right wound 1- right foot neuropathic ulcer (Active)   Wound Image   05/10/22 1419   Wound Etiology Other 05/10/22 1419   Dressing Status Old drainage noted 05/10/22 1419   Wound Cleansed Soap and water 05/10/22 1419   Dressing/Treatment Hydrofera blue 22 1432   Wound Length (cm) 1 cm 05/10/22 1419   Wound Width (cm) 0.9 cm 05/10/22 1419   Wound Depth (cm) 0.2 cm 05/10/22 1419   Wound Surface Area (cm^2) 0.9 cm^2 05/10/22 1419   Change in Wound Size % (l*w) 10 05/10/22 1419   Wound Volume (cm^3) 0.18 cm^3 05/10/22 1419   Wound Healing % -80 05/10/22 1419   Post-Procedure Length (cm) 1 cm 05/10/22 1436   Post-Procedure Width (cm) 0.9 cm 05/10/22 1436   Post-Procedure Depth (cm) 0.2 cm 05/10/22 1436   Post-Procedure Surface Area (cm^2) 0.9 cm^2 05/10/22 1436   Post-Procedure Volume (cm^3) 0.18 cm^3 05/10/22 1436   Wound Assessment [place]/red;Slough 05/10/22 1419   Drainage Amount Moderate 05/10/22 1419   Drainage Description Serosanguinous 05/10/22 1419   Odor None 05/10/22 1419   Audelia-wound Assessment Intact 05/10/22 1419   Margins Defined edges 05/10/22 1419   Wound Thickness Description not for Pressure Injury Full thickness 05/10/22 1419   Number of days: 29          Supplies Requested :      WOUND #: 1   PRIMARY DRESSING:  Hydrafera Blue Ready Silicone Border        FREQUENCY OF DRESSING CHANGES:  Every other day            ADDITIONAL ITEMS:  [] Gloves Small  [x] Gloves Medium [] Gloves Large [] Gloves XLarge  [] Tape 1\" [x] Tape 2\" [] Tape 3\"  [] Medipore Tape  [] Saline  [] Skin Prep   [] Adhesive Remover   [] Cotton Tip Applicators   [] Other:    Patient Wound(s) Debrided: [x] Yes if yes please add date 5/10  [] No    Debribement Type: Excisional/Sharp    Is the patient currently on an antibiotic for their Wound(s): [] Yes if yes please add name and dose   [x] No    Patient currently being seen by Home Health: [] Yes   [x] No    Duration for needed supplies:  []15  [x]30  []60  []90 Days    Electronically signed by Liliya Sheppard RN on 5/10/2022 at 2:37 PM     Provider Information:      PROVIDER'S NAME: Dr Beth Colorado     NPI: [id number]

## 2022-05-24 ENCOUNTER — HOSPITAL ENCOUNTER (OUTPATIENT)
Dept: WOUND CARE | Age: 52
Discharge: HOME | End: 2022-05-24
Payer: MEDICARE

## 2022-05-24 VITALS
DIASTOLIC BLOOD PRESSURE: 78 MMHG | SYSTOLIC BLOOD PRESSURE: 115 MMHG | BODY MASS INDEX: 20.73 KG/M2 | HEIGHT: 69 IN | RESPIRATION RATE: 20 BRPM | HEART RATE: 62 BPM | TEMPERATURE: 97 F | WEIGHT: 140 LBS

## 2022-05-24 DIAGNOSIS — L97.512 NEUROPATHIC ULCER OF RIGHT FOOT WITH FAT LAYER EXPOSED (HCC): Primary | ICD-10-CM

## 2022-05-24 DIAGNOSIS — S71.102D OPEN WOUND OF LEFT THIGH, SUBSEQUENT ENCOUNTER: Chronic | ICD-10-CM

## 2022-05-24 DIAGNOSIS — L97.422 NEUROPATHIC ULCER OF HEEL, LEFT, WITH FAT LAYER EXPOSED (HCC): ICD-10-CM

## 2022-05-24 DIAGNOSIS — G82.20 PARAPLEGIA (HCC): Chronic | ICD-10-CM

## 2022-05-24 DIAGNOSIS — F17.200 SMOKING: Chronic | ICD-10-CM

## 2022-05-24 PROBLEM — S71.102A OPEN WOUND OF LEFT THIGH: Chronic | Status: ACTIVE | Noted: 2022-05-24

## 2022-05-24 PROCEDURE — 16020 DRESS/DEBRID P-THICK BURN S: CPT | Performed by: SURGERY

## 2022-05-24 PROCEDURE — 97597 DBRDMT OPN WND 1ST 20 CM/<: CPT | Performed by: SURGERY

## 2022-05-24 PROCEDURE — 6370000000 HC RX 637 (ALT 250 FOR IP): Performed by: NURSE PRACTITIONER

## 2022-05-24 PROCEDURE — 97597 DBRDMT OPN WND 1ST 20 CM/<: CPT

## 2022-05-24 RX ORDER — LIDOCAINE HYDROCHLORIDE 20 MG/ML
JELLY TOPICAL PRN
Status: CANCELLED
Start: 2022-05-24

## 2022-05-24 RX ORDER — GINSENG 100 MG
CAPSULE ORAL ONCE
Status: CANCELLED | OUTPATIENT
Start: 2022-05-24 | End: 2022-05-24

## 2022-05-24 RX ORDER — LIDOCAINE HYDROCHLORIDE 20 MG/ML
JELLY TOPICAL PRN
Status: DISCONTINUED | OUTPATIENT
Start: 2022-05-24 | End: 2022-05-26 | Stop reason: HOSPADM

## 2022-05-24 RX ADMIN — LIDOCAINE HYDROCHLORIDE: 20 JELLY TOPICAL at 14:18
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
## 2022-05-24 NOTE — PROGRESS NOTES
[address]   Progress Note and Procedure Note      Paul Nix  MEDICAL RECORD NUMBER:  [id number]  AGE: 47 y.o. GENDER: female  : 1970  EPISODE DATE:  2022    Subjective:     Chief Complaint   Patient presents with    Wound Check     right foot and new area on left thigh and left foot         HISTORY of PRESENT ILLNESS HPI     Paul Nix is a 47 y.o. female who presents today for wound/ulcer evaluation.    Wound Context: Pt with L heel,L thigh,and R foot wounds here for eval/treat  Wound/Ulcer Pain Timing/Severity: none  Quality of pain: N/A  Severity:  0 / 10   Modifying Factors: None  Associated Signs/Symptoms: none    Ulcer Identification:  Ulcer Type: pressure and burn  Contributing Factors: chronic pressure, decreased mobility and shear force    Wound: pressure        PAST MEDICAL HISTORY        Diagnosis Date    Ankle wound     and toe wound    Aptyalism 2017    Arthritis 2020    Asthma     Back pain 2017    Binocular vision disorder with diplopia 2017    CAFL [facility]chronic airflow limitation) ([facility] 2017    Hay fever 2017    Headache 2017    MS (multiple sclerosis) ([facility])     Muscle spasticity     Neuropathic ulcer of left foot, limited to breakdown of skin ([address] 4/10/2017    Numbness 2016    Open wound of ankle 2017    Open wound of second toe of left foot 2018    Peripheral vascular disease ([facility])     Seizure ([facility])     occ. related to ms    Sepsis ([address] 2016    Weakness 2016       PAST SURGICAL HISTORY    Past Surgical History:   Procedure Laterality Date    BACLOFEN PUMP IMPLANTATION      BREAST BIOPSY Right     neg    COLONOSCOPY N/A 2019    Dr Delroy Metz ileum through ostomy-patent and healthy appearing anastomosis in the right colon-entero colonic anastomosis-10 yr recall    COLOSTOMY      FEMUR FRACTURE SURGERY      Right    FOOT AMPUTATION Left 2019    LEFT TRANSMET AMPUTATION performed by Cm Sharif MD at [address] OTHER SURGICAL HISTORY      urostomy    OTHER SURGICAL HISTORY      pain pump    SD INSJ PRPH CTR VAD W/SUBQ PORT UNDER 5 YR N/A 6/26/2018    SINGLE LUMEN PORT PLACEMENT WITH FLUORO performed by Shirley Pride MD at [address] TOE AMPUTATION      L last toe    TONSILLECTOMY      URETEROTOMY         FAMILY HISTORY    Family History   Problem Relation Age of Onset   [place] Cancer Mother         breast    Colon Cancer Mother     Colon Polyps Mother     Breast Cancer Mother     Diabetes Father     High Blood Pressure Father     Cancer Paternal Aunt         breast    Liver Cancer Paternal Aunt     Heart Disease Paternal Grandmother     Esophageal Cancer Neg Hx     Liver Disease Neg Hx     Rectal Cancer Neg Hx     Stomach Cancer Neg Hx        SOCIAL HISTORY    Social History     Tobacco Use    Smoking status: Current Some Day Smoker     Packs/day: 0.50     Years: 15.00     Pack years: 7.50     Types: Cigarettes    Smokeless tobacco: Never Used   Vaping Use    Vaping Use: Never used   Substance Use Topics    Alcohol use: Yes     Comment: yaritza    Drug use: Yes     Types: Marijuana (Weed)     Comment: daily        ALLERGIES    Allergies   Allergen Reactions    Darvocet [Propoxyphene N-Acetaminophen]      Talking out of her head    Morphine      Category: Allergy;     Morphine And Related Itching and Swelling    Propoxyphene Swelling       MEDICATIONS    Current Outpatient Medications on File Prior to Encounter   Medication Sig Dispense Refill    HYDROcodone-acetaminophen (NORCO)  MG per tablet TAKE 1 TABLET BY MOUTH 3 TIMES DAILY AS NEEDED FOR PAIN.  REDUCE DOSES AS PAIN BECOMES MANAGEABLE 90 tablet 0    methylphenidate (RITALIN) 20 MG tablet TAKE 1 TABLET BY MOUTH DAILY 30 tablet 0    pilocarpine (SALAGEN) 7.5 MG tablet TAKE ONE TABLET BY MOUTH 3 TIMES DAILY 90 tablet 3    fluocinolone acetonide (SYNALAR) 0.01 % external solution Apply 10 drops topically at bedtime      pregabalin (LYRICA) 300 MG capsule 1 bid (Patient taking differently: Take 300 mg by mouth daily. 1 bid) 60 capsule 5    nitrofurantoin (MACRODANTIN) 50 MG capsule Take 1 capsule by mouth nightly 90 capsule 1    levETIRAcetam (KEPPRA) 750 MG tablet Take 1 tablet by mouth daily 30 tablet 5    Diapers & Supplies MISC Indications: FX: [id number] Diapers, Chucks, Wipes, and Gloves. 100 each 3    ondansetron (ZOFRAN-ODT) 4 MG disintegrating tablet Take 1 tablet by mouth 3 times daily as needed for Nausea or Vomiting 30 tablet 0    DULoxetine (CYMBALTA) 30 MG extended release capsule Take 1 capsule by mouth daily (Patient taking differently: Take 30 mg by mouth nightly ) 30 capsule 5    tiZANidine (ZANAFLEX) 4 MG tablet TAKE 3 TABLETS TWICE DAILY AS NEEDED  180 tablet 11    azelastine (ASTELIN) 0.1 % nasal spray USE 2 SPRAYS IN EACH NOSTRIL TWICE DAILY AS DIRECTED 30 mL 0    hydroCHLOROthiazide (HYDRODIURIL) 25 MG tablet TAKE 1 TABLET BY MOUTH ONCE DAILY AS NEEDED 30 tablet 0    cetirizine (ZYRTEC) 10 MG tablet Take 10 mg by mouth daily      Catheters MISC Catheter supplies and lubricant 5 times daily G82.20  to [address] [facility] [phone] each 3    glucose monitoring kit (FREESTYLE) monitoring kit 1 kit by Does not apply route daily 1 kit 0    blood glucose monitor strips Test 1 times a day & as needed for symptoms of irregular blood glucose. 100 strip 3    docusate sodium (COLACE) 100 MG capsule Take 200 mg by mouth as needed for Constipation      PROAIR  (90 Base) MCG/ACT inhaler INHALE 1 PUFF INTO THE LUNGS EVERY 6 HOURS AS NEEDED FOR WHEEZING OR SHORTNESS OF BREATH 8.5 g 5    Heparin Lock Flush (HEPARIN FLUSH, 100 UNITS/ML,) 100 UNIT/ML injection 3 mLs by Intercatheter route every 30 days No every 30 days but every 4-6 weeks.  Flush port with 300 units heparin with 20 cc normal saline for ocrevus infusion for Multiple sclerosis and NS 20CC 1 Syringe 5    ipratropium-albuterol (DUONEB) 0.5-2.5 (3) MG/3ML SOLN nebulizer solution USE 1 UNIT DOSE IN NEBULIZER EVERY 4 TO 6 HOURS AS NEEDED. 450 mL 11    BACLOFEN, PAIN PUMP REFILL CHARGE, by Implant route continuous Indications: every 3 months      Multiple Vitamins-Minerals (THERAPEUTIC MULTIVITAMIN-MINERALS) tablet Take 1 tablet by mouth daily      glucose monitoring kit (FREESTYLE) monitoring kit 1 kit by Does not apply route daily 1 kit 0    blood glucose monitor strips Test 1 times a day & as needed for symptoms of irregular blood glucose. 100 strip 0    baclofen (LIORESAL) 10 MG tablet Take 1 tablet by mouth 2 times daily as needed (muscle spasms) (Patient taking differently: Take 10 mg by mouth 2 times daily as needed (muscle spasms) Indications: using pump ) 60 tablet 5    Sodium Chloride Flush (SALINE FLUSH) 0.9 % SOLN Infuse 20 mLs intravenously every 30 days Not every 30 days but every 4-6 weeks as need with 300 units of heparin to flush port for Infusion of Ocrevus for multiple sclerosis 20 mL 5     No current facility-administered medications on file prior to encounter. REVIEW OF SYSTEMS    A comprehensive review of systems was negative.     Objective:      /78   Pulse 62   Temp 97 °F (36.1 °C) (Temporal)   Resp 20   Ht 5' 9\" (1.753 m)   Wt 140 lb (63.5 kg)   BMI 20.67 kg/m²     Wt Readings from Last 3 Encounters:   05/24/22 140 lb (63.5 kg)   05/10/22 141 lb (64 kg)   04/26/22 141 lb 1 oz (64 kg)       PHYSICAL EXAM    General Appearance: alert and oriented to person, place and time, well developed and well- nourished, in no acute distress  Skin: warm and dry, no rash or erythema  Head: normocephalic and atraumatic  Eyes: pupils equal, round, and reactive to light, extraocular eye movements intact, conjunctivae normal  ENT: tympanic membrane, external ear and ear canal normal bilaterally, nose without deformity, nasal mucosa and turbinates normal without polyps, lips teeth and gums normal  Neck: supple and non-tender without mass, no thyromegaly or thyroid nodules, no cervical lymphadenopathy  Pulmonary/Chest: clear to auscultation bilaterally- no wheezes, rales or rhonchi, normal air movement, no respiratory distress  Cardiovascular: normal rate, regular rhythm, normal S1 and S2, no murmurs, rubs, clicks, or gallops, distal pulses intact, no carotid bruits  Abdomen: soft, non-tender, non-distended, normal bowel sounds, no masses or organomegaly  Extremities: no cyanosis, clubbing or edema  Musculoskeletal: normal range of motion, no joint swelling, deformity or tenderness      Assessment:      Problem List Items Addressed This Visit     Neuropathic ulcer of heel, left, 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)    Relevant Medications    lidocaine (XYLOCAINE) 2 % uro-jet    Other Relevant Orders    Initiate Outpatient Wound Care Protocol    Open wound of left thigh (Chronic)    Relevant Medications    lidocaine (XYLOCAINE) 2 % uro-jet    Other Relevant Orders    Initiate Outpatient Wound Care Protocol    Paraplegia (HCC) (Chronic)    Smoking (Chronic)    * (Principal) 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 Performed by: Maynor Phelps MD    Consent obtained:  Yes    Time out taken:  Yes    Pain Control: Anesthetic  Anesthetic: 2% Lidocaine Gel Topical       Debridement:Non-excisional Debridement    Using #15 blade scalpel and forceps the wound(s)/ulcer(s) was/were sharply debrided down through and including the removal of epidermis and dermis.         Devitalized Tissue Debrided:  fibrin, biofilm, slough, necrotic/eschar and exudate      Pre Debridement Measurements:  Are located in the Wound/Ulcer Documentation Flow Sheet    Wound/Ulcer #: 1, 2 and 3    Percent of Wound(s)/Ulcer(s) Debrided: 100%    Total Surface Area Debrided:  5.7 sq cm       Diabetic/Pressure/Non Pressure Ulcers only:  Ulcer: Non-Pressure ulcer, fat layer exposed             Post Debridement Measurements:    Wound/Ulcer Descriptions are Pre Debridement --EXCEPT MEASUREMENTS    Wound 04/11/22 Ankle Anterior;Right wound 1- right foot neuropathic ulcer (Active)   Wound Image   05/24/22 1359   Wound Etiology Other 05/24/22 1359   Dressing Status Old drainage noted 05/24/22 1359   Wound Cleansed Soap and water 05/24/22 1359   Dressing/Treatment Hydrofera blue;Silicone border 25/61/56 1430   Wound Length (cm) 0.9 cm 05/24/22 1359   Wound Width (cm) 0.7 cm 05/24/22 1359   Wound Depth (cm) 0.1 cm 05/24/22 1359   Wound Surface Area (cm^2) 0.63 cm^2 05/24/22 1359   Change in Wound Size % (l*w) 37 05/24/22 1359   Wound Volume (cm^3) 0.063 cm^3 05/24/22 1359   Wound Healing % 37 05/24/22 1359   Post-Procedure Length (cm) 0.9 cm 05/24/22 1412   Post-Procedure Width (cm) 0.7 cm 05/24/22 1412   Post-Procedure Depth (cm) 0.1 cm 05/24/22 1412   Post-Procedure Surface Area (cm^2) 0.63 cm^2 05/24/22 1412   Post-Procedure Volume (cm^3) 0.063 cm^3 05/24/22 1412   Wound Assessment [place]/red;Slough 05/24/22 1359   Drainage Amount Moderate 05/24/22 1359   Drainage Description Serosanguinous 05/24/22 1359   Odor None 05/24/22 1359   Audelia-wound Assessment Intact 05/24/22 1359   Margins Defined edges 05/24/22 1359   Wound Thickness Description not for Pressure Injury Full thickness 05/24/22 1359   Number of days: 43       Wound 05/24/22 Thigh Anterior; Left wound 2- left thigh burn (Active)   Wound Image   05/24/22 1359   Wound Etiology Burn 05/24/22 1359   Dressing Status Old drainage noted 05/24/22 1359   Wound Cleansed Soap and water 05/24/22 1359   Wound Length (cm) 1 cm 05/24/22 1357 Wound Width (cm) 1 cm 05/24/22 1359   Wound Depth (cm) 0.1 cm 05/24/22 1359   Wound Surface Area (cm^2) 1 cm^2 05/24/22 1359   Wound Volume (cm^3) 0.1 cm^3 05/24/22 1359   Post-Procedure Length (cm) 1 cm 05/24/22 1412   Post-Procedure Width (cm) 1 cm 05/24/22 1412   Post-Procedure Depth (cm) 0.1 cm 05/24/22 1412   Post-Procedure Surface Area (cm^2) 1 cm^2 05/24/22 1412   Post-Procedure Volume (cm^3) 0.1 cm^3 05/24/22 1412   Wound Assessment Slough 05/24/22 1359   Drainage Amount Moderate 05/24/22 1359   Drainage Description Serosanguinous 05/24/22 1359   Odor None 05/24/22 1359   Audelia-wound Assessment Intact; Blanchable erythema 05/24/22 1359   Margins Defined edges 05/24/22 1359   Wound Thickness Description not for Pressure Injury Full thickness 05/24/22 1359   Number of days: 0       Wound 05/24/22 Heel Left wound 3- left heel wag 1 (Active)   Wound Image   05/24/22 1359   Wound Etiology Diabetic Flores 1 05/24/22 1359   Dressing Status Old drainage noted 05/24/22 1359   Wound Cleansed Soap and water 05/24/22 1359   Wound Length (cm) 2.3 cm 05/24/22 1359   Wound Width (cm) 1.9 cm 05/24/22 1359   Wound Depth (cm) 0.1 cm 05/24/22 1359   Wound Surface Area (cm^2) 4.37 cm^2 05/24/22 1359   Wound Volume (cm^3) 0.437 cm^3 05/24/22 1359   Post-Procedure Length (cm) 2.3 cm 05/24/22 1412   Post-Procedure Width (cm) 1.9 cm 05/24/22 1412   Post-Procedure Depth (cm) 0.1 cm 05/24/22 1412   Post-Procedure Surface Area (cm^2) 4.37 cm^2 05/24/22 1412   Post-Procedure Volume (cm^3) 0.437 cm^3 05/24/22 1412   Wound Assessment Eschar dry 05/24/22 1359   Drainage Amount Small 05/24/22 1359   Drainage Description Serosanguinous 05/24/22 1359   Odor None 05/24/22 1359   Audelia-wound Assessment Hyperkeratosis (callous); Intact 05/24/22 1359   Margins Defined edges 05/24/22 1359   Wound Thickness Description not for Pressure Injury Full thickness 05/24/22 1359   Number of days: 0             Estimated Blood Loss:  Minimal    Hemostasis Achieved:  by pressure    Procedural Pain:  0  / 10     Post Procedural Pain:  0 / 10     Response to treatment:  Well tolerated by patient. Plan:     Problem List Items Addressed This Visit     Neuropathic ulcer of heel, left, with fat layer exposed ([address] (Chronic)    Relevant Medications    lidocaine (XYLOCAINE) 2 % uro-jet    Other Relevant Orders    Initiate Outpatient Wound Care Protocol    Open wound of left thigh (Chronic)    Relevant Medications    lidocaine (XYLOCAINE) 2 % uro-jet    Other Relevant Orders    Initiate Outpatient Wound Care Protocol    Paraplegia (HCC) (Chronic)    Smoking (Chronic)    * (Principal) Neuropathic 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 ([address] - Primary (Chronic)    Relevant Medications    lidocaine (XYLOCAINE) 2 % uro-jet    Other Relevant Orders    Initiate Outpatient Wound Care Protocol          Pt with wounds here for eval/use hydrofera on all wounds. RTO 1 week    Treatment Note please see attached Discharge Instructions    In my professional opinion this patient would benefit from HBO Therapy: No    Written patient dismissal instructions given to patient and signed by patient or POA. Discharge [address] and Hyperbaric Oxygen Therapy   Physician Orders and Discharge Instructions  [address] [place] [address]  Telephone: [phone]    NAME:  Sergio Ponce:  1970  MEDICAL RECORD NUMBER:  [id number]  DATE:  5/24/2022    Discharge condition: Stable    Discharge to: Home    Left via:Private automobile    Accompanied by: Caregiver     ECF/HHA: [facility] Holstein     Dressing Orders: Right foot wound:   Soap and water wash, Apply hydrafera blue silicone border change every 3 days. Left heel: apply Desitin barrier cream    Left Thigh:     Treatment Orders:  Protein rich diet  Multivitamin  Stop smoking!   Wear offloading heel boot- place lambs wool between dressing and boot strap    [facility] follow up visit _____________________________  (Please note your next appointment above and if you are unable to keep, kindly give a 24 hour notice. Thank you.)          If you experience any of the following, please call the [facility] during business hours:    * Increase in Pain  * Temperature over 101  * Increase in drainage from your wound  * Drainage with a foul odor  * Bleeding  * Increase in swelling  * Need for compression bandage changes due to slippage, breakthrough drainage. If you need medical attention outside of the business hours of the [facility] please contact your PCP or go to the nearest emergency room.         Electronically signed by Yaakov De Leon MD on 5/24/2022 at 2:20 PM

## 2022-06-06 DIAGNOSIS — M54.2 PAIN, NECK: ICD-10-CM

## 2022-06-06 DIAGNOSIS — R53.83 OTHER FATIGUE: ICD-10-CM

## 2022-06-06 DIAGNOSIS — M62.838 MUSCLE SPASTICITY: ICD-10-CM

## 2022-06-06 DIAGNOSIS — M79.621 PAIN IN BOTH UPPER ARMS: ICD-10-CM

## 2022-06-06 DIAGNOSIS — M79.622 PAIN IN BOTH UPPER ARMS: ICD-10-CM

## 2022-06-06 DIAGNOSIS — G35 MS (MULTIPLE SCLEROSIS) (HCC): ICD-10-CM

## 2022-06-06 RX ORDER — DULOXETIN HYDROCHLORIDE 30 MG/1
30 CAPSULE, DELAYED RELEASE ORAL NIGHTLY
Qty: 90 CAPSULE | Refills: 2 | Status: ON HOLD | OUTPATIENT
Start: 2022-06-06

## 2022-06-06 RX ORDER — METHYLPHENIDATE HYDROCHLORIDE 20 MG/1
TABLET ORAL
Qty: 30 TABLET | Refills: 0 | Status: SHIPPED | OUTPATIENT
Start: 2022-06-09 | End: 2022-07-05

## 2022-06-06 RX ORDER — HYDROCODONE BITARTRATE AND ACETAMINOPHEN 10; 325 MG/1; MG/1
TABLET ORAL
Qty: 90 TABLET | Refills: 0 | Status: SHIPPED | OUTPATIENT
Start: 2022-06-09 | End: 2022-07-05

## 2022-06-06 NOTE — TELEPHONE ENCOUNTER
Requested Prescriptions     Pending Prescriptions Disp Refills    methylphenidate (RITALIN) 20 MG tablet [Pharmacy Med Name: METHYLPHENIDATE HYDROCHLORIDE 20MG TABLET] 30 tablet 0     Sig: TAKE 1 TABLET BY MOUTH DAILY    HYDROcodone-acetaminophen (NORCO)  MG per tablet [Pharmacy Med Name: HYDROCODONE BITARTRATE/ACETAMINOPHEN 325MG-10MG TABLET] 90 tablet 0     Sig: TAKE 1 TABLET BY MOUTH 3 TIMES DAILY AS NEEDED FOR PAIN.  REDUCE DOSES AS PAIN BECOMES MANAGEABLE       Last Office Visit:  4/6/2022  Next Office Visit:  7/6/2022  Last Medication Refill:  5/10/22 for both  Jinx File up to date:  6/6/22    *RX updated to reflect   6/9/22 for both fill date*    Tor pt

## 2022-06-07 ENCOUNTER — HOSPITAL ENCOUNTER (OUTPATIENT)
Dept: WOUND CARE | Age: 52
Discharge: HOME | End: 2022-06-07
Payer: MEDICARE

## 2022-06-07 VITALS
HEART RATE: 88 BPM | RESPIRATION RATE: 18 BRPM | DIASTOLIC BLOOD PRESSURE: 82 MMHG | SYSTOLIC BLOOD PRESSURE: 113 MMHG | TEMPERATURE: 97.8 F

## 2022-06-07 DIAGNOSIS — L97.512 NEUROPATHIC ULCER OF RIGHT FOOT WITH FAT LAYER EXPOSED (HCC): ICD-10-CM

## 2022-06-07 DIAGNOSIS — L97.422 NEUROPATHIC ULCER OF HEEL, LEFT, WITH FAT LAYER EXPOSED (HCC): Primary | ICD-10-CM

## 2022-06-07 DIAGNOSIS — F17.200 SMOKING: Chronic | ICD-10-CM

## 2022-06-07 DIAGNOSIS — S71.102D OPEN WOUND OF LEFT THIGH, SUBSEQUENT ENCOUNTER: ICD-10-CM

## 2022-06-07 PROCEDURE — 6370000000 HC RX 637 (ALT 250 FOR IP): Performed by: NURSE PRACTITIONER

## 2022-06-07 PROCEDURE — 97597 DBRDMT OPN WND 1ST 20 CM/<: CPT

## 2022-06-07 PROCEDURE — 16020 DRESS/DEBRID P-THICK BURN S: CPT | Performed by: SURGERY

## 2022-06-07 PROCEDURE — 97597 DBRDMT OPN WND 1ST 20 CM/<: CPT | Performed by: SURGERY

## 2022-06-07 RX ORDER — LIDOCAINE HYDROCHLORIDE 20 MG/ML
JELLY TOPICAL PRN
Status: DISCONTINUED | OUTPATIENT
Start: 2022-06-07 | End: 2022-06-09 | Stop reason: HOSPADM

## 2022-06-07 RX ORDER — GINSENG 100 MG
CAPSULE ORAL ONCE
Status: CANCELLED | OUTPATIENT
Start: 2022-06-07 | End: 2022-06-07

## 2022-06-07 RX ORDER — LIDOCAINE HYDROCHLORIDE 20 MG/ML
JELLY TOPICAL PRN
Status: CANCELLED
Start: 2022-06-07

## 2022-06-07 RX ADMIN — LIDOCAINE HYDROCHLORIDE: 20 JELLY TOPICAL at 13:31

## 2022-06-07 NOTE — WOUND CARE
[address]:     [facility] [address] [facility], [address]  p: [phone] f: [phone]     Ordering Center:     [address]  [address], [place] [address] [address] [phone]  WOUND CARE Dept: [address] RTWRKT [phone]    Patient Information:      Bijal Rawls  3 [facility] [address] [place] [address]   [phone]   : 1970  AGE: 47 y.o. GENDER: female   EPISODE DATE: 2022    Insurance:      PRIMARY INSURANCE:  Plan: MEDICARE PART A AND B  Coverage: MEDICARE  Effective Date: 2015  Group Number: [unfilled]  Subscriber Number: [id number] - (Medicare)    Payor/Plan Subscr  Sex Relation Sub. Ins. ID Effective Group Num   1. [address] [facility] 1970 Female Self [id number] 1/1/15                                    [address] [address]   2.  [place]ICAID [place] -* AJAY HERNANDEZ O 1970 Female Self [id number] 1/15/15                                    [address]        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)    Relevant Medications    lidocaine (XYLOCAINE) 2 % uro-jet    Other Relevant Orders    Initiate Outpatient Wound Care Protocol          WOUNDS REQUIRING DRESSING SUPPLIES:     Wound 22 Ankle Anterior;Right wound 1- right foot neuropathic ulcer (Active)   Wound Image   22 1333   Wound Etiology Other 22 1333   Dressing Status Old drainage noted 06/07/22 1333   Wound Cleansed Soap and water 06/07/22 1333   Dressing/Treatment Hydrofera blue;Silicone border 29/10/08 1430   Wound Length (cm) 0.4 cm 06/07/22 1333   Wound Width (cm) 0.3 cm 06/07/22 1333   Wound Depth (cm) 0.1 cm 06/07/22 1333   Wound Surface Area (cm^2) 0.12 cm^2 06/07/22 1333   Change in Wound Size % (l*w) 88 06/07/22 1333   Wound Volume (cm^3) 0.012 cm^3 06/07/22 1333   Wound Healing % 88 06/07/22 1333   Post-Procedure Length (cm) 0.4 cm 06/07/22 1340   Post-Procedure Width (cm) 0.3 cm 06/07/22 1340   Post-Procedure Depth (cm) 0.1 cm 06/07/22 1340   Post-Procedure Surface Area (cm^2) 0.12 cm^2 06/07/22 1340   Post-Procedure Volume (cm^3) 0.012 cm^3 06/07/22 1340   Wound Assessment Pink/red;Slough 06/07/22 1333   Drainage Amount Moderate 06/07/22 1333   Drainage Description Serosanguinous 06/07/22 1333   Odor None 06/07/22 1333   Audelia-wound Assessment Intact 06/07/22 1333   Margins Defined edges 06/07/22 1333   Wound Thickness Description not for Pressure Injury Full thickness 06/07/22 1333   Number of days: 57       Wound 05/24/22 Thigh Anterior; Left wound 2- left thigh burn (Active)   Wound Image   06/07/22 1333   Wound Etiology Burn 06/07/22 1333   Dressing Status Old drainage noted 06/07/22 1333   Wound Cleansed Soap and water 06/07/22 1333   Wound Length (cm) 0.8 cm 06/07/22 1333   Wound Width (cm) 0.5 cm 06/07/22 1333   Wound Depth (cm) 0.1 cm 06/07/22 1333   Wound Surface Area (cm^2) 0.4 cm^2 06/07/22 1333   Change in Wound Size % (l*w) 60 06/07/22 1333   Wound Volume (cm^3) 0.04 cm^3 06/07/22 1333   Wound Healing % 60 06/07/22 1333   Post-Procedure Length (cm) 0.8 cm 06/07/22 1340   Post-Procedure Width (cm) 0.5 cm 06/07/22 1340   Post-Procedure Depth (cm) 0.1 cm 06/07/22 1340   Post-Procedure Surface Area (cm^2) 0.4 cm^2 06/07/22 1340   Post-Procedure Volume (cm^3) 0.04 cm^3 06/07/22 1340   Wound Assessment Pink/red;Slough 06/07/22 1333   Drainage Amount Moderate 06/07/22 1333   Drainage Description Serosanguinous 06/07/22 1333   Odor None 06/07/22 1333   Audelia-wound Assessment Intact; Blanchable erythema 06/07/22 1333   Margins Defined edges 06/07/22 1333   Wound Thickness Description not for Pressure Injury Full thickness 06/07/22 1333   Number of days: 14       Wound 05/24/22 Heel Left wound 3- left heel wag 1 (Active)   Wound Image   06/07/22 1333   Wound Etiology Diabetic Flores 1 06/07/22 1333   Dressing Status Old drainage noted 06/07/22 1333   Wound Cleansed Soap and water 06/07/22 1333   Offloading for Diabetic Foot Ulcers Other (comment) 06/07/22 1333   Wound Length (cm) 1.1 cm 06/07/22 1333   Wound Width (cm) 1 cm 06/07/22 1333   Wound Depth (cm) 0.1 cm 06/07/22 1333   Wound Surface Area (cm^2) 1.1 cm^2 06/07/22 1333   Change in Wound Size % (l*w) 74.83 06/07/22 1333   Wound Volume (cm^3) 0.11 cm^3 06/07/22 1333   Wound Healing % 75 06/07/22 1333   Post-Procedure Length (cm) 1.1 cm 06/07/22 1340   Post-Procedure Width (cm) 1 cm 06/07/22 1340   Post-Procedure Depth (cm) 0.1 cm 06/07/22 1340   Post-Procedure Surface Area (cm^2) 1.1 cm^2 06/07/22 1340   Post-Procedure Volume (cm^3) 0.11 cm^3 06/07/22 1340   Wound Assessment Pink/red;Dry 06/07/22 1333   Drainage Amount Moderate 06/07/22 1333   Drainage Description Serosanguinous 06/07/22 1333   Odor None 06/07/22 1333   Audelia-wound Assessment Intact 06/07/22 1333   Margins Defined edges 06/07/22 1333   Wound Thickness Description not for Pressure Injury Full thickness 06/07/22 1333   Number of days: 13          Supplies Requested :      WOUND #: 1,2,3   PRIMARY DRESSING:  Xeroform   Mepilex Border     FREQUENCY OF DRESSING CHANGES:  Every other day          ADDITIONAL ITEMS:  [] Gloves Small  [] Gloves Medium [] Gloves Large [] Gloves XLarge  [] Tape 1\" [] Tape 2\" [] Tape 3\"  [] Medipore Tape  [] Saline  [] Skin Prep   [] Adhesive Remover   [] Cotton Tip Applicators   [] Other:    Patient Wound(s) Debrided: [x] Yes if yes please add date 6/7   [] No    Debribement Type: Excisional/Sharp    Is the patient currently on an antibiotic for their Wound(s): [] Yes if yes please add name and dose    [x] No    Patient currently being seen by Home Health: [] Yes   [x] No    Duration for needed supplies:  []15  [x]30  []60  []90 Days    Electronically signed by Rosas Rogers RN on 6/7/2022 at 2:03 PM     Provider Information:      PROVIDER'S NAME: Dr Dianne Virk     NPI: [id number]

## 2022-06-07 NOTE — PROGRESS NOTES
[address]   Progress Note and Procedure Note      Raissa Mora  MEDICAL RECORD NUMBER:  [id number]  AGE: 47 y.o. GENDER: female  : 1970  EPISODE DATE:  2022    Subjective:     Chief Complaint   Patient presents with    Wound Check         HISTORY of PRESENT ILLNESS HPI     Raissa Mora is a 47 y.o. female who presents today for wound/ulcer evaluation.    Wound Context: Pt with L thigh,L heel, and R foot wounds here for eval/treat  Wound/Ulcer Pain Timing/Severity: none  Quality of pain: N/A  Severity:  0 / 10   Modifying Factors: None  Associated Signs/Symptoms: none    Ulcer Identification:  Ulcer Type: pressure  Contributing Factors: chronic pressure, decreased mobility and shear force    Wound: pressure        PAST MEDICAL HISTORY        Diagnosis Date    Ankle wound     and toe wound    Aptyalism 2017    Arthritis 2020    Asthma     Back pain 2017    Binocular vision disorder with diplopia 2017    CAFL [facility]chronic airflow limitation) [facility]) 2017    Hay fever 2017    Headache 2017    MS (multiple sclerosis) ([facility])     Muscle spasticity     Neuropathic ulcer of left foot, limited to breakdown of skin ([address] 4/10/2017    Numbness 2016    Open wound of ankle 2017    Open wound of second toe of left foot 2018    Peripheral vascular disease ([facility])     Seizure ([facility])     occ. related to ms    Sepsis ([facility] 2016    Weakness 2016       PAST SURGICAL HISTORY    Past Surgical History:   Procedure Laterality Date    BACLOFEN PUMP IMPLANTATION      BREAST BIOPSY Right     neg    COLONOSCOPY N/A 2019    Dr Tse Mass ileum through ostomy-patent and healthy appearing anastomosis in the right colon-entero colonic anastomosis-10 yr recall    COLOSTOMY      [address]      Right    FOOT AMPUTATION Left 2019    LEFT TRANSMET AMPUTATION performed by Blanca Dow MD at [address] [place] [address] HYSTERECTOMY      OTHER SURGICAL HISTORY      urostomy    OTHER SURGICAL HISTORY      pain pump    OR INSJ PRPH CTR VAD W/SUBQ PORT UNDER 5 YR N/A 6/26/2018    SINGLE LUMEN PORT PLACEMENT WITH FLUORO performed by Elvis Ellis MD at [address] TOE AMPUTATION      L last toe    TONSILLECTOMY      URETEROTOMY         FAMILY HISTORY    Family History   Problem Relation Age of Onset   [place] Cancer Mother         breast    Colon Cancer Mother     Colon Polyps Mother     Breast Cancer Mother     Diabetes Father     High Blood Pressure Father     Cancer Paternal Aunt         breast    Liver Cancer Paternal Aunt     Heart Disease Paternal Grandmother     Esophageal Cancer Neg Hx     Liver Disease Neg Hx     Rectal Cancer Neg Hx     Stomach Cancer Neg Hx        SOCIAL HISTORY    Social History     Tobacco Use    Smoking status: Current Some Day Smoker     Packs/day: 0.50     Years: 15.00     Pack years: 7.50     Types: Cigarettes    Smokeless tobacco: Never Used   Vaping Use    Vaping Use: Never used   Substance Use Topics    Alcohol use: Yes     Comment: yaritza    Drug use: Yes     Types: Marijuana Alyson Aver)     Comment: daily        ALLERGIES    Allergies   Allergen Reactions    Darvocet [Propoxyphene N-Acetaminophen]      Talking out of her head    Morphine      Category: Allergy;     Morphine And Related Itching and Swelling    Propoxyphene Swelling       MEDICATIONS    Current Outpatient Medications on File Prior to Encounter   Medication Sig Dispense Refill    [START ON 6/9/2022] methylphenidate (RITALIN) 20 MG tablet TAKE 1 TABLET BY MOUTH DAILY 30 tablet 0    [START ON 6/9/2022] HYDROcodone-acetaminophen (NORCO)  MG per tablet TAKE 1 TABLET BY MOUTH 3 TIMES DAILY AS NEEDED FOR PAIN.  REDUCE DOSES AS PAIN BECOMES MANAGEABLE 90 tablet 0    DULoxetine (CYMBALTA) 30 MG extended release capsule Take 1 capsule by mouth nightly 90 capsule 2    pilocarpine (SALAGEN) 7.5 MG tablet TAKE ONE TABLET BY MOUTH 3 TIMES DAILY 90 tablet 3    fluocinolone acetonide (SYNALAR) 0.01 % external solution Apply 10 drops topically at bedtime      pregabalin (LYRICA) 300 MG capsule 1 bid (Patient taking differently: Take 300 mg by mouth daily. 1 bid) 60 capsule 5    nitrofurantoin (MACRODANTIN) 50 MG capsule Take 1 capsule by mouth nightly 90 capsule 1    levETIRAcetam (KEPPRA) 750 MG tablet Take 1 tablet by mouth daily 30 tablet 5    Diapers & Supplies MISC Indications: FX: [id number] Diapers, Chucks, Wipes, and Gloves. 100 each 3    ondansetron (ZOFRAN-ODT) 4 MG disintegrating tablet Take 1 tablet by mouth 3 times daily as needed for Nausea or Vomiting 30 tablet 0    tiZANidine (ZANAFLEX) 4 MG tablet TAKE 3 TABLETS TWICE DAILY AS NEEDED  180 tablet 11    azelastine (ASTELIN) 0.1 % nasal spray USE 2 SPRAYS IN EACH NOSTRIL TWICE DAILY AS DIRECTED 30 mL 0    hydroCHLOROthiazide (HYDRODIURIL) 25 MG tablet TAKE 1 TABLET BY MOUTH ONCE DAILY AS NEEDED 30 tablet 0    cetirizine (ZYRTEC) 10 MG tablet Take 10 mg by mouth daily      Catheters MISC Catheter supplies and lubricant 5 times daily G82.20  to [address] [facility] [phone] each 3    glucose monitoring kit (FREESTYLE) monitoring kit 1 kit by Does not apply route daily 1 kit 0    blood glucose monitor strips Test 1 times a day & as needed for symptoms of irregular blood glucose. 100 strip 3    docusate sodium (COLACE) 100 MG capsule Take 200 mg by mouth as needed for Constipation      PROAIR  (90 Base) MCG/ACT inhaler INHALE 1 PUFF INTO THE LUNGS EVERY 6 HOURS AS NEEDED FOR WHEEZING OR SHORTNESS OF BREATH 8.5 g 5    Heparin Lock Flush (HEPARIN FLUSH, 100 UNITS/ML,) 100 UNIT/ML injection 3 mLs by Intercatheter route every 30 days No every 30 days but every 4-6 weeks.  Flush port with 300 units heparin with 20 cc normal saline for ocrevus infusion for Multiple sclerosis and NS 20CC 1 Syringe 5    ipratropium-albuterol (DUONEB) 0.5-2.5 (3) MG/3ML SOLN nebulizer solution USE 1 UNIT DOSE IN NEBULIZER EVERY 4 TO 6 HOURS AS NEEDED. 450 mL 11    BACLOFEN, PAIN PUMP REFILL CHARGE, by Implant route continuous Indications: every 3 months      Multiple Vitamins-Minerals (THERAPEUTIC MULTIVITAMIN-MINERALS) tablet Take 1 tablet by mouth daily      glucose monitoring kit (FREESTYLE) monitoring kit 1 kit by Does not apply route daily 1 kit 0    blood glucose monitor strips Test 1 times a day & as needed for symptoms of irregular blood glucose. 100 strip 0    baclofen (LIORESAL) 10 MG tablet Take 1 tablet by mouth 2 times daily as needed (muscle spasms) (Patient taking differently: Take 10 mg by mouth 2 times daily as needed (muscle spasms) Indications: using pump ) 60 tablet 5    Sodium Chloride Flush (SALINE FLUSH) 0.9 % SOLN Infuse 20 mLs intravenously every 30 days Not every 30 days but every 4-6 weeks as need with 300 units of heparin to flush port for Infusion of Ocrevus for multiple sclerosis 20 mL 5     No current facility-administered medications on file prior to encounter. REVIEW OF SYSTEMS    A comprehensive review of systems was negative.     Objective:      /82   Pulse 88   Temp 97.8 °F (36.6 °C) (Temporal)   Resp 18     Wt Readings from Last 3 Encounters:   05/24/22 140 lb (63.5 kg)   05/10/22 141 lb (64 kg)   04/26/22 141 lb 1 oz (64 kg)       PHYSICAL EXAM    General Appearance: alert and oriented to person, place and time, well developed and well- nourished, in no acute distress  Skin: warm and dry, no rash or erythema  Head: normocephalic and atraumatic  Eyes: pupils equal, round, and reactive to light, extraocular eye movements intact, conjunctivae normal  ENT: tympanic membrane, external ear and ear canal normal bilaterally, nose without deformity, nasal mucosa and turbinates normal without polyps, lips teeth and gums normal  Neck: supple and non-tender without mass, no thyromegaly or thyroid nodules, no cervical lymphadenopathy  Pulmonary/Chest: clear to auscultation bilaterally- no wheezes, rales or rhonchi, normal air movement, no respiratory distress  Cardiovascular: normal rate, regular rhythm, normal S1 and S2, no murmurs, rubs, clicks, or gallops, distal pulses intact, no carotid bruits  Abdomen: soft, non-tender, non-distended, normal bowel sounds, no masses or organomegaly  Extremities: no cyanosis, clubbing or edema  Musculoskeletal: normal range of motion, no joint swelling, deformity or tenderness      Assessment:      Problem List Items Addressed This Visit     Neuropathic ulcer of heel, left, 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)    Relevant Medications    lidocaine (XYLOCAINE) 2 % uro-jet    Other Relevant Orders    Initiate Outpatient Wound Care Protocol    * (Principal) Open wound of left thigh (Chronic)    Relevant Medications    lidocaine (XYLOCAINE) 2 % uro-jet    Other Relevant Orders    Initiate Outpatient Wound Care Protocol    Smoking (Chronic)    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 Performed by: Karl Carrasco MD    Consent obtained:  Yes    Time out taken:  Yes    Pain Control: Anesthetic  Anesthetic: 2% Lidocaine Gel Topical       Debridement:Non-excisional Debridement    Using curette the wound(s)/ulcer(s) was/were sharply debrided down through and including the removal of epidermis and dermis.         Devitalized Tissue Debrided:  fibrin, biofilm, slough, necrotic/eschar and exudate      Pre Debridement Measurements:  Are located in the Wound/Ulcer Documentation Flow Sheet    Wound/Ulcer #: 1, 2 and 3    Percent of Wound(s)/Ulcer(s) Debrided: Volume (cm^3) 0.04 cm^3 06/07/22 1333   Wound Healing % 60 06/07/22 1333   Post-Procedure Length (cm) 0.8 cm 06/07/22 1340   Post-Procedure Width (cm) 0.5 cm 06/07/22 1340   Post-Procedure Depth (cm) 0.1 cm 06/07/22 1340   Post-Procedure Surface Area (cm^2) 0.4 cm^2 06/07/22 1340   Post-Procedure Volume (cm^3) 0.04 cm^3 06/07/22 1340   Wound Assessment Pink/red;Slough 06/07/22 1333   Drainage Amount Moderate 06/07/22 1333   Drainage Description Serosanguinous 06/07/22 1333   Odor None 06/07/22 1333   Audelia-wound Assessment Intact; Blanchable erythema 06/07/22 1333   Margins Defined edges 06/07/22 1333   Wound Thickness Description not for Pressure Injury Full thickness 06/07/22 1333   Number of days: 13       Wound 05/24/22 Heel Left wound 3- left heel wag 1 (Active)   Wound Image   06/07/22 1333   Wound Etiology Diabetic Flores 1 06/07/22 1333   Dressing Status Old drainage noted 06/07/22 1333   Wound Cleansed Soap and water 06/07/22 1333   Offloading for Diabetic Foot Ulcers Other (comment) 06/07/22 1333   Wound Length (cm) 1.1 cm 06/07/22 1333   Wound Width (cm) 1 cm 06/07/22 1333   Wound Depth (cm) 0.1 cm 06/07/22 1333   Wound Surface Area (cm^2) 1.1 cm^2 06/07/22 1333   Change in Wound Size % (l*w) 74.83 06/07/22 1333   Wound Volume (cm^3) 0.11 cm^3 06/07/22 1333   Wound Healing % 75 06/07/22 1333   Post-Procedure Length (cm) 1.1 cm 06/07/22 1340   Post-Procedure Width (cm) 1 cm 06/07/22 1340   Post-Procedure Depth (cm) 0.1 cm 06/07/22 1340   Post-Procedure Surface Area (cm^2) 1.1 cm^2 06/07/22 1340   Post-Procedure Volume (cm^3) 0.11 cm^3 06/07/22 1340   Wound Assessment Pink/red;Dry 06/07/22 1333   Drainage Amount Small 06/07/22 1333   Drainage Description Serosanguinous 06/07/22 1333   Odor None 06/07/22 1333   Audelia-wound Assessment Intact 06/07/22 1333   Margins Defined edges 06/07/22 1333   Wound Thickness Description not for Pressure Injury Full thickness 06/07/22 1333   Number of days: 13 Estimated Blood Loss:  Minimal    Hemostasis Achieved:  by pressure    Procedural Pain:  0  / 10     Post Procedural Pain:  0 / 10     Response to treatment:  Well tolerated by patient. 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              Electronically signed by Yassine Peoples MD on 6/7/2022 at 1:41 PM

## 2022-06-07 NOTE — DISCHARGE INSTR - COC
Continuity of Care Form    Patient Name: Yamil Oviedo   :  1970  MRN:  [id number]    Admit date:  2022  Discharge date:  ***    Code Status Order: Prior   Advance Directives:      Admitting Physician:  No admitting provider for patient encounter. PCP: Xavier Cottrell MD    Discharging Nurse: [facility] Unit/Room#: No information available for this encounter.   Discharging Unit Phone Number: ***    Emergency Contact:   Extended Emergency Contact Information  Primary Emergency Contact: Adam Kellogg  Address: [address] [place] [address] Dr Guzman, [address] Phone: [phone]  Relation: Other  Secondary Emergency Contact: Savita Charles  [place] Phone: [phone]  Relation: Child  Preferred language: [place]    Past Surgical History:  Past Surgical History:   Procedure Laterality Date    BACLOFEN PUMP IMPLANTATION      BREAST BIOPSY Right     neg    COLONOSCOPY N/A 2019    Dr Ogden Aquas ileum through ostomy-patent and healthy appearing anastomosis in the right colon-entero colonic anastomosis-10 yr recall    COLOSTOMY      FEMUR FRACTURE SURGERY      Right    FOOT AMPUTATION Left 2019    LEFT TRANSMET AMPUTATION performed by Ekta Verdugo MD at [address]      urostomy    OTHER SURGICAL HISTORY      pain pump    VT INSJ PRPH CTR VAD W/SUBQ PORT UNDER 5 YR N/A 2018    SINGLE LUMEN PORT PLACEMENT WITH FLUORO performed by Jennifer Tejada MD at [facility]      L last toe    TONSILLECTOMY      URETEROTOMY         Immunization History:   Immunization History   Administered Date(s) Administered    COVID-19, J&J, PF, 0.5 mL 2021    COVID-19, Moderna, Booster, PF, 50mcg/0.25ml 2021    Influenza Virus Vaccine 2008, 10/13/2008, 10/08/2009, 10/09/2014, 2015, 2016    Influenza, High Dose (Fluzone 65 yrs and older) 2015    Influenza, MDCK Quadv, IM, PF (Flucelvax 2 yrs and older) 12/09/2021    Influenza, Fernando Remi, 6 mo and older, IM, PF (Flulaval, Fluarix) 11/15/2018    Influenza, Quadv, IM, PF (6 mo and older Fluzone, Flulaval, Fluarix, and 3 yrs and older Afluria) 10/17/2019, 10/13/2020    Pneumococcal Polysaccharide (Pypcccxnn49) 10/09/2014, 09/05/2017    Tdap (Boostrix, Adacel) 06/28/2017       Active Problems:  Patient Active Problem List   Diagnosis Code    Muscle spasticity M62.838    Peripheral vascular disease ([facility]) I73.9    Multiple sclerosis ([facility]) G35    Pain in both upper arms M79.621, M79.622    Numbness R20.0    Other fatigue R53.83    Weakness R53.1    Chronic pain G89.29    Hx of seizure disorder Z86.69    Insomnia G47.00    Pressure ulcer of sacral region L89.159    Neck pain M54.2    Seasonal allergic rhinitis J30.2    S/P transmetatarsal amputation of foot, left ([facility]) C57.039    Constipation due to neurogenic bowel K59.00, K59.2    Colostomy present ([facility]) Z93.3    Paraplegia ([facility]) G82.20    Hip pain M25.559    Seizure ([facility]) R56.9    Smoking F17.200    Encounter for care related to vascular access port Z45.2    Urinary incontinence R32    Mixed anxiety and depressive disorder F41.8    Malodorous urine R82.90    Arthritis M19.90    Osteoporosis M81.0    Urinary bladder stone N21.0    Vesicovaginal fistula N82.0    Neuropathic 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 ([address] L97.512    Dyspnea R06.00    Marijuana user F12.90    Neurogenic bladder N31.9    Other specified misadventures during surgical and medical care Y65.8    Presence of other specified devices Z97.8    Renal stone N20.0    Wheelchair bound Z99.3    Pressure injury of sacral region, stage 3 ([address] L89.153    Unspecified severe protein-calorie malnutrition ([address] E43    Neuropathic ulcer of heel, left, with fat layer exposed ([address] L97.422    Open wound of left thigh S71.102A       Isolation/Infection:   Isolation            No Isolation          Patient Infection Status       Infection Onset Added Last Indicated Last Indicated By Review Planned Expiration Resolved Resolved By    MDRO (multi-drug resistant organism)  16 Sotero Perez        2019 Urine:Citrobacter farmeri MDRO and Proteus mirabilis MDRO  2019 Urine culture: Proteus mirabilis MDRO    Resolved    COVID-19 (Rule Out) 21 Respiratory Panel, Molecular, with COVID-19 (Restricted: peds pts or suitable admitted adults) (Ordered)   21 Rule-Out Test Resulted            Nurse Assessment:  Last Vital Signs: /82   Pulse 88   Temp 97.8 °F (36.6 °C) (Temporal)   Resp 18     Last documented pain score (0-10 scale):    Last Weight:   Wt Readings from Last 1 Encounters:   22 140 lb (63.5 kg)     Mental Status:  {IP PT MENTAL STATUS:07588}    IV Access:  { ELIZABETH IV ACCESS:139182144}    Nursing Mobility/ADLs:  Walking   {CHP DME ZZAI:220981368}  Transfer  {CHP DME KRER:223290583}  Bathing  {CHP DME YM}  Dressing  {CHP DME PCU}  Toileting  {CHP DME NCIS:616666035}  Feeding  {CHP DME VMAD:754773424}  Med Admin  {CHP DME YOG}  Med Delivery   { ELIZABETH MED Delivery:992443532}    Wound Care Documentation and Therapy:  Wound 22 Ankle Anterior;Right wound 1- right foot neuropathic ulcer (Active)   Wound Image   22 1333   Wound Etiology Other 22 1333   Dressing Status Old drainage noted 22 1333   Wound Cleansed Soap and water 22 1333   Dressing/Treatment Hydrofera blue;Silicone border  1430   Wound Length (cm) 0.4 cm 22 1333   Wound Width (cm) 0.3 cm 22 1333   Wound Depth (cm) 0.1 cm 22 1333   Wound Surface Area (cm^2) 0.12 cm^2 22 1333   Change in Wound Size % (l*w) 88 22 1333   Wound Volume (cm^3) 0.012 cm^3 22 1333   Wound Healing % 88 22 1333   Post-Procedure Length (cm) 0.4 cm 22 1340   Post-Procedure Width (cm) 0.3 cm 22 1340   Post-Procedure Depth (cm) 0.1 cm 22 134 Post-Procedure Surface Area (cm^2) 0.12 cm^2 06/07/22 1340   Post-Procedure Volume (cm^3) 0.012 cm^3 06/07/22 1340   Wound Assessment Pink/red;Slough 06/07/22 1333   Drainage Amount Moderate 06/07/22 1333   Drainage Description Serosanguinous 06/07/22 1333   Odor None 06/07/22 1333   Audelia-wound Assessment Intact 06/07/22 1333   Margins Defined edges 06/07/22 1333   Wound Thickness Description not for Pressure Injury Full thickness 06/07/22 1333   Number of days: 57       Wound 05/24/22 Thigh Anterior; Left wound 2- left thigh burn (Active)   Wound Image   06/07/22 1333   Wound Etiology Burn 06/07/22 1333   Dressing Status Old drainage noted 06/07/22 1333   Wound Cleansed Soap and water 06/07/22 1333   Wound Length (cm) 0.8 cm 06/07/22 1333   Wound Width (cm) 0.5 cm 06/07/22 1333   Wound Depth (cm) 0.1 cm 06/07/22 1333   Wound Surface Area (cm^2) 0.4 cm^2 06/07/22 1333   Change in Wound Size % (l*w) 60 06/07/22 1333   Wound Volume (cm^3) 0.04 cm^3 06/07/22 1333   Wound Healing % 60 06/07/22 1333   Post-Procedure Length (cm) 0.8 cm 06/07/22 1340   Post-Procedure Width (cm) 0.5 cm 06/07/22 1340   Post-Procedure Depth (cm) 0.1 cm 06/07/22 1340   Post-Procedure Surface Area (cm^2) 0.4 cm^2 06/07/22 1340   Post-Procedure Volume (cm^3) 0.04 cm^3 06/07/22 1340   Wound Assessment Pink/red;Slough 06/07/22 1333   Drainage Amount Moderate 06/07/22 1333   Drainage Description Serosanguinous 06/07/22 1333   Odor None 06/07/22 1333   Audelia-wound Assessment Intact; Blanchable erythema 06/07/22 1333   Margins Defined edges 06/07/22 1333   Wound Thickness Description not for Pressure Injury Full thickness 06/07/22 1333   Number of days: 13       Wound 05/24/22 Heel Left wound 3- left heel wag 1 (Active)   Wound Image   06/07/22 1333   Wound Etiology Diabetic Flores 1 06/07/22 1333   Dressing Status Old drainage noted 06/07/22 1333   Wound Cleansed Soap and water 06/07/22 1333   Offloading for Diabetic Foot Ulcers Other (comment) 22 1333   Wound Length (cm) 1.1 cm 22 1333   Wound Width (cm) 1 cm 22 1333   Wound Depth (cm) 0.1 cm 22 1333   Wound Surface Area (cm^2) 1.1 cm^2 22 1333   Change in Wound Size % (l*w) 74.83 22 1333   Wound Volume (cm^3) 0.11 cm^3 22 1333   Wound Healing % 75 22 1333   Post-Procedure Length (cm) 1.1 cm 22 1340   Post-Procedure Width (cm) 1 cm 22 1340   Post-Procedure Depth (cm) 0.1 cm 22 1340   Post-Procedure Surface Area (cm^2) 1.1 cm^2 22 1340   Post-Procedure Volume (cm^3) 0.11 cm^3 22 1340   Wound Assessment Pink/red;Dry 22 1333   Drainage Amount Moderate 22 1333   Drainage Description Serosanguinous 22 1333   Odor None 22 1333   Audelia-wound Assessment Intact 22 1333   Margins Defined edges 22 1333   Wound Thickness Description not for Pressure Injury Full thickness 22 1333   Number of days: 13        Elimination:  Continence: Bowel: {YES / AU:67711}  Bladder: {YES / B}  Urinary Catheter: {Urinary Catheter:971664178}   Colostomy/Ileostomy/Ileal Conduit: {YES / X}       Date of Last BM: ***  No intake or output data in the 24 hours ending 22 1401  No intake/output data recorded.     Safety Concerns:     [employer] Safety Concerns:352896403}    Impairments/Disabilities:      [employer] Impairments/Disabilities:222078605}    Nutrition Therapy:  Current Nutrition Therapy:   [employer] Diet List:038180666}    Routes of Feeding: {CHP DME Other Feedings:158364209}  Liquids: {Slp liquid thickness:93635}  Daily Fluid Restriction: {CHP DME Yes amt example:133612435}  Last Modified Barium Swallow with Video (Video Swallowing Test): {Done Not Done JSQV:216467566}    Treatments at the Time of Hospital Discharge:   Respiratory Treatments: ***  Oxygen Therapy:  {Therapy; copd oxygen:22414}  Ventilator:    {MH CC Vent UJTV:477494832}    Rehab Therapies: {THERAPEUTIC INTERVENTION:0199146910}  Weight Bearing Status/Restrictions: 50Adalberto Pierce CC Weight Bearin}  Other Medical Equipment (for information only, NOT a DME order):  {EQUIPMENT:625094327}  Other Treatments: ***    Patient's personal belongings (please select all that are sent with patient):  {CHP DME Belongings:813218923}    RN SIGNATURE:  {Esignature:122234027}    CASE MANAGEMENT/SOCIAL WORK SECTION    Inpatient Status Date: ***    Readmission Risk Assessment Score:  Readmission Risk              Risk of Unplanned Readmission:  0           Discharging to Facility/ Agency   Name:   Address:  Phone:  Fax:    Dialysis Facility (if applicable)   Name:  Address:  Dialysis Schedule:  Phone:  Fax:    / signature: {Esignature:834895743}    PHYSICIAN SECTION    Prognosis: {Prognosis:3402198228}    Condition at Discharge: Jenae Pierce Patient Condition:830049232}    Rehab Potential (if transferring to Rehab): {Prognosis:4792286193}    Recommended Labs or Other Treatments After Discharge: ***    Physician Certification: I certify the above information and transfer of Srini Sierra  is necessary for the continuing treatment of the diagnosis listed and that she requires {Admit to Appropriate Level of Care:70244} for {GREATER/LESS:498280342} 30 days.      Update Admission H&P: {CHP DME Changes in NLCIQ:884525103}    PHYSICIAN SIGNATURE:  {Esignature:448738647}

## 2022-06-21 ENCOUNTER — HOSPITAL ENCOUNTER (OUTPATIENT)
Dept: WOUND CARE | Age: 52
Discharge: HOME | End: 2022-06-21
Payer: MEDICARE

## 2022-06-21 VITALS
TEMPERATURE: 97.7 F | HEART RATE: 92 BPM | BODY MASS INDEX: 20.59 KG/M2 | SYSTOLIC BLOOD PRESSURE: 105 MMHG | HEIGHT: 69 IN | WEIGHT: 139 LBS | RESPIRATION RATE: 18 BRPM | DIASTOLIC BLOOD PRESSURE: 74 MMHG

## 2022-06-21 DIAGNOSIS — G82.20 PARAPLEGIA (HCC): Chronic | ICD-10-CM

## 2022-06-21 DIAGNOSIS — L97.512 NEUROPATHIC ULCER OF RIGHT FOOT WITH FAT LAYER EXPOSED (HCC): Primary | Chronic | ICD-10-CM

## 2022-06-21 DIAGNOSIS — L97.422 NEUROPATHIC ULCER OF HEEL, LEFT, WITH FAT LAYER EXPOSED (HCC): Chronic | ICD-10-CM

## 2022-06-21 DIAGNOSIS — F17.200 SMOKING: Chronic | ICD-10-CM

## 2022-06-21 DIAGNOSIS — S71.102D OPEN WOUND OF LEFT THIGH, SUBSEQUENT ENCOUNTER: Chronic | ICD-10-CM

## 2022-06-21 PROCEDURE — 97597 DBRDMT OPN WND 1ST 20 CM/<: CPT | Performed by: SURGERY

## 2022-06-21 PROCEDURE — 6370000000 HC RX 637 (ALT 250 FOR IP): Performed by: NURSE PRACTITIONER

## 2022-06-21 PROCEDURE — 97597 DBRDMT OPN WND 1ST 20 CM/<: CPT

## 2022-06-21 RX ORDER — LIDOCAINE HYDROCHLORIDE 20 MG/ML
JELLY TOPICAL PRN
Status: CANCELLED
Start: 2022-06-21

## 2022-06-21 RX ORDER — LIDOCAINE HYDROCHLORIDE 20 MG/ML
JELLY TOPICAL PRN
Status: DISCONTINUED | OUTPATIENT
Start: 2022-06-21 | End: 2022-06-23 | Stop reason: HOSPADM

## 2022-06-21 RX ORDER — GINSENG 100 MG
CAPSULE ORAL ONCE
Status: CANCELLED | OUTPATIENT
Start: 2022-06-21 | End: 2022-06-21
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
## 2022-06-21 NOTE — PLAN OF CARE
Problem: Chronic Conditions and Co-morbidities  Goal: Patient's chronic conditions and co-morbidity symptoms are monitored and maintained or improved  Outcome: Progressing     Problem: Discharge Planning  Goal: Discharge to home or other facility with appropriate resources  Outcome: Progressing     Problem: Wound:  Goal: Will show signs of wound healing; wound closure and no evidence of infection  Description: Will show signs of wound healing; wound closure and no evidence of infection  Outcome: Progressing     Problem: Falls - Risk of:  Goal: Will remain free from falls  Description: Will remain free from falls  Outcome: Progressing     Problem: Blood Glucose:  Goal: Ability to maintain appropriate glucose levels will improve  Description: Ability to maintain appropriate glucose levels will improve  Outcome: Progressing

## 2022-07-05 DIAGNOSIS — M62.838 MUSCLE SPASTICITY: ICD-10-CM

## 2022-07-05 DIAGNOSIS — M79.622 PAIN IN BOTH UPPER ARMS: ICD-10-CM

## 2022-07-05 DIAGNOSIS — M79.621 PAIN IN BOTH UPPER ARMS: ICD-10-CM

## 2022-07-05 DIAGNOSIS — M54.2 PAIN, NECK: ICD-10-CM

## 2022-07-05 DIAGNOSIS — G35 MS (MULTIPLE SCLEROSIS) (HCC): ICD-10-CM

## 2022-07-05 DIAGNOSIS — R53.83 OTHER FATIGUE: ICD-10-CM

## 2022-07-05 NOTE — TELEPHONE ENCOUNTER
Requested Prescriptions     Pending Prescriptions Disp Refills    HYDROcodone-acetaminophen (NORCO)  MG per tablet [Pharmacy Med Name: HYDROCODONE BITARTRATE/ACETAMINOPHEN 325MG-10MG TABLET] 90 tablet 0     Sig: TAKE 1 TABLET BY MOUTH 3 TIMES DAILY AS NEEDED FOR PAIN.  REDUCE DOSES AS PAIN BECOMES MANAGEABLE    methylphenidate (RITALIN) 20 MG tablet [Pharmacy Med Name: METHYLPHENIDATE HYDROCHLORIDE 20MG TABLET] 30 tablet 0     Sig: TAKE 1 TABLET BY MOUTH DAILY       Last Office Visit:  4/6/2022  Next Office Visit:  7/6/2022  Last Medication Refill:  Both 6/9/22  Jenna Byrd up to date:  6/6/22    *RX updated to reflect  Both 7/8/22  fill date*

## 2022-07-06 RX ORDER — HYDROCODONE BITARTRATE AND ACETAMINOPHEN 10; 325 MG/1; MG/1
TABLET ORAL
Qty: 90 TABLET | Refills: 0 | Status: SHIPPED | OUTPATIENT
Start: 2022-07-08 | End: 2022-08-08

## 2022-07-06 RX ORDER — METHYLPHENIDATE HYDROCHLORIDE 20 MG/1
TABLET ORAL
Qty: 30 TABLET | Refills: 0 | Status: SHIPPED | OUTPATIENT
Start: 2022-07-08 | End: 2022-08-08

## 2022-07-12 ENCOUNTER — HOSPITAL ENCOUNTER (OUTPATIENT)
Dept: WOUND CARE | Age: 52
Discharge: HOME | End: 2022-07-12
Payer: MEDICARE

## 2022-07-12 VITALS
SYSTOLIC BLOOD PRESSURE: 95 MMHG | WEIGHT: 139 LBS | HEIGHT: 69 IN | DIASTOLIC BLOOD PRESSURE: 62 MMHG | HEART RATE: 78 BPM | BODY MASS INDEX: 20.59 KG/M2 | TEMPERATURE: 98 F | RESPIRATION RATE: 18 BRPM

## 2022-07-12 DIAGNOSIS — S71.102D OPEN WOUND OF LEFT THIGH, SUBSEQUENT ENCOUNTER: ICD-10-CM

## 2022-07-12 DIAGNOSIS — L97.512 NEUROPATHIC ULCER OF RIGHT FOOT WITH FAT LAYER EXPOSED (HCC): ICD-10-CM

## 2022-07-12 DIAGNOSIS — G82.20 PARAPLEGIA (HCC): Chronic | ICD-10-CM

## 2022-07-12 DIAGNOSIS — L97.422 NEUROPATHIC ULCER OF HEEL, LEFT, WITH FAT LAYER EXPOSED (HCC): Primary | ICD-10-CM

## 2022-07-12 PROBLEM — F17.200 SMOKING: Status: ACTIVE | Noted: 2020-08-25

## 2022-07-12 PROBLEM — S71.102A OPEN WOUND OF LEFT THIGH: Status: ACTIVE | Noted: 2022-05-24

## 2022-07-12 PROCEDURE — 99213 OFFICE O/P EST LOW 20 MIN: CPT

## 2022-07-12 PROCEDURE — 99212 OFFICE O/P EST SF 10 MIN: CPT | Performed by: SURGERY

## 2022-07-12 RX ORDER — LIDOCAINE HYDROCHLORIDE 20 MG/ML
JELLY TOPICAL PRN
Status: DISCONTINUED | OUTPATIENT
Start: 2022-07-12 | End: 2022-07-14 | Stop reason: HOSPADM

## 2022-07-12 RX ORDER — GINSENG 100 MG
CAPSULE ORAL ONCE
Status: CANCELLED | OUTPATIENT
Start: 2022-07-12 | End: 2022-07-12

## 2022-07-12 RX ORDER — LIDOCAINE HYDROCHLORIDE 20 MG/ML
JELLY TOPICAL PRN
Status: CANCELLED
Start: 2022-07-12

## 2022-07-12 NOTE — PLAN OF CARE
Problem: Chronic Conditions and Co-morbidities  Goal: Patient's chronic conditions and co-morbidity symptoms are monitored and maintained or improved  Outcome: Progressing     Problem: Discharge Planning  Goal: Discharge to home or other facility with appropriate resources  Outcome: Progressing     Problem: Wound:  Goal: Will show signs of wound healing; wound closure and no evidence of infection  Description: Will show signs of wound healing; wound closure and no evidence of infection  Outcome: Progressing     Problem: Smoking cessation:  Goal: Ability to formulate a plan to maintain a tobacco-free life will be supported  Description: Ability to formulate a plan to maintain a tobacco-free life will be supported  Outcome: Progressing     Problem: Falls - Risk of:  Goal: Will remain free from falls  Description: Will remain free from falls  Outcome: Progressing     Problem: Blood Glucose:  Goal: Ability to maintain appropriate glucose levels will improve  Description: Ability to maintain appropriate glucose levels will improve  Outcome: Progressing

## 2022-07-12 NOTE — PROGRESS NOTES
[address]   Progress Note and Procedure Note      Court Chan  MEDICAL RECORD NUMBER:  [id number]  AGE: 47 y.o. GENDER: female  : 1970  EPISODE DATE:  2022    Subjective:     Chief Complaint   Patient presents with    Wound Check     pt states doing well         HISTORY of PRESENT ILLNESS HPI     Court Chan is a 47 y.o. female who presents today for wound/ulcer evaluation.    History of Wound Context: Pt with R foot wound here for eval/treat  Wound/Ulcer Pain Timing/Severity: none  Quality of pain: N/A  Severity:  0 / 10   Modifying Factors: None  Associated Signs/Symptoms: none    Ulcer Identification:  Ulcer Type: pressure  Contributing Factors: chronic pressure, decreased mobility and shear force    Wound: pressure        PAST MEDICAL HISTORY        Diagnosis Date    Ankle wound     and toe wound    Aptyalism 2017    Arthritis 2020    Asthma     Back pain 2017    Binocular vision disorder with diplopia 2017    CAFL (chronic airflow limitation) ([facility]) 2017    Hay fever 2017    Headache 2017    MS (multiple sclerosis) ([address]     Muscle spasticity     Neuropathic ulcer of left foot, limited to breakdown of skin ([address] 4/10/2017    Numbness 2016    Open wound of ankle 2017    Open wound of second toe of left foot 2018    Peripheral vascular disease ([address]     Seizure ([address]     occ. related to ms    Sepsis ([address] 2016    Weakness 2016       PAST SURGICAL HISTORY    Past Surgical History:   Procedure Laterality Date    BACLOFEN PUMP IMPLANTATION      BREAST BIOPSY Right     neg    COLONOSCOPY N/A 2019    Dr Worrell Pals ileum through ostomy-patent and healthy appearing anastomosis in the right colon-entero colonic anastomosis-10 yr recall    COLOSTOMY      FEMUR FRACTURE SURGERY      Right    FOOT AMPUTATION Left 2019    LEFT TRANSMET AMPUTATION performed by Yefri Victor MD at [facility] OR    HYSTERECTOMY (CERVIX STATUS UNKNOWN)      OTHER SURGICAL HISTORY      urostomy    OTHER SURGICAL HISTORY      pain pump    UT INSJ PRPH CTR VAD W/SUBQ PORT UNDER 5 YR N/A 6/26/2018    SINGLE LUMEN PORT PLACEMENT WITH FLUORO performed by Paul Schneider MD at [address] TOE AMPUTATION      L last toe    TONSILLECTOMY      URETEROTOMY         FAMILY HISTORY    Family History   Problem Relation Age of Onset   Lina Coral Cancer Mother         breast    Colon Cancer Mother     Colon Polyps Mother     Breast Cancer Mother     Diabetes Father     High Blood Pressure Father     Cancer Paternal Aunt         breast    Liver Cancer Paternal Aunt     Heart Disease Paternal Grandmother     Esophageal Cancer Neg Hx     Liver Disease Neg Hx     Rectal Cancer Neg Hx     Stomach Cancer Neg Hx        SOCIAL HISTORY    Social History     Tobacco Use    Smoking status: Current Some Day Smoker     Packs/day: 0.50     Years: 15.00     Pack years: 7.50     Types: Cigarettes    Smokeless tobacco: Never Used   Vaping Use    Vaping Use: Never used   Substance Use Topics    Alcohol use: Yes     Comment: yaritza    Drug use: Yes     Types: Marijuana (Weed)     Comment: daily        ALLERGIES    Allergies   Allergen Reactions    Darvocet [Propoxyphene N-Acetaminophen]      Talking out of her head    Morphine      Category: Allergy;     Morphine And Related Itching and Swelling    Propoxyphene Swelling       MEDICATIONS    Current Outpatient Medications on File Prior to Encounter   Medication Sig Dispense Refill    HYDROcodone-acetaminophen (NORCO)  MG per tablet TAKE 1 TABLET BY MOUTH 3 TIMES DAILY AS NEEDED FOR PAIN.  REDUCE DOSES AS PAIN BECOMES MANAGEABLE 90 tablet 0    methylphenidate (RITALIN) 20 MG tablet TAKE 1 TABLET BY MOUTH DAILY 30 tablet 0    DULoxetine (CYMBALTA) 30 MG extended release capsule Take 1 capsule by mouth nightly 90 capsule 2    pilocarpine (SALAGEN) 7.5 MG tablet TAKE ONE TABLET BY MOUTH 3 TIMES DAILY 90 tablet 3    fluocinolone acetonide (SYNALAR) 0.01 % external solution Apply 10 drops topically at bedtime      pregabalin (LYRICA) 300 MG capsule 1 bid (Patient taking differently: Take 300 mg by mouth daily. 1 bid) 60 capsule 5    nitrofurantoin (MACRODANTIN) 50 MG capsule Take 1 capsule by mouth nightly 90 capsule 1    levETIRAcetam (KEPPRA) 750 MG tablet Take 1 tablet by mouth daily 30 tablet 5    Diapers & Supplies MISC Indications: FX: [id number] Diapers, Chucks, Wipes, and Gloves. 100 each 3    ondansetron (ZOFRAN-ODT) 4 MG disintegrating tablet Take 1 tablet by mouth 3 times daily as needed for Nausea or Vomiting 30 tablet 0    tiZANidine (ZANAFLEX) 4 MG tablet TAKE 3 TABLETS TWICE DAILY AS NEEDED  180 tablet 11    azelastine (ASTELIN) 0.1 % nasal spray USE 2 SPRAYS IN EACH NOSTRIL TWICE DAILY AS DIRECTED 30 mL 0    hydroCHLOROthiazide (HYDRODIURIL) 25 MG tablet TAKE 1 TABLET BY MOUTH ONCE DAILY AS NEEDED 30 tablet 0    cetirizine (ZYRTEC) 10 MG tablet Take 10 mg by mouth daily      Catheters MISC Catheter supplies and lubricant 5 times daily G82.20  to [address] [facility] [phone] 450 each 3    glucose monitoring kit (FREESTYLE) monitoring kit 1 kit by Does not apply route daily 1 kit 0    blood glucose monitor strips Test 1 times a day & as needed for symptoms of irregular blood glucose. 100 strip 3    docusate sodium (COLACE) 100 MG capsule Take 200 mg by mouth as needed for Constipation      PROAIR  (90 Base) MCG/ACT inhaler INHALE 1 PUFF INTO THE LUNGS EVERY 6 HOURS AS NEEDED FOR WHEEZING OR SHORTNESS OF BREATH 8.5 g 5    Heparin Lock Flush (HEPARIN FLUSH, 100 UNITS/ML,) 100 UNIT/ML injection 3 mLs by Intercatheter route every 30 days No every 30 days but every 4-6 weeks.  Flush port with 300 units heparin with 20 cc normal saline for ocrevus infusion for Multiple sclerosis and NS 20CC 1 Syringe 5    ipratropium-albuterol (DUONEB) 0.5-2.5 (3) MG/3ML SOLN nebulizer solution USE 1 UNIT DOSE IN NEBULIZER EVERY 4 TO 6 HOURS AS NEEDED. 450 mL 11    BACLOFEN, PAIN PUMP REFILL CHARGE, by Implant route continuous Indications: every 3 months      Multiple Vitamins-Minerals (THERAPEUTIC MULTIVITAMIN-MINERALS) tablet Take 1 tablet by mouth daily      glucose monitoring kit (FREESTYLE) monitoring kit 1 kit by Does not apply route daily 1 kit 0    blood glucose monitor strips Test 1 times a day & as needed for symptoms of irregular blood glucose. 100 strip 0    baclofen (LIORESAL) 10 MG tablet Take 1 tablet by mouth 2 times daily as needed (muscle spasms) (Patient taking differently: Take 10 mg by mouth 2 times daily as needed (muscle spasms) Indications: using pump ) 60 tablet 5    Sodium Chloride Flush (SALINE FLUSH) 0.9 % SOLN Infuse 20 mLs intravenously every 30 days Not every 30 days but every 4-6 weeks as need with 300 units of heparin to flush port for Infusion of Ocrevus for multiple sclerosis 20 mL 5     No current facility-administered medications on file prior to encounter. REVIEW OF SYSTEMS    A comprehensive review of systems was negative.     Objective:      BP 95/62   Pulse 78   Temp 98 °F (36.7 °C) (Temporal)   Resp 18   Ht 5' 9\" (1.753 m)   Wt 139 lb (63 kg)   BMI 20.53 kg/m²     Wt Readings from Last 3 Encounters:   07/12/22 139 lb (63 kg)   06/21/22 139 lb (63 kg)   05/24/22 140 lb (63.5 kg)       PHYSICAL EXAM    General Appearance: alert and oriented to person, place and time, well developed and well- nourished, in no acute distress  Skin: warm and dry, no rash or erythema  Head: normocephalic and atraumatic  Eyes: pupils equal, round, and reactive to light, extraocular eye movements intact, conjunctivae normal  ENT: tympanic membrane, external ear and ear canal normal bilaterally, nose without deformity, nasal mucosa and turbinates normal without polyps, lips teeth and gums normal  Neck: supple and non-tender without mass, no thyromegaly or thyroid nodules, no cervical lymphadenopathy  Pulmonary/Chest: clear to auscultation bilaterally- no wheezes, rales or rhonchi, normal air movement, no respiratory distress  Cardiovascular: normal rate, regular rhythm, normal S1 and S2, no murmurs, rubs, clicks, or gallops, distal pulses intact, no carotid bruits  Abdomen: soft, non-tender, non-distended, normal bowel sounds, no masses or organomegaly  Extremities: no cyanosis, clubbing or edema      Assessment:      Patient Active Problem List   Diagnosis Code    Muscle spasticity M62.838    Peripheral vascular disease ([facility]) I73.9    Multiple sclerosis ([facility]) G35    Pain in both upper arms M79.621, M79.622    Numbness R20.0    Other fatigue R53.83    Weakness R53.1    Chronic pain G89.29    Hx of seizure disorder Z86.69    Insomnia G47.00    Pressure ulcer of sacral region L89.159    Neck pain M54.2    Seasonal allergic rhinitis J30.2    S/P transmetatarsal amputation of foot, left ([facility]) Q28.848    Constipation due to neurogenic bowel K59.00, K59.2    Colostomy present ([facility]) Z93.3    Paraplegia ([facility]) G82.20    Hip pain M25.559    Seizure ([facility]) R56.9    Smoking F17.200    Encounter for care related to vascular access port Z45.2    Urinary incontinence R32    Mixed anxiety and depressive disorder F41.8    Malodorous urine R82.90    Arthritis M19.90    Osteoporosis M81.0    Urinary bladder stone N21.0    Vesicovaginal fistula N82.0    Neuropathic 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 ([address] L97.512    Dyspnea R06.00    Marijuana user F12.90    Neurogenic bladder N31.9    Other specified misadventures during surgical and medical care Y65.8    Presence of other specified devices Z97.8    Renal stone N20.0    Wheelchair bound Z99.3    Pressure injury of sacral region, stage 3 ([facility]) L89.153    Unspecified severe protein-calorie malnutrition ([facility]) E43    Neuropathic ulcer of heel, left, with fat layer exposed ([address] L97.422    Open wound of left thigh S71.102A             Wound 04/11/22 Ankle Anterior;Right wound 1- right foot neuropathic ulcer (Active)   Wound Image   07/12/22 1351   Wound Etiology Other 07/12/22 1351   Dressing Status Old drainage noted 07/12/22 1351   Wound Cleansed Soap and water 07/12/22 1351   Dressing/Treatment Xeroform;Silicone border 64/35/28 1355   Wound Length (cm) 0 cm 07/12/22 1351   Wound Width (cm) 0 cm 07/12/22 1351   Wound Depth (cm) 0 cm 07/12/22 1351   Wound Surface Area (cm^2) 0 cm^2 07/12/22 1351   Change in Wound Size % (l*w) 100 07/12/22 1351   Wound Volume (cm^3) 0 cm^3 07/12/22 1351   Wound Healing % 100 07/12/22 1351   Post-Procedure Length (cm) 0.1 cm 06/21/22 1345   Post-Procedure Width (cm) 0.2 cm 06/21/22 1345   Post-Procedure Depth (cm) 0.1 cm 06/21/22 1345   Post-Procedure Surface Area (cm^2) 0.02 cm^2 06/21/22 1345   Post-Procedure Volume (cm^3) 0.002 cm^3 06/21/22 1345   Distance Tunneling (cm) 0 cm 07/12/22 1351   Tunneling Position ___ O'Clock 0 07/12/22 1351   Undermining Starts ___ O'Clock 0 07/12/22 1351   Undermining Ends___ O'Clock 0 07/12/22 1351   Undermining Maxium Distance (cm) 0 07/12/22 1351   Wound Assessment [place]/red;[place] 07/12/22 1351   Drainage Amount Scant 07/12/22 1351   Drainage Description Serosanguinous 07/12/22 1351   Odor None 07/12/22 1351   Audelia-wound Assessment Intact 07/12/22 1351   Margins Defined edges 07/12/22 1351   Wound Thickness Description not for Pressure Injury Full thickness 07/12/22 1351   Number of days: 92             Plan:     Problem List Items Addressed This Visit     Paraplegia ([facility] (Chronic)    * (Principal) Neuropathic 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 RTO PRN      Treatment Note please see attached Discharge Instructions    In my professional opinion this patient would benefit from HBO Therapy: No    Written patient dismissal instructions given to patient and signed by patient or POA. Discharge [address] and Hyperbaric Oxygen Therapy   Physician Orders and Discharge Instructions  [address] [facility] Maria Stein  Corine Esposito  Telephone: [phone]    NAME:  Palmira Sis:  1970  MEDICAL RECORD NUMBER:  [id number]  DATE:  7/12/2022    Discharge condition: Stable    Discharge to: Home    Left via:Private automobile    Accompanied by: caregiver       ECF/HHA:     Healed today      1) Moisturize your  the right foot with a good thick cream like Eucerin Cream, Aquaphor or Cocoa Butter (in a jar) at least twice daily. 2) Elevate legs when sitting as tolerated. 3) Avoid standing for long periods of time. 4) Walking is good. Push yourself just past where the pain tells you to stop. 5) Be very careful with your legs. Often these are caused by a bump or scrape that doesn't heal well. Wear pads or shin guards if necessary. Wear a protective good fitting shoe and a thick white cotton sock. 6) If you smoke, stop. 7) You may need a follow up appointment at [address] [place] [address].     Stop smoking! Wear offloading heel boot- place lambs wool between dressing and boot strap to protect     [facility] follow up visit ____________as needed_________________  (Please note your next appointment above and if you are unable to keep, kindly give a 24 hour notice.  Thank you.)          If you experience any of the following, please call the [facility] during business hours:    * Increase in Pain  * Temperature over 101  * Increase in drainage from your wound  * Drainage with a foul odor  * Bleeding  * Increase in swelling  * Need for compression bandage changes due to slippage, breakthrough drainage. If you need medical attention outside of the business hours of the [facility] please contact your PCP or go to the nearest emergency room.           Electronically signed by Charla Muhammad MD on 7/12/2022 at 5:55 PM

## 2022-07-19 ENCOUNTER — OFFICE VISIT (OUTPATIENT)
Dept: INTERNAL MEDICINE | Age: 52
End: 2022-07-19
Payer: MEDICARE

## 2022-07-19 VITALS
OXYGEN SATURATION: 99 % | DIASTOLIC BLOOD PRESSURE: 60 MMHG | BODY MASS INDEX: 20.53 KG/M2 | SYSTOLIC BLOOD PRESSURE: 118 MMHG | HEART RATE: 68 BPM | HEIGHT: 69 IN

## 2022-07-19 DIAGNOSIS — Z91.09 ENVIRONMENTAL ALLERGIES: ICD-10-CM

## 2022-07-19 DIAGNOSIS — E55.9 VITAMIN D DEFICIENCY: ICD-10-CM

## 2022-07-19 DIAGNOSIS — R73.9 HYPERGLYCEMIA: ICD-10-CM

## 2022-07-19 DIAGNOSIS — R25.2 SPASTICITY: ICD-10-CM

## 2022-07-19 DIAGNOSIS — G35 MULTIPLE SCLEROSIS (HCC): ICD-10-CM

## 2022-07-19 DIAGNOSIS — L97.512 NEUROPATHIC ULCER OF RIGHT FOOT WITH FAT LAYER EXPOSED (HCC): Chronic | ICD-10-CM

## 2022-07-19 DIAGNOSIS — I10 PRIMARY HYPERTENSION: ICD-10-CM

## 2022-07-19 DIAGNOSIS — R11.0 CHRONIC NAUSEA: ICD-10-CM

## 2022-07-19 DIAGNOSIS — N89.8 VAGINAL DISCHARGE: ICD-10-CM

## 2022-07-19 DIAGNOSIS — F32.89 OTHER DEPRESSION: ICD-10-CM

## 2022-07-19 DIAGNOSIS — Z00.00 ROUTINE ADULT HEALTH MAINTENANCE: Primary | ICD-10-CM

## 2022-07-19 DIAGNOSIS — Z29.8 NEED FOR PROPHYLAXIS AGAINST URINARY TRACT INFECTION: ICD-10-CM

## 2022-07-19 DIAGNOSIS — Z87.891 SMOKING HISTORY: ICD-10-CM

## 2022-07-19 DIAGNOSIS — Z23 NEED FOR VACCINATION AGAINST STREPTOCOCCUS PNEUMONIAE: ICD-10-CM

## 2022-07-19 DIAGNOSIS — E78.5 HYPERLIPIDEMIA, UNSPECIFIED HYPERLIPIDEMIA TYPE: ICD-10-CM

## 2022-07-19 DIAGNOSIS — R40.0 DAYTIME SOMNOLENCE: ICD-10-CM

## 2022-07-19 DIAGNOSIS — G40.909 SEIZURE DISORDER (HCC): ICD-10-CM

## 2022-07-19 DIAGNOSIS — R68.2 DRY MOUTH: ICD-10-CM

## 2022-07-19 DIAGNOSIS — M79.2 NEUROPATHIC PAIN: ICD-10-CM

## 2022-07-19 DIAGNOSIS — M54.50 CHRONIC LOW BACK PAIN, UNSPECIFIED BACK PAIN LATERALITY, UNSPECIFIED WHETHER SCIATICA PRESENT: ICD-10-CM

## 2022-07-19 DIAGNOSIS — Z12.31 ENCOUNTER FOR SCREENING MAMMOGRAM FOR MALIGNANT NEOPLASM OF BREAST: ICD-10-CM

## 2022-07-19 DIAGNOSIS — G89.29 CHRONIC LOW BACK PAIN, UNSPECIFIED BACK PAIN LATERALITY, UNSPECIFIED WHETHER SCIATICA PRESENT: ICD-10-CM

## 2022-07-19 PROCEDURE — G0009 ADMIN PNEUMOCOCCAL VACCINE: HCPCS | Performed by: INTERNAL MEDICINE

## 2022-07-19 PROCEDURE — 90732 PPSV23 VACC 2 YRS+ SUBQ/IM: CPT | Performed by: INTERNAL MEDICINE

## 2022-07-19 PROCEDURE — 3017F COLORECTAL CA SCREEN DOC REV: CPT | Performed by: INTERNAL MEDICINE

## 2022-07-19 PROCEDURE — G0439 PPPS, SUBSEQ VISIT: HCPCS | Performed by: INTERNAL MEDICINE

## 2022-07-19 RX ORDER — ONDANSETRON 4 MG/1
4 TABLET, ORALLY DISINTEGRATING ORAL 3 TIMES DAILY PRN
Qty: 30 TABLET | Refills: 0 | Status: ON HOLD | OUTPATIENT
Start: 2022-07-19

## 2022-07-19 RX ORDER — HYDROCHLOROTHIAZIDE 25 MG/1
TABLET ORAL
Qty: 30 TABLET | Refills: 0 | Status: ON HOLD | OUTPATIENT
Start: 2022-07-19

## 2022-07-19 ASSESSMENT — PATIENT HEALTH QUESTIONNAIRE - PHQ9
SUM OF ALL RESPONSES TO PHQ QUESTIONS 1-9: 6
SUM OF ALL RESPONSES TO PHQ QUESTIONS 1-9: 6
3. TROUBLE FALLING OR STAYING ASLEEP: 0
1. LITTLE INTEREST OR PLEASURE IN DOING THINGS: 0
SUM OF ALL RESPONSES TO PHQ9 QUESTIONS 1 & 2: 0
7. TROUBLE CONCENTRATING ON THINGS, SUCH AS READING THE NEWSPAPER OR WATCHING TELEVISION: 3
SUM OF ALL RESPONSES TO PHQ QUESTIONS 1-9: 6
4. FEELING TIRED OR HAVING LITTLE ENERGY: 3
5. POOR APPETITE OR OVEREATING: 0
SUM OF ALL RESPONSES TO PHQ QUESTIONS 1-9: 6
10. IF YOU CHECKED OFF ANY PROBLEMS, HOW DIFFICULT HAVE THESE PROBLEMS MADE IT FOR YOU TO DO YOUR WORK, TAKE CARE OF THINGS AT HOME, OR GET ALONG WITH OTHER PEOPLE: 0
2. FEELING DOWN, DEPRESSED OR HOPELESS: 0
9. THOUGHTS THAT YOU WOULD BE BETTER OFF DEAD, OR OF HURTING YOURSELF: 0
6. FEELING BAD ABOUT YOURSELF - OR THAT YOU ARE A FAILURE OR HAVE LET YOURSELF OR YOUR FAMILY DOWN: 0
8. MOVING OR SPEAKING SO SLOWLY THAT OTHER PEOPLE COULD HAVE NOTICED. OR THE OPPOSITE, BEING SO FIGETY OR RESTLESS THAT YOU HAVE BEEN MOVING AROUND A LOT MORE THAN USUAL: 0

## 2022-07-19 ASSESSMENT — LIFESTYLE VARIABLES: HOW OFTEN DO YOU HAVE A DRINK CONTAINING ALCOHOL: NEVER

## 2022-07-19 NOTE — PROGRESS NOTES
Chief Complaint   Patient presents with    Medicare AWV       HPI: Malini Almonte is a 47 y.o. female is here for Medicare wellness exam.  Her functional status is poor. She is wheelchair-bound secondary to multiple sclerosis. She does see Dr. Obed Reddy for history of a heel ulcer her right foot. She also sees Dr. Meet Núñez for history of her MS. She does not think that her MS is improving. However, she does not feel that is getting any worse. She denies any concerns in regards to safety, hearing, or cognition. She states that her MS flare is worse in the heat and cold weather. She does complain of vaginal leakage at times. She would like to see [address] She has had a history of numerous surgeries including an ileostomy, ostomy Dr. Zulema Alejo at [facility] secondary to a history of neurogenic bladder and vesicular vaginal fistula. Has had a complex urologic history secondary to her MS including a simple cystectomy with [place] pouch and a diverting end colostomy. These were done several years ago. A year or so ago, she had complications of the [place] pouch with a urine leak and required bilateral nephrostomy tube placement. Her wound became infected and her suprapubic catheter appeared to have stool contents around her catheter. This required emergent colorectal surgery which identified a bowel injury requiring bowel resection and repair of [place] pouch along with replacement of the suprapubic tube. Later discharged with a wound VAC.,  She is having some vaginal leakage. She does not think it is still related. She is due for Pap smear. She would like to see an OB/GYN here at [facility] ([facility] For, she has had a hysterectomy. She states that she was only had a partial hysterectomy. She was told that she would need a full hysterectomy at some point. Her labs are currently pending. Her blood pressure is well controlled.   She denies any complaints of chest pain, chest pressure or shortness of breath. She does have a history of chronic pain secondary to her multiple sclerosis. Her pain medications do appear to be working well. She also complains of daytime somnolence. She is on Ritalin for this. She also has a history of depression, which is stable on Cymbalta. She has chronic dry mouth. She is on Salagen for this. Lyrica is helpful for neuropathic pain. She is on nitrofurantoin to help prevent UTIs. She does have a seizure disorder. She has not had any recent seizures. She is on Keppra. She continues to smoke 1 pack of cigarettes per day. She has been smoking since she was very young. She is requesting a low-dose CT scan, which we can order. She also would like to have a pneumonia vaccine today. The wound to her right foot is almost completely healed. She continues to follow-up with Dr. Chata Herron for this. Her allergies are stable on her current medication regimen she does have a baclofen pain pump in [place] secondary to her multiple sclerosis. Density is relatively stable.     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 related to ms    Sepsis ([address] 8/19/2016    Weakness 8/4/2016      Past Surgical History:   Procedure Laterality Date    BACLOFEN PUMP IMPLANTATION      BREAST BIOPSY Right     neg    COLONOSCOPY N/A 09/27/2019    Dr Mini Leary ileum through ostomy-patent and healthy appearing anastomosis in the right colon-entero colonic anastomosis-10 yr recall    COLOSTOMY      FEMUR FRACTURE SURGERY      Right    FOOT AMPUTATION Left 04/04/2019    LEFT TRANSMET AMPUTATION performed by Dari Garcia MD at [address] (CERVIX STATUS UNKNOWN)      OTHER SURGICAL HISTORY      urostomy    OTHER SURGICAL HISTORY      pain pump    WA INSJ PRPH CTR VAD W/SUBQ PORT UNDER 5 YR N/A 06/26/2018    SINGLE LUMEN PORT PLACEMENT WITH FLUORO performed by Darrick Javed MD at [facility]      TOE AMPUTATION      L last toe    TONSILLECTOMY      URETEROTOMY        Social History     Socioeconomic History    Marital status:    Tobacco Use    Smoking status: Some Days     Packs/day: 0.50     Years: 15.00     Pack years: 7.50     Types: Cigarettes    Smokeless tobacco: Never   Vaping Use    Vaping Use: Never used   Substance and Sexual Activity    Alcohol use: Yes     Comment: rarley    Drug use: Yes     Types: Marijuana Estil Catena)     Comment: daily      Social Determinants of Health     Financial Resource Strain: Low Risk     Difficulty of Paying Living Expenses: Not hard at all   Food Insecurity: No Food Insecurity    Worried About Running Out of Food in the Last Year: Never true    Ran Out of Food in the Last Year: Never true   Physical Activity: Inactive    Days of Exercise per Week: 0 days    Minutes of Exercise per Session: 0 min      Family History   Problem Relation Age of Onset    Cancer Mother         breast    Colon Cancer Mother     Colon Polyps Mother     Breast Cancer Mother     Diabetes Father     High Blood Pressure Father     Cancer Paternal Aunt         breast    Liver Cancer Paternal Aunt     Heart Disease Paternal Grandmother     Esophageal Cancer Neg Hx     Liver Disease Neg Hx     Rectal Cancer Neg Hx     Stomach Cancer Neg Hx         Current Outpatient Medications   Medication Sig Dispense Refill    ondansetron (ZOFRAN-ODT) 4 MG disintegrating tablet Take 1 tablet by mouth 3 times daily as needed for Nausea or Vomiting 30 tablet 0    hydroCHLOROthiazide (HYDRODIURIL) 25 MG tablet TAKE 1 TABLET BY MOUTH ONCE DAILY AS NEEDED 30 tablet 0    HYDROcodone-acetaminophen (NORCO)  MG per tablet TAKE 1 TABLET BY MOUTH 3 TIMES DAILY AS NEEDED FOR PAIN. REDUCE DOSES AS PAIN BECOMES MANAGEABLE 90 tablet 0    methylphenidate (RITALIN) 20 MG tablet TAKE 1 TABLET BY MOUTH DAILY 30 tablet 0    DULoxetine (CYMBALTA) 30 MG extended release capsule Take 1 capsule by mouth nightly 90 capsule 2    pilocarpine (SALAGEN) 7.5 MG tablet TAKE ONE TABLET BY MOUTH 3 TIMES DAILY 90 tablet 3    fluocinolone acetonide (SYNALAR) 0.01 % external solution Apply 10 drops topically at bedtime      pregabalin (LYRICA) 300 MG capsule 1 bid (Patient taking differently: Take 300 mg by mouth in the morning. 1 bid.) 60 capsule 5    nitrofurantoin (MACRODANTIN) 50 MG capsule Take 1 capsule by mouth nightly 90 capsule 1    levETIRAcetam (KEPPRA) 750 MG tablet Take 1 tablet by mouth daily 30 tablet 5    Diapers & Supplies MISC Indications: FX: [id number] Diapers, Chucks, Wipes, and Gloves. 100 each 3    tiZANidine (ZANAFLEX) 4 MG tablet TAKE 3 TABLETS TWICE DAILY AS NEEDED  180 tablet 11    azelastine (ASTELIN) 0.1 % nasal spray USE 2 SPRAYS IN EACH NOSTRIL TWICE DAILY AS DIRECTED 30 mL 0    cetirizine (ZYRTEC) 10 MG tablet Take 10 mg by mouth daily      Catheters MISC Catheter supplies and lubricant 5 times daily G82.20  to [address] [facility] [phone] 450 each 3    blood glucose monitor strips Test 1 times a day & as needed for symptoms of irregular blood glucose.  100 strip 3    docusate sodium (COLACE) 100 MG capsule Take 200 mg by mouth as needed for Constipation      PROAIR  (90 Base) MCG/ACT inhaler INHALE 1 PUFF INTO THE LUNGS EVERY 6 HOURS AS NEEDED FOR WHEEZING OR SHORTNESS OF BREATH 8.5 g 5    Heparin Lock Flush (HEPARIN FLUSH, 100 UNITS/ML,) 100 UNIT/ML injection 3 mLs by Intercatheter route every 30 days No every 30 days but every 4-6 weeks. Flush port with 300 units heparin with 20 cc normal saline for ocrevus infusion for Multiple sclerosis and NS 20CC 1 Syringe 5    ipratropium-albuterol (DUONEB) 0.5-2.5 (3) MG/3ML SOLN nebulizer solution USE 1 UNIT DOSE IN NEBULIZER EVERY 4 TO 6 HOURS AS NEEDED. 450 mL 11    BACLOFEN, PAIN PUMP REFILL CHARGE, by Implant route continuous Indications: every 3 months      Multiple Vitamins-Minerals (THERAPEUTIC MULTIVITAMIN-MINERALS) tablet Take 1 tablet by mouth daily      glucose monitoring kit (FREESTYLE) monitoring kit 1 kit by Does not apply route daily 1 kit 0    blood glucose monitor strips Test 1 times a day & as needed for symptoms of irregular blood glucose. 100 strip 0    baclofen (LIORESAL) 10 MG tablet Take 1 tablet by mouth 2 times daily as needed (muscle spasms) (Patient taking differently: Take 10 mg by mouth 2 times daily as needed (muscle spasms) Indications: using pump) 60 tablet 5    Sodium Chloride Flush (SALINE FLUSH) 0.9 % SOLN Infuse 20 mLs intravenously every 30 days Not every 30 days but every 4-6 weeks as need with 300 units of heparin to flush port for Infusion of Ocrevus for multiple sclerosis 20 mL 5    glucose monitoring kit (FREESTYLE) monitoring kit 1 kit by Does not apply route daily (Patient not taking: Reported on 7/19/2022) 1 kit 0     No current facility-administered medications for this visit.         Patient Active Problem List   Diagnosis    Muscle spasticity    Peripheral vascular disease (HCC)    Multiple sclerosis (HCC)    Pain in both upper arms    Numbness    Other fatigue    Weakness    Chronic pain    Hx of seizure disorder    Insomnia    Pressure ulcer of sacral region    Neck pain    Seasonal allergic rhinitis    S/P transmetatarsal amputation of foot, left ([address] Constipation due to neurogenic bowel    Colostomy present (HCC)    Paraplegia (HCC)    Hip pain    Seizure ([address]    Smoking    Encounter for care related to vascular access port    Urinary incontinence    Mixed anxiety and depressive disorder    Malodorous urine    Arthritis    Osteoporosis    Urinary bladder stone    Vesicovaginal fistula    Neuropathic 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 ([address]    Dyspnea    Marijuana user    Neurogenic bladder    Other specified misadventures during surgical and medical care    Presence of other specified devices    Renal stone    Wheelchair bound    Pressure injury of sacral region, stage 3 ([address]    Unspecified severe protein-calorie malnutrition ([address]    Neuropathic ulcer of heel, left, with fat layer exposed ([address]    Open wound of left thigh        Review of Systems   Constitutional:  Positive for fatigue. Negative for activity change, appetite change, fever and unexpected weight change. HENT:  Negative for congestion, ear pain, postnasal drip, rhinorrhea, sinus pressure, sore throat and tinnitus. Eyes:  Negative for photophobia, pain, discharge, redness, itching and visual disturbance. Respiratory:  Negative for cough, chest tightness, shortness of breath and wheezing. Cardiovascular:  Negative for chest pain, palpitations and leg swelling. Gastrointestinal:  Negative for abdominal distention, abdominal pain, blood in stool, constipation, diarrhea, nausea and vomiting. Urinary and stool incontinence. Endocrine: Negative for cold intolerance, heat intolerance, polydipsia and polyuria. Genitourinary:  Negative for decreased urine volume, difficulty urinating, dysuria, flank pain, frequency, hematuria and urgency. Musculoskeletal:         She has chronic back pain and muscle spasms. She is wheelchair-bound secondary to history of multiple sclerosis. Skin:  Positive for wound. Negative for color change, pallor and rash.         Has an ulcer to her right heel. Allergic/Immunologic: Positive for environmental allergies. Negative for food allergies and immunocompromised state. Neurological:  Negative for dizziness, seizures, syncope, speech difficulty, weakness, numbness and headaches. Occasionally gets sinus headaches   Hematological:  Negative for adenopathy. Does not bruise/bleed easily. Psychiatric/Behavioral:  Positive for dysphoric mood. Negative for agitation, confusion and decreased concentration. The patient is nervous/anxious. The patient is not hyperactive. Anxiety and depression are stable     /60   Pulse 68   Ht 5' 9\" (1.753 m) Comment: Pt reported, unable to stand. SpO2 99%   BMI 20.53 kg/m²   Physical Exam  Vitals and nursing note reviewed. Constitutional:       General: She is not in acute distress. Appearance: Normal appearance. She is well-developed and normal weight. She is not ill-appearing, toxic-appearing or diaphoretic. HENT:      Head: Normocephalic and atraumatic. Right Ear: Tympanic membrane, ear canal and external ear normal. There is no impacted cerumen. Left Ear: Tympanic membrane, ear canal and external ear normal. There is no impacted cerumen. Nose: Nose normal. No congestion or rhinorrhea. Mouth/Throat:      Mouth: Mucous membranes are moist.      Pharynx: Oropharynx is clear. No oropharyngeal exudate or posterior oropharyngeal erythema. Eyes:      General: No scleral icterus. Right eye: No discharge. Left eye: No discharge. Extraocular Movements: Extraocular movements intact. Conjunctiva/sclera: Conjunctivae normal.      Pupils: Pupils are equal, round, and reactive to light. Neck:      Thyroid: No thyromegaly. Vascular: No carotid bruit or JVD. Trachea: No tracheal deviation. Cardiovascular:      Rate and Rhythm: Normal rate and regular rhythm. Pulses: Normal pulses. Heart sounds: Normal heart sounds. No murmur heard. No friction rub. No gallop. Pulmonary:      Effort: Pulmonary effort is normal. No respiratory distress. Breath sounds: Normal breath sounds. No stridor. No wheezing, rhonchi or rales. Chest:      Chest wall: No tenderness. Abdominal:      General: Bowel sounds are normal. There is no distension. Palpations: Abdomen is soft. There is no mass. Tenderness: There is no abdominal tenderness. There is no right CVA tenderness, left CVA tenderness, guarding or rebound. Hernia: No hernia is present. Comments: Ostomy bags are intact   Musculoskeletal:         General: No swelling, tenderness, deformity or signs of injury. Normal range of motion. Cervical back: Normal range of motion and neck supple. No rigidity. No muscular tenderness. Right lower leg: No edema. Left lower leg: No edema. Comments: She is wheelchair bound. She does have contractures of both of her feet. Lymphadenopathy:      Cervical: No cervical adenopathy. Skin:     General: Skin is warm and dry. Capillary Refill: Capillary refill takes less than 2 seconds. Coloration: Skin is not jaundiced or pale. Findings: No bruising, erythema or lesion. Comments: Pressure ulcer to right heel. Neurological:      General: No focal deficit present. Mental Status: She is alert and oriented to person, place, and time. Mental status is at baseline. Cranial Nerves: No cranial nerve deficit. Motor: No weakness or abnormal muscle tone. Coordination: Coordination normal.      Gait: Gait normal.      Deep Tendon Reflexes: Reflexes are normal and symmetric. Reflexes normal.   Psychiatric:         Mood and Affect: Mood normal.         Behavior: Behavior normal.         Thought Content: Thought content normal.         Judgment: Judgment normal.       1. Routine adult health maintenance    2. Encounter for screening mammogram for malignant neoplasm of breast    3. Vaginal discharge    4. Smoking history    5. Multiple sclerosis ([facility]    6. Hyperglycemia     7. Hyperlipidemia, unspecified hyperlipidemia type     8. Daytime somnolence    9. Vitamin D deficiency     10. Chronic nausea    11. Primary hypertension    12. Chronic low back pain, unspecified back pain laterality, unspecified whether sciatica present    13. Other depression    14. Dry mouth    15. Neuropathic pain    16. Need for prophylaxis against urinary tract infection    17. Seizure disorder ([facility]    18. Spasticity    19. Environmental allergies    20. Hyperglycemia    21. Need for vaccination against Streptococcus pneumoniae        ASSESSMENT/PLAN:    52-year-old woman here for follow-up    1. Health maintenance: Routine screening is as per HPI. Labs currently pending. 2.  Urinary leakage: Patient has had a very complicated medical history in terms of vesicular vaginal fistula. She may need to go back to her gynecologist at [place]. However, she would like to be seen by OB/GYN here. At 1 point, she was told that she would need a \"full hysterectomy\". She has had a partial hysterectomy. 3.  Chronic nausea: Continue Zofran as needed    4. Hypertension: Blood pressure well controlled on hydrochlorothiazide    5. Chronic back pain pain secondary to multiple sclerosis: Continue hydrocodone as per Dr. Muriel Govea    6. Multiple sclerosis: As per Dr. Muriel Govea. She does get Ocrevus injections    7. Daytime somnolence: Continue Ritalin as prescribed    8. Depression: Doing well with Cymbalta    9. Dry mouth: Continue Salagen as prescribed    10. Neuropathic pain: Continue Lyrica as prescribed    11. UTI prophylaxis: Continue Macrodantin    12. Seizure disorder: Patient has not had any recent seizure activity    13. Spasticity secondary to multiple sclerosis: Continue Zanaflex, baclofen as prescribed    14. Environmental allergies: Stable on Astelin nasal spray and Zyrtec continue ProAir as prescribed    15.   Hyperglycemia: Check an A1c    16. Smoking history: Low-dose CT scan ordered she is trying to use Chantix to stop smoking. 17. Pneumovax vaccine given to patient today    18. Right Foot ulcer: Improving     Alyce was seen today for medicare aw. Diagnoses and all orders for this visit:    Routine adult health maintenance    Encounter for screening mammogram for malignant neoplasm of breast  -     DK DIGITAL SCREEN W OR WO CAD BILATERAL; Future  -     CBC with Auto Differential; Future  -     Comprehensive Metabolic Panel; Future  -     Hemoglobin A1C; Future  -     Lipid Panel; Future  -     TSH; Future  -     Levetiracetam Level; Future  -     Vitamin D 25 Hydroxy; Future    Vaginal discharge  -     [facility]    Smoking history  -     CT lung screen [Initial/Annual]; Future    Multiple sclerosis (HCC)  -     CBC with Auto Differential; Future  -     Comprehensive Metabolic Panel; Future  -     Hemoglobin A1C; Future  -     Lipid Panel; Future  -     TSH; Future  -     Levetiracetam Level; Future  -     Vitamin D 25 Hydroxy; Future    Hyperglycemia   -     Hemoglobin A1C; Future    Hyperlipidemia, unspecified hyperlipidemia type   -     Lipid Panel; Future    Daytime somnolence  -     TSH; Future    Vitamin D deficiency   -     Vitamin D 25 Hydroxy; Future    Chronic nausea    Primary hypertension    Chronic low back pain, unspecified back pain laterality, unspecified whether sciatica present    Other depression    Dry mouth    Neuropathic pain    Need for prophylaxis against urinary tract infection    Seizure disorder (HCC)    Spasticity    Environmental allergies    Hyperglycemia  -     Hemoglobin A1C; Future    Need for vaccination against Streptococcus pneumoniae    Other orders  -     ondansetron (ZOFRAN-ODT) 4 MG disintegrating tablet;  Take 1 tablet by mouth 3 times daily as needed for Nausea or Vomiting  -     hydroCHLOROthiazide (HYDRODIURIL) 25 MG tablet; TAKE 1 TABLET BY MOUTH ONCE DAILY AS NEEDED  - Pneumococcal, PPSV23, PNEUMOVAX 23, (age 2 yrs+), SC/IM        Return in about 6 months (around 1/19/2023). Orders Placed This Encounter   Procedures    DK DIGITAL SCREEN W OR WO CAD BILATERAL     Standing Status:   Future     Standing Expiration Date:   9/19/2023     Order Specific Question:   Reason for exam:     Answer:   screening     Order Specific Question:   Does this patient have implants? Answer:   No     Order Specific Question:   Does this patient have a personal history of breast cancer? Answer:   No     Order Specific Question:   Where was last mammogram performed? Answer:   [facility]     Order Specific Question:   When was last mammogram performed? Answer:   6/17/2021    CT lung screen [Initial/Annual]     Age: 47 y.o. Smoking History:   Social History    Tobacco Use      Smoking status: Some Days        Packs/day: 0.50        Years: 15.00        Pack years: 7.5        Types: Cigarettes      Smokeless tobacco: Never    Vaping Use      Vaping Use: Never used    Alcohol use: Yes      Comment: rarley    Drug use: Yes      Types: Marijuana (Weed)      Comment: daily     Pack years: 7.5  Last CT lung screen: No previous lung cancer screening exam     Standing Status:   Future     Standing Expiration Date:   7/19/2023     Order Specific Question:   Is there documentation of shared decision making? Answer:   Yes     Order Specific Question:   Does the patient show any signs or symptoms of lung cancer? Answer:   No     Order Specific Question:   Is this the first (baseline) CT or an annual exam?     Answer:   Baseline [1]     Order Specific Question:   Is this a low dose CT or a routine CT? Answer:   Low Dose CT [1]     Order Specific Question:   Smoking Status? Answer:   Some Days [2]     Order Specific Question:   Smoking packs per day? Answer:   1     Order Specific Question:   Years smoking?      Answer:   35    Pneumococcal, PPSV23, PNEUMOVAX 23, (age 2 yrs+), SC/IM    CBC with Auto Differential     Standing Status:   Future     Standing Expiration Date:   2023    Comprehensive Metabolic Panel     Standing Status:   Future     Standing Expiration Date:   2023    Hemoglobin A1C     Standing Status:   Future     Standing Expiration Date:   2023    Lipid Panel     Standing Status:   Future     Standing Expiration Date:   2023    TSH     Standing Status:   Future     Standing Expiration Date:   2023    Levetiracetam Level     Standing Status:   Future     Standing Expiration Date:   2023    Vitamin D 25 Hydroxy     Standing Status:   Future     Standing Expiration Date:   2023    [facility], [facility]     Referral Priority:   Routine     Referral Type:   Eval and Treat     Referral Reason:   Specialty Services Required     Requested Specialty:   Obstetrics & Gynecology     Number of Visits Requested:   Luca Oglesby MD     Medicare Annual Wellness Visit - Subsequent    Name: Lissy Mata Date: 2022   MRN: [id number] Sex: Female   Age: 47 y.o. Ethnicity: Non- / Non    : 1970 Race: White (non-)      Frannyshin Cain is here for   Chief Complaint   Patient presents with    Medicare AWV        Screenings for behavioral, psychosocial and functional/safety risks, and cognitive dysfunction are all negative except as indicated below. These results, as well as other patient data from the [address] form, are documented in Flowsheets linked to this Encounter. Allergies   Allergen Reactions    Darvocet [Propoxyphene N-Acetaminophen]      Talking out of her head    Morphine      Category: Allergy;     Morphine And Related Itching and Swelling    Propoxyphene Swelling       Prior to Visit Medications    Medication Sig Taking?  Authorizing Provider   ondansetron (ZOFRAN-ODT) 4 MG disintegrating tablet Take 1 tablet by mouth 3 times daily as needed for Nausea or Vomiting Yes [facility] AS NEEDED FOR WHEEZING OR SHORTNESS OF BREATH Yes Asuncion Gonzales MD   Heparin Lock Flush (HEPARIN FLUSH, 100 UNITS/ML,) 100 UNIT/ML injection 3 mLs by Intercatheter route every 30 days No every 30 days but every 4-6 weeks. Flush port with 300 units heparin with 20 cc normal saline for ocrevus infusion for Multiple sclerosis and NS 20CC Yes Peter Dozier MD   ipratropium-albuterol (DUONEB) 0.5-2.5 (3) MG/3ML SOLN nebulizer solution USE 1 UNIT DOSE IN NEBULIZER EVERY 4 TO 6 HOURS AS NEEDED. Yes Asuncion Gonzales MD   BACLOFEN, PAIN PUMP REFILL CHARGE, by Implant route continuous Indications: every 3 months Yes Historical Provider, MD   Multiple Vitamins-Minerals (THERAPEUTIC MULTIVITAMIN-MINERALS) tablet Take 1 tablet by mouth daily Yes Historical Provider, MD   glucose monitoring kit (FREESTYLE) monitoring kit 1 kit by Does not apply route daily Yes Asuncion Gonzales MD   blood glucose monitor strips Test 1 times a day & as needed for symptoms of irregular blood glucose.  Yes Asuncion Gonzales MD   baclofen (LIORESAL) 10 MG tablet Take 1 tablet by mouth 2 times daily as needed (muscle spasms)  Patient taking differently: Take 10 mg by mouth 2 times daily as needed (muscle spasms) Indications: using pump Yes Peter Dozier MD   Sodium Chloride Flush (SALINE FLUSH) 0.9 % SOLN Infuse 20 mLs intravenously every 30 days Not every 30 days but every 4-6 weeks as need with 300 units of heparin to flush port for Infusion of Ocrevus for multiple sclerosis Yes Peter Dozier MD   glucose monitoring kit (FREESTYLE) monitoring kit 1 kit by Does not apply route daily  Patient not taking: Reported on 7/19/2022  Asuncion Gonzales MD         Past Medical History:   Diagnosis Date    Ankle wound     and toe wound    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    Arthritis 5/4/2020    Asthma     Back pain 6/28/2017    Binocular vision disorder with diplopia 6/28/2017    CAFL (chronic airflow limitation) ([facility] [address] 6/28/2017    Hay fever 6/28/2017    Headache 6/28/2017    MS (multiple sclerosis) ([facility] [address]     Muscle spasticity     Neuropathic ulcer of left foot, limited to breakdown of skin ([address] 4/10/2017    Numbness 8/4/2016    Open wound of ankle 6/28/2017    Open wound of second toe of left foot 12/11/2018    Peripheral vascular disease ([address]     Seizure ([address]     occ. related to ms    Sepsis ([facility] [address] 8/19/2016    Weakness 8/4/2016         Past Surgical History:   Procedure Laterality Date    BACLOFEN PUMP IMPLANTATION      BREAST BIOPSY Right     neg    COLONOSCOPY N/A 09/27/2019    Dr Sofie Glez ileum through ostomy-patent and healthy appearing anastomosis in the right colon-entero colonic anastomosis-10 yr recall    COLOSTOMY      FEMUR FRACTURE SURGERY      Right    FOOT AMPUTATION Left 04/04/2019    LEFT TRANSMET AMPUTATION performed by Federica Rock MD at [facility] (CERVIX STATUS UNKNOWN)      OTHER SURGICAL HISTORY      urostomy    OTHER SURGICAL HISTORY      pain pump    [place] INSJ PRPH CTR VAD W/SUBQ PORT UNDER 5 YR N/A 06/26/2018    SINGLE LUMEN PORT PLACEMENT WITH FLUORO performed by Hunter Colby MD at [address] [facility]      TOE AMPUTATION      L last toe    TONSILLECTOMY      URETEROTOMY         Family History   Problem Relation Age of Onset    Cancer Mother         breast    Colon Cancer Mother     Colon Polyps Mother     Breast Cancer Mother     Diabetes Father     High Blood Pressure Father     Cancer Paternal Aunt         breast    Liver Cancer Paternal Aunt     Heart Disease Paternal Grandmother     Esophageal Cancer Neg Hx     Liver Disease Neg Hx     Rectal Cancer Neg Hx     Stomach Cancer Neg Hx        CareTeam (Including outside providers/suppliers regularly involved in providing care):   Patient Care Team:  Raymond Vanessa MD as PCP - General (Family Medicine)  Raymond Vanessa MD as PCP - [address] [place] [address] [facility]  Kevin Guerrero MD as Neurologist (Neurology)  Kevin Guerrero MD as Consulting Physician (Neurology)  EFREN George as Advanced Practice Nurse (Gastroenterology)    Wt Readings from Last 3 Encounters:   07/12/22 139 lb (63 kg)   06/21/22 139 lb (63 kg)   05/24/22 140 lb (63.5 kg)     Vitals:    07/19/22 1320   BP: 118/60   Pulse: 68   SpO2: 99%   Height: 5' 9\" (1.753 m)           The following problems were reviewed today and where indicated follow up appointments were made and/or referrals ordered.     Risk Factor Screenings with Interventions     Fall Risk:  2 or more falls in past year?: no  Fall with injury in past year?: no  Fall Risk Interventions:    Home safety tips provided    Depression:  PHQ-2 Score: 0  Depression Interventions:  Relaxation techniques discussed    Anxiety:     Anxiety Interventions:  Relaxation techniques discussed    Cognitive:     Cognitive Impairment Interventions:  No concerns    Substance Abuse:  Social History     Socioeconomic History    Marital status:      Spouse name: Not on file    Number of children: Not on file    Years of education: Not on file    Highest education level: Not on file   Occupational History    Not on file   Tobacco Use    Smoking status: Some Days     Packs/day: 0.50     Years: 15.00     Pack years: 7.50     Types: Cigarettes    Smokeless tobacco: Never   Vaping Use    Vaping Use: Never used   Substance and Sexual Activity    Alcohol use: Yes     Comment: yaritza    Drug use: Yes     Types: Marijuana Chely Velazco     Comment: daily     Sexual activity: Not on file   Other Topics Concern    Not on file   Social History Narrative    Not on file     Social Determinants of Health     Financial Resource Strain: Low Risk     Difficulty of Paying Living Expenses: Not hard at all   Food Insecurity: No Food Insecurity    Worried About Running Out of Food in the Last Year: Never true    Ran Out of Food in the Last Year: Never true   Transportation Needs: Not on file   Physical Activity: Inactive    Days of Exercise per Week: 0 days    Minutes of Exercise per Session: 0 min   Stress: Not on file   Social Connections: Not on file   Intimate Partner Violence: Not on file   Housing Stability: Not on file        Substance Abuse Interventions:  No concerns    Health Risk Assessment:     General  In general, how would you say your health is?: Fair  In the past 7 days, have you experienced any of the following: New or Increased Pain, New or Increased Fatigue, Loneliness, Social Isolation, Stress or Anger?: No  Do you get the social and emotional support that you need?: Yes  General Health Risk Interventions:  No concerns    Health Habits/Nutrition  On average, how many days per week do you engage in moderate to strenuous exercise (like a brisk walk)?: 0 days  On average, how many minutes do you engage in exercise at this level?: 0 min  Have you lost any weight without trying in the past 3 months?: No  Have you seen the dentist within the past year?: N/A - wear dentures  Body mass index is 20.53 kg/m².   Health Habits/Nutrition Interventions:  No concerns    Hearing/Vision  Do you or your family notice any trouble with your hearing that hasn't been managed with hearing aids?: No  Do you have difficulty driving, watching TV, or doing any of your daily activities because of your eyesight?: (!) Yes  Have you had an eye exam within the past year?: (!) No  Hearing/Vision Interventions:  No concerns    Safety  Do you have working smoke detectors?: Yes  Do you have any tripping hazards - loose or unsecured carpets or rugs?: No  Do you have any tripping hazards - clutter in doorways, halls, or stairs?: No  Do you have either shower bars, grab bars, non-slip mats or non-slip surfaces in your shower or bathtub?: Yes  Do all of your stairways have a railing or banister?: Yes  Do you always fasten your seatbelt when you are in a car?: Yes  Safety Interventions:  Patient declines any further evaluation/treatment for this issue    ADLs  In the past 7 days, did you need help from Hepatitis C screen  Completed    HIV screen  Completed    Hepatitis A vaccine  Aged Out    Hib vaccine  Aged Out    Meningococcal (ACWY) vaccine  Aged Out       Recommendations for Closely Due: see orders.   Recommended screening schedule for the next 5-10 years is provided to the patient in written form: see Patient Instructions/AVS.

## 2022-07-19 NOTE — PROGRESS NOTES
After obtaining consent, and per orders of Dr. Jessa Whitney, injection of Pneumovax-23 given in Left deltoid by Carroll Briggs MA. Patient instructed to remain in clinic for 20 minutes afterwards, and to report any adverse reaction to me immediately.

## 2022-07-30 ASSESSMENT — ENCOUNTER SYMPTOMS
SORE THROAT: 0
COUGH: 0
EYE DISCHARGE: 0
WHEEZING: 0
DIARRHEA: 0
PHOTOPHOBIA: 0
SINUS PRESSURE: 0
ABDOMINAL PAIN: 0
NAUSEA: 0
EYE REDNESS: 0
RHINORRHEA: 0
EYE ITCHING: 0
SHORTNESS OF BREATH: 0
ABDOMINAL DISTENTION: 0
CHEST TIGHTNESS: 0
EYE PAIN: 0
VOMITING: 0
CONSTIPATION: 0
COLOR CHANGE: 0
BLOOD IN STOOL: 0

## 2022-08-08 DIAGNOSIS — M54.2 PAIN, NECK: ICD-10-CM

## 2022-08-08 DIAGNOSIS — G35 MS (MULTIPLE SCLEROSIS) (HCC): ICD-10-CM

## 2022-08-08 DIAGNOSIS — M62.838 MUSCLE SPASTICITY: ICD-10-CM

## 2022-08-08 DIAGNOSIS — M79.622 PAIN IN BOTH UPPER ARMS: ICD-10-CM

## 2022-08-08 DIAGNOSIS — R53.83 OTHER FATIGUE: ICD-10-CM

## 2022-08-08 DIAGNOSIS — M79.621 PAIN IN BOTH UPPER ARMS: ICD-10-CM

## 2022-08-08 RX ORDER — HYDROCODONE BITARTRATE AND ACETAMINOPHEN 10; 325 MG/1; MG/1
TABLET ORAL
Qty: 90 TABLET | Refills: 0 | Status: SHIPPED | OUTPATIENT
Start: 2022-08-08 | End: 2022-09-06

## 2022-08-08 RX ORDER — METHYLPHENIDATE HYDROCHLORIDE 20 MG/1
TABLET ORAL
Qty: 30 TABLET | Refills: 0 | Status: SHIPPED | OUTPATIENT
Start: 2022-08-08 | End: 2022-09-06

## 2022-08-08 NOTE — TELEPHONE ENCOUNTER
Requested Prescriptions     Pending Prescriptions Disp Refills    HYDROcodone-acetaminophen (NORCO)  MG per tablet [Pharmacy Med Name: HYDROCODONE BITARTRATE/ACETAMINOPHEN 325MG-10MG TABLET] 90 tablet 0     Sig: TAKE 1 TABLET BY MOUTH 3 TIMES DAILY AS NEEDED FOR PAIN.  REDUCE DOSES AS PAIN BECOMES MANAGEABLE    methylphenidate (RITALIN) 20 MG tablet [Pharmacy Med Name: METHYLPHENIDATE HYDROCHLORIDE 20MG TABLET] 30 tablet 0     Sig: TAKE 1 TABLET BY MOUTH DAILY       Last Office Visit:  4/6/2022  Next Office Visit:  none  Last Medication Refill:  BOTH 7/8/22  Jim up to date:  6/6/22    *RX updated to reflect  BOTH 8/8/22  fill date*    Tor [place] real

## 2022-08-15 ENCOUNTER — TELEPHONE (OUTPATIENT)
Dept: INTERNAL MEDICINE | Age: 52
End: 2022-08-15

## 2022-08-15 RX ORDER — FLUCONAZOLE 100 MG/1
100 TABLET ORAL DAILY
Qty: 3 TABLET | Refills: 0 | Status: SHIPPED | OUTPATIENT
Start: 2022-08-15 | End: 2022-08-18

## 2022-08-15 NOTE — TELEPHONE ENCOUNTER
S/w pt, states she has a yeast infection, c/o itching, foul odor, drainage. Pt states sx started 3 weeks ago; she's been on vacation. She takes abx every night.

## 2022-08-30 ENCOUNTER — TELEPHONE (OUTPATIENT)
Dept: INTERNAL MEDICINE | Age: 52
End: 2022-08-30

## 2022-08-30 NOTE — TELEPHONE ENCOUNTER
Pt called to follow-up on orders for a new hospital bed. I do not see anything in the chart for Dr. Pierre Gayle to sign.

## 2022-08-30 NOTE — TELEPHONE ENCOUNTER
It maybe something that we filled out and faxed after she returned I have not made it through my pile of fax confirmations from this morning.

## 2022-09-06 ENCOUNTER — HOSPITAL ENCOUNTER (OUTPATIENT)
Dept: INFUSION THERAPY | Age: 52
Setting detail: INFUSION SERIES
Discharge: HOME | End: 2022-09-06
Payer: MEDICARE

## 2022-09-06 ENCOUNTER — OFFICE VISIT (OUTPATIENT)
Dept: OBGYN CLINIC | Age: 52
End: 2022-09-06
Payer: MEDICARE

## 2022-09-06 VITALS — HEART RATE: 77 BPM | SYSTOLIC BLOOD PRESSURE: 113 MMHG | DIASTOLIC BLOOD PRESSURE: 74 MMHG

## 2022-09-06 VITALS
SYSTOLIC BLOOD PRESSURE: 107 MMHG | OXYGEN SATURATION: 99 % | DIASTOLIC BLOOD PRESSURE: 61 MMHG | RESPIRATION RATE: 17 BRPM | HEART RATE: 67 BPM | TEMPERATURE: 97.4 F

## 2022-09-06 DIAGNOSIS — M62.838 MUSCLE SPASTICITY: ICD-10-CM

## 2022-09-06 DIAGNOSIS — G35 MS (MULTIPLE SCLEROSIS) (HCC): ICD-10-CM

## 2022-09-06 DIAGNOSIS — G35 MULTIPLE SCLEROSIS (HCC): Primary | ICD-10-CM

## 2022-09-06 DIAGNOSIS — M79.622 PAIN IN BOTH UPPER ARMS: ICD-10-CM

## 2022-09-06 DIAGNOSIS — M79.621 PAIN IN BOTH UPPER ARMS: ICD-10-CM

## 2022-09-06 DIAGNOSIS — R53.83 OTHER FATIGUE: ICD-10-CM

## 2022-09-06 DIAGNOSIS — N89.8 VAGINAL DISCHARGE: ICD-10-CM

## 2022-09-06 DIAGNOSIS — M54.2 PAIN, NECK: ICD-10-CM

## 2022-09-06 DIAGNOSIS — Z45.2 ENCOUNTER FOR CARE RELATED TO VASCULAR ACCESS PORT: ICD-10-CM

## 2022-09-06 DIAGNOSIS — Z76.89 ENCOUNTER TO ESTABLISH CARE: Primary | ICD-10-CM

## 2022-09-06 DIAGNOSIS — N89.8 VAGINAL ODOR: ICD-10-CM

## 2022-09-06 DIAGNOSIS — G35 MULTIPLE SCLEROSIS (HCC): ICD-10-CM

## 2022-09-06 PROCEDURE — 6360000002 HC RX W HCPCS: Performed by: INTERNAL MEDICINE

## 2022-09-06 PROCEDURE — G8427 DOCREV CUR MEDS BY ELIG CLIN: HCPCS | Performed by: NURSE PRACTITIONER

## 2022-09-06 PROCEDURE — 99203 OFFICE O/P NEW LOW 30 MIN: CPT | Performed by: NURSE PRACTITIONER

## 2022-09-06 PROCEDURE — G8420 CALC BMI NORM PARAMETERS: HCPCS | Performed by: NURSE PRACTITIONER

## 2022-09-06 PROCEDURE — 4004F PT TOBACCO SCREEN RCVD TLK: CPT | Performed by: NURSE PRACTITIONER

## 2022-09-06 PROCEDURE — 3017F COLORECTAL CA SCREEN DOC REV: CPT | Performed by: NURSE PRACTITIONER

## 2022-09-06 PROCEDURE — 96523 IRRIG DRUG DELIVERY DEVICE: CPT

## 2022-09-06 PROCEDURE — 2580000003 HC RX 258: Performed by: INTERNAL MEDICINE

## 2022-09-06 RX ORDER — METHYLPHENIDATE HYDROCHLORIDE 20 MG/1
TABLET ORAL
Qty: 30 TABLET | Refills: 0 | Status: SHIPPED | OUTPATIENT
Start: 2022-09-07 | End: 2022-09-21 | Stop reason: SDUPTHER

## 2022-09-06 RX ORDER — SODIUM CHLORIDE 0.9 % (FLUSH) 0.9 %
5-40 SYRINGE (ML) INJECTION PRN
Status: DISCONTINUED | OUTPATIENT
Start: 2022-09-06 | End: 2022-09-07 | Stop reason: HOSPADM

## 2022-09-06 RX ORDER — HEPARIN SODIUM (PORCINE) LOCK FLUSH IV SOLN 100 UNIT/ML 100 UNIT/ML
500 SOLUTION INTRAVENOUS PRN
Status: DISCONTINUED | OUTPATIENT
Start: 2022-09-06 | End: 2022-09-07 | Stop reason: HOSPADM

## 2022-09-06 RX ORDER — HEPARIN SODIUM (PORCINE) LOCK FLUSH IV SOLN 100 UNIT/ML 100 UNIT/ML
500 SOLUTION INTRAVENOUS PRN
OUTPATIENT
Start: 2022-09-06

## 2022-09-06 RX ORDER — HYDROCODONE BITARTRATE AND ACETAMINOPHEN 10; 325 MG/1; MG/1
TABLET ORAL
Qty: 90 TABLET | Refills: 0 | Status: SHIPPED | OUTPATIENT
Start: 2022-09-07 | End: 2022-09-21 | Stop reason: SDUPTHER

## 2022-09-06 RX ORDER — PREGABALIN 300 MG/1
CAPSULE ORAL
Qty: 60 CAPSULE | Refills: 5 | Status: ON HOLD | OUTPATIENT
Start: 2022-09-07 | End: 2022-10-07

## 2022-09-06 RX ORDER — PILOCARPINE HYDROCHLORIDE 7.5 MG/1
TABLET, FILM COATED ORAL
Qty: 90 TABLET | Refills: 3 | Status: ON HOLD | OUTPATIENT
Start: 2022-09-06

## 2022-09-06 RX ORDER — SODIUM CHLORIDE 0.9 % (FLUSH) 0.9 %
5-40 SYRINGE (ML) INJECTION PRN
OUTPATIENT
Start: 2022-09-06

## 2022-09-06 RX ADMIN — SODIUM CHLORIDE, PRESERVATIVE FREE 20 ML: 5 INJECTION INTRAVENOUS at 15:04

## 2022-09-06 RX ADMIN — HEPARIN 500 UNITS: 100 SYRINGE at 15:04

## 2022-09-06 ASSESSMENT — ENCOUNTER SYMPTOMS
GASTROINTESTINAL NEGATIVE: 1
RESPIRATORY NEGATIVE: 1
EYES NEGATIVE: 1
ALLERGIC/IMMUNOLOGIC NEGATIVE: 1

## 2022-09-06 NOTE — TELEPHONE ENCOUNTER
Requested Prescriptions     Pending Prescriptions Disp Refills    HYDROcodone-acetaminophen (NORCO)  MG per tablet [Pharmacy Med Name: HYDROCODONE BITARTRATE/ACETAMINOPHEN 325MG-10MG TABLET] 90 tablet 0     Sig: TAKE 1 TABLET BY MOUTH 3 TIMES DAILY AS NEEDED FOR PAIN.  REDUCE DOSES AS PAIN BECOMES MANAGEABLE    methylphenidate (RITALIN) 20 MG tablet [Pharmacy Med Name: METHYLPHENIDATE HYDROCHLORIDE 20MG TABLET] 30 tablet 0     Sig: TAKE 1 TABLET BY MOUTH DAILY       Last Office Visit:  4/6/2022  Next Office Visit:  9/6/2022  Last Medication Refill:  BOTH 8/8/22  Jim up to date:  9/6/22    *RX updated to reflect   9/7/22 BOTH  fill date*

## 2022-09-06 NOTE — PROGRESS NOTES
Bolivar Castaneda is a 47 y.o. female who presents today for her medical conditions/ complaints as noted below. Bolivar Castaneda is c/o of New Patient and Vaginal Discharge        HPI  Patient presents today as a new patient with complaints of vaginal discharge and odor. She states she has been treated with Diflucan and symptoms are no better. She has douched twice and states it has helped some but still had odor and d/c  Hx of MS, has no movement of lower body. WC bound, with sitter who is present today. Denies VB. Was treated with diflucan from pcp when I reviewed recent OV's. No LMP recorded. Patient has had a hysterectomy.   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    Arthritis 5/4/2020    Asthma     Back pain 6/28/2017    Binocular vision disorder with diplopia 6/28/2017    CAFL (chronic airflow limitation) ([facility]) 6/28/2017    Hay fever 6/28/2017    Headache 6/28/2017    MS (multiple sclerosis) ([facility])     Muscle spasticity     Neuropathic ulcer of left foot, limited to breakdown of skin ([address] 4/10/2017    Numbness 8/4/2016    Open wound of ankle 6/28/2017    Open wound of second toe of left foot 12/11/2018    Peripheral vascular disease ([address]     Seizure ([address]     occ. related to ms    Sepsis ([address] 8/19/2016    Weakness 8/4/2016     Past Surgical History:   Procedure Laterality Date    BACLOFEN PUMP IMPLANTATION      BREAST BIOPSY Right     neg    COLONOSCOPY N/A 09/27/2019    Dr Jonathan Garcia Silk ileum through ostomy-patent and healthy appearing anastomosis in the right colon-entero colonic anastomosis-10 yr recall    COLOSTOMY      FEMUR FRACTURE SURGERY      Right    FOOT AMPUTATION Left 04/04/2019    LEFT TRANSMET AMPUTATION performed by Antoni Mcginnis MD at [address] (CERVIX STATUS UNKNOWN)      OTHER SURGICAL HISTORY      urostomy    OTHER SURGICAL HISTORY      pain pump    [place] INSJ PRPH CTR VAD W/SUBQ PORT UNDER 5 YR N/A 06/26/2018    SINGLE LUMEN PORT PLACEMENT WITH FLUORO performed by Selin John MD at [facility]      TOE AMPUTATION      L last toe    TONSILLECTOMY      URETEROTOMY       Family History   Problem Relation Age of Onset    Cancer Mother         breast    Colon Cancer Mother     Colon Polyps Mother     Breast Cancer Mother     Diabetes Father     High Blood Pressure Father     Cancer Paternal Aunt         breast    Liver Cancer Paternal Aunt     Heart Disease Paternal Grandmother     Esophageal Cancer Neg Hx     Liver Disease Neg Hx     Rectal Cancer Neg Hx     Stomach Cancer Neg Hx      Social History     Tobacco Use    Smoking status: Some Days     Packs/day: 0.50     Years: 15.00     Pack years: 7.50     Types: Cigarettes    Smokeless tobacco: Never   Substance Use Topics    Alcohol use: Yes     Comment: yaritza       Current Outpatient Medications   Medication Sig Dispense Refill    [START ON 9/7/2022] HYDROcodone-acetaminophen (NORCO)  MG per tablet TAKE 1 TABLET BY MOUTH 3 TIMES DAILY AS NEEDED FOR PAIN.  REDUCE DOSES AS PAIN BECOMES MANAGEABLE 90 tablet 0    [START ON 9/7/2022] methylphenidate (RITALIN) 20 MG tablet TAKE 1 TABLET BY MOUTH DAILY 30 tablet 0    pilocarpine (SALAGEN) 7.5 MG tablet TAKE ONE TABLET BY MOUTH 3 TIMES DAILY 90 tablet 3    [START ON 9/7/2022] pregabalin (LYRICA) 300 MG capsule TAKE 1 CAPSULE BY MOUTH TWICE A DAY 60 capsule 5    ondansetron (ZOFRAN-ODT) 4 MG disintegrating tablet Take 1 tablet by mouth 3 times daily as needed for Nausea or Vomiting 30 tablet 0    hydroCHLOROthiazide (HYDRODIURIL) 25 MG tablet TAKE 1 TABLET BY MOUTH ONCE DAILY AS NEEDED 30 tablet 0    DULoxetine (CYMBALTA) 30 MG extended release capsule Take 1 capsule by mouth nightly 90 capsule 2    fluocinolone acetonide (SYNALAR) 0.01 % external solution Apply 10 drops topically at bedtime      nitrofurantoin (MACRODANTIN) 50 MG capsule Take 1 capsule by mouth nightly 90 capsule 1 levETIRAcetam (KEPPRA) 750 MG tablet Take 1 tablet by mouth daily 30 tablet 5    Diapers & Supplies MISC Indications: FX: [id number] Diapers, Chucks, Wipes, and Gloves. 100 each 3    tiZANidine (ZANAFLEX) 4 MG tablet TAKE 3 TABLETS TWICE DAILY AS NEEDED  180 tablet 11    azelastine (ASTELIN) 0.1 % nasal spray USE 2 SPRAYS IN EACH NOSTRIL TWICE DAILY AS DIRECTED 30 mL 0    cetirizine (ZYRTEC) 10 MG tablet Take 10 mg by mouth daily      Catheters MISC Catheter supplies and lubricant 5 times daily G82.20  to [address] [facility] [phone] 450 each 3    glucose monitoring kit (FREESTYLE) monitoring kit 1 kit by Does not apply route daily 1 kit 0    blood glucose monitor strips Test 1 times a day & as needed for symptoms of irregular blood glucose. 100 strip 3    docusate sodium (COLACE) 100 MG capsule Take 200 mg by mouth as needed for Constipation      PROAIR  (90 Base) MCG/ACT inhaler INHALE 1 PUFF INTO THE LUNGS EVERY 6 HOURS AS NEEDED FOR WHEEZING OR SHORTNESS OF BREATH 8.5 g 5    Heparin Lock Flush (HEPARIN FLUSH, 100 UNITS/ML,) 100 UNIT/ML injection 3 mLs by Intercatheter route every 30 days No every 30 days but every 4-6 weeks. Flush port with 300 units heparin with 20 cc normal saline for ocrevus infusion for Multiple sclerosis and NS 20CC 1 Syringe 5    ipratropium-albuterol (DUONEB) 0.5-2.5 (3) MG/3ML SOLN nebulizer solution USE 1 UNIT DOSE IN NEBULIZER EVERY 4 TO 6 HOURS AS NEEDED. 450 mL 11    BACLOFEN, PAIN PUMP REFILL CHARGE, by Implant route continuous Indications: every 3 months      Multiple Vitamins-Minerals (THERAPEUTIC MULTIVITAMIN-MINERALS) tablet Take 1 tablet by mouth daily      glucose monitoring kit (FREESTYLE) monitoring kit 1 kit by Does not apply route daily 1 kit 0    blood glucose monitor strips Test 1 times a day & as needed for symptoms of irregular blood glucose.  100 strip 0    baclofen (LIORESAL) 10 MG tablet Take 1 tablet by mouth 2 times daily as needed (muscle spasms) (Patient taking differently: Take 10 mg by mouth 2 times daily as needed (muscle spasms) Indications: using pump) 60 tablet 5    Sodium Chloride Flush (SALINE FLUSH) 0.9 % SOLN Infuse 20 mLs intravenously every 30 days Not every 30 days but every 4-6 weeks as need with 300 units of heparin to flush port for Infusion of Ocrevus for multiple sclerosis 20 mL 5     No current facility-administered medications for this visit. Allergies   Allergen Reactions    Darvocet [Propoxyphene N-Acetaminophen]      Talking out of her head    Morphine      Category: Allergy;     Morphine And Related Itching and Swelling    Propoxyphene Swelling     Vitals:    09/06/22 1330   BP: 113/74   Pulse: 77     There is no height or weight on file to calculate BMI. Review of Systems   Constitutional: Negative. HENT: Negative. Eyes: Negative. Respiratory: Negative. Cardiovascular: Negative. Gastrointestinal: Negative. Endocrine: Negative. Genitourinary:  Positive for vaginal discharge (with odor). Negative for difficulty urinating, dyspareunia, dysuria, enuresis, frequency, hematuria, menstrual problem, pelvic pain and urgency. Musculoskeletal: Negative. Skin: Negative. Allergic/Immunologic: Negative. Neurological: Negative. Hematological: Negative. Psychiatric/Behavioral: Negative. Physical Exam  Vitals and nursing note reviewed. Constitutional:       General: She is not in acute distress. Appearance: She is well-developed. She is not diaphoretic. HENT:      Head: Normocephalic and atraumatic. Eyes:      Conjunctiva/sclera: Conjunctivae normal.      Pupils: Pupils are equal, round, and reactive to light. Pulmonary:      Effort: Pulmonary effort is normal.   Abdominal:      Tenderness: There is no guarding. Comments: Colostomy with bag in place  Urostomy stump noted, with pad over site for infrequent leakage of urine   Genitourinary:     Comments: [facility] vaginal swab collected. Labia normal, urethra somewhat protruded as was vulvar area, likely from being WC bound, dependent protrusion type  Musculoskeletal:         General: Normal range of motion. Cervical back: Normal range of motion. Comments: Normal ROM in all 4 extremities; normal gait   Skin:     General: Skin is warm and dry. Neurological:      Mental Status: She is alert and oriented to person, place, and time. Motor: No abnormal muscle tone. Coordination: Coordination normal.   Psychiatric:         Behavior: Behavior normal.        Diagnosis Orders   1. Encounter to establish care        2. Vaginal discharge  Miscellaneous Sendout 2      3. Vaginal odor  Miscellaneous Sendout 2      4. Multiple sclerosis ([facility]            MEDICATIONS:  No orders of the defined types were placed in this encounter. ORDERS:  Orders Placed This Encounter   Procedures    Miscellaneous Sendout 2         PLAN:  [facility] sent  We discussed importance of not douching which can destroy healthy bacteria and pH balance  Plan pending. Discussed boric acid supp also to help with pH balance  Over 50% of the total visit time of 30 minutes was spent on counseling and/or coordination of care. All questions were answered and the patient voiced understanding. There are no Patient Instructions on file for this visit.

## 2022-09-08 NOTE — TELEPHONE ENCOUNTER
Requested Prescriptions     Pending Prescriptions Disp Refills    levETIRAcetam (KEPPRA) 750 MG tablet [Pharmacy Med Name: LEVETIRACETAM 750MG TABLET] 30 tablet 5     Sig: TAKE 1 TABLET BY MOUTH DAILY       Last Office Visit: 4/6/2022  Next Office Visit: 9/21/2022  Last Medication Refill: 3/3/22 with 5 RF

## 2022-09-09 RX ORDER — LEVETIRACETAM 750 MG/1
750 TABLET ORAL DAILY
Qty: 30 TABLET | Refills: 5 | Status: ON HOLD | OUTPATIENT
Start: 2022-09-09

## 2022-09-21 ENCOUNTER — OFFICE VISIT (OUTPATIENT)
Dept: NEUROLOGY | Age: 52
End: 2022-09-21
Payer: MEDICAID

## 2022-09-21 DIAGNOSIS — M62.838 MUSCLE SPASTICITY: ICD-10-CM

## 2022-09-21 DIAGNOSIS — R53.1 WEAKNESS: ICD-10-CM

## 2022-09-21 DIAGNOSIS — G35 MS (MULTIPLE SCLEROSIS) (HCC): Primary | ICD-10-CM

## 2022-09-21 DIAGNOSIS — M54.2 PAIN, NECK: ICD-10-CM

## 2022-09-21 DIAGNOSIS — R20.0 NUMBNESS: ICD-10-CM

## 2022-09-21 DIAGNOSIS — G40.909 SEIZURE DISORDER (HCC): ICD-10-CM

## 2022-09-21 DIAGNOSIS — R53.83 OTHER FATIGUE: ICD-10-CM

## 2022-09-21 DIAGNOSIS — M79.621 PAIN IN BOTH UPPER ARMS: ICD-10-CM

## 2022-09-21 DIAGNOSIS — M79.622 PAIN IN BOTH UPPER ARMS: ICD-10-CM

## 2022-09-21 PROCEDURE — 99213 OFFICE O/P EST LOW 20 MIN: CPT | Performed by: PSYCHIATRY & NEUROLOGY

## 2022-09-21 RX ORDER — HYDROCODONE BITARTRATE AND ACETAMINOPHEN 10; 325 MG/1; MG/1
TABLET ORAL
Qty: 90 TABLET | Refills: 0 | Status: SHIPPED | OUTPATIENT
Start: 2022-10-07 | End: 2022-11-02

## 2022-09-21 RX ORDER — METHYLPHENIDATE HYDROCHLORIDE 20 MG/1
TABLET ORAL
Qty: 30 TABLET | Refills: 0 | Status: SHIPPED | OUTPATIENT
Start: 2022-10-07 | End: 2022-11-02

## 2022-09-21 RX ORDER — NALOXONE HYDROCHLORIDE 4 MG/.1ML
1 SPRAY NASAL PRN
Qty: 1 EACH | Refills: 5 | Status: ON HOLD | OUTPATIENT
Start: 2022-09-21

## 2022-09-21 NOTE — PROGRESS NOTES
08/19/2016    Hx of seizure disorder 08/19/2016    Insomnia 06/28/2017    Pressure ulcer of sacral region 06/28/2017    Neck pain 09/06/2017    Seasonal allergic rhinitis 11/26/2018    S/P transmetatarsal amputation of foot, left ([address] 04/17/2019    Constipation due to neurogenic bowel 05/02/2019    Colostomy present ([address] 05/02/2019    Paraplegia ([address] 11/05/2019    Hip pain 12/03/2019    Seizure ([address] 05/26/2020    Smoking 08/25/2020    Encounter for care related to vascular access port 12/10/2020    Urinary incontinence 05/04/2020    Mixed anxiety and depressive disorder 05/04/2020    Malodorous urine 12/14/2020    Arthritis 05/04/2020    Osteoporosis 05/04/2020    Urinary bladder stone 10/16/2020    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/29/2020    Neuropathic 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 ([address] 12/13/2021    Dyspnea 04/29/2021    Marijuana user 04/29/2021    Neurogenic bladder 04/29/2021    Other specified misadventures during surgical and medical care 10/25/2016    Presence of other specified devices 12/10/2020    Renal stone 04/29/2021    Wheelchair bound 04/29/2021    Pressure injury of sacral region, stage 3 ([address] 04/15/2022    Unspecified severe protein-calorie malnutrition ([address] 04/15/2022    Neuropathic ulcer of heel, left, with fat layer exposed ([address] 05/24/2022    Open wound of left thigh 05/24/2022     Resolved Ambulatory Problems     Diagnosis Date Noted    Neuropathic ulcer of right foot, limited to breakdown of skin ([address] 02/01/2016    Sepsis ([address] 08/19/2016    Urinary tract infection 08/19/2016    Hypokalemia 08/20/2016    Hypokalemia 08/20/2016    Abnormal EKG     Encephalopathy 08/25/2016    Elevated troponin level     Neuropathic ulcer of left foot, limited to breakdown of skin ([address] 04/10/2017    Acute bronchitis 06/28/2017    Acute sinusitis 06/28/2017    Open wound of ankle 06/28/2017    Vitamin D deficiency 06/28/2017    Back pain 06/28/2017    Breakdown (mechanical) of cranial or spinal infusion catheter, initial encounter 10/25/2016    CAFL (chronic airflow limitation) ([address] 06/28/2017    Cough 06/28/2017    Encounter for screening for other disorder 06/28/2017    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    Difficult or painful urination 06/28/2017    Headache 06/28/2017    Hyperlipidemia 06/28/2017    Influenza 06/28/2017    Breast lump 06/28/2017    Patient overweight 06/28/2017    Hay fever 06/28/2017    Cobalamin deficiency 06/28/2017    Disease caused by fungus 06/28/2017    Colostomy and enterostomy malfunction ([address] 09/06/2017    Atherosclerosis of native arteries of right leg with ulceration of calf ([address] 11/26/2018    Venous stasis ulcer of left lower extremity ([address] 11/26/2018    Open wound of second toe of left foot 12/11/2018    Type 2 diabetes mellitus with left diabetic foot ulcer ([address] 04/04/2019    Encounter for screening colonoscopy 05/02/2019    Family history of colon cancer 05/02/2019    Surgical wound breakdown 07/24/2019    Pressure ulcer of sacral region, stage 4 ([address] 10/17/2019    Neuropathic foot ulcer, left, limited to breakdown of skin ([address] 05/05/2020    Type 2 diabetes mellitus without complication ([address] 37/12/1599    Skin ulcer of abdominal wall, with fat layer exposed ([address] 06/16/2021    Ulcer of right heel, with fat layer exposed ([address] 12/13/2021    Non-pressure chronic ulcer of right ankle with fat layer exposed ([address] 02/07/2022    Ulcer of great toe ([address] 12/13/2021     Past Medical History:   Diagnosis Date    Ankle wound     Asthma     MS (multiple sclerosis) ([address]        Past Surgical History:   Procedure Laterality Date    BACLOFEN PUMP IMPLANTATION      BREAST BIOPSY Right     neg    COLONOSCOPY N/A 09/27/2019    Dr Brock Childs ileum through ostomy-patent and healthy appearing anastomosis in the right colon-entero colonic anastomosis-10 yr recall    COLOSTOMY      FEMUR FRACTURE SURGERY      Right    FOOT AMPUTATION Left 04/04/2019    LEFT TRANSMET AMPUTATION performed by Lisa Rao MD at [facility] OR    HYSTERECTOMY (CERVIX STATUS UNKNOWN)      OTHER SURGICAL HISTORY      urostomy    OTHER SURGICAL HISTORY      pain pump    [place] INSJ PRPH CTR VAD W/SUBQ PORT UNDER 5 YR N/A 06/26/2018    SINGLE LUMEN PORT PLACEMENT WITH FLUORO performed by Laisha Hills MD at [facility]      TOE AMPUTATION      L last toe    TONSILLECTOMY      URETEROTOMY         Family History   Problem Relation Age of Onset    Cancer Mother         breast    Colon Cancer Mother     Colon Polyps Mother     Breast Cancer Mother     Diabetes Father     High Blood Pressure Father     Cancer Paternal Aunt         breast    Liver Cancer Paternal Aunt     Heart Disease Paternal Grandmother     Esophageal Cancer Neg Hx     Liver Disease Neg Hx     Rectal Cancer Neg Hx     Stomach Cancer Neg Hx        Allergies   Allergen Reactions    Darvocet [Propoxyphene N-Acetaminophen]      Talking out of her head    Morphine      Category:  Allergy;     Morphine And Related Itching and Swelling    Propoxyphene Swelling       Social History     Socioeconomic History    Marital status:      Spouse name: Not on file    Number of children: Not on file    Years of education: Not on file    Highest education level: Not on file   Occupational History    Not on file   Tobacco Use    Smoking status: Some Days     Packs/day: 0.50     Years: 15.00     Pack years: 7.50     Types: Cigarettes    Smokeless tobacco: Never   Vaping Use    Vaping Use: Never used   Substance and Sexual Activity    Alcohol use: Yes     Comment: yaritza    Drug use: Yes     Types: Marijuana Dhiraj Vivienne)     Comment: daily     Sexual activity: Not on file   Other Topics Concern    Not on file   Social History Narrative    Not on file     Social Determinants of Health     Financial Resource Strain: Low Risk     Difficulty of Paying Living Expenses: Not hard at all   Food Insecurity: No Food Insecurity    Worried About Running Out of Food in the Last Year: Never true    Ran Out of Food in the Last Year: Never true   Transportation Needs: Not on file   Physical Activity: Inactive    Days of Exercise per Week: 0 days    Minutes of Exercise per Session: 0 min   Stress: Not on file   Social Connections: Not on file   Intimate Partner Violence: Not on file   Housing Stability: Not on file     Review of Systems     Constitutional - No fever or chills. No diaphoresis or significant fatigue. HENT -  No tinnitus or significant hearing loss. Eyes - no sudden vision change or eye pain  Respiratory - no significant shortness of breath or cough  Cardiovascular - no chest pain No palpitations or significant leg swelling  Gastrointestinal - no abdominal swelling or pain. Genitourinary - No difficulty urinating, dysuria  Musculoskeletal - yes back pain or myalgia. Skin - no color change or rash  Neurologic - No seizures. No lateralizing weakness. Hematologic - no easy bruising or excessive bleeding. Psychiatric - no severe anxiety or nervousness. All other review of systems are negative.        Current Outpatient Medications   Medication Sig Dispense Refill    [START ON 10/7/2022] HYDROcodone-acetaminophen (NORCO)  MG per tablet 1 tid 90 tablet 0    [START ON 10/7/2022] methylphenidate (RITALIN) 20 MG tablet 1 daily 30 tablet 0    naloxone 4 MG/0.1ML LIQD nasal spray 1 spray by Nasal route as needed for Opioid Reversal 1 each 5    levETIRAcetam (KEPPRA) 750 MG tablet TAKE 1 TABLET BY MOUTH DAILY 30 tablet 5    pilocarpine (SALAGEN) 7.5 MG tablet TAKE ONE TABLET BY MOUTH 3 TIMES DAILY 90 tablet 3    pregabalin (LYRICA) 300 MG capsule TAKE 1 CAPSULE BY MOUTH TWICE A DAY 60 capsule 5    ondansetron (ZOFRAN-ODT) 4 MG disintegrating tablet Take 1 tablet by mouth 3 times daily as needed for Nausea or Vomiting 30 tablet 0    hydroCHLOROthiazide (HYDRODIURIL) 25 MG tablet TAKE 1 TABLET BY MOUTH ONCE DAILY AS NEEDED 30 tablet 0    DULoxetine (CYMBALTA) 30 MG extended release capsule Take 1 capsule by mouth nightly 90 capsule 2    fluocinolone acetonide (SYNALAR) 0.01 % external solution Apply 10 drops topically at bedtime      nitrofurantoin (MACRODANTIN) 50 MG capsule Take 1 capsule by mouth nightly 90 capsule 1    Diapers & Supplies MISC Indications: FX: [id number] Diapers, Chucks, Wipes, and Gloves. 100 each 3    tiZANidine (ZANAFLEX) 4 MG tablet TAKE 3 TABLETS TWICE DAILY AS NEEDED  180 tablet 11    azelastine (ASTELIN) 0.1 % nasal spray USE 2 SPRAYS IN EACH NOSTRIL TWICE DAILY AS DIRECTED 30 mL 0    cetirizine (ZYRTEC) 10 MG tablet Take 10 mg by mouth daily      Catheters MISC Catheter supplies and lubricant 5 times daily G82.20  to [address] [facility] [phone] 450 each 3    glucose monitoring kit (FREESTYLE) monitoring kit 1 kit by Does not apply route daily 1 kit 0    blood glucose monitor strips Test 1 times a day & as needed for symptoms of irregular blood glucose. 100 strip 3    docusate sodium (COLACE) 100 MG capsule Take 200 mg by mouth as needed for Constipation      PROAIR  (90 Base) MCG/ACT inhaler INHALE 1 PUFF INTO THE LUNGS EVERY 6 HOURS AS NEEDED FOR WHEEZING OR SHORTNESS OF BREATH 8.5 g 5    Heparin Lock Flush (HEPARIN FLUSH, 100 UNITS/ML,) 100 UNIT/ML injection 3 mLs by Intercatheter route every 30 days No every 30 days but every 4-6 weeks.  Flush port with 300 units heparin with 20 cc normal saline for ocrevus infusion for Multiple sclerosis and NS 20CC 1 Syringe 5    ipratropium-albuterol (DUONEB) 0.5-2.5 (3) MG/3ML SOLN nebulizer solution USE 1 UNIT DOSE IN NEBULIZER EVERY 4 TO 6 HOURS AS NEEDED. 450 mL 11    BACLOFEN, PAIN PUMP REFILL CHARGE, by Implant route continuous Indications: every 3 months      Multiple Vitamins-Minerals (THERAPEUTIC MULTIVITAMIN-MINERALS) tablet Take 1 tablet by mouth daily      glucose monitoring kit (FREESTYLE) monitoring kit 1 kit by Does not apply route daily 1 kit 0    blood glucose monitor strips Test 1 times a day & as needed for symptoms of irregular blood glucose. 100 strip 0    baclofen (LIORESAL) 10 MG tablet Take 1 tablet by mouth 2 times daily as needed (muscle spasms) (Patient taking differently: Take 10 mg by mouth 2 times daily as needed (muscle spasms) Indications: using pump) 60 tablet 5    Sodium Chloride Flush (SALINE FLUSH) 0.9 % SOLN Infuse 20 mLs intravenously every 30 days Not every 30 days but every 4-6 weeks as need with 300 units of heparin to flush port for Infusion of Ocrevus for multiple sclerosis 20 mL 5     No current facility-administered medications for this visit. There were no vitals taken for this visit. Lab Results   Component Value Date    [id number] 05/02/2019     Lab Results   Component Value Date    WBC 6.4 12/07/2021    HGB 14.3 12/07/2021    HCT 47.2 (H) 12/07/2021    MCV 89.1 12/07/2021     12/07/2021     Lab Results   Component Value Date     12/07/2021    K 4.1 12/07/2021     12/07/2021    CO2 26 12/07/2021    BUN 12 12/07/2021    CREATININE 0.3 (L) 12/07/2021    GLUCOSE 88 12/07/2021    CALCIUM 9.4 12/07/2021    PROT 6.6 12/07/2021    LABALBU 4.2 12/07/2021    BILITOT 0.4 12/07/2021    ALKPHOS 108 (H) 12/07/2021    AST 13 12/07/2021    ALT 15 12/07/2021    LABGLOM >60 12/07/2021    GFRAA >59 12/07/2021    GLOB 2.7 08/19/2016     Impression   1.. No foci of abnormal T2 signal or enhancement within the cervical   cord to suggest plaques of multiple sclerosis or mass. 2. Mild bulging of the disc at C5-C6 with uncinate spurring   contributing to neural foraminal narrowing. There is no central   stenosis. The remaining cervical disc levels are unremarkable. Signed by Dr Anabel Manrique on 8/14/2017 5:05 PM           Assessment    ICD-10-CM    1. MS (multiple sclerosis) (HCC)  G35 HYDROcodone-acetaminophen (NORCO)  MG per tablet      2.  Pain, neck  M54.2 HYDROcodone-acetaminophen (NORCO)  MG per tablet      3. Pain in both upper arms  M79.621 HYDROcodone-acetaminophen (NORCO)  MG per tablet    M79.622       4. Other fatigue  R53.83 HYDROcodone-acetaminophen (NORCO)  MG per tablet     methylphenidate (RITALIN) 20 MG tablet      5. Muscle spasticity  M62.838 HYDROcodone-acetaminophen (NORCO)  MG per tablet      6. Seizure disorder ([address]  G40.909       7. Numbness  R20.0       8. Weakness  R53.1             Her neurological examination previously was significant for no movement in the lower extremities. She had some altered sensation in the legs along with some spasticity. She's wheelchair-bound. Her MRI of the brain revealed no new lesions. There was extensive white matter lesions. , Her cervical, thoracic, and lumbosacral spine were relatively unremarkable. She was agreeable to be started on Gilenya with no issues. stable. She had been on Copaxone but came off of this on her own. She denies any clear relapses over the last several years. She'll be referred to physical therapy as well. She will have a CBC and CMP every 3 months. The patient indicated understanding of the management plan. At this time she will be referred to [facility] as per her wishes for some experimental treatments. She is to reduce her Keppra to 750 mg daily as she wants to be at this dose due to fatigue,  She is seeing Dr Virgie Lott who manages baclofen pump. She will be continued  Ritalin to see if this helps at a future. She will be tried on Nuvgil. Lyrica restarted. She will be referred to [facility] for colostomy issues. .her hepatitis B panel was unremkarkable. Her EMG with NCs of the arms was suggestive of an ulnar neuropathy on the left. her MRI of the brain had stable white matter change. Her MRI of the c spine had some minimal disc bulging but no MS plaques Her x rays of her hips due to pain were unremarkable except for bladder stone along with pin in right hip.   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 She is off Ocrevus due to [address] virus. Has reduced Cymbala. .She's to follow-up with me in approximately 3 months and call with any further problems. Medicines are continuing to control symptoms. Continue current regimen. Telephone call 5 minutes  Both parties in [facility] to E&M services    [facility] [place]note 2], was evaluated through a synchronous (real-time) audio-video   encounter. The patient (or guardian if applicable) is aware that this is a billable   service, which includes applicable co-pays. This Virtual Visit was conducted with   patient's (and/or legal guardian's) consent. The visit was conducted pursuant to   the emergency declaration under the [address] [place] [address], [address] [place] [address] authority and the [employer] and   [employer] General Act. Patient identification was verified,   and a caregiver was present when appropriate. The patient was located in a   state where the provider was licensed to provide care. Plan    No orders of the defined types were placed in this encounter. Orders Placed This Encounter   Medications    HYDROcodone-acetaminophen (NORCO)  MG per tablet     Si tid     Dispense:  90 tablet     Refill:  0     Reduce doses taken as pain becomes manageable    methylphenidate (RITALIN) 20 MG tablet     Si daily     Dispense:  30 tablet     Refill:  0    naloxone 4 MG/0.1ML LIQD nasal spray     Si spray by Nasal route as needed for Opioid Reversal     Dispense:  1 each     Refill:  5         Return in about 3 months (around 2022).

## 2022-09-22 ENCOUNTER — HOSPITAL ENCOUNTER (OUTPATIENT)
Dept: WOUND CARE | Age: 52
Discharge: HOME | End: 2022-09-22
Payer: MEDICARE

## 2022-09-22 VITALS
TEMPERATURE: 96.3 F | RESPIRATION RATE: 18 BRPM | DIASTOLIC BLOOD PRESSURE: 73 MMHG | HEART RATE: 68 BPM | SYSTOLIC BLOOD PRESSURE: 104 MMHG

## 2022-09-22 DIAGNOSIS — G35 MULTIPLE SCLEROSIS (HCC): ICD-10-CM

## 2022-09-22 DIAGNOSIS — F17.200 SMOKING: ICD-10-CM

## 2022-09-22 DIAGNOSIS — L97.512 NEUROPATHIC ULCER OF RIGHT FOOT WITH FAT LAYER EXPOSED (HCC): Primary | Chronic | ICD-10-CM

## 2022-09-22 PROBLEM — S71.102A OPEN WOUND OF LEFT THIGH: Status: RESOLVED | Noted: 2022-05-24 | Resolved: 2022-09-22

## 2022-09-22 PROBLEM — L97.422 NEUROPATHIC ULCER OF HEEL, LEFT, WITH FAT LAYER EXPOSED (HCC): Status: RESOLVED | Noted: 2022-05-24 | Resolved: 2022-09-22

## 2022-09-22 PROCEDURE — 99213 OFFICE O/P EST LOW 20 MIN: CPT | Performed by: NURSE PRACTITIONER

## 2022-09-22 PROCEDURE — 99213 OFFICE O/P EST LOW 20 MIN: CPT

## 2022-09-22 PROCEDURE — 97597 DBRDMT OPN WND 1ST 20 CM/<: CPT | Performed by: NURSE PRACTITIONER

## 2022-09-22 PROCEDURE — 97597 DBRDMT OPN WND 1ST 20 CM/<: CPT

## 2022-09-22 RX ORDER — LIDOCAINE HYDROCHLORIDE 20 MG/ML
JELLY TOPICAL ONCE
Status: CANCELLED | OUTPATIENT
Start: 2022-09-22 | End: 2022-09-22

## 2022-09-22 RX ORDER — LIDOCAINE HYDROCHLORIDE 40 MG/ML
SOLUTION TOPICAL ONCE
Status: CANCELLED | OUTPATIENT
Start: 2022-09-22 | End: 2022-09-22

## 2022-09-22 RX ORDER — LIDOCAINE 40 MG/G
CREAM TOPICAL ONCE
Status: CANCELLED | OUTPATIENT
Start: 2022-09-22 | End: 2022-09-22

## 2022-09-22 RX ORDER — LIDOCAINE HYDROCHLORIDE 20 MG/ML
JELLY TOPICAL PRN
Status: DISCONTINUED | OUTPATIENT
Start: 2022-09-22 | End: 2022-09-24 | Stop reason: HOSPADM

## 2022-09-22 RX ORDER — LIDOCAINE 50 MG/G
OINTMENT TOPICAL ONCE
Status: CANCELLED | OUTPATIENT
Start: 2022-09-22 | End: 2022-09-22

## 2022-09-22 ASSESSMENT — VISUAL ACUITY: OU: 1

## 2022-09-22 NOTE — DISCHARGE INSTRUCTIONS
[address] [facility]   Physician Orders and Discharge Instructions  [address] [facility] [place]  [place], [address]  Telephone: [phone]    NAME:  Maria T Velez OF BIRTH:  1970  MEDICAL RECORD NUMBER:  [id number]  DATE:  9/22/2022    Discharge condition: Stable    Discharge to: Home    Left via:Private automobile    Accompanied by:  caregiver    ECF/HHA:     Dressing Orders: Right lateral ankle: Wash with soap and water. Hydrofera blue cut to fit wound size, moisten with saline and place to wound bed, cover with silicone dressing. Change dressing every 2 days. Treatment Orders:  Protein rich diet (unless restricted by your physician); Multivitamin daily; Elevate legs above the level of your heart when sitting 3-4 times daily for at least one hour each time, avoid standing for long periods of time. [address] [place] [address],[address] follow up visit ___________1 week with Abbie__________________  (Please note your next appointment above and if you are unable to keep, kindly give a 24 hour notice. Thank you.)          If you experience any of the following, please call the [address] during business hours:    * Increase in Pain  * Temperature over 101  * Increase in drainage from your wound  * Drainage with a foul odor  * Bleeding  * Increase in swelling  * Need for compression bandage changes due to slippage, breakthrough drainage. If you need medical attention outside of the business hours of the [address] please contact your PCP or go to the nearest emergency room.

## 2022-09-22 NOTE — HOME CARE
[address] f: [phone] f: [phone] p: [phone] [email or web address]      Ordering Center:     [address]  [address] [id number] [phone]  WOUND CARE Dept: [address] NUMBER [phone]    Patient Information:      Clara Melgar [address]   [phone]   : 1970  AGE: 47 y.o. GENDER: female   EPISODE DATE: 2022    Insurance:      PRIMARY INSURANCE:  Plan: MEDICARE PART A AND B  Coverage: MEDICARE  Effective Date: 2022  Group Number: [unfilled]  Subscriber Number: [id number] - (Medicare)    Payer/Plan Subscr  Sex Relation Sub. Ins. ID Effective Group Num   1. [facility] 1970 Female Self [id number]                                    [address]    2.  MEDICAID [place] -* AJAY HERNANDEZ O 1970 Female Self [id number] 1/15/15                                    [address]        Patient Wound Information:      Problem List Items Addressed This Visit          Other    * (Principal) Neuropathic 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 ([address] (Chronic)    Multiple sclerosis ([address] - Primary    Smoking       WOUNDS REQUIRING DRESSING SUPPLIES:     Wound 22 Foot Anterior;Right;Dorsal Wound #1 Right dorsal foot (traumatic) (Active)   Wound Image   22 1444   Wound Etiology Traumatic 22 1444   Dressing Status New dressing applied 22 1525   Wound Cleansed Soap and water 22 1444   Dressing/Treatment Hydrofera blue;Silicone border 82/95/55 1525   Offloading for Diabetic Foot Ulcers Offloading boot 22 1525   Wound Length (cm) 0.5 cm 22 1444   Wound Width (cm) 0.4 cm 22 1444   Wound Depth (cm) 0.1 cm 22 1444   Wound Surface Area (cm^2) 0.2 cm^2 22 1444   Wound Volume (cm^3) 0.02 cm^3 22 1444 Post-Procedure Length (cm) 0.5 cm 09/22/22 1506   Post-Procedure Width (cm) 0.4 cm 09/22/22 1506   Post-Procedure Depth (cm) 0.1 cm 09/22/22 1506   Post-Procedure Surface Area (cm^2) 0.2 cm^2 09/22/22 1506   Post-Procedure Volume (cm^3) 0.02 cm^3 09/22/22 1506   Tunneling Position ___ O'Clock 0 09/22/22 1444   Undermining Starts ___ O'Clock 0 09/22/22 1444   Undermining Ends___ O'Clock 0 09/22/22 1444   Undermining Maxium Distance (cm) 0 09/22/22 1444   Wound Assessment Bleeding;Pink/red 09/22/22 1444   Drainage Amount Moderate 09/22/22 1444   Drainage Description Serosanguinous 09/22/22 1444   Odor None 09/22/22 1444   Audelia-wound Assessment Blanchable erythema; Hyperkeratosis (callous) 09/22/22 1444   Margins Attached edges 09/22/22 1444   Wound Thickness Description not for Pressure Injury Full thickness 09/22/22 1444   Number of days: 0          Supplies Requested :      WOUND #: 1   PRIMARY DRESSING:  Other: hydrofera blue ready silicone border   Cover and Secure with: None     FREQUENCY OF DRESSING CHANGES:  Daily       ADDITIONAL ITEMS:  [] Gloves Small  [x] Gloves Medium [] Gloves Large [] Gloves XLarge  [] Tape 1\" [] Tape 2\" [] Tape 3\"  [] Medipore Tape  [] Saline  [] Skin Prep   [] Adhesive Remover   [] Cotton Tip Applicators   [] Other:    Patient Wound(s) Debrided: [x] Yes if yes please add date 9/22/22   [] No    Debribement Type: Excisional/Sharp and Mechanical     Is the patient currently on an antibiotic for their Wound(s): [] Yes if yes please add name and dose    [x] No    Patient currently being seen by Home Health: [] Yes   [x] No    Duration for needed supplies:  []15  [x]30  []60  []90 Days    Electronically signed by EFREN Alexandra CNP on 9/22/2022 at 3:51 PM     Provider Information:      PROVIDER'S NAME: Jewell SCHWARTZ  NPI: [id number]    Dispense as written only

## 2022-09-22 NOTE — PROGRESS NOTES
Patient Care Team:  Jun Suarez MD as PCP - General (Family Medicine)  Jun Suarez MD as PCP - REHABILITATION [facility]  Junita Nissen, MD as Neurologist (Neurology)  Junita Nissen, MD as Consulting Physician (Neurology)  EFREN Jain as Advanced Practice Nurse (Gastroenterology)    TODAY'S DATE:  9/22/2022     HISTORY of PRESENTILLNESS HPI   Kulwant Ray is a 47 y.o. female who presents today for wound evaluation. She reports she developed a wound on right foot. This started 1 week(s) ago. She believes this is not healing. She has been applying nothing. She has not had  fever or chills. She has a history of multiple sclerosis and foot drop.   Wound Type: neuropathic  Wound Location: right foot  Modifying factors:smoking and neuropathy, foot drop    Patient Active Problem List   Diagnosis Code    Muscle spasticity M62.838    Peripheral vascular disease ([facility]) I73.9    Multiple sclerosis ([facility]) G35    Pain in both upper arms M79.621, M79.622    Numbness R20.0    Other fatigue R53.83    Weakness R53.1    Chronic pain G89.29    Hx of seizure disorder Z86.69    Insomnia G47.00    Pressure ulcer of sacral region L89.159    Neck pain M54.2    Seasonal allergic rhinitis J30.2    S/P transmetatarsal amputation of foot, left ([facility] U08.297    Constipation due to neurogenic bowel K59.00, K59.2    Colostomy present ([facility]) Z93.3    Paraplegia ([facility]) G82.20    Hip pain M25.559    Seizure ([facility]) R56.9    Smoking F17.200    Encounter for care related to vascular access port Z45.2    Urinary incontinence R32    Mixed anxiety and depressive disorder F41.8    Malodorous urine R82.90    Arthritis M19.90    Osteoporosis M81.0    Urinary bladder stone N21.0    Vesicovaginal fistula N82.0    Neuropathic 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 ([facility] L97.512    Dyspnea R06.00    Marijuana user F12.90    Neurogenic bladder N31.9    Other specified misadventures during surgical and medical care Y65.8    Presence of other specified devices Z97.8    Renal stone N20.0    Wheelchair bound Z99.3    Pressure injury of sacral region, stage 3 ([facility] L89.153    Unspecified severe protein-calorie malnutrition ([facility] [address] Roberto Romano is a 47 y.o. female with the following history reviewed and recorded in [facility]:    Current Outpatient Medications   Medication Sig Dispense Refill    [START ON 10/7/2022] HYDROcodone-acetaminophen (NORCO)  MG per tablet 1 tid 90 tablet 0    [START ON 10/7/2022] methylphenidate (RITALIN) 20 MG tablet 1 daily 30 tablet 0    naloxone 4 MG/0.1ML LIQD nasal spray 1 spray by Nasal route as needed for Opioid Reversal 1 each 5    levETIRAcetam (KEPPRA) 750 MG tablet TAKE 1 TABLET BY MOUTH DAILY 30 tablet 5    pilocarpine (SALAGEN) 7.5 MG tablet TAKE ONE TABLET BY MOUTH 3 TIMES DAILY 90 tablet 3    pregabalin (LYRICA) 300 MG capsule TAKE 1 CAPSULE BY MOUTH TWICE A DAY 60 capsule 5    ondansetron (ZOFRAN-ODT) 4 MG disintegrating tablet Take 1 tablet by mouth 3 times daily as needed for Nausea or Vomiting 30 tablet 0    hydroCHLOROthiazide (HYDRODIURIL) 25 MG tablet TAKE 1 TABLET BY MOUTH ONCE DAILY AS NEEDED 30 tablet 0    DULoxetine (CYMBALTA) 30 MG extended release capsule Take 1 capsule by mouth nightly 90 capsule 2    fluocinolone acetonide (SYNALAR) 0.01 % external solution Apply 10 drops topically at bedtime      nitrofurantoin (MACRODANTIN) 50 MG capsule Take 1 capsule by mouth nightly 90 capsule 1    Diapers & Supplies MISC Indications: FX: [id number] Diapers, Chucks, Wipes, and Gloves.  100 each 3    tiZANidine (ZANAFLEX) 4 MG tablet TAKE 3 TABLETS TWICE DAILY AS NEEDED  180 tablet 11    azelastine (ASTELIN) 0.1 % nasal spray USE 2 SPRAYS IN EACH NOSTRIL TWICE DAILY AS DIRECTED 30 mL 0    cetirizine (ZYRTEC) 10 MG tablet Take 10 mg by mouth daily      Catheters MISC Catheter supplies and lubricant 5 times daily G82.20  to [address] [facility] [phone] 450 each 3    glucose monitoring kit (FREESTYLE) monitoring kit 1 kit by Does not apply route daily 1 kit 0    blood glucose monitor strips Test 1 times a day & as needed for symptoms of irregular blood glucose. 100 strip 3    docusate sodium (COLACE) 100 MG capsule Take 200 mg by mouth as needed for Constipation      PROAIR  (90 Base) MCG/ACT inhaler INHALE 1 PUFF INTO THE LUNGS EVERY 6 HOURS AS NEEDED FOR WHEEZING OR SHORTNESS OF BREATH 8.5 g 5    Heparin Lock Flush (HEPARIN FLUSH, 100 UNITS/ML,) 100 UNIT/ML injection 3 mLs by Intercatheter route every 30 days No every 30 days but every 4-6 weeks. Flush port with 300 units heparin with 20 cc normal saline for ocrevus infusion for Multiple sclerosis and NS 20CC 1 Syringe 5    ipratropium-albuterol (DUONEB) 0.5-2.5 (3) MG/3ML SOLN nebulizer solution USE 1 UNIT DOSE IN NEBULIZER EVERY 4 TO 6 HOURS AS NEEDED. 450 mL 11    BACLOFEN, PAIN PUMP REFILL CHARGE, by Implant route continuous Indications: every 3 months      Multiple Vitamins-Minerals (THERAPEUTIC MULTIVITAMIN-MINERALS) tablet Take 1 tablet by mouth daily      glucose monitoring kit (FREESTYLE) monitoring kit 1 kit by Does not apply route daily 1 kit 0    blood glucose monitor strips Test 1 times a day & as needed for symptoms of irregular blood glucose.  100 strip 0    baclofen (LIORESAL) 10 MG tablet Take 1 tablet by mouth 2 times daily as needed (muscle spasms) (Patient taking differently: Take 10 mg by mouth 2 times daily as needed (muscle spasms) Indications: using pump) 60 tablet 5    Sodium Chloride Flush (SALINE FLUSH) 0.9 % SOLN Infuse 20 mLs intravenously every 30 days Not every 30 days but every 4-6 weeks as need with 300 units of heparin to flush port for Infusion of Ocrevus for multiple sclerosis 20 mL 5     Current Facility-Administered Medications   Medication Dose Route Frequency Provider Last Rate Last Admin    lidocaine (XYLOCAINE) 2 % uro-jet   Topical PRN Porfirio Hernandez, APRN - CNP         Allergies: Darvocet [propoxyphene n-acetaminophen], Morphine, Morphine and related, and Propoxyphene  Past Medical History:   Diagnosis Date    Ankle wound     and toe wound    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 related to ms    Sepsis ([facility] 8/19/2016    Weakness 8/4/2016       Past Surgical History:   Procedure Laterality Date    BACLOFEN PUMP IMPLANTATION      BREAST BIOPSY Right     neg    COLONOSCOPY N/A 09/27/2019    Dr Mulugeta German ileum through ostomy-patent and healthy appearing anastomosis in the right colon-entero colonic anastomosis-10 yr recall    COLOSTOMY      FEMUR FRACTURE SURGERY      Right    FOOT AMPUTATION Left 04/04/2019    LEFT TRANSMET AMPUTATION performed by Claus Lott MD at [address] (CERVIX STATUS UNKNOWN)      OTHER SURGICAL HISTORY      urostomy    OTHER SURGICAL HISTORY      pain pump    IA INSJ PRPH CTR VAD W/SUBQ PORT UNDER 5 YR N/A 06/26/2018    SINGLE LUMEN PORT PLACEMENT WITH FLUORO performed by Slein John MD at [address]      TOE AMPUTATION      L last toe    TONSILLECTOMY      URETEROTOMY       Family History   Problem Relation Age of Onset    Cancer Mother         breast    Colon Cancer Mother     Colon Polyps Mother     Breast Cancer Mother     Diabetes Father     High Blood Pressure Father     Cancer Paternal Aunt         breast    Liver Cancer Paternal Aunt     Heart Disease Paternal Grandmother     Esophageal Cancer Neg Hx     Liver Disease Neg Hx     Rectal Cancer Neg Hx Stomach Cancer Neg Hx      Social History     Tobacco Use    Smoking status: Some Days     Packs/day: 0.50     Years: 15.00     Pack years: 7.50     Types: Cigarettes    Smokeless tobacco: Never   Substance Use Topics    Alcohol use: Yes     Comment: yaritza         Review of Systems    Review of Systems   Skin:  Positive for wound. All other systems reviewed and are negative. All other review of systems are negative. Physical Exam    /73   Pulse 68   Temp (!) 96.3 °F (35.7 °C) (Temporal)   Resp 18     Physical Exam  Vitals reviewed. Constitutional:       Appearance: Normal appearance. She is normal weight. HENT:      Head: Normocephalic and atraumatic. Right Ear: External ear normal.      Left Ear: External ear normal.   Eyes:      General: Lids are normal. Lids are everted, no foreign bodies appreciated. Vision grossly intact. Gaze aligned appropriately. Cardiovascular:      Rate and Rhythm: Normal rate and regular rhythm. Pulses: Normal pulses. Heart sounds: Normal heart sounds. Pulmonary:      Effort: Pulmonary effort is normal.      Breath sounds: Normal breath sounds. Abdominal:      General: Bowel sounds are normal.   Musculoskeletal:        Feet:       Comments: Limited mobility   Skin:     General: Skin is warm and dry. Capillary Refill: Capillary refill takes 2 to 3 seconds. Neurological:      Mental Status: She is alert and oriented to person, place, and time. Psychiatric:         Mood and Affect: Mood normal.         Behavior: Behavior normal.         Thought Content: Thought content normal.         Judgment: Judgment normal.           Post Debridement Measurements and Assessment:    The patientspain is   . Wound is has improved. Please refer to nursing measurements and assessment regarding wound pre and postdebridement.     Wound 09/22/22 Foot Anterior;Right;Dorsal Wound #1 Right dorsal foot (traumatic) (Active)   Wound Image   09/22/22 0201 Wound Etiology Traumatic 09/22/22 1444   Dressing Status New dressing applied 09/22/22 1525   Wound Cleansed Soap and water 09/22/22 1444   Dressing/Treatment Hydrofera blue;Silicone border 62/48/92 1525   Offloading for Diabetic Foot Ulcers Offloading boot 09/22/22 1525   Wound Length (cm) 0.5 cm 09/22/22 1444   Wound Width (cm) 0.4 cm 09/22/22 1444   Wound Depth (cm) 0.1 cm 09/22/22 1444   Wound Surface Area (cm^2) 0.2 cm^2 09/22/22 1444   Wound Volume (cm^3) 0.02 cm^3 09/22/22 1444   Post-Procedure Length (cm) 0.5 cm 09/22/22 1506   Post-Procedure Width (cm) 0.4 cm 09/22/22 1506   Post-Procedure Depth (cm) 0.1 cm 09/22/22 1506   Post-Procedure Surface Area (cm^2) 0.2 cm^2 09/22/22 1506   Post-Procedure Volume (cm^3) 0.02 cm^3 09/22/22 1506   Tunneling Position ___ O'Clock 0 09/22/22 1444   Undermining Starts ___ O'Clock 0 09/22/22 1444   Undermining Ends___ O'Clock 0 09/22/22 1444   Undermining Maxium Distance (cm) 0 09/22/22 1444   Wound Assessment Bleeding;Pink/red 09/22/22 1444   Drainage Amount Moderate 09/22/22 1444   Drainage Description Serosanguinous 09/22/22 1444   Odor None 09/22/22 1444   Audelia-wound Assessment Blanchable erythema; Hyperkeratosis (callous) 09/22/22 1444   Margins Attached edges 09/22/22 1444   Wound Thickness Description not for Pressure Injury Full thickness 09/22/22 1444   Number of days: 0            Debridement: Selective Debridement/Non-Excisional Debridement    Using curette the wound(s)/ulcer(s) was/were sharply debrided down through and including the removal of epidermis and dermis.         Devitalized Tissue Debrided:  fibrin, biofilm, slough, and exudate    Pre Debridement Measurements:  Are located in the Wound/Ulcer Documentation Flow Sheet    Wound/Ulcer #: 1    Post Debridement Measurements:  Wound/Ulcer Descriptions are Pre Debridement except measurements:          Percent of Wound(s)/Ulcer(s) Debrided: 100%    Total Surface Area Debrided:  0.2 sq cm Diabetic/Pressure/Non Pressure Ulcers only:  Ulcer: N/A     Estimated Blood Loss:  Minimal    Hemostasis Achieved:  by pressure    Procedural Pain:  0  / 10     Post Procedural Pain:  0 / 10     Response to treatment:  Well tolerated by patient. Assessment    1. Multiple sclerosis (HCC)    2. Smoking    3. Neuropathic 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 ([address]          Plan for wound - Dress per physician order  Treatment:     Compression : No   Offloading : Yes   Dressing : hydrofera blue ready silicone border    Discussed importance of nutrition, offloading, wound care, and plan of care. Patient understanding and questions answered. I spent a total of  20 minutes face to face with the patient. Over 90% of that time was spent on counseling and care coordination. Patient was told that if symptoms worsen or new symptoms develop they are to go to the emergency department immediately. Patient was educated on diagnosis and treatment plan. All of patient's questions were answered, and the patient understands the discharge plan. Discussed appropriate home care of this wound. Wound redressed. Patient instructions were given. Recommend no smoking  Offloading instructions given    [address]  [facility]   Physician Orders and Discharge Instructions  [address] [place] [address]  Telephone: [phone]    NAME:  Yamil Nielson OF BIRTH:  1970  MEDICAL RECORD NUMBER:  [id number]  DATE:  9/22/2022    Discharge condition: Stable    Discharge to: Home    Left via:Private automobile    Accompanied by:  caregiver    ECF/HHA:     Dressing Orders: Right lateral ankle: Wash with soap and water. Hydrofera blue cut to fit wound size, moisten with saline and place to wound bed, cover with silicone dressing. Change dressing every 2 days.     Treatment Orders:  Protein rich diet (unless restricted by

## 2022-09-26 ENCOUNTER — TELEPHONE (OUTPATIENT)
Dept: WOUND CARE | Age: 52
End: 2022-09-26

## 2022-09-26 NOTE — DISCHARGE INSTRUCTIONS
[address] [facility]   Physician Orders and Discharge Instructions  [address] [place] [address] [facility]  Telephone: [phone]    NAME:  Karolina Corbett OF BIRTH:  1970  MEDICAL RECORD NUMBER:  [id number]  DATE:  9/26/2022    Discharge condition: {STABLE/UNSTABLE:981605330}    Discharge to: {CHP Wound Discharge To:87593}    Left via:{Left RTA:03556}    Accompanied by:  {:[id number]}    ECF/HHA: [facility]    Dressing Orders: Right lateral ankle: Wash with soap and water. Hydrofera blue cut to fit wound size, moisten with saline and place to wound bed, cover with silicone dressing. Change dressing every 2 days. Treatment Orders:   Protein rich diet (unless restricted by your physician); Multivitamin daily; Elevate legs above the level of your heart when sitting 3-4 times daily for at least one hour each time, avoid standing for long periods of time. [facility] follow up visit _____________________________  (Please note your next appointment above and if you are unable to keep, kindly give a 24 hour notice. Thank you.)          If you experience any of the following, please call the [facility] during business hours:    * Increase in Pain  * Temperature over 101  * Increase in drainage from your wound  * Drainage with a foul odor  * Bleeding  * Increase in swelling  * Need for compression bandage changes due to slippage, breakthrough drainage. If you need medical attention outside of the business hours of the [address] [place] [address] [address] please contact your PCP or go to the nearest emergency room.

## 2022-09-26 NOTE — TELEPHONE ENCOUNTER
Patient on schedule with other provider, requested appointment with Dr. Obed Reddy. Patient stated she wanted to have backside checked to make sure she does not have any places, she has noted a bony area. Stated she has papers that need to be filled out re: DFE and would like Dr. Obed Reddy to see those papers. Appointment scheduled for 10/3/22 at [phone], for bed with lift.

## 2022-09-26 NOTE — PROGRESS NOTES
Patient on schedule with other provider, requested appointment with Dr. Chelsie Pyle. Patient stated she wanted to have backside checked to make sure she does not have any places, she has noted a bony area. Stated she has papers that need to be filled out re: DFE and would like Dr. Chelsie Pyle to see those papers. Appointment scheduled for 10/3/22 at [phone], for bed with lift.

## 2022-09-28 ENCOUNTER — HOSPITAL ENCOUNTER (OUTPATIENT)
Dept: WOUND CARE | Age: 52
Discharge: HOME | End: 2022-09-28

## 2022-10-03 ENCOUNTER — HOSPITAL ENCOUNTER (OUTPATIENT)
Dept: WOUND CARE | Age: 52
Discharge: HOME | End: 2022-10-03
Payer: MEDICARE

## 2022-10-03 VITALS
HEART RATE: 60 BPM | DIASTOLIC BLOOD PRESSURE: 82 MMHG | RESPIRATION RATE: 18 BRPM | TEMPERATURE: 97 F | SYSTOLIC BLOOD PRESSURE: 109 MMHG

## 2022-10-03 DIAGNOSIS — L97.512 NEUROPATHIC ULCER OF RIGHT FOOT WITH FAT LAYER EXPOSED (HCC): Primary | ICD-10-CM

## 2022-10-03 PROCEDURE — 6370000000 HC RX 637 (ALT 250 FOR IP): Performed by: NURSE PRACTITIONER

## 2022-10-03 PROCEDURE — 97597 DBRDMT OPN WND 1ST 20 CM/<: CPT

## 2022-10-03 PROCEDURE — 97597 DBRDMT OPN WND 1ST 20 CM/<: CPT | Performed by: SURGERY

## 2022-10-03 RX ORDER — LIDOCAINE 40 MG/G
CREAM TOPICAL ONCE
OUTPATIENT
Start: 2022-10-03 | End: 2022-10-03

## 2022-10-03 RX ORDER — LIDOCAINE HYDROCHLORIDE 40 MG/ML
SOLUTION TOPICAL ONCE
OUTPATIENT
Start: 2022-10-03 | End: 2022-10-03

## 2022-10-03 RX ORDER — LIDOCAINE HYDROCHLORIDE 20 MG/ML
JELLY TOPICAL ONCE
OUTPATIENT
Start: 2022-10-03 | End: 2022-10-03

## 2022-10-03 RX ORDER — LIDOCAINE HYDROCHLORIDE 20 MG/ML
JELLY TOPICAL ONCE
Status: COMPLETED | OUTPATIENT
Start: 2022-10-03 | End: 2022-10-03

## 2022-10-03 RX ORDER — LIDOCAINE 50 MG/G
OINTMENT TOPICAL ONCE
OUTPATIENT
Start: 2022-10-03 | End: 2022-10-03

## 2022-10-03 RX ORDER — LIDOCAINE HYDROCHLORIDE 20 MG/ML
JELLY TOPICAL ONCE
Status: CANCELLED | OUTPATIENT
Start: 2022-10-03 | End: 2022-10-03

## 2022-10-03 RX ADMIN — LIDOCAINE HYDROCHLORIDE: 20 JELLY TOPICAL at 14:21

## 2022-10-03 NOTE — DISCHARGE INSTRUCTIONS
[address] [facility]   Physician Orders and Discharge Instructions  [address] [facility] [place]  [place], [address]  Telephone: [phone]    NAME:  Rosemary Castañeda OF BIRTH:  1970  MEDICAL RECORD NUMBER:  [id number]  DATE:  10/3/2022    Discharge condition: Stable    Discharge to: Home    Left via:Private automobile    Accompanied by: Care Giver     Dressing Orders: Right lateral ankle:   Wash with soap and water. Hydrofera blue cut to fit wound size, moisten with saline and place to wound bed, cover with silicone dressing. Change dressing every 2 days. Treatment Orders:  Protein rich diet (unless restricted by your physician); Multivitamin daily; Elevate legs above the level of your heart when sitting 3-4 times daily for at least one hour each time,  Wear heel lift boots    [facility] follow up visit ____________2 weeks_________________  (Please note your next appointment above and if you are unable to keep, kindly give a 24 hour notice. Thank you.)    If you experience any of the following, please call the [facility] during business hours:    * Increase in Pain  * Temperature over 101  * Increase in drainage from your wound  * Drainage with a foul odor  * Bleeding  * Increase in swelling  * Need for compression bandage changes due to slippage, breakthrough drainage. If you need medical attention outside of the business hours of the [facility] [address] please contact your PCP or go to the nearest emergency room.

## 2022-10-03 NOTE — PLAN OF CARE
Problem: Chronic Conditions and Co-morbidities  Goal: Patient's chronic conditions and co-morbidity symptoms are monitored and maintained or improved  Outcome: Progressing     Problem: Discharge Planning  Goal: Discharge to home or other facility with appropriate resources  Outcome: Progressing     Problem: Wound:  Goal: Will show signs of wound healing; wound closure and no evidence of infection  Description: Will show signs of wound healing; wound closure and no evidence of infection  Outcome: Progressing     Problem: Smoking cessation:  Goal: Ability to formulate a plan to maintain a tobacco-free life will be supported  Description: Ability to formulate a plan to maintain a tobacco-free life will be supported  Outcome: Progressing     Problem: Weight control:  Goal: Ability to maintain an optimal weight for height and age will be supported  Description: Ability to maintain an optimal weight for height and age will be supported  Outcome: Progressing

## 2022-10-03 NOTE — PROGRESS NOTES
SURGICAL HISTORY      urostomy    OTHER SURGICAL HISTORY      pain pump    NE INSJ PRPH CTR VAD W/SUBQ PORT UNDER 5 YR N/A 06/26/2018    SINGLE LUMEN PORT PLACEMENT WITH FLUORO performed by Lindy Ibarra MD at [facility]      TOE AMPUTATION      L last toe    TONSILLECTOMY      URETEROTOMY         FAMILY HISTORY    Family History   Problem Relation Age of Onset    Cancer Mother         breast    Colon Cancer Mother     Colon Polyps Mother     Breast Cancer Mother     Diabetes Father     High Blood Pressure Father     Cancer Paternal Aunt         breast    Liver Cancer Paternal Aunt     Heart Disease Paternal Grandmother     Esophageal Cancer Neg Hx     Liver Disease Neg Hx     Rectal Cancer Neg Hx     Stomach Cancer Neg Hx        SOCIAL HISTORY    Social History     Tobacco Use    Smoking status: Some Days     Packs/day: 0.50     Years: 15.00     Pack years: 7.50     Types: Cigarettes    Smokeless tobacco: Never   Vaping Use    Vaping Use: Never used   Substance Use Topics    Alcohol use: Yes     Comment: yaritza    Drug use: Yes     Types: Marijuana Nik Shoemaker)     Comment: daily        ALLERGIES    Allergies   Allergen Reactions    Darvocet [Propoxyphene N-Acetaminophen]      Talking out of her head    Morphine      Category:  Allergy;     Morphine And Related Itching and Swelling    Propoxyphene Swelling       MEDICATIONS    Current Outpatient Medications on File Prior to Encounter   Medication Sig Dispense Refill    [START ON 10/7/2022] HYDROcodone-acetaminophen (NORCO)  MG per tablet 1 tid 90 tablet 0    [START ON 10/7/2022] methylphenidate (RITALIN) 20 MG tablet 1 daily 30 tablet 0    naloxone 4 MG/0.1ML LIQD nasal spray 1 spray by Nasal route as needed for Opioid Reversal 1 each 5    levETIRAcetam (KEPPRA) 750 MG tablet TAKE 1 TABLET BY MOUTH DAILY 30 tablet 5    pilocarpine (SALAGEN) 7.5 MG tablet TAKE ONE TABLET BY MOUTH 3 TIMES DAILY 90 tablet 3    pregabalin (LYRICA) 300 MG capsule TAKE 1 CAPSULE BY MOUTH TWICE A DAY 60 capsule 5    ondansetron (ZOFRAN-ODT) 4 MG disintegrating tablet Take 1 tablet by mouth 3 times daily as needed for Nausea or Vomiting 30 tablet 0    hydroCHLOROthiazide (HYDRODIURIL) 25 MG tablet TAKE 1 TABLET BY MOUTH ONCE DAILY AS NEEDED 30 tablet 0    DULoxetine (CYMBALTA) 30 MG extended release capsule Take 1 capsule by mouth nightly 90 capsule 2    fluocinolone acetonide (SYNALAR) 0.01 % external solution Apply 10 drops topically at bedtime      nitrofurantoin (MACRODANTIN) 50 MG capsule Take 1 capsule by mouth nightly 90 capsule 1    Diapers & Supplies MISC Indications: FX: [id number] Diapers, Chucks, Wipes, and Gloves. 100 each 3    tiZANidine (ZANAFLEX) 4 MG tablet TAKE 3 TABLETS TWICE DAILY AS NEEDED  180 tablet 11    azelastine (ASTELIN) 0.1 % nasal spray USE 2 SPRAYS IN EACH NOSTRIL TWICE DAILY AS DIRECTED 30 mL 0    cetirizine (ZYRTEC) 10 MG tablet Take 10 mg by mouth daily      Catheters MISC Catheter supplies and lubricant 5 times daily G82.20  to [address] [facility] [place] [phone] 450 each 3    glucose monitoring kit (FREESTYLE) monitoring kit 1 kit by Does not apply route daily 1 kit 0    blood glucose monitor strips Test 1 times a day & as needed for symptoms of irregular blood glucose. 100 strip 3    docusate sodium (COLACE) 100 MG capsule Take 200 mg by mouth as needed for Constipation      PROAIR  (90 Base) MCG/ACT inhaler INHALE 1 PUFF INTO THE LUNGS EVERY 6 HOURS AS NEEDED FOR WHEEZING OR SHORTNESS OF BREATH 8.5 g 5    Heparin Lock Flush (HEPARIN FLUSH, 100 UNITS/ML,) 100 UNIT/ML injection 3 mLs by Intercatheter route every 30 days No every 30 days but every 4-6 weeks.  Flush port with 300 units heparin with 20 cc normal saline for ocrevus infusion for Multiple sclerosis and NS 20CC 1 Syringe 5    ipratropium-albuterol (DUONEB) 0.5-2.5 (3) MG/3ML SOLN nebulizer solution USE 1 UNIT DOSE IN NEBULIZER EVERY 4 TO 6 HOURS AS NEEDED. 450 mL 11 BACLOFEN, PAIN PUMP REFILL CHARGE, by Implant route continuous Indications: every 3 months      Multiple Vitamins-Minerals (THERAPEUTIC MULTIVITAMIN-MINERALS) tablet Take 1 tablet by mouth daily      glucose monitoring kit (FREESTYLE) monitoring kit 1 kit by Does not apply route daily 1 kit 0    blood glucose monitor strips Test 1 times a day & as needed for symptoms of irregular blood glucose. 100 strip 0    baclofen (LIORESAL) 10 MG tablet Take 1 tablet by mouth 2 times daily as needed (muscle spasms) (Patient taking differently: Take 10 mg by mouth 2 times daily as needed (muscle spasms) Indications: using pump) 60 tablet 5    Sodium Chloride Flush (SALINE FLUSH) 0.9 % SOLN Infuse 20 mLs intravenously every 30 days Not every 30 days but every 4-6 weeks as need with 300 units of heparin to flush port for Infusion of Ocrevus for multiple sclerosis 20 mL 5     No current facility-administered medications on file prior to encounter. REVIEW OF SYSTEMS    A comprehensive review of systems was negative.     Objective:      /82   Pulse 60   Temp 97 °F (36.1 °C) (Temporal)   Resp 18     Wt Readings from Last 3 Encounters:   07/12/22 139 lb (63 kg)   06/21/22 139 lb (63 kg)   05/24/22 140 lb (63.5 kg)       PHYSICAL EXAM    General Appearance: alert and oriented to person, place and time, well developed and well- nourished, in no acute distress  Skin: warm and dry, no rash or erythema  Head: normocephalic and atraumatic  Eyes: pupils equal, round, and reactive to light, extraocular eye movements intact, conjunctivae normal  ENT: tympanic membrane, external ear and ear canal normal bilaterally, nose without deformity, nasal mucosa and turbinates normal without polyps, lips teeth and gums normal  Neck: supple and non-tender without mass, no thyromegaly or thyroid nodules, no cervical lymphadenopathy  Pulmonary/Chest: clear to auscultation bilaterally- no wheezes, rales or rhonchi, normal air movement, no respiratory distress  Cardiovascular: normal rate, regular rhythm, normal S1 and S2, no murmurs, rubs, clicks, or gallops, distal pulses intact, no carotid bruits  Abdomen: soft, non-tender, non-distended, normal bowel sounds, no masses or organomegaly  Extremities: no cyanosis, clubbing or edema      Assessment:      Problem List Items Addressed This Visit       * (Principal) 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 Performed by: Elvi Lara MD    Consent obtained:  Yes    Time out taken:  Yes    Pain Control: Anesthetic  Anesthetic: 2% Lidocaine Gel Topical       Debridement:Non-excisional Debridement    Using #15 blade scalpel and forceps the wound(s)/ulcer(s) was/were sharply debrided down through and including the removal of epidermis and dermis.         Devitalized Tissue Debrided:  fibrin, biofilm, slough, necrotic/eschar, and exudate      Pre Debridement Measurements:  Are located in the Wound/Ulcer Documentation Flow Sheet    Wound/Ulcer #: 1    Percent of Wound(s)/Ulcer(s) Debrided: 100%    Total Surface Area Debrided:  1.7 sq cm       Diabetic/Pressure/Non Pressure Ulcers only:  Ulcer: Non-Pressure ulcer, fat layer exposed             Post Debridement Measurements:    Wound/Ulcer Descriptions are Pre Debridement --EXCEPT MEASUREMENTS    Wound 09/22/22 Foot Anterior;Right;Dorsal Wound #1 Right dorsal foot (traumatic) (Active)   Wound Image   10/03/22 1418   Wound Etiology Traumatic 10/03/22 1418   Dressing Status Old drainage noted 10/03/22 1418   Wound Cleansed Soap and water 10/03/22 1418   Dressing/Treatment Hydrofera blue;Silicone border 28/57/07 1525   Offloading for Diabetic Foot Ulcers Offloading boot 09/22/22 1525 Wound Length (cm) 1.3 cm 10/03/22 1418   Wound Width (cm) 1.3 cm 10/03/22 1418   Wound Depth (cm) 0.1 cm 10/03/22 1418   Wound Surface Area (cm^2) 1.69 cm^2 10/03/22 1418   Change in Wound Size % (l*w) -745 10/03/22 1418   Wound Volume (cm^3) 0.169 cm^3 10/03/22 1418   Wound Healing % -745 10/03/22 1418   Post-Procedure Length (cm) 1.3 cm 10/03/22 1422   Post-Procedure Width (cm) 1.3 cm 10/03/22 1422   Post-Procedure Depth (cm) 0.1 cm 10/03/22 1422   Post-Procedure Surface Area (cm^2) 1.69 cm^2 10/03/22 1422   Post-Procedure Volume (cm^3) 0.169 cm^3 10/03/22 1422   Tunneling Position ___ O'Clock 0 09/22/22 1444   Undermining Starts ___ O'Clock 0 09/22/22 1444   Undermining Ends___ O'Clock 0 09/22/22 1444   Undermining Maxium Distance (cm) 0 09/22/22 1444   Wound Assessment Pink/red;Eschar dry 10/03/22 1418   Drainage Amount Small 10/03/22 1418   Drainage Description Serosanguinous 10/03/22 1418   Odor None 10/03/22 1418   Audelia-wound Assessment Intact; Hemosiderin staining (brown yellow) 10/03/22 1418   Margins Attached edges 10/03/22 1418   Wound Thickness Description not for Pressure Injury Full thickness 10/03/22 1418   Number of days: 10             Estimated Blood Loss:  Minimal    Hemostasis Achieved:  by pressure    Procedural Pain:  0  / 10     Post Procedural Pain:  0 / 10     Response to treatment:  Well tolerated by patient. 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          Discharge Instructions           [address] and Hyper[facility]ic Oxygen Therapy Physician Orders and Discharge Instructions  [facility] [address]  Corine Esposito  Telephone: [phone]    NAME:  Meera Sauceda OF BIRTH:  1970  MEDICAL RECORD NUMBER:  [id number]  DATE:  10/3/2022    Discharge condition: Stable    Discharge to: Home    Left via:Private automobile    Accompanied by: Care Giver     Dressing Orders: Right lateral ankle:   Wash with soap and water. Hydrofera blue cut to fit wound size, moisten with saline and place to wound bed, cover with silicone dressing. Change dressing every 2 days. Treatment Orders:  Protein rich diet (unless restricted by your physician); Multivitamin daily; Elevate legs above the level of your heart when sitting 3-4 times daily for at least one hour each time,    [address] [place] [address],[address] follow up visit _____________________________  (Please note your next appointment above and if you are unable to keep, kindly give a 24 hour notice. Thank you.)    If you experience any of the following, please call the [address] [place] [address] [facility]:    * Increase in Pain  * Temperature over 101  * Increase in drainage from your wound  * Drainage with a foul odor  * Bleeding  * Increase in swelling  * Need for compression bandage changes due to slippage, breakthrough drainage. If you need medical attention outside of the business hours of the [address] [place] [address] Road please contact your PCP or go to the nearest emergency room.          Electronically signed by Kitty Macias MD on 10/3/2022 at 2:26 PM

## 2022-10-07 ENCOUNTER — HOSPITAL ENCOUNTER (OUTPATIENT)
Dept: WOMENS IMAGING | Age: 52
Discharge: HOME | End: 2022-10-07
Payer: MEDICARE

## 2022-10-07 DIAGNOSIS — Z12.31 ENCOUNTER FOR SCREENING MAMMOGRAM FOR MALIGNANT NEOPLASM OF BREAST: ICD-10-CM

## 2022-10-07 PROCEDURE — 77063 BREAST TOMOSYNTHESIS BI: CPT

## 2022-10-19 RX ORDER — NITROFURANTOIN MACROCRYSTALS 50 MG/1
50 CAPSULE ORAL NIGHTLY
Qty: 90 CAPSULE | Refills: 1 | Status: ON HOLD | OUTPATIENT
Start: 2022-10-19

## 2022-10-19 NOTE — TELEPHONE ENCOUNTER
Tyron Stokes called to request a refill on her medication.       Last office visit : 7/19/2022   Next office visit : 1/19/2023     Requested Prescriptions     Pending Prescriptions Disp Refills    nitrofurantoin (MACRODANTIN) 50 MG capsule [Pharmacy Med Name: NITROFURANTOIN MACROCRYSTALS 50MG CAPSULE] 90 capsule 1     Sig: TAKE 1 CAPSULE BY MOUTH STEPHANIE Silva [place]

## 2022-10-24 ENCOUNTER — HOSPITAL ENCOUNTER (OUTPATIENT)
Dept: WOUND CARE | Age: 52
Discharge: HOME | End: 2022-10-24
Payer: MEDICARE

## 2022-10-24 VITALS
TEMPERATURE: 97.6 F | HEART RATE: 74 BPM | SYSTOLIC BLOOD PRESSURE: 103 MMHG | DIASTOLIC BLOOD PRESSURE: 74 MMHG | RESPIRATION RATE: 18 BRPM

## 2022-10-24 DIAGNOSIS — G82.20 PARAPLEGIA (HCC): Chronic | ICD-10-CM

## 2022-10-24 DIAGNOSIS — S96.901S OPEN WOUND OF RIGHT ANKLE WITH TENDON INVOLVEMENT, SEQUELA: ICD-10-CM

## 2022-10-24 DIAGNOSIS — L97.512 NEUROPATHIC ULCER OF RIGHT FOOT WITH FAT LAYER EXPOSED (HCC): Primary | ICD-10-CM

## 2022-10-24 DIAGNOSIS — S91.001S OPEN WOUND OF RIGHT ANKLE WITH TENDON INVOLVEMENT, SEQUELA: ICD-10-CM

## 2022-10-24 PROCEDURE — 6370000000 HC RX 637 (ALT 250 FOR IP): Performed by: NURSE PRACTITIONER

## 2022-10-24 PROCEDURE — 97597 DBRDMT OPN WND 1ST 20 CM/<: CPT | Performed by: SURGERY

## 2022-10-24 PROCEDURE — 97597 DBRDMT OPN WND 1ST 20 CM/<: CPT

## 2022-10-24 RX ORDER — LIDOCAINE HYDROCHLORIDE 20 MG/ML
JELLY TOPICAL ONCE
Status: COMPLETED | OUTPATIENT
Start: 2022-10-24 | End: 2022-10-24

## 2022-10-24 RX ORDER — LIDOCAINE HYDROCHLORIDE 40 MG/ML
SOLUTION TOPICAL ONCE
Status: CANCELLED | OUTPATIENT
Start: 2022-10-24 | End: 2022-10-24

## 2022-10-24 RX ORDER — LIDOCAINE 50 MG/G
OINTMENT TOPICAL ONCE
Status: CANCELLED | OUTPATIENT
Start: 2022-10-24 | End: 2022-10-24

## 2022-10-24 RX ORDER — LIDOCAINE HYDROCHLORIDE 20 MG/ML
JELLY TOPICAL ONCE
Status: CANCELLED | OUTPATIENT
Start: 2022-10-24 | End: 2022-10-24

## 2022-10-24 RX ORDER — LIDOCAINE 40 MG/G
CREAM TOPICAL ONCE
Status: CANCELLED | OUTPATIENT
Start: 2022-10-24 | End: 2022-10-24

## 2022-10-24 RX ADMIN — LIDOCAINE HYDROCHLORIDE: 20 JELLY TOPICAL at 13:11

## 2022-10-24 NOTE — PROGRESS NOTES
[address]   Progress Note and Procedure Note      Mima Siddiqui  MEDICAL RECORD NUMBER:  [id number]  AGE: 47 y.o. GENDER: female  : 1970  EPISODE DATE:  10/24/2022    Subjective:     Chief Complaint   Patient presents with    Wound Check     Right foot wound         HISTORY of PRESENT ILLNESS HPI     Mima Siddiqui is a 47 y.o. female who presents today for wound/ulcer evaluation.    Wound Context: Pt with R ant foot wound here for eval/treat  Wound/Ulcer Pain Timing/Severity: none  Quality of pain: N/A  Severity:  0 / 10   Modifying Factors: None  Associated Signs/Symptoms: none    Ulcer Identification:  Ulcer Type: neuropathic  Contributing Factors: decreased mobility    Wound:  neuropathic        PAST MEDICAL HISTORY        Diagnosis Date    Ankle wound     and toe wound    Aptyalism 2017    Arthritis 2020    Asthma     Back pain 2017    Binocular vision disorder with diplopia 2017    CAFL (chronic airflow limitation) ([address] 2017    Hay fever 2017    Headache 2017    MS (multiple sclerosis) ([address]     Muscle spasticity     Neuropathic ulcer of left foot, limited to breakdown of skin ([address] 4/10/2017    Numbness 2016    Open wound of ankle 2017    Open wound of second toe of left foot 2018    Peripheral vascular disease ([address]     Seizure ([address]     occ. related to ms    Sepsis ([address] 2016    Weakness 2016       PAST SURGICAL HISTORY    Past Surgical History:   Procedure Laterality Date    BACLOFEN PUMP IMPLANTATION      BREAST BIOPSY Right     neg    COLONOSCOPY N/A 2019    Dr Mary Ann Frank ileum through ostomy-patent and healthy appearing anastomosis in the right colon-entero colonic anastomosis-10 yr recall    COLOSTOMY      FEMUR FRACTURE SURGERY      Right    FOOT AMPUTATION Left 2019    LEFT TRANSMET AMPUTATION performed by Kathie Myers MD at [address] [facility] ([facility])      OTHER SURGICAL HISTORY      urostomy    OTHER SURGICAL HISTORY      pain pump    SD INSJ PRPH CTR VAD W/SUBQ PORT UNDER 5 YR N/A 06/26/2018    SINGLE LUMEN PORT PLACEMENT WITH FLUORO performed by Anay Gaffney MD at [facility]      TOE AMPUTATION      L last toe    TONSILLECTOMY      URETEROTOMY         FAMILY HISTORY    Family History   Problem Relation Age of Onset    Cancer Mother         breast    Colon Cancer Mother     Colon Polyps Mother     Breast Cancer Mother     Diabetes Father     High Blood Pressure Father     Heart Disease Paternal Grandmother     Breast Cancer Paternal Aunt     Cancer Paternal Aunt         breast    Liver Cancer Paternal Aunt     Esophageal Cancer Neg Hx     Liver Disease Neg Hx     Rectal Cancer Neg Hx     Stomach Cancer Neg Hx        SOCIAL HISTORY    Social History     Tobacco Use    Smoking status: Every Day     Packs/day: 0.50     Years: 15.00     Pack years: 7.50     Types: Cigarettes    Smokeless tobacco: Never    Tobacco comments:     Less than 10 per day   Vaping Use    Vaping Use: Never used   Substance Use Topics    Alcohol use: Yes     Comment: yaritza    Drug use: Yes     Types: Marijuana Renato Escalante     Comment: daily        ALLERGIES    Allergies   Allergen Reactions    Darvocet [Propoxyphene N-Acetaminophen]      Talking out of her head    Morphine      Category:  Allergy;     Morphine And Related Itching and Swelling    Propoxyphene Swelling       MEDICATIONS    Current Outpatient Medications on File Prior to Encounter   Medication Sig Dispense Refill    nitrofurantoin (MACRODANTIN) 50 MG capsule TAKE 1 CAPSULE BY MOUTH NIGHTLY 90 capsule 1    naloxone 4 MG/0.1ML LIQD nasal spray 1 spray by Nasal route as needed for Opioid Reversal 1 each 5    levETIRAcetam (KEPPRA) 750 MG tablet TAKE 1 TABLET BY MOUTH DAILY 30 tablet 5    pilocarpine (SALAGEN) 7.5 MG tablet TAKE ONE TABLET BY MOUTH 3 TIMES DAILY 90 tablet 3    pregabalin (LYRICA) 300 MG capsule TAKE 1 CAPSULE BY MOUTH TWICE A DAY 60 capsule 5    ondansetron (ZOFRAN-ODT) 4 MG disintegrating tablet Take 1 tablet by mouth 3 times daily as needed for Nausea or Vomiting 30 tablet 0    hydroCHLOROthiazide (HYDRODIURIL) 25 MG tablet TAKE 1 TABLET BY MOUTH ONCE DAILY AS NEEDED 30 tablet 0    DULoxetine (CYMBALTA) 30 MG extended release capsule Take 1 capsule by mouth nightly 90 capsule 2    fluocinolone acetonide (SYNALAR) 0.01 % external solution Apply 10 drops topically at bedtime      Diapers & Supplies MISC Indications: FX: [phone] Diapers, Chucks, Wipes, and Gloves. 100 each 3    tiZANidine (ZANAFLEX) 4 MG tablet TAKE 3 TABLETS TWICE DAILY AS NEEDED  180 tablet 11    azelastine (ASTELIN) 0.1 % nasal spray USE 2 SPRAYS IN EACH NOSTRIL TWICE DAILY AS DIRECTED 30 mL 0    cetirizine (ZYRTEC) 10 MG tablet Take 10 mg by mouth daily      Catheters MISC Catheter supplies and lubricant 5 times daily G82.20  to [address] [facility] [phone] 450 each 3    glucose monitoring kit (FREESTYLE) monitoring kit 1 kit by Does not apply route daily 1 kit 0    blood glucose monitor strips Test 1 times a day & as needed for symptoms of irregular blood glucose. 100 strip 3    docusate sodium (COLACE) 100 MG capsule Take 200 mg by mouth as needed for Constipation      PROAIR  (90 Base) MCG/ACT inhaler INHALE 1 PUFF INTO THE LUNGS EVERY 6 HOURS AS NEEDED FOR WHEEZING OR SHORTNESS OF BREATH 8.5 g 5    Heparin Lock Flush (HEPARIN FLUSH, 100 UNITS/ML,) 100 UNIT/ML injection 3 mLs by Intercatheter route every 30 days No every 30 days but every 4-6 weeks.  Flush port with 300 units heparin with 20 cc normal saline for ocrevus infusion for Multiple sclerosis and NS 20CC 1 Syringe 5    ipratropium-albuterol (DUONEB) 0.5-2.5 (3) MG/3ML SOLN nebulizer solution USE 1 UNIT DOSE IN NEBULIZER EVERY 4 TO 6 HOURS AS NEEDED. 450 mL 11    BACLOFEN, PAIN PUMP REFILL CHARGE, by Implant route continuous Indications: every 3 months      Multiple Vitamins-Minerals (THERAPEUTIC MULTIVITAMIN-MINERALS) tablet Take 1 tablet by mouth daily      glucose monitoring kit (FREESTYLE) monitoring kit 1 kit by Does not apply route daily 1 kit 0    blood glucose monitor strips Test 1 times a day & as needed for symptoms of irregular blood glucose. 100 strip 0    baclofen (LIORESAL) 10 MG tablet Take 1 tablet by mouth 2 times daily as needed (muscle spasms) (Patient taking differently: Take 10 mg by mouth 2 times daily as needed (muscle spasms) Indications: using pump) 60 tablet 5    Sodium Chloride Flush (SALINE FLUSH) 0.9 % SOLN Infuse 20 mLs intravenously every 30 days Not every 30 days but every 4-6 weeks as need with 300 units of heparin to flush port for Infusion of Ocrevus for multiple sclerosis 20 mL 5     No current facility-administered medications on file prior to encounter. REVIEW OF SYSTEMS    A comprehensive review of systems was negative.     Objective:      /74   Pulse 74   Temp 97.6 °F (36.4 °C) (Temporal)   Resp 18     Wt Readings from Last 3 Encounters:   07/12/22 139 lb (63 kg)   06/21/22 139 lb (63 kg)   05/24/22 140 lb (63.5 kg)       PHYSICAL EXAM    General Appearance: alert and oriented to person, place and time, well developed and well- nourished, in no acute distress  Skin: warm and dry, no rash or erythema  Head: normocephalic and atraumatic  Eyes: pupils equal, round, and reactive to light, extraocular eye movements intact, conjunctivae normal  ENT: tympanic membrane, external ear and ear canal normal bilaterally, nose without deformity, nasal mucosa and turbinates normal without polyps, lips teeth and gums normal  Neck: supple and non-tender without mass, no thyromegaly or thyroid nodules, no cervical lymphadenopathy  Pulmonary/Chest: clear to auscultation bilaterally- no wheezes, rales or rhonchi, normal air movement, no respiratory distress  Cardiovascular: normal rate, regular rhythm, normal S1 and S2, no murmurs, rubs, clicks, or gallops, distal pulses intact, no carotid bruits  Abdomen: soft, non-tender, non-distended, normal bowel sounds, no masses or organomegaly  Extremities: no cyanosis, clubbing or edema      Assessment:      Problem List Items Addressed This Visit       Paraplegia ([address] (Chronic)    * (Principal) 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 Performed by: Sergei Medrano MD    Consent obtained:  Yes    Time out taken:  Yes    Pain Control: Anesthetic  Anesthetic: 2% Lidocaine Gel Topical       Debridement:Non-excisional Debridement    Using #15 blade scalpel and forceps the wound(s)/ulcer(s) was/were sharply debrided down through and including the removal of epidermis and dermis.         Devitalized Tissue Debrided:  fibrin, biofilm, slough, necrotic/eschar, and exudate      Pre Debridement Measurements:  Are located in the Wound/Ulcer Documentation Flow Sheet    Wound/Ulcer #: 1    Percent of Wound(s)/Ulcer(s) Debrided: 100%    Total Surface Area Debrided:  2.55 sq cm       Diabetic/Pressure/Non Pressure Ulcers only:  Ulcer: Pressure ulcer, Stage 3           Post Debridement Measurements:    Wound/Ulcer Descriptions are Pre Debridement --EXCEPT MEASUREMENTS    Wound 09/22/22 Foot Anterior;Right;Dorsal Wound #1 Right dorsal foot (traumatic) (Active)   Wound Image   10/24/22 1308   Wound Etiology Traumatic 10/24/22 1308   Dressing Status Old drainage noted 10/24/22 1308   Wound Cleansed Soap and water 10/24/22 1308   Dressing/Treatment Hydrofera blue;Silicone border 75/83/11 1450   Offloading for Diabetic Foot Ulcers Offloading boot 10/24/22 1308   Wound Length (cm) 1.5 cm 10/24/22 1308   Wound Width (cm) 1.7 cm 10/24/22 1308   Wound Depth (cm) 0.2 cm 10/24/22 1308   Wound Surface Area (cm^2) 2.55 cm^2 10/24/22 1308   Change in Wound Size % (l*w) -1175 10/24/22 1308   Wound Volume (cm^3) 0.51 cm^3 10/24/22 1308   Wound Healing % -2450 10/24/22 1308   Post-Procedure Length (cm) 1.5 cm 10/24/22 1326   Post-Procedure Width (cm) 1.7 cm 10/24/22 1326   Post-Procedure Depth (cm) 0.2 cm 10/24/22 1326   Post-Procedure Surface Area (cm^2) 2.55 cm^2 10/24/22 1326   Post-Procedure Volume (cm^3) 0.51 cm^3 10/24/22 1326   Tunneling Position ___ O'Clock 0 09/22/22 1444   Undermining Starts ___ O'Clock 0 09/22/22 1444   Undermining Ends___ O'Clock 0 09/22/22 1444   Undermining Maxium Distance (cm) 0 09/22/22 1444   Wound Assessment Pink/red;Slough 10/24/22 1308   Drainage Amount Small 10/24/22 1308   Drainage Description Serosanguinous 10/24/22 1308   Odor None 10/24/22 1308   Audelia-wound Assessment Intact; Hemosiderin staining (brown yellow) 10/24/22 1308   Margins Attached edges 10/24/22 1308   Wound Thickness Description not for Pressure Injury Full thickness 10/24/22 1308   Number of days: 31             Estimated Blood Loss:  Minimal    Hemostasis Achieved:  by pressure    Procedural Pain:  0  / 10     Post Procedural Pain:  0 / 10     Response to treatment:  Well tolerated by patient. 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   Will check CAMILO and XR  Culture and change to aquacel AG+  RTO next week    Treatment Note please see attached Discharge Instructions    In my professional opinion this patient would benefit from HBO Therapy: No    Written patient dismissal instructions given to patient and signed by patient or POA. Discharge [address] and Hyperbaric Oxygen Therapy   Physician Orders and Discharge Instructions  [address] [facility] [address] [place] A [place]ine [place]  Telephone: [phone]    NAME:  Jaycob Pickett:  1970  MEDICAL RECORD NUMBER:  [id number]  DATE:  10/24/2022    Discharge condition: Stable    Discharge to: Home    Left via:Private automobile    Accompanied by: Care Giver      Dressing Orders: Right lateral ankle:   Wash with soap and water. Aquacel cut to fit wound size, moisten with saline and place to wound bed, cover with silicone dressing. Change dressing every 2 days. CAMILO on [place] in [address] [place] [address] St Test will be done in [place] [address] Appointment to follow test at    Culture taken today in [facility]    Treatment Orders:  Protein rich diet (unless restricted by your physician); Multivitamin daily; Elevate legs above the level of your heart when sitting 3-4 times daily for at least one hour each time,     [facility] follow up visit ____________2 weeks_________________  (Please note your next appointment above and if you are unable to keep, kindly give a 24 hour notice. Thank you.)    If you experience any of the following, please call the [address] [place] [address] [facility]:    * Increase in Pain  * Temperature over 101  * Increase in drainage from your wound  * Drainage with a foul odor  * Bleeding  * Increase in swelling  * Need for compression bandage changes due to slippage, breakthrough drainage. If you need medical attention outside of the business hours of the [email or web address] please contact your PCP or go to the nearest emergency room.           Electronically signed by Justice Soto MD on 10/24/2022 at 1:28 PM

## 2022-10-24 NOTE — DISCHARGE INSTRUCTIONS
[address] and Hyperbaric Oxygen Therapy   Physician Orders and Discharge Instructions  [address] [facility]  [place], [address]  Telephone: [phone]    NAME:  Leandro Riojas OF BIRTH:  1970  MEDICAL RECORD NUMBER:  [id number]  DATE:  10/24/2022    Discharge condition: Stable    Discharge to: Home    Left via:Private automobile    Accompanied by: Care Giver      Dressing Orders: Right lateral ankle:   Wash with soap and water. Aquacel cut to fit wound size, moisten with saline and place to wound bed, cover with silicone dressing. Change dressing every 2 days. CAMILO on Monday 10/31 [place] in [address] Test will be done in [address] at 2:00  Wound Care Appointment to follow test at  2:30  Culture taken today in [facility]    Treatment Orders:  Protein rich diet (unless restricted by your physician); Multivitamin daily; Elevate legs above the level of your heart when sitting 3-4 times daily for at least one hour each time,     [facility] follow up visit _________1 week________________  (Please note your next appointment above and if you are unable to keep, kindly give a 24 hour notice. Thank you.)    If you experience any of the following, please call the [facility] during business hours:    * Increase in Pain  * Temperature over 101  * Increase in drainage from your wound  * Drainage with a foul odor  * Bleeding  * Increase in swelling  * Need for compression bandage changes due to slippage, breakthrough drainage. If you need medical attention outside of the business hours of the [facility] please contact your PCP or go to the nearest emergency room.

## 2022-10-29 LAB
ANAEROBIC CULTURE: ABNORMAL
GRAM STAIN RESULT: ABNORMAL
ORGANISM: ABNORMAL
WOUND/ABSCESS: ABNORMAL

## 2022-10-31 ENCOUNTER — HOSPITAL ENCOUNTER (OUTPATIENT)
Dept: WOUND CARE | Age: 52
Discharge: HOME | End: 2022-10-31

## 2022-10-31 ENCOUNTER — HOSPITAL ENCOUNTER (OUTPATIENT)
Dept: GENERAL RADIOLOGY | Age: 52
Discharge: HOME | DRG: 637 | End: 2022-10-31
Payer: MEDICARE

## 2022-10-31 ENCOUNTER — HOSPITAL ENCOUNTER (OUTPATIENT)
Dept: NON INVASIVE DIAGNOSTICS | Age: 52
Discharge: HOME | DRG: 637 | End: 2022-10-31
Payer: MEDICARE

## 2022-10-31 ENCOUNTER — TELEPHONE (OUTPATIENT)
Dept: WOUND CARE | Age: 52
End: 2022-10-31

## 2022-10-31 DIAGNOSIS — L97.512 NEUROPATHIC ULCER OF RIGHT FOOT WITH FAT LAYER EXPOSED (HCC): Primary | Chronic | ICD-10-CM

## 2022-10-31 DIAGNOSIS — L97.512 NEUROPATHIC ULCER OF RIGHT FOOT WITH FAT LAYER EXPOSED (HCC): ICD-10-CM

## 2022-10-31 DIAGNOSIS — L97.512 NEUROPATHIC ULCER OF RIGHT FOOT WITH FAT LAYER EXPOSED (HCC): Primary | ICD-10-CM

## 2022-10-31 DIAGNOSIS — G82.20 PARAPLEGIA (HCC): Chronic | ICD-10-CM

## 2022-10-31 PROCEDURE — 93923 UPR/LXTR ART STDY 3+ LVLS: CPT

## 2022-10-31 PROCEDURE — 73610 X-RAY EXAM OF ANKLE: CPT | Performed by: RADIOLOGY

## 2022-10-31 PROCEDURE — 73610 X-RAY EXAM OF ANKLE: CPT

## 2022-10-31 NOTE — DISCHARGE INSTRUCTIONS
[address] and Hyperbaric Oxygen Therapy   Physician Orders and Discharge Instructions  [address] [facility]  Telephone: [phone]    NAME:  Irene Mcburney OF BIRTH:  1970  MEDICAL RECORD NUMBER:  [id number]  DATE:  10/31/2022    Discharge condition: Stable    Discharge to: Home    Left via:Private automobile    Accompanied by: Caregiver     Dressing Orders: Right lateral ankle:   Wash with soap and water. Aquacel cut to fit wound size, moisten with saline and place to wound bed, cover with silicone dressing. Change dressing every 2 days. CAMILO on [place] in [address] [place] [address] St Test will be done in [place] [address] Appointment to follow test at    Culture taken today in [facility]     Treatment Orders:  Protein rich diet (unless restricted by your physician); Multivitamin daily; Elevate legs above the level of your heart when sitting 3-4 times daily for at least one hour each time,    [facility] follow up visit _____________________________  (Please note your next appointment above and if you are unable to keep, kindly give a 24 hour notice. Thank you.)    If you experience any of the following, please call the [facility] during business hours:    * Increase in Pain  * Temperature over 101  * Increase in drainage from your wound  * Drainage with a foul odor  * Bleeding  * Increase in swelling  * Need for compression bandage changes due to slippage, breakthrough drainage. If you need medical attention outside of the business hours of the [facility] please contact your PCP or go to the nearest emergency room.

## 2022-10-31 NOTE — TELEPHONE ENCOUNTER
Discussed with patient need for iv antibiotics for her infected R anterior ankle wound. She is paraplegic and needs abx TID for 5-7 days.   Pt agrees and understands and will come to hospital in the morning for eval/treat

## 2022-11-01 ENCOUNTER — HOSPITAL ENCOUNTER (EMERGENCY)
Age: 52
Discharge: LEFT BEFORE DISCHARGE | End: 2022-11-01

## 2022-11-01 VITALS
SYSTOLIC BLOOD PRESSURE: 114 MMHG | TEMPERATURE: 98.7 F | BODY MASS INDEX: 19.49 KG/M2 | RESPIRATION RATE: 18 BRPM | OXYGEN SATURATION: 98 % | WEIGHT: 132 LBS | DIASTOLIC BLOOD PRESSURE: 75 MMHG | HEART RATE: 70 BPM

## 2022-11-01 DIAGNOSIS — M79.622 PAIN IN BOTH UPPER ARMS: ICD-10-CM

## 2022-11-01 DIAGNOSIS — R53.83 OTHER FATIGUE: ICD-10-CM

## 2022-11-01 DIAGNOSIS — M62.838 MUSCLE SPASTICITY: ICD-10-CM

## 2022-11-01 DIAGNOSIS — G35 MS (MULTIPLE SCLEROSIS) (HCC): ICD-10-CM

## 2022-11-01 DIAGNOSIS — M54.2 PAIN, NECK: ICD-10-CM

## 2022-11-01 DIAGNOSIS — M79.621 PAIN IN BOTH UPPER ARMS: ICD-10-CM

## 2022-11-01 NOTE — TELEPHONE ENCOUNTER
Requested Prescriptions     Pending Prescriptions Disp Refills    methylphenidate (RITALIN) 20 MG tablet [Pharmacy Med Name: METHYLPHENIDATE HYDROCHLORIDE 20MG TABLET] 30 tablet 0     Sig: TAKE 1 TABLET BY MOUTH DAILY    HYDROcodone-acetaminophen ([address] [facility] [address])  MG per tablet [Pharmacy Med Name: HYDROCODONE BITARTRATE/ACETAMINOPHEN 325MG-10MG TABLET] 90 tablet 0     Sig: TAKE 1 TABLET BY MOUTH 3 TIMES  A DAY       Last Office Visit:  9/21/2022  Next Office Visit:  12/21/2022  Last Medication Refill: 10/7/2022 with 0 JEFFREY Reese up to date:  11/1/2022    *RX updated to reflect   11/6/2022  fill date*  Tor Patient

## 2022-11-02 ENCOUNTER — HOSPITAL ENCOUNTER (INPATIENT)
Age: 52
LOS: 8 days | Discharge: HOME | DRG: 637 | End: 2022-11-10
Attending: EMERGENCY MEDICINE | Admitting: HOSPITALIST
Payer: MEDICARE

## 2022-11-02 ENCOUNTER — TELEPHONE (OUTPATIENT)
Dept: NEUROLOGY | Age: 52
End: 2022-11-02

## 2022-11-02 DIAGNOSIS — L08.9 INFECTED WOUND: Primary | ICD-10-CM

## 2022-11-02 DIAGNOSIS — T14.8XXA INFECTED WOUND: Primary | ICD-10-CM

## 2022-11-02 PROBLEM — S91.001S ANKLE WOUND, RIGHT, SEQUELA: Status: ACTIVE | Noted: 2022-11-02

## 2022-11-02 LAB
ALBUMIN SERPL-MCNC: 3.6 G/DL (ref 3.5–5.2)
ALP BLD-CCNC: 106 U/L (ref 35–104)
ALT SERPL-CCNC: 14 U/L (ref 5–33)
ANION GAP SERPL CALCULATED.3IONS-SCNC: 9 MMOL/L (ref 7–19)
AST SERPL-CCNC: 11 U/L (ref 5–32)
BILIRUB SERPL-MCNC: 0.3 MG/DL (ref 0.2–1.2)
BUN BLDV-MCNC: 13 MG/DL (ref 6–20)
CALCIUM SERPL-MCNC: 8.6 MG/DL (ref 8.6–10)
CHLORIDE BLD-SCNC: 110 MMOL/L (ref 98–111)
CO2: 23 MMOL/L (ref 22–29)
CREAT SERPL-MCNC: 0.3 MG/DL (ref 0.5–0.9)
GFR SERPL CREATININE-BSD FRML MDRD: >60 ML/MIN/{1.73_M2}
GLUCOSE BLD-MCNC: 90 MG/DL (ref 74–109)
HBA1C MFR BLD: 4.9 % (ref 4–6)
HCT VFR BLD CALC: 39.2 % (ref 37–47)
HEMOGLOBIN: 12.9 G/DL (ref 12–16)
LACTIC ACID: 0.6 MMOL/L (ref 0.5–1.9)
MCH RBC QN AUTO: 28.9 PG (ref 27–31)
MCHC RBC AUTO-ENTMCNC: 32.9 G/DL (ref 33–37)
MCV RBC AUTO: 87.7 FL (ref 81–99)
PDW BLD-RTO: 14.1 % (ref 11.5–14.5)
PLATELET # BLD: 171 K/UL (ref 130–400)
PMV BLD AUTO: 11.1 FL (ref 9.4–12.3)
POTASSIUM REFLEX MAGNESIUM: 3.6 MMOL/L (ref 3.5–5)
RBC # BLD: 4.47 M/UL (ref 4.2–5.4)
SARS-COV-2, NAAT: NOT DETECTED
SODIUM BLD-SCNC: 142 MMOL/L (ref 136–145)
TOTAL PROTEIN: 5.5 G/DL (ref 6.6–8.7)
TSH SERPL DL<=0.05 MIU/L-ACNC: 1.58 UIU/ML (ref 0.27–4.2)
WBC # BLD: 4.8 K/UL (ref 4.8–10.8)

## 2022-11-02 PROCEDURE — 2580000003 HC RX 258

## 2022-11-02 PROCEDURE — 84443 ASSAY THYROID STIM HORMONE: CPT

## 2022-11-02 PROCEDURE — 87635 SARS-COV-2 COVID-19 AMP PRB: CPT

## 2022-11-02 PROCEDURE — 80177 DRUG SCRN QUAN LEVETIRACETAM: CPT

## 2022-11-02 PROCEDURE — 36415 COLL VENOUS BLD VENIPUNCTURE: CPT

## 2022-11-02 PROCEDURE — 83605 ASSAY OF LACTIC ACID: CPT

## 2022-11-02 PROCEDURE — 80053 COMPREHEN METABOLIC PANEL: CPT

## 2022-11-02 PROCEDURE — 6360000002 HC RX W HCPCS: Performed by: EMERGENCY MEDICINE

## 2022-11-02 PROCEDURE — 87040 BLOOD CULTURE FOR BACTERIA: CPT

## 2022-11-02 PROCEDURE — 83036 HEMOGLOBIN GLYCOSYLATED A1C: CPT

## 2022-11-02 PROCEDURE — 2580000003 HC RX 258: Performed by: EMERGENCY MEDICINE

## 2022-11-02 PROCEDURE — 1210000000 HC MED SURG R&B

## 2022-11-02 PROCEDURE — 6360000002 HC RX W HCPCS

## 2022-11-02 PROCEDURE — 6370000000 HC RX 637 (ALT 250 FOR IP)

## 2022-11-02 PROCEDURE — 99285 EMERGENCY DEPT VISIT HI MDM: CPT

## 2022-11-02 PROCEDURE — 85027 COMPLETE CBC AUTOMATED: CPT

## 2022-11-02 RX ORDER — PILOCARPINE HYDROCHLORIDE 5 MG/1
7.5 TABLET, FILM COATED ORAL 2 TIMES DAILY
Status: DISCONTINUED | OUTPATIENT
Start: 2022-11-02 | End: 2022-11-10 | Stop reason: HOSPADM

## 2022-11-02 RX ORDER — METHYLPHENIDATE HYDROCHLORIDE 20 MG/1
TABLET ORAL
Qty: 30 TABLET | Refills: 0 | Status: SHIPPED | OUTPATIENT
Start: 2022-11-06 | End: 2022-12-06

## 2022-11-02 RX ORDER — SODIUM CHLORIDE 0.9 % (FLUSH) 0.9 %
5-40 SYRINGE (ML) INJECTION EVERY 12 HOURS SCHEDULED
Status: DISCONTINUED | OUTPATIENT
Start: 2022-11-02 | End: 2022-11-10 | Stop reason: HOSPADM

## 2022-11-02 RX ORDER — ENOXAPARIN SODIUM 100 MG/ML
40 INJECTION SUBCUTANEOUS DAILY
Status: DISCONTINUED | OUTPATIENT
Start: 2022-11-02 | End: 2022-11-10 | Stop reason: HOSPADM

## 2022-11-02 RX ORDER — MEROPENEM AND SODIUM CHLORIDE 1 G/50ML
1000 INJECTION, SOLUTION INTRAVENOUS ONCE
Status: COMPLETED | OUTPATIENT
Start: 2022-11-02 | End: 2022-11-02

## 2022-11-02 RX ORDER — TIZANIDINE 4 MG/1
TABLET ORAL
Qty: 180 TABLET | Refills: 11 | Status: SHIPPED | OUTPATIENT
Start: 2022-11-02

## 2022-11-02 RX ORDER — ALBUTEROL SULFATE 2.5 MG/3ML
2.5 SOLUTION RESPIRATORY (INHALATION) EVERY 6 HOURS PRN
Status: DISCONTINUED | OUTPATIENT
Start: 2022-11-02 | End: 2022-11-10 | Stop reason: HOSPADM

## 2022-11-02 RX ORDER — HYDROCODONE BITARTRATE AND ACETAMINOPHEN 10; 325 MG/1; MG/1
TABLET ORAL
Qty: 90 TABLET | Refills: 0 | Status: SHIPPED | OUTPATIENT
Start: 2022-11-06 | End: 2022-12-06

## 2022-11-02 RX ORDER — HYDROCODONE BITARTRATE AND ACETAMINOPHEN 10; 325 MG/1; MG/1
1 TABLET ORAL EVERY 8 HOURS PRN
Status: DISCONTINUED | OUTPATIENT
Start: 2022-11-02 | End: 2022-11-10 | Stop reason: HOSPADM

## 2022-11-02 RX ORDER — PREGABALIN 150 MG/1
300 CAPSULE ORAL 2 TIMES DAILY
Status: DISCONTINUED | OUTPATIENT
Start: 2022-11-02 | End: 2022-11-10 | Stop reason: HOSPADM

## 2022-11-02 RX ORDER — DOCUSATE SODIUM 100 MG/1
200 CAPSULE, LIQUID FILLED ORAL PRN
Status: DISCONTINUED | OUTPATIENT
Start: 2022-11-02 | End: 2022-11-10 | Stop reason: HOSPADM

## 2022-11-02 RX ORDER — CETIRIZINE HYDROCHLORIDE 10 MG/1
10 TABLET ORAL DAILY
Status: DISCONTINUED | OUTPATIENT
Start: 2022-11-02 | End: 2022-11-10 | Stop reason: HOSPADM

## 2022-11-02 RX ORDER — MEROPENEM AND SODIUM CHLORIDE 1 G/50ML
1000 INJECTION, SOLUTION INTRAVENOUS EVERY 8 HOURS
Status: DISCONTINUED | OUTPATIENT
Start: 2022-11-03 | End: 2022-11-07

## 2022-11-02 RX ORDER — M-VIT,TX,IRON,MINS/CALC/FOLIC 27MG-0.4MG
1 TABLET ORAL DAILY
Status: DISCONTINUED | OUTPATIENT
Start: 2022-11-02 | End: 2022-11-10 | Stop reason: HOSPADM

## 2022-11-02 RX ORDER — ONDANSETRON 4 MG/1
4 TABLET, ORALLY DISINTEGRATING ORAL EVERY 8 HOURS PRN
Status: DISCONTINUED | OUTPATIENT
Start: 2022-11-02 | End: 2022-11-10 | Stop reason: HOSPADM

## 2022-11-02 RX ORDER — DULOXETIN HYDROCHLORIDE 30 MG/1
30 CAPSULE, DELAYED RELEASE ORAL NIGHTLY
Status: DISCONTINUED | OUTPATIENT
Start: 2022-11-02 | End: 2022-11-10 | Stop reason: HOSPADM

## 2022-11-02 RX ORDER — ONDANSETRON 2 MG/ML
4 INJECTION INTRAMUSCULAR; INTRAVENOUS EVERY 6 HOURS PRN
Status: DISCONTINUED | OUTPATIENT
Start: 2022-11-02 | End: 2022-11-10 | Stop reason: HOSPADM

## 2022-11-02 RX ORDER — NALOXONE HYDROCHLORIDE 0.4 MG/ML
0.4 INJECTION, SOLUTION INTRAMUSCULAR; INTRAVENOUS; SUBCUTANEOUS PRN
Status: DISCONTINUED | OUTPATIENT
Start: 2022-11-02 | End: 2022-11-10 | Stop reason: HOSPADM

## 2022-11-02 RX ORDER — LEVETIRACETAM 250 MG/1
750 TABLET ORAL DAILY
Status: DISCONTINUED | OUTPATIENT
Start: 2022-11-02 | End: 2022-11-08

## 2022-11-02 RX ORDER — SODIUM CHLORIDE 9 MG/ML
INJECTION, SOLUTION INTRAVENOUS PRN
Status: DISCONTINUED | OUTPATIENT
Start: 2022-11-02 | End: 2022-11-10 | Stop reason: HOSPADM

## 2022-11-02 RX ORDER — POLYETHYLENE GLYCOL 3350 17 G/17G
17 POWDER, FOR SOLUTION ORAL DAILY PRN
Status: DISCONTINUED | OUTPATIENT
Start: 2022-11-02 | End: 2022-11-10 | Stop reason: HOSPADM

## 2022-11-02 RX ORDER — MEROPENEM AND SODIUM CHLORIDE 1 G/50ML
1000 INJECTION, SOLUTION INTRAVENOUS EVERY 8 HOURS
Status: DISCONTINUED | OUTPATIENT
Start: 2022-11-02 | End: 2022-11-02 | Stop reason: DRUGHIGH

## 2022-11-02 RX ORDER — SODIUM CHLORIDE 0.9 % (FLUSH) 0.9 %
5-40 SYRINGE (ML) INJECTION PRN
Status: DISCONTINUED | OUTPATIENT
Start: 2022-11-02 | End: 2022-11-10 | Stop reason: HOSPADM

## 2022-11-02 RX ORDER — ALBUTEROL SULFATE 90 UG/1
1 AEROSOL, METERED RESPIRATORY (INHALATION) EVERY 6 HOURS PRN
Status: DISCONTINUED | OUTPATIENT
Start: 2022-11-02 | End: 2022-11-02 | Stop reason: CLARIF

## 2022-11-02 RX ORDER — TIZANIDINE 4 MG/1
4 TABLET ORAL EVERY 8 HOURS PRN
Status: DISCONTINUED | OUTPATIENT
Start: 2022-11-02 | End: 2022-11-04

## 2022-11-02 RX ADMIN — SODIUM CHLORIDE, PRESERVATIVE FREE 10 ML: 5 INJECTION INTRAVENOUS at 21:07

## 2022-11-02 RX ADMIN — PREGABALIN 300 MG: 150 CAPSULE ORAL at 17:26

## 2022-11-02 RX ADMIN — CETIRIZINE HYDROCHLORIDE 10 MG: 10 TABLET, FILM COATED ORAL at 17:27

## 2022-11-02 RX ADMIN — CEFEPIME 2000 MG: 2 INJECTION, POWDER, FOR SOLUTION INTRAVENOUS at 15:30

## 2022-11-02 RX ADMIN — HYDROCODONE BITARTRATE AND ACETAMINOPHEN 1 TABLET: 10; 325 TABLET ORAL at 21:07

## 2022-11-02 RX ADMIN — MEROPENEM AND SODIUM CHLORIDE 1000 MG: 1 INJECTION, SOLUTION INTRAVENOUS at 17:39

## 2022-11-02 RX ADMIN — VANCOMYCIN HYDROCHLORIDE 1250 MG: 10 INJECTION, POWDER, LYOPHILIZED, FOR SOLUTION INTRAVENOUS at 21:23

## 2022-11-02 RX ADMIN — ENOXAPARIN SODIUM 40 MG: 100 INJECTION SUBCUTANEOUS at 17:26

## 2022-11-02 RX ADMIN — PILOCARPINE HYDROCHLORIDE 7.5 MG: 5 TABLET, FILM COATED ORAL at 21:08

## 2022-11-02 RX ADMIN — TIZANIDINE 4 MG: 4 TABLET ORAL at 21:08

## 2022-11-02 RX ADMIN — Medication 1 TABLET: at 17:26

## 2022-11-02 RX ADMIN — DULOXETINE 30 MG: 30 CAPSULE, DELAYED RELEASE ORAL at 21:07

## 2022-11-02 RX ADMIN — PREGABALIN 300 MG: 150 CAPSULE ORAL at 21:08

## 2022-11-02 RX ADMIN — LEVETIRACETAM 750 MG: 250 TABLET, FILM COATED ORAL at 21:29

## 2022-11-02 ASSESSMENT — ENCOUNTER SYMPTOMS
COLOR CHANGE: 1
RESPIRATORY NEGATIVE: 1
SHORTNESS OF BREATH: 0
VOMITING: 0
DIARRHEA: 0
ABDOMINAL PAIN: 0
EYE PAIN: 0

## 2022-11-02 ASSESSMENT — PAIN DESCRIPTION - LOCATION
LOCATION: BACK;HEAD
LOCATION: GENERALIZED

## 2022-11-02 ASSESSMENT — PAIN - FUNCTIONAL ASSESSMENT
PAIN_FUNCTIONAL_ASSESSMENT: PREVENTS OR INTERFERES SOME ACTIVE ACTIVITIES AND ADLS
PAIN_FUNCTIONAL_ASSESSMENT: ACTIVITIES ARE NOT PREVENTED

## 2022-11-02 ASSESSMENT — PAIN DESCRIPTION - ORIENTATION
ORIENTATION: POSTERIOR
ORIENTATION: INNER

## 2022-11-02 ASSESSMENT — PAIN SCALES - GENERAL
PAINLEVEL_OUTOF10: 7
PAINLEVEL_OUTOF10: 3

## 2022-11-02 ASSESSMENT — PAIN DESCRIPTION - DESCRIPTORS
DESCRIPTORS: ACHING
DESCRIPTORS: ACHING

## 2022-11-02 ASSESSMENT — PAIN DESCRIPTION - PAIN TYPE: TYPE: CHRONIC PAIN

## 2022-11-02 NOTE — ED PROVIDER NOTES
[facility]  eMERGENCY dEPARTMENT eNCOUnter      Pt Name: Stephani Viera  MRN: [id number]  [place] 1970  Date of evaluation: 11/2/2022  Provider: Alysa Amos MD    [address] [place] [address]       Chief Complaint   Patient presents with    Abnormal Lab     Sent by [facility] after wound culture results, needs IV abx         HISTORY OF PRESENT ILLNESS   (Location/Symptom, Timing/Onset,Context/Setting, Quality, Duration, Modifying Factors, Severity)  Note limiting factors. Stephani Viera is a 47 y.o. female who presents to the emergency department due to infected wound. Patient has MS. For the last few months has developed a wound over the dorsum of her right foot. Patient does not know what started the wound. Has been seen by Dr. Fredi Holland wound care for this. He recently did a wound culture because the wound has been gradually getting bigger and grew out multiple bacteria. Dr. Frdei Holland then sent patient here for plans for admission for IV antibiotics. Patient has no other complaints. No fever chills or other constitutional symptoms. Patient is in wheelchair due to complications from [address]. Has no use of her legs. HPI    NursingNotes were reviewed. REVIEW OF SYSTEMS    (2-9 systems for level 4, 10 or more for level 5)     Review of Systems   Constitutional:  Negative for fever. Eyes:  Negative for pain. Respiratory:  Negative for shortness of breath. Cardiovascular:  Negative for chest pain and palpitations. Gastrointestinal:  Negative for abdominal pain, diarrhea and vomiting. Genitourinary:  Negative for dysuria. Skin:  Positive for wound (R foot). Negative for rash. Neurological:  Negative for weakness and headaches. All other systems reviewed and are negative. A complete review of systems was performed and is negative except as noted above in the HPI.        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 related to ms    Sepsis ([address] 8/19/2016    Weakness 8/4/2016         SURGICAL HISTORY       Past Surgical History:   Procedure Laterality Date    BACLOFEN PUMP IMPLANTATION      BREAST BIOPSY Right     neg    COLONOSCOPY N/A 09/27/2019    Dr Wanda Boston ileum through ostomy-patent and healthy appearing anastomosis in the right colon-entero colonic anastomosis-10 yr recall    COLOSTOMY      FEMUR FRACTURE SURGERY      Right    FOOT AMPUTATION Left 04/04/2019    LEFT TRANSMET AMPUTATION performed by Nba Canseco MD at [address] (CERVIX STATUS UNKNOWN)      OTHER SURGICAL HISTORY      urostomy    OTHER SURGICAL HISTORY      pain pump    [place] [facility] CTR VAD W/SUBQ PORT UNDER 5 YR N/A 06/26/2018    SINGLE LUMEN PORT PLACEMENT WITH FLUORO performed by Oren Crawford MD at [address]      L last toe    TONSILLECTOMY      URETEROTOMY           CURRENT MEDICATIONS       Previous Medications    AZELASTINE (ASTELIN) 0.1 % NASAL SPRAY    USE 2 SPRAYS IN EACH NOSTRIL TWICE DAILY AS DIRECTED    BACLOFEN (LIORESAL) 10 MG TABLET    Take 1 tablet by mouth 2 times daily as needed (muscle spasms)    BACLOFEN, PAIN PUMP REFILL CHARGE,    by Implant route continuous Indications: every 3 months    BLOOD GLUCOSE MONITOR STRIPS    Test 1 times a day & as needed for symptoms of irregular blood glucose.     BLOOD GLUCOSE MONITOR STRIPS    Test 1 times a day & as needed for symptoms of irregular blood glucose. CATHETERS [facility]    Catheter supplies and lubricant 5 times daily G82.20  to [facility] [phone]    CETIRIZINE (ZYRTEC) 10 MG TABLET    Take 10 mg by mouth daily    DIAPERS & SUPPLIES MISC    Indications: FX: [id number] Diapers, Chucks, Wipes, and Gloves. DOCUSATE SODIUM (COLACE) 100 MG CAPSULE    Take 200 mg by mouth as needed for Constipation    DULOXETINE (CYMBALTA) 30 MG EXTENDED RELEASE CAPSULE    Take 1 capsule by mouth nightly    FLUOCINOLONE ACETONIDE (SYNALAR) 0.01 % EXTERNAL SOLUTION    Apply 10 drops topically at bedtime    GLUCOSE MONITORING KIT (FREESTYLE) MONITORING KIT    1 kit by Does not apply route daily    GLUCOSE MONITORING KIT (FREESTYLE) MONITORING KIT    1 kit by Does not apply route daily    HEPARIN LOCK FLUSH (HEPARIN FLUSH, 100 UNITS/ML,) 100 UNIT/ML INJECTION    3 mLs by Intercatheter route every 30 days No every 30 days but every 4-6 weeks. Flush port with 300 units heparin with 20 cc normal saline for ocrevus infusion for Multiple sclerosis and NS 20CC    HYDROCHLOROTHIAZIDE (HYDRODIURIL) 25 MG TABLET    TAKE 1 TABLET BY MOUTH ONCE DAILY AS NEEDED    HYDROCODONE-ACETAMINOPHEN (NORCO)  MG PER TABLET    TAKE 1 TABLET BY MOUTH 3 TIMES  A DAY    IPRATROPIUM-ALBUTEROL (DUONEB) 0.5-2.5 (3) MG/3ML SOLN NEBULIZER SOLUTION    USE 1 UNIT DOSE IN NEBULIZER EVERY 4 TO 6 HOURS AS NEEDED.     LEVETIRACETAM (KEPPRA) 750 MG TABLET    TAKE 1 TABLET BY MOUTH DAILY    METHYLPHENIDATE (RITALIN) 20 MG TABLET    TAKE 1 TABLET BY MOUTH DAILY    MULTIPLE VITAMINS-MINERALS (THERAPEUTIC MULTIVITAMIN-MINERALS) TABLET    Take 1 tablet by mouth daily    NALOXONE 4 MG/0.1ML LIQD NASAL SPRAY    1 spray by Nasal route as needed for Opioid Reversal    NITROFURANTOIN (MACRODANTIN) 50 MG CAPSULE    TAKE 1 CAPSULE BY MOUTH NIGHTLY    ONDANSETRON (ZOFRAN-ODT) 4 MG DISINTEGRATING TABLET    Take 1 tablet by mouth 3 times daily as needed for Nausea or Vomiting PILOCARPINE (SALAGEN) 7.5 MG TABLET    TAKE ONE TABLET BY MOUTH 3 TIMES DAILY    PREGABALIN (LYRICA) 300 MG CAPSULE    TAKE 1 CAPSULE BY MOUTH TWICE A DAY    PROAIR  (90 BASE) MCG/ACT INHALER    INHALE 1 PUFF INTO THE LUNGS EVERY 6 HOURS AS NEEDED FOR WHEEZING OR SHORTNESS OF BREATH    SODIUM CHLORIDE FLUSH (SALINE FLUSH) 0.9 % SOLN    Infuse 20 mLs intravenously every 30 days Not every 30 days but every 4-6 weeks as need with 300 units of heparin to flush port for Infusion of Ocrevus for multiple sclerosis    TIZANIDINE (ZANAFLEX) 4 MG TABLET    TAKE 3 TABLETS TWICE DAILY AS NEEDED       ALLERGIES     Darvocet [propoxyphene n-acetaminophen], Morphine, Morphine and related, and Propoxyphene    FAMILY HISTORY       Family History   Problem Relation Age of Onset    Cancer Mother         breast    Colon Cancer Mother     Colon Polyps Mother     Breast Cancer Mother     Diabetes Father     High Blood Pressure Father     Heart Disease Paternal Grandmother     Breast Cancer Paternal Aunt     Cancer Paternal Aunt         breast    Liver Cancer Paternal Aunt     Esophageal Cancer Neg Hx     Liver Disease Neg Hx     Rectal Cancer Neg Hx     Stomach Cancer Neg Hx           SOCIAL HISTORY       Social History     Socioeconomic History    Marital status:      Spouse name: None    Number of children: None    Years of education: None    Highest education level: None   Tobacco Use    Smoking status: Every Day     Packs/day: 0.50     Years: 15.00     Pack years: 7.50     Types: Cigarettes    Smokeless tobacco: Never    Tobacco comments:     Less than 10 per day   Vaping Use    Vaping Use: Never used   Substance and Sexual Activity    Alcohol use: Yes     Comment: yaritza    Drug use: Yes     Types: Marijuana Sabrina Romo     Comment: daily      Social Determinants of Health     Financial Resource Strain: Low Risk     Difficulty of Paying Living Expenses: Not hard at all   Food Insecurity: No Food Insecurity    Worried About Running Out of Food in the Last Year: Never true    Ran Out of Food in the Last Year: Never true   Physical Activity: Inactive    Days of Exercise per Week: 0 days    Minutes of Exercise per Session: 0 min       SCREENINGS    Sublimity Coma Scale  Eye Opening: Spontaneous  Best Verbal Response: Oriented  Best Motor Response: Obeys commands  Soto Coma Scale Score: 15        PHYSICAL EXAM    (up to 7 for level 4, 8 or more for level 5)     ED Triage Vitals [11/02/22 1207]   BP Temp Temp Source Heart Rate Resp SpO2 Height Weight   (!) 124/90 98.4 °F (36.9 °C) Tympanic 94 16 96 % -- 132 lb (59.9 kg)       Physical Exam  Vitals reviewed. Constitutional:       General: She is not in acute distress. Appearance: She is well-developed. HENT:      Head: Normocephalic and atraumatic. Eyes:      General: No scleral icterus. Pupils: Pupils are equal, round, and reactive to light. Neck:      Vascular: No JVD. Cardiovascular:      Rate and Rhythm: Normal rate and regular rhythm. Heart sounds: Normal heart sounds. Pulmonary:      Effort: Pulmonary effort is normal. No respiratory distress. Breath sounds: Normal breath sounds. Abdominal:      General: There is no distension. Palpations: Abdomen is soft. Tenderness: There is no abdominal tenderness. Musculoskeletal:         General: No tenderness. Cervical back: Normal range of motion and neck supple. Feet:    Skin:     General: Skin is warm and dry. Neurological:      Mental Status: She is alert and oriented to person, place, and time. Comments: Paralysis of bilateral lower extremities which is baseline. Equal  strengths.    Psychiatric:         Behavior: Behavior normal.       DIAGNOSTIC RESULTS     EKG: All EKG's are interpreted by the Emergency Department Physician who either signs or Co-signs this chart in the absence of a cardiologist.        RADIOLOGY:   Non-plain film images such as CT, Ultrasound and MRI are read by the radiologist. Loreli Bolus images are visualized and preliminarily interpreted by the emergency physician with the below findings:        Interpretation per the Radiologist below, if available at the time of this note:    No orders to display         ED BEDSIDE ULTRASOUND:   Performed by ED Physician - none    LABS:  Labs Reviewed   CULTURE, BLOOD 1   CULTURE, BLOOD 2   COVID-19, RAPID   CBC   COMPREHENSIVE METABOLIC PANEL W/ REFLEX TO MG FOR LOW K       All other labs were within normal range or not returned as of this dictation. EMERGENCY DEPARTMENT COURSE and DIFFERENTIALDIAGNOSIS/MDM:   Vitals:    Vitals:    11/02/22 1207   BP: (!) 124/90   Pulse: 94   Resp: 16   Temp: 98.4 °F (36.9 °C)   TempSrc: Tympanic   SpO2: 96%   Weight: 132 lb (59.9 kg)       MDM    Reviewed recent wound culture which is growing out Pseudomonas, Proteus, Enterococcus and E. coli. Enterococcus is sensitive to vancomycin. All other bacteria are sensitive to cefepime so we will plan to order cefepime and vancomycin. Patient's case discussed with hospitalist, Kim Cardoso, who is agreeable plan of care and admission. Labs are still pending at this time and Kim Cardoso will follow up on the labs. Patient resting comfortably and updated about plan. CONSULTS:  IP CONSULT TO PHARMACY    PROCEDURES:  Unless otherwise notedbelow, none     Procedures    FINAL IMPRESSION     1.  Infected wound          DISPOSITION/PLAN   DISPOSITION Decision To Admit 11/02/2022 01:36:48 PM      PATIENT REFERRED TO:  @FUP@    DISCHARGE MEDICATIONS:  New Prescriptions    No medications on file          (Please note that portions of this note were completed with a voice recognition program.  Efforts were made to edit the dictations butoccasionally words are mis-transcribed.)    Behzad Bhatia MD (electronically signed)  AttendingEmergency Physician          Behzad Bhatia MD  11/02/22 5527

## 2022-11-02 NOTE — TELEPHONE ENCOUNTER
I spoke with patient, she was wanting a sooner appointment to be admitted to the hospital due to an infection that Dr. Freedom Lantigua from [id number] [facility] found on her foot. I stated to Thong Lowry that Dr. Santo Dale is not in the office this week, I advised her to go back to the ED since she was there last night and left due to the long weight. I also explained our providers will not admit directly from the office, they would just advise her to go the the ED. Thong Lowry understood.

## 2022-11-02 NOTE — TELEPHONE ENCOUNTER
Alyce called requesting a refill of the below medication which has been pended for you:     Requested Prescriptions     Pending Prescriptions Disp Refills    tiZANidine (ZANAFLEX) 4 MG tablet 180 tablet 11     Sig: TAKE 3 TABLETS TWICE DAILY AS NEEDED       Last Appointment Date: 7/19/2022  Next Appointment Date: 1/19/2023    Allergies   Allergen Reactions    Darvocet [Propoxyphene N-Acetaminophen]      Talking out of her head    Morphine      Category: Allergy;     Morphine And Related Itching and Swelling    Propoxyphene Swelling     Patient called this morning stating she has four different bacteria in her wound and she has to be admitted to the hospital for IV abx. When asked why her wound care doc didn't order these, she stated he told her that her pcp would need to admit her. I advised Mary Frye does not have admitting privilages at either hospital anymore. She argued the fact for a second and then asked if she could go to the infusion center for IV abx.    Please advise what you would like to do

## 2022-11-02 NOTE — CARE COORDINATION
Patient Contact Information:    [facility] [address] [facility] [facility] [address] [phone] (home)   Telephone Information:   Mobile [phone]     Above information verified? [x]   Yes  []   No      Emergency Contacts:    Extended Emergency Contact Information  Primary Emergency Contact: Adam [place]  Address: [address] [place], [address] Phone: [phone]  Relation: Other  Secondary Emergency Contact: Yasmeen Delgadillo  Home Phone: [phone]  Relation: Child  Preferred language: English      Have you been vaccinated for COVID-19 (SARS-CoV-2)? [x]   Yes  []   No                   If so, when? Which :         []   Pfizer-BioNTech  [x]   Moderna  [x]   Donnamarie Bailey  []   Other:         Pharmacy:    [facility] [address] [phone] Steffi Hopson [phone]  [address] [address]  Phone: [phone] Fax: [address] Danilo Christina [address] [place] [address] [phone]  [address] [place] [address] [address] [place] [address] [address]  Phone: [phone] Fax: [phone]          Patient Deficits:    []   Yes   [x]   No    If yes:    []   Confusion/Memory  []   Visual  []   Motor/Sensory         []   Right arm         []   Right leg         []   Left arm         []   Left leg  []   Language/Speech         []   Aphasia         []   Dysarthria         []   Swallow         Soto Coma Scale  Eye Opening: Spontaneous  Best Verbal Response: Oriented  Best Motor Response: Obeys commands  Soto Coma Scale Score: 15    Patient Deficit Notes:   SW met with pt at bedside. Pt is paralyzed from the breast down. Pt has a hospital bed from 20 years ago that is falling apart. Pt would also like a lift.        11/02/22 7777   Service Assessment   Patient Orientation Alert and Oriented   Cognition Alert   History Provided By Patient   Primary [address] [place] [address] Members;Friends/Neighbors   Patient's Healthcare Decision Maker is: Legal Next of Kin  (fiance)   PCP Verified by CM Yes   Last Visit to PCP Within last 6 months   Prior Functional Level Independent in ADLs/IADLs   Current Functional Level Independent in ADLs/IADLs   Can patient return to prior living arrangement Yes   Ability to make needs known: Good   Family able to assist with home care needs: Yes   Would you like for me to discuss the discharge plan with any other family members/significant others, and if so, who? Yes  (fiance)   Financial Resources Medicare; Other (Comment); Medicaid  (SSDI)   CM/SW Referral DME  (Lift, hospital bed)   Social/Functional History   Lives With Significant other   Type of [address] [facility] Two level; Able to Live on Main level with bedroom/bathroom   Home Access Ramped entrance   Bathroom Shower/Tub Walk-in shower  (Roll in shower)   [facility] Accessibility Accessible; Wheelchair accessible   [address] Help From Personal care attendant   ADL [facility] Responsibilities Yes   Ambulation Assistance Non-ambulatory   Transfer Assistance Needs assistance   Active  No   Patient's  Info fiance   Mode of Transportation Car   Occupation On disability   Discharge Planning   Type of Residence [employer]   Living Arrangements Spouse/Significant Other   Current Services Prior To Admission None   Potential Assistance Needed Durable Medical Equipment   Potential DME Needed Other (Comment)  ([address])   DME Ordered?  No   Potential Assistance Purchasing Medications No   Type of Home Care Services None   Patient expects to be discharged to: House   One/Two Story Residence Two story   Services At/After Discharge   Transition of Care Consult (CM Consult) DME/Supply Assistance   Services At/After Discharge DME   Mode of Transport at Discharge Other (see comment)  (Fiance')   Confirm Follow Up Transport Other (see comment)  (Fiance)

## 2022-11-02 NOTE — PROGRESS NOTES
Pharmacy Adjustment per [facility] protocol    Bryan Ott is a 47 y.o. female. Pharmacy has adjusted medications per [facility] protocol. Recent Labs     11/02/22  1513   BUN 13       Recent Labs     11/02/22  1513   CREATININE 0.3*       Estimated Creatinine Clearance: 210 mL/min (A) (based on SCr of 0.3 mg/dL (L)).     Height:   Ht Readings from Last 1 Encounters:   07/19/22 5' 9\" (1.753 m)     Weight:  Wt Readings from Last 1 Encounters:   11/02/22 132 lb (59.9 kg)         Plan: Adjust the following medications based on [facility] protocol:           Meropenem to 1000 mg IV once over 30 minutes followed by 1000 mg IV every 8 hours extended infusion over 180 minutes     Electronically signed by Dwight Munoz [facility] on 11/2/2022 at 4:12 PM

## 2022-11-02 NOTE — H&P
[facility] Hospitalists      Hospitalist - History & Physical      PCP: Tray Saeed MD    Date of Admission: 11/2/2022    Date of Service: 11/2/2022    Chief Complaint:  abnormal lab     History Of Present Illness: The patient is a 47 y.o. female who presented to [facility] ER with PMH multiple sclerosis, PVD, seizures,  complaining of abnormal lab. Patient follows with Davon Messina, wound care, due to stage III pressure ulcer to right dorsal foot. Wound was debrided on 10/24 and wound cultures were obtained. Patient was notified of wound culture results and was instructed to [facility] ER for further evaluation. Upon patient arrival, she denies fever, nausea, vomiting, abdominal pain, shortness of breath or chest pain. Is paraplegic, utilizes wheelchair, and has maximum assistance at home. Patient is to be admitted to hospitalist service with consultation to vascular surgery due to infected right ankle wound.   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 related to ms    Sepsis ([address] 8/19/2016    Weakness 8/4/2016       Past Surgical History:        Procedure Laterality Date    BACLOFEN PUMP IMPLANTATION      BREAST BIOPSY Right     neg    COLONOSCOPY N/A 09/27/2019    Dr Segundo Gutierrez ileum through ostomy-patent and healthy appearing anastomosis in the right colon-entero colonic anastomosis-10 yr recall    COLOSTOMY      FEMUR FRACTURE SURGERY      Right    FOOT AMPUTATION Left 04/04/2019    LEFT TRANSMET AMPUTATION performed by Randi Parikh MD at [address] (CERVIX STATUS UNKNOWN)      OTHER SURGICAL HISTORY      urostomy    OTHER SURGICAL HISTORY      pain pump    VT INSJ PRPH CTR VAD W/SUBQ PORT UNDER 5 YR N/A 06/26/2018    SINGLE LUMEN PORT PLACEMENT WITH FLUORO performed by Stella Weems MD at [facility]      TOE AMPUTATION      L last toe    TONSILLECTOMY      URETEROTOMY         Home Medications:  Prior to Admission medications    Medication Sig Start Date End Date Taking?  Authorizing Provider   methylphenidate (RITALIN) 20 MG tablet TAKE 1 TABLET BY MOUTH DAILY 11/6/22 12/6/22  Bryan Pérez MD   HYDROcodone-acetaminophen [facility])  MG per tablet TAKE 1 TABLET BY MOUTH 3 TIMES  A DAY 11/6/22 12/6/22  Bryan Pérez MD   tiZANidine (ZANAFLEX) 4 MG tablet TAKE 3 TABLETS TWICE DAILY AS NEEDED 11/2/22   Kalia Gomez MD   nitrofurantoin (MACRODANTIN) 50 MG capsule TAKE 1 CAPSULE BY MOUTH NIGHTLY 10/19/22   Kalia Gomez MD   naloxone 4 MG/0.1ML LIQD nasal spray 1 spray by Nasal route as needed for Opioid Reversal 9/21/22   Althea Mclaughlin MD   levETIRAcetam (KEPPRA) 750 MG tablet TAKE 1 TABLET BY MOUTH DAILY 9/9/22   Althea Mclaughlin MD   pilocarpine (SALAGEN) 7.5 MG tablet TAKE ONE TABLET BY MOUTH 3 TIMES DAILY 9/6/22   Kalia Gomez MD   pregabalin (LYRICA) 300 MG capsule TAKE 1 CAPSULE BY MOUTH TWICE A DAY 9/7/22 10/7/22  Althea Mclaughlin MD   ondansetron (ZOFRAN-ODT) 4 MG disintegrating tablet Take 1 tablet by mouth 3 times daily as needed for Nausea or Vomiting 7/19/22   Kalia Gomez MD   hydroCHLOROthiazide (HYDRODIURIL) 25 MG tablet TAKE 1 TABLET BY MOUTH ONCE DAILY AS NEEDED 7/19/22   Kalia Gomez MD   DULoxetine (CYMBALTA) 30 MG extended release capsule Take 1 capsule by mouth nightly 6/6/22   Kalia Gomez MD   fluocinolone acetonide (SYNALAR) 0.01 % external solution Apply 10 drops topically at bedtime 2/18/22   Historical Provider, MD   Diapers & Supplies MISC Indications: FX: [phone] Diapers, [address], [address], and Gloves. 1/11/22   Zeus Conte MD   azelastine (ASTELIN) 0.1 % nasal spray USE 2 SPRAYS IN EACH NOSTRIL TWICE DAILY AS DIRECTED 10/1/20   Zeus Conte MD   cetirizine (ZYRTEC) 10 MG tablet Take 10 mg by mouth daily    Historical Provider, MD   Catheters MISC Catheter supplies and lubricant 5 times daily G82.20  to [address] [facility] [phone] 8/25/20   Zeus Conte MD   glucose monitoring kit (FREESTYLE) monitoring kit 1 kit by Does not apply route daily 5/26/20   Zeus Conte MD   blood glucose monitor strips Test 1 times a day & as needed for symptoms of irregular blood glucose. 5/26/20   Zeus Conte MD   docusate sodium (COLACE) 100 MG capsule Take 200 mg by mouth as needed for Constipation    Historical Provider, MD   PROAIR  (90 Base) MCG/ACT inhaler INHALE 1 PUFF INTO THE LUNGS EVERY 6 HOURS AS NEEDED FOR WHEEZING OR SHORTNESS OF BREATH 12/2/19   Zeus Conte MD   Heparin Lock Flush (HEPARIN FLUSH, 100 UNITS/ML,) 100 UNIT/ML injection 3 mLs by Intercatheter route every 30 days No every 30 days but every 4-6 weeks.  Flush port with 300 units heparin with 20 cc normal saline for ocrevus infusion for Multiple sclerosis and NS 20CC 8/28/19   Eliazar Espinoza MD   ipratropium-albuterol (DUONEB) 0.5-2.5 (3) MG/3ML SOLN nebulizer solution USE 1 UNIT DOSE IN NEBULIZER EVERY 4 TO 6 HOURS AS NEEDED. 5/2/19   Zeus Conte MD   BACLOFEN, PAIN PUMP REFILL CHARGE, by Implant route continuous Indications: every 3 months    Historical Provider, MD   Multiple Vitamins-Minerals (THERAPEUTIC MULTIVITAMIN-MINERALS) tablet Take 1 tablet by mouth daily    Historical Provider, MD   glucose monitoring kit (FREESTYLE) monitoring kit 1 kit by Does not apply route daily 11/15/18   Zeus Conte MD   blood glucose monitor strips Test 1 times a day & as needed for symptoms of irregular blood glucose. 11/15/18   Sonali Roman MD   baclofen (LIORESAL) 10 MG tablet Take 1 tablet by mouth 2 times daily as needed (muscle spasms)  Patient taking differently: Take 10 mg by mouth 2 times daily as needed (muscle spasms) Indications: using pump 9/14/18   Raghav Wynn MD   Sodium Chloride Flush (SALINE FLUSH) 0.9 % SOLN Infuse 20 mLs intravenously every 30 days Not every 30 days but every 4-6 weeks as need with 300 units of heparin to flush port for Infusion of Ocrevus for multiple sclerosis 8/13/18   Raghav Wynn MD       Allergies:    Darvocet [propoxyphene n-acetaminophen], Morphine, Morphine and related, and Propoxyphene    Social History:    The patient currently lives home   Tobacco:   reports that she has been smoking cigarettes. She has a 7.50 pack-year smoking history. She has never used smokeless tobacco.  Alcohol:   reports current alcohol use. Illicit Drugs: denies    Family History:      Problem Relation Age of Onset    Cancer Mother         breast    Colon Cancer Mother     Colon Polyps Mother     Breast Cancer Mother     Diabetes Father     High Blood Pressure Father     Heart Disease Paternal Grandmother     Breast Cancer Paternal Aunt     Cancer Paternal Aunt         breast    Liver Cancer Paternal Aunt     Esophageal Cancer Neg Hx     Liver Disease Neg Hx     Rectal Cancer Neg Hx     Stomach Cancer Neg Hx        Review of Systems:   Review of Systems   Constitutional:  Positive for chills and fatigue. HENT: Negative. Respiratory: Negative. Musculoskeletal:  Positive for arthralgias, gait problem (paraplegic, wheelchair bound) and myalgias. Skin:  Positive for color change and wound. Neurological:  Positive for weakness. 14 point review of systems is negative except as specifically addressed above.     Physical Examination:  BP (!) 124/90   Pulse 94   Temp 98.4 °F (36.9 °C) (Tympanic)   Resp 16   Wt 132 lb (59.9 kg)   SpO2 96%   BMI 19.49 kg/m²   Physical Exam  Vitals and nursing note reviewed. Constitutional:       General: She is not in acute distress. Appearance: She is ill-appearing (chronically). HENT:      Mouth/Throat:      Mouth: Mucous membranes are dry. Pharynx: Oropharynx is clear. Eyes:      Extraocular Movements: Extraocular movements intact. Conjunctiva/sclera: Conjunctivae normal.      Pupils: Pupils are equal, round, and reactive to light. Cardiovascular:      Rate and Rhythm: Normal rate and regular rhythm. Pulses: Normal pulses. Heart sounds: Normal heart sounds. No murmur heard. Pulmonary:      Effort: Pulmonary effort is normal. No respiratory distress. Breath sounds: Normal breath sounds. Chest:      Chest wall: No tenderness. Abdominal:      General: The ostomy site is clean. Bowel sounds are normal. There is no distension. Palpations: Abdomen is soft. Tenderness: There is no abdominal tenderness. There is no guarding or rebound. Comments: Colostomy  Urostomy site RLQ area C/D/I, no signs of infection    Musculoskeletal:         General: No swelling. Normal range of motion. Cervical back: Normal range of motion and neck supple. No rigidity or tenderness. Right lower leg: No edema. Left lower leg: No edema. Feet:    Skin:     General: Skin is warm and dry. Capillary Refill: Capillary refill takes 2 to 3 seconds. Neurological:      General: No focal deficit present. Mental Status: She is alert and oriented to person, place, and time. Cranial Nerves: No cranial nerve deficit. Motor: Weakness present.    Psychiatric:         Mood and Affect: Mood normal.         Behavior: Behavior normal.        Diagnostic Data:  CBC:  Recent Labs     11/02/22  1513   WBC 4.8   HGB 12.9   HCT 39.2        BMP:  Recent Labs     11/02/22  1513      K 3.6      CO2 23   BUN 13   CREATININE 0.3*   CALCIUM 8.6     Recent Labs 11/02/22  1513   AST 11   ALT 14   BILITOT 0.3   ALKPHOS 106*       ABGs:  Lab Results   Component Value Date/Time    PHART 7.460 08/27/2016 08:00 AM    PO2ART 128.0 08/27/2016 08:00 AM    REU6VNJ 26.0 08/27/2016 08:00 AM     Urinalysis:  Lab Results   Component Value Date/Time    NITRU POSITIVE 04/26/2022 03:06 PM    WBCUA 33 04/26/2022 03:06 PM    BACTERIA 3+ 04/26/2022 03:06 PM    RBCUA 7 04/26/2022 03:06 PM    BLOODU TRACE 04/26/2022 03:06 PM    SPECGRAV 1.014 04/26/2022 03:06 PM    GLUCOSEU Negative 04/26/2022 03:06 PM       Assessment/Plan:  Principal Problem:    Ankle wound, right, sequela  -Consultation to vascular surgery  -Consultation Wound care   -XR right ankle obtained on 10/31  -VL Arterial LE mildly diminished flow to bilateral lower extremities  -Speciality Bed  -Wound culture obtained 10/24: Pseudomonas Aeruginosa, Proteus Mirabilis, Enterococcus faecalis, Escherichia coli   -IV antibiotics, Merrem & Vancomycin    -Neurovascular checks    -Elevate extremity   -Follow blood cultures   Active Problems:    Multiple sclerosis (HCC)    Peripheral vascular disease ([address]    DVT prophylactic: Lovenox      Signed:  EFREN Clifton - CNP, 11/2/2022 1:56 PM

## 2022-11-02 NOTE — TELEPHONE ENCOUNTER
Patient is requesting a return call from the office to see if she can get in any sooner than her appt on 12/21 due to some issues she is having. Please return her call. Thank you!

## 2022-11-03 ENCOUNTER — APPOINTMENT (OUTPATIENT)
Dept: GENERAL RADIOLOGY | Age: 52
DRG: 637 | End: 2022-11-03
Payer: MEDICARE

## 2022-11-03 PROBLEM — E43 SEVERE MALNUTRITION (HCC): Status: ACTIVE | Noted: 2022-11-03

## 2022-11-03 LAB
ANION GAP SERPL CALCULATED.3IONS-SCNC: 8 MMOL/L (ref 7–19)
BUN BLDV-MCNC: 11 MG/DL (ref 6–20)
CALCIUM SERPL-MCNC: 8.6 MG/DL (ref 8.6–10)
CHLORIDE BLD-SCNC: 106 MMOL/L (ref 98–111)
CO2: 25 MMOL/L (ref 22–29)
CREAT SERPL-MCNC: 0.3 MG/DL (ref 0.5–0.9)
GFR SERPL CREATININE-BSD FRML MDRD: >60 ML/MIN/{1.73_M2}
GLUCOSE BLD-MCNC: 99 MG/DL (ref 74–109)
HCT VFR BLD CALC: 38.3 % (ref 37–47)
HEMOGLOBIN: 12.2 G/DL (ref 12–16)
MCH RBC QN AUTO: 28.9 PG (ref 27–31)
MCHC RBC AUTO-ENTMCNC: 31.9 G/DL (ref 33–37)
MCV RBC AUTO: 90.8 FL (ref 81–99)
PDW BLD-RTO: 14 % (ref 11.5–14.5)
PLATELET # BLD: 164 K/UL (ref 130–400)
PMV BLD AUTO: 11.3 FL (ref 9.4–12.3)
POTASSIUM REFLEX MAGNESIUM: 3.6 MMOL/L (ref 3.5–5)
RBC # BLD: 4.22 M/UL (ref 4.2–5.4)
SODIUM BLD-SCNC: 139 MMOL/L (ref 136–145)
WBC # BLD: 4.2 K/UL (ref 4.8–10.8)

## 2022-11-03 PROCEDURE — 36591 DRAW BLOOD OFF VENOUS DEVICE: CPT

## 2022-11-03 PROCEDURE — 2580000003 HC RX 258

## 2022-11-03 PROCEDURE — 73630 X-RAY EXAM OF FOOT: CPT

## 2022-11-03 PROCEDURE — 85027 COMPLETE CBC AUTOMATED: CPT

## 2022-11-03 PROCEDURE — 73630 X-RAY EXAM OF FOOT: CPT | Performed by: RADIOLOGY

## 2022-11-03 PROCEDURE — 1210000000 HC MED SURG R&B

## 2022-11-03 PROCEDURE — 73610 X-RAY EXAM OF ANKLE: CPT

## 2022-11-03 PROCEDURE — 6360000002 HC RX W HCPCS

## 2022-11-03 PROCEDURE — 73610 X-RAY EXAM OF ANKLE: CPT | Performed by: RADIOLOGY

## 2022-11-03 PROCEDURE — 80048 BASIC METABOLIC PNL TOTAL CA: CPT

## 2022-11-03 PROCEDURE — 2580000003 HC RX 258: Performed by: HOSPITALIST

## 2022-11-03 PROCEDURE — 6360000002 HC RX W HCPCS: Performed by: HOSPITALIST

## 2022-11-03 PROCEDURE — 6370000000 HC RX 637 (ALT 250 FOR IP)

## 2022-11-03 PROCEDURE — 6370000000 HC RX 637 (ALT 250 FOR IP): Performed by: SURGERY

## 2022-11-03 RX ORDER — ACETIC ACID 3 %
250 LIQUID (ML) MISCELLANEOUS 2 TIMES DAILY
Status: DISCONTINUED | OUTPATIENT
Start: 2022-11-03 | End: 2022-11-10 | Stop reason: HOSPADM

## 2022-11-03 RX ORDER — ACETAMINOPHEN 325 MG/1
650 TABLET ORAL EVERY 6 HOURS PRN
Status: DISCONTINUED | OUTPATIENT
Start: 2022-11-03 | End: 2022-11-10 | Stop reason: HOSPADM

## 2022-11-03 RX ADMIN — MEROPENEM AND SODIUM CHLORIDE 1000 MG: 1 INJECTION, SOLUTION INTRAVENOUS at 23:11

## 2022-11-03 RX ADMIN — DULOXETINE 30 MG: 30 CAPSULE, DELAYED RELEASE ORAL at 21:58

## 2022-11-03 RX ADMIN — PILOCARPINE HYDROCHLORIDE 7.5 MG: 5 TABLET, FILM COATED ORAL at 21:57

## 2022-11-03 RX ADMIN — Medication 250 ML: at 13:07

## 2022-11-03 RX ADMIN — HYDROCODONE BITARTRATE AND ACETAMINOPHEN 1 TABLET: 10; 325 TABLET ORAL at 22:00

## 2022-11-03 RX ADMIN — Medication 1 TABLET: at 08:09

## 2022-11-03 RX ADMIN — AMPICILLIN SODIUM AND SULBACTAM SODIUM 3000 MG: 2; 1 INJECTION, POWDER, FOR SOLUTION INTRAMUSCULAR; INTRAVENOUS at 15:49

## 2022-11-03 RX ADMIN — CETIRIZINE HYDROCHLORIDE 10 MG: 10 TABLET, FILM COATED ORAL at 08:10

## 2022-11-03 RX ADMIN — AMPICILLIN SODIUM AND SULBACTAM SODIUM 3000 MG: 2; 1 INJECTION, POWDER, FOR SOLUTION INTRAMUSCULAR; INTRAVENOUS at 22:05

## 2022-11-03 RX ADMIN — PREGABALIN 300 MG: 150 CAPSULE ORAL at 08:10

## 2022-11-03 RX ADMIN — TIZANIDINE 4 MG: 4 TABLET ORAL at 08:28

## 2022-11-03 RX ADMIN — MEROPENEM AND SODIUM CHLORIDE 1000 MG: 1 INJECTION, SOLUTION INTRAVENOUS at 17:29

## 2022-11-03 RX ADMIN — MEROPENEM AND SODIUM CHLORIDE 1000 MG: 1 INJECTION, SOLUTION INTRAVENOUS at 00:22

## 2022-11-03 RX ADMIN — LEVETIRACETAM 750 MG: 250 TABLET, FILM COATED ORAL at 08:10

## 2022-11-03 RX ADMIN — MEROPENEM AND SODIUM CHLORIDE 1000 MG: 1 INJECTION, SOLUTION INTRAVENOUS at 10:03

## 2022-11-03 RX ADMIN — PILOCARPINE HYDROCHLORIDE 7.5 MG: 5 TABLET, FILM COATED ORAL at 08:12

## 2022-11-03 RX ADMIN — AMPICILLIN SODIUM AND SULBACTAM SODIUM 3000 MG: 2; 1 INJECTION, POWDER, FOR SOLUTION INTRAMUSCULAR; INTRAVENOUS at 08:35

## 2022-11-03 RX ADMIN — ENOXAPARIN SODIUM 40 MG: 100 INJECTION SUBCUTANEOUS at 08:09

## 2022-11-03 RX ADMIN — Medication 250 ML: at 22:12

## 2022-11-03 RX ADMIN — SODIUM CHLORIDE, PRESERVATIVE FREE 10 ML: 5 INJECTION INTRAVENOUS at 22:13

## 2022-11-03 RX ADMIN — PREGABALIN 300 MG: 150 CAPSULE ORAL at 21:58

## 2022-11-03 ASSESSMENT — PAIN DESCRIPTION - PAIN TYPE: TYPE: CHRONIC PAIN

## 2022-11-03 ASSESSMENT — PAIN DESCRIPTION - DESCRIPTORS
DESCRIPTORS: ACHING;DISCOMFORT
DESCRIPTORS: ACHING
DESCRIPTORS: ACHING

## 2022-11-03 ASSESSMENT — PAIN DESCRIPTION - LOCATION
LOCATION: BACK;NECK
LOCATION: LEG
LOCATION: HEAD
LOCATION: GENERALIZED

## 2022-11-03 ASSESSMENT — PAIN - FUNCTIONAL ASSESSMENT
PAIN_FUNCTIONAL_ASSESSMENT: ACTIVITIES ARE NOT PREVENTED
PAIN_FUNCTIONAL_ASSESSMENT: PREVENTS OR INTERFERES SOME ACTIVE ACTIVITIES AND ADLS
PAIN_FUNCTIONAL_ASSESSMENT: PREVENTS OR INTERFERES SOME ACTIVE ACTIVITIES AND ADLS

## 2022-11-03 ASSESSMENT — PAIN DESCRIPTION - ORIENTATION
ORIENTATION: INNER
ORIENTATION: INNER

## 2022-11-03 ASSESSMENT — PAIN SCALES - GENERAL
PAINLEVEL_OUTOF10: 3
PAINLEVEL_OUTOF10: 0
PAINLEVEL_OUTOF10: 2
PAINLEVEL_OUTOF10: 0
PAINLEVEL_OUTOF10: 0
PAINLEVEL_OUTOF10: 4
PAINLEVEL_OUTOF10: 0
PAINLEVEL_OUTOF10: 3

## 2022-11-03 ASSESSMENT — PAIN DESCRIPTION - FREQUENCY: FREQUENCY: CONTINUOUS

## 2022-11-03 ASSESSMENT — ENCOUNTER SYMPTOMS
CHEST TIGHTNESS: 0
COLOR CHANGE: 1
ALLERGIC/IMMUNOLOGIC NEGATIVE: 1
EYES NEGATIVE: 1
SHORTNESS OF BREATH: 0

## 2022-11-03 ASSESSMENT — PAIN DESCRIPTION - ONSET: ONSET: ON-GOING

## 2022-11-03 NOTE — PLAN OF CARE
Problem: Discharge Planning  Goal: Discharge to home or other facility with appropriate resources  11/2/2022 2318 by Jong Roman RN  Outcome: Progressing  11/2/2022 1837 by Aleks Moreno RN  Outcome: Progressing     Problem: Pain  Goal: Verbalizes/displays adequate comfort level or baseline comfort level  11/2/2022 2318 by Jong Roman RN  Outcome: Progressing  11/2/2022 1837 by Aleks Moreno RN  Outcome: Progressing     Problem: Skin/Tissue Integrity  Goal: Absence of new skin breakdown  Description: 1. Monitor for areas of redness and/or skin breakdown  2. Assess vascular access sites hourly  3. Every 4-6 hours minimum:  Change oxygen saturation probe site  4. Every 4-6 hours:  If on nasal continuous positive airway pressure, respiratory therapy assess nares and determine need for appliance change or resting period.   11/2/2022 2318 by Jong Roman RN  Outcome: Progressing  11/2/2022 1837 by Aleks Moreno RN  Outcome: Progressing     Problem: Safety - Adult  Goal: Free from fall injury  11/2/2022 2318 by Jong Roman RN  Outcome: Progressing  11/2/2022 1837 by Aleks Moreno RN  Outcome: Progressing     Problem: Neurosensory - Adult  Goal: Achieves stable or improved neurological status  Outcome: Progressing  Goal: Absence of seizures  Outcome: Progressing  Goal: Remains free of injury related to seizures activity  Outcome: Progressing  Goal: Achieves maximal functionality and self care  Outcome: Progressing     Problem: Respiratory - Adult  Goal: Achieves optimal ventilation and oxygenation  Outcome: Progressing     Problem: Cardiovascular - Adult  Goal: Maintains optimal cardiac output and hemodynamic stability  Outcome: Progressing  Goal: Absence of cardiac dysrhythmias or at baseline  Outcome: Progressing     Problem: Skin/Tissue Integrity - Adult  Goal: Skin integrity remains intact  Outcome: Progressing  Flowsheets  Taken 11/2/2022 1748 by Aleks Moreno RN  Skin Integrity Remains Intact: Monitor for areas of redness and/or skin breakdown  Taken 11/2/2022 1745 by Solange Bowens RN  Skin Integrity Remains Intact: Monitor for areas of redness and/or skin breakdown  Goal: Incisions, wounds, or drain sites healing without S/S of infection  Outcome: Progressing  Goal: Oral mucous membranes remain intact  Outcome: Progressing     Problem: Musculoskeletal - Adult  Goal: Return mobility to safest level of function  Outcome: Progressing  Goal: Maintain proper alignment of affected body part  Outcome: Progressing  Goal: Return ADL status to a safe level of function  Outcome: Progressing     Problem: Gastrointestinal - Adult  Goal: Minimal or absence of nausea and vomiting  Outcome: Progressing  Goal: Maintains or returns to baseline bowel function  Outcome: Progressing  Goal: Maintains adequate nutritional intake  Outcome: Progressing  Goal: Establish and maintain optimal ostomy function  Outcome: Progressing     Problem: Genitourinary - Adult  Goal: Absence of urinary retention  Outcome: Progressing  Goal: Urinary catheter remains patent  Outcome: Progressing     Problem: Infection - Adult  Goal: Absence of infection at discharge  Outcome: Progressing  Goal: Absence of infection during hospitalization  Outcome: Progressing  Goal: Absence of fever/infection during anticipated neutropenic period  Outcome: Progressing     Problem: Metabolic/Fluid and Electrolytes - Adult  Goal: Electrolytes maintained within normal limits  Outcome: Progressing  Goal: Hemodynamic stability and optimal renal function maintained  Outcome: Progressing  Goal: Glucose maintained within prescribed range  Outcome: Progressing     Problem: Hematologic - Adult  Goal: Maintains hematologic stability  Outcome: Progressing     Problem: Chronic Conditions and Co-morbidities  Goal: Patient's chronic conditions and co-morbidity symptoms are monitored and maintained or improved  Outcome: Progressing     Problem: ABCDS Injury Assessment  Goal: Absence of physical injury  Outcome: Progressing

## 2022-11-03 NOTE — CONSULTS
Comprehensive Nutrition Assessment    Type and Reason for Visit:  Initial, Consult    Nutrition Recommendations/Plan:   Start ONS and increase protein portions     Malnutrition Assessment:  Malnutrition Status:  Severe malnutrition (11/03/22 1358)    Context:  Acute Illness     Findings of the 6 clinical characteristics of malnutrition:  Energy Intake:  Mild decrease in energy intake (Comment)  Weight Loss:  7.5% over 3 months     Body Fat Loss: Moderate body fat loss Orbital, Triceps   Muscle Mass Loss: Moderate muscle mass loss Temples (temporalis), Clavicles (pectoralis & deltoids), Hand (interosseous)  Fluid Accumulation:  Mild Extremities   Strength:  Not Performed    Nutrition Assessment:    Received consult for wounds and supplements. Pt meets criteria for severe malnutrition AEB fat and muscle mass depletion. PO intake is fair with intake ranging 0-50% depending on if she likes what she received. To start supplements at B,L & D along with large protein portions. Nutrition Related Findings:    urostomy Wound Type: Stage III       Current Nutrition Intake & Therapies:    Average Meal Intake: 1-25%, 26-50%  Average Supplements Intake: None Ordered  ADULT ORAL NUTRITION SUPPLEMENT; Breakfast; Fortified Pudding Oral Supplement  ADULT ORAL NUTRITION SUPPLEMENT; Lunch; Other Oral Supplement; 4oz prepacked milkshake at lunch  ADULT ORAL NUTRITION SUPPLEMENT; Dinner; Frozen Oral Supplement  ADULT DIET; Regular    Anthropometric Measures:  Height: 5' 9\" (175.3 cm)  Ideal Body Weight (IBW): 145 lbs (66 kg)    Admission Body Weight: 132 lb (59.9 kg) (stated)  Current Body Weight: 128 lb 8 oz (58.3 kg), 88.6 % IBW.     Current BMI (kg/m2): 19  Usual Body Weight: 141 lb (64 kg) (5/2022)  % Weight Change (Calculated): -8.9  Weight Adjustment For: Paraplegia  Total Adjusted Percentage (Calculated): 7.5  Adjusted Ideal Body Weight (lbs) (Calculated): 134.1 lbs  Adjusted Ideal Body Weight (kg) (Calculated): 60.95 kg  Adjusted % Ideal Body Weight (Calculated): 95.8  Adjusted BMI (kg/m2) (Calculated): 20.4  BMI Categories: Normal Weight (BMI 18.5-24. 9)    Estimated Daily Nutrient Needs:  Energy Requirements Based On: Kcal/kg  Weight Used for Energy Requirements: Current  Energy (kcal/day): 1007-1964 kcals (25-30 kcals/kg)  Weight Used for Protein Requirements: Current  Protein (g/day): 58-117g  Method Used for Fluid Requirements: 1 ml/kcal  Fluid (ml/day): 9475-1415 ml    Nutrition Diagnosis:   Inadequate oral intake related to increase demand for energy/nutrients, acute injury/trauma as evidenced by intake 0-25%, intake 26-50%, poor intake prior to admission, wounds, weight loss, moderate loss of subcutaneous fat, moderate muscle loss    Nutrition Interventions:   Food and/or Nutrient Delivery: Continue Current Diet, Start Oral Nutrition Supplement  Nutrition Education/Counseling: No recommendation at this time  Coordination of Nutrition Care: Continue to monitor while inpatient  Plan of Care discussed with: pt and family    Goals:     Goals: Meet at least 75% of estimated needs, PO intake 50% or greater       Nutrition Monitoring and Evaluation:   Behavioral-Environmental Outcomes: None Identified  Food/Nutrient Intake Outcomes: Food and Nutrient Intake, Supplement Intake  Physical Signs/Symptoms Outcomes: Biochemical Data, Fluid Status or Edema, Weight, Skin, Nutrition Focused Physical Findings    Discharge Planning:    Continue current diet     Ifeoma Merlos MS, RD, LD  Contact: [phone]

## 2022-11-03 NOTE — CONSULTS
[facility] Vascular Surgery    CONSULT    Patient:  Yeison Villanueva  YOB: 1970  Date of Service: 11/3/2022  MRN: Cherie Lagunas: [de-identified]   Primary Care Physician: Venu Cameron MD    Reason for consult: Right Dorsal Foot Wound    Requesting Physician: EFREN Martinez    History Obtained From: Patient    HISTORY OF PRESENT ILLNESS:  Ms. Yeison Villanueva is a 47 y.o. female, with PMHx MS with chronic right dorsal foot wound. She is followed by Dr. Neena Schneider at [facility]. She was last seen in the office by Dr. Neena Schneider on 10/24/2022. Wound was felt to be non-healing, therefore, CAMILO and foot XR, along with wound culture was obtained. Results of wound culture on 10/29/2022. Patient notified by Dr. Neena Schneider of need for IV antibiotics and recommended admission to hospital on 10/31/2022. She presented to the ER on 11/01/2022, but left before being seen 2' to long wait times. Presented back to the ER on 11/02/2022. Labs were unremarkable. She was started on appropriate IV antibiotics and admitted to the hospitalist service with vascular surgery consultation.        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 related to ms    Sepsis ([address] 8/19/2016    Weakness 8/4/2016       Past Surgical History:        Procedure Laterality Date    BACLOFEN PUMP IMPLANTATION      BREAST BIOPSY Right     neg    COLONOSCOPY N/A 09/27/2019    Dr Mathews [facility]-to ileum through ostomy-patent and healthy appearing anastomosis in the right colon-entero colonic anastomosis-10 yr recall    COLOSTOMY      FEMUR FRACTURE SURGERY      Right    FOOT AMPUTATION Left 04/04/2019    LEFT TRANSMET AMPUTATION performed by Abhinav Foss MD at [address] [facility] (CERVIX STATUS UNKNOWN)      OTHER SURGICAL HISTORY      urostomy    OTHER SURGICAL HISTORY      pain pump    MO INSJ PRPH CTR VAD W/SUBQ PORT UNDER 5 YR N/A 06/26/2018    SINGLE LUMEN PORT PLACEMENT WITH FLUORO performed by Keegan Rajput MD at [address]      L last toe    TONSILLECTOMY      URETEROTOMY         Allergies:  Darvocet [propoxyphene n-acetaminophen], Morphine, Morphine and related, and Propoxyphene    Medications Current:   Current Facility-Administered Medications   Medication Dose Route Frequency Provider Last Rate Last Admin    ampicillin-sulbactam (UNASYN) 3,000 mg in sodium chloride 0.9 % 100 mL IVPB (mini-bag)  3,000 mg IntraVENous Q6H Aime Marte MD   Stopped at 11/03/22 0905    acetaminophen (TYLENOL) tablet 650 mg  650 mg Oral Q6H PRN Abbie Jolynn, DO        Acetic Acid 3 % solution 250 mL  250 mL Topical BID Abbielawrence Neil, DO   250 mL at 11/03/22 1307    sodium chloride flush 0.9 % injection 5-40 mL  5-40 mL IntraVENous 2 times per day EFREN Brooke - CNP   10 mL at 11/02/22 2107    sodium chloride flush 0.9 % injection 5-40 mL  5-40 mL IntraVENous PRN EFREN Brooke - CNP        0.9 % sodium chloride infusion   IntraVENous PRN EFREN Brooke - CNP        enoxaparin (LOVENOX) injection 40 mg  40 mg SubCUTAneous Daily EFREN Brooke - CNP   40 mg at 11/03/22 0809    ondansetron (ZOFRAN-ODT) disintegrating tablet 4 mg  4 mg Oral Q8H PRN EFREN Brooke CNP        Or    ondansetron (ZOFRAN) injection 4 mg  4 mg IntraVENous Q6H PRN EFREN Brooke CNP        polyethylene glycol (GLYCOLAX) packet 17 g  17 g Oral Daily PRN Husseinrie Dezks, APRN - CNP        cetirizine (ZYRTEC) tablet 10 mg  10 mg Oral Daily Jeffrie Lucks, APRN - CNP   10 mg at 11/03/22 0810    docusate sodium (COLACE) capsule 200 mg  200 mg Oral PRN Husseinrie Lucks, APRN - CNP        DULoxetine (CYMBALTA) extended release capsule 30 mg  30 mg Oral Nightly Husseinrie Lucks, APRN - CNP   30 mg at 11/02/22 2107    HYDROcodone-acetaminophen (Radha Guevara)  MG per tablet 1 tablet  1 tablet Oral Q8H PRN Husseinrie Lucks, APRN - CNP   1 tablet at 11/02/22 2107    levETIRAcetam (KEPPRA) tablet 750 mg  750 mg Oral Daily Husseinrie Lucks, APRN - CNP   750 mg at 11/03/22 0810    therapeutic multivitamin-minerals 1 tablet  1 tablet Oral Daily Vitoe Jigar, APRN - CNP   1 tablet at 11/03/22 0809    tiZANidine (ZANAFLEX) tablet 4 mg  4 mg Oral Q8H PRN Husseinrie Dezks, APRN - CNP   4 mg at 11/03/22 0828    pregabalin (LYRICA) capsule 300 mg  300 mg Oral BID Husseinrie Jigar, APRN - CNP   300 mg at 11/03/22 0810    pilocarpine (SALAGEN) tablet 7.5 mg  7.5 mg Oral BID Husseinrie Lucks, APRN - CNP   7.5 mg at 11/03/22 8375    naloxone (NARCAN) injection 0.4 mg  0.4 mg IntraVENous PRN Husseinrie Dezks, APRN - CNP        albuterol (PROVENTIL) nebulizer solution 2.5 mg  2.5 mg Nebulization Q6H PRN Husseinrie Lucks, APRN - CNP        meropenem (MERREM) 1000 mg in sodium chloride 0.9% 50 mL (duplex)  1,000 mg IntraVENous Q8H Husseinrie Lucks, APRN - CNP   Stopped at 11/03/22 1309    nicotine (NICODERM CQ) 7 MG/24HR 1 patch  1 patch TransDERmal Daily Husseinrie Jigar, APRN - CNP   1 patch at 11/03/22 8222       Social History:  Reports that she has been smoking cigarettes. She has a 7.50 pack-year smoking history. She has never used smokeless tobacco. She reports current alcohol use. She reports current drug use. Drug: Marijuana Deng Mata).     Family History:      Problem Relation Age of Onset    Cancer Mother         breast    Colon Cancer Mother     Colon Polyps Mother     Breast Cancer Mother     Diabetes Father     High Blood Pressure Father     Heart Disease Paternal Grandmother     Breast Cancer Paternal Aunt     Cancer Paternal Aunt         breast    Liver Cancer Paternal Aunt     Esophageal Cancer Neg Hx     Liver Disease Neg Hx     Rectal Cancer Neg Hx     Stomach Cancer Neg Hx        REVIEW OF SYSTEMS:  General: Denies any fever or chills. Denies any unexplained weight loss or gain. Denies any change in activity or endurance. HEENT: Denies any headaches or visual changes. Respiratory: Denies any cough or hoarseness. Cardiac: Denies any chest pain or pressure. Denies any palpitations. Denies any presyncope or syncope. Denies any orthopnea or PND. Denies any lower extremity edema. GI: Denies any abdominal pain. Denies any nausea or vomiting. Denies any change in bowel habits. Denies any recent history of GI tract blood loss. Colostomy. : Denies any hematuria, frequency, hesitancy, or dysuria. Urostomy. Musculoskeletal: Non-ambulatory. Wheelchair bound x 20+ years 2' to [address]. Chronic pain - has pain pump. Neurological: Denies any paraesthesias. Denies any history of seizure or stroke symptoms. MS. Psychological: Denies any problems with anxiety or depression. Skin: Chronic wound, right dorsal ankle wound. All other systems are negative except where stated above. PHYSICAL EXAM:  /76   Pulse 63   Temp 97.4 °F (36.3 °C) (Temporal)   Resp 16   Ht 5' 9\" (1.753 m)   Wt 128 lb 8 oz (58.3 kg)   SpO2 97%   BMI 18.98 kg/m²   General appearance: Demonstrates a well-developed, well-nourished female who is alert and oriented in no acute distress. HEENT: Normocephalic. Atraumatic. ANN. NECK: Supple. NO JVD. No carotids bruits auscultated. Chest: Clear to auscultation bilaterally without wheezes or rhonchi. Cardiac: Normal heart tones with regular rate and rhythm, S1, S2 normal. No murmurs, gallops, or rubs auscultated.    Abdomen: Soft, non-tender; non-distended normal bowel sounds no masses, no organomegaly. Extremities: Feet warm to touch/ pink in color. Mild BLE edema. Biphasic doppler signals - left DP and PT. Monophasic doppler signals - right DP and PT. Left TMA well healed. Right dorsal ankle wound circular wound that tunnels downward. Tissue pink, no erythema or odor. Media Information  Document Information    Wound Care Image:  Wound   Rt Ankle   11/02/2022 21:00   Attached To: Hospital Encounter on 11/2/22     Source Information    Deshaun Carrizales RN  [facility]     Skin: Skin color, texture, turgor normal. No rashes or lesions  Neurologic: Grossly intact. LABS AND DIAGNOSTICS:    CBC with Differential:   Lab Results   Component Value Date/Time    WBC 4.2 11/03/2022 12:26 AM    RBC 4.22 11/03/2022 12:26 AM    HGB 12.2 11/03/2022 12:26 AM    HCT 38.3 11/03/2022 12:26 AM     11/03/2022 12:26 AM    MCV 90.8 11/03/2022 12:26 AM     BMP:   Lab Results   Component Value Date/Time     11/03/2022 12:26 AM    K 3.6 11/03/2022 12:26 AM     11/03/2022 12:26 AM    CO2 25 11/03/2022 12:26 AM    BUN 11 11/03/2022 12:26 AM    CREATININE 0.3 11/03/2022 12:26 AM    CALCIUM 8.6 11/03/2022 12:26 AM    LABGLOM >60 11/03/2022 12:26 AM    GLUCOSE 99 11/03/2022 12:26 AM     VL Lower Extremity Arterial Segmental with Ppg (10/970403): Based on ankle brachial indices and doppler waveforms, the patient has mildly diminished flow to the bilateral lower extremity arterial system at rest.     XR Right Ankle (10/31/2022): Osteoporosis. Talofibular degenerative change with curvilinear spur of the fibula and mild soft tissue swelling. ASSESSMENT:    Chronic, Non-Healing Foot Wound, Right  MS  Debilitation - Wheelchair Bound      PLAN:    Repeat right foot XR without boot. Suspects bone involvement. May need CT or MRI. Agree with IV Antibiotics based on wound cultures. Wound care BID with Ascetic Acid.       EFREN Willett

## 2022-11-03 NOTE — PROGRESS NOTES
[facility] Hospitalists      Patient:  Joanan Vazquez  YOB: 1970  Date of Service: 11/3/2022  MRN: Annette Cord: [de-identified]   Primary Care Physician: Maria G Galvin MD  Advance Directive: Full Code  Admit Date: 11/2/2022       Hospital Day: 1  Portions of this note have been copied forward, however, changed to reflect the most current clinical status of this patient. CHIEF COMPLAINT Abnormal Labs    SUBJECTIVE:  Patient resting in the bed. No complaints at this time. Cold, but normal for her. No chills at this time. No family present. CUMULATIVE HOSPITAL COURSE:  The patient is a 47 y.o. female who presented to [facility] ER with PMH multiple sclerosis, PVD, seizures,  complaining of abnormal lab. Patient follows with Usha Phillips, wound care, due to stage III pressure ulcer to right dorsal foot. Wound was debrided on 10/24 and wound cultures were obtained. Patient was notified of wound culture results and was instructed to [facility] for further evaluation. Upon patient arrival, she denies fever, nausea, vomiting, abdominal pain, shortness of breath or chest pain. Is paraplegic, utilizes wheelchair, and has maximum assistance at home. Patient is to be admitted to hospitalist service with consultation to vascular surgery due to infected right ankle wound. Pending recommendations from vascular surgery. IV Antibiotic therapy started. Will monitor for increased signs of infection and pain to right foot. Review of Systems:   Review of Systems   Constitutional:  Positive for fatigue. Negative for chills and fever. HENT: Negative. Eyes: Negative. Respiratory:  Negative for chest tightness and shortness of breath. Cardiovascular:  Positive for leg swelling. Negative for chest pain and palpitations. Endocrine: Positive for cold intolerance. Genitourinary: Negative.     Musculoskeletal:  Positive for arthralgias and gait problem.        (paraplegic, wheelchair bound)   Skin: Positive for color change. Allergic/Immunologic: Negative. Neurological:  Positive for weakness. Negative for dizziness and headaches. Hematological: Negative. Psychiatric/Behavioral:  Negative for agitation and confusion. The patient is not nervous/anxious. 14 point review of systems is negative except as specifically addressed above. Objective:   VITALS:  /77   Pulse 53   Temp 97.6 °F (36.4 °C) (Temporal)   Resp 16   Ht 5' 9\" (1.753 m)   Wt 128 lb 8 oz (58.3 kg)   SpO2 98%   BMI 18.98 kg/m²   24HR INTAKE/OUTPUT:  No intake or output data in the 24 hours ending 11/03/22 1130    Physical Exam  Vitals and nursing note reviewed. Constitutional:       General: She is not in acute distress. Appearance: She is not ill-appearing. HENT:      Head: Normocephalic. Nose: Nose normal.      Mouth/Throat:      Mouth: Mucous membranes are moist.   Eyes:      Pupils: Pupils are equal, round, and reactive to light. Cardiovascular:      Rate and Rhythm: Normal rate and regular rhythm. Pulses: Normal pulses. Heart sounds: Normal heart sounds. Pulmonary:      Effort: Pulmonary effort is normal.      Breath sounds: Normal breath sounds. Abdominal:      General: Bowel sounds are normal.      Palpations: Abdomen is soft. Tenderness: There is no abdominal tenderness. Musculoskeletal:      Cervical back: Normal range of motion. Right lower leg: Edema present. Left lower leg: Edema present. Comments: (paraplegic, wheelchair bound)   Skin:     General: Skin is warm and dry. Capillary Refill: Capillary refill takes less than 2 seconds. Neurological:      Mental Status: She is alert and oriented to person, place, and time. Motor: Weakness present.       Comments: (paraplegic, wheelchair bound)   Psychiatric:         Mood and Affect: Mood normal.         Behavior: Behavior normal.       Medications:      sodium chloride        ampicillin-sulbactam 3,000 mg IntraVENous Q6H    Acetic Acid  250 mL Topical BID    sodium chloride flush  5-40 mL IntraVENous 2 times per day    enoxaparin  40 mg SubCUTAneous Daily    cetirizine  10 mg Oral Daily    DULoxetine  30 mg Oral Nightly    levETIRAcetam  750 mg Oral Daily    therapeutic multivitamin-minerals  1 tablet Oral Daily    pregabalin  300 mg Oral BID    pilocarpine  7.5 mg Oral BID    meropenem  1,000 mg IntraVENous Q8H    nicotine  1 patch TransDERmal Daily     acetaminophen, sodium chloride flush, sodium chloride, ondansetron **OR** ondansetron, polyethylene glycol, docusate sodium, HYDROcodone-acetaminophen, tiZANidine, naloxone, albuterol  ADULT DIET; Regular     Lab and other Data:     Recent Labs     11/02/22  1513 11/03/22  0026   WBC 4.8 4.2*   HGB 12.9 12.2    164     Recent Labs     11/02/22  1513 11/03/22  0026    139   K 3.6 3.6    106   CO2 23 25   BUN 13 11   CREATININE 0.3* 0.3*   GLUCOSE 90 99     Recent Labs     11/02/22  1513   AST 11   ALT 14   BILITOT 0.3   ALKPHOS 106*     Troponin T: No results for input(s): TROPONINI in the last 72 hours. Pro-BNP: No results for input(s): BNP in the last 72 hours. INR: No results for input(s): INR in the last 72 hours. UA:No results for input(s): NITRITE, COLORU, PHUR, LABCAST, WBCUA, RBCUA, MUCUS, TRICHOMONAS, YEAST, BACTERIA, CLARITYU, SPECGRAV, LEUKOCYTESUR, UROBILINOGEN, BILIRUBINUR, BLOODU, GLUCOSEU, AMORPHOUS in the last 72 hours. Invalid input(s): KETONESU  A1C:   Recent Labs     11/02/22  1513   LABA1C 4.9     ABG:No results for input(s): PHART, ILU0SDU, PO2ART, RQK3IEI, BEART, HGBAE, B2EIYJXS, CARBOXHGBART in the last 72 hours. RAD:   XR ANKLE LEFT (MIN 3 VIEWS)    Result Date: 11/3/2022  No acute osseous abnormality of the ankle. No osseous erosions are seen to suggest osteomyelitis. XR ANKLE RIGHT (MIN 3 VIEWS)    Result Date: 10/31/2022  Osteoporosis.  Talofibular degenerative change with curvilinear spur of the fibula and mild soft tissue swelling. Recommendation: Follow up as clinically indicated. Note: Direct correlation with point tenderness and the X-ray images is recommended and does significantly increase the sensitivity of radiographic evaluation. Electronically Signed by Allen Amezcua MD at 31-Oct-2022 02:34:25 PM EST             XR FOOT LEFT (MIN 3 VIEWS)    Result Date: 11/3/2022  Transmetatarsal amputation of the left foot with postsurgical changes Severe osteopenia         Assessment/Plan   Principal Problem:    Ankle wound, right, sequela  Active Problems:    Multiple sclerosis ([address]    Peripheral vascular disease ([address]  Resolved Problems:    * No resolved hospital problems. *    Principal Problem:    Ankle wound, right, sequela  -Consultation to vascular surgery- pending recommendations  -Consultation Wound care- pending  -XR right ankle obtained on 10/31- Completed,  Osteoporosis.  Talofibular degenerative change with curvilinear spur of the fibula and mild soft tissue swelling.   -VL Arterial LE mildly diminished flow to bilateral lower extremities  -Speciality Bed  -Wound culture obtained 10/24: Pseudomonas Aeruginosa, Proteus Mirabilis, Enterococcus faecalis, Escherichia coli              -IV antibiotics, Merrem & Vancomycin,   Changes 11/3- Merrem & Usasyn              -Neurovascular checks               -Elevate extremity              -Follow blood cultures- pending  Active Problems:    Multiple sclerosis (HCC)    Peripheral vascular disease ([address]     DVT prophylactic: EFREN Urias - CNP, 11/3/2022 11:30 AM

## 2022-11-03 NOTE — PLAN OF CARE
Problem: Discharge Planning  Goal: Discharge to home or other facility with appropriate resources  Outcome: Not Progressing  Flowsheets (Taken 11/3/2022 7401)  Discharge to home or other facility with appropriate resources:   Identify barriers to discharge with patient and caregiver   Identify discharge learning needs (meds, wound care, etc)   Arrange for needed discharge resources and transportation as appropriate   Arrange for interpreters to assist at discharge as needed   Refer to discharge planning if patient needs post-hospital services based on physician order or complex needs related to functional status, cognitive ability or social support system     Problem: Pain  Goal: Verbalizes/displays adequate comfort level or baseline comfort level  Outcome: Not Progressing  Flowsheets (Taken 11/3/2022 0858)  Verbalizes/displays adequate comfort level or baseline comfort level:   Encourage patient to monitor pain and request assistance   Assess pain using appropriate pain scale   Administer analgesics based on type and severity of pain and evaluate response   Implement non-pharmacological measures as appropriate and evaluate response   Consider cultural and social influences on pain and pain management   Notify Licensed Independent Practitioner if interventions unsuccessful or patient reports new pain     Problem: Skin/Tissue Integrity  Goal: Absence of new skin breakdown  Outcome: Not Progressing     Problem: Safety - Adult  Goal: Free from fall injury  Outcome: Not Progressing     Problem: Neurosensory - Adult  Goal: Achieves stable or improved neurological status  Outcome: Not Progressing  Flowsheets (Taken 11/3/2022 0937)  Achieves stable or improved neurological status:   Initiate measures to prevent increased intracranial pressure   Assess for and report changes in neurological status   Maintain blood pressure and fluid volume within ordered parameters to optimize cerebral perfusion and minimize risk of hemorrhage   Monitor temperature, glucose, and sodium. Initiate appropriate interventions as ordered  Goal: Absence of seizures  Outcome: Not Progressing  Flowsheets (Taken 11/3/2022 0937)  Absence of seizures:   Monitor for seizure activity.   If seizure occurs, document type and location of movements and any associated apnea   If seizure occurs, turn head to side and suction secretions as needed   Administer anticonvulsants as ordered   Support airway/breathing, administer oxygen as needed   Diagnostic studies as ordered  Goal: Remains free of injury related to seizures activity  Outcome: Not Progressing  Flowsheets (Taken 11/3/2022 0937)  Remains free of injury related to seizure activity:   Maintain airway, patient safety  and administer oxygen as ordered   Monitor patient for seizure activity, document and report duration and description of seizure to Licensed Independent Practitioner   If seizure occurs, turn patient to side and suction secretions as needed   Reorient patient post seizure   Seizure pads on all 4 side rails   Instruct patient/family to call for assistance with activity based on assessment   Instruct patient/family to notify RN of any seizure activity  Goal: Achieves maximal functionality and self care  Outcome: Not Progressing  Flowsheets (Taken 11/3/2022 0937)  Achieves maximal functionality and self care:   Monitor swallowing and airway patency with patient fatigue and changes in neurological status   Encourage and assist patient to increase activity and self care with guidance from physical therapy/occupational therapy   Encourage visually impaired, hearing impaired and aphasic patients to use assistive/communication devices     Problem: Respiratory - Adult  Goal: Achieves optimal ventilation and oxygenation  Outcome: Not Progressing  Flowsheets (Taken 11/3/2022 0937)  Achieves optimal ventilation and oxygenation:   Assess for changes in respiratory status   Assess for changes in mentation and behavior   Position to facilitate oxygenation and minimize respiratory effort   Oxygen supplementation based on oxygen saturation or arterial blood gases   Initiate smoking cessation protocol as indicated   Encourage broncho-pulmonary hygiene including cough, deep breathe, incentive spirometry   Assess the need for suctioning and aspirate as needed   Assess and instruct to report shortness of breath or any respiratory difficulty   Respiratory therapy support as indicated     Problem: Cardiovascular - Adult  Goal: Maintains optimal cardiac output and hemodynamic stability  Outcome: Not Progressing  Flowsheets (Taken 11/3/2022 0937)  Maintains optimal cardiac output and hemodynamic stability:   Monitor blood pressure and heart rate   Monitor urine output and notify Licensed Independent Practitioner for values outside of normal range   Assess for signs of decreased cardiac output   Administer fluid and/or volume expanders as ordered   Administer vasoactive medications as ordered  Goal: Absence of cardiac dysrhythmias or at baseline  Outcome: Not Progressing  Flowsheets (Taken 11/3/2022 0937)  Absence of cardiac dysrhythmias or at baseline:   Monitor cardiac rate and rhythm   Assess for signs of decreased cardiac output   Administer antiarrhythmia medication and electrolyte replacement as ordered     Problem: Skin/Tissue Integrity - Adult  Goal: Skin integrity remains intact  Outcome: Not Progressing  Flowsheets  Taken 11/3/2022 1042  Skin Integrity Remains Intact:   Monitor for areas of redness and/or skin breakdown   Assess vascular access sites hourly   Every 4-6 hours minimum: Change oxygen saturation probe site   Every 4-6 hours: If on nasal continuous positive airway pressure, respiratory therapy assesses nares and determine need for appliance change or resting period  Taken 11/3/2022 0937  Skin Integrity Remains Intact:   Monitor for areas of redness and/or skin breakdown   Assess vascular access sites hourly   Every 4-6 hours minimum: Change oxygen saturation probe site   Every 4-6 hours: If on nasal continuous positive airway pressure, respiratory therapy assesses nares and determine need for appliance change or resting period  Goal: Incisions, wounds, or drain sites healing without S/S of infection  Outcome: Not Progressing  Flowsheets  Taken 11/3/2022 1042  Incisions, Wounds, or Drain Sites Healing Without Sign and Symptoms of Infection:   ADMISSION and DAILY: Assess and document risk factors for pressure ulcer development   TWICE DAILY: Assess and document skin integrity   TWICE DAILY: Assess and document dressing/incision, wound bed, drain sites and surrounding tissue   Implement wound care per orders   Initiate isolation precautions as appropriate   Initiate pressure ulcer prevention bundle as indicated  Taken 11/3/2022 0937  Incisions, Wounds, or Drain Sites Healing Without Sign and Symptoms of Infection:   ADMISSION and DAILY: Assess and document risk factors for pressure ulcer development   TWICE DAILY: Assess and document skin integrity   TWICE DAILY: Assess and document dressing/incision, wound bed, drain sites and surrounding tissue   Implement wound care per orders   Initiate isolation precautions as appropriate   Initiate pressure ulcer prevention bundle as indicated  Goal: Oral mucous membranes remain intact  Outcome: Not Progressing  Flowsheets  Taken 11/3/2022 1042  Oral Mucous Membranes Remain Intact:   Assess oral mucosa and hygiene practices   Implement preventative oral hygiene regimen   Implement oral medicated treatments as ordered  Taken 11/3/2022 0937  Oral Mucous Membranes Remain Intact:   Assess oral mucosa and hygiene practices   Implement preventative oral hygiene regimen   Implement oral medicated treatments as ordered     Problem: Musculoskeletal - Adult  Goal: Return mobility to safest level of function  Outcome: Not Progressing  Flowsheets (Taken 11/3/2022 0937)  Return Mobility to Safest Level of Function:   Assess patient stability and activity tolerance for standing, transferring and ambulating with or without assistive devices   Assist with transfers and ambulation using safe patient handling equipment as needed   Ensure adequate protection for wounds/incisions during mobilization   Obtain physical therapy/occupational therapy consults as needed   Apply continuous passive motion per provider or physical therapy orders to increase flexion toward goal   Instruct patient/family in ordered activity level  Goal: Maintain proper alignment of affected body part  Outcome: Not Progressing  Flowsheets (Taken 11/3/2022 0937)  Maintain proper alignment of affected body part:   Support and protect limb and body alignment per provider's orders   Instruct and reinforce with patient and family use of appropriate assistive device and precautions (e.g. spinal or hip dislocation precautions)  Goal: Return ADL status to a safe level of function  Outcome: Not Progressing  Flowsheets (Taken 11/3/2022 0937)  Return ADL Status to a Safe Level of Function:   Administer medication as ordered   Assess activities of daily living deficits and provide assistive devices as needed   Obtain physical therapy/occupational therapy consults as needed   Assist and instruct patient to increase activity and self care as tolerated     Problem: Gastrointestinal - Adult  Goal: Minimal or absence of nausea and vomiting  Outcome: Not Progressing  Flowsheets (Taken 11/3/2022 0937)  Minimal or absence of nausea and vomiting: Administer IV fluids as ordered to ensure adequate hydration  Goal: Maintains or returns to baseline bowel function  Outcome: Not Progressing  Flowsheets (Taken 11/3/2022 0937)  Maintains or returns to baseline bowel function:   Assess bowel function   Encourage oral fluids to ensure adequate hydration   Administer IV fluids as ordered to ensure adequate hydration   Administer ordered medications as needed Encourage mobilization and activity   Nutrition consult to assist patient with appropriate food choices  Goal: Maintains adequate nutritional intake  Outcome: Not Progressing  Flowsheets (Taken 11/3/2022 0937)  Maintains adequate nutritional intake:   Monitor percentage of each meal consumed   Identify factors contributing to decreased intake, treat as appropriate   Assist with meals as needed   Obtain nutritional consult as needed   Monitor intake and output, weight and lab values  Goal: Establish and maintain optimal ostomy function  Outcome: Not Progressing  Flowsheets (Taken 11/3/2022 0937)  Establish and maintain optimal ostomy function:   Monitor output from ostomies   Administer IV fluids and TPN as ordered   Introduce and advance enteral feedings as ordered   Nutrition consult   Infuse IV Fluids/TPN as ordered     Problem: Genitourinary - Adult  Goal: Absence of urinary retention  Outcome: Not Progressing  Flowsheets (Taken 11/3/2022 0937)  Absence of urinary retention:   Monitor intake/output and perform bladder scan as needed   Place urinary catheter per Licensed Independent Practitioner order if needed   Assess patients ability to void and empty bladder  Goal: Urinary catheter remains patent  Outcome: Not Progressing  Flowsheets (Taken 11/3/2022 0937)  Urinary catheter remains patent:   Assess patency of urinary catheter   Assess need for a larger catheter size or a 3-way catheter for continuous bladder irrigation     Problem: Infection - Adult  Goal: Absence of infection at discharge  Outcome: Not Progressing  Flowsheets (Taken 11/3/2022 0937)  Absence of infection at discharge:   Assess and monitor for signs and symptoms of infection   Monitor lab/diagnostic results   Monitor all insertion sites i.e., indwelling lines, tubes and drains   [place] appropriate cooling/warming therapies per order   Administer medications as ordered   Instruct and encourage patient and family to use good hand hygiene technique  Goal: Absence of infection during hospitalization  Outcome: Not Progressing  Flowsheets (Taken 11/3/2022 0937)  Absence of infection during hospitalization:   Assess and monitor for signs and symptoms of infection   Monitor lab/diagnostic results   [place] appropriate cooling/warming therapies per order   Administer medications as ordered   Instruct and encourage patient and family to use good hand hygiene technique   Identify and instruct in appropriate isolation precautions for identified infection/condition  Goal: Absence of fever/infection during anticipated neutropenic period  Outcome: Not Progressing  Flowsheets (Taken 11/3/2022 0937)  Absence of fever/infection during anticipated neutropenic period:   Monitor white blood cell count   Administer growth factors as ordered   Implement neutropenic guidelines     Problem: Metabolic/Fluid and Electrolytes - Adult  Goal: Electrolytes maintained within normal limits  Outcome: Not Progressing  Flowsheets (Taken 11/3/2022 0937)  Electrolytes maintained within normal limits:   Administer electrolyte replacement as ordered   Monitor labs and assess patient for signs and symptoms of electrolyte imbalances   Monitor response to electrolyte replacements, including repeat lab results as appropriate   Fluid restriction as ordered   Instruct patient on fluid and nutrition restrictions as appropriate  Goal: Hemodynamic stability and optimal renal function maintained  Outcome: Not Progressing  Flowsheets (Taken 11/3/2022 0937)  Hemodynamic stability and optimal renal function maintained:   Monitor labs and assess for signs and symptoms of volume excess or deficit   Monitor intake, output and patient weight   Monitor urine specific gravity, serum osmolarity and serum sodium as indicated or ordered   Monitor response to interventions for patient's volume status, including labs, urine output, blood pressure (other measures as available)   Encourage oral intake as appropriate   Instruct patient on fluid and nutrition restrictions as appropriate  Goal: Glucose maintained within prescribed range  Outcome: Not Progressing  Flowsheets (Taken 11/3/2022 0937)  Glucose maintained within prescribed range:   Monitor blood glucose as ordered   Assess for signs and symptoms of hyperglycemia and hypoglycemia   Administer ordered medications to maintain glucose within target range   Assess barriers to adequate nutritional intake and initiate nutrition consult as needed   Instruct patient on self management of diabetes and initiate consult as needed     Problem: Hematologic - Adult  Goal: Maintains hematologic stability  Outcome: Not Progressing  Flowsheets (Taken 11/3/2022 0937)  Maintains hematologic stability:   Assess for signs and symptoms of bleeding or hemorrhage   Monitor labs for bleeding or clotting disorders   Administer blood products/factors as ordered     Problem: Chronic Conditions and Co-morbidities  Goal: Patient's chronic conditions and co-morbidity symptoms are monitored and maintained or improved  Outcome: Not Progressing  Flowsheets (Taken 11/3/2022 0937)  Care Plan - Patient's Chronic Conditions and Co-Morbidity Symptoms are Monitored and Maintained or Improved:   Monitor and assess patient's chronic conditions and comorbid symptoms for stability, deterioration, or improvement   Collaborate with multidisciplinary team to address chronic and comorbid conditions and prevent exacerbation or deterioration   Update acute care plan with appropriate goals if chronic or comorbid symptoms are exacerbated and prevent overall improvement and discharge     Problem: ABCDS Injury Assessment  Goal: Absence of physical injury  Outcome: Not Progressing     Problem: Nutrition Deficit:  Goal: Optimize nutritional status  Outcome: Not Progressing

## 2022-11-04 LAB
ANION GAP SERPL CALCULATED.3IONS-SCNC: 8 MMOL/L (ref 7–19)
BUN BLDV-MCNC: 8 MG/DL (ref 6–20)
CALCIUM SERPL-MCNC: 8.2 MG/DL (ref 8.6–10)
CHLORIDE BLD-SCNC: 110 MMOL/L (ref 98–111)
CO2: 25 MMOL/L (ref 22–29)
CREAT SERPL-MCNC: 0.3 MG/DL (ref 0.5–0.9)
GFR SERPL CREATININE-BSD FRML MDRD: >60 ML/MIN/{1.73_M2}
GLUCOSE BLD-MCNC: 89 MG/DL (ref 74–109)
HCT VFR BLD CALC: 38.8 % (ref 37–47)
HEMOGLOBIN: 12.2 G/DL (ref 12–16)
KEPPRA: 19 UG/ML (ref 10–40)
MAGNESIUM: 1.9 MG/DL (ref 1.6–2.6)
MCH RBC QN AUTO: 28.4 PG (ref 27–31)
MCHC RBC AUTO-ENTMCNC: 31.4 G/DL (ref 33–37)
MCV RBC AUTO: 90.2 FL (ref 81–99)
PDW BLD-RTO: 13.8 % (ref 11.5–14.5)
PLATELET # BLD: 166 K/UL (ref 130–400)
PMV BLD AUTO: 10.8 FL (ref 9.4–12.3)
POTASSIUM REFLEX MAGNESIUM: 3.5 MMOL/L (ref 3.5–5)
RBC # BLD: 4.3 M/UL (ref 4.2–5.4)
SODIUM BLD-SCNC: 143 MMOL/L (ref 136–145)
WBC # BLD: 3.5 K/UL (ref 4.8–10.8)

## 2022-11-04 PROCEDURE — 6360000002 HC RX W HCPCS: Performed by: HOSPITALIST

## 2022-11-04 PROCEDURE — 80048 BASIC METABOLIC PNL TOTAL CA: CPT

## 2022-11-04 PROCEDURE — 6370000000 HC RX 637 (ALT 250 FOR IP)

## 2022-11-04 PROCEDURE — 1210000000 HC MED SURG R&B

## 2022-11-04 PROCEDURE — 6360000002 HC RX W HCPCS

## 2022-11-04 PROCEDURE — 85027 COMPLETE CBC AUTOMATED: CPT

## 2022-11-04 PROCEDURE — 2580000003 HC RX 258

## 2022-11-04 PROCEDURE — 90686 IIV4 VACC NO PRSV 0.5 ML IM: CPT | Performed by: HOSPITALIST

## 2022-11-04 PROCEDURE — 2580000003 HC RX 258: Performed by: HOSPITALIST

## 2022-11-04 PROCEDURE — 36591 DRAW BLOOD OFF VENOUS DEVICE: CPT

## 2022-11-04 PROCEDURE — G0008 ADMIN INFLUENZA VIRUS VAC: HCPCS | Performed by: HOSPITALIST

## 2022-11-04 PROCEDURE — 6370000000 HC RX 637 (ALT 250 FOR IP): Performed by: HOSPITALIST

## 2022-11-04 PROCEDURE — 83735 ASSAY OF MAGNESIUM: CPT

## 2022-11-04 RX ORDER — TIZANIDINE 4 MG/1
12 TABLET ORAL 2 TIMES DAILY
Status: DISCONTINUED | OUTPATIENT
Start: 2022-11-04 | End: 2022-11-10 | Stop reason: HOSPADM

## 2022-11-04 RX ADMIN — ENOXAPARIN SODIUM 40 MG: 100 INJECTION SUBCUTANEOUS at 08:12

## 2022-11-04 RX ADMIN — INFLUENZA A VIRUS A/VICTORIA/2570/2019 IVR-215 (H1N1) ANTIGEN (PROPIOLACTONE INACTIVATED), INFLUENZA A VIRUS A/DARWIN/6/2021 IVR-227 (H3N2) ANTIGEN (PROPIOLACTONE INACTIVATED), INFLUENZA B VIRUS B/AUSTRIA/1359417/2021 BVR-26 ANTIGEN (PROPIOLACTONE INACTIVATED), INFLUENZA B VIRUS B/PHUKET/3073/2013 BVR-1B ANTIGEN (PROPIOLACTONE INACTIVATED) 0.5 ML: 15; 15; 15; 15 INJECTION, SOLUTION INTRAMUSCULAR at 09:47

## 2022-11-04 RX ADMIN — TIZANIDINE 12 MG: 4 TABLET ORAL at 20:44

## 2022-11-04 RX ADMIN — AMPICILLIN SODIUM AND SULBACTAM SODIUM 3000 MG: 2; 1 INJECTION, POWDER, FOR SOLUTION INTRAMUSCULAR; INTRAVENOUS at 08:11

## 2022-11-04 RX ADMIN — PREGABALIN 300 MG: 150 CAPSULE ORAL at 20:44

## 2022-11-04 RX ADMIN — Medication 250 ML: at 20:49

## 2022-11-04 RX ADMIN — Medication 250 ML: at 08:16

## 2022-11-04 RX ADMIN — CETIRIZINE HYDROCHLORIDE 10 MG: 10 TABLET, FILM COATED ORAL at 08:11

## 2022-11-04 RX ADMIN — MEROPENEM AND SODIUM CHLORIDE 1000 MG: 1 INJECTION, SOLUTION INTRAVENOUS at 08:11

## 2022-11-04 RX ADMIN — AMPICILLIN SODIUM AND SULBACTAM SODIUM 3000 MG: 2; 1 INJECTION, POWDER, FOR SOLUTION INTRAMUSCULAR; INTRAVENOUS at 02:54

## 2022-11-04 RX ADMIN — Medication 1 TABLET: at 08:11

## 2022-11-04 RX ADMIN — PREGABALIN 300 MG: 150 CAPSULE ORAL at 08:12

## 2022-11-04 RX ADMIN — LEVETIRACETAM 750 MG: 250 TABLET, FILM COATED ORAL at 08:11

## 2022-11-04 RX ADMIN — DULOXETINE 30 MG: 30 CAPSULE, DELAYED RELEASE ORAL at 20:44

## 2022-11-04 RX ADMIN — PILOCARPINE HYDROCHLORIDE 7.5 MG: 5 TABLET, FILM COATED ORAL at 08:11

## 2022-11-04 RX ADMIN — AMPICILLIN SODIUM AND SULBACTAM SODIUM 3000 MG: 2; 1 INJECTION, POWDER, FOR SOLUTION INTRAMUSCULAR; INTRAVENOUS at 16:22

## 2022-11-04 RX ADMIN — AMPICILLIN SODIUM AND SULBACTAM SODIUM 3000 MG: 2; 1 INJECTION, POWDER, FOR SOLUTION INTRAMUSCULAR; INTRAVENOUS at 20:41

## 2022-11-04 RX ADMIN — SODIUM CHLORIDE, PRESERVATIVE FREE 10 ML: 5 INJECTION INTRAVENOUS at 20:45

## 2022-11-04 RX ADMIN — MEROPENEM AND SODIUM CHLORIDE 1000 MG: 1 INJECTION, SOLUTION INTRAVENOUS at 16:22

## 2022-11-04 RX ADMIN — HYDROCODONE BITARTRATE AND ACETAMINOPHEN 1 TABLET: 10; 325 TABLET ORAL at 20:45

## 2022-11-04 RX ADMIN — PILOCARPINE HYDROCHLORIDE 7.5 MG: 5 TABLET, FILM COATED ORAL at 20:44

## 2022-11-04 ASSESSMENT — PAIN SCALES - GENERAL
PAINLEVEL_OUTOF10: 0
PAINLEVEL_OUTOF10: 7
PAINLEVEL_OUTOF10: 0

## 2022-11-04 ASSESSMENT — PAIN DESCRIPTION - LOCATION: LOCATION: GENERALIZED

## 2022-11-04 ASSESSMENT — PAIN DESCRIPTION - DESCRIPTORS: DESCRIPTORS: ACHING;DISCOMFORT

## 2022-11-04 ASSESSMENT — PAIN DESCRIPTION - ORIENTATION: ORIENTATION: INNER

## 2022-11-04 ASSESSMENT — PAIN - FUNCTIONAL ASSESSMENT: PAIN_FUNCTIONAL_ASSESSMENT: PREVENTS OR INTERFERES SOME ACTIVE ACTIVITIES AND ADLS

## 2022-11-04 NOTE — PROGRESS NOTES
Progress Note  Date:2022       [facility]:[id number]  Patient Anita Awan     Date of Birth:[birth year]     Age:52 y.o. Subjective    Subjective: 50-year-old female with a history of MS, peripheral vascular disease, seizures, presented to the hospital 2022 with concerns of abnormal labs. Outpatient was seen by wound care, patient with chronic right foot wound and cultures outpatient growing Pseudomonas, Proteus, Enterococcus faecalis and E. coli. Initially on broad-spectrum antibiotics with Vanco and meropenem and transition to Unasyn and meropenem after review of sensitivities. Seen in house by vascular surgery, repeat x-ray of the low right foot was obtained with no definitive diagnosis of osteomyelitis. Recommended continuation of antibiotics, local wound care and awaiting ID evaluation and recommendations on Monday. Patient was seen in house today with family member present, denied any acute overnight event complaining of chronic back pain associated with prior right hip surgery. She denied any chest pain or shortness of breath denied any other ongoing issues at this time. Review of Systems: 12 point system review negative except as above. Objective         Vitals Last 24 Hours:  TEMPERATURE:  Temp  Av °F (36.7 °C)  Min: 97.5 °F (36.4 °C)  Max: 99.1 °F (37.3 °C)  RESPIRATIONS RANGE: Resp  Av.3  Min: 16  Max: 18  PULSE OXIMETRY RANGE: SpO2  Av.8 %  Min: 97 %  Max: 100 %  PULSE RANGE: Pulse  Av.5  Min: 64  Max: 77  BLOOD PRESSURE RANGE: Systolic (44OWI), ZIS:874 , Min:93 , REQ:375   ; Diastolic (07RBI), IYT:06, Min:55, Max:86    I/O (24Hr): Intake/Output Summary (Last 24 hours) at 2022 1649  Last data filed at 2022 1641  Gross per 24 hour   Intake 2104.71 ml   Output 2500 ml   Net -395.29 ml       Physical Examination:  General: Underweight, no acute distress lying comfortably in bed.   HEENT: Atraumatic normocephalic, range of motion normal, no JVD, no tracheal deviation noted. Cardiac: Normal S1-S2   Respiratory: clear To auscultation bilaterally, no rhonchi or rales, no wheezing  Abdomen: Soft, positive bowel sounds in all quadrants, no distention, nontender to palpation, no organomegaly noted, no rebound noted. Extremities: ankle wound noted in media, no tenderness, no edema, moves all extremities  Psych: Affect normal and good eye contact, behavioral normal.        Labs/Imaging/Diagnostics    Labs:  CBC:  Recent Labs     11/02/22  1513 11/03/22  0026 11/04/22  0256   WBC 4.8 4.2* 3.5*   RBC 4.47 4.22 4.30   HGB 12.9 12.2 12.2   HCT 39.2 38.3 38.8   MCV 87.7 90.8 90.2   RDW 14.1 14.0 13.8    164 166     CHEMISTRIES:  Recent Labs     11/02/22  1513 11/03/22  0026 11/04/22  0256    139 143   K 3.6 3.6 3.5    106 110   CO2 23 25 25   BUN 13 11 8   CREATININE 0.3* 0.3* 0.3*   GLUCOSE 90 99 89   MG  --   --  1.9     PT/INR:No results for input(s): PROTIME, INR in the last 72 hours. APTT:No results for input(s): APTT in the last 72 hours. LIVER PROFILE:  Recent Labs     11/02/22  1513   AST 11   ALT 14   BILITOT 0.3   ALKPHOS 106*       Imaging Last 24 Hours:  XR ANKLE LEFT (MIN 3 VIEWS)    Result Date: 11/3/2022  CLINICAL HISTORY: Wound evaluation TECHNIQUE: 3 views of the left ankle FINDINGS: Severe bony demineralization. Postsurgical changes from transmetatarsal amputation. The surgical margins appear sharp. Surgical clips are seen in the distal soft tissues of the foot. There is no evidence of acute fracture or dislocation. Ankle mortise joint is unremarkable. The soft tissues are unremarkable. No acute osseous abnormality of the ankle. No osseous erosions are seen to suggest osteomyelitis. XR ANKLE RIGHT (MIN 3 VIEWS)    Result Date: 11/4/2022  NO PRIOR REPORT AVAILABLE Exam:X-RAYS OF RIGHT ANKLE Clinical data: Questionable osteo. Technique: Three views of the right ankle.  Prior studies: Radiographs of the right ankle dated 10/31/2022. Findings: Diffuse osteopenia which may obscure fine bony detail. The right ankle demonstrates no evidence of fracture or dislocation. The talar dome is intact. The medial, lateral and posterior malleoli show no gross abnormality. Talofibular degenerative changes again noted. Rest the intra-articular surfaces are within normal limits. No erosive lesions, no abnormal calcifications, no foci of subcutaneous air. Diffuse osteopenia. No acute osseous abnormality or erosive lesions. Low sensitivity of plain radiography for early osteomyelitis. Could correlate with MR if clinically warranted and not contraindicated. Recommendation: Follow up as clinically indicated. Note: Direct correlation with point tenderness and the X-ray images is recommended and does significantly increase the sensitivity of radiographic evaluation. Electronically Signed by Garo Pat MD at 04-Nov-2022 09:40:50 AM EST             XR FOOT LEFT (MIN 3 VIEWS)    Result Date: 11/3/2022  LEFT FOOT, THREE VIEWS: CLINICAL HISTORY: Wound FINDINGS : There is transmetatarsal i amputation of the left foot the stump appears to be intact. There are surgical clip near the 1st and 2nd metatarsal . There is severe osteopenia of the osseous structure. The left foot has a neuropathic radiographic appearance. There is no evidence of fracture or dislocation. The soft tissues appear normal.    Transmetatarsal amputation of the left foot with postsurgical changes Severe osteopenia    XR FOOT RIGHT (MIN 3 VIEWS)    Result Date: 11/4/2022  NO PRIOR REPORT AVAILABLE Exam:X-RAYS OF RIGHT FOOT Clinical data:Questionable osteo. Technique: Three views of the right foot. Prior studies: No prior studies submitted. Findings: Diffuse osteopenia which may obscure fine bony detail. Toes are held in flexion. Arterial calcifications. No evidence of fracture or dislocation. No discernible erosive lesion. The intra-articular surfaces are within normal limits. The plantar fascia shows no gross abnormality. The first MTP sesamoid bones show no gross abnormality. Diffuse osteopenia which may obscure fine bony detail. Toes are held in flexion. Arterial calcifications. No acute osseous abnormality or significant degenerative/erosive change. Low sensitivity of plain radiography for early osteomyelitis. Could correlate with MR if clinically warranted and not contraindicated. Recommendation: Follow up as clinically indicated. Note: Direct correlation with point tenderness and the X-ray images is recommended and does significantly increase the sensitivity of radiographic evaluation. Electronically Signed by Osmany Juarez MD at 04-Nov-2022 10:35:39 AM EST             Assessment//Plan           Hospital Problems             Last Modified POA    * (Principal) Ankle wound, right, sequela 11/2/2022 Yes    Multiple sclerosis ([address] 11/2/2022 Yes    Severe malnutrition ([address] 11/3/2022 Yes    Peripheral vascular disease ([facility] [address] 11/2/2022 Yes    Overview Signed 10/14/2017  7:29 PM by [facility]     Updating Deprecated Diagnoses          Assessment & Plan    Right Ankle wound  Wound culture obtained 10/24: Pseudomonas Aeruginosa, Proteus Mirabilis, Enterococcus faecalis, Escherichia coli  Currently on Unasyn and meropenem  Seen by Vascular and awaiting ID eval  VL Arterial LE mildly diminished flow to bilateral lower extremities  Wound care  Blood cultures currently negative. History of seizures: Continue Keppra    Multiple Sclerosis: Stable    Severe Malnutrition    PVD      Electronically signed by   Hilary Allen MD   Internal Medicine Hospitalist  On 11/4/2022  At 5:04 PM    EMR Dragon/Transcription disclaimer:   Much of this encounter note is an electronic transcription/translation of spoken language to printed text.  The electronic translation of spoken language may permit erroneous, or at times, nonsensical words or phrases to be inadvertently transcribed; although attempts have made to review the note for such errors, some may still exist.

## 2022-11-04 NOTE — PLAN OF CARE
Problem: Discharge Planning  Goal: Discharge to home or other facility with appropriate resources  11/4/2022 0201 by Roman Maldonado RN  Outcome: Progressing  11/4/2022 0100 by Roman Maldonado RN  Outcome: Progressing  11/3/2022 1842 by Radha Damon RN  Outcome: Not Progressing  Flowsheets (Taken 11/3/2022 8085)  Discharge to home or other facility with appropriate resources:   Identify barriers to discharge with patient and caregiver   Identify discharge learning needs (meds, wound care, etc)   Arrange for needed discharge resources and transportation as appropriate   Arrange for interpreters to assist at discharge as needed   Refer to discharge planning if patient needs post-hospital services based on physician order or complex needs related to functional status, cognitive ability or social support system     Problem: Pain  Goal: Verbalizes/displays adequate comfort level or baseline comfort level  11/4/2022 0201 by Roman Maldonado RN  Outcome: Progressing  11/4/2022 0100 by Roman Maldonado RN  Outcome: Progressing  11/3/2022 1842 by Radha Damon RN  Outcome: Not Progressing  Flowsheets (Taken 11/3/2022 5022)  Verbalizes/displays adequate comfort level or baseline comfort level:   Encourage patient to monitor pain and request assistance   Assess pain using appropriate pain scale   Administer analgesics based on type and severity of pain and evaluate response   Implement non-pharmacological measures as appropriate and evaluate response   Consider cultural and social influences on pain and pain management   Notify Licensed Independent Practitioner if interventions unsuccessful or patient reports new pain     Problem: Skin/Tissue Integrity  Goal: Absence of new skin breakdown  Description: 1. Monitor for areas of redness and/or skin breakdown  2. Assess vascular access sites hourly  3. Every 4-6 hours minimum:  Change oxygen saturation probe site  4.   Every 4-6 hours:  If on nasal continuous positive airway pressure, respiratory therapy assess nares and determine need for appliance change or resting period. 11/4/2022 0201 by Jeri Arredondo RN  Outcome: Progressing  11/4/2022 0100 by Jeri Arredondo RN  Outcome: Progressing  11/3/2022 1842 by Mini Driver RN  Outcome: Not Progressing     Problem: Safety - Adult  Goal: Free from fall injury  11/4/2022 0201 by Jeri Arredondo RN  Outcome: Progressing  11/4/2022 0100 by Jeri Arredondo RN  Outcome: Progressing  11/3/2022 1842 by Mini Driver RN  Outcome: Not Progressing     Problem: Neurosensory - Adult  Goal: Achieves stable or improved neurological status  11/4/2022 0201 by Jeri Arredondo RN  Outcome: Progressing  11/4/2022 0100 by Jeri Arredondo RN  Outcome: Progressing  11/3/2022 1842 by Mini Driver RN  Outcome: Not Progressing  Flowsheets (Taken 11/3/2022 3123)  Achieves stable or improved neurological status:   Initiate measures to prevent increased intracranial pressure   Assess for and report changes in neurological status   Maintain blood pressure and fluid volume within ordered parameters to optimize cerebral perfusion and minimize risk of hemorrhage   Monitor temperature, glucose, and sodium. Initiate appropriate interventions as ordered  Goal: Absence of seizures  11/4/2022 0201 by Jeri Arredondo RN  Outcome: Progressing  11/4/2022 0100 by Jeri Arredondo RN  Outcome: Progressing  11/3/2022 1842 by Mini Driver RN  Outcome: Not Progressing  Flowsheets (Taken 11/3/2022 1822)  Absence of seizures:   Monitor for seizure activity.   If seizure occurs, document type and location of movements and any associated apnea   If seizure occurs, turn head to side and suction secretions as needed   Administer anticonvulsants as ordered   Support airway/breathing, administer oxygen as needed   Diagnostic studies as ordered  Goal: Remains free of injury related to seizures activity  11/4/2022 0201 by Jeri Arredondo RN  Outcome: Progressing  11/4/2022 0100 by Jeri Arredondo RN  Outcome: Progressing  11/3/2022 1842 by Felipe Hoang RN  Outcome: Not Progressing  Flowsheets (Taken 11/3/2022 1376)  Remains free of injury related to seizure activity:   Maintain airway, patient safety  and administer oxygen as ordered   Monitor patient for seizure activity, document and report duration and description of seizure to Licensed Independent Practitioner   If seizure occurs, turn patient to side and suction secretions as needed   Reorient patient post seizure   Seizure pads on all 4 side rails   Instruct patient/family to call for assistance with activity based on assessment   Instruct patient/family to notify RN of any seizure activity  Goal: Achieves maximal functionality and self care  11/4/2022 0201 by Liss Escamilla RN  Outcome: Progressing  11/4/2022 0100 by Liss Escamilla RN  Outcome: Progressing  11/3/2022 1842 by Felipe Hoang RN  Outcome: Not Progressing  Flowsheets (Taken 11/3/2022 4382)  Achieves maximal functionality and self care:   Monitor swallowing and airway patency with patient fatigue and changes in neurological status   Encourage and assist patient to increase activity and self care with guidance from physical therapy/occupational therapy   Encourage visually impaired, hearing impaired and aphasic patients to use assistive/communication devices     Problem: Respiratory - Adult  Goal: Achieves optimal ventilation and oxygenation  11/4/2022 0201 by Liss Escamilla RN  Outcome: Progressing  11/4/2022 0100 by Liss Escamilla RN  Outcome: Progressing  11/3/2022 1842 by Felipe Hoang RN  Outcome: Not Progressing  Flowsheets (Taken 11/3/2022 6405)  Achieves optimal ventilation and oxygenation:   Assess for changes in respiratory status   Assess for changes in mentation and behavior   Position to facilitate oxygenation and minimize respiratory effort   Oxygen supplementation based on oxygen saturation or arterial blood gases   Initiate smoking cessation protocol as indicated Encourage broncho-pulmonary hygiene including cough, deep breathe, incentive spirometry   Assess the need for suctioning and aspirate as needed   Assess and instruct to report shortness of breath or any respiratory difficulty   Respiratory therapy support as indicated     Problem: Cardiovascular - Adult  Goal: Maintains optimal cardiac output and hemodynamic stability  11/4/2022 0201 by Yara Hsu RN  Outcome: Progressing  11/4/2022 0100 by Yara Hsu RN  Outcome: Progressing  11/3/2022 1842 by Rahul Arambula RN  Outcome: Not Progressing  Flowsheets (Taken 11/3/2022 7360)  Maintains optimal cardiac output and hemodynamic stability:   Monitor blood pressure and heart rate   Monitor urine output and notify Licensed Independent Practitioner for values outside of normal range   Assess for signs of decreased cardiac output   Administer fluid and/or volume expanders as ordered   Administer vasoactive medications as ordered  Goal: Absence of cardiac dysrhythmias or at baseline  11/4/2022 0201 by Yara Hsu RN  Outcome: Progressing  11/4/2022 0100 by Yara Hsu RN  Outcome: Progressing  11/3/2022 1842 by Rahul Arambula RN  Outcome: Not Progressing  Flowsheets (Taken 11/3/2022 6348)  Absence of cardiac dysrhythmias or at baseline:   Monitor cardiac rate and rhythm   Assess for signs of decreased cardiac output   Administer antiarrhythmia medication and electrolyte replacement as ordered     Problem: Skin/Tissue Integrity - Adult  Goal: Skin integrity remains intact  11/4/2022 0201 by Yara Hsu RN  Outcome: Progressing  11/4/2022 0100 by Yara Hsu RN  Outcome: Progressing  11/3/2022 1842 by Rahul Arambula RN  Outcome: Not Progressing  Flowsheets  Taken 11/3/2022 1042  Skin Integrity Remains Intact:   Monitor for areas of redness and/or skin breakdown   Assess vascular access sites hourly   Every 4-6 hours minimum: Change oxygen saturation probe site   Every 4-6 hours: If on nasal continuous positive airway pressure, respiratory therapy assesses nares and determine need for appliance change or resting period  Taken 11/3/2022 0937  Skin Integrity Remains Intact:   Monitor for areas of redness and/or skin breakdown   Assess vascular access sites hourly   Every 4-6 hours minimum: Change oxygen saturation probe site   Every 4-6 hours: If on nasal continuous positive airway pressure, respiratory therapy assesses nares and determine need for appliance change or resting period  Goal: Incisions, wounds, or drain sites healing without S/S of infection  11/4/2022 0201 by Yara Hsu RN  Outcome: Progressing  11/4/2022 0100 by Yara Hsu RN  Outcome: Progressing  11/3/2022 1842 by Rahul Arambula RN  Outcome: Not Progressing  Flowsheets  Taken 11/3/2022 1042  Incisions, Wounds, or Drain Sites Healing Without Sign and Symptoms of Infection:   ADMISSION and DAILY: Assess and document risk factors for pressure ulcer development   TWICE DAILY: Assess and document skin integrity   TWICE DAILY: Assess and document dressing/incision, wound bed, drain sites and surrounding tissue   Implement wound care per orders   Initiate isolation precautions as appropriate   Initiate pressure ulcer prevention bundle as indicated  Taken 11/3/2022 0937  Incisions, Wounds, or Drain Sites Healing Without Sign and Symptoms of Infection:   ADMISSION and DAILY: Assess and document risk factors for pressure ulcer development   TWICE DAILY: Assess and document skin integrity   TWICE DAILY: Assess and document dressing/incision, wound bed, drain sites and surrounding tissue   Implement wound care per orders   Initiate isolation precautions as appropriate   Initiate pressure ulcer prevention bundle as indicated  Goal: Oral mucous membranes remain intact  11/4/2022 0201 by Yara Hsu RN  Outcome: Progressing  11/4/2022 0100 by Yara Hsu RN  Outcome: Progressing  11/3/2022 1842 by Rahul Arambula RN  Outcome: Not Progressing  Flowsheets  Taken 11/3/2022 1042  Oral Mucous Membranes Remain Intact:   Assess oral mucosa and hygiene practices   Implement preventative oral hygiene regimen   Implement oral medicated treatments as ordered  Taken 11/3/2022 1504  Oral Mucous Membranes Remain Intact:   Assess oral mucosa and hygiene practices   Implement preventative oral hygiene regimen   Implement oral medicated treatments as ordered     Problem: Musculoskeletal - Adult  Goal: Return mobility to safest level of function  11/4/2022 0201 by Aiyana Liu RN  Outcome: Progressing  11/4/2022 0100 by Aiyana Liu RN  Outcome: Progressing  11/3/2022 1842 by Gina Kessler RN  Outcome: Not Progressing  Flowsheets (Taken 11/3/2022 4043)  Return Mobility to Safest Level of Function:   Assess patient stability and activity tolerance for standing, transferring and ambulating with or without assistive devices   Assist with transfers and ambulation using safe patient handling equipment as needed   Ensure adequate protection for wounds/incisions during mobilization   Obtain physical therapy/occupational therapy consults as needed   Apply continuous passive motion per provider or physical therapy orders to increase flexion toward goal   Instruct patient/family in ordered activity level  Goal: Maintain proper alignment of affected body part  11/4/2022 0201 by Aiyana Liu RN  Outcome: Progressing  11/4/2022 0100 by Aiyana Liu RN  Outcome: Progressing  11/3/2022 1842 by Gina Kessler RN  Outcome: Not Progressing  Flowsheets (Taken 11/3/2022 6611)  Maintain proper alignment of affected body part:   Support and protect limb and body alignment per provider's orders   Instruct and reinforce with patient and family use of appropriate assistive device and precautions (e.g. spinal or hip dislocation precautions)  Goal: Return ADL status to a safe level of function  11/4/2022 0201 by Aiyana Liu RN  Outcome: Progressing  11/4/2022 0100 by Evaristo Knox Varghese Loomis RN  Outcome: Progressing  11/3/2022 1842 by Sydney Delgado RN  Outcome: Not Progressing  Flowsheets (Taken 11/3/2022 9397)  Return ADL Status to a Safe Level of Function:   Administer medication as ordered   Assess activities of daily living deficits and provide assistive devices as needed   Obtain physical therapy/occupational therapy consults as needed   Assist and instruct patient to increase activity and self care as tolerated     Problem: Gastrointestinal - Adult  Goal: Minimal or absence of nausea and vomiting  11/4/2022 0201 by Jillian Stephenson RN  Outcome: Progressing  11/4/2022 0100 by Jillian Stephenson RN  Outcome: Progressing  11/3/2022 1842 by Sydney Delgado RN  Outcome: Not Progressing  Flowsheets (Taken 11/3/2022 9719)  Minimal or absence of nausea and vomiting: Administer IV fluids as ordered to ensure adequate hydration  Goal: Maintains or returns to baseline bowel function  11/4/2022 0201 by Jillian Stephenson RN  Outcome: Progressing  11/4/2022 0100 by Jillian Stephenson RN  Outcome: Progressing  11/3/2022 1842 by Sydney Delgado RN  Outcome: Not Progressing  Flowsheets (Taken 11/3/2022 3024)  Maintains or returns to baseline bowel function:   Assess bowel function   Encourage oral fluids to ensure adequate hydration   Administer IV fluids as ordered to ensure adequate hydration   Administer ordered medications as needed   Encourage mobilization and activity   Nutrition consult to assist patient with appropriate food choices  Goal: Maintains adequate nutritional intake  11/4/2022 0201 by Jillian Stephenson RN  Outcome: Progressing  11/4/2022 0100 by Jillian Stephenson RN  Outcome: Progressing  11/3/2022 1842 by Sydney Delgado RN  Outcome: Not Progressing  Flowsheets (Taken 11/3/2022 2047)  Maintains adequate nutritional intake:   Monitor percentage of each meal consumed   Identify factors contributing to decreased intake, treat as appropriate   Assist with meals as needed   Obtain nutritional consult as needed   Monitor intake and output, weight and lab values  Goal: Establish and maintain optimal ostomy function  11/4/2022 0201 by Louretta Hammans, RN  Outcome: Progressing  11/4/2022 0100 by Louretta Hammans, RN  Outcome: Progressing  11/3/2022 1842 by Sabi Chadwick RN  Outcome: Not Progressing  Flowsheets (Taken 11/3/2022 9986)  Establish and maintain optimal ostomy function:   Monitor output from ostomies   Administer IV fluids and TPN as ordered   Introduce and advance enteral feedings as ordered   Nutrition consult   Infuse IV Fluids/TPN as ordered     Problem: Genitourinary - Adult  Goal: Absence of urinary retention  11/4/2022 0201 by Louretta Hammans, RN  Outcome: Progressing  11/4/2022 0100 by Louretta Hammans, RN  Outcome: Progressing  11/3/2022 1842 by Sabi Chadwick RN  Outcome: Not Progressing  Flowsheets (Taken 11/3/2022 9309)  Absence of urinary retention:   Monitor intake/output and perform bladder scan as needed   Place urinary catheter per Licensed Independent Practitioner order if needed   Assess patients ability to void and empty bladder  Goal: Urinary catheter remains patent  11/4/2022 0201 by Louretta Hammans, RN  Outcome: Progressing  11/4/2022 0100 by Louretta Hammans, RN  Outcome: Progressing  11/3/2022 1842 by Sabi Chadwick RN  Outcome: Not Progressing  Flowsheets (Taken 11/3/2022 7880)  Urinary catheter remains patent:   Assess patency of urinary catheter   Assess need for a larger catheter size or a 3-way catheter for continuous bladder irrigation     Problem: Infection - Adult  Goal: Absence of infection at discharge  11/4/2022 0201 by Louretta Hammans, RN  Outcome: Progressing  11/4/2022 0100 by Louretta Hammans, RN  Outcome: Progressing  11/3/2022 1842 by Sabi Chadwick RN  Outcome: Not Progressing  Flowsheets (Taken 11/3/2022 8862)  Absence of infection at discharge:   Assess and monitor for signs and symptoms of infection   Monitor lab/diagnostic results   Monitor all insertion sites i.e., indwelling lines, tubes and drains   [place] appropriate cooling/warming therapies per order   Administer medications as ordered   Instruct and encourage patient and family to use good hand hygiene technique  Goal: Absence of infection during hospitalization  11/4/2022 0201 by Constance Smith RN  Outcome: Progressing  11/4/2022 0100 by Constance Smith RN  Outcome: Progressing  11/3/2022 1842 by Mendez Lockhart RN  Outcome: Not Progressing  Flowsheets (Taken 11/3/2022 8718)  Absence of infection during hospitalization:   Assess and monitor for signs and symptoms of infection   Monitor lab/diagnostic results   [place] appropriate cooling/warming therapies per order   Administer medications as ordered   Instruct and encourage patient and family to use good hand hygiene technique   Identify and instruct in appropriate isolation precautions for identified infection/condition  Goal: Absence of fever/infection during anticipated neutropenic period  11/4/2022 0201 by Constance Smith RN  Outcome: Progressing  11/4/2022 0100 by Constance Smith RN  Outcome: Progressing  11/3/2022 1842 by Mendez Lockhart RN  Outcome: Not Progressing  Flowsheets (Taken 11/3/2022 4276)  Absence of fever/infection during anticipated neutropenic period:   Monitor white blood cell count   Administer growth factors as ordered   Implement neutropenic guidelines     Problem: Metabolic/Fluid and Electrolytes - Adult  Goal: Electrolytes maintained within normal limits  11/4/2022 0201 by Constance Smith RN  Outcome: Progressing  11/4/2022 0100 by Constance Smith RN  Outcome: Progressing  11/3/2022 1842 by Mendez Lockhart RN  Outcome: Not Progressing  Flowsheets (Taken 11/3/2022 6338)  Electrolytes maintained within normal limits:   Administer electrolyte replacement as ordered   Monitor labs and assess patient for signs and symptoms of electrolyte imbalances   Monitor response to electrolyte replacements, including repeat lab results as appropriate   Fluid restriction as ordered   Instruct patient on fluid and nutrition restrictions as appropriate  Goal: Hemodynamic stability and optimal renal function maintained  11/4/2022 0201 by Kennedy Amado RN  Outcome: Progressing  11/4/2022 0100 by Kennedy Amado RN  Outcome: Progressing  11/3/2022 1842 by Divya Dorado RN  Outcome: Not Progressing  Flowsheets (Taken 11/3/2022 5489)  Hemodynamic stability and optimal renal function maintained:   Monitor labs and assess for signs and symptoms of volume excess or deficit   Monitor intake, output and patient weight   Monitor urine specific gravity, serum osmolarity and serum sodium as indicated or ordered   Monitor response to interventions for patient's volume status, including labs, urine output, blood pressure (other measures as available)   Encourage oral intake as appropriate   Instruct patient on fluid and nutrition restrictions as appropriate  Goal: Glucose maintained within prescribed range  11/4/2022 0201 by Kennedy Amado RN  Outcome: Progressing  11/4/2022 0100 by Kennedy Amado RN  Outcome: Progressing  11/3/2022 1842 by Divya Dorado RN  Outcome: Not Progressing  Flowsheets (Taken 11/3/2022 5131)  Glucose maintained within prescribed range:   Monitor blood glucose as ordered   Assess for signs and symptoms of hyperglycemia and hypoglycemia   Administer ordered medications to maintain glucose within target range   Assess barriers to adequate nutritional intake and initiate nutrition consult as needed   Instruct patient on self management of diabetes and initiate consult as needed     Problem: Hematologic - Adult  Goal: Maintains hematologic stability  11/4/2022 0201 by Kennedy Amado RN  Outcome: Progressing  11/4/2022 0100 by Kennedy Amado RN  Outcome: Progressing  11/3/2022 1842 by Divya Dorado RN  Outcome: Not Progressing  Flowsheets (Taken 11/3/2022 0160)  Maintains hematologic stability:   Assess for signs and symptoms of bleeding or hemorrhage   Monitor labs for bleeding or clotting disorders   Administer blood products/factors as ordered     Problem: Chronic Conditions and Co-morbidities  Goal: Patient's chronic conditions and co-morbidity symptoms are monitored and maintained or improved  11/4/2022 0201 by Roberto Kumar RN  Outcome: Progressing  11/4/2022 0100 by Roberto Kumar RN  Outcome: Progressing  11/3/2022 1842 by Mandeep Fulton RN  Outcome: Not Progressing  Flowsheets (Taken 11/3/2022 7701)  Care Plan - Patient's Chronic Conditions and Co-Morbidity Symptoms are Monitored and Maintained or Improved:   Monitor and assess patient's chronic conditions and comorbid symptoms for stability, deterioration, or improvement   Collaborate with multidisciplinary team to address chronic and comorbid conditions and prevent exacerbation or deterioration   Update acute care plan with appropriate goals if chronic or comorbid symptoms are exacerbated and prevent overall improvement and discharge     Problem: ABCDS Injury Assessment  Goal: Absence of physical injury  11/4/2022 0201 by Roberto Kumar RN  Outcome: Progressing  11/4/2022 0100 by Roberto Kumar RN  Outcome: Progressing  11/3/2022 1842 by Mandeep Fulton RN  Outcome: Not Progressing     Problem: Nutrition Deficit:  Goal: Optimize nutritional status  11/4/2022 0201 by Roberto Kumar RN  Outcome: Progressing  11/4/2022 0100 by Roberto Kumar RN  Outcome: Progressing  11/3/2022 1842 by Mandeep Fulton RN  Outcome: Not Progressing     Problem: Discharge Planning  Goal: Discharge to home or other facility with appropriate resources  11/4/2022 0201 by Roberto Kumar RN  Outcome: Progressing  11/4/2022 0100 by Roberto Kumar RN  Outcome: Progressing  11/3/2022 1842 by Mandeep Fluton RN  Outcome: Not Progressing  Flowsheets (Taken 11/3/2022 9336)  Discharge to home or other facility with appropriate resources:   Identify barriers to discharge with patient and caregiver   Identify discharge learning needs (meds, wound care, etc)   Arrange for needed discharge resources and transportation as appropriate   Arrange for interpreters to assist at discharge as needed   Refer to discharge planning if patient needs post-hospital services based on physician order or complex needs related to functional status, cognitive ability or social support system     Problem: Pain  Goal: Verbalizes/displays adequate comfort level or baseline comfort level  11/4/2022 0201 by Cali Kumar RN  Outcome: Progressing  11/4/2022 0100 by Cali Kumar RN  Outcome: Progressing  11/3/2022 1842 by Sushila Webb RN  Outcome: Not Progressing  Flowsheets (Taken 11/3/2022 7003)  Verbalizes/displays adequate comfort level or baseline comfort level:   Encourage patient to monitor pain and request assistance   Assess pain using appropriate pain scale   Administer analgesics based on type and severity of pain and evaluate response   Implement non-pharmacological measures as appropriate and evaluate response   Consider cultural and social influences on pain and pain management   Notify Licensed Independent Practitioner if interventions unsuccessful or patient reports new pain     Problem: Skin/Tissue Integrity  Goal: Absence of new skin breakdown  Description: 1. Monitor for areas of redness and/or skin breakdown  2. Assess vascular access sites hourly  3. Every 4-6 hours minimum:  Change oxygen saturation probe site  4. Every 4-6 hours:  If on nasal continuous positive airway pressure, respiratory therapy assess nares and determine need for appliance change or resting period.   11/4/2022 0201 by Cali Kmuar RN  Outcome: Progressing  11/4/2022 0100 by Cali Kumar RN  Outcome: Progressing  11/3/2022 1842 by Sushila Webb RN  Outcome: Not Progressing     Problem: Safety - Adult  Goal: Free from fall injury  11/4/2022 0201 by Cali Kumar RN  Outcome: Progressing  11/4/2022 0100 by Cali Kumar RN  Outcome: Progressing  11/3/2022 1842 by Sushila Webb RN  Outcome: Not Progressing     Problem: Neurosensory - Adult  Goal: Achieves stable or improved neurological status  11/4/2022 0201 by Dalila Edward RN  Outcome: Progressing  11/4/2022 0100 by Dalila Edward RN  Outcome: Progressing  11/3/2022 1842 by Fredis Jiménez RN  Outcome: Not Progressing  Flowsheets (Taken 11/3/2022 2838)  Achieves stable or improved neurological status:   Initiate measures to prevent increased intracranial pressure   Assess for and report changes in neurological status   Maintain blood pressure and fluid volume within ordered parameters to optimize cerebral perfusion and minimize risk of hemorrhage   Monitor temperature, glucose, and sodium. Initiate appropriate interventions as ordered  Goal: Absence of seizures  11/4/2022 0201 by Dalila Edward RN  Outcome: Progressing  11/4/2022 0100 by Dalila Edward RN  Outcome: Progressing  11/3/2022 1842 by Fredis Jiménez RN  Outcome: Not Progressing  Flowsheets (Taken 11/3/2022 0681)  Absence of seizures:   Monitor for seizure activity.   If seizure occurs, document type and location of movements and any associated apnea   If seizure occurs, turn head to side and suction secretions as needed   Administer anticonvulsants as ordered   Support airway/breathing, administer oxygen as needed   Diagnostic studies as ordered  Goal: Remains free of injury related to seizures activity  11/4/2022 0201 by Dalila Edward RN  Outcome: Progressing  11/4/2022 0100 by Dalila Edward RN  Outcome: Progressing  11/3/2022 1842 by Fredis Jiménez RN  Outcome: Not Progressing  Flowsheets (Taken 11/3/2022 4056)  Remains free of injury related to seizure activity:   Maintain airway, patient safety  and administer oxygen as ordered   Monitor patient for seizure activity, document and report duration and description of seizure to Licensed Independent Practitioner   If seizure occurs, turn patient to side and suction secretions as needed   Reorient patient post seizure   Seizure pads on all 4 side rails   Instruct patient/family to call for assistance with activity based on assessment   Instruct patient/family to notify RN of any seizure activity  Goal: Achieves maximal functionality and self care  11/4/2022 0201 by Jeri Arredondo RN  Outcome: Progressing  11/4/2022 0100 by Jeri Arredondo RN  Outcome: Progressing  11/3/2022 1842 by Mini Drievr RN  Outcome: Not Progressing  Flowsheets (Taken 11/3/2022 3451)  Achieves maximal functionality and self care:   Monitor swallowing and airway patency with patient fatigue and changes in neurological status   Encourage and assist patient to increase activity and self care with guidance from physical therapy/occupational therapy   Encourage visually impaired, hearing impaired and aphasic patients to use assistive/communication devices     Problem: Respiratory - Adult  Goal: Achieves optimal ventilation and oxygenation  11/4/2022 0201 by Jeri Arredondo RN  Outcome: Progressing  11/4/2022 0100 by Jeri Arredondo RN  Outcome: Progressing  11/3/2022 1842 by Mini Driver RN  Outcome: Not Progressing  Flowsheets (Taken 11/3/2022 8930)  Achieves optimal ventilation and oxygenation:   Assess for changes in respiratory status   Assess for changes in mentation and behavior   Position to facilitate oxygenation and minimize respiratory effort   Oxygen supplementation based on oxygen saturation or arterial blood gases   Initiate smoking cessation protocol as indicated   Encourage broncho-pulmonary hygiene including cough, deep breathe, incentive spirometry   Assess the need for suctioning and aspirate as needed   Assess and instruct to report shortness of breath or any respiratory difficulty   Respiratory therapy support as indicated     Problem: Cardiovascular - Adult  Goal: Maintains optimal cardiac output and hemodynamic stability  11/4/2022 0201 by Jeri Arredondo RN  Outcome: Progressing  11/4/2022 0100 by Jeri Arredondo RN  Outcome: Progressing  11/3/2022 1842 by Rizwana Esther Harp RN  Outcome: Not Progressing  Flowsheets (Taken 11/3/2022 2903)  Maintains optimal cardiac output and hemodynamic stability:   Monitor blood pressure and heart rate   Monitor urine output and notify Licensed Independent Practitioner for values outside of normal range   Assess for signs of decreased cardiac output   Administer fluid and/or volume expanders as ordered   Administer vasoactive medications as ordered  Goal: Absence of cardiac dysrhythmias or at baseline  11/4/2022 0201 by Roberto Kumar RN  Outcome: Progressing  11/4/2022 0100 by Roberto Kumar RN  Outcome: Progressing  11/3/2022 1842 by Mandeep Fulton RN  Outcome: Not Progressing  Flowsheets (Taken 11/3/2022 0250)  Absence of cardiac dysrhythmias or at baseline:   Monitor cardiac rate and rhythm   Assess for signs of decreased cardiac output   Administer antiarrhythmia medication and electrolyte replacement as ordered     Problem: Skin/Tissue Integrity - Adult  Goal: Skin integrity remains intact  11/4/2022 0201 by Roberto Kumar RN  Outcome: Progressing  11/4/2022 0100 by Roberto Kumar RN  Outcome: Progressing  11/3/2022 1842 by Mandeep Fulton RN  Outcome: Not Progressing  Flowsheets  Taken 11/3/2022 1042  Skin Integrity Remains Intact:   Monitor for areas of redness and/or skin breakdown   Assess vascular access sites hourly   Every 4-6 hours minimum: Change oxygen saturation probe site   Every 4-6 hours: If on nasal continuous positive airway pressure, respiratory therapy assesses nares and determine need for appliance change or resting period  Taken 11/3/2022 0937  Skin Integrity Remains Intact:   Monitor for areas of redness and/or skin breakdown   Assess vascular access sites hourly   Every 4-6 hours minimum: Change oxygen saturation probe site   Every 4-6 hours: If on nasal continuous positive airway pressure, respiratory therapy assesses nares and determine need for appliance change or resting period  Goal: Incisions, wounds, or drain sites healing without S/S of infection  11/4/2022 0201 by Marisol Meyer RN  Outcome: Progressing  11/4/2022 0100 by Marisol Meyer RN  Outcome: Progressing  11/3/2022 1842 by Uriah Razo RN  Outcome: Not Progressing  Flowsheets  Taken 11/3/2022 1042  Incisions, Wounds, or Drain Sites Healing Without Sign and Symptoms of Infection:   ADMISSION and DAILY: Assess and document risk factors for pressure ulcer development   TWICE DAILY: Assess and document skin integrity   TWICE DAILY: Assess and document dressing/incision, wound bed, drain sites and surrounding tissue   Implement wound care per orders   Initiate isolation precautions as appropriate   Initiate pressure ulcer prevention bundle as indicated  Taken 11/3/2022 0937  Incisions, Wounds, or Drain Sites Healing Without Sign and Symptoms of Infection:   ADMISSION and DAILY: Assess and document risk factors for pressure ulcer development   TWICE DAILY: Assess and document skin integrity   TWICE DAILY: Assess and document dressing/incision, wound bed, drain sites and surrounding tissue   Implement wound care per orders   Initiate isolation precautions as appropriate   Initiate pressure ulcer prevention bundle as indicated  Goal: Oral mucous membranes remain intact  11/4/2022 0201 by Marisol Meyer RN  Outcome: Progressing  11/4/2022 0100 by Marisol Meyer RN  Outcome: Progressing  11/3/2022 1842 by Uriah Razo RN  Outcome: Not Progressing  Flowsheets  Taken 11/3/2022 1042  Oral Mucous Membranes Remain Intact:   Assess oral mucosa and hygiene practices   Implement preventative oral hygiene regimen   Implement oral medicated treatments as ordered  Taken 11/3/2022 0937  Oral Mucous Membranes Remain Intact:   Assess oral mucosa and hygiene practices   Implement preventative oral hygiene regimen   Implement oral medicated treatments as ordered     Problem: Musculoskeletal - Adult  Goal: Return mobility to safest level of function  11/4/2022 0201 by Marisol Meyer RN  Outcome: Progressing  11/4/2022 0100 by Roman Maldonado RN  Outcome: Progressing  11/3/2022 1842 by Radha Damon RN  Outcome: Not Progressing  Flowsheets (Taken 11/3/2022 5148)  Return Mobility to Safest Level of Function:   Assess patient stability and activity tolerance for standing, transferring and ambulating with or without assistive devices   Assist with transfers and ambulation using safe patient handling equipment as needed   Ensure adequate protection for wounds/incisions during mobilization   Obtain physical therapy/occupational therapy consults as needed   Apply continuous passive motion per provider or physical therapy orders to increase flexion toward goal   Instruct patient/family in ordered activity level  Goal: Maintain proper alignment of affected body part  11/4/2022 0201 by Roman Maldonado RN  Outcome: Progressing  11/4/2022 0100 by Roman Maldonado RN  Outcome: Progressing  11/3/2022 1842 by Radha Damon RN  Outcome: Not Progressing  Flowsheets (Taken 11/3/2022 0875)  Maintain proper alignment of affected body part:   Support and protect limb and body alignment per provider's orders   Instruct and reinforce with patient and family use of appropriate assistive device and precautions (e.g. spinal or hip dislocation precautions)  Goal: Return ADL status to a safe level of function  11/4/2022 0201 by Roman Maldonado RN  Outcome: Progressing  11/4/2022 0100 by Roman Maldonado RN  Outcome: Progressing  11/3/2022 1842 by Radha Damon RN  Outcome: Not Progressing  Flowsheets (Taken 11/3/2022 7325)  Return ADL Status to a Safe Level of Function:   Administer medication as ordered   Assess activities of daily living deficits and provide assistive devices as needed   Obtain physical therapy/occupational therapy consults as needed   Assist and instruct patient to increase activity and self care as tolerated     Problem: Gastrointestinal - Adult  Goal: Minimal or absence of nausea and vomiting  11/4/2022 0201 by Cami Bowens RN  Outcome: Progressing  11/4/2022 0100 by Cami Bowens RN  Outcome: Progressing  11/3/2022 1842 by Hope Bloom RN  Outcome: Not Progressing  Flowsheets (Taken 11/3/2022 8443)  Minimal or absence of nausea and vomiting: Administer IV fluids as ordered to ensure adequate hydration  Goal: Maintains or returns to baseline bowel function  11/4/2022 0201 by Cami Bowens RN  Outcome: Progressing  11/4/2022 0100 by Cami Bowens RN  Outcome: Progressing  11/3/2022 1842 by Hope Blomo RN  Outcome: Not Progressing  Flowsheets (Taken 11/3/2022 4263)  Maintains or returns to baseline bowel function:   Assess bowel function   Encourage oral fluids to ensure adequate hydration   Administer IV fluids as ordered to ensure adequate hydration   Administer ordered medications as needed   Encourage mobilization and activity   Nutrition consult to assist patient with appropriate food choices  Goal: Maintains adequate nutritional intake  11/4/2022 0201 by Cami Bowens RN  Outcome: Progressing  11/4/2022 0100 by Cami Bowens RN  Outcome: Progressing  11/3/2022 1842 by Hope Bloom RN  Outcome: Not Progressing  Flowsheets (Taken 11/3/2022 3597)  Maintains adequate nutritional intake:   Monitor percentage of each meal consumed   Identify factors contributing to decreased intake, treat as appropriate   Assist with meals as needed   Obtain nutritional consult as needed   Monitor intake and output, weight and lab values  Goal: Establish and maintain optimal ostomy function  11/4/2022 0201 by Cami Bowens RN  Outcome: Progressing  11/4/2022 0100 by Cami Bowens RN  Outcome: Progressing  11/3/2022 1842 by Hope Bloom RN  Outcome: Not Progressing  Flowsheets (Taken 11/3/2022 8511)  Establish and maintain optimal ostomy function:   Monitor output from ostomies   Administer IV fluids and TPN as ordered   Introduce and advance enteral feedings as ordered   Nutrition consult   Infuse IV Fluids/TPN as ordered     Problem: Genitourinary - Adult  Goal: Absence of urinary retention  11/4/2022 0201 by Cali Kumar RN  Outcome: Progressing  11/4/2022 0100 by Cali Kumar RN  Outcome: Progressing  11/3/2022 1842 by Sushila Webb RN  Outcome: Not Progressing  Flowsheets (Taken 11/3/2022 4420)  Absence of urinary retention:   Monitor intake/output and perform bladder scan as needed   Place urinary catheter per Licensed Independent Practitioner order if needed   Assess patients ability to void and empty bladder  Goal: Urinary catheter remains patent  11/4/2022 0201 by Cali Kumar RN  Outcome: Progressing  11/4/2022 0100 by Cali Kumar RN  Outcome: Progressing  11/3/2022 1842 by Sushila Webb RN  Outcome: Not Progressing  Flowsheets (Taken 11/3/2022 6500)  Urinary catheter remains patent:   Assess patency of urinary catheter   Assess need for a larger catheter size or a 3-way catheter for continuous bladder irrigation     Problem: Infection - Adult  Goal: Absence of infection at discharge  11/4/2022 0201 by Cali Kumar RN  Outcome: Progressing  11/4/2022 0100 by Cali Kumar RN  Outcome: Progressing  11/3/2022 1842 by Sushila Webb RN  Outcome: Not Progressing  Flowsheets (Taken 11/3/2022 7045)  Absence of infection at discharge:   Assess and monitor for signs and symptoms of infection   Monitor lab/diagnostic results   Monitor all insertion sites i.e., indwelling lines, tubes and drains   [place] appropriate cooling/warming therapies per order   Administer medications as ordered   Instruct and encourage patient and family to use good hand hygiene technique  Goal: Absence of infection during hospitalization  11/4/2022 0201 by Cali Kumar RN  Outcome: Progressing  11/4/2022 0100 by Cali Kumar RN  Outcome: Progressing  11/3/2022 1842 by Sushila Webb RN  Outcome: Not Progressing  Flowsheets (Taken 11/3/2022 1343)  Absence of infection during hospitalization:   Assess and monitor for signs and symptoms of infection   Monitor lab/diagnostic results   [place] appropriate cooling/warming therapies per order   Administer medications as ordered   Instruct and encourage patient and family to use good hand hygiene technique   Identify and instruct in appropriate isolation precautions for identified infection/condition  Goal: Absence of fever/infection during anticipated neutropenic period  11/4/2022 0201 by Sammy Madsen RN  Outcome: Progressing  11/4/2022 0100 by Sammy Madsen RN  Outcome: Progressing  11/3/2022 1842 by Taty Calabrese RN  Outcome: Not Progressing  Flowsheets (Taken 11/3/2022 1613)  Absence of fever/infection during anticipated neutropenic period:   Monitor white blood cell count   Administer growth factors as ordered   Implement neutropenic guidelines     Problem: Metabolic/Fluid and Electrolytes - Adult  Goal: Electrolytes maintained within normal limits  11/4/2022 0201 by Sammy Madsen RN  Outcome: Progressing  11/4/2022 0100 by Sammy Madsen RN  Outcome: Progressing  11/3/2022 1842 by Taty Calabrese RN  Outcome: Not Progressing  Flowsheets (Taken 11/3/2022 4749)  Electrolytes maintained within normal limits:   Administer electrolyte replacement as ordered   Monitor labs and assess patient for signs and symptoms of electrolyte imbalances   Monitor response to electrolyte replacements, including repeat lab results as appropriate   Fluid restriction as ordered   Instruct patient on fluid and nutrition restrictions as appropriate  Goal: Hemodynamic stability and optimal renal function maintained  11/4/2022 0201 by Sammy Madsen RN  Outcome: Progressing  11/4/2022 0100 by Sammy Madsen RN  Outcome: Progressing  11/3/2022 1842 by Taty Calabrese RN  Outcome: Not Progressing  Flowsheets (Taken 11/3/2022 5816)  Hemodynamic stability and optimal renal function maintained:   Monitor labs and assess for signs and symptoms of volume excess or deficit   Monitor intake, output and patient weight Monitor urine specific gravity, serum osmolarity and serum sodium as indicated or ordered   Monitor response to interventions for patient's volume status, including labs, urine output, blood pressure (other measures as available)   Encourage oral intake as appropriate   Instruct patient on fluid and nutrition restrictions as appropriate  Goal: Glucose maintained within prescribed range  11/4/2022 0201 by Liss Escamilla RN  Outcome: Progressing  11/4/2022 0100 by Liss Escamilla RN  Outcome: Progressing  11/3/2022 1842 by Felipe Hoang RN  Outcome: Not Progressing  Flowsheets (Taken 11/3/2022 7112)  Glucose maintained within prescribed range:   Monitor blood glucose as ordered   Assess for signs and symptoms of hyperglycemia and hypoglycemia   Administer ordered medications to maintain glucose within target range   Assess barriers to adequate nutritional intake and initiate nutrition consult as needed   Instruct patient on self management of diabetes and initiate consult as needed     Problem: Hematologic - Adult  Goal: Maintains hematologic stability  11/4/2022 0201 by Liss Escamilla RN  Outcome: Progressing  11/4/2022 0100 by Liss Escamilla RN  Outcome: Progressing  11/3/2022 1842 by Felipe Hoang RN  Outcome: Not Progressing  Flowsheets (Taken 11/3/2022 7396)  Maintains hematologic stability:   Assess for signs and symptoms of bleeding or hemorrhage   Monitor labs for bleeding or clotting disorders   Administer blood products/factors as ordered     Problem: Chronic Conditions and Co-morbidities  Goal: Patient's chronic conditions and co-morbidity symptoms are monitored and maintained or improved  11/4/2022 0201 by Liss Escamilla RN  Outcome: Progressing  11/4/2022 0100 by Liss Escamilla RN  Outcome: Progressing  11/3/2022 1842 by Felipe Hoang RN  Outcome: Not Progressing  Flowsheets (Taken 11/3/2022 1424)  Care Plan - Patient's Chronic Conditions and Co-Morbidity Symptoms are Monitored and Maintained or Improved:   Monitor and assess patient's chronic conditions and comorbid symptoms for stability, deterioration, or improvement   Collaborate with multidisciplinary team to address chronic and comorbid conditions and prevent exacerbation or deterioration   Update acute care plan with appropriate goals if chronic or comorbid symptoms are exacerbated and prevent overall improvement and discharge     Problem: ABCDS Injury Assessment  Goal: Absence of physical injury  11/4/2022 0201 by Abhishek Parikh RN  Outcome: Progressing  11/4/2022 0100 by Abhishek Parikh RN  Outcome: Progressing  11/3/2022 1842 by Tiff Graves RN  Outcome: Not Progressing     Problem: Nutrition Deficit:  Goal: Optimize nutritional status  11/4/2022 0201 by Abhishek Parikh RN  Outcome: Progressing  11/4/2022 0100 by Abhishek Parikh RN  Outcome: Progressing  11/3/2022 1842 by Tiff Graves RN  Outcome: Not Progressing

## 2022-11-04 NOTE — PLAN OF CARE
Problem: Discharge Planning  Goal: Discharge to home or other facility with appropriate resources  Outcome: Not Progressing     Problem: Pain  Goal: Verbalizes/displays adequate comfort level or baseline comfort level  Outcome: Not Progressing     Problem: Skin/Tissue Integrity  Goal: Absence of new skin breakdown  Outcome: Not Progressing     Problem: Safety - Adult  Goal: Free from fall injury  Outcome: Not Progressing     Problem: Neurosensory - Adult  Goal: Achieves stable or improved neurological status  Outcome: Not Progressing  Flowsheets (Taken 11/4/2022 0817)  Achieves stable or improved neurological status:   Assess for and report changes in neurological status   Initiate measures to prevent increased intracranial pressure   Maintain blood pressure and fluid volume within ordered parameters to optimize cerebral perfusion and minimize risk of hemorrhage   Monitor temperature, glucose, and sodium. Initiate appropriate interventions as ordered  Goal: Absence of seizures  Outcome: Not Progressing  Flowsheets (Taken 11/4/2022 0817)  Absence of seizures:   Monitor for seizure activity.   If seizure occurs, document type and location of movements and any associated apnea   If seizure occurs, turn head to side and suction secretions as needed   Administer anticonvulsants as ordered   Support airway/breathing, administer oxygen as needed   Diagnostic studies as ordered  Goal: Remains free of injury related to seizures activity  Outcome: Not Progressing  Goal: Achieves maximal functionality and self care  Outcome: Not Progressing  Flowsheets (Taken 11/4/2022 0817)  Achieves maximal functionality and self care:   Monitor swallowing and airway patency with patient fatigue and changes in neurological status   Encourage and assist patient to increase activity and self care with guidance from physical therapy/occupational therapy   Encourage visually impaired, hearing impaired and aphasic patients to use assistive/communication devices     Problem: Respiratory - Adult  Goal: Achieves optimal ventilation and oxygenation  Outcome: Not Progressing  Flowsheets (Taken 11/4/2022 0817)  Achieves optimal ventilation and oxygenation:   Assess for changes in respiratory status   Assess for changes in mentation and behavior   Position to facilitate oxygenation and minimize respiratory effort   Oxygen supplementation based on oxygen saturation or arterial blood gases   Initiate smoking cessation protocol as indicated   Encourage broncho-pulmonary hygiene including cough, deep breathe, incentive spirometry   Assess the need for suctioning and aspirate as needed   Assess and instruct to report shortness of breath or any respiratory difficulty   Respiratory therapy support as indicated     Problem: Cardiovascular - Adult  Goal: Maintains optimal cardiac output and hemodynamic stability  Outcome: Not Progressing  Flowsheets (Taken 11/4/2022 0817)  Maintains optimal cardiac output and hemodynamic stability:   Monitor blood pressure and heart rate   Monitor urine output and notify Licensed Independent Practitioner for values outside of normal range   Administer fluid and/or volume expanders as ordered   Administer vasoactive medications as ordered   Assess for signs of decreased cardiac output  Goal: Absence of cardiac dysrhythmias or at baseline  Outcome: Not Progressing  Flowsheets (Taken 11/4/2022 0817)  Absence of cardiac dysrhythmias or at baseline:   Monitor cardiac rate and rhythm   Assess for signs of decreased cardiac output   Administer antiarrhythmia medication and electrolyte replacement as ordered     Problem: Skin/Tissue Integrity - Adult  Goal: Skin integrity remains intact  Outcome: Not Progressing  Goal: Incisions, wounds, or drain sites healing without S/S of infection  Outcome: Not Progressing  Goal: Oral mucous membranes remain intact  Outcome: Not Progressing     Problem: Musculoskeletal - Adult  Goal: Return mobility to safest level of function  Outcome: Not Progressing  Goal: Maintain proper alignment of affected body part  Outcome: Not Progressing  Goal: Return ADL status to a safe level of function  Outcome: Not Progressing     Problem: Gastrointestinal - Adult  Goal: Minimal or absence of nausea and vomiting  Outcome: Not Progressing  Flowsheets (Taken 11/4/2022 0817)  Minimal or absence of nausea and vomiting:   Administer IV fluids as ordered to ensure adequate hydration   Maintain NPO status until nausea and vomiting are resolved   Administer ordered antiemetic medications as needed   Nasogastric tube to low intermittent suction as ordered   Provide nonpharmacologic comfort measures as appropriate  Goal: Maintains or returns to baseline bowel function  Outcome: Not Progressing  Flowsheets (Taken 11/4/2022 0817)  Maintains or returns to baseline bowel function:   Encourage oral fluids to ensure adequate hydration   Administer ordered medications as needed   Encourage mobilization and activity   Administer IV fluids as ordered to ensure adequate hydration  Goal: Maintains adequate nutritional intake  Outcome: Not Progressing  Flowsheets (Taken 11/4/2022 0817)  Maintains adequate nutritional intake:   Monitor percentage of each meal consumed   Identify factors contributing to decreased intake, treat as appropriate   Monitor intake and output, weight and lab values   Obtain nutritional consult as needed   Assist with meals as needed  Goal: Establish and maintain optimal ostomy function  Outcome: Not Progressing  Flowsheets (Taken 11/4/2022 0817)  Establish and maintain optimal ostomy function:   Introduce and advance enteral feedings as ordered   Nutrition consult   Administer IV fluids and TPN as ordered   Monitor output from ostomies     Problem: Genitourinary - Adult  Goal: Absence of urinary retention  Outcome: Not Progressing  Goal: Urinary catheter remains patent  Outcome: Not Progressing     Problem: Infection - Adult  Goal: Absence of infection at discharge  Outcome: Not Progressing  Goal: Absence of infection during hospitalization  Outcome: Not Progressing  Goal: Absence of fever/infection during anticipated neutropenic period  Outcome: Not Progressing     Problem: Metabolic/Fluid and Electrolytes - Adult  Goal: Electrolytes maintained within normal limits  Outcome: Not Progressing  Goal: Hemodynamic stability and optimal renal function maintained  Outcome: Not Progressing  Goal: Glucose maintained within prescribed range  Outcome: Not Progressing     Problem: Hematologic - Adult  Goal: Maintains hematologic stability  Outcome: Not Progressing     Problem: Chronic Conditions and Co-morbidities  Goal: Patient's chronic conditions and co-morbidity symptoms are monitored and maintained or improved  Outcome: Not Progressing     Problem: ABCDS Injury Assessment  Goal: Absence of physical injury  Outcome: Not Progressing     Problem: Nutrition Deficit:  Goal: Optimize nutritional status  Outcome: Not Progressing

## 2022-11-04 NOTE — PROGRESS NOTES
Vascular Surgery  Daily Progress Note    Pt Name: [facility] Record Number: [id number]  Date of Birth 1970   Today's Date: 11/4/2022    SUBJECTIVE:     Patient was seen and examined. Pain is moderately controlled  has not had nausea/vomiting    OBJECTIVE:     Patient Vitals for the past 24 hrs:   BP Temp Temp src Pulse Resp SpO2 Height   11/04/22 0506 128/86 97.5 °F (36.4 °C) Oral 65 18 97 % --   11/04/22 0025 130/83 97.7 °F (36.5 °C) Oral 68 16 97 % --   11/03/22 2230 -- -- -- -- 16 -- --   11/03/22 2158 -- -- -- -- 16 -- --   11/03/22 1713 (!) 93/55 97.6 °F (36.4 °C) Temporal 77 16 97 % --   11/03/22 1344 -- -- -- -- -- -- 5' 9\" (1.753 m)   11/03/22 1144 121/76 97.4 °F (36.3 °C) Temporal 63 16 97 % --         Intake/Output Summary (Last 24 hours) at 11/4/2022 1027  Last data filed at 11/4/2022 1025  Gross per 24 hour   Intake 1510.71 ml   Output 2650 ml   Net -1139.29 ml       In: 1510.7 [P.O.:460; I.V.:316]  Out: 2650 [Urine:2000]    I/O last 3 completed shifts: In: 1390.7 [P.O.:340; I.V.:316; IV Piggyback:734.7]  Out: 2650 [Urine:2000; Stool:650]     Date 11/04/22 0000 - 11/04/22 2359   Shift 2888-8221 7838-9966 2117-0355 24 Hour Total   INTAKE   P.O.(mL/kg/hr)  120  120   IV Piggyback(mL/kg) 734. 7(12.6)   734. 7(12.6)   Shift Total(mL/kg) 734. 7(12.6) 120(2.1)  854. 7(14.7)   OUTPUT   Shift Total(mL/kg)       Weight (kg) 58.3 58.3 58.3 58.3       Wt Readings from Last 3 Encounters:   11/02/22 128 lb 8 oz (58.3 kg)   07/12/22 139 lb (63 kg)   06/21/22 139 lb (63 kg)        Body mass index is 18.98 kg/m². Diet: ADULT ORAL NUTRITION SUPPLEMENT; Breakfast; Fortified Pudding Oral Supplement  ADULT ORAL NUTRITION SUPPLEMENT; Lunch; Other Oral Supplement; 4oz prepacked milkshake at lunch  ADULT ORAL NUTRITION SUPPLEMENT; Dinner; Frozen Oral Supplement  ADULT DIET;  Regular    MEDS:     Scheduled Meds:   ampicillin-sulbactam  3,000 mg IntraVENous Q6H    Acetic Acid  250 mL Topical BID    sodium chloride flush  5-40 mL IntraVENous 2 times per day    enoxaparin  40 mg SubCUTAneous Daily    cetirizine  10 mg Oral Daily    DULoxetine  30 mg Oral Nightly    levETIRAcetam  750 mg Oral Daily    therapeutic multivitamin-minerals  1 tablet Oral Daily    pregabalin  300 mg Oral BID    pilocarpine  7.5 mg Oral BID    meropenem  1,000 mg IntraVENous Q8H    nicotine  1 patch TransDERmal Daily     Continuous Infusions:   sodium chloride       PRN Meds:acetaminophen, 650 mg, Q6H PRN  sodium chloride flush, 5-40 mL, PRN  sodium chloride, , PRN  ondansetron, 4 mg, Q8H PRN   Or  ondansetron, 4 mg, Q6H PRN  polyethylene glycol, 17 g, Daily PRN  docusate sodium, 200 mg, PRN  HYDROcodone-acetaminophen, 1 tablet, Q8H PRN  tiZANidine, 4 mg, Q8H PRN  naloxone, 0.4 mg, PRN  albuterol, 2.5 mg, Q6H PRN    YSICAL EXAM:     CONSTITUTIONAL: Alert and oriented times 3, no acute distress and cooperative to examination. LUNGS: Chest clear bilaterally anteriorly  CARDIOVASCULAR: Heart sounds are normal.  Regular rate and rhythm without murmur, gallop or rub. ABDOMEN: soft, nontender, colostomy and urostomy in place  NEUROLOGIC: Awake, alert, oriented to name, place and time. Does not walk, wheelchair bound  WOUND/INCISION:  Dressing to right foot dry/intact. Toes warm to touch, pink color.    EXTREMITY: Feet warm to touch bilaterally, has heel-lift boots in place    LABS:     CBC:   Recent Labs     11/02/22  1513 11/03/22  0026 11/04/22  0256   WBC 4.8 4.2* 3.5*   RBC 4.47 4.22 4.30   HGB 12.9 12.2 12.2   HCT 39.2 38.3 38.8   MCV 87.7 90.8 90.2   MCH 28.9 28.9 28.4   MCHC 32.9* 31.9* 31.4*   RDW 14.1 14.0 13.8    164 166   MPV 11.1 11.3 10.8      Last 3 CMP:   Recent Labs     11/02/22  1513 11/03/22  0026 11/04/22  0256    139 143   K 3.6 3.6 3.5    106 110   CO2 23 25 25   BUN 13 11 8   CREATININE 0.3* 0.3* 0.3*   GLUCOSE 90 99 89   CALCIUM 8.6 8.6 8.2*   PROT 5.5*  --   --    LABALBU 3.6  --   --    BILITOT 0.3  -- --    ALKPHOS 106*  --   --    AST 11  --   --    ALT 14  --   --       Troponin: No results for input(s): TROPONINI in the last 72 hours.   Calcium:   Lab Results   Component Value Date/Time    CALCIUM 8.2 11/04/2022 02:56 AM    CALCIUM 8.6 11/03/2022 12:26 AM    CALCIUM 8.6 11/02/2022 03:13 PM        Lactic Acid:   Lab Results   Component Value Date/Time    LACTA 0.6 11/02/2022 03:13 PM    LACTA 1.1 07/31/2019 01:20 PM    LACTA 1.9 08/19/2016 03:20 PM      Culture, Anaerobic and Aerobic [[id number]] (Abnormal)  Collected: 10/24/22 1325   Lab Status: Edited Result - FINAL Specimen: Ankle from Wound Updated: 10/29/22 0715    Gram Stain Result Rare Gram positive cocci  in pairs   Rare WBC's (Polymorphonuclear)   Rare Epithelial Cells    Abnormal     Anaerobic Culture No anaerobes isolated  4 days    Organism Pseudomonas aeruginosa Abnormal     WOUND/ABSCESS Moderate growth    Organism Proteus mirabilis Abnormal     WOUND/ABSCESS Moderate growth    Organism Enterococcus faecalis Abnormal     WOUND/ABSCESS Rare growth    Organism Escherichia coli Abnormal     WOUND/ABSCESS Moderate growth   Narrative:     ORDER#: [id number]                          ORDERED BY: Gris Nichols   SOURCE: Wound right ankle                  COLLECTED:  10/24/22 13:25   ANTIBIOTICS AT [facility].:                      RECEIVED :  10/24/22 15:26   Susceptibility     Escherichia coli Pseudomonas aeruginosa Proteus mirabilis Enterococcus faecalis     BACTERIAL SUSCEPTIBILITY PANEL BY AYLA BACTERIAL SUSCEPTIBILITY PANEL BY AYLA BACTERIAL SUSCEPTIBILITY PANEL BY AYLA BACTERIAL SUSCEPTIBILITY PANEL BY AYLA     ampicillin <=2 mcg/mL Sensitive   >=32 mcg/mL Resistant <=2 mcg/mL Sensitive     ampicillin-sulbactam <=2 mcg/mL Sensitive   >=32 mcg/mL Resistant       aztreonam <=1 mcg/mL Sensitive   <=1 mcg/mL Sensitive       benzylpenicillin       2 mcg/mL Sensitive     ceFAZolin <=4 mcg/mL Sensitive   <=4 mcg/mL Sensitive       cefepime <=1 mcg/mL Sensitive 2 mcg/mL Sensitive <=1 mcg/mL Sensitive       cefTRIAXone <=1 mcg/mL Sensitive   <=1 mcg/mL Sensitive       ertapenem <=0.5 mcg/mL Sensitive   <=0.5 mcg/mL Sensitive       gentamicin <=1 mcg/mL Sensitive 4 mcg/mL Sensitive >=16 mcg/mL Resistant       gentamicin-syn       SYN-S mcg/mL Sensitive     levofloxacin <=0.12 mcg/mL Sensitive >=8 mcg/mL Resistant >=8 mcg/mL Resistant       meropenem <=0.25 mcg/mL Sensitive <=0.25 mcg/mL Sensitive <=0.25 mcg/mL Sensitive       piperacillin-tazobactam <=4 mcg/mL Sensitive <=4 mcg/mL Sensitive         tobramycin   4 mcg/mL Sensitive >=16 mcg/mL Resistant       trimethoprim-sulfamethoxazole <=20 mcg/mL Sensitive   >=320 mcg/mL Resistant       vancomycin       2 mcg/mL Sensitive                            VT prophylaxis: [x] Lovenox                              ASSESSMENT:     POD #   HD # 2  Active Hospital Problems    Diagnosis Date Noted    Severe malnutrition ([facility] [address] [E43] 11/03/2022     Priority: Medium    Ankle wound, right, sequela [S91.001S] 11/02/2022     Priority: Medium    Multiple sclerosis ([facility] [address] Christen Kite 06/08/2016     Priority: Medium    Peripheral vascular disease ([facility] [address] [I73.9] 02/01/2016       Chief Complaint:  Chief Complaint   Patient presents with    Abnormal Lab     Sent by [facility] after wound culture results, needs IV abx       PLAN:     Continue IV antibiotics  Acetic acid dressings BID to right dorsal foot wound  Right foot xrays indicate diffuse osteopenia with no acute osseous abnormality or significant degenerative/erosive change, recommend correlate with MRI if clinically indicated  [employer]  Dietary ordered ONS

## 2022-11-04 NOTE — PROGRESS NOTES
Physician Progress Note      Roberta Diaz  [facility] #:                  [id number]  :                       1970  ADMIT DATE:       2022 1:13 PM  DISCH DATE:  RESPONDING  PROVIDER #:        Porfirio SCHWARTZ          QUERY TEXT:    Patient admitted with pressure wound to [facility], noted to have BMI 18.98. If   possible, please document in progress notes and discharge summary if you are   evaluating and/or treating any of the following: The medical record reflects the following:  Risk Factors: MS, multiple comorbidities  Clinical Indicators: BMI 18.98  Treatment: Regular diet with large protein portions, ONS  Options provided:  -- Underweight  -- BMI 18.98 not clinically significant  -- Other - I will add my own diagnosis  -- Disagree - Not applicable / Not valid  -- Disagree - Clinically unable to determine / Unknown  -- Refer to Clinical Documentation Reviewer    PROVIDER RESPONSE TEXT:    BMI18.98 is not clinically significant.     Query created by: Keeley Owens on 2022 1:02 PM      Electronically signed by:  Magda SCHWARTZ 2022 1:34 PM

## 2022-11-04 NOTE — PLAN OF CARE
Problem: Discharge Planning  Goal: Discharge to home or other facility with appropriate resources  11/4/2022 0100 by Constance Smith RN  Outcome: Progressing  11/3/2022 1842 by Mendez Lockhart RN  Outcome: Not Progressing  Flowsheets (Taken 11/3/2022 8530)  Discharge to home or other facility with appropriate resources:   Identify barriers to discharge with patient and caregiver   Identify discharge learning needs (meds, wound care, etc)   Arrange for needed discharge resources and transportation as appropriate   Arrange for interpreters to assist at discharge as needed   Refer to discharge planning if patient needs post-hospital services based on physician order or complex needs related to functional status, cognitive ability or social support system     Problem: Pain  Goal: Verbalizes/displays adequate comfort level or baseline comfort level  11/4/2022 0100 by Constance Smith RN  Outcome: Progressing  11/3/2022 1842 by Mendez Lockhart RN  Outcome: Not Progressing  Flowsheets (Taken 11/3/2022 1131)  Verbalizes/displays adequate comfort level or baseline comfort level:   Encourage patient to monitor pain and request assistance   Assess pain using appropriate pain scale   Administer analgesics based on type and severity of pain and evaluate response   Implement non-pharmacological measures as appropriate and evaluate response   Consider cultural and social influences on pain and pain management   Notify Licensed Independent Practitioner if interventions unsuccessful or patient reports new pain     Problem: Skin/Tissue Integrity  Goal: Absence of new skin breakdown  Description: 1. Monitor for areas of redness and/or skin breakdown  2. Assess vascular access sites hourly  3. Every 4-6 hours minimum:  Change oxygen saturation probe site  4. Every 4-6 hours:  If on nasal continuous positive airway pressure, respiratory therapy assess nares and determine need for appliance change or resting period.   11/4/2022 0100 by Kennedy Amado RN  Outcome: Progressing  11/3/2022 1842 by Divya Dorado RN  Outcome: Not Progressing     Problem: Safety - Adult  Goal: Free from fall injury  11/4/2022 0100 by Kennedy Amado RN  Outcome: Progressing  11/3/2022 1842 by Divya Dorado RN  Outcome: Not Progressing     Problem: Neurosensory - Adult  Goal: Achieves stable or improved neurological status  11/4/2022 0100 by Kennedy Amado RN  Outcome: Progressing  11/3/2022 1842 by Divya Dorado RN  Outcome: Not Progressing  Flowsheets (Taken 11/3/2022 0482)  Achieves stable or improved neurological status:   Initiate measures to prevent increased intracranial pressure   Assess for and report changes in neurological status   Maintain blood pressure and fluid volume within ordered parameters to optimize cerebral perfusion and minimize risk of hemorrhage   Monitor temperature, glucose, and sodium. Initiate appropriate interventions as ordered  Goal: Absence of seizures  11/4/2022 0100 by Kennedy Amado RN  Outcome: Progressing  11/3/2022 1842 by Divya Dorado RN  Outcome: Not Progressing  Flowsheets (Taken 11/3/2022 0419)  Absence of seizures:   Monitor for seizure activity.   If seizure occurs, document type and location of movements and any associated apnea   If seizure occurs, turn head to side and suction secretions as needed   Administer anticonvulsants as ordered   Support airway/breathing, administer oxygen as needed   Diagnostic studies as ordered  Goal: Remains free of injury related to seizures activity  11/4/2022 0100 by Kennedy Amado RN  Outcome: Progressing  11/3/2022 1842 by Divya Dorado RN  Outcome: Not Progressing  Flowsheets (Taken 11/3/2022 9532)  Remains free of injury related to seizure activity:   Maintain airway, patient safety  and administer oxygen as ordered   Monitor patient for seizure activity, document and report duration and description of seizure to Licensed Independent Practitioner   If seizure occurs, turn patient to side and suction secretions as needed   Reorient patient post seizure   Seizure pads on all 4 side rails   Instruct patient/family to call for assistance with activity based on assessment   Instruct patient/family to notify RN of any seizure activity  Goal: Achieves maximal functionality and self care  11/4/2022 0100 by Arabella Reynaga RN  Outcome: Progressing  11/3/2022 1842 by Victoria Kimble RN  Outcome: Not Progressing  Flowsheets (Taken 11/3/2022 7200)  Achieves maximal functionality and self care:   Monitor swallowing and airway patency with patient fatigue and changes in neurological status   Encourage and assist patient to increase activity and self care with guidance from physical therapy/occupational therapy   Encourage visually impaired, hearing impaired and aphasic patients to use assistive/communication devices     Problem: Respiratory - Adult  Goal: Achieves optimal ventilation and oxygenation  11/4/2022 0100 by Arabella Reynaga RN  Outcome: Progressing  11/3/2022 1842 by Victoria Kimble RN  Outcome: Not Progressing  Flowsheets (Taken 11/3/2022 9518)  Achieves optimal ventilation and oxygenation:   Assess for changes in respiratory status   Assess for changes in mentation and behavior   Position to facilitate oxygenation and minimize respiratory effort   Oxygen supplementation based on oxygen saturation or arterial blood gases   Initiate smoking cessation protocol as indicated   Encourage broncho-pulmonary hygiene including cough, deep breathe, incentive spirometry   Assess the need for suctioning and aspirate as needed   Assess and instruct to report shortness of breath or any respiratory difficulty   Respiratory therapy support as indicated     Problem: Cardiovascular - Adult  Goal: Maintains optimal cardiac output and hemodynamic stability  11/4/2022 0100 by Arabella Reynaga RN  Outcome: Progressing  11/3/2022 1842 by Victoria Kimble RN  Outcome: Not Progressing  Flowsheets (Taken 11/3/2022 1309)  Maintains optimal cardiac output and hemodynamic stability:   Monitor blood pressure and heart rate   Monitor urine output and notify Licensed Independent Practitioner for values outside of normal range   Assess for signs of decreased cardiac output   Administer fluid and/or volume expanders as ordered   Administer vasoactive medications as ordered  Goal: Absence of cardiac dysrhythmias or at baseline  11/4/2022 0100 by Catrachita Way RN  Outcome: Progressing  11/3/2022 1842 by Adeline Jaffe RN  Outcome: Not Progressing  Flowsheets (Taken 11/3/2022 4074)  Absence of cardiac dysrhythmias or at baseline:   Monitor cardiac rate and rhythm   Assess for signs of decreased cardiac output   Administer antiarrhythmia medication and electrolyte replacement as ordered     Problem: Skin/Tissue Integrity - Adult  Goal: Skin integrity remains intact  11/4/2022 0100 by Catrachita Way RN  Outcome: Progressing  11/3/2022 1842 by Adeline Jaffe RN  Outcome: Not Progressing  Flowsheets  Taken 11/3/2022 1042  Skin Integrity Remains Intact:   Monitor for areas of redness and/or skin breakdown   Assess vascular access sites hourly   Every 4-6 hours minimum: Change oxygen saturation probe site   Every 4-6 hours: If on nasal continuous positive airway pressure, respiratory therapy assesses nares and determine need for appliance change or resting period  Taken 11/3/2022 0937  Skin Integrity Remains Intact:   Monitor for areas of redness and/or skin breakdown   Assess vascular access sites hourly   Every 4-6 hours minimum: Change oxygen saturation probe site   Every 4-6 hours: If on nasal continuous positive airway pressure, respiratory therapy assesses nares and determine need for appliance change or resting period  Goal: Incisions, wounds, or drain sites healing without S/S of infection  11/4/2022 0100 by Catrachita Way RN  Outcome: Progressing  11/3/2022 1842 by Adeline Jaffe RN  Outcome: Not Progressing  Flowsheets  Taken 11/3/2022 1042  Incisions, Wounds, or Drain Sites Healing Without Sign and Symptoms of Infection:   ADMISSION and DAILY: Assess and document risk factors for pressure ulcer development   TWICE DAILY: Assess and document skin integrity   TWICE DAILY: Assess and document dressing/incision, wound bed, drain sites and surrounding tissue   Implement wound care per orders   Initiate isolation precautions as appropriate   Initiate pressure ulcer prevention bundle as indicated  Taken 11/3/2022 0937  Incisions, Wounds, or Drain Sites Healing Without Sign and Symptoms of Infection:   ADMISSION and DAILY: Assess and document risk factors for pressure ulcer development   TWICE DAILY: Assess and document skin integrity   TWICE DAILY: Assess and document dressing/incision, wound bed, drain sites and surrounding tissue   Implement wound care per orders   Initiate isolation precautions as appropriate   Initiate pressure ulcer prevention bundle as indicated  Goal: Oral mucous membranes remain intact  11/4/2022 0100 by Griselda Reed RN  Outcome: Progressing  11/3/2022 1842 by Verena Bumpers, RN  Outcome: Not Progressing  Flowsheets  Taken 11/3/2022 1042  Oral Mucous Membranes Remain Intact:   Assess oral mucosa and hygiene practices   Implement preventative oral hygiene regimen   Implement oral medicated treatments as ordered  Taken 11/3/2022 0937  Oral Mucous Membranes Remain Intact:   Assess oral mucosa and hygiene practices   Implement preventative oral hygiene regimen   Implement oral medicated treatments as ordered     Problem: Musculoskeletal - Adult  Goal: Return mobility to safest level of function  11/4/2022 0100 by Griselda Reed RN  Outcome: Progressing  11/3/2022 1842 by Verena Bumpers, RN  Outcome: Not Progressing  Flowsheets (Taken 11/3/2022 0117)  Return Mobility to Safest Level of Function:   Assess patient stability and activity tolerance for standing, transferring and ambulating with or without assistive devices Assist with transfers and ambulation using safe patient handling equipment as needed   Ensure adequate protection for wounds/incisions during mobilization   Obtain physical therapy/occupational therapy consults as needed   Apply continuous passive motion per provider or physical therapy orders to increase flexion toward goal   Instruct patient/family in ordered activity level  Goal: Maintain proper alignment of affected body part  11/4/2022 0100 by Arabella Reynaga RN  Outcome: Progressing  11/3/2022 1842 by Victoria Kimble RN  Outcome: Not Progressing  Flowsheets (Taken 11/3/2022 8361)  Maintain proper alignment of affected body part:   Support and protect limb and body alignment per provider's orders   Instruct and reinforce with patient and family use of appropriate assistive device and precautions (e.g. spinal or hip dislocation precautions)  Goal: Return ADL status to a safe level of function  11/4/2022 0100 by Arabella Reynaga RN  Outcome: Progressing  11/3/2022 1842 by Victoria Kimble RN  Outcome: Not Progressing  Flowsheets (Taken 11/3/2022 7434)  Return ADL Status to a Safe Level of Function:   Administer medication as ordered   Assess activities of daily living deficits and provide assistive devices as needed   Obtain physical therapy/occupational therapy consults as needed   Assist and instruct patient to increase activity and self care as tolerated     Problem: Gastrointestinal - Adult  Goal: Minimal or absence of nausea and vomiting  11/4/2022 0100 by Arabella Reynaga RN  Outcome: Progressing  11/3/2022 1842 by Victoria Kimble RN  Outcome: Not Progressing  Flowsheets (Taken 11/3/2022 7056)  Minimal or absence of nausea and vomiting: Administer IV fluids as ordered to ensure adequate hydration  Goal: Maintains or returns to baseline bowel function  11/4/2022 0100 by Arabella Reynaga RN  Outcome: Progressing  11/3/2022 1842 by Victoria Kimble RN  Outcome: Not Progressing  Flowsheets (Taken 11/3/2022 5330)  Maintains or returns to baseline bowel function:   Assess bowel function   Encourage oral fluids to ensure adequate hydration   Administer IV fluids as ordered to ensure adequate hydration   Administer ordered medications as needed   Encourage mobilization and activity   Nutrition consult to assist patient with appropriate food choices  Goal: Maintains adequate nutritional intake  11/4/2022 0100 by Neal Butts RN  Outcome: Progressing  11/3/2022 1842 by Duke Phillips RN  Outcome: Not Progressing  Flowsheets (Taken 11/3/2022 0638)  Maintains adequate nutritional intake:   Monitor percentage of each meal consumed   Identify factors contributing to decreased intake, treat as appropriate   Assist with meals as needed   Obtain nutritional consult as needed   Monitor intake and output, weight and lab values  Goal: Establish and maintain optimal ostomy function  11/4/2022 0100 by Neal Butts RN  Outcome: Progressing  11/3/2022 1842 by Duke Phillips RN  Outcome: Not Progressing  Flowsheets (Taken 11/3/2022 0460)  Establish and maintain optimal ostomy function:   Monitor output from ostomies   Administer IV fluids and TPN as ordered   Introduce and advance enteral feedings as ordered   Nutrition consult   Infuse IV Fluids/TPN as ordered     Problem: Genitourinary - Adult  Goal: Absence of urinary retention  11/4/2022 0100 by Neal Butts RN  Outcome: Progressing  11/3/2022 1842 by Duke Phillips RN  Outcome: Not Progressing  Flowsheets (Taken 11/3/2022 2024)  Absence of urinary retention:   Monitor intake/output and perform bladder scan as needed   Place urinary catheter per Licensed Independent Practitioner order if needed   Assess patients ability to void and empty bladder  Goal: Urinary catheter remains patent  11/4/2022 0100 by Neal Butts RN  Outcome: Progressing  11/3/2022 1842 by Duke Phillips RN  Outcome: Not Progressing  Flowsheets (Taken 11/3/2022 7492)  Urinary catheter remains patent: Assess patency of urinary catheter   Assess need for a larger catheter size or a 3-way catheter for continuous bladder irrigation     Problem: Infection - Adult  Goal: Absence of infection at discharge  11/4/2022 0100 by Hildred Mcburney, RN  Outcome: Progressing  11/3/2022 1842 by Stefani Glez RN  Outcome: Not Progressing  Flowsheets (Taken 11/3/2022 5609)  Absence of infection at discharge:   Assess and monitor for signs and symptoms of infection   Monitor lab/diagnostic results   Monitor all insertion sites i.e., indwelling lines, tubes and drains   [place] appropriate cooling/warming therapies per order   Administer medications as ordered   Instruct and encourage patient and family to use good hand hygiene technique  Goal: Absence of infection during hospitalization  11/4/2022 0100 by Hildred Mcburney, RN  Outcome: Progressing  11/3/2022 1842 by Stefani Glez RN  Outcome: Not Progressing  Flowsheets (Taken 11/3/2022 9974)  Absence of infection during hospitalization:   Assess and monitor for signs and symptoms of infection   Monitor lab/diagnostic results   [place] appropriate cooling/warming therapies per order   Administer medications as ordered   Instruct and encourage patient and family to use good hand hygiene technique   Identify and instruct in appropriate isolation precautions for identified infection/condition  Goal: Absence of fever/infection during anticipated neutropenic period  11/4/2022 0100 by Hildred Mcburney, RN  Outcome: Progressing  11/3/2022 1842 by Stefani Glez RN  Outcome: Not Progressing  Flowsheets (Taken 11/3/2022 8711)  Absence of fever/infection during anticipated neutropenic period:   Monitor white blood cell count   Administer growth factors as ordered   Implement neutropenic guidelines     Problem: Metabolic/Fluid and Electrolytes - Adult  Goal: Electrolytes maintained within normal limits  11/4/2022 0100 by Hildred Mcburney, RN  Outcome: Progressing  11/3/2022 1842 by Lorri Knox Salty Borges RN  Outcome: Not Progressing  Flowsheets (Taken 11/3/2022 7122)  Electrolytes maintained within normal limits:   Administer electrolyte replacement as ordered   Monitor labs and assess patient for signs and symptoms of electrolyte imbalances   Monitor response to electrolyte replacements, including repeat lab results as appropriate   Fluid restriction as ordered   Instruct patient on fluid and nutrition restrictions as appropriate  Goal: Hemodynamic stability and optimal renal function maintained  11/4/2022 0100 by Da Montalvo RN  Outcome: Progressing  11/3/2022 1842 by Beryle Schneider, RN  Outcome: Not Progressing  Flowsheets (Taken 11/3/2022 3637)  Hemodynamic stability and optimal renal function maintained:   Monitor labs and assess for signs and symptoms of volume excess or deficit   Monitor intake, output and patient weight   Monitor urine specific gravity, serum osmolarity and serum sodium as indicated or ordered   Monitor response to interventions for patient's volume status, including labs, urine output, blood pressure (other measures as available)   Encourage oral intake as appropriate   Instruct patient on fluid and nutrition restrictions as appropriate  Goal: Glucose maintained within prescribed range  11/4/2022 0100 by Da Montalvo RN  Outcome: Progressing  11/3/2022 1842 by Beryle Schneider, RN  Outcome: Not Progressing  Flowsheets (Taken 11/3/2022 6354)  Glucose maintained within prescribed range:   Monitor blood glucose as ordered   Assess for signs and symptoms of hyperglycemia and hypoglycemia   Administer ordered medications to maintain glucose within target range   Assess barriers to adequate nutritional intake and initiate nutrition consult as needed   Instruct patient on self management of diabetes and initiate consult as needed     Problem: Hematologic - Adult  Goal: Maintains hematologic stability  11/4/2022 0100 by Da Montalvo RN  Outcome: Progressing  11/3/2022 1842 by Fadi Childers Michel Moore RN  Outcome: Not Progressing  Flowsheets (Taken 11/3/2022 1534)  Maintains hematologic stability:   Assess for signs and symptoms of bleeding or hemorrhage   Monitor labs for bleeding or clotting disorders   Administer blood products/factors as ordered     Problem: Chronic Conditions and Co-morbidities  Goal: Patient's chronic conditions and co-morbidity symptoms are monitored and maintained or improved  11/4/2022 0100 by Aiyana Liu RN  Outcome: Progressing  11/3/2022 1842 by Gina Kessler RN  Outcome: Not Progressing  Flowsheets (Taken 11/3/2022 0894)  Care Plan - Patient's Chronic Conditions and Co-Morbidity Symptoms are Monitored and Maintained or Improved:   Monitor and assess patient's chronic conditions and comorbid symptoms for stability, deterioration, or improvement   Collaborate with multidisciplinary team to address chronic and comorbid conditions and prevent exacerbation or deterioration   Update acute care plan with appropriate goals if chronic or comorbid symptoms are exacerbated and prevent overall improvement and discharge     Problem: ABCDS Injury Assessment  Goal: Absence of physical injury  11/4/2022 0100 by Aiyana Liu RN  Outcome: Progressing  11/3/2022 1842 by Gina Kessler RN  Outcome: Not Progressing     Problem: Nutrition Deficit:  Goal: Optimize nutritional status  11/4/2022 0100 by Aiyana Liu RN  Outcome: Progressing  11/3/2022 1842 by Gina Kessler RN  Outcome: Not Progressing     Problem: Discharge Planning  Goal: Discharge to home or other facility with appropriate resources  11/4/2022 0100 by Aiyana Liu RN  Outcome: Progressing  11/3/2022 1842 by Gina Kessler RN  Outcome: Not Progressing  Flowsheets (Taken 11/3/2022 4906)  Discharge to home or other facility with appropriate resources:   Identify barriers to discharge with patient and caregiver   Identify discharge learning needs (meds, wound care, etc)   Arrange for needed discharge resources and transportation as appropriate   Arrange for interpreters to assist at discharge as needed   Refer to discharge planning if patient needs post-hospital services based on physician order or complex needs related to functional status, cognitive ability or social support system     Problem: Pain  Goal: Verbalizes/displays adequate comfort level or baseline comfort level  11/4/2022 0100 by Aiyana Liu RN  Outcome: Progressing  11/3/2022 1842 by Gina Kessler RN  Outcome: Not Progressing  Flowsheets (Taken 11/3/2022 0858)  Verbalizes/displays adequate comfort level or baseline comfort level:   Encourage patient to monitor pain and request assistance   Assess pain using appropriate pain scale   Administer analgesics based on type and severity of pain and evaluate response   Implement non-pharmacological measures as appropriate and evaluate response   Consider cultural and social influences on pain and pain management   Notify Licensed Independent Practitioner if interventions unsuccessful or patient reports new pain     Problem: Skin/Tissue Integrity  Goal: Absence of new skin breakdown  Description: 1. Monitor for areas of redness and/or skin breakdown  2. Assess vascular access sites hourly  3. Every 4-6 hours minimum:  Change oxygen saturation probe site  4. Every 4-6 hours:  If on nasal continuous positive airway pressure, respiratory therapy assess nares and determine need for appliance change or resting period.   11/4/2022 0100 by Aiyana Liu RN  Outcome: Progressing  11/3/2022 1842 by Gina Kessler RN  Outcome: Not Progressing     Problem: Safety - Adult  Goal: Free from fall injury  11/4/2022 0100 by Aiyana Liu RN  Outcome: Progressing  11/3/2022 1842 by Gina Kessler RN  Outcome: Not Progressing     Problem: Neurosensory - Adult  Goal: Achieves stable or improved neurological status  11/4/2022 0100 by Aiyana Liu RN  Outcome: Progressing  11/3/2022 1842 by Gina Kessler RN  Outcome: Not Progressing  Flowsheets (Taken 11/3/2022 0937)  Achieves stable or improved neurological status:   Initiate measures to prevent increased intracranial pressure   Assess for and report changes in neurological status   Maintain blood pressure and fluid volume within ordered parameters to optimize cerebral perfusion and minimize risk of hemorrhage   Monitor temperature, glucose, and sodium. Initiate appropriate interventions as ordered  Goal: Absence of seizures  11/4/2022 0100 by Jillian Stephenson RN  Outcome: Progressing  11/3/2022 1842 by Sydney Delgado RN  Outcome: Not Progressing  Flowsheets (Taken 11/3/2022 1051)  Absence of seizures:   Monitor for seizure activity.   If seizure occurs, document type and location of movements and any associated apnea   If seizure occurs, turn head to side and suction secretions as needed   Administer anticonvulsants as ordered   Support airway/breathing, administer oxygen as needed   Diagnostic studies as ordered  Goal: Remains free of injury related to seizures activity  11/4/2022 0100 by Jillian Stephenson RN  Outcome: Progressing  11/3/2022 1842 by Sydney Delgado RN  Outcome: Not Progressing  Flowsheets (Taken 11/3/2022 7189)  Remains free of injury related to seizure activity:   Maintain airway, patient safety  and administer oxygen as ordered   Monitor patient for seizure activity, document and report duration and description of seizure to Licensed Independent Practitioner   If seizure occurs, turn patient to side and suction secretions as needed   Reorient patient post seizure   Seizure pads on all 4 side rails   Instruct patient/family to call for assistance with activity based on assessment   Instruct patient/family to notify RN of any seizure activity  Goal: Achieves maximal functionality and self care  11/4/2022 0100 by Jillian Stephenson RN  Outcome: Progressing  11/3/2022 1842 by Sydney Delgado RN  Outcome: Not Progressing  Flowsheets (Taken 11/3/2022 9323)  Achieves maximal functionality and self care:   Monitor swallowing and airway patency with patient fatigue and changes in neurological status   Encourage and assist patient to increase activity and self care with guidance from physical therapy/occupational therapy   Encourage visually impaired, hearing impaired and aphasic patients to use assistive/communication devices     Problem: Respiratory - Adult  Goal: Achieves optimal ventilation and oxygenation  11/4/2022 0100 by Fabi Amin RN  Outcome: Progressing  11/3/2022 1842 by Jared Brooks RN  Outcome: Not Progressing  Flowsheets (Taken 11/3/2022 0087)  Achieves optimal ventilation and oxygenation:   Assess for changes in respiratory status   Assess for changes in mentation and behavior   Position to facilitate oxygenation and minimize respiratory effort   Oxygen supplementation based on oxygen saturation or arterial blood gases   Initiate smoking cessation protocol as indicated   Encourage broncho-pulmonary hygiene including cough, deep breathe, incentive spirometry   Assess the need for suctioning and aspirate as needed   Assess and instruct to report shortness of breath or any respiratory difficulty   Respiratory therapy support as indicated     Problem: Cardiovascular - Adult  Goal: Maintains optimal cardiac output and hemodynamic stability  11/4/2022 0100 by Fabi Amin RN  Outcome: Progressing  11/3/2022 1842 by Jared Brooks RN  Outcome: Not Progressing  Flowsheets (Taken 11/3/2022 8584)  Maintains optimal cardiac output and hemodynamic stability:   Monitor blood pressure and heart rate   Monitor urine output and notify Licensed Independent Practitioner for values outside of normal range   Assess for signs of decreased cardiac output   Administer fluid and/or volume expanders as ordered   Administer vasoactive medications as ordered  Goal: Absence of cardiac dysrhythmias or at baseline  11/4/2022 0100 by Fabi Amin RN  Outcome: Progressing  11/3/2022 1842 by Ruthann Palencia RN  Outcome: Not Progressing  Flowsheets (Taken 11/3/2022 7073)  Absence of cardiac dysrhythmias or at baseline:   Monitor cardiac rate and rhythm   Assess for signs of decreased cardiac output   Administer antiarrhythmia medication and electrolyte replacement as ordered     Problem: Skin/Tissue Integrity - Adult  Goal: Skin integrity remains intact  11/4/2022 0100 by Rebecca Pineda RN  Outcome: Progressing  11/3/2022 1842 by Ruthann Palencia RN  Outcome: Not Progressing  Flowsheets  Taken 11/3/2022 1042  Skin Integrity Remains Intact:   Monitor for areas of redness and/or skin breakdown   Assess vascular access sites hourly   Every 4-6 hours minimum: Change oxygen saturation probe site   Every 4-6 hours: If on nasal continuous positive airway pressure, respiratory therapy assesses nares and determine need for appliance change or resting period  Taken 11/3/2022 0937  Skin Integrity Remains Intact:   Monitor for areas of redness and/or skin breakdown   Assess vascular access sites hourly   Every 4-6 hours minimum: Change oxygen saturation probe site   Every 4-6 hours: If on nasal continuous positive airway pressure, respiratory therapy assesses nares and determine need for appliance change or resting period  Goal: Incisions, wounds, or drain sites healing without S/S of infection  11/4/2022 0100 by Rebecca Pineda RN  Outcome: Progressing  11/3/2022 1842 by Ruthann Palencia RN  Outcome: Not Progressing  Flowsheets  Taken 11/3/2022 1042  Incisions, Wounds, or Drain Sites Healing Without Sign and Symptoms of Infection:   ADMISSION and DAILY: Assess and document risk factors for pressure ulcer development   TWICE DAILY: Assess and document skin integrity   TWICE DAILY: Assess and document dressing/incision, wound bed, drain sites and surrounding tissue   Implement wound care per orders   Initiate isolation precautions as appropriate   Initiate pressure ulcer prevention bundle as indicated  Taken 11/3/2022 7947  Incisions, Wounds, or Drain Sites Healing Without Sign and Symptoms of Infection:   ADMISSION and DAILY: Assess and document risk factors for pressure ulcer development   TWICE DAILY: Assess and document skin integrity   TWICE DAILY: Assess and document dressing/incision, wound bed, drain sites and surrounding tissue   Implement wound care per orders   Initiate isolation precautions as appropriate   Initiate pressure ulcer prevention bundle as indicated  Goal: Oral mucous membranes remain intact  11/4/2022 0100 by Da Montalvo RN  Outcome: Progressing  11/3/2022 1842 by Beryle Schneider, RN  Outcome: Not Progressing  Flowsheets  Taken 11/3/2022 1042  Oral Mucous Membranes Remain Intact:   Assess oral mucosa and hygiene practices   Implement preventative oral hygiene regimen   Implement oral medicated treatments as ordered  Taken 11/3/2022 0937  Oral Mucous Membranes Remain Intact:   Assess oral mucosa and hygiene practices   Implement preventative oral hygiene regimen   Implement oral medicated treatments as ordered     Problem: Musculoskeletal - Adult  Goal: Return mobility to safest level of function  11/4/2022 0100 by Da Montalvo RN  Outcome: Progressing  11/3/2022 1842 by Beryle Schneider, RN  Outcome: Not Progressing  Flowsheets (Taken 11/3/2022 2789)  Return Mobility to Safest Level of Function:   Assess patient stability and activity tolerance for standing, transferring and ambulating with or without assistive devices   Assist with transfers and ambulation using safe patient handling equipment as needed   Ensure adequate protection for wounds/incisions during mobilization   Obtain physical therapy/occupational therapy consults as needed   Apply continuous passive motion per provider or physical therapy orders to increase flexion toward goal   Instruct patient/family in ordered activity level  Goal: Maintain proper alignment of affected body part  11/4/2022 0100 by Da Montalvo RN  Outcome: Progressing  11/3/2022 1842 by Junette Cranker, RN  Outcome: Not Progressing  Flowsheets (Taken 11/3/2022 8752)  Maintain proper alignment of affected body part:   Support and protect limb and body alignment per provider's orders   Instruct and reinforce with patient and family use of appropriate assistive device and precautions (e.g. spinal or hip dislocation precautions)  Goal: Return ADL status to a safe level of function  11/4/2022 0100 by Ted Edwards RN  Outcome: Progressing  11/3/2022 1842 by Junette Cranker, RN  Outcome: Not Progressing  Flowsheets (Taken 11/3/2022 7950)  Return ADL Status to a Safe Level of Function:   Administer medication as ordered   Assess activities of daily living deficits and provide assistive devices as needed   Obtain physical therapy/occupational therapy consults as needed   Assist and instruct patient to increase activity and self care as tolerated     Problem: Gastrointestinal - Adult  Goal: Minimal or absence of nausea and vomiting  11/4/2022 0100 by Ted Edwards RN  Outcome: Progressing  11/3/2022 1842 by Junette Cranker, RN  Outcome: Not Progressing  Flowsheets (Taken 11/3/2022 3971)  Minimal or absence of nausea and vomiting: Administer IV fluids as ordered to ensure adequate hydration  Goal: Maintains or returns to baseline bowel function  11/4/2022 0100 by Ted Edwards RN  Outcome: Progressing  11/3/2022 1842 by Junette Cranker, RN  Outcome: Not Progressing  Flowsheets (Taken 11/3/2022 4341)  Maintains or returns to baseline bowel function:   Assess bowel function   Encourage oral fluids to ensure adequate hydration   Administer IV fluids as ordered to ensure adequate hydration   Administer ordered medications as needed   Encourage mobilization and activity   Nutrition consult to assist patient with appropriate food choices  Goal: Maintains adequate nutritional intake  11/4/2022 0100 by Ted Edwards RN  Outcome: Progressing  11/3/2022 1842 by Junette Cranker, RN  Outcome: Not Progressing  Flowsheets (Taken 11/3/2022 7319)  Maintains adequate nutritional intake:   Monitor percentage of each meal consumed   Identify factors contributing to decreased intake, treat as appropriate   Assist with meals as needed   Obtain nutritional consult as needed   Monitor intake and output, weight and lab values  Goal: Establish and maintain optimal ostomy function  11/4/2022 0100 by Jhonny Salvador RN  Outcome: Progressing  11/3/2022 1842 by Kami Tsang RN  Outcome: Not Progressing  Flowsheets (Taken 11/3/2022 5991)  Establish and maintain optimal ostomy function:   Monitor output from ostomies   Administer IV fluids and TPN as ordered   Introduce and advance enteral feedings as ordered   Nutrition consult   Infuse IV Fluids/TPN as ordered     Problem: Genitourinary - Adult  Goal: Absence of urinary retention  11/4/2022 0100 by Jhonny Salvador RN  Outcome: Progressing  11/3/2022 1842 by Kami Tsang RN  Outcome: Not Progressing  Flowsheets (Taken 11/3/2022 3560)  Absence of urinary retention:   Monitor intake/output and perform bladder scan as needed   Place urinary catheter per Licensed Independent Practitioner order if needed   Assess patients ability to void and empty bladder  Goal: Urinary catheter remains patent  11/4/2022 0100 by Jhonny Salvador RN  Outcome: Progressing  11/3/2022 1842 by Kami Tsang RN  Outcome: Not Progressing  Flowsheets (Taken 11/3/2022 7044)  Urinary catheter remains patent:   Assess patency of urinary catheter   Assess need for a larger catheter size or a 3-way catheter for continuous bladder irrigation     Problem: Infection - Adult  Goal: Absence of infection at discharge  11/4/2022 0100 by Jhonny Salvador RN  Outcome: Progressing  11/3/2022 1842 by Kami Tsang RN  Outcome: Not Progressing  Flowsheets (Taken 11/3/2022 4802)  Absence of infection at discharge:   Assess and monitor for signs and symptoms of infection   Monitor lab/diagnostic results   Monitor all insertion sites i.e., indwelling lines, tubes and drains   [place] appropriate cooling/warming therapies per order   Administer medications as ordered   Instruct and encourage patient and family to use good hand hygiene technique  Goal: Absence of infection during hospitalization  11/4/2022 0100 by Rebecca Pineda RN  Outcome: Progressing  11/3/2022 1842 by Ruthann Palencia RN  Outcome: Not Progressing  Flowsheets (Taken 11/3/2022 2741)  Absence of infection during hospitalization:   Assess and monitor for signs and symptoms of infection   Monitor lab/diagnostic results   [place] appropriate cooling/warming therapies per order   Administer medications as ordered   Instruct and encourage patient and family to use good hand hygiene technique   Identify and instruct in appropriate isolation precautions for identified infection/condition  Goal: Absence of fever/infection during anticipated neutropenic period  11/4/2022 0100 by Rebecca Pineda RN  Outcome: Progressing  11/3/2022 1842 by Ruthann Palencia RN  Outcome: Not Progressing  Flowsheets (Taken 11/3/2022 8412)  Absence of fever/infection during anticipated neutropenic period:   Monitor white blood cell count   Administer growth factors as ordered   Implement neutropenic guidelines     Problem: Metabolic/Fluid and Electrolytes - Adult  Goal: Electrolytes maintained within normal limits  11/4/2022 0100 by Rebecca Pineda RN  Outcome: Progressing  11/3/2022 1842 by Ruthann Palencia RN  Outcome: Not Progressing  Flowsheets (Taken 11/3/2022 9739)  Electrolytes maintained within normal limits:   Administer electrolyte replacement as ordered   Monitor labs and assess patient for signs and symptoms of electrolyte imbalances   Monitor response to electrolyte replacements, including repeat lab results as appropriate   Fluid restriction as ordered   Instruct patient on fluid and nutrition restrictions as appropriate  Goal: Hemodynamic stability and optimal renal function maintained  11/4/2022 0100 by Catalina Del Real RN  Outcome: Progressing  11/3/2022 1842 by Ethan Gaviria RN  Outcome: Not Progressing  Flowsheets (Taken 11/3/2022 6611)  Hemodynamic stability and optimal renal function maintained:   Monitor labs and assess for signs and symptoms of volume excess or deficit   Monitor intake, output and patient weight   Monitor urine specific gravity, serum osmolarity and serum sodium as indicated or ordered   Monitor response to interventions for patient's volume status, including labs, urine output, blood pressure (other measures as available)   Encourage oral intake as appropriate   Instruct patient on fluid and nutrition restrictions as appropriate  Goal: Glucose maintained within prescribed range  11/4/2022 0100 by Catalina Del Real RN  Outcome: Progressing  11/3/2022 1842 by Ethan Gaviria RN  Outcome: Not Progressing  Flowsheets (Taken 11/3/2022 0895)  Glucose maintained within prescribed range:   Monitor blood glucose as ordered   Assess for signs and symptoms of hyperglycemia and hypoglycemia   Administer ordered medications to maintain glucose within target range   Assess barriers to adequate nutritional intake and initiate nutrition consult as needed   Instruct patient on self management of diabetes and initiate consult as needed     Problem: Hematologic - Adult  Goal: Maintains hematologic stability  11/4/2022 0100 by Catalina Del Real RN  Outcome: Progressing  11/3/2022 1842 by Ethan Gaviria RN  Outcome: Not Progressing  Flowsheets (Taken 11/3/2022 4095)  Maintains hematologic stability:   Assess for signs and symptoms of bleeding or hemorrhage   Monitor labs for bleeding or clotting disorders   Administer blood products/factors as ordered     Problem: Chronic Conditions and Co-morbidities  Goal: Patient's chronic conditions and co-morbidity symptoms are monitored and maintained or improved  11/4/2022 0100 by Catalina Del Real RN  Outcome: Progressing  11/3/2022 1842 by Grabiel Olivares RN  Outcome: Not Progressing  Flowsheets (Taken 11/3/2022 7014)  Care Plan - Patient's Chronic Conditions and Co-Morbidity Symptoms are Monitored and Maintained or Improved:   Monitor and assess patient's chronic conditions and comorbid symptoms for stability, deterioration, or improvement   Collaborate with multidisciplinary team to address chronic and comorbid conditions and prevent exacerbation or deterioration   Update acute care plan with appropriate goals if chronic or comorbid symptoms are exacerbated and prevent overall improvement and discharge     Problem: ABCDS Injury Assessment  Goal: Absence of physical injury  11/4/2022 0100 by Elba Kirk RN  Outcome: Progressing  11/3/2022 1842 by Grabiel Olivares RN  Outcome: Not Progressing     Problem: Nutrition Deficit:  Goal: Optimize nutritional status  11/4/2022 0100 by Elba Kirk RN  Outcome: Progressing  11/3/2022 1842 by Grabiel Olivares RN  Outcome: Not Progressing

## 2022-11-05 LAB
ANION GAP SERPL CALCULATED.3IONS-SCNC: 7 MMOL/L (ref 7–19)
BUN BLDV-MCNC: 7 MG/DL (ref 6–20)
CALCIUM SERPL-MCNC: 8.2 MG/DL (ref 8.6–10)
CHLORIDE BLD-SCNC: 108 MMOL/L (ref 98–111)
CO2: 28 MMOL/L (ref 22–29)
CREAT SERPL-MCNC: 0.3 MG/DL (ref 0.5–0.9)
GFR SERPL CREATININE-BSD FRML MDRD: >60 ML/MIN/{1.73_M2}
GLUCOSE BLD-MCNC: 134 MG/DL (ref 74–109)
HCT VFR BLD CALC: 35.3 % (ref 37–47)
HEMOGLOBIN: 11.3 G/DL (ref 12–16)
MCH RBC QN AUTO: 29 PG (ref 27–31)
MCHC RBC AUTO-ENTMCNC: 32 G/DL (ref 33–37)
MCV RBC AUTO: 90.5 FL (ref 81–99)
PDW BLD-RTO: 13.8 % (ref 11.5–14.5)
PLATELET # BLD: 155 K/UL (ref 130–400)
PMV BLD AUTO: 11.7 FL (ref 9.4–12.3)
POTASSIUM REFLEX MAGNESIUM: 3.6 MMOL/L (ref 3.5–5)
RBC # BLD: 3.9 M/UL (ref 4.2–5.4)
SODIUM BLD-SCNC: 143 MMOL/L (ref 136–145)
WBC # BLD: 3.2 K/UL (ref 4.8–10.8)

## 2022-11-05 PROCEDURE — 1210000000 HC MED SURG R&B

## 2022-11-05 PROCEDURE — 6370000000 HC RX 637 (ALT 250 FOR IP): Performed by: SURGERY

## 2022-11-05 PROCEDURE — 85027 COMPLETE CBC AUTOMATED: CPT

## 2022-11-05 PROCEDURE — 80048 BASIC METABOLIC PNL TOTAL CA: CPT

## 2022-11-05 PROCEDURE — 6370000000 HC RX 637 (ALT 250 FOR IP)

## 2022-11-05 PROCEDURE — 6360000002 HC RX W HCPCS

## 2022-11-05 PROCEDURE — 6360000002 HC RX W HCPCS: Performed by: HOSPITALIST

## 2022-11-05 PROCEDURE — 2580000003 HC RX 258

## 2022-11-05 PROCEDURE — 6370000000 HC RX 637 (ALT 250 FOR IP): Performed by: HOSPITALIST

## 2022-11-05 PROCEDURE — 2580000003 HC RX 258: Performed by: HOSPITALIST

## 2022-11-05 PROCEDURE — 36591 DRAW BLOOD OFF VENOUS DEVICE: CPT

## 2022-11-05 RX ADMIN — Medication 250 ML: at 10:31

## 2022-11-05 RX ADMIN — TIZANIDINE 12 MG: 4 TABLET ORAL at 08:43

## 2022-11-05 RX ADMIN — Medication 1 TABLET: at 08:40

## 2022-11-05 RX ADMIN — MEROPENEM AND SODIUM CHLORIDE 1000 MG: 1 INJECTION, SOLUTION INTRAVENOUS at 09:15

## 2022-11-05 RX ADMIN — CETIRIZINE HYDROCHLORIDE 10 MG: 10 TABLET, FILM COATED ORAL at 08:39

## 2022-11-05 RX ADMIN — SODIUM CHLORIDE, PRESERVATIVE FREE 5 ML: 5 INJECTION INTRAVENOUS at 09:17

## 2022-11-05 RX ADMIN — AMPICILLIN SODIUM AND SULBACTAM SODIUM 3000 MG: 2; 1 INJECTION, POWDER, FOR SOLUTION INTRAMUSCULAR; INTRAVENOUS at 20:54

## 2022-11-05 RX ADMIN — PILOCARPINE HYDROCHLORIDE 7.5 MG: 5 TABLET, FILM COATED ORAL at 21:08

## 2022-11-05 RX ADMIN — ACETAMINOPHEN 650 MG: 325 TABLET, FILM COATED ORAL at 17:54

## 2022-11-05 RX ADMIN — ENOXAPARIN SODIUM 40 MG: 100 INJECTION SUBCUTANEOUS at 08:40

## 2022-11-05 RX ADMIN — TIZANIDINE 12 MG: 4 TABLET ORAL at 21:09

## 2022-11-05 RX ADMIN — AMPICILLIN SODIUM AND SULBACTAM SODIUM 3000 MG: 2; 1 INJECTION, POWDER, FOR SOLUTION INTRAMUSCULAR; INTRAVENOUS at 14:34

## 2022-11-05 RX ADMIN — SODIUM CHLORIDE, PRESERVATIVE FREE 10 ML: 5 INJECTION INTRAVENOUS at 21:09

## 2022-11-05 RX ADMIN — MEROPENEM AND SODIUM CHLORIDE 1000 MG: 1 INJECTION, SOLUTION INTRAVENOUS at 16:21

## 2022-11-05 RX ADMIN — MEROPENEM AND SODIUM CHLORIDE 1000 MG: 1 INJECTION, SOLUTION INTRAVENOUS at 01:13

## 2022-11-05 RX ADMIN — AMPICILLIN SODIUM AND SULBACTAM SODIUM 3000 MG: 2; 1 INJECTION, POWDER, FOR SOLUTION INTRAMUSCULAR; INTRAVENOUS at 04:45

## 2022-11-05 RX ADMIN — PILOCARPINE HYDROCHLORIDE 7.5 MG: 5 TABLET, FILM COATED ORAL at 08:39

## 2022-11-05 RX ADMIN — PREGABALIN 300 MG: 150 CAPSULE ORAL at 21:08

## 2022-11-05 RX ADMIN — DULOXETINE 30 MG: 30 CAPSULE, DELAYED RELEASE ORAL at 21:09

## 2022-11-05 RX ADMIN — Medication 250 ML: at 20:51

## 2022-11-05 RX ADMIN — AMPICILLIN SODIUM AND SULBACTAM SODIUM 3000 MG: 2; 1 INJECTION, POWDER, FOR SOLUTION INTRAMUSCULAR; INTRAVENOUS at 10:06

## 2022-11-05 RX ADMIN — HYDROCODONE BITARTRATE AND ACETAMINOPHEN 1 TABLET: 10; 325 TABLET ORAL at 21:13

## 2022-11-05 ASSESSMENT — PAIN DESCRIPTION - DESCRIPTORS: DESCRIPTORS: ACHING

## 2022-11-05 ASSESSMENT — PAIN SCALES - GENERAL
PAINLEVEL_OUTOF10: 4
PAINLEVEL_OUTOF10: 5
PAINLEVEL_OUTOF10: 0
PAINLEVEL_OUTOF10: 0
PAINLEVEL_OUTOF10: 5

## 2022-11-05 ASSESSMENT — PAIN DESCRIPTION - LOCATION
LOCATION: GENERALIZED
LOCATION: HEAD;NECK
LOCATION: HEAD

## 2022-11-05 ASSESSMENT — PAIN - FUNCTIONAL ASSESSMENT: PAIN_FUNCTIONAL_ASSESSMENT: PREVENTS OR INTERFERES SOME ACTIVE ACTIVITIES AND ADLS

## 2022-11-05 NOTE — PLAN OF CARE
Problem: Discharge Planning  Goal: Discharge to home or other facility with appropriate resources  11/5/2022 0122 by Roman Maldonado RN  Outcome: Progressing  11/4/2022 1604 by Radha Damon RN  Outcome: Not Progressing     Problem: Pain  Goal: Verbalizes/displays adequate comfort level or baseline comfort level  11/5/2022 0122 by Roman Maldonado RN  Outcome: Progressing  11/4/2022 1604 by Radha Damon RN  Outcome: Not Progressing     Problem: Skin/Tissue Integrity  Goal: Absence of new skin breakdown  Description: 1. Monitor for areas of redness and/or skin breakdown  2. Assess vascular access sites hourly  3. Every 4-6 hours minimum:  Change oxygen saturation probe site  4. Every 4-6 hours:  If on nasal continuous positive airway pressure, respiratory therapy assess nares and determine need for appliance change or resting period. 11/5/2022 0122 by Roman Maldonado RN  Outcome: Progressing  11/4/2022 1604 by Radha Damon RN  Outcome: Not Progressing     Problem: Safety - Adult  Goal: Free from fall injury  11/5/2022 0122 by Roman Maldonado RN  Outcome: Progressing  11/4/2022 1604 by Radha Damon RN  Outcome: Not Progressing     Problem: Neurosensory - Adult  Goal: Achieves stable or improved neurological status  11/5/2022 0122 by Roman Maldonado RN  Outcome: Progressing  11/4/2022 1604 by Radha Damon RN  Outcome: Not Progressing  Flowsheets (Taken 11/4/2022 0817)  Achieves stable or improved neurological status:   Assess for and report changes in neurological status   Initiate measures to prevent increased intracranial pressure   Maintain blood pressure and fluid volume within ordered parameters to optimize cerebral perfusion and minimize risk of hemorrhage   Monitor temperature, glucose, and sodium.  Initiate appropriate interventions as ordered  Goal: Absence of seizures  11/5/2022 0122 by Roman Maldonado RN  Outcome: Progressing  11/4/2022 1604 by Radha Damon RN  Outcome: Not Progressing  Flowsheets (Taken 11/4/2022 0817)  Absence of seizures:   Monitor for seizure activity.   If seizure occurs, document type and location of movements and any associated apnea   If seizure occurs, turn head to side and suction secretions as needed   Administer anticonvulsants as ordered   Support airway/breathing, administer oxygen as needed   Diagnostic studies as ordered  Goal: Remains free of injury related to seizures activity  11/5/2022 0122 by Savage Grant RN  Outcome: Progressing  11/4/2022 1604 by Gabe Wilson RN  Outcome: Not Progressing  Goal: Achieves maximal functionality and self care  11/5/2022 0122 by Savage Grant RN  Outcome: Progressing  11/4/2022 1604 by Gabe Wilson RN  Outcome: Not Progressing  Flowsheets (Taken 11/4/2022 0817)  Achieves maximal functionality and self care:   Monitor swallowing and airway patency with patient fatigue and changes in neurological status   Encourage and assist patient to increase activity and self care with guidance from physical therapy/occupational therapy   Encourage visually impaired, hearing impaired and aphasic patients to use assistive/communication devices     Problem: Respiratory - Adult  Goal: Achieves optimal ventilation and oxygenation  11/5/2022 0122 by Savage Grant RN  Outcome: Progressing  11/4/2022 1604 by Gabe Wilson RN  Outcome: Not Progressing  Flowsheets (Taken 11/4/2022 0817)  Achieves optimal ventilation and oxygenation:   Assess for changes in respiratory status   Assess for changes in mentation and behavior   Position to facilitate oxygenation and minimize respiratory effort   Oxygen supplementation based on oxygen saturation or arterial blood gases   Initiate smoking cessation protocol as indicated   Encourage broncho-pulmonary hygiene including cough, deep breathe, incentive spirometry   Assess the need for suctioning and aspirate as needed   Assess and instruct to report shortness of breath or any respiratory difficulty   Respiratory therapy support as indicated     Problem: Cardiovascular - Adult  Goal: Maintains optimal cardiac output and hemodynamic stability  11/5/2022 0122 by Wilder Lopez RN  Outcome: Progressing  11/4/2022 1604 by Franko Purcell RN  Outcome: Not Progressing  Flowsheets (Taken 11/4/2022 5773)  Maintains optimal cardiac output and hemodynamic stability:   Monitor blood pressure and heart rate   Monitor urine output and notify Licensed Independent Practitioner for values outside of normal range   Administer fluid and/or volume expanders as ordered   Administer vasoactive medications as ordered   Assess for signs of decreased cardiac output  Goal: Absence of cardiac dysrhythmias or at baseline  11/5/2022 0122 by Wilder Lopez RN  Outcome: Progressing  11/4/2022 1604 by Franko Purcell RN  Outcome: Not Progressing  Flowsheets (Taken 11/4/2022 0805)  Absence of cardiac dysrhythmias or at baseline:   Monitor cardiac rate and rhythm   Assess for signs of decreased cardiac output   Administer antiarrhythmia medication and electrolyte replacement as ordered     Problem: Skin/Tissue Integrity - Adult  Goal: Skin integrity remains intact  11/5/2022 0122 by Wilder Lopez RN  Outcome: Progressing  11/4/2022 1604 by Franko Purcell RN  Outcome: Not Progressing  Goal: Incisions, wounds, or drain sites healing without S/S of infection  11/5/2022 0122 by Wilder Lopez RN  Outcome: Progressing  11/4/2022 1604 by Franko Purcell RN  Outcome: Not Progressing  Goal: Oral mucous membranes remain intact  11/5/2022 0122 by Wilder Lopez RN  Outcome: Progressing  11/4/2022 1604 by Franko Purcell RN  Outcome: Not Progressing     Problem: Musculoskeletal - Adult  Goal: Return mobility to safest level of function  11/5/2022 0122 by Wilder Lopez RN  Outcome: Progressing  11/4/2022 1604 by Franko Purcell RN  Outcome: Not Progressing  Goal: Maintain proper alignment of affected body part  11/5/2022 0122 by Tyson Matias Laura Franklin RN  Outcome: Progressing  11/4/2022 1604 by Verena Bumpers, RN  Outcome: Not Progressing  Goal: Return ADL status to a safe level of function  11/5/2022 0122 by Griselda Reed RN  Outcome: Progressing  11/4/2022 1604 by Verena Bumpers, RN  Outcome: Not Progressing     Problem: Gastrointestinal - Adult  Goal: Minimal or absence of nausea and vomiting  11/5/2022 0122 by Griselda Reed RN  Outcome: Progressing  11/4/2022 1604 by Verena Bumpers, RN  Outcome: Not Progressing  Flowsheets (Taken 11/4/2022 0817)  Minimal or absence of nausea and vomiting:   Administer IV fluids as ordered to ensure adequate hydration   Maintain NPO status until nausea and vomiting are resolved   Administer ordered antiemetic medications as needed   Nasogastric tube to low intermittent suction as ordered   Provide nonpharmacologic comfort measures as appropriate  Goal: Maintains or returns to baseline bowel function  11/5/2022 0122 by Griselda Reed RN  Outcome: Progressing  11/4/2022 1604 by Verena Bumpers, RN  Outcome: Not Progressing  Flowsheets (Taken 11/4/2022 7695)  Maintains or returns to baseline bowel function:   Encourage oral fluids to ensure adequate hydration   Administer ordered medications as needed   Encourage mobilization and activity   Administer IV fluids as ordered to ensure adequate hydration  Goal: Maintains adequate nutritional intake  11/5/2022 0122 by Griselda Reed RN  Outcome: Progressing  11/4/2022 1604 by Verena Bumpers, RN  Outcome: Not Progressing  Flowsheets (Taken 11/4/2022 0817)  Maintains adequate nutritional intake:   Monitor percentage of each meal consumed   Identify factors contributing to decreased intake, treat as appropriate   Monitor intake and output, weight and lab values   Obtain nutritional consult as needed   Assist with meals as needed  Goal: Establish and maintain optimal ostomy function  11/5/2022 0122 by Griselda Reed RN  Outcome: Progressing  11/4/2022 1604 by Verena Bumpers, RN  Outcome: Not Progressing  Flowsheets (Taken 11/4/2022 0817)  Establish and maintain optimal ostomy function:   Introduce and advance enteral feedings as ordered   Nutrition consult   Administer IV fluids and TPN as ordered   Monitor output from ostomies     Problem: Genitourinary - Adult  Goal: Absence of urinary retention  11/5/2022 0122 by Arabella Reynaga RN  Outcome: Progressing  11/4/2022 1604 by Victoria Kimble RN  Outcome: Not Progressing  Goal: Urinary catheter remains patent  11/5/2022 0122 by Arabella Reynaga RN  Outcome: Progressing  11/4/2022 1604 by Victoria Kimble RN  Outcome: Not Progressing     Problem: Infection - Adult  Goal: Absence of infection at discharge  11/5/2022 0122 by Arabella eRynaga RN  Outcome: Progressing  11/4/2022 1604 by Victoria Kimble RN  Outcome: Not Progressing  Goal: Absence of infection during hospitalization  11/5/2022 0122 by Arabella Reynaga RN  Outcome: Progressing  11/4/2022 1604 by Victoria Kimble RN  Outcome: Not Progressing  Goal: Absence of fever/infection during anticipated neutropenic period  11/5/2022 0122 by Arabella Reynaga RN  Outcome: Progressing  11/4/2022 1604 by Victoria Kimble RN  Outcome: Not Progressing     Problem: Metabolic/Fluid and Electrolytes - Adult  Goal: Electrolytes maintained within normal limits  11/5/2022 0122 by Arabella Reynaga RN  Outcome: Progressing  11/4/2022 1604 by Victoria Kimble RN  Outcome: Not Progressing  Goal: Hemodynamic stability and optimal renal function maintained  11/5/2022 0122 by Arabella Reynaga RN  Outcome: Progressing  11/4/2022 1604 by Victoria Kimble RN  Outcome: Not Progressing  Goal: Glucose maintained within prescribed range  11/5/2022 0122 by Arabella Reynaga RN  Outcome: Progressing  11/4/2022 1604 by Victoria Kimble RN  Outcome: Not Progressing     Problem: Hematologic - Adult  Goal: Maintains hematologic stability  11/5/2022 0122 by Arabella Reynaga RN  Outcome: Progressing  11/4/2022 1604 by Victoria Kimble RN  Outcome: Not Progressing     Problem: Chronic Conditions and Co-morbidities  Goal: Patient's chronic conditions and co-morbidity symptoms are monitored and maintained or improved  11/5/2022 0122 by Jeri Arredondo RN  Outcome: Progressing  11/4/2022 1604 by Mini Driver RN  Outcome: Not Progressing     Problem: ABCDS Injury Assessment  Goal: Absence of physical injury  11/5/2022 0122 by Jeri Arredondo RN  Outcome: Progressing  11/4/2022 1604 by Mini Driver RN  Outcome: Not Progressing     Problem: Nutrition Deficit:  Goal: Optimize nutritional status  11/5/2022 0122 by Jeri Arredondo RN  Outcome: Progressing  11/4/2022 1604 by Mini Driver RN  Outcome: Not Progressing     Problem: Discharge Planning  Goal: Discharge to home or other facility with appropriate resources  11/5/2022 0122 by Jeri Arredondo RN  Outcome: Progressing  11/4/2022 1604 by Mini Driver RN  Outcome: Not Progressing     Problem: Pain  Goal: Verbalizes/displays adequate comfort level or baseline comfort level  11/5/2022 0122 by Jeri Arredondo RN  Outcome: Progressing  11/4/2022 1604 by Mini Driver RN  Outcome: Not Progressing     Problem: Skin/Tissue Integrity  Goal: Absence of new skin breakdown  Description: 1. Monitor for areas of redness and/or skin breakdown  2. Assess vascular access sites hourly  3. Every 4-6 hours minimum:  Change oxygen saturation probe site  4. Every 4-6 hours:  If on nasal continuous positive airway pressure, respiratory therapy assess nares and determine need for appliance change or resting period.   11/5/2022 0122 by Jeri Arredondo RN  Outcome: Progressing  11/4/2022 1604 by Mini Driver RN  Outcome: Not Progressing     Problem: Safety - Adult  Goal: Free from fall injury  11/5/2022 0122 by Jeri Arredondo RN  Outcome: Progressing  11/4/2022 1604 by Mini Driver RN  Outcome: Not Progressing     Problem: Neurosensory - Adult  Goal: Achieves stable or improved neurological status  11/5/2022 0122 by Gaurang Nichole Yenni Wu RN  Outcome: Progressing  11/4/2022 1604 by Keny Raymond RN  Outcome: Not Progressing  Flowsheets (Taken 11/4/2022 5010)  Achieves stable or improved neurological status:   Assess for and report changes in neurological status   Initiate measures to prevent increased intracranial pressure   Maintain blood pressure and fluid volume within ordered parameters to optimize cerebral perfusion and minimize risk of hemorrhage   Monitor temperature, glucose, and sodium. Initiate appropriate interventions as ordered  Goal: Absence of seizures  11/5/2022 0122 by Madison Contreras RN  Outcome: Progressing  11/4/2022 1604 by Keny Raymond RN  Outcome: Not Progressing  Flowsheets (Taken 11/4/2022 0817)  Absence of seizures:   Monitor for seizure activity.   If seizure occurs, document type and location of movements and any associated apnea   If seizure occurs, turn head to side and suction secretions as needed   Administer anticonvulsants as ordered   Support airway/breathing, administer oxygen as needed   Diagnostic studies as ordered  Goal: Remains free of injury related to seizures activity  11/5/2022 0122 by Madison Contreras RN  Outcome: Progressing  11/4/2022 1604 by Keny Raymond RN  Outcome: Not Progressing  Goal: Achieves maximal functionality and self care  11/5/2022 0122 by Madison Contreras RN  Outcome: Progressing  11/4/2022 1604 by Keny Raymond RN  Outcome: Not Progressing  Flowsheets (Taken 11/4/2022 0817)  Achieves maximal functionality and self care:   Monitor swallowing and airway patency with patient fatigue and changes in neurological status   Encourage and assist patient to increase activity and self care with guidance from physical therapy/occupational therapy   Encourage visually impaired, hearing impaired and aphasic patients to use assistive/communication devices     Problem: Respiratory - Adult  Goal: Achieves optimal ventilation and oxygenation  11/5/2022 0122 by Madison Contreras RN  Outcome: Progressing  11/4/2022 1604 by Jose Armando Braga RN  Outcome: Not Progressing  Flowsheets (Taken 11/4/2022 8471)  Achieves optimal ventilation and oxygenation:   Assess for changes in respiratory status   Assess for changes in mentation and behavior   Position to facilitate oxygenation and minimize respiratory effort   Oxygen supplementation based on oxygen saturation or arterial blood gases   Initiate smoking cessation protocol as indicated   Encourage broncho-pulmonary hygiene including cough, deep breathe, incentive spirometry   Assess the need for suctioning and aspirate as needed   Assess and instruct to report shortness of breath or any respiratory difficulty   Respiratory therapy support as indicated     Problem: Cardiovascular - Adult  Goal: Maintains optimal cardiac output and hemodynamic stability  11/5/2022 0122 by Burt Bedoya RN  Outcome: Progressing  11/4/2022 1604 by Jose Armando Braga RN  Outcome: Not Progressing  Flowsheets (Taken 11/4/2022 0174)  Maintains optimal cardiac output and hemodynamic stability:   Monitor blood pressure and heart rate   Monitor urine output and notify Licensed Independent Practitioner for values outside of normal range   Administer fluid and/or volume expanders as ordered   Administer vasoactive medications as ordered   Assess for signs of decreased cardiac output  Goal: Absence of cardiac dysrhythmias or at baseline  11/5/2022 0122 by Burt Bedoya RN  Outcome: Progressing  11/4/2022 1604 by Jose Armando Braga RN  Outcome: Not Progressing  Flowsheets (Taken 11/4/2022 0817)  Absence of cardiac dysrhythmias or at baseline:   Monitor cardiac rate and rhythm   Assess for signs of decreased cardiac output   Administer antiarrhythmia medication and electrolyte replacement as ordered     Problem: Skin/Tissue Integrity - Adult  Goal: Skin integrity remains intact  11/5/2022 0122 by Burt Bedoya RN  Outcome: Progressing  11/4/2022 1604 by Jose Armando Braga RN  Outcome: Not Progressing  Goal: Incisions, wounds, or drain sites healing without S/S of infection  11/5/2022 0122 by Abhishek Parikh RN  Outcome: Progressing  11/4/2022 1604 by Tiff Graves RN  Outcome: Not Progressing  Goal: Oral mucous membranes remain intact  11/5/2022 0122 by Abhishek Parikh RN  Outcome: Progressing  11/4/2022 1604 by Tiff Graves RN  Outcome: Not Progressing     Problem: Musculoskeletal - Adult  Goal: Return mobility to safest level of function  11/5/2022 0122 by Abhishek Parikh RN  Outcome: Progressing  11/4/2022 1604 by Tiff Graves RN  Outcome: Not Progressing  Goal: Maintain proper alignment of affected body part  11/5/2022 0122 by Abhishek Parikh RN  Outcome: Progressing  11/4/2022 1604 by Tiff Graves RN  Outcome: Not Progressing  Goal: Return ADL status to a safe level of function  11/5/2022 0122 by Abhishek Parikh RN  Outcome: Progressing  11/4/2022 1604 by Tiff Graves RN  Outcome: Not Progressing     Problem: Gastrointestinal - Adult  Goal: Minimal or absence of nausea and vomiting  11/5/2022 0122 by Abhishek Parikh RN  Outcome: Progressing  11/4/2022 1604 by Tiff Graves RN  Outcome: Not Progressing  Flowsheets (Taken 11/4/2022 0817)  Minimal or absence of nausea and vomiting:   Administer IV fluids as ordered to ensure adequate hydration   Maintain NPO status until nausea and vomiting are resolved   Administer ordered antiemetic medications as needed   Nasogastric tube to low intermittent suction as ordered   Provide nonpharmacologic comfort measures as appropriate  Goal: Maintains or returns to baseline bowel function  11/5/2022 0122 by Abhishek Parikh RN  Outcome: Progressing  11/4/2022 1604 by Tiff Graves RN  Outcome: Not Progressing  Flowsheets (Taken 11/4/2022 0817)  Maintains or returns to baseline bowel function:   Encourage oral fluids to ensure adequate hydration   Administer ordered medications as needed   Encourage mobilization and activity   Administer IV fluids as ordered to ensure adequate hydration  Goal: Maintains adequate nutritional intake  11/5/2022 0122 by Deshaun Carrizales RN  Outcome: Progressing  11/4/2022 1604 by Stewart Lynn RN  Outcome: Not Progressing  Flowsheets (Taken 11/4/2022 2753)  Maintains adequate nutritional intake:   Monitor percentage of each meal consumed   Identify factors contributing to decreased intake, treat as appropriate   Monitor intake and output, weight and lab values   Obtain nutritional consult as needed   Assist with meals as needed  Goal: Establish and maintain optimal ostomy function  11/5/2022 0122 by Deshaun Carrizales RN  Outcome: Progressing  11/4/2022 1604 by Stewart Lynn RN  Outcome: Not Progressing  Flowsheets (Taken 11/4/2022 0921)  Establish and maintain optimal ostomy function:   Introduce and advance enteral feedings as ordered   Nutrition consult   Administer IV fluids and TPN as ordered   Monitor output from ostomies     Problem: Genitourinary - Adult  Goal: Absence of urinary retention  11/5/2022 0122 by Deshaun Carrizales RN  Outcome: Progressing  11/4/2022 1604 by Stewart Lynn RN  Outcome: Not Progressing  Goal: Urinary catheter remains patent  11/5/2022 0122 by Deshaun Carrizales RN  Outcome: Progressing  11/4/2022 1604 by Stewart Lynn RN  Outcome: Not Progressing     Problem: Infection - Adult  Goal: Absence of infection at discharge  11/5/2022 0122 by Deshaun Carrizales RN  Outcome: Progressing  11/4/2022 1604 by Stewart Lynn RN  Outcome: Not Progressing  Goal: Absence of infection during hospitalization  11/5/2022 0122 by Deshaun Carrizales RN  Outcome: Progressing  11/4/2022 1604 by Stewart Lynn RN  Outcome: Not Progressing  Goal: Absence of fever/infection during anticipated neutropenic period  11/5/2022 0122 by Deshaun Carrizales RN  Outcome: Progressing  11/4/2022 1604 by Stewart Lynn RN  Outcome: Not Progressing     Problem: Metabolic/Fluid and Electrolytes - Adult  Goal: Electrolytes maintained within normal limits  11/5/2022 0122 by Marisol Meyer RN  Outcome: Progressing  11/4/2022 1604 by Uriah Razo RN  Outcome: Not Progressing  Goal: Hemodynamic stability and optimal renal function maintained  11/5/2022 0122 by Marisol Meyer RN  Outcome: Progressing  11/4/2022 1604 by Uriah Razo RN  Outcome: Not Progressing  Goal: Glucose maintained within prescribed range  11/5/2022 0122 by Marisol Meyer RN  Outcome: Progressing  11/4/2022 1604 by Uriah Razo RN  Outcome: Not Progressing     Problem: Hematologic - Adult  Goal: Maintains hematologic stability  11/5/2022 0122 by Marisol Meyer RN  Outcome: Progressing  11/4/2022 1604 by Uriah Razo RN  Outcome: Not Progressing     Problem: Chronic Conditions and Co-morbidities  Goal: Patient's chronic conditions and co-morbidity symptoms are monitored and maintained or improved  11/5/2022 0122 by Marisol Meyer RN  Outcome: Progressing  11/4/2022 1604 by Uriah aRzo RN  Outcome: Not Progressing     Problem: ABCDS Injury Assessment  Goal: Absence of physical injury  11/5/2022 0122 by Marisol Meyer RN  Outcome: Progressing  11/4/2022 1604 by Uriah Razo RN  Outcome: Not Progressing     Problem: Nutrition Deficit:  Goal: Optimize nutritional status  11/5/2022 0122 by Marisol Meyer RN  Outcome: Progressing  11/4/2022 1604 by Uriah Razo RN  Outcome: Not Progressing

## 2022-11-05 NOTE — PROGRESS NOTES
Progress Note  Date:2022       NJOX:[id number]  Patient Edwar Guallpa     Date of Birth:[birth year]     Age:52 y.o. Subjective    Subjective: 20-year-old female with a history of MS, peripheral vascular disease, seizures, presented to the hospital 2022 with concerns of abnormal labs. Outpatient was seen by wound care, patient with chronic right foot wound and cultures outpatient growing Pseudomonas, Proteus, Enterococcus faecalis and E. coli. Initially on broad-spectrum antibiotics with Vanco and meropenem and transition to Unasyn and meropenem after review of sensitivities. Seen in house by vascular surgery, repeat x-ray of the low right foot was obtained with no definitive diagnosis of osteomyelitis. Recommended continuation of antibiotics, local wound care and awaiting ID evaluation and recommendations on Monday. Patient was seen in house today with family member present, denied any acute overnight event complaining of chronic back pain associated with prior right hip surgery. She denied any chest pain or shortness of breath denied any other ongoing issues at this time. Review of Systems: 12 point system review negative except as above. Objective         Vitals Last 24 Hours:  TEMPERATURE:  Temp  Av °F (37.2 °C)  Min: 98.6 °F (37 °C)  Max: 99.3 °F (37.4 °C)  RESPIRATIONS RANGE: Resp  Av.8  Min: 16  Max: 20  PULSE OXIMETRY RANGE: SpO2  Av.8 %  Min: 98 %  Max: 99 %  PULSE RANGE: Pulse  Av.5  Min: 79  Max: 70  BLOOD PRESSURE RANGE: Systolic (82RZO), FNE:899 , Min:123 , SER:016   ; Diastolic (60TAG), MCT:93, Min:62, Max:93    I/O (24Hr): Intake/Output Summary (Last 24 hours) at 2022 1321  Last data filed at 2022 1010  Gross per 24 hour   Intake 1892.53 ml   Output 500 ml   Net 1392.53 ml         Physical Examination:  General: Underweight, no acute distress lying comfortably in bed.   HEENT: Atraumatic normocephalic, range of motion normal, no JVD, no tracheal deviation noted. Cardiac: Normal S1-S2   Respiratory: clear To auscultation bilaterally, no rhonchi or rales, no wheezing  Abdomen: Soft, positive bowel sounds in all quadrants, no distention, nontender to palpation, no organomegaly noted, no rebound noted. Extremities: ankle wound noted in media, no tenderness, no edema, moves all extremities  Psych: Affect normal and good eye contact, behavioral normal.        Labs/Imaging/Diagnostics    Labs:  CBC:  Recent Labs     11/03/22 0026 11/04/22 0256 11/05/22 0118   WBC 4.2* 3.5* 3.2*   RBC 4.22 4.30 3.90*   HGB 12.2 12.2 11.3*   HCT 38.3 38.8 35.3*   MCV 90.8 90.2 90.5   RDW 14.0 13.8 13.8    166 155       CHEMISTRIES:  Recent Labs     11/03/22 0026 11/04/22 0256 11/05/22 0118    143 143   K 3.6 3.5 3.6    110 108   CO2 25 25 28   BUN 11 8 7   CREATININE 0.3* 0.3* 0.3*   GLUCOSE 99 89 134*   MG  --  1.9  --        PT/INR:No results for input(s): PROTIME, INR in the last 72 hours. APTT:No results for input(s): APTT in the last 72 hours. LIVER PROFILE:  Recent Labs     11/02/22  1513   AST 11   ALT 14   BILITOT 0.3   ALKPHOS 106*         Imaging Last 24 Hours:  XR ANKLE LEFT (MIN 3 VIEWS)    Result Date: 11/3/2022  CLINICAL HISTORY: Wound evaluation TECHNIQUE: 3 views of the left ankle FINDINGS: Severe bony demineralization. Postsurgical changes from transmetatarsal amputation. The surgical margins appear sharp. Surgical clips are seen in the distal soft tissues of the foot. There is no evidence of acute fracture or dislocation. Ankle mortise joint is unremarkable. The soft tissues are unremarkable. No acute osseous abnormality of the ankle. No osseous erosions are seen to suggest osteomyelitis. XR ANKLE RIGHT (MIN 3 VIEWS)    Result Date: 11/4/2022  NO PRIOR REPORT AVAILABLE Exam:X-RAYS OF RIGHT ANKLE Clinical data: Questionable osteo. Technique: Three views of the right ankle.  Prior studies: Radiographs of the right ankle dated 10/31/2022. Findings: Diffuse osteopenia which may obscure fine bony detail. The right ankle demonstrates no evidence of fracture or dislocation. The talar dome is intact. The medial, lateral and posterior malleoli show no gross abnormality. Talofibular degenerative changes again noted. Rest the intra-articular surfaces are within normal limits. No erosive lesions, no abnormal calcifications, no foci of subcutaneous air. Diffuse osteopenia. No acute osseous abnormality or erosive lesions. Low sensitivity of plain radiography for early osteomyelitis. Could correlate with MR if clinically warranted and not contraindicated. Recommendation: Follow up as clinically indicated. Note: Direct correlation with point tenderness and the X-ray images is recommended and does significantly increase the sensitivity of radiographic evaluation. Electronically Signed by Adalberto Haq MD at 04-Nov-2022 09:40:50 AM EST             XR FOOT LEFT (MIN 3 VIEWS)    Result Date: 11/3/2022  LEFT FOOT, THREE VIEWS: CLINICAL HISTORY: Wound FINDINGS : There is transmetatarsal i amputation of the left foot the stump appears to be intact. There are surgical clip near the 1st and 2nd metatarsal . There is severe osteopenia of the osseous structure. The left foot has a neuropathic radiographic appearance. There is no evidence of fracture or dislocation. The soft tissues appear normal.    Transmetatarsal amputation of the left foot with postsurgical changes Severe osteopenia    XR FOOT RIGHT (MIN 3 VIEWS)    Result Date: 11/4/2022  NO PRIOR REPORT AVAILABLE Exam:X-RAYS OF RIGHT FOOT Clinical data:Questionable osteo. Technique: Three views of the right foot. Prior studies: No prior studies submitted. Findings: Diffuse osteopenia which may obscure fine bony detail. Toes are held in flexion. Arterial calcifications. No evidence of fracture or dislocation. No discernible erosive lesion.  The intra-articular surfaces are within normal limits. The plantar fascia shows no gross abnormality. The first MTP sesamoid bones show no gross abnormality. Diffuse osteopenia which may obscure fine bony detail. Toes are held in flexion. Arterial calcifications. No acute osseous abnormality or significant degenerative/erosive change. Low sensitivity of plain radiography for early osteomyelitis. Could correlate with MR if clinically warranted and not contraindicated. Recommendation: Follow up as clinically indicated. Note: Direct correlation with point tenderness and the X-ray images is recommended and does significantly increase the sensitivity of radiographic evaluation. Electronically Signed by Maegan Mcclain MD at 04-Nov-2022 10:35:39 AM EST             Assessment//Plan           Hospital Problems             Last Modified POA    * (Principal) Ankle wound, right, sequela 11/2/2022 Yes    Multiple sclerosis ([facility] [address] 11/2/2022 Yes    Severe malnutrition ([facility] [address] 11/3/2022 Yes    Peripheral vascular disease ([facility] [address] 11/2/2022 Yes    Overview Signed 10/14/2017  7:29 PM by [facility]     Updating Deprecated Diagnoses        Assessment & Plan    Right Ankle wound  Wound culture obtained 10/24: Pseudomonas Aeruginosa, Proteus Mirabilis, Enterococcus faecalis, Escherichia coli  Currently on Unasyn and meropenem  Seen by Vascular and awaiting ID eval  VL Arterial LE mildly diminished flow to bilateral lower extremities  Wound care  Blood cultures currently negative. History of seizures: Continue Keppra    Multiple Sclerosis: Stable    Severe Malnutrition    PVD      Electronically signed by   Juan Perez MD   Internal Medicine Hospitalist  On 11/5/2022  At 1:21 PM    EMR Dragon/Transcription disclaimer:   Much of this encounter note is an electronic transcription/translation of spoken language to printed text.  The electronic translation of spoken language may permit erroneous, or at times, nonsensical words or phrases to be inadvertently transcribed; although attempts have made to review the note for such errors, some may still exist.

## 2022-11-06 PROCEDURE — 1210000000 HC MED SURG R&B

## 2022-11-06 PROCEDURE — 6360000002 HC RX W HCPCS

## 2022-11-06 PROCEDURE — 2580000003 HC RX 258: Performed by: HOSPITALIST

## 2022-11-06 PROCEDURE — 6370000000 HC RX 637 (ALT 250 FOR IP): Performed by: HOSPITALIST

## 2022-11-06 PROCEDURE — 2580000003 HC RX 258

## 2022-11-06 PROCEDURE — 6360000002 HC RX W HCPCS: Performed by: HOSPITALIST

## 2022-11-06 PROCEDURE — 6370000000 HC RX 637 (ALT 250 FOR IP)

## 2022-11-06 RX ADMIN — ENOXAPARIN SODIUM 40 MG: 100 INJECTION SUBCUTANEOUS at 09:12

## 2022-11-06 RX ADMIN — Medication 250 ML: at 20:47

## 2022-11-06 RX ADMIN — Medication 250 ML: at 09:13

## 2022-11-06 RX ADMIN — PILOCARPINE HYDROCHLORIDE 7.5 MG: 5 TABLET, FILM COATED ORAL at 11:36

## 2022-11-06 RX ADMIN — AMPICILLIN SODIUM AND SULBACTAM SODIUM 3000 MG: 2; 1 INJECTION, POWDER, FOR SOLUTION INTRAMUSCULAR; INTRAVENOUS at 09:12

## 2022-11-06 RX ADMIN — PREGABALIN 300 MG: 150 CAPSULE ORAL at 20:55

## 2022-11-06 RX ADMIN — PILOCARPINE HYDROCHLORIDE 7.5 MG: 5 TABLET, FILM COATED ORAL at 20:56

## 2022-11-06 RX ADMIN — MEROPENEM AND SODIUM CHLORIDE 1000 MG: 1 INJECTION, SOLUTION INTRAVENOUS at 11:38

## 2022-11-06 RX ADMIN — CETIRIZINE HYDROCHLORIDE 10 MG: 10 TABLET, FILM COATED ORAL at 09:06

## 2022-11-06 RX ADMIN — DULOXETINE 30 MG: 30 CAPSULE, DELAYED RELEASE ORAL at 20:55

## 2022-11-06 RX ADMIN — MEROPENEM AND SODIUM CHLORIDE 1000 MG: 1 INJECTION, SOLUTION INTRAVENOUS at 00:34

## 2022-11-06 RX ADMIN — AMPICILLIN SODIUM AND SULBACTAM SODIUM 3000 MG: 2; 1 INJECTION, POWDER, FOR SOLUTION INTRAMUSCULAR; INTRAVENOUS at 22:07

## 2022-11-06 RX ADMIN — TIZANIDINE 12 MG: 4 TABLET ORAL at 09:06

## 2022-11-06 RX ADMIN — MEROPENEM AND SODIUM CHLORIDE 1000 MG: 1 INJECTION, SOLUTION INTRAVENOUS at 18:39

## 2022-11-06 RX ADMIN — SODIUM CHLORIDE, PRESERVATIVE FREE 10 ML: 5 INJECTION INTRAVENOUS at 20:58

## 2022-11-06 RX ADMIN — AMPICILLIN SODIUM AND SULBACTAM SODIUM 3000 MG: 2; 1 INJECTION, POWDER, FOR SOLUTION INTRAMUSCULAR; INTRAVENOUS at 15:45

## 2022-11-06 RX ADMIN — TIZANIDINE 12 MG: 4 TABLET ORAL at 20:55

## 2022-11-06 RX ADMIN — Medication 1 TABLET: at 09:06

## 2022-11-06 RX ADMIN — AMPICILLIN SODIUM AND SULBACTAM SODIUM 3000 MG: 2; 1 INJECTION, POWDER, FOR SOLUTION INTRAMUSCULAR; INTRAVENOUS at 02:44

## 2022-11-06 RX ADMIN — HYDROCODONE BITARTRATE AND ACETAMINOPHEN 1 TABLET: 10; 325 TABLET ORAL at 20:56

## 2022-11-06 ASSESSMENT — PAIN SCALES - GENERAL
PAINLEVEL_OUTOF10: 7
PAINLEVEL_OUTOF10: 0

## 2022-11-06 ASSESSMENT — PAIN - FUNCTIONAL ASSESSMENT: PAIN_FUNCTIONAL_ASSESSMENT: ACTIVITIES ARE NOT PREVENTED

## 2022-11-06 ASSESSMENT — PAIN DESCRIPTION - ORIENTATION: ORIENTATION: INNER

## 2022-11-06 ASSESSMENT — PAIN DESCRIPTION - LOCATION: LOCATION: GENERALIZED

## 2022-11-06 ASSESSMENT — PAIN DESCRIPTION - DESCRIPTORS: DESCRIPTORS: ACHING

## 2022-11-06 NOTE — PLAN OF CARE
Problem: Discharge Planning  Goal: Discharge to home or other facility with appropriate resources  Outcome: Progressing  Flowsheets (Taken 11/6/2022 0132)  Discharge to home or other facility with appropriate resources:   Identify barriers to discharge with patient and caregiver   Arrange for needed discharge resources and transportation as appropriate   Identify discharge learning needs (meds, wound care, etc)   Arrange for interpreters to assist at discharge as needed   Refer to discharge planning if patient needs post-hospital services based on physician order or complex needs related to functional status, cognitive ability or social support system     Problem: Pain  Goal: Verbalizes/displays adequate comfort level or baseline comfort level  Outcome: Progressing     Problem: Skin/Tissue Integrity  Goal: Absence of new skin breakdown  Description: 1. Monitor for areas of redness and/or skin breakdown  2. Assess vascular access sites hourly  3. Every 4-6 hours minimum:  Change oxygen saturation probe site  4. Every 4-6 hours:  If on nasal continuous positive airway pressure, respiratory therapy assess nares and determine need for appliance change or resting period.   Outcome: Progressing     Problem: Safety - Adult  Goal: Free from fall injury  Outcome: Progressing     Problem: Neurosensory - Adult  Goal: Achieves stable or improved neurological status  Outcome: Progressing  Goal: Absence of seizures  Outcome: Progressing  Goal: Remains free of injury related to seizures activity  Outcome: Progressing  Goal: Achieves maximal functionality and self care  Outcome: Progressing     Problem: Respiratory - Adult  Goal: Achieves optimal ventilation and oxygenation  Outcome: Progressing     Problem: Cardiovascular - Adult  Goal: Maintains optimal cardiac output and hemodynamic stability  Outcome: Progressing  Goal: Absence of cardiac dysrhythmias or at baseline  Outcome: Progressing     Problem: Skin/Tissue Integrity - Adult  Goal: Skin integrity remains intact  Outcome: Progressing  Flowsheets (Taken 11/6/2022 0132)  Skin Integrity Remains Intact:   Monitor for areas of redness and/or skin breakdown   Assess vascular access sites hourly   Every 4-6 hours minimum: Change oxygen saturation probe site   Every 4-6 hours: If on nasal continuous positive airway pressure, respiratory therapy assesses nares and determine need for appliance change or resting period  Goal: Incisions, wounds, or drain sites healing without S/S of infection  Outcome: Progressing  Flowsheets (Taken 11/6/2022 0132)  Incisions, Wounds, or Drain Sites Healing Without Sign and Symptoms of Infection:   ADMISSION and DAILY: Assess and document risk factors for pressure ulcer development   TWICE DAILY: Assess and document skin integrity   TWICE DAILY: Assess and document dressing/incision, wound bed, drain sites and surrounding tissue   Implement wound care per orders   Initiate isolation precautions as appropriate   Initiate pressure ulcer prevention bundle as indicated  Goal: Oral mucous membranes remain intact  Outcome: Progressing  Flowsheets (Taken 11/6/2022 0132)  Oral Mucous Membranes Remain Intact:   Assess oral mucosa and hygiene practices   Implement preventative oral hygiene regimen   Implement oral medicated treatments as ordered     Problem: Musculoskeletal - Adult  Goal: Return mobility to safest level of function  Outcome: Progressing  Flowsheets (Taken 11/6/2022 0132)  Return Mobility to Safest Level of Function:   Assess patient stability and activity tolerance for standing, transferring and ambulating with or without assistive devices   Assist with transfers and ambulation using safe patient handling equipment as needed   Ensure adequate protection for wounds/incisions during mobilization   Obtain physical therapy/occupational therapy consults as needed   Apply continuous passive motion per provider or physical therapy orders to increase flexion toward goal   Instruct patient/family in ordered activity level  Goal: Maintain proper alignment of affected body part  Outcome: Progressing  Flowsheets (Taken 11/6/2022 0132)  Maintain proper alignment of affected body part:   Support and protect limb and body alignment per provider's orders   Instruct and reinforce with patient and family use of appropriate assistive device and precautions (e.g. spinal or hip dislocation precautions)  Goal: Return ADL status to a safe level of function  Outcome: Progressing  Flowsheets (Taken 11/6/2022 0132)  Return ADL Status to a Safe Level of Function:   Administer medication as ordered   Assess activities of daily living deficits and provide assistive devices as needed   Obtain physical therapy/occupational therapy consults as needed   Assist and instruct patient to increase activity and self care as tolerated     Problem: Gastrointestinal - Adult  Goal: Minimal or absence of nausea and vomiting  Outcome: Progressing  Goal: Maintains or returns to baseline bowel function  Outcome: Progressing  Goal: Maintains adequate nutritional intake  Outcome: Progressing  Goal: Establish and maintain optimal ostomy function  Outcome: Progressing     Problem: Genitourinary - Adult  Goal: Absence of urinary retention  Outcome: Progressing  Flowsheets (Taken 11/6/2022 0132)  Absence of urinary retention:   Assess patients ability to void and empty bladder   Monitor intake/output and perform bladder scan as needed   Place urinary catheter per Licensed Independent Practitioner order if needed   Discuss with Licensed Independent Practitioner  medications to alleviate retention as needed   Discuss catheterization for long term situations as appropriate  Goal: Urinary catheter remains patent  Outcome: Progressing  Flowsheets (Taken 11/6/2022 0132)  Urinary catheter remains patent:   Assess patency of urinary catheter   Irrigate catheter per Licensed Independent Practitioner order if indicated and notify Licensed Independent Practitioner if unable to irrigate   Assess need for a larger catheter size or a 3-way catheter for continuous bladder irrigation     Problem: Infection - Adult  Goal: Absence of infection at discharge  Outcome: Progressing  Flowsheets (Taken 11/6/2022 0132)  Absence of infection at discharge:   Assess and monitor for signs and symptoms of infection   Monitor lab/diagnostic results   Monitor all insertion sites i.e., indwelling lines, tubes and drains   Monitor endotracheal (as able) and nasal secretions for changes in amount and color   Rogers appropriate cooling/warming therapies per order   Administer medications as ordered   Instruct and encourage patient and family to use good hand hygiene technique   Identify and instruct in appropriate isolation precautions for identified infection/condition  Goal: Absence of infection during hospitalization  Outcome: Progressing  Flowsheets (Taken 11/6/2022 0132)  Absence of infection during hospitalization:   Assess and monitor for signs and symptoms of infection   Monitor lab/diagnostic results   Monitor all insertion sites i.e., indwelling lines, tubes and drains   Monitor endotracheal (as able) and nasal secretions for changes in amount and color   Rogers appropriate cooling/warming therapies per order   Administer medications as ordered   Instruct and encourage patient and family to use good hand hygiene technique   Identify and instruct in appropriate isolation precautions for identified infection/condition  Goal: Absence of fever/infection during anticipated neutropenic period  Outcome: Progressing  Flowsheets (Taken 11/6/2022 0132)  Absence of fever/infection during anticipated neutropenic period:   Monitor white blood cell count   Administer growth factors as ordered   Implement neutropenic guidelines     Problem: Metabolic/Fluid and Electrolytes - Adult  Goal: Electrolytes maintained within normal limits  Outcome: Progressing  Flowsheets (Taken 11/6/2022 0132)  Electrolytes maintained within normal limits:   Monitor labs and assess patient for signs and symptoms of electrolyte imbalances   Administer electrolyte replacement as ordered   Monitor response to electrolyte replacements, including repeat lab results as appropriate   Fluid restriction as ordered   Instruct patient on fluid and nutrition restrictions as appropriate  Goal: Hemodynamic stability and optimal renal function maintained  Outcome: Progressing  Flowsheets (Taken 11/6/2022 0132)  Hemodynamic stability and optimal renal function maintained:   Monitor labs and assess for signs and symptoms of volume excess or deficit   Monitor intake, output and patient weight   Monitor urine specific gravity, serum osmolarity and serum sodium as indicated or ordered   Monitor response to interventions for patient's volume status, including labs, urine output, blood pressure (other measures as available)   Encourage oral intake as appropriate   Instruct patient on fluid and nutrition restrictions as appropriate  Goal: Glucose maintained within prescribed range  Outcome: Progressing  Flowsheets (Taken 11/6/2022 0132)  Glucose maintained within prescribed range:   Monitor blood glucose as ordered   Assess for signs and symptoms of hyperglycemia and hypoglycemia   Administer ordered medications to maintain glucose within target range   Assess barriers to adequate nutritional intake and initiate nutrition consult as needed   Instruct patient on self management of diabetes and initiate consult as needed     Problem: Hematologic - Adult  Goal: Maintains hematologic stability  Outcome: Progressing  Flowsheets (Taken 11/6/2022 0132)  Maintains hematologic stability:   Assess for signs and symptoms of bleeding or hemorrhage   Monitor labs for bleeding or clotting disorders   Administer blood products/factors as ordered     Problem: Chronic Conditions and Co-morbidities  Goal: Patient's chronic conditions and co-morbidity symptoms are monitored and maintained or improved  Outcome: Progressing  Flowsheets (Taken 11/6/2022 0132)  Care Plan - Patient's Chronic Conditions and Co-Morbidity Symptoms are Monitored and Maintained or Improved:   Monitor and assess patient's chronic conditions and comorbid symptoms for stability, deterioration, or improvement   Collaborate with multidisciplinary team to address chronic and comorbid conditions and prevent exacerbation or deterioration   Update acute care plan with appropriate goals if chronic or comorbid symptoms are exacerbated and prevent overall improvement and discharge     Problem: ABCDS Injury Assessment  Goal: Absence of physical injury  Outcome: Progressing     Problem: Nutrition Deficit:  Goal: Optimize nutritional status  Outcome: Progressing

## 2022-11-06 NOTE — PROGRESS NOTES
Progress Note  Date:2022       CZKT:[id number]  Patient Miller Dose     Date of Birth:[birth year]     Age:52 y.o. Subjective    Subjective: 50-year-old female with a history of MS, peripheral vascular disease, seizures, presented to the hospital 2022 with concerns of abnormal labs. Outpatient was seen by wound care, patient with chronic right foot wound and cultures outpatient growing Pseudomonas, Proteus, Enterococcus faecalis and E. coli. Initially on broad-spectrum antibiotics with Vanco and meropenem and transition to Unasyn and meropenem after review of sensitivities. Seen in house by vascular surgery, repeat x-ray of the low right foot was obtained with no definitive diagnosis of osteomyelitis. Recommended continuation of antibiotics, local wound care and awaiting ID evaluation and recommendations on Monday. Patient was seen in house today with no family member present, denied any acute overnight event, denied any chest pain or shortness of breath denied any other ongoing issues at this time. Review of Systems: 12 point system review negative except as above. Objective         Vitals Last 24 Hours:  TEMPERATURE:  Temp  Av.8 °F (37.1 °C)  Min: 97.9 °F (36.6 °C)  Max: 100.1 °F (37.8 °C)  RESPIRATIONS RANGE: Resp  Av.3  Min: 16  Max: 18  PULSE OXIMETRY RANGE: SpO2  Av.2 %  Min: 93 %  Max: 98 %  PULSE RANGE: Pulse  Av  Min: 59  Max: 65  BLOOD PRESSURE RANGE: Systolic (89QEI), VBU:287 , Min:127 , OPO:855   ; Diastolic (86KAA), DBW:63, Min:66, Max:84    I/O (24Hr): Intake/Output Summary (Last 24 hours) at 2022 1604  Last data filed at 2022 1130  Gross per 24 hour   Intake 875.06 ml   Output 975 ml   Net -99.94 ml         Physical Examination:  General: Underweight, no acute distress lying comfortably in bed. HEENT: Atraumatic normocephalic, range of motion normal, no JVD, no tracheal deviation noted.   Cardiac: Normal S1-S2   Respiratory: clear To auscultation bilaterally, no rhonchi or rales, no wheezing  Abdomen: Soft, positive bowel sounds in all quadrants, no distention, nontender to palpation, no organomegaly noted, no rebound noted. Extremities: ankle wound noted in media, no tenderness, no edema, moves all extremities  Psych: Affect normal and good eye contact, behavioral normal.        Labs/Imaging/Diagnostics    Labs:  CBC:  Recent Labs     11/04/22 0256 11/05/22 0118   WBC 3.5* 3.2*   RBC 4.30 3.90*   HGB 12.2 11.3*   HCT 38.8 35.3*   MCV 90.2 90.5   RDW 13.8 13.8    155       CHEMISTRIES:  Recent Labs     11/04/22 0256 11/05/22 0118    143   K 3.5 3.6    108   CO2 25 28   BUN 8 7   CREATININE 0.3* 0.3*   GLUCOSE 89 134*   MG 1.9  --        PT/INR:No results for input(s): PROTIME, INR in the last 72 hours. APTT:No results for input(s): APTT in the last 72 hours. LIVER PROFILE:  No results for input(s): AST, ALT, BILIDIR, BILITOT, ALKPHOS in the last 72 hours. Imaging Last 24 Hours:  XR ANKLE LEFT (MIN 3 VIEWS)    Result Date: 11/3/2022  CLINICAL HISTORY: Wound evaluation TECHNIQUE: 3 views of the left ankle FINDINGS: Severe bony demineralization. Postsurgical changes from transmetatarsal amputation. The surgical margins appear sharp. Surgical clips are seen in the distal soft tissues of the foot. There is no evidence of acute fracture or dislocation. Ankle mortise joint is unremarkable. The soft tissues are unremarkable. No acute osseous abnormality of the ankle. No osseous erosions are seen to suggest osteomyelitis. XR ANKLE RIGHT (MIN 3 VIEWS)    Result Date: 11/4/2022  NO PRIOR REPORT AVAILABLE Exam:X-RAYS OF RIGHT ANKLE Clinical data: Questionable osteo. Technique: Three views of the right ankle. Prior studies: Radiographs of the right ankle dated 10/31/2022. Findings: Diffuse osteopenia which may obscure fine bony detail. The right ankle demonstrates no evidence of fracture or dislocation.  The talar dome is intact. The medial, lateral and posterior malleoli show no gross abnormality. Talofibular degenerative changes again noted. Rest the intra-articular surfaces are within normal limits. No erosive lesions, no abnormal calcifications, no foci of subcutaneous air. Diffuse osteopenia. No acute osseous abnormality or erosive lesions. Low sensitivity of plain radiography for early osteomyelitis. Could correlate with MR if clinically warranted and not contraindicated. Recommendation: Follow up as clinically indicated. Note: Direct correlation with point tenderness and the X-ray images is recommended and does significantly increase the sensitivity of radiographic evaluation. Electronically Signed by Demetri Barnett MD at 04-Nov-2022 09:40:50 AM EST             XR FOOT LEFT (MIN 3 VIEWS)    Result Date: 11/3/2022  LEFT FOOT, THREE VIEWS: CLINICAL HISTORY: Wound FINDINGS : There is transmetatarsal i amputation of the left foot the stump appears to be intact. There are surgical clip near the 1st and 2nd metatarsal . There is severe osteopenia of the osseous structure. The left foot has a neuropathic radiographic appearance. There is no evidence of fracture or dislocation. The soft tissues appear normal.    Transmetatarsal amputation of the left foot with postsurgical changes Severe osteopenia    XR FOOT RIGHT (MIN 3 VIEWS)    Result Date: 11/4/2022  NO PRIOR REPORT AVAILABLE Exam:X-RAYS OF RIGHT FOOT Clinical data:Questionable osteo. Technique: Three views of the right foot. Prior studies: No prior studies submitted. Findings: Diffuse osteopenia which may obscure fine bony detail. Toes are held in flexion. Arterial calcifications. No evidence of fracture or dislocation. No discernible erosive lesion. The intra-articular surfaces are within normal limits. The plantar fascia shows no gross abnormality. The first MTP sesamoid bones show no gross abnormality.     Diffuse osteopenia which may obscure fine bony detail. Toes are held in flexion. Arterial calcifications. No acute osseous abnormality or significant degenerative/erosive change. Low sensitivity of plain radiography for early osteomyelitis. Could correlate with MR if clinically warranted and not contraindicated. Recommendation: Follow up as clinically indicated. Note: Direct correlation with point tenderness and the X-ray images is recommended and does significantly increase the sensitivity of radiographic evaluation. Electronically Signed by Lauren Llamas MD at 04-Nov-2022 10:35:39 AM EST             Assessment//Plan           Hospital Problems             Last Modified POA    * (Principal) Ankle wound, right, sequela 11/2/2022 Yes    Multiple sclerosis ([facility] [address] 11/2/2022 Yes    Severe malnutrition ([facility] [address] 11/3/2022 Yes    Peripheral vascular disease ([facility] [address] 11/2/2022 Yes    Overview Signed 10/14/2017  7:29 PM by Roberto Nur     Updating Deprecated Diagnoses        Assessment & Plan    Right Ankle wound  Wound culture obtained 10/24: Pseudomonas Aeruginosa, Proteus Mirabilis, Enterococcus faecalis, Escherichia coli  Currently on Unasyn and meropenem  Seen by Vascular and awaiting ID eval  VL Arterial LE mildly diminished flow to bilateral lower extremities  Wound care  Blood cultures currently negative. History of seizures: Continue Keppra    Multiple Sclerosis: Stable    Severe Malnutrition    PVD      Electronically signed by   Heaven Tejada MD   Internal Medicine Hospitalist  On 11/6/2022  At 4:04 PM    EMR Dragon/Transcription disclaimer:   Much of this encounter note is an electronic transcription/translation of spoken language to printed text.  The electronic translation of spoken language may permit erroneous, or at times, nonsensical words or phrases to be inadvertently transcribed; although attempts have made to review the note for such errors, some may still exist.

## 2022-11-06 NOTE — PLAN OF CARE
Problem: Discharge Planning  Goal: Discharge to home or other facility with appropriate resources  Outcome: Progressing     Problem: Pain  Goal: Verbalizes/displays adequate comfort level or baseline comfort level  Outcome: Progressing     Problem: Skin/Tissue Integrity  Goal: Absence of new skin breakdown  Description: 1. Monitor for areas of redness and/or skin breakdown  2. Assess vascular access sites hourly  3. Every 4-6 hours minimum:  Change oxygen saturation probe site  4. Every 4-6 hours:  If on nasal continuous positive airway pressure, respiratory therapy assess nares and determine need for appliance change or resting period.   Outcome: Progressing     Problem: Safety - Adult  Goal: Free from fall injury  Outcome: Progressing     Problem: Neurosensory - Adult  Goal: Achieves stable or improved neurological status  Outcome: Progressing  Goal: Absence of seizures  Outcome: Progressing  Goal: Remains free of injury related to seizures activity  Outcome: Progressing  Goal: Achieves maximal functionality and self care  Outcome: Progressing     Problem: Respiratory - Adult  Goal: Achieves optimal ventilation and oxygenation  Outcome: Progressing     Problem: Cardiovascular - Adult  Goal: Maintains optimal cardiac output and hemodynamic stability  Outcome: Progressing  Goal: Absence of cardiac dysrhythmias or at baseline  Outcome: Progressing     Problem: Skin/Tissue Integrity - Adult  Goal: Skin integrity remains intact  Outcome: Progressing  Goal: Incisions, wounds, or drain sites healing without S/S of infection  Outcome: Progressing  Goal: Oral mucous membranes remain intact  Outcome: Progressing     Problem: Musculoskeletal - Adult  Goal: Return mobility to safest level of function  Outcome: Progressing  Goal: Maintain proper alignment of affected body part  Outcome: Progressing  Goal: Return ADL status to a safe level of function  Outcome: Progressing     Problem: Gastrointestinal - Adult  Goal: Minimal or absence of nausea and vomiting  Outcome: Progressing  Goal: Maintains or returns to baseline bowel function  Outcome: Progressing  Goal: Maintains adequate nutritional intake  Outcome: Progressing  Goal: Establish and maintain optimal ostomy function  Outcome: Progressing     Problem: Genitourinary - Adult  Goal: Absence of urinary retention  Outcome: Progressing  Goal: Urinary catheter remains patent  Outcome: Progressing     Problem: Infection - Adult  Goal: Absence of infection at discharge  Outcome: Progressing  Goal: Absence of infection during hospitalization  Outcome: Progressing  Goal: Absence of fever/infection during anticipated neutropenic period  Outcome: Progressing     Problem: Metabolic/Fluid and Electrolytes - Adult  Goal: Electrolytes maintained within normal limits  Outcome: Progressing  Goal: Hemodynamic stability and optimal renal function maintained  Outcome: Progressing  Goal: Glucose maintained within prescribed range  Outcome: Progressing     Problem: Hematologic - Adult  Goal: Maintains hematologic stability  Outcome: Progressing     Problem: Chronic Conditions and Co-morbidities  Goal: Patient's chronic conditions and co-morbidity symptoms are monitored and maintained or improved  Outcome: Progressing     Problem: ABCDS Injury Assessment  Goal: Absence of physical injury  Outcome: Progressing     Problem: Nutrition Deficit:  Goal: Optimize nutritional status  Outcome: Progressing

## 2022-11-07 ENCOUNTER — HOSPITAL ENCOUNTER (OUTPATIENT)
Dept: WOUND CARE | Age: 52
Discharge: HOME | End: 2022-11-07

## 2022-11-07 PROBLEM — L08.9 INFECTED WOUND: Status: ACTIVE | Noted: 2022-11-07

## 2022-11-07 PROBLEM — T14.8XXA INFECTED WOUND: Status: ACTIVE | Noted: 2022-11-07

## 2022-11-07 LAB
ALBUMIN SERPL-MCNC: 2.9 G/DL (ref 3.5–5.2)
ALP BLD-CCNC: 96 U/L (ref 35–104)
ALT SERPL-CCNC: 24 U/L (ref 5–33)
ANION GAP SERPL CALCULATED.3IONS-SCNC: 7 MMOL/L (ref 7–19)
AST SERPL-CCNC: 23 U/L (ref 5–32)
BILIRUB SERPL-MCNC: <0.2 MG/DL (ref 0.2–1.2)
BLOOD CULTURE, ROUTINE: NORMAL
BUN BLDV-MCNC: 8 MG/DL (ref 6–20)
CALCIUM SERPL-MCNC: 8.7 MG/DL (ref 8.6–10)
CHLORIDE BLD-SCNC: 109 MMOL/L (ref 98–111)
CO2: 30 MMOL/L (ref 22–29)
CREAT SERPL-MCNC: 0.2 MG/DL (ref 0.5–0.9)
CULTURE, BLOOD 2: NORMAL
GFR SERPL CREATININE-BSD FRML MDRD: >60 ML/MIN/{1.73_M2}
GLUCOSE BLD-MCNC: 90 MG/DL (ref 74–109)
HCT VFR BLD CALC: 36.1 % (ref 37–47)
HEMOGLOBIN: 11.6 G/DL (ref 12–16)
MCH RBC QN AUTO: 29.1 PG (ref 27–31)
MCHC RBC AUTO-ENTMCNC: 32.1 G/DL (ref 33–37)
MCV RBC AUTO: 90.7 FL (ref 81–99)
PDW BLD-RTO: 14 % (ref 11.5–14.5)
PLATELET # BLD: 151 K/UL (ref 130–400)
PMV BLD AUTO: 11.6 FL (ref 9.4–12.3)
POTASSIUM SERPL-SCNC: 3.4 MMOL/L (ref 3.5–5)
RBC # BLD: 3.98 M/UL (ref 4.2–5.4)
SODIUM BLD-SCNC: 146 MMOL/L (ref 136–145)
TOTAL PROTEIN: 4.8 G/DL (ref 6.6–8.7)
WBC # BLD: 3 K/UL (ref 4.8–10.8)

## 2022-11-07 PROCEDURE — 1210000000 HC MED SURG R&B

## 2022-11-07 PROCEDURE — 85027 COMPLETE CBC AUTOMATED: CPT

## 2022-11-07 PROCEDURE — 2580000003 HC RX 258: Performed by: HOSPITALIST

## 2022-11-07 PROCEDURE — 6360000002 HC RX W HCPCS

## 2022-11-07 PROCEDURE — 6370000000 HC RX 637 (ALT 250 FOR IP): Performed by: HOSPITALIST

## 2022-11-07 PROCEDURE — 99231 SBSQ HOSP IP/OBS SF/LOW 25: CPT | Performed by: NURSE PRACTITIONER

## 2022-11-07 PROCEDURE — 6360000002 HC RX W HCPCS: Performed by: HOSPITALIST

## 2022-11-07 PROCEDURE — 80053 COMPREHEN METABOLIC PANEL: CPT

## 2022-11-07 PROCEDURE — 6370000000 HC RX 637 (ALT 250 FOR IP)

## 2022-11-07 RX ORDER — MEROPENEM AND SODIUM CHLORIDE 1 G/50ML
1000 INJECTION, SOLUTION INTRAVENOUS EVERY 8 HOURS
Status: DISCONTINUED | OUTPATIENT
Start: 2022-11-07 | End: 2022-11-10 | Stop reason: HOSPADM

## 2022-11-07 RX ORDER — POTASSIUM CHLORIDE 20 MEQ/1
40 TABLET, EXTENDED RELEASE ORAL ONCE
Status: COMPLETED | OUTPATIENT
Start: 2022-11-07 | End: 2022-11-07

## 2022-11-07 RX ORDER — MEROPENEM AND SODIUM CHLORIDE 1 G/50ML
1000 INJECTION, SOLUTION INTRAVENOUS EVERY 8 HOURS
Status: COMPLETED | OUTPATIENT
Start: 2022-11-07 | End: 2022-11-07

## 2022-11-07 RX ADMIN — HYDROCODONE BITARTRATE AND ACETAMINOPHEN 1 TABLET: 10; 325 TABLET ORAL at 19:55

## 2022-11-07 RX ADMIN — MEROPENEM AND SODIUM CHLORIDE 1000 MG: 1 INJECTION, SOLUTION INTRAVENOUS at 20:56

## 2022-11-07 RX ADMIN — MEROPENEM AND SODIUM CHLORIDE 1000 MG: 1 INJECTION, SOLUTION INTRAVENOUS at 11:07

## 2022-11-07 RX ADMIN — PREGABALIN 300 MG: 150 CAPSULE ORAL at 09:03

## 2022-11-07 RX ADMIN — Medication 250 ML: at 14:52

## 2022-11-07 RX ADMIN — MEROPENEM AND SODIUM CHLORIDE 1000 MG: 1 INJECTION, SOLUTION INTRAVENOUS at 03:50

## 2022-11-07 RX ADMIN — PREGABALIN 300 MG: 150 CAPSULE ORAL at 19:54

## 2022-11-07 RX ADMIN — ENOXAPARIN SODIUM 40 MG: 100 INJECTION SUBCUTANEOUS at 09:04

## 2022-11-07 RX ADMIN — Medication 1 TABLET: at 09:04

## 2022-11-07 RX ADMIN — PILOCARPINE HYDROCHLORIDE 7.5 MG: 5 TABLET, FILM COATED ORAL at 09:02

## 2022-11-07 RX ADMIN — PILOCARPINE HYDROCHLORIDE 7.5 MG: 5 TABLET, FILM COATED ORAL at 19:54

## 2022-11-07 RX ADMIN — CETIRIZINE HYDROCHLORIDE 10 MG: 10 TABLET, FILM COATED ORAL at 09:04

## 2022-11-07 RX ADMIN — Medication 250 ML: at 21:03

## 2022-11-07 RX ADMIN — AMPICILLIN SODIUM AND SULBACTAM SODIUM 3000 MG: 2; 1 INJECTION, POWDER, FOR SOLUTION INTRAMUSCULAR; INTRAVENOUS at 20:01

## 2022-11-07 RX ADMIN — POTASSIUM CHLORIDE 40 MEQ: 1500 TABLET, EXTENDED RELEASE ORAL at 09:01

## 2022-11-07 RX ADMIN — AMPICILLIN SODIUM AND SULBACTAM SODIUM 3000 MG: 2; 1 INJECTION, POWDER, FOR SOLUTION INTRAMUSCULAR; INTRAVENOUS at 09:17

## 2022-11-07 RX ADMIN — TIZANIDINE 12 MG: 4 TABLET ORAL at 19:54

## 2022-11-07 RX ADMIN — LEVETIRACETAM 750 MG: 250 TABLET, FILM COATED ORAL at 09:04

## 2022-11-07 RX ADMIN — TIZANIDINE 12 MG: 4 TABLET ORAL at 09:03

## 2022-11-07 RX ADMIN — AMPICILLIN SODIUM AND SULBACTAM SODIUM 3000 MG: 2; 1 INJECTION, POWDER, FOR SOLUTION INTRAMUSCULAR; INTRAVENOUS at 02:29

## 2022-11-07 RX ADMIN — AMPICILLIN SODIUM AND SULBACTAM SODIUM 3000 MG: 2; 1 INJECTION, POWDER, FOR SOLUTION INTRAMUSCULAR; INTRAVENOUS at 16:18

## 2022-11-07 RX ADMIN — DULOXETINE 30 MG: 30 CAPSULE, DELAYED RELEASE ORAL at 19:54

## 2022-11-07 ASSESSMENT — PAIN DESCRIPTION - DESCRIPTORS
DESCRIPTORS: SHARP
DESCRIPTORS: ACHING;SHARP

## 2022-11-07 ASSESSMENT — PAIN DESCRIPTION - LOCATION
LOCATION: BACK;NECK
LOCATION: NECK

## 2022-11-07 ASSESSMENT — PAIN SCALES - GENERAL
PAINLEVEL_OUTOF10: 0
PAINLEVEL_OUTOF10: 5
PAINLEVEL_OUTOF10: 4

## 2022-11-07 NOTE — PLAN OF CARE
Problem: Discharge Planning  Goal: Discharge to home or other facility with appropriate resources  11/7/2022 0157 by Roberto Kumar RN  Outcome: Progressing  Flowsheets (Taken 11/7/2022 0043)  Discharge to home or other facility with appropriate resources:   Identify barriers to discharge with patient and caregiver   Arrange for needed discharge resources and transportation as appropriate   Arrange for interpreters to assist at discharge as needed   Refer to discharge planning if patient needs post-hospital services based on physician order or complex needs related to functional status, cognitive ability or social support system   Identify discharge learning needs (meds, wound care, etc)  11/6/2022 1728 by Jesus Mattson RN  Outcome: Progressing     Problem: Pain  Goal: Verbalizes/displays adequate comfort level or baseline comfort level  11/7/2022 0157 by Roberto Kumar RN  Outcome: Progressing  11/6/2022 1728 by Jesus Mattson RN  Outcome: Progressing     Problem: Skin/Tissue Integrity  Goal: Absence of new skin breakdown  Description: 1. Monitor for areas of redness and/or skin breakdown  2. Assess vascular access sites hourly  3. Every 4-6 hours minimum:  Change oxygen saturation probe site  4. Every 4-6 hours:  If on nasal continuous positive airway pressure, respiratory therapy assess nares and determine need for appliance change or resting period.   11/7/2022 0157 by Roberto Kumar RN  Outcome: Progressing  11/6/2022 1728 by Jesus Mattson RN  Outcome: Progressing     Problem: Safety - Adult  Goal: Free from fall injury  11/7/2022 0157 by Roberto Kumar RN  Outcome: Progressing  11/6/2022 1728 by Jesus Mattson RN  Outcome: Progressing     Problem: Neurosensory - Adult  Goal: Achieves stable or improved neurological status  11/7/2022 0157 by Roberto Kumar RN  Outcome: Progressing  Flowsheets (Taken 11/7/2022 0043)  Achieves stable or improved neurological status:   Assess for and report changes in neurological status   Initiate measures to prevent increased intracranial pressure   Maintain blood pressure and fluid volume within ordered parameters to optimize cerebral perfusion and minimize risk of hemorrhage   Monitor temperature, glucose, and sodium. Initiate appropriate interventions as ordered  11/6/2022 1728 by Connie Gonzalez RN  Outcome: Progressing  Goal: Absence of seizures  11/7/2022 0157 by Sammy Madsen RN  Outcome: Progressing  Flowsheets (Taken 11/7/2022 0043)  Absence of seizures:   Monitor for seizure activity.   If seizure occurs, document type and location of movements and any associated apnea   If seizure occurs, turn head to side and suction secretions as needed   Administer anticonvulsants as ordered   Support airway/breathing, administer oxygen as needed   Diagnostic studies as ordered  11/6/2022 1728 by Connie Gonzalez RN  Outcome: Progressing  Goal: Remains free of injury related to seizures activity  11/7/2022 0157 by Sammy Madsen RN  Outcome: Progressing  Flowsheets (Taken 11/7/2022 0043)  Remains free of injury related to seizure activity:   Monitor patient for seizure activity, document and report duration and description of seizure to Licensed Independent Practitioner   If seizure occurs, turn patient to side and suction secretions as needed   Maintain airway, patient safety  and administer oxygen as ordered   Reorient patient post seizure   Seizure pads on all 4 side rails   Instruct patient/family to notify RN of any seizure activity   Instruct patient/family to call for assistance with activity based on assessment  11/6/2022 1728 by Connie Gonzalez RN  Outcome: Progressing  Goal: Achieves maximal functionality and self care  11/7/2022 0157 by Sammy Madsen RN  Outcome: Progressing  Flowsheets (Taken 11/7/2022 0043)  Achieves maximal functionality and self care:   Monitor swallowing and airway patency with patient fatigue and changes in neurological status   Encourage and assist patient to increase activity and self care with guidance from physical therapy/occupational therapy   Encourage visually impaired, hearing impaired and aphasic patients to use assistive/communication devices  11/6/2022 1728 by Connie Gonzalez RN  Outcome: Progressing     Problem: Respiratory - Adult  Goal: Achieves optimal ventilation and oxygenation  11/7/2022 0157 by Sammy Madsen RN  Outcome: Progressing  Flowsheets (Taken 11/7/2022 0043)  Achieves optimal ventilation and oxygenation:   Assess for changes in respiratory status   Assess for changes in mentation and behavior   Position to facilitate oxygenation and minimize respiratory effort   Oxygen supplementation based on oxygen saturation or arterial blood gases   Initiate smoking cessation protocol as indicated   Encourage broncho-pulmonary hygiene including cough, deep breathe, incentive spirometry   Assess the need for suctioning and aspirate as needed   Assess and instruct to report shortness of breath or any respiratory difficulty   Respiratory therapy support as indicated  11/6/2022 1728 by Connie Gonzalez RN  Outcome: Progressing     Problem: Cardiovascular - Adult  Goal: Maintains optimal cardiac output and hemodynamic stability  11/7/2022 0157 by Sammy Madsen RN  Outcome: Progressing  Flowsheets (Taken 11/7/2022 0043)  Maintains optimal cardiac output and hemodynamic stability:   Monitor blood pressure and heart rate   Monitor urine output and notify Licensed Independent Practitioner for values outside of normal range   Assess for signs of decreased cardiac output   Administer fluid and/or volume expanders as ordered   Administer vasoactive medications as ordered   For PPHN infants, administer sedation as ordered and minimize all controllable stressors.   11/6/2022 1728 by Connie Gonzalez RN  Outcome: Progressing  Goal: Absence of cardiac dysrhythmias or at baseline  11/7/2022 0157 by Sammy Madsen RN  Outcome: Progressing  Flowsheets (Taken 11/7/2022 7527)  Absence of cardiac dysrhythmias or at baseline:   Monitor cardiac rate and rhythm   Assess for signs of decreased cardiac output   Administer antiarrhythmia medication and electrolyte replacement as ordered  11/6/2022 1728 by Torrie Villa RN  Outcome: Progressing     Problem: Skin/Tissue Integrity - Adult  Goal: Skin integrity remains intact  11/7/2022 0157 by Abhishek Parikh RN  Outcome: Progressing  Flowsheets (Taken 11/7/2022 0043)  Skin Integrity Remains Intact:   Monitor for areas of redness and/or skin breakdown   Every 4-6 hours minimum: Change oxygen saturation probe site   Every 4-6 hours: If on nasal continuous positive airway pressure, respiratory therapy assesses nares and determine need for appliance change or resting period   Assess vascular access sites hourly  11/6/2022 1728 by Torrie Villa RN  Outcome: Progressing  Goal: Incisions, wounds, or drain sites healing without S/S of infection  11/7/2022 0157 by Abhishek Parikh RN  Outcome: Progressing  Flowsheets (Taken 11/7/2022 0043)  Incisions, Wounds, or Drain Sites Healing Without Sign and Symptoms of Infection:   ADMISSION and DAILY: Assess and document risk factors for pressure ulcer development   TWICE DAILY: Assess and document skin integrity   TWICE DAILY: Assess and document dressing/incision, wound bed, drain sites and surrounding tissue   Implement wound care per orders   Initiate isolation precautions as appropriate   Initiate pressure ulcer prevention bundle as indicated  11/6/2022 1728 by Torrie Villa RN  Outcome: Progressing  Goal: Oral mucous membranes remain intact  11/7/2022 0157 by Abhishek Parikh RN  Outcome: Progressing  Flowsheets (Taken 11/7/2022 0043)  Oral Mucous Membranes Remain Intact:   Assess oral mucosa and hygiene practices   Implement preventative oral hygiene regimen   Implement oral medicated treatments as ordered  11/6/2022 1728 by Torrie Villa RN  Outcome: Progressing     Problem: Musculoskeletal - Adult  Goal: Return mobility to safest level of function  11/7/2022 0157 by Elba Kirk RN  Outcome: Progressing  Flowsheets (Taken 11/7/2022 0043)  Return Mobility to Safest Level of Function:   Assess patient stability and activity tolerance for standing, transferring and ambulating with or without assistive devices   Assist with transfers and ambulation using safe patient handling equipment as needed   Ensure adequate protection for wounds/incisions during mobilization   Obtain physical therapy/occupational therapy consults as needed   Apply continuous passive motion per provider or physical therapy orders to increase flexion toward goal   Instruct patient/family in ordered activity level  11/6/2022 1728 by Josefina Farnsworth RN  Outcome: Progressing  Goal: Maintain proper alignment of affected body part  11/7/2022 0157 by Elba Kirk RN  Outcome: Progressing  Flowsheets (Taken 11/7/2022 0043)  Maintain proper alignment of affected body part:    Instruct and reinforce with patient and family use of appropriate assistive device and precautions (e.g. spinal or hip dislocation precautions)   Support and protect limb and body alignment per provider's orders  11/6/2022 1728 by Josefina Farnsworth RN  Outcome: Progressing  Goal: Return ADL status to a safe level of function  11/7/2022 0157 by Elba Kirk RN  Outcome: Progressing  Flowsheets (Taken 11/7/2022 0043)  Return ADL Status to a Safe Level of Function:   Administer medication as ordered   Assess activities of daily living deficits and provide assistive devices as needed   Obtain physical therapy/occupational therapy consults as needed   Assist and instruct patient to increase activity and self care as tolerated  11/6/2022 1728 by Josefina Farnsworth RN  Outcome: Progressing     Problem: Gastrointestinal - Adult  Goal: Minimal or absence of nausea and vomiting  11/7/2022 0157 by Elba Kirk RN  Outcome: Progressing  Flowsheets (Taken 11/7/2022 0043)  Minimal or absence of nausea and vomiting:   Administer IV fluids as ordered to ensure adequate hydration   Maintain NPO status until nausea and vomiting are resolved   Nasogastric tube to low intermittent suction as ordered   Administer ordered antiemetic medications as needed   Provide nonpharmacologic comfort measures as appropriate   Advance diet as tolerated, if ordered   Nutrition consult to assist patient with adequate nutrition and appropriate food choices  11/6/2022 1728 by Matias Campos RN  Outcome: Progressing  Goal: Maintains or returns to baseline bowel function  11/7/2022 0157 by Cali Kumar RN  Outcome: Progressing  Flowsheets (Taken 11/7/2022 0043)  Maintains or returns to baseline bowel function:   Assess bowel function   Encourage oral fluids to ensure adequate hydration   Administer IV fluids as ordered to ensure adequate hydration   Administer ordered medications as needed   Encourage mobilization and activity   Nutrition consult to assist patient with appropriate food choices  11/6/2022 1728 by Matias Campos RN  Outcome: Progressing  Goal: Maintains adequate nutritional intake  11/7/2022 0157 by Cali Kumar RN  Outcome: Progressing  Flowsheets (Taken 11/7/2022 0043)  Maintains adequate nutritional intake:   Monitor percentage of each meal consumed   Identify factors contributing to decreased intake, treat as appropriate   Assist with meals as needed   Monitor intake and output, weight and lab values   Obtain nutritional consult as needed  11/6/2022 1728 by Matias Campos RN  Outcome: Progressing  Goal: Establish and maintain optimal ostomy function  11/7/2022 0157 by Cali Kumar RN  Outcome: Progressing  Flowsheets (Taken 11/7/2022 0043)  Establish and maintain optimal ostomy function:   Monitor output from ostomies   Introduce and advance enteral feedings as ordered   Administer IV fluids and TPN as ordered   Nutrition consult   Gastric suctioning as ordered   Infuse IV Fluids/TPN as ordered  11/6/2022 1728 by NIC Tubbs  Outcome: Progressing     Problem: Genitourinary - Adult  Goal: Absence of urinary retention  11/7/2022 0157 by Liss Escamilla RN  Outcome: Progressing  Flowsheets (Taken 11/7/2022 0043)  Absence of urinary retention:   Assess patients ability to void and empty bladder   Monitor intake/output and perform bladder scan as needed   Place urinary catheter per Licensed Independent Practitioner order if needed   Discuss with Licensed Independent Practitioner  medications to alleviate retention as needed   Discuss catheterization for long term situations as appropriate  11/6/2022 1728 by NIC Tubbs  Outcome: Progressing  Goal: Urinary catheter remains patent  11/7/2022 0157 by Liss Escamilla RN  Outcome: Progressing  Flowsheets (Taken 11/7/2022 0043)  Urinary catheter remains patent:   Assess patency of urinary catheter   Irrigate catheter per Licensed Independent Practitioner order if indicated and notify Licensed Independent Practitioner if unable to irrigate   Assess need for a larger catheter size or a 3-way catheter for continuous bladder irrigation  11/6/2022 1728 by NIC Tubbs  Outcome: Progressing     Problem: Infection - Adult  Goal: Absence of infection at discharge  11/7/2022 0157 by Liss Escamilla RN  Outcome: Progressing  Flowsheets (Taken 11/7/2022 0043)  Absence of infection at discharge:   Assess and monitor for signs and symptoms of infection   Monitor lab/diagnostic results   Monitor all insertion sites i.e., indwelling lines, tubes and drains   Monitor endotracheal (as able) and nasal secretions for changes in amount and color   [place] appropriate cooling/warming therapies per order   Administer medications as ordered   Instruct and encourage patient and family to use good hand hygiene technique   Identify and instruct in appropriate isolation precautions for identified infection/condition  11/6/2022 1728 by NIC Tubbs  Outcome: Progressing  Goal: Absence of infection during hospitalization  11/7/2022 0157 by Liss Escamilla RN  Outcome: Progressing  Flowsheets (Taken 11/7/2022 0043)  Absence of infection during hospitalization:   Assess and monitor for signs and symptoms of infection   Monitor lab/diagnostic results   Monitor all insertion sites i.e., indwelling lines, tubes and drains   Monitor endotracheal (as able) and nasal secretions for changes in amount and color   [place] appropriate cooling/warming therapies per order   Administer medications as ordered   Instruct and encourage patient and family to use good hand hygiene technique   Identify and instruct in appropriate isolation precautions for identified infection/condition  11/6/2022 1728 by Bailee Ro RN  Outcome: Progressing  Goal: Absence of fever/infection during anticipated neutropenic period  11/7/2022 0157 by Liss Escamilla RN  Outcome: Progressing  Flowsheets (Taken 11/7/2022 0043)  Absence of fever/infection during anticipated neutropenic period:   Monitor white blood cell count   Administer growth factors as ordered   Implement neutropenic guidelines  11/6/2022 1728 by Bailee Ro RN  Outcome: Progressing     Problem: Metabolic/Fluid and Electrolytes - Adult  Goal: Electrolytes maintained within normal limits  11/7/2022 0157 by Liss Escamilla RN  Outcome: Progressing  Flowsheets (Taken 11/7/2022 0043)  Electrolytes maintained within normal limits:   Monitor labs and assess patient for signs and symptoms of electrolyte imbalances   Administer electrolyte replacement as ordered   Monitor response to electrolyte replacements, including repeat lab results as appropriate   Fluid restriction as ordered   Instruct patient on fluid and nutrition restrictions as appropriate  11/6/2022 1728 by Bailee Ro RN  Outcome: Progressing  Goal: Hemodynamic stability and optimal renal function maintained  11/7/2022 0157 by Liss Escamilla RN  Outcome: Progressing  Flowsheets (Taken 11/7/2022 0043)  Hemodynamic stability and optimal renal function maintained:   Monitor labs and assess for signs and symptoms of volume excess or deficit   Monitor intake, output and patient weight   Monitor urine specific gravity, serum osmolarity and serum sodium as indicated or ordered   Monitor response to interventions for patient's volume status, including labs, urine output, blood pressure (other measures as available)   Encourage oral intake as appropriate   Instruct patient on fluid and nutrition restrictions as appropriate  11/6/2022 1728 by Matias Campos RN  Outcome: Progressing  Goal: Glucose maintained within prescribed range  11/7/2022 0157 by Cali Kumar RN  Outcome: Progressing  Flowsheets (Taken 11/7/2022 0043)  Glucose maintained within prescribed range:   Monitor blood glucose as ordered   Assess for signs and symptoms of hyperglycemia and hypoglycemia   Administer ordered medications to maintain glucose within target range   Assess barriers to adequate nutritional intake and initiate nutrition consult as needed   Instruct patient on self management of diabetes and initiate consult as needed  11/6/2022 1728 by Matias Campos RN  Outcome: Progressing     Problem: Hematologic - Adult  Goal: Maintains hematologic stability  11/7/2022 0157 by Cali Kumar RN  Outcome: Progressing  Flowsheets (Taken 11/7/2022 0043)  Maintains hematologic stability:   Assess for signs and symptoms of bleeding or hemorrhage   Monitor labs for bleeding or clotting disorders   Administer blood products/factors as ordered  11/6/2022 1728 by Matias Campos RN  Outcome: Progressing     Problem: Chronic Conditions and Co-morbidities  Goal: Patient's chronic conditions and co-morbidity symptoms are monitored and maintained or improved  11/7/2022 0157 by Cali Kumar RN  Outcome: Progressing  Flowsheets (Taken 11/7/2022 0043)  Care Plan - Patient's Chronic Conditions and Co-Morbidity Symptoms are Monitored and Maintained or Improved:   Monitor and assess patient's chronic conditions and comorbid symptoms for stability, deterioration, or improvement   Collaborate with multidisciplinary team to address chronic and comorbid conditions and prevent exacerbation or deterioration   Update acute care plan with appropriate goals if chronic or comorbid symptoms are exacerbated and prevent overall improvement and discharge  11/6/2022 1728 by Cory Ann, NIC  Outcome: Progressing     Problem: ABCDS Injury Assessment  Goal: Absence of physical injury  11/7/2022 0157 by Savage Grant RN  Outcome: Progressing  11/6/2022 1728 by Cory Ann RN  Outcome: Progressing     Problem: Nutrition Deficit:  Goal: Optimize nutritional status  11/7/2022 0157 by Savage Grant RN  Outcome: Progressing  11/6/2022 1728 by Cory Ann RN  Outcome: Progressing

## 2022-11-07 NOTE — PROGRESS NOTES
Vascular Surgery  Daily Progress Note    Pt Name: Shelley Flannery [place] Record Number: [id number]  Date of Birth 1970   Today's Date: 11/7/2022    SUBJECTIVE:     Patient was seen and examined, stating she \"feels okay,feels like she always does. OBJECTIVE:     Patient Vitals for the past 24 hrs:   BP Temp Temp src Pulse Resp SpO2   11/07/22 0815 (!) 135/56 97.9 °F (36.6 °C) Oral 64 18 97 %   11/07/22 0730 130/82 98.2 °F (36.8 °C) Oral 70 16 --   11/07/22 0043 133/77 97.5 °F (36.4 °C) Oral 59 17 98 %   11/06/22 2126 -- -- -- -- 16 --   11/06/22 2125 -- -- -- -- 16 --   11/06/22 2055 -- -- -- -- 16 --   11/06/22 1720 135/77 98.5 °F (36.9 °C) Oral 51 16 97 %       Intake/Output Summary (Last 24 hours) at 11/7/2022 1017  Last data filed at 11/7/2022 0543  Gross per 24 hour   Intake 1218.59 ml   Output 1375 ml   Net -156.41 ml       In: 1428.6 [P.O.:930]  Out: 9664 [Urine:1375]    I/O last 3 completed shifts: In: 1853.7 [P.O.:930; IV Piggyback:923.7]  Out: 1950 [Urine:1950]     Date 11/07/22 0000 - 11/07/22 2359   Shift 1666-9226 9036-0893 4432-7251 24 Hour Total   INTAKE   IV Piggyback(mL/kg) 498.6(8.7)   498.6(8.7)   Shift Total(mL/kg) 498.6(8.7)   498.6(8.7)   OUTPUT   Urine(mL/kg/hr) 350(0.8)   350   Shift Total(mL/kg) 350(6.1)   350(6.1)   Weight (kg) 57.3 57.3 57.3 57.3       Wt Readings from Last 3 Encounters:   11/06/22 126 lb 6.4 oz (57.3 kg)   07/12/22 139 lb (63 kg)   06/21/22 139 lb (63 kg)        Body mass index is 18.67 kg/m². Diet: ADULT ORAL NUTRITION SUPPLEMENT; Breakfast; Fortified Pudding Oral Supplement  ADULT ORAL NUTRITION SUPPLEMENT; Dinner; Frozen Oral Supplement  ADULT DIET; Regular  ADULT ORAL NUTRITION SUPPLEMENT; Lunch;  Other Oral Supplement; Peanut butter milkshake    MEDS:     Scheduled Meds:   meropenem  1,000 mg IntraVENous Q8H    tiZANidine  12 mg Oral BID    ampicillin-sulbactam  3,000 mg IntraVENous Q6H    Acetic Acid  250 mL Topical BID    sodium chloride flush  5-40 mL IntraVENous 2 times per day    enoxaparin  40 mg SubCUTAneous Daily    cetirizine  10 mg Oral Daily    DULoxetine  30 mg Oral Nightly    levETIRAcetam  750 mg Oral Daily    therapeutic multivitamin-minerals  1 tablet Oral Daily    pregabalin  300 mg Oral BID    pilocarpine  7.5 mg Oral BID    nicotine  1 patch TransDERmal Daily     Continuous Infusions:   sodium chloride       PRN Meds:acetaminophen, 650 mg, Q6H PRN  sodium chloride flush, 5-40 mL, PRN  sodium chloride, , PRN  ondansetron, 4 mg, Q8H PRN   Or  ondansetron, 4 mg, Q6H PRN  polyethylene glycol, 17 g, Daily PRN  docusate sodium, 200 mg, PRN  HYDROcodone-acetaminophen, 1 tablet, Q8H PRN  naloxone, 0.4 mg, PRN  albuterol, 2.5 mg, Q6H PRN    YSICAL EXAM:     CONSTITUTIONAL: Awake and alert, cooperative  LUNGS: Chest clear bilaterally anteriorly  CARDIOVASCULAR:   Regular rate and rhythm without murmur, gallop or rub. ABDOMEN: soft, nontender, colostomy and urostomy in place  NEUROLOGIC: Awake, alert, oriented to name, place and time. Does not walk, wheelchair bound  WOUND/INCISION:  Dressing to right foot dry/intact. Toes warm to touch, pink color. Left chest port accessed, site clear and dressing CDI. EXTREMITY: Feet warm to touch bilaterally, has bilateral heel-lift boots in place.  No edema to LE's    LABS:     CBC:   Recent Labs     11/05/22 0118 11/07/22  0640   WBC 3.2* 3.0*   RBC 3.90* 3.98*   HGB 11.3* 11.6*   HCT 35.3* 36.1*   MCV 90.5 90.7   MCH 29.0 29.1   MCHC 32.0* 32.1*   RDW 13.8 14.0    151   MPV 11.7 11.6      Last 3 CMP:   Recent Labs     11/05/22  0118 11/07/22  0640    146*   K 3.6 3.4*    109   CO2 28 30*   BUN 7 8   CREATININE 0.3* 0.2*   GLUCOSE 134* 90   CALCIUM 8.2* 8.7   PROT  --  4.8*   LABALBU  --  2.9*   BILITOT  --  <0.2   ALKPHOS  --  96   AST  --  23   ALT  --  24        Calcium:   Lab Results   Component Value Date/Time    CALCIUM 8.7 11/07/2022 06:40 AM    CALCIUM 8.2 11/05/2022 01:18 AM    CALCIUM 8.2 11/04/2022 02:56 AM        Lactic Acid:   Lab Results   Component Value Date/Time    LACTA 0.6 11/02/2022 03:13 PM    LACTA 1.1 07/31/2019 01:20 PM    LACTA 1.9 08/19/2016 03:20 PM      Culture, Anaerobic and Aerobic [[id number]] (Abnormal)  Collected: 10/24/22 1325   Lab Status: Edited Result - FINAL Specimen: Ankle from Wound Updated: 10/29/22 0715    Gram Stain Result Rare Gram positive cocci  in pairs   Rare WBC's (Polymorphonuclear)   Rare Epithelial Cells    Abnormal     Anaerobic Culture No anaerobes isolated  4 days    Organism Pseudomonas aeruginosa Abnormal     WOUND/ABSCESS Moderate growth    Organism Proteus mirabilis Abnormal     WOUND/ABSCESS Moderate growth    Organism Enterococcus faecalis Abnormal     WOUND/ABSCESS Rare growth    Organism Escherichia coli Abnormal     WOUND/ABSCESS Moderate growth   Narrative:     ORDER#: [id number]                          ORDERED BY: Gayathri Plasencia   SOURCE: Wound right ankle                  COLLECTED:  10/24/22 13:25   ANTIBIOTICS AT [facility].:                      RECEIVED :  10/24/22 15:26   Susceptibility     Escherichia coli Pseudomonas aeruginosa Proteus mirabilis Enterococcus faecalis     BACTERIAL SUSCEPTIBILITY PANEL BY AYLA BACTERIAL SUSCEPTIBILITY PANEL BY AYLA BACTERIAL SUSCEPTIBILITY PANEL BY AYLA BACTERIAL SUSCEPTIBILITY PANEL BY AYLA     ampicillin <=2 mcg/mL Sensitive   >=32 mcg/mL Resistant <=2 mcg/mL Sensitive     ampicillin-sulbactam <=2 mcg/mL Sensitive   >=32 mcg/mL Resistant       aztreonam <=1 mcg/mL Sensitive   <=1 mcg/mL Sensitive       benzylpenicillin       2 mcg/mL Sensitive     ceFAZolin <=4 mcg/mL Sensitive   <=4 mcg/mL Sensitive       cefepime <=1 mcg/mL Sensitive 2 mcg/mL Sensitive <=1 mcg/mL Sensitive       cefTRIAXone <=1 mcg/mL Sensitive   <=1 mcg/mL Sensitive       ertapenem <=0.5 mcg/mL Sensitive   <=0.5 mcg/mL Sensitive       gentamicin <=1 mcg/mL Sensitive 4 mcg/mL Sensitive >=16 mcg/mL Resistant       gentamicin-syn       SYN-S mcg/mL Sensitive     levofloxacin <=0.12 mcg/mL Sensitive >=8 mcg/mL Resistant >=8 mcg/mL Resistant       meropenem <=0.25 mcg/mL Sensitive <=0.25 mcg/mL Sensitive <=0.25 mcg/mL Sensitive       piperacillin-tazobactam <=4 mcg/mL Sensitive <=4 mcg/mL Sensitive         tobramycin   4 mcg/mL Sensitive >=16 mcg/mL Resistant       trimethoprim-sulfamethoxazole <=20 mcg/mL Sensitive   >=320 mcg/mL Resistant       vancomycin       2 mcg/mL Sensitive                            VT prophylaxis: [x] Lovenox                              ASSESSMENT:     POD #   HD # 5  Active Hospital Problems    Diagnosis Date Noted    [facility] [E43] 11/03/2022     Priority: Medium    Ankle wound, right, sequela [S91.001S] 11/02/2022     Priority: Medium    Multiple sclerosis ([facility] [G35] 06/08/2016     Priority: Medium    Peripheral vascular disease ([facility] [I73.9] 02/01/2016       Chief Complaint:  Chief Complaint   Patient presents with    Abnormal Lab     Sent by [facility] after wound culture results, needs IV abx       PLAN:     Acetic acid dressings BID to right dorsal foot wound  Right foot xrays indicate diffuse osteopenia with no acute osseous abnormality or significant degenerative/erosive change, recommend correlate with MRI if clinically indicated  [employer], heel lift boots in place  ONS per Dietary. Discussion with patient this am, she is not taking supplements, states she doesn't like any of them. 5.   Continue IV abx, ID consult  6. Stating she does not want HH upon dc, she has a helper that stays with her.    7.    Will follow up at [facility]

## 2022-11-07 NOTE — PLAN OF CARE
Problem: Discharge Planning  Goal: Discharge to home or other facility with appropriate resources  11/7/2022 1553 by Gianna Griffin RN  Outcome: Progressing  Flowsheets (Taken 11/7/2022 1536)  Discharge to home or other facility with appropriate resources: Identify barriers to discharge with patient and caregiver  11/7/2022 0157 by Cali Kumar RN  Outcome: Progressing  Flowsheets (Taken 11/7/2022 0043)  Discharge to home or other facility with appropriate resources:   Identify barriers to discharge with patient and caregiver   Arrange for needed discharge resources and transportation as appropriate   Arrange for interpreters to assist at discharge as needed   Refer to discharge planning if patient needs post-hospital services based on physician order or complex needs related to functional status, cognitive ability or social support system   Identify discharge learning needs (meds, wound care, etc)     Problem: Pain  Goal: Verbalizes/displays adequate comfort level or baseline comfort level  11/7/2022 1553 by Gianna Griffin RN  Outcome: Progressing  11/7/2022 0157 by Cali Kumar RN  Outcome: Progressing     Problem: Skin/Tissue Integrity  Goal: Absence of new skin breakdown  Description: 1. Monitor for areas of redness and/or skin breakdown  2. Assess vascular access sites hourly  3. Every 4-6 hours minimum:  Change oxygen saturation probe site  4. Every 4-6 hours:  If on nasal continuous positive airway pressure, respiratory therapy assess nares and determine need for appliance change or resting period.   11/7/2022 1553 by Gianna Griffin RN  Outcome: Progressing  11/7/2022 0157 by Cali Kumar RN  Outcome: Progressing     Problem: Safety - Adult  Goal: Free from fall injury  11/7/2022 1553 by Gianna Griffin RN  Outcome: Nick Demarco (Taken 11/7/2022 1536)  Free From Fall Injury: Valerie Purdy family/caregiver on patient safety  11/7/2022 0157 by Cali Kumar RN  Outcome: Progressing Problem: Neurosensory - Adult  Goal: Achieves stable or improved neurological status  11/7/2022 1553 by Sheree Castle RN  Outcome: Progressing  Flowsheets (Taken 11/7/2022 1536)  Achieves stable or improved neurological status: Assess for and report changes in neurological status  11/7/2022 0157 by Da Montalvo RN  Outcome: Progressing  Flowsheets (Taken 11/7/2022 0043)  Achieves stable or improved neurological status:   Assess for and report changes in neurological status   Initiate measures to prevent increased intracranial pressure   Maintain blood pressure and fluid volume within ordered parameters to optimize cerebral perfusion and minimize risk of hemorrhage   Monitor temperature, glucose, and sodium. Initiate appropriate interventions as ordered  Goal: Absence of seizures  11/7/2022 1553 by Sheree Castle RN  Outcome: Progressing  Flowsheets (Taken 11/7/2022 1536)  Absence of seizures: Monitor for seizure activity. If seizure occurs, document type and location of movements and any associated apnea  11/7/2022 0157 by Da Montalvo RN  Outcome: Progressing  Flowsheets (Taken 11/7/2022 0043)  Absence of seizures:   Monitor for seizure activity.   If seizure occurs, document type and location of movements and any associated apnea   If seizure occurs, turn head to side and suction secretions as needed   Administer anticonvulsants as ordered   Support airway/breathing, administer oxygen as needed   Diagnostic studies as ordered  Goal: Remains free of injury related to seizures activity  11/7/2022 1553 by Sheree Castle RN  Outcome: Progressing  Flowsheets (Taken 11/7/2022 1536)  Remains free of injury related to seizure activity: Maintain airway, patient safety  and administer oxygen as ordered  11/7/2022 0157 by Da Montalvo RN  Outcome: Progressing  Flowsheets (Taken 11/7/2022 0043)  Remains free of injury related to seizure activity:   Monitor patient for seizure activity, document and report duration and description of seizure to Licensed Independent Practitioner   If seizure occurs, turn patient to side and suction secretions as needed   Maintain airway, patient safety  and administer oxygen as ordered   Reorient patient post seizure   Seizure pads on all 4 side rails   Instruct patient/family to notify RN of any seizure activity   Instruct patient/family to call for assistance with activity based on assessment  Goal: Achieves maximal functionality and self care  11/7/2022 1553 by Vy Stallworth RN  Outcome: Progressing  Flowsheets (Taken 11/7/2022 1536)  Achieves maximal functionality and self care: Monitor swallowing and airway patency with patient fatigue and changes in neurological status  11/7/2022 0157 by Jong Roman RN  Outcome: Progressing  Flowsheets (Taken 11/7/2022 0043)  Achieves maximal functionality and self care:   Monitor swallowing and airway patency with patient fatigue and changes in neurological status   Encourage and assist patient to increase activity and self care with guidance from physical therapy/occupational therapy   Encourage visually impaired, hearing impaired and aphasic patients to use assistive/communication devices     Problem: Respiratory - Adult  Goal: Achieves optimal ventilation and oxygenation  11/7/2022 1553 by Vy Stallworth RN  Outcome: Progressing  Flowsheets (Taken 11/7/2022 1536)  Achieves optimal ventilation and oxygenation: Assess for changes in respiratory status  11/7/2022 0157 by Jong Roman RN  Outcome: Progressing  Flowsheets (Taken 11/7/2022 0043)  Achieves optimal ventilation and oxygenation:   Assess for changes in respiratory status   Assess for changes in mentation and behavior   Position to facilitate oxygenation and minimize respiratory effort   Oxygen supplementation based on oxygen saturation or arterial blood gases   Initiate smoking cessation protocol as indicated   Encourage broncho-pulmonary hygiene including cough, deep breathe, incentive spirometry   Assess the need for suctioning and aspirate as needed   Assess and instruct to report shortness of breath or any respiratory difficulty   Respiratory therapy support as indicated     Problem: Cardiovascular - Adult  Goal: Maintains optimal cardiac output and hemodynamic stability  11/7/2022 1553 by Eliceo Baca RN  Outcome: Progressing  Flowsheets (Taken 11/7/2022 1536)  Maintains optimal cardiac output and hemodynamic stability: Monitor blood pressure and heart rate  11/7/2022 0157 by Wilder Lopez RN  Outcome: Progressing  Flowsheets (Taken 11/7/2022 0043)  Maintains optimal cardiac output and hemodynamic stability:   Monitor blood pressure and heart rate   Monitor urine output and notify Licensed Independent Practitioner for values outside of normal range   Assess for signs of decreased cardiac output   Administer fluid and/or volume expanders as ordered   Administer vasoactive medications as ordered   For PPHN infants, administer sedation as ordered and minimize all controllable stressors.   Goal: Absence of cardiac dysrhythmias or at baseline  11/7/2022 1553 by Eliceo Baca RN  Outcome: Progressing  Flowsheets (Taken 11/7/2022 1536)  Absence of cardiac dysrhythmias or at baseline: Monitor cardiac rate and rhythm  11/7/2022 0157 by Wilder Lopez RN  Outcome: Progressing  Flowsheets (Taken 11/7/2022 0043)  Absence of cardiac dysrhythmias or at baseline:   Monitor cardiac rate and rhythm   Assess for signs of decreased cardiac output   Administer antiarrhythmia medication and electrolyte replacement as ordered     Problem: Skin/Tissue Integrity - Adult  Goal: Skin integrity remains intact  11/7/2022 1553 by Eliceo Baca RN  Outcome: Progressing  Flowsheets (Taken 11/7/2022 1536)  Skin Integrity Remains Intact: Monitor for areas of redness and/or skin breakdown  11/7/2022 0157 by Wilder Lopez RN  Outcome: Progressing  Flowsheets (Taken 11/7/2022 0043)  Skin Integrity Remains Intact: Monitor for areas of redness and/or skin breakdown   Every 4-6 hours minimum: Change oxygen saturation probe site   Every 4-6 hours: If on nasal continuous positive airway pressure, respiratory therapy assesses nares and determine need for appliance change or resting period   Assess vascular access sites hourly  Goal: Incisions, wounds, or drain sites healing without S/S of infection  11/7/2022 1553 by Fadi Gonzalez RN  Outcome: Progressing  Flowsheets (Taken 11/7/2022 1536)  Incisions, Wounds, or Drain Sites Healing Without Sign and Symptoms of Infection: ADMISSION and DAILY: Assess and document risk factors for pressure ulcer development  11/7/2022 0157 by Hildred Mcburney, RN  Outcome: Progressing  Flowsheets (Taken 11/7/2022 0043)  Incisions, Wounds, or Drain Sites Healing Without Sign and Symptoms of Infection:   ADMISSION and DAILY: Assess and document risk factors for pressure ulcer development   TWICE DAILY: Assess and document skin integrity   TWICE DAILY: Assess and document dressing/incision, wound bed, drain sites and surrounding tissue   Implement wound care per orders   Initiate isolation precautions as appropriate   Initiate pressure ulcer prevention bundle as indicated  Goal: Oral mucous membranes remain intact  11/7/2022 1553 by Fadi Gonzalez RN  Outcome: Progressing  Flowsheets (Taken 11/7/2022 1536)  Oral Mucous Membranes Remain Intact: Assess oral mucosa and hygiene practices  11/7/2022 0157 by Hildred Mcburney, RN  Outcome: Progressing  Flowsheets (Taken 11/7/2022 0043)  Oral Mucous Membranes Remain Intact:   Assess oral mucosa and hygiene practices   Implement preventative oral hygiene regimen   Implement oral medicated treatments as ordered     Problem: Musculoskeletal - Adult  Goal: Return mobility to safest level of function  11/7/2022 1553 by Fadi Gonzalez RN  Outcome: Progressing  Flowsheets (Taken 11/7/2022 1536)  Return Mobility to Safest Level of Function: Assess patient stability and activity tolerance for standing, transferring and ambulating with or without assistive devices  11/7/2022 0157 by eNal Butts RN  Outcome: Progressing  Flowsheets (Taken 11/7/2022 0043)  Return Mobility to Safest Level of Function:   Assess patient stability and activity tolerance for standing, transferring and ambulating with or without assistive devices   Assist with transfers and ambulation using safe patient handling equipment as needed   Ensure adequate protection for wounds/incisions during mobilization   Obtain physical therapy/occupational therapy consults as needed   Apply continuous passive motion per provider or physical therapy orders to increase flexion toward goal   Instruct patient/family in ordered activity level  Goal: Maintain proper alignment of affected body part  11/7/2022 1553 by Eliz Sullivan RN  Outcome: Progressing  Flowsheets (Taken 11/7/2022 1536)  Maintain proper alignment of affected body part: Support and protect limb and body alignment per provider's orders  11/7/2022 0157 by Neal Butts RN  Outcome: Progressing  Flowsheets (Taken 11/7/2022 0043)  Maintain proper alignment of affected body part:    Instruct and reinforce with patient and family use of appropriate assistive device and precautions (e.g. spinal or hip dislocation precautions)   Support and protect limb and body alignment per provider's orders  Goal: Return ADL status to a safe level of function  11/7/2022 1553 by Eliz Sullivan RN  Outcome: Progressing  Flowsheets (Taken 11/7/2022 1536)  Return ADL Status to a Safe Level of Function: Administer medication as ordered  11/7/2022 0157 by Neal Butts RN  Outcome: Progressing  Flowsheets (Taken 11/7/2022 0043)  Return ADL Status to a Safe Level of Function:   Administer medication as ordered   Assess activities of daily living deficits and provide assistive devices as needed   Obtain physical therapy/occupational therapy consults as needed   Assist and instruct patient to increase activity and self care as tolerated     Problem: Gastrointestinal - Adult  Goal: Minimal or absence of nausea and vomiting  11/7/2022 1553 by Lavelle Griffin RN  Outcome: Progressing  Flowsheets (Taken 11/7/2022 1536)  Minimal or absence of nausea and vomiting: Administer IV fluids as ordered to ensure adequate hydration  11/7/2022 0157 by Jeri Arredondo RN  Outcome: Progressing  Flowsheets (Taken 11/7/2022 0043)  Minimal or absence of nausea and vomiting:   Administer IV fluids as ordered to ensure adequate hydration   Maintain NPO status until nausea and vomiting are resolved   Nasogastric tube to low intermittent suction as ordered   Administer ordered antiemetic medications as needed   Provide nonpharmacologic comfort measures as appropriate   Advance diet as tolerated, if ordered   Nutrition consult to assist patient with adequate nutrition and appropriate food choices  Goal: Maintains or returns to baseline bowel function  11/7/2022 1553 by Lavelle Griffin RN  Outcome: Progressing  Flowsheets (Taken 11/7/2022 1536)  Maintains or returns to baseline bowel function: Assess bowel function  11/7/2022 0157 by Jeri Arredondo RN  Outcome: Progressing  Flowsheets (Taken 11/7/2022 0043)  Maintains or returns to baseline bowel function:   Assess bowel function   Encourage oral fluids to ensure adequate hydration   Administer IV fluids as ordered to ensure adequate hydration   Administer ordered medications as needed   Encourage mobilization and activity   Nutrition consult to assist patient with appropriate food choices  Goal: Maintains adequate nutritional intake  11/7/2022 1553 by Lavelle Griffin RN  Outcome: Progressing  Flowsheets (Taken 11/7/2022 1536)  Maintains adequate nutritional intake: Monitor percentage of each meal consumed  11/7/2022 0157 by Jeri Arredondo RN  Outcome: Progressing  Flowsheets (Taken 11/7/2022 0043)  Maintains adequate nutritional intake:   Monitor percentage of each meal consumed   Identify factors contributing to decreased intake, treat as appropriate   Assist with meals as needed   Monitor intake and output, weight and lab values   Obtain nutritional consult as needed  Goal: Establish and maintain optimal ostomy function  11/7/2022 1553 by Jer Rhodes RN  Outcome: Progressing  Flowsheets (Taken 11/7/2022 1536)  Establish and maintain optimal ostomy function: Monitor output from ostomies  11/7/2022 0157 by Jillian Stephenson RN  Outcome: Progressing  Flowsheets (Taken 11/7/2022 0043)  Establish and maintain optimal ostomy function:   Monitor output from ostomies   Introduce and advance enteral feedings as ordered   Administer IV fluids and TPN as ordered   Nutrition consult   Gastric suctioning as ordered   Infuse IV Fluids/TPN as ordered     Problem: Genitourinary - Adult  Goal: Absence of urinary retention  11/7/2022 1553 by Jer Rhodes RN  Outcome: Progressing  Flowsheets (Taken 11/7/2022 1536)  Absence of urinary retention: Assess patients ability to void and empty bladder  11/7/2022 0157 by Jillian Stephenson RN  Outcome: Progressing  Flowsheets (Taken 11/7/2022 0043)  Absence of urinary retention:   Assess patients ability to void and empty bladder   Monitor intake/output and perform bladder scan as needed   Place urinary catheter per Licensed Independent Practitioner order if needed   Discuss with Licensed Independent Practitioner  medications to alleviate retention as needed   Discuss catheterization for long term situations as appropriate  Goal: Urinary catheter remains patent  11/7/2022 1553 by Jer Rhodes RN  Outcome: Progressing  Flowsheets (Taken 11/7/2022 1536)  Urinary catheter remains patent: Assess patency of urinary catheter  11/7/2022 0157 by Jillian Stephenson RN  Outcome: Progressing  Flowsheets (Taken 11/7/2022 0043)  Urinary catheter remains patent:   Assess patency of urinary catheter   Irrigate catheter per Licensed Independent Practitioner order if indicated and notify Licensed Independent Practitioner if unable to irrigate   Assess need for a larger catheter size or a 3-way catheter for continuous bladder irrigation     Problem: Infection - Adult  Goal: Absence of infection at discharge  11/7/2022 1553 by Anahi Ovalle RN  Outcome: Progressing  Flowsheets (Taken 11/7/2022 1536)  Absence of infection at discharge: Assess and monitor for signs and symptoms of infection  11/7/2022 0157 by Kennedy Amado RN  Outcome: Progressing  Flowsheets (Taken 11/7/2022 0043)  Absence of infection at discharge:   Assess and monitor for signs and symptoms of infection   Monitor lab/diagnostic results   Monitor all insertion sites i.e., indwelling lines, tubes and drains   Monitor endotracheal (as able) and nasal secretions for changes in amount and color   [place] appropriate cooling/warming therapies per order   Administer medications as ordered   Instruct and encourage patient and family to use good hand hygiene technique   Identify and instruct in appropriate isolation precautions for identified infection/condition  Goal: Absence of infection during hospitalization  11/7/2022 1553 by Anahi Ovalle RN  Outcome: Progressing  Flowsheets (Taken 11/7/2022 1536)  Absence of infection during hospitalization: Assess and monitor for signs and symptoms of infection  11/7/2022 0157 by Kennedy Amado RN  Outcome: Progressing  Flowsheets (Taken 11/7/2022 0043)  Absence of infection during hospitalization:   Assess and monitor for signs and symptoms of infection   Monitor lab/diagnostic results   Monitor all insertion sites i.e., indwelling lines, tubes and drains   Monitor endotracheal (as able) and nasal secretions for changes in amount and color   [place] appropriate cooling/warming therapies per order   Administer medications as ordered   Instruct and encourage patient and family to use good hand hygiene technique   Identify and instruct in appropriate isolation precautions for identified infection/condition  Goal: Absence of fever/infection during anticipated neutropenic period  11/7/2022 1553 by iLzz Bacon RN  Outcome: Progressing  Flowsheets (Taken 11/7/2022 1536)  Absence of fever/infection during anticipated neutropenic period: Monitor white blood cell count  11/7/2022 0157 by Jb Toledo RN  Outcome: Progressing  Flowsheets (Taken 11/7/2022 0043)  Absence of fever/infection during anticipated neutropenic period:   Monitor white blood cell count   Administer growth factors as ordered   Implement neutropenic guidelines     Problem: Metabolic/Fluid and Electrolytes - Adult  Goal: Electrolytes maintained within normal limits  11/7/2022 1553 by Lizz Bacon RN  Outcome: Progressing  Flowsheets (Taken 11/7/2022 1536)  Electrolytes maintained within normal limits: Monitor labs and assess patient for signs and symptoms of electrolyte imbalances  11/7/2022 0157 by Jb Toledo RN  Outcome: Progressing  Flowsheets (Taken 11/7/2022 0043)  Electrolytes maintained within normal limits:   Monitor labs and assess patient for signs and symptoms of electrolyte imbalances   Administer electrolyte replacement as ordered   Monitor response to electrolyte replacements, including repeat lab results as appropriate   Fluid restriction as ordered   Instruct patient on fluid and nutrition restrictions as appropriate  Goal: Hemodynamic stability and optimal renal function maintained  11/7/2022 1553 by Lizz Bacon RN  Outcome: Progressing  Flowsheets (Taken 11/7/2022 1536)  Hemodynamic stability and optimal renal function maintained: Monitor labs and assess for signs and symptoms of volume excess or deficit  11/7/2022 0157 by Jb Toledo RN  Outcome: Progressing  Flowsheets (Taken 11/7/2022 0043)  Hemodynamic stability and optimal renal function maintained:   Monitor labs and assess for signs and symptoms of volume excess or deficit   Monitor intake, output and patient weight   Monitor urine specific gravity, serum osmolarity and serum sodium as indicated or ordered   Monitor response to interventions for patient's volume status, including labs, urine output, blood pressure (other measures as available)   Encourage oral intake as appropriate   Instruct patient on fluid and nutrition restrictions as appropriate  Goal: Glucose maintained within prescribed range  11/7/2022 1553 by Gianna Griffin RN  Outcome: Progressing  Flowsheets (Taken 11/7/2022 1536)  Glucose maintained within prescribed range: Monitor blood glucose as ordered  11/7/2022 0157 by Cali Kumar RN  Outcome: Progressing  Flowsheets (Taken 11/7/2022 0043)  Glucose maintained within prescribed range:   Monitor blood glucose as ordered   Assess for signs and symptoms of hyperglycemia and hypoglycemia   Administer ordered medications to maintain glucose within target range   Assess barriers to adequate nutritional intake and initiate nutrition consult as needed   Instruct patient on self management of diabetes and initiate consult as needed     Problem: Hematologic - Adult  Goal: Maintains hematologic stability  11/7/2022 1553 by Gianna Grfifin RN  Outcome: Progressing  Flowsheets (Taken 11/7/2022 1536)  Maintains hematologic stability: Assess for signs and symptoms of bleeding or hemorrhage  11/7/2022 0157 by Cali Kumar RN  Outcome: Progressing  Flowsheets (Taken 11/7/2022 0043)  Maintains hematologic stability:   Assess for signs and symptoms of bleeding or hemorrhage   Monitor labs for bleeding or clotting disorders   Administer blood products/factors as ordered     Problem: Chronic Conditions and Co-morbidities  Goal: Patient's chronic conditions and co-morbidity symptoms are monitored and maintained or improved  11/7/2022 1553 by Gianna Griffin RN  Outcome: Progressing  Flowsheets (Taken 11/7/2022 1536)  Care Plan - Patient's Chronic Conditions and Co-Morbidity Symptoms are Monitored and Maintained or Improved: Monitor and assess patient's chronic conditions and comorbid symptoms for stability, deterioration, or improvement  11/7/2022 0157 by Jenna Williamson RN  Outcome: Progressing  Flowsheets (Taken 11/7/2022 0043)  Care Plan - Patient's Chronic Conditions and Co-Morbidity Symptoms are Monitored and Maintained or Improved:   Monitor and assess patient's chronic conditions and comorbid symptoms for stability, deterioration, or improvement   Collaborate with multidisciplinary team to address chronic and comorbid conditions and prevent exacerbation or deterioration   Update acute care plan with appropriate goals if chronic or comorbid symptoms are exacerbated and prevent overall improvement and discharge     Problem: ABCDS Injury Assessment  Goal: Absence of physical injury  11/7/2022 1553 by Stan Haney RN  Outcome: Progressing  Flowsheets (Taken 11/7/2022 1536)  Absence of Physical Injury: Implement safety measures based on patient assessment  11/7/2022 0157 by Jenna Williamson RN  Outcome: Progressing     Problem: Nutrition Deficit:  Goal: Optimize nutritional status  11/7/2022 1553 by Stan Haney RN  Outcome: Progressing  11/7/2022 0157 by Jenna Williamson RN  Outcome: Progressing

## 2022-11-07 NOTE — PROGRESS NOTES
Respiratory: clear To auscultation bilaterally, no rhonchi or rales, no wheezing  Abdomen: Soft, positive bowel sounds in all quadrants, no distention, nontender to palpation, no organomegaly noted, no rebound noted. Extremities: ankle wound noted in media, no tenderness, no edema, moves all extremities  Psych: Affect normal and good eye contact, behavioral normal.        Labs/Imaging/Diagnostics    Labs:  CBC:  Recent Labs     11/05/22 0118 11/07/22  0640   WBC 3.2* 3.0*   RBC 3.90* 3.98*   HGB 11.3* 11.6*   HCT 35.3* 36.1*   MCV 90.5 90.7   RDW 13.8 14.0    151       CHEMISTRIES:  Recent Labs     11/05/22 0118 11/07/22  0640    146*   K 3.6 3.4*    109   CO2 28 30*   BUN 7 8   CREATININE 0.3* 0.2*   GLUCOSE 134* 90       PT/INR:No results for input(s): PROTIME, INR in the last 72 hours. APTT:No results for input(s): APTT in the last 72 hours. LIVER PROFILE:  Recent Labs     11/07/22  0640   AST 23   ALT 24   BILITOT <0.2   ALKPHOS 96         Imaging Last 24 Hours:  XR ANKLE LEFT (MIN 3 VIEWS)    Result Date: 11/3/2022  CLINICAL HISTORY: Wound evaluation TECHNIQUE: 3 views of the left ankle FINDINGS: Severe bony demineralization. Postsurgical changes from transmetatarsal amputation. The surgical margins appear sharp. Surgical clips are seen in the distal soft tissues of the foot. There is no evidence of acute fracture or dislocation. Ankle mortise joint is unremarkable. The soft tissues are unremarkable. No acute osseous abnormality of the ankle. No osseous erosions are seen to suggest osteomyelitis. XR ANKLE RIGHT (MIN 3 VIEWS)    Result Date: 11/4/2022  NO PRIOR REPORT AVAILABLE Exam:X-RAYS OF RIGHT ANKLE Clinical data: Questionable osteo. Technique: Three views of the right ankle. Prior studies: Radiographs of the right ankle dated 10/31/2022. Findings: Diffuse osteopenia which may obscure fine bony detail. The right ankle demonstrates no evidence of fracture or dislocation.  The talar dome is intact. The medial, lateral and posterior malleoli show no gross abnormality. Talofibular degenerative changes again noted. Rest the intra-articular surfaces are within normal limits. No erosive lesions, no abnormal calcifications, no foci of subcutaneous air. Diffuse osteopenia. No acute osseous abnormality or erosive lesions. Low sensitivity of plain radiography for early osteomyelitis. Could correlate with MR if clinically warranted and not contraindicated. Recommendation: Follow up as clinically indicated. Note: Direct correlation with point tenderness and the X-ray images is recommended and does significantly increase the sensitivity of radiographic evaluation. Electronically Signed by Nany Johnson MD at 04-Nov-2022 09:40:50 AM EST             XR FOOT LEFT (MIN 3 VIEWS)    Result Date: 11/3/2022  LEFT FOOT, THREE VIEWS: CLINICAL HISTORY: Wound FINDINGS : There is transmetatarsal i amputation of the left foot the stump appears to be intact. There are surgical clip near the 1st and 2nd metatarsal . There is severe osteopenia of the osseous structure. The left foot has a neuropathic radiographic appearance. There is no evidence of fracture or dislocation. The soft tissues appear normal.    Transmetatarsal amputation of the left foot with postsurgical changes Severe osteopenia    XR FOOT RIGHT (MIN 3 VIEWS)    Result Date: 11/4/2022  NO PRIOR REPORT AVAILABLE Exam:X-RAYS OF RIGHT FOOT Clinical data:Questionable osteo. Technique: Three views of the right foot. Prior studies: No prior studies submitted. Findings: Diffuse osteopenia which may obscure fine bony detail. Toes are held in flexion. Arterial calcifications. No evidence of fracture or dislocation. No discernible erosive lesion. The intra-articular surfaces are within normal limits. The plantar fascia shows no gross abnormality. The first MTP sesamoid bones show no gross abnormality.     Diffuse osteopenia which may obscure fine bony detail. Toes are held in flexion. Arterial calcifications. No acute osseous abnormality or significant degenerative/erosive change. Low sensitivity of plain radiography for early osteomyelitis. Could correlate with MR if clinically warranted and not contraindicated. Recommendation: Follow up as clinically indicated. Note: Direct correlation with point tenderness and the X-ray images is recommended and does significantly increase the sensitivity of radiographic evaluation. Electronically Signed by Shira Jerome MD at 04-Nov-2022 10:35:39 AM EST             Assessment//Plan           Hospital Problems             Last Modified POA    * (Principal) Ankle wound, right, sequela 11/2/2022 Yes    Multiple sclerosis ([facility] [address] 11/2/2022 Yes    Severe malnutrition ([facility] [address] 11/3/2022 Yes    Infected wound 11/7/2022 Yes    Peripheral vascular disease ([facility] [address] 11/2/2022 Yes    Overview Signed 10/14/2017  7:29 PM by [facility]     Updating Deprecated Diagnoses        Assessment & Plan    Right Ankle wound  Wound culture obtained 10/24: Pseudomonas Aeruginosa, Proteus Mirabilis, Enterococcus faecalis, Escherichia coli  Currently on Unasyn and meropenem  Seen by Vascular and awaiting ID eval  VL Arterial LE mildly diminished flow to bilateral lower extremities  Wound care  Blood cultures currently negative. History of seizures: Continue Keppra    Multiple Sclerosis: Stable    Severe Malnutrition    PVD      Electronically signed by   Rose Mary Green MD   Internal Medicine Hospitalist  On 11/7/2022  At 12:54 PM    EMR Dragon/Transcription disclaimer:   Much of this encounter note is an electronic transcription/translation of spoken language to printed text.  The electronic translation of spoken language may permit erroneous, or at times, nonsensical words or phrases to be inadvertently transcribed; although attempts have made to review the note for such errors, some may still exist.

## 2022-11-08 LAB
ANION GAP SERPL CALCULATED.3IONS-SCNC: 6 MMOL/L (ref 7–19)
BUN BLDV-MCNC: 10 MG/DL (ref 6–20)
CALCIUM SERPL-MCNC: 8.7 MG/DL (ref 8.6–10)
CHLORIDE BLD-SCNC: 105 MMOL/L (ref 98–111)
CO2: 32 MMOL/L (ref 22–29)
CREAT SERPL-MCNC: 0.2 MG/DL (ref 0.5–0.9)
GFR SERPL CREATININE-BSD FRML MDRD: >60 ML/MIN/{1.73_M2}
GLUCOSE BLD-MCNC: 97 MG/DL (ref 74–109)
POTASSIUM REFLEX MAGNESIUM: 4.1 MMOL/L (ref 3.5–5)
SODIUM BLD-SCNC: 143 MMOL/L (ref 136–145)

## 2022-11-08 PROCEDURE — 99231 SBSQ HOSP IP/OBS SF/LOW 25: CPT | Performed by: NURSE PRACTITIONER

## 2022-11-08 PROCEDURE — 6370000000 HC RX 637 (ALT 250 FOR IP)

## 2022-11-08 PROCEDURE — 1210000000 HC MED SURG R&B

## 2022-11-08 PROCEDURE — 80048 BASIC METABOLIC PNL TOTAL CA: CPT

## 2022-11-08 PROCEDURE — 2580000003 HC RX 258

## 2022-11-08 PROCEDURE — 6360000002 HC RX W HCPCS: Performed by: HOSPITALIST

## 2022-11-08 PROCEDURE — 2580000003 HC RX 258: Performed by: HOSPITALIST

## 2022-11-08 PROCEDURE — 6370000000 HC RX 637 (ALT 250 FOR IP): Performed by: HOSPITALIST

## 2022-11-08 RX ORDER — LIDOCAINE HYDROCHLORIDE 20 MG/ML
20 JELLY TOPICAL ONCE
Status: COMPLETED | OUTPATIENT
Start: 2022-11-08 | End: 2022-11-09

## 2022-11-08 RX ADMIN — PILOCARPINE HYDROCHLORIDE 7.5 MG: 5 TABLET, FILM COATED ORAL at 20:11

## 2022-11-08 RX ADMIN — Medication 1 TABLET: at 08:19

## 2022-11-08 RX ADMIN — TIZANIDINE 12 MG: 4 TABLET ORAL at 08:20

## 2022-11-08 RX ADMIN — AMPICILLIN SODIUM AND SULBACTAM SODIUM 3000 MG: 2; 1 INJECTION, POWDER, FOR SOLUTION INTRAMUSCULAR; INTRAVENOUS at 02:46

## 2022-11-08 RX ADMIN — HYDROCODONE BITARTRATE AND ACETAMINOPHEN 1 TABLET: 10; 325 TABLET ORAL at 20:25

## 2022-11-08 RX ADMIN — MEROPENEM AND SODIUM CHLORIDE 1000 MG: 1 INJECTION, SOLUTION INTRAVENOUS at 11:53

## 2022-11-08 RX ADMIN — MEROPENEM AND SODIUM CHLORIDE 1000 MG: 1 INJECTION, SOLUTION INTRAVENOUS at 03:54

## 2022-11-08 RX ADMIN — HYDROCODONE BITARTRATE AND ACETAMINOPHEN 1 TABLET: 10; 325 TABLET ORAL at 08:25

## 2022-11-08 RX ADMIN — TIZANIDINE 12 MG: 4 TABLET ORAL at 20:11

## 2022-11-08 RX ADMIN — PREGABALIN 300 MG: 150 CAPSULE ORAL at 20:12

## 2022-11-08 RX ADMIN — MEROPENEM AND SODIUM CHLORIDE 1000 MG: 1 INJECTION, SOLUTION INTRAVENOUS at 20:26

## 2022-11-08 RX ADMIN — DULOXETINE 30 MG: 30 CAPSULE, DELAYED RELEASE ORAL at 20:11

## 2022-11-08 RX ADMIN — SODIUM CHLORIDE, PRESERVATIVE FREE 10 ML: 5 INJECTION INTRAVENOUS at 20:12

## 2022-11-08 RX ADMIN — LEVETIRACETAM 750 MG: 500 TABLET, FILM COATED ORAL at 20:11

## 2022-11-08 RX ADMIN — Medication 250 ML: at 11:54

## 2022-11-08 RX ADMIN — CETIRIZINE HYDROCHLORIDE 10 MG: 10 TABLET, FILM COATED ORAL at 08:20

## 2022-11-08 RX ADMIN — PILOCARPINE HYDROCHLORIDE 7.5 MG: 5 TABLET, FILM COATED ORAL at 08:19

## 2022-11-08 ASSESSMENT — PAIN SCALES - GENERAL
PAINLEVEL_OUTOF10: 0
PAINLEVEL_OUTOF10: 6
PAINLEVEL_OUTOF10: 6
PAINLEVEL_OUTOF10: 0
PAINLEVEL_OUTOF10: 2

## 2022-11-08 ASSESSMENT — PAIN DESCRIPTION - DESCRIPTORS
DESCRIPTORS: DISCOMFORT

## 2022-11-08 ASSESSMENT — PAIN DESCRIPTION - FREQUENCY
FREQUENCY: CONTINUOUS
FREQUENCY: CONTINUOUS

## 2022-11-08 ASSESSMENT — PAIN DESCRIPTION - ORIENTATION
ORIENTATION: LOWER
ORIENTATION: RIGHT

## 2022-11-08 ASSESSMENT — PAIN - FUNCTIONAL ASSESSMENT: PAIN_FUNCTIONAL_ASSESSMENT: ACTIVITIES ARE NOT PREVENTED

## 2022-11-08 ASSESSMENT — PAIN DESCRIPTION - PAIN TYPE
TYPE: CHRONIC PAIN
TYPE: CHRONIC PAIN

## 2022-11-08 ASSESSMENT — PAIN DESCRIPTION - ONSET
ONSET: ON-GOING
ONSET: ON-GOING

## 2022-11-08 ASSESSMENT — PAIN DESCRIPTION - LOCATION
LOCATION: BACK
LOCATION: LEG

## 2022-11-08 NOTE — PROGRESS NOTES
Progress Note  Date:2022       [id number]  Patient Janette Ellison     Date of Birth:[birth year]     Age:52 y.o. Subjective    Subjective: 81-year-old female with a history of MS, peripheral vascular disease, seizures, presented to the hospital 2022 with concerns of abnormal labs. Outpatient was seen by wound care, patient with chronic right foot wound and cultures outpatient grew Pseudomonas, Proteus, Enterococcus faecalis and E. coli. Initially on broad-spectrum antibiotics with Vanco and meropenem and transition to Unasyn and meropenem after review of sensitivities. Seen in house by vascular surgery, repeat x-ray of the low right foot was obtained with no definitive diagnosis of osteomyelitis. Recommended continuation of antibiotics, local wound care and ID evaluation and recommendations. Patient evaluated by ID and recc biopsy to r/o fungal/mycobacterial infection as well as malignancy. Vascular plans biopsy tomorrow. Patient was seen in house today with no family member present, denied any acute overnight event, denied any chest pain or shortness of breath denied any other ongoing issues at this time. Review of Systems: 12 point system review negative except as above. Objective         Vitals Last 24 Hours:  TEMPERATURE:  Temp  Av.9 °F (36.6 °C)  Min: 97.5 °F (36.4 °C)  Max: 98.2 °F (36.8 °C)  RESPIRATIONS RANGE: Resp  Av  Min: 16  Max: 18  PULSE OXIMETRY RANGE: SpO2  Av.3 %  Min: 96 %  Max: 98 %  PULSE RANGE: Pulse  Av.5  Min: 62  Max: 68  BLOOD PRESSURE RANGE: Systolic (31TDM), UXJ:295 , Min:95 , PPZ:236   ; Diastolic (46IKL), ZHANE:70, Min:44, Max:78    I/O (24Hr): Intake/Output Summary (Last 24 hours) at 2022 1615  Last data filed at 2022 1329  Gross per 24 hour   Intake 787 ml   Output 1300 ml   Net -513 ml         Physical Examination:  General: Underweight, no acute distress lying comfortably in bed.   HEENT: Atraumatic normocephalic, range of motion normal, no JVD, no tracheal deviation noted. Cardiac: Normal S1-S2   Respiratory: clear To auscultation bilaterally, no rhonchi or rales, no wheezing  Abdomen: Soft, positive bowel sounds in all quadrants, no distention, nontender to palpation, no organomegaly noted, no rebound noted. Extremities: ankle wound noted in media, no tenderness, no edema, moves all extremities  Psych: Affect normal and good eye contact, behavioral normal.        Labs/Imaging/Diagnostics    Labs:  CBC:  Recent Labs     11/07/22  0640   WBC 3.0*   RBC 3.98*   HGB 11.6*   HCT 36.1*   MCV 90.7   RDW 14.0          CHEMISTRIES:  Recent Labs     11/07/22  0640   *   K 3.4*      CO2 30*   BUN 8   CREATININE 0.2*   GLUCOSE 90       PT/INR:No results for input(s): PROTIME, INR in the last 72 hours. APTT:No results for input(s): APTT in the last 72 hours. LIVER PROFILE:  Recent Labs     11/07/22  0640   AST 23   ALT 24   BILITOT <0.2   ALKPHOS 96         Imaging Last 24 Hours:  XR ANKLE LEFT (MIN 3 VIEWS)    Result Date: 11/3/2022  CLINICAL HISTORY: Wound evaluation TECHNIQUE: 3 views of the left ankle FINDINGS: Severe bony demineralization. Postsurgical changes from transmetatarsal amputation. The surgical margins appear sharp. Surgical clips are seen in the distal soft tissues of the foot. There is no evidence of acute fracture or dislocation. Ankle mortise joint is unremarkable. The soft tissues are unremarkable. No acute osseous abnormality of the ankle. No osseous erosions are seen to suggest osteomyelitis. XR ANKLE RIGHT (MIN 3 VIEWS)    Result Date: 11/4/2022  NO PRIOR REPORT AVAILABLE Exam:X-RAYS OF RIGHT ANKLE Clinical data: Questionable osteo. Technique: Three views of the right ankle. Prior studies: Radiographs of the right ankle dated 10/31/2022. Findings: Diffuse osteopenia which may obscure fine bony detail. The right ankle demonstrates no evidence of fracture or dislocation. The talar dome is intact. The medial, lateral and posterior malleoli show no gross abnormality. Talofibular degenerative changes again noted. Rest the intra-articular surfaces are within normal limits. No erosive lesions, no abnormal calcifications, no foci of subcutaneous air. Diffuse osteopenia. No acute osseous abnormality or erosive lesions. Low sensitivity of plain radiography for early osteomyelitis. Could correlate with MR if clinically warranted and not contraindicated. Recommendation: Follow up as clinically indicated. Note: Direct correlation with point tenderness and the X-ray images is recommended and does significantly increase the sensitivity of radiographic evaluation. Electronically Signed by Nolvia Steel MD at 04-Nov-2022 09:40:50 AM EST             XR FOOT LEFT (MIN 3 VIEWS)    Result Date: 11/3/2022  LEFT FOOT, THREE VIEWS: CLINICAL HISTORY: Wound FINDINGS : There is transmetatarsal i amputation of the left foot the stump appears to be intact. There are surgical clip near the 1st and 2nd metatarsal . There is severe osteopenia of the osseous structure. The left foot has a neuropathic radiographic appearance. There is no evidence of fracture or dislocation. The soft tissues appear normal.    Transmetatarsal amputation of the left foot with postsurgical changes Severe osteopenia    XR FOOT RIGHT (MIN 3 VIEWS)    Result Date: 11/4/2022  NO PRIOR REPORT AVAILABLE Exam:X-RAYS OF RIGHT FOOT Clinical data:Questionable osteo. Technique: Three views of the right foot. Prior studies: No prior studies submitted. Findings: Diffuse osteopenia which may obscure fine bony detail. Toes are held in flexion. Arterial calcifications. No evidence of fracture or dislocation. No discernible erosive lesion. The intra-articular surfaces are within normal limits. The plantar fascia shows no gross abnormality. The first MTP sesamoid bones show no gross abnormality.     Diffuse osteopenia which may obscure fine bony detail. Toes are held in flexion. Arterial calcifications. No acute osseous abnormality or significant degenerative/erosive change. Low sensitivity of plain radiography for early osteomyelitis. Could correlate with MR if clinically warranted and not contraindicated. Recommendation: Follow up as clinically indicated. Note: Direct correlation with point tenderness and the X-ray images is recommended and does significantly increase the sensitivity of radiographic evaluation. Electronically Signed by Adalberto Haq MD at 04-Nov-2022 10:35:39 AM EST             Assessment//Plan           Hospital Problems             Last Modified POA    * (Principal) Ankle wound, right, sequela 11/2/2022 Yes    Multiple sclerosis ([facility] [address] 11/2/2022 Yes    Severe malnutrition ([facility] [address] 11/3/2022 Yes    Infected wound 11/7/2022 Yes    Peripheral vascular disease ([facility] [address] 11/2/2022 Yes    Overview Signed 10/14/2017  7:29 PM by [address] [facility]     Updating Deprecated Diagnoses        Assessment & Plan    Right Ankle wound  Wound culture obtained 10/24: Pseudomonas Aeruginosa, Proteus Mirabilis, Enterococcus faecalis, Escherichia coli  Initiatlly on Vanc and ford then transitoned to Unasyn and meropenem; evaluated by ID and recc d/c unasyn  ID recc biopsy to r/o fungal/mycobacterial infection as well as malignancy. Vascular surgery following  VL Arterial LE mildly diminished flow to bilateral lower extremities  Wound care  Blood cultures currently negative. History of seizures: Continue Keppra    Multiple Sclerosis: Stable    Severe Malnutrition    PVD      Electronically signed by   Cassie Baez MD   Internal Medicine Hospitalist  On 11/8/2022  At 4:15 PM    EMR Dragon/Transcription disclaimer:   Much of this encounter note is an electronic transcription/translation of spoken language to printed text.  The electronic translation of spoken language may permit erroneous, or at times, nonsensical words or phrases to be inadvertently transcribed; although attempts have made to review the note for such errors, some may still exist.

## 2022-11-08 NOTE — PROGRESS NOTES
Infectious Diseases Progress Note    Patient:  Jesus Delgadillo  YOB: 1970  MRN: [id number]   Admit date: 11/2/2022   Admitting Physician: Gabriel Sofia MD  Primary Care Physician: Zaki Conklin MD    Chief Complaint/Interval History: She feels okay. She is without any symptoms. Going to have skin biopsy tomorrow. She indicates the area started sometime in July. Started as a small pimple like area or ulcer. It has not been painful. She does not describe any abscess or fluctuance. She indicates it has grown in size. In/Out    Intake/Output Summary (Last 24 hours) at 11/8/2022 1544  Last data filed at 11/8/2022 1329  Gross per 24 hour   Intake 787 ml   Output 1300 ml   Net -513 ml     Allergies: Allergies   Allergen Reactions    Darvocet [Propoxyphene N-Acetaminophen]      Talking out of her head    Morphine      Category:  Allergy;     Morphine And Related Itching and Swelling    Propoxyphene Swelling     Current Meds: lidocaine (XYLOCAINE) 2 % uro-jet 20 mL, Once  levETIRAcetam (KEPPRA) tablet 750 mg, Nightly  meropenem (MERREM) 1000 mg in sodium chloride 0.9% 50 mL (duplex), Q8H  tiZANidine (ZANAFLEX) tablet 12 mg, BID  acetaminophen (TYLENOL) tablet 650 mg, Q6H PRN  Acetic Acid 3 % solution 250 mL, BID  sodium chloride flush 0.9 % injection 5-40 mL, 2 times per day  sodium chloride flush 0.9 % injection 5-40 mL, PRN  0.9 % sodium chloride infusion, PRN  enoxaparin (LOVENOX) injection 40 mg, Daily  ondansetron (ZOFRAN-ODT) disintegrating tablet 4 mg, Q8H PRN   Or  ondansetron (ZOFRAN) injection 4 mg, Q6H PRN  polyethylene glycol (GLYCOLAX) packet 17 g, Daily PRN  cetirizine (ZYRTEC) tablet 10 mg, Daily  docusate sodium (COLACE) capsule 200 mg, PRN  DULoxetine (CYMBALTA) extended release capsule 30 mg, Nightly  HYDROcodone-acetaminophen (NORCO)  MG per tablet 1 tablet, Q8H PRN  therapeutic multivitamin-minerals 1 tablet, Daily  pregabalin (LYRICA) capsule 300 mg, BID  pilocarpine (SALAGEN) tablet 7.5 mg, BID  naloxone (NARCAN) injection 0.4 mg, PRN  albuterol (PROVENTIL) nebulizer solution 2.5 mg, Q6H PRN  nicotine (NICODERM CQ) 7 MG/24HR 1 patch, Daily      Review of Systems see HPI. No new symptoms. VitalSigns:  BP (!) 95/44   Pulse 58   Temp 97.6 °F (36.4 °C) (Axillary)   Resp 16   Ht 5' 9\" (1.753 m)   Wt 126 lb 6.4 oz (57.3 kg)   SpO2 96%   BMI 18.67 kg/m²      Physical Exam  Line/IV site: No erythema, warmth, induration, or tenderness. Ulcerated area dorsal right foot without change    Lab Results:  CBC:   Recent Labs     22  0640   WBC 3.0*   HGB 11.6*        BMP:  Recent Labs     22  0640   *   K 3.4*      CO2 30*   BUN 8   CREATININE 0.2*   GLUCOSE 90     CultureResults:  Culture right foot wound 2022-Pseudomonas, Proteus, Enterococcus, E. coli    Radiology: None    Additional Studies Reviewed:  None    Impression:  1. Chronic wound dorsal aspect right foot  2. Multiple sclerosis    Recommendations:  Continue meropenem  Continue local care  Agree with biopsy of foot lesion  Typically best if biopsy can be from the edge of the lesion and include both involved and not involved tissue    Would suggest biopsying the right dorsal foot lesion and would send for the followin. Send biopsy sample to pathology for histopathology with tissue Gram and GMS stain  2.   Send biopsy to you for aerobic/anaerobic culture, fungal culture, and AFB culture    Hubert Boxer, MD

## 2022-11-08 NOTE — PROGRESS NOTES
Nutrition Assessment     Type and Reason for Visit: Reassess    Nutrition Recommendations/Plan:   Modify ONS per pt request.      Malnutrition Assessment:  Malnutrition Status: Severe malnutrition    Nutrition Assessment:  Pt remains severely malnourished and with further wt loss 2 lbs documented since previous assessment. PO intake has remained variable, usually <50%. Pt is consuming some of ONS. She requests peanut butter milkshake with lunch meals. This has been ordered. Continue current interventions. Estimated Daily Nutrient Needs:  Energy (kcal):  8288-4622 kcals (25-30 kcals/kg) Weight Used for Energy Requirements: Current     Protein (g):  58-117g Weight Used for Protein Requirements: Current        Fluid (ml/day):  1819-2870 ml Method Used for Fluid Requirements: 1 ml/kcal    Nutrition Related Findings:   urostomy Wound Type: Stage III    Current Nutrition Therapies:    ADULT ORAL NUTRITION SUPPLEMENT; Breakfast; Fortified Pudding Oral Supplement  ADULT ORAL NUTRITION SUPPLEMENT; Dinner; Frozen Oral Supplement  ADULT DIET; Regular  ADULT ORAL NUTRITION SUPPLEMENT; Lunch;  Other Oral Supplement; Peanut butter milkshake    Anthropometric Measures:  Height: 5' 9\" (175.3 cm)  Current Body Wt: 126 lb 6.4 oz (57.3 kg)   BMI: 18.7    Nutrition Diagnosis:   Inadequate oral intake related to increase demand for energy/nutrients, acute injury/trauma as evidenced by intake 0-25%, intake 26-50%, poor intake prior to admission, wounds, weight loss, moderate loss of subcutaneous fat, moderate muscle loss    Nutrition Interventions:   Food and/or Nutrient Delivery: Continue Current Diet, Modify Oral Nutrition Supplement  Nutrition Education/Counseling: No recommendation at this time  Coordination of Nutrition Care: Continue to monitor while inpatient  Plan of Care discussed with: pt and family    Goals:  Previous Goal Met: Progressing toward Goal(s)  Goals: Meet at least 75% of estimated needs, PO intake 50% or greater       Nutrition Monitoring and Evaluation:   Behavioral-Environmental Outcomes: None Identified  Food/Nutrient Intake Outcomes: Food and Nutrient Intake, Supplement Intake  Physical Signs/Symptoms Outcomes: Biochemical Data, Fluid Status or Edema, Weight, Skin, Nutrition Focused Physical Findings    Discharge Planning:    Continue current diet     Harpreet Allen RD, LD  Contact: 1983

## 2022-11-08 NOTE — PLAN OF CARE
Problem: Discharge Planning  Goal: Discharge to home or other facility with appropriate resources  Outcome: Not Progressing  Flowsheets (Taken 11/8/2022 0825)  Discharge to home or other facility with appropriate resources:   Identify barriers to discharge with patient and caregiver   Arrange for needed discharge resources and transportation as appropriate   Identify discharge learning needs (meds, wound care, etc)   Arrange for interpreters to assist at discharge as needed   Refer to discharge planning if patient needs post-hospital services based on physician order or complex needs related to functional status, cognitive ability or social support system     Problem: Pain  Goal: Verbalizes/displays adequate comfort level or baseline comfort level  Outcome: Not Progressing     Problem: Skin/Tissue Integrity  Goal: Absence of new skin breakdown  Outcome: Not Progressing     Problem: Safety - Adult  Goal: Free from fall injury  Outcome: Not Progressing  Flowsheets (Taken 11/8/2022 1208)  Free From Fall Injury: Based on caregiver fall risk screen, instruct family/caregiver to ask for assistance with transferring infant if caregiver noted to have fall risk factors     Problem: Neurosensory - Adult  Goal: Achieves stable or improved neurological status  Outcome: Not Progressing  Flowsheets (Taken 11/8/2022 0825)  Achieves stable or improved neurological status:   Assess for and report changes in neurological status   Monitor temperature, glucose, and sodium. Initiate appropriate interventions as ordered   Initiate measures to prevent increased intracranial pressure   Maintain blood pressure and fluid volume within ordered parameters to optimize cerebral perfusion and minimize risk of hemorrhage  Goal: Absence of seizures  Outcome: Not Progressing  Flowsheets (Taken 11/8/2022 0825)  Absence of seizures:   Monitor for seizure activity.   If seizure occurs, document type and location of movements and any associated apnea If seizure occurs, turn head to side and suction secretions as needed   Administer anticonvulsants as ordered   Support airway/breathing, administer oxygen as needed   Diagnostic studies as ordered  Goal: Remains free of injury related to seizures activity  Outcome: Not Progressing  Flowsheets (Taken 11/8/2022 0825)  Remains free of injury related to seizure activity:   Maintain airway, patient safety  and administer oxygen as ordered   Monitor patient for seizure activity, document and report duration and description of seizure to Licensed Independent Practitioner   If seizure occurs, turn patient to side and suction secretions as needed   Reorient patient post seizure   Instruct patient/family to notify RN of any seizure activity   Seizure pads on all 4 side rails   Instruct patient/family to call for assistance with activity based on assessment  Goal: Achieves maximal functionality and self care  Outcome: Not Progressing  Flowsheets (Taken 11/8/2022 0825)  Achieves maximal functionality and self care:   Monitor swallowing and airway patency with patient fatigue and changes in neurological status   Encourage and assist patient to increase activity and self care with guidance from physical therapy/occupational therapy   Encourage visually impaired, hearing impaired and aphasic patients to use assistive/communication devices     Problem: Respiratory - Adult  Goal: Achieves optimal ventilation and oxygenation  Outcome: Not Progressing  Flowsheets (Taken 11/8/2022 0825)  Achieves optimal ventilation and oxygenation:   Assess for changes in respiratory status   Assess for changes in mentation and behavior   Position to facilitate oxygenation and minimize respiratory effort   Oxygen supplementation based on oxygen saturation or arterial blood gases   Encourage broncho-pulmonary hygiene including cough, deep breathe, incentive spirometry   Initiate smoking cessation protocol as indicated   Assess the need for suctioning and aspirate as needed   Assess and instruct to report shortness of breath or any respiratory difficulty   Respiratory therapy support as indicated     Problem: Cardiovascular - Adult  Goal: Maintains optimal cardiac output and hemodynamic stability  Outcome: Not Progressing  Flowsheets (Taken 11/8/2022 0825)  Maintains optimal cardiac output and hemodynamic stability:   Monitor blood pressure and heart rate   Monitor urine output and notify Licensed Independent Practitioner for values outside of normal range   Assess for signs of decreased cardiac output   Administer fluid and/or volume expanders as ordered   Administer vasoactive medications as ordered   For PPHN infants, administer sedation as ordered and minimize all controllable stressors.   Goal: Absence of cardiac dysrhythmias or at baseline  Outcome: Not Progressing  Flowsheets (Taken 11/8/2022 0825)  Absence of cardiac dysrhythmias or at baseline:   Monitor cardiac rate and rhythm   Assess for signs of decreased cardiac output   Administer antiarrhythmia medication and electrolyte replacement as ordered     Problem: Skin/Tissue Integrity - Adult  Goal: Skin integrity remains intact  Outcome: Not Progressing  Flowsheets (Taken 11/8/2022 1208)  Skin Integrity Remains Intact:   Monitor for areas of redness and/or skin breakdown   Assess vascular access sites hourly   Every 4-6 hours minimum: Change oxygen saturation probe site  Goal: Incisions, wounds, or drain sites healing without S/S of infection  Outcome: Not Progressing  Flowsheets (Taken 11/8/2022 1208)  Incisions, Wounds, or Drain Sites Healing Without Sign and Symptoms of Infection:   ADMISSION and DAILY: Assess and document risk factors for pressure ulcer development   TWICE DAILY: Assess and document skin integrity   TWICE DAILY: Assess and document dressing/incision, wound bed, drain sites and surrounding tissue   Implement wound care per orders   Initiate isolation precautions as appropriate Initiate pressure ulcer prevention bundle as indicated  Goal: Oral mucous membranes remain intact  Outcome: Not Progressing  Flowsheets (Taken 11/8/2022 1208)  Oral Mucous Membranes Remain Intact:   Assess oral mucosa and hygiene practices   Implement preventative oral hygiene regimen     Problem: Musculoskeletal - Adult  Goal: Return mobility to safest level of function  Outcome: Not Progressing  Goal: Maintain proper alignment of affected body part  Outcome: Not Progressing  Goal: Return ADL status to a safe level of function  Outcome: Not Progressing     Problem: Gastrointestinal - Adult  Goal: Minimal or absence of nausea and vomiting  Outcome: Not Progressing  Flowsheets (Taken 11/8/2022 0825)  Minimal or absence of nausea and vomiting:   Administer IV fluids as ordered to ensure adequate hydration   Maintain NPO status until nausea and vomiting are resolved   Nasogastric tube to low intermittent suction as ordered   Administer ordered antiemetic medications as needed   Provide nonpharmacologic comfort measures as appropriate   Advance diet as tolerated, if ordered   Nutrition consult to assist patient with adequate nutrition and appropriate food choices  Goal: Maintains or returns to baseline bowel function  Outcome: Not Progressing  Flowsheets (Taken 11/8/2022 0825)  Maintains or returns to baseline bowel function:   Assess bowel function   Encourage oral fluids to ensure adequate hydration   Administer IV fluids as ordered to ensure adequate hydration   Administer ordered medications as needed   Nutrition consult to assist patient with appropriate food choices  Goal: Maintains adequate nutritional intake  Outcome: Not Progressing  Goal: Establish and maintain optimal ostomy function  Outcome: Not Progressing  Flowsheets (Taken 11/8/2022 0825)  Establish and maintain optimal ostomy function:   Monitor output from ostomies   Nutrition consult     Problem: Genitourinary - Adult  Goal: Absence of urinary retention  Outcome: Not Progressing  Flowsheets (Taken 11/8/2022 0825)  Absence of urinary retention:   Monitor intake/output and perform bladder scan as needed   Assess patients ability to void and empty bladder   Place urinary catheter per Licensed Independent Practitioner order if needed   Discuss catheterization for long term situations as appropriate  Goal: Urinary catheter remains patent  Outcome: Not Progressing  Flowsheets (Taken 11/8/2022 0825)  Urinary catheter remains patent:   Assess patency of urinary catheter   Assess need for a larger catheter size or a 3-way catheter for continuous bladder irrigation     Problem: Infection - Adult  Goal: Absence of infection at discharge  Outcome: Not Progressing  Flowsheets (Taken 11/8/2022 0825)  Absence of infection at discharge:   Assess and monitor for signs and symptoms of infection   Monitor lab/diagnostic results   [place] appropriate cooling/warming therapies per order   Administer medications as ordered   Instruct and encourage patient and family to use good hand hygiene technique  Goal: Absence of infection during hospitalization  Outcome: Not Progressing  Flowsheets (Taken 11/8/2022 0825)  Absence of infection during hospitalization:   Assess and monitor for signs and symptoms of infection   Monitor lab/diagnostic results   Monitor all insertion sites i.e., indwelling lines, tubes and drains  Goal: Absence of fever/infection during anticipated neutropenic period  Outcome: Not Progressing     Problem: Metabolic/Fluid and Electrolytes - Adult  Goal: Electrolytes maintained within normal limits  Outcome: Not Progressing  Goal: Hemodynamic stability and optimal renal function maintained  Outcome: Not Progressing  Goal: Glucose maintained within prescribed range  Outcome: Not Progressing     Problem: Hematologic - Adult  Goal: Maintains hematologic stability  Outcome: Not Progressing  Flowsheets (Taken 11/8/2022 0825)  Maintains hematologic stability: Assess for signs and symptoms of bleeding or hemorrhage   Monitor labs for bleeding or clotting disorders   Administer blood products/factors as ordered     Problem: Chronic Conditions and Co-morbidities  Goal: Patient's chronic conditions and co-morbidity symptoms are monitored and maintained or improved  Outcome: Not Progressing  Flowsheets (Taken 11/8/2022 0825)  Care Plan - Patient's Chronic Conditions and Co-Morbidity Symptoms are Monitored and Maintained or Improved:   Update acute care plan with appropriate goals if chronic or comorbid symptoms are exacerbated and prevent overall improvement and discharge   Collaborate with multidisciplinary team to address chronic and comorbid conditions and prevent exacerbation or deterioration   Monitor and assess patient's chronic conditions and comorbid symptoms for stability, deterioration, or improvement     Problem: ABCDS Injury Assessment  Goal: Absence of physical injury  Outcome: Not Progressing  Flowsheets (Taken 11/8/2022 1208)  Absence of Physical Injury: Implement safety measures based on patient assessment     Problem: Nutrition Deficit:  Goal: Optimize nutritional status  Outcome: Not Progressing  Flowsheets (Taken 11/8/2022 0569 by Bonny Sacks, RD, LD)  Nutrient intake appropriate for improving, restoring, or maintaining nutritional needs:   Monitor oral intake, labs, and treatment plans   Recommend appropriate diets, oral nutritional supplements, and vitamin/mineral supplements

## 2022-11-08 NOTE — PROGRESS NOTES
Vascular Surgery  Daily Progress Note    Pt Name: Shelley Flannery [place] Record Number: [id number]  Date of Birth 1970   Today's Date: 11/8/2022    SUBJECTIVE:     Patient was seen and examined, stating she \"feels okay,feels like she always does. Ate appx 20% of breakfast, stating it does not taste good, states she did drink her milk. OBJECTIVE:     Patient Vitals for the past 24 hrs:   BP Temp Temp src Pulse Resp SpO2   11/08/22 0642 129/78 97.5 °F (36.4 °C) -- 58 17 98 %   11/08/22 0211 111/62 98.2 °F (36.8 °C) Oral 62 17 97 %   11/07/22 2025 -- -- -- -- 18 --   11/07/22 1954 -- -- -- -- 18 --   11/07/22 1809 132/60 98.2 °F (36.8 °C) Oral 68 16 98 %       Intake/Output Summary (Last 24 hours) at 11/8/2022 0913  Last data filed at 11/8/2022 0829  Gross per 24 hour   Intake 1317 ml   Output 900 ml   Net 417 ml       In: 0747 [P.O.:530; I.V.:787]  Out: 900 [Urine:650]    I/O last 3 completed shifts: In: 1028.6 [P.O.:530; IV Piggyback:498.6]  Out: 1250 [Urine:1000; Stool:250]     Date 11/08/22 0000 - 11/08/22 2359   Shift 2413-5193 7455-7077 1240-8100 24 Hour Total   INTAKE   I.V.(mL/kg)  787(13.7)  787(13.7)   Shift Total(mL/kg)  787(13.7)  787(13.7)   OUTPUT   Urine(mL/kg/hr)  300  300   Shift Total(mL/kg)  300(5.2)  300(5.2)   Weight (kg) 57.3 57.3 57.3 57.3       Wt Readings from Last 3 Encounters:   11/06/22 126 lb 6.4 oz (57.3 kg)   07/12/22 139 lb (63 kg)   06/21/22 139 lb (63 kg)        Body mass index is 18.67 kg/m². Diet: ADULT ORAL NUTRITION SUPPLEMENT; Breakfast; Fortified Pudding Oral Supplement  ADULT ORAL NUTRITION SUPPLEMENT; Dinner; Frozen Oral Supplement  ADULT DIET; Regular  ADULT ORAL NUTRITION SUPPLEMENT; Lunch;  Other Oral Supplement; Peanut butter milkshake    MEDS:     Scheduled Meds:   meropenem  1,000 mg IntraVENous Q8H    tiZANidine  12 mg Oral BID    Acetic Acid  250 mL Topical BID    sodium chloride flush  5-40 mL IntraVENous 2 times per day    enoxaparin  40 mg SubCUTAneous Daily    cetirizine  10 mg Oral Daily    DULoxetine  30 mg Oral Nightly    levETIRAcetam  750 mg Oral Daily    therapeutic multivitamin-minerals  1 tablet Oral Daily    pregabalin  300 mg Oral BID    pilocarpine  7.5 mg Oral BID    nicotine  1 patch TransDERmal Daily     Continuous Infusions:   sodium chloride       PRN Meds:acetaminophen, 650 mg, Q6H PRN  sodium chloride flush, 5-40 mL, PRN  sodium chloride, , PRN  ondansetron, 4 mg, Q8H PRN   Or  ondansetron, 4 mg, Q6H PRN  polyethylene glycol, 17 g, Daily PRN  docusate sodium, 200 mg, PRN  HYDROcodone-acetaminophen, 1 tablet, Q8H PRN  naloxone, 0.4 mg, PRN  albuterol, 2.5 mg, Q6H PRN    YSICAL EXAM:     CONSTITUTIONAL: Awake and alert, cooperative  LUNGS: Chest clear bilaterally anteriorly, diminished lower lobe breath sounds  CARDIOVASCULAR:   Regular rate and rhythm without murmur, gallop or rub. ABDOMEN: soft, nontender, colostomy and urostomy noted  NEUROLOGIC: Awake, alert, oriented to name, place and time. Does not walk, wheelchair bound  WOUND/INCISION:  Dressing to right foot dry/intact. Toes warm to touch, pink color. Left chest port accessed, site clear and dressing CDI. EXTREMITY: Feet warm to touch bilaterally, has bilateral heel-lift boots in place.  No edema to LE's    LABS:     CBC:   Recent Labs     11/07/22  0640   WBC 3.0*   RBC 3.98*   HGB 11.6*   HCT 36.1*   MCV 90.7   MCH 29.1   MCHC 32.1*   RDW 14.0      MPV 11.6      Last 3 CMP:   Recent Labs     11/07/22  0640   *   K 3.4*      CO2 30*   BUN 8   CREATININE 0.2*   GLUCOSE 90   CALCIUM 8.7   PROT 4.8*   LABALBU 2.9*   BILITOT <0.2   ALKPHOS 96   AST 23   ALT 24        Calcium:   Lab Results   Component Value Date/Time    CALCIUM 8.7 11/07/2022 06:40 AM    CALCIUM 8.2 11/05/2022 01:18 AM    CALCIUM 8.2 11/04/2022 02:56 AM        Lactic Acid:   Lab Results   Component Value Date/Time    LACTA 0.6 11/02/2022 03:13 PM    LACTA 1.1 07/31/2019 01:20 PM    LACTA 1.9 08/19/2016 03:20 PM      Culture, Anaerobic and Aerobic [[id number]] (Abnormal)  Collected: 10/24/22 1325   Lab Status: Edited Result - FINAL Specimen: Ankle from Wound Updated: 10/29/22 0715    Gram Stain Result Rare Gram positive cocci  in pairs   Rare WBC's (Polymorphonuclear)   Rare Epithelial Cells    Abnormal     Anaerobic Culture No anaerobes isolated  4 days    Organism Pseudomonas aeruginosa Abnormal     WOUND/ABSCESS Moderate growth    Organism Proteus mirabilis Abnormal     WOUND/ABSCESS Moderate growth    Organism Enterococcus faecalis Abnormal     WOUND/ABSCESS Rare growth    Organism Escherichia coli Abnormal     WOUND/ABSCESS Moderate growth   Narrative:     ORDER#: [id number]                          ORDERED BY: Tyson Kendall   SOURCE: Wound right ankle                  COLLECTED:  10/24/22 13:25   ANTIBIOTICS AT [place].:                      RECEIVED :  10/24/22 15:26   Susceptibility     Escherichia coli Pseudomonas aeruginosa Proteus mirabilis Enterococcus faecalis     BACTERIAL SUSCEPTIBILITY PANEL BY AYLA BACTERIAL SUSCEPTIBILITY PANEL BY AYLA BACTERIAL SUSCEPTIBILITY PANEL BY AYLA BACTERIAL SUSCEPTIBILITY PANEL BY AYLA     ampicillin <=2 mcg/mL Sensitive   >=32 mcg/mL Resistant <=2 mcg/mL Sensitive     ampicillin-sulbactam <=2 mcg/mL Sensitive   >=32 mcg/mL Resistant       aztreonam <=1 mcg/mL Sensitive   <=1 mcg/mL Sensitive       benzylpenicillin       2 mcg/mL Sensitive     ceFAZolin <=4 mcg/mL Sensitive   <=4 mcg/mL Sensitive       cefepime <=1 mcg/mL Sensitive 2 mcg/mL Sensitive <=1 mcg/mL Sensitive       cefTRIAXone <=1 mcg/mL Sensitive   <=1 mcg/mL Sensitive       ertapenem <=0.5 mcg/mL Sensitive   <=0.5 mcg/mL Sensitive       gentamicin <=1 mcg/mL Sensitive 4 mcg/mL Sensitive >=16 mcg/mL Resistant       gentamicin-syn       SYN-S mcg/mL Sensitive     levofloxacin <=0.12 mcg/mL Sensitive >=8 mcg/mL Resistant >=8 mcg/mL Resistant       meropenem <=0.25 mcg/mL Sensitive <=0.25 mcg/mL Sensitive <=0.25 mcg/mL Sensitive       piperacillin-tazobactam <=4 mcg/mL Sensitive <=4 mcg/mL Sensitive         tobramycin   4 mcg/mL Sensitive >=16 mcg/mL Resistant       trimethoprim-sulfamethoxazole <=20 mcg/mL Sensitive   >=320 mcg/mL Resistant       vancomycin       2 mcg/mL Sensitive                            VT prophylaxis: [x] Lovenox                              ASSESSMENT:     POD #   HD # 6  Active Hospital Problems    Diagnosis Date Noted    Infected wound [T14. 8XXA, L08.9] 11/07/2022     Priority: Medium    Severe malnutrition ([address] [E43] 11/03/2022     Priority: Medium    Ankle wound, right, sequela [S91.001S] 11/02/2022     Priority: Medium    Multiple sclerosis ([facility] Tia Jessica 06/08/2016     Priority: Medium    Peripheral vascular disease ([facility] [address] [I73.9] 02/01/2016       Chief Complaint:  Chief Complaint   Patient presents with    Abnormal Lab     Sent by [facility] after wound culture results, needs IV abx       PLAN:     Acetic acid dressings BID to right dorsal foot wound  Right foot xrays indicate diffuse osteopenia with no acute osseous abnormality or significant degenerative/erosive change, recommend correlate with MRI if clinically indicated  [place] [employer], heel lift boots in place  Appetite poor. ONS per Dietary. Dietitian has discussed options with patient   5. ID recommends biopsy, Vascular will arrange for punch bx. Patient requesting extra lidocaine gel if bx done at bedside   6. Stating she does not want HH upon dc, she has a helper that stays with her. 7.    Will follow up at [address] [place] [address],[address]  8.     Will take new photo today of right foot wound

## 2022-11-08 NOTE — PLAN OF CARE
Problem: Discharge Planning  Goal: Discharge to home or other facility with appropriate resources  11/8/2022 0144 by Roseann Johnson LPN  Outcome: Progressing  Flowsheets (Taken 11/7/2022 2157)  Discharge to home or other facility with appropriate resources: Identify barriers to discharge with patient and caregiver  11/7/2022 1553 by Leora Moreno RN  Outcome: Progressing  Flowsheets (Taken 11/7/2022 1536)  Discharge to home or other facility with appropriate resources: Identify barriers to discharge with patient and caregiver     Problem: Pain  Goal: Verbalizes/displays adequate comfort level or baseline comfort level  11/8/2022 0144 by Roseann Johnson LPN  Outcome: Progressing  11/7/2022 1553 by Leora Moreno RN  Outcome: Progressing     Problem: Skin/Tissue Integrity  Goal: Absence of new skin breakdown  Description: 1. Monitor for areas of redness and/or skin breakdown  2. Assess vascular access sites hourly  3. Every 4-6 hours minimum:  Change oxygen saturation probe site  4. Every 4-6 hours:  If on nasal continuous positive airway pressure, respiratory therapy assess nares and determine need for appliance change or resting period.   11/8/2022 0144 by Roseann Johnson LPN  Outcome: Progressing  11/7/2022 1553 by Leora Moreno RN  Outcome: Progressing     Problem: Safety - Adult  Goal: Free from fall injury  11/8/2022 0144 by Roseann Johnson LPN  Outcome: Progressing  11/7/2022 1553 by Leora Moreno RN  Outcome: Progressing  Flowsheets (Taken 11/7/2022 1536)  Free From Fall Injury: Navdeep Rushing family/caregiver on patient safety     Problem: Neurosensory - Adult  Goal: Achieves stable or improved neurological status  11/8/2022 0144 by Roseann Jhonson LPN  Outcome: Progressing  Flowsheets (Taken 11/7/2022 2157)  Achieves stable or improved neurological status: Assess for and report changes in neurological status  11/7/2022 1553 by Leora Moreno RN  Outcome: Progressing  Flowsheets (Taken 11/7/2022 1536)  Achieves stable or improved neurological status: Assess for and report changes in neurological status  Goal: Absence of seizures  11/8/2022 0144 by Elizabeth Schmidt LPN  Outcome: Progressing  Flowsheets (Taken 11/7/2022 2157)  Absence of seizures: Monitor for seizure activity. If seizure occurs, document type and location of movements and any associated apnea  11/7/2022 1553 by Tex Garg RN  Outcome: Progressing  Flowsheets (Taken 11/7/2022 1536)  Absence of seizures: Monitor for seizure activity.   If seizure occurs, document type and location of movements and any associated apnea  Goal: Remains free of injury related to seizures activity  11/8/2022 0144 by Elizabeth Schmidt LPN  Outcome: Progressing  Flowsheets (Taken 11/7/2022 2157)  Remains free of injury related to seizure activity: Maintain airway, patient safety  and administer oxygen as ordered  11/7/2022 1553 by Tex Garg RN  Outcome: Progressing  Flowsheets (Taken 11/7/2022 1536)  Remains free of injury related to seizure activity: Maintain airway, patient safety  and administer oxygen as ordered  Goal: Achieves maximal functionality and self care  11/8/2022 0144 by Elizabeth Schmidt LPN  Outcome: Progressing  Flowsheets (Taken 11/7/2022 2157)  Achieves maximal functionality and self care: Monitor swallowing and airway patency with patient fatigue and changes in neurological status  11/7/2022 1553 by Tex Garg RN  Outcome: Progressing  Flowsheets (Taken 11/7/2022 1536)  Achieves maximal functionality and self care: Monitor swallowing and airway patency with patient fatigue and changes in neurological status     Problem: Respiratory - Adult  Goal: Achieves optimal ventilation and oxygenation  11/8/2022 0144 by Elizabeth Schmidt LPN  Outcome: Progressing  Flowsheets (Taken 11/7/2022 2157)  Achieves optimal ventilation and oxygenation: Assess for changes in respiratory status  11/7/2022 1553 by Lavelle Griffin RN  Outcome: Progressing  Flowsheets (Taken 11/7/2022 1536)  Achieves optimal ventilation and oxygenation: Assess for changes in respiratory status     Problem: Cardiovascular - Adult  Goal: Maintains optimal cardiac output and hemodynamic stability  11/8/2022 0144 by Alaina Lopez LPN  Outcome: Progressing  Flowsheets (Taken 11/7/2022 2157)  Maintains optimal cardiac output and hemodynamic stability: Monitor blood pressure and heart rate  11/7/2022 1553 by Lavelle Griffin RN  Outcome: Progressing  Flowsheets (Taken 11/7/2022 1536)  Maintains optimal cardiac output and hemodynamic stability: Monitor blood pressure and heart rate  Goal: Absence of cardiac dysrhythmias or at baseline  11/8/2022 0144 by Alaina Lopez LPN  Outcome: Progressing  Flowsheets (Taken 11/7/2022 2157)  Absence of cardiac dysrhythmias or at baseline: Monitor cardiac rate and rhythm  11/7/2022 1553 by Lavelle Griffin RN  Outcome: Progressing  Flowsheets (Taken 11/7/2022 1536)  Absence of cardiac dysrhythmias or at baseline: Monitor cardiac rate and rhythm     Problem: Skin/Tissue Integrity - Adult  Goal: Skin integrity remains intact  11/8/2022 0144 by Alaina Lopez LPN  Outcome: Progressing  Flowsheets (Taken 11/7/2022 2157)  Skin Integrity Remains Intact: Monitor for areas of redness and/or skin breakdown  11/7/2022 1553 by Lavelle Griffin RN  Outcome: Progressing  Flowsheets (Taken 11/7/2022 1536)  Skin Integrity Remains Intact: Monitor for areas of redness and/or skin breakdown  Goal: Incisions, wounds, or drain sites healing without S/S of infection  11/8/2022 0144 by Alaina Lopez LPN  Outcome: Progressing  Flowsheets (Taken 11/7/2022 2157)  Incisions, Wounds, or Drain Sites Healing Without Sign and Symptoms of Infection: ADMISSION and DAILY: Assess and document risk factors for pressure ulcer development  11/7/2022 1553 by Lavelle Griffin RN  Outcome: Progressing  Flowsheets (Taken 11/7/2022 1536)  Incisions, Wounds, or Drain Sites Healing Without Sign and Symptoms of Infection: ADMISSION and DAILY: Assess and document risk factors for pressure ulcer development  Goal: Oral mucous membranes remain intact  11/8/2022 0144 by Benton Verdugo LPN  Outcome: Progressing  Flowsheets (Taken 11/7/2022 2157)  Oral Mucous Membranes Remain Intact: Assess oral mucosa and hygiene practices  11/7/2022 1553 by Aaron Fuentes RN  Outcome: Progressing  Flowsheets (Taken 11/7/2022 1536)  Oral Mucous Membranes Remain Intact: Assess oral mucosa and hygiene practices     Problem: Musculoskeletal - Adult  Goal: Return mobility to safest level of function  11/8/2022 0144 by Benton Verdugo LPN  Outcome: Progressing  Flowsheets (Taken 11/7/2022 2157)  Return Mobility to Safest Level of Function: Assess patient stability and activity tolerance for standing, transferring and ambulating with or without assistive devices  11/7/2022 1553 by Aaron Fuentes RN  Outcome: Progressing  Flowsheets (Taken 11/7/2022 1536)  Return Mobility to Safest Level of Function: Assess patient stability and activity tolerance for standing, transferring and ambulating with or without assistive devices  Goal: Maintain proper alignment of affected body part  11/8/2022 0144 by Benton Verdugo LPN  Outcome: Progressing  Flowsheets (Taken 11/7/2022 2157)  Maintain proper alignment of affected body part: Support and protect limb and body alignment per provider's orders  11/7/2022 1553 by Aaron Fuentes RN  Outcome: Progressing  Flowsheets (Taken 11/7/2022 1536)  Maintain proper alignment of affected body part: Support and protect limb and body alignment per provider's orders  Goal: Return ADL status to a safe level of function  11/8/2022 0144 by Benton Verdugo LPN  Outcome: Progressing  Flowsheets (Taken 11/7/2022 2157)  Return ADL Status to a Safe Level of Function: Administer medication as ordered  11/7/2022 1553 by Eliceo Baca RN  Outcome: Progressing  Flowsheets (Taken 11/7/2022 1536)  Return ADL Status to a Safe Level of Function: Administer medication as ordered     Problem: Gastrointestinal - Adult  Goal: Minimal or absence of nausea and vomiting  11/8/2022 0144 by Ashish Prieto LPN  Outcome: Progressing  Flowsheets (Taken 11/7/2022 2157)  Minimal or absence of nausea and vomiting: Administer IV fluids as ordered to ensure adequate hydration  11/7/2022 1553 by Eliceo Baca RN  Outcome: Progressing  Flowsheets (Taken 11/7/2022 1536)  Minimal or absence of nausea and vomiting: Administer IV fluids as ordered to ensure adequate hydration  Goal: Maintains or returns to baseline bowel function  11/8/2022 0144 by Ashish Prieto LPN  Outcome: Progressing  Flowsheets (Taken 11/7/2022 2157)  Maintains or returns to baseline bowel function: Assess bowel function  11/7/2022 1553 by Eliceo Baca RN  Outcome: Progressing  Flowsheets (Taken 11/7/2022 1536)  Maintains or returns to baseline bowel function: Assess bowel function  Goal: Maintains adequate nutritional intake  11/8/2022 0144 by Ashish Prieto LPN  Outcome: Progressing  Flowsheets (Taken 11/7/2022 2157)  Maintains adequate nutritional intake: Monitor percentage of each meal consumed  11/7/2022 1553 by Eliceo Baca RN  Outcome: Progressing  Flowsheets (Taken 11/7/2022 1536)  Maintains adequate nutritional intake: Monitor percentage of each meal consumed  Goal: Establish and maintain optimal ostomy function  11/8/2022 0144 by Ashish Prieto LPN  Outcome: Progressing  Flowsheets (Taken 11/7/2022 2157)  Establish and maintain optimal ostomy function: Monitor output from ostomies  11/7/2022 1553 by Eliceo Baca RN  Outcome: Progressing  Flowsheets (Taken 11/7/2022 1536)  Establish and maintain optimal ostomy function: Monitor output from ostomies     Problem: Genitourinary - Adult  Goal: Absence of urinary retention  11/8/2022 0144 by Isidoro Escamilla LPN  Outcome: Progressing  Flowsheets (Taken 11/7/2022 2157)  Absence of urinary retention: Assess patients ability to void and empty bladder  11/7/2022 1553 by Rocky Cantu RN  Outcome: Progressing  Flowsheets (Taken 11/7/2022 1536)  Absence of urinary retention: Assess patients ability to void and empty bladder  Goal: Urinary catheter remains patent  11/8/2022 0144 by Isidoro Escamilla LPN  Outcome: Progressing  Flowsheets (Taken 11/7/2022 2157)  Urinary catheter remains patent: Assess patency of urinary catheter  11/7/2022 1553 by Rocky Cantu RN  Outcome: Progressing  Flowsheets (Taken 11/7/2022 1536)  Urinary catheter remains patent: Assess patency of urinary catheter     Problem: Infection - Adult  Goal: Absence of infection at discharge  11/8/2022 0144 by Isidoro Escamilla LPN  Outcome: Progressing  [address] (Taken 11/7/2022 2157)  Absence of infection at discharge: Assess and monitor for signs and symptoms of infection  11/7/2022 1553 by Rocky Cantu RN  Outcome: Progressing  Flowsheets (Taken 11/7/2022 1536)  Absence of infection at discharge: Assess and monitor for signs and symptoms of infection  Goal: Absence of infection during hospitalization  11/8/2022 0144 by Isidoro Escamilla LPN  Outcome: Progressing  Flowsheets (Taken 11/7/2022 2157)  Absence of infection during hospitalization: Assess and monitor for signs and symptoms of infection  11/7/2022 1553 by Rocky Cantu RN  Outcome: Progressing  Flowsheets (Taken 11/7/2022 1536)  Absence of infection during hospitalization: Assess and monitor for signs and symptoms of infection  Goal: Absence of fever/infection during anticipated neutropenic period  11/8/2022 0144 by Isidoro Escamilla LPN  Outcome: Progressing  Flowsheets (Taken 11/7/2022 2157)  Absence of fever/infection during anticipated neutropenic period: Monitor white blood cell count  11/7/2022 1553 by Rocky Cantu RN  Outcome: Progressing  Flowsheets (Taken 11/7/2022 1536)  Absence of fever/infection during anticipated neutropenic period: Monitor white blood cell count     Problem: Metabolic/Fluid and Electrolytes - Adult  Goal: Electrolytes maintained within normal limits  11/8/2022 0144 by Roseann Johnson LPN  Outcome: Progressing  Flowsheets (Taken 11/7/2022 2157)  Electrolytes maintained within normal limits: Monitor labs and assess patient for signs and symptoms of electrolyte imbalances  11/7/2022 1553 by Leora Moreno RN  Outcome: Progressing  Flowsheets (Taken 11/7/2022 1536)  Electrolytes maintained within normal limits: Monitor labs and assess patient for signs and symptoms of electrolyte imbalances  Goal: Hemodynamic stability and optimal renal function maintained  11/8/2022 0144 by Roseann Johnson LPN  Outcome: Progressing  Flowsheets (Taken 11/7/2022 2157)  Hemodynamic stability and optimal renal function maintained: Monitor labs and assess for signs and symptoms of volume excess or deficit  11/7/2022 1553 by Leora Moreno RN  Outcome: Progressing  Flowsheets (Taken 11/7/2022 1536)  Hemodynamic stability and optimal renal function maintained: Monitor labs and assess for signs and symptoms of volume excess or deficit  Goal: Glucose maintained within prescribed range  11/8/2022 0144 by Roseann Johnson LPN  Outcome: Progressing  Flowsheets (Taken 11/7/2022 2157)  Glucose maintained within prescribed range: Monitor blood glucose as ordered  11/7/2022 1553 by Leora Moreno RN  Outcome: Progressing  Flowsheets (Taken 11/7/2022 1536)  Glucose maintained within prescribed range: Monitor blood glucose as ordered     Problem: Hematologic - Adult  Goal: Maintains hematologic stability  11/8/2022 0144 by Roseann Johnson LPN  Outcome: Progressing  Flowsheets (Taken 11/7/2022 2157)  Maintains hematologic stability: Assess for signs and symptoms of bleeding or hemorrhage  11/7/2022 1553 by Tc Bella NIC Lew  Outcome: Progressing  Flowsheets (Taken 11/7/2022 1536)  Maintains hematologic stability: Assess for signs and symptoms of bleeding or hemorrhage     Problem: Chronic Conditions and Co-morbidities  Goal: Patient's chronic conditions and co-morbidity symptoms are monitored and maintained or improved  11/8/2022 0144 by Sia Kohler LPN  Outcome: Progressing  Flowsheets (Taken 11/7/2022 2157)  Care Plan - Patient's Chronic Conditions and Co-Morbidity Symptoms are Monitored and Maintained or Improved: Monitor and assess patient's chronic conditions and comorbid symptoms for stability, deterioration, or improvement  11/7/2022 1553 by Roddy Young RN  Outcome: Progressing  Flowsheets (Taken 11/7/2022 1536)  Care Plan - Patient's Chronic Conditions and Co-Morbidity Symptoms are Monitored and Maintained or Improved: Monitor and assess patient's chronic conditions and comorbid symptoms for stability, deterioration, or improvement     Problem: ABCDS Injury Assessment  Goal: Absence of physical injury  11/8/2022 0144 by Sia Kohler LPN  Outcome: Progressing  11/7/2022 1553 by Roddy Young RN  Outcome: Progressing  Flowsheets (Taken 11/7/2022 1536)  Absence of Physical Injury: Implement safety measures based on patient assessment     Problem: Nutrition Deficit:  Goal: Optimize nutritional status  11/8/2022 0144 by Sia Kohler LPN  Outcome: Progressing  11/7/2022 1553 by Roddy Young RN  Outcome: Progressing

## 2022-11-09 PROCEDURE — 11106 INCAL BX SKN SINGLE LES: CPT | Performed by: SURGERY

## 2022-11-09 PROCEDURE — 87075 CULTR BACTERIA EXCEPT BLOOD: CPT

## 2022-11-09 PROCEDURE — 87205 SMEAR GRAM STAIN: CPT

## 2022-11-09 PROCEDURE — 87070 CULTURE OTHR SPECIMN AEROBIC: CPT

## 2022-11-09 PROCEDURE — 87206 SMEAR FLUORESCENT/ACID STAI: CPT

## 2022-11-09 PROCEDURE — 87176 TISSUE HOMOGENIZATION CULTR: CPT

## 2022-11-09 PROCEDURE — 2580000003 HC RX 258

## 2022-11-09 PROCEDURE — 87015 SPECIMEN INFECT AGNT CONCNTJ: CPT

## 2022-11-09 PROCEDURE — 87102 FUNGUS ISOLATION CULTURE: CPT

## 2022-11-09 PROCEDURE — 88305 TISSUE EXAM BY PATHOLOGIST: CPT

## 2022-11-09 PROCEDURE — 6370000000 HC RX 637 (ALT 250 FOR IP): Performed by: HOSPITALIST

## 2022-11-09 PROCEDURE — 88312 SPECIAL STAINS GROUP 1: CPT

## 2022-11-09 PROCEDURE — 87116 MYCOBACTERIA CULTURE: CPT

## 2022-11-09 PROCEDURE — 1210000000 HC MED SURG R&B

## 2022-11-09 PROCEDURE — 6360000002 HC RX W HCPCS: Performed by: HOSPITALIST

## 2022-11-09 PROCEDURE — 6370000000 HC RX 637 (ALT 250 FOR IP)

## 2022-11-09 PROCEDURE — 6370000000 HC RX 637 (ALT 250 FOR IP): Performed by: NURSE PRACTITIONER

## 2022-11-09 PROCEDURE — 0HBMXZX EXCISION OF RIGHT FOOT SKIN, EXTERNAL APPROACH, DIAGNOSTIC: ICD-10-PCS | Performed by: SURGERY

## 2022-11-09 RX ADMIN — CETIRIZINE HYDROCHLORIDE 10 MG: 10 TABLET, FILM COATED ORAL at 08:25

## 2022-11-09 RX ADMIN — TIZANIDINE 12 MG: 4 TABLET ORAL at 20:05

## 2022-11-09 RX ADMIN — PILOCARPINE HYDROCHLORIDE 7.5 MG: 5 TABLET, FILM COATED ORAL at 20:05

## 2022-11-09 RX ADMIN — PILOCARPINE HYDROCHLORIDE 7.5 MG: 5 TABLET, FILM COATED ORAL at 08:25

## 2022-11-09 RX ADMIN — Medication 1 TABLET: at 08:25

## 2022-11-09 RX ADMIN — SODIUM CHLORIDE, PRESERVATIVE FREE 10 ML: 5 INJECTION INTRAVENOUS at 20:06

## 2022-11-09 RX ADMIN — MEROPENEM AND SODIUM CHLORIDE 1000 MG: 1 INJECTION, SOLUTION INTRAVENOUS at 12:19

## 2022-11-09 RX ADMIN — LIDOCAINE HYDROCHLORIDE 20 ML: 20 JELLY TOPICAL at 17:44

## 2022-11-09 RX ADMIN — Medication 250 ML: at 01:45

## 2022-11-09 RX ADMIN — HYDROCODONE BITARTRATE AND ACETAMINOPHEN 1 TABLET: 10; 325 TABLET ORAL at 21:54

## 2022-11-09 RX ADMIN — Medication 250 ML: at 20:09

## 2022-11-09 RX ADMIN — LEVETIRACETAM 750 MG: 500 TABLET, FILM COATED ORAL at 20:04

## 2022-11-09 RX ADMIN — DULOXETINE 30 MG: 30 CAPSULE, DELAYED RELEASE ORAL at 20:04

## 2022-11-09 RX ADMIN — MEROPENEM AND SODIUM CHLORIDE 1000 MG: 1 INJECTION, SOLUTION INTRAVENOUS at 20:09

## 2022-11-09 RX ADMIN — TIZANIDINE 12 MG: 4 TABLET ORAL at 08:25

## 2022-11-09 RX ADMIN — MEROPENEM AND SODIUM CHLORIDE 1000 MG: 1 INJECTION, SOLUTION INTRAVENOUS at 05:24

## 2022-11-09 RX ADMIN — HYDROCODONE BITARTRATE AND ACETAMINOPHEN 1 TABLET: 10; 325 TABLET ORAL at 14:53

## 2022-11-09 RX ADMIN — Medication 250 ML: at 08:28

## 2022-11-09 RX ADMIN — PREGABALIN 300 MG: 150 CAPSULE ORAL at 20:05

## 2022-11-09 ASSESSMENT — PAIN DESCRIPTION - ORIENTATION
ORIENTATION: MID
ORIENTATION: MID
ORIENTATION: RIGHT

## 2022-11-09 ASSESSMENT — PAIN DESCRIPTION - FREQUENCY
FREQUENCY: CONTINUOUS
FREQUENCY: CONTINUOUS

## 2022-11-09 ASSESSMENT — PAIN DESCRIPTION - LOCATION
LOCATION: NECK
LOCATION: NECK
LOCATION: FOOT

## 2022-11-09 ASSESSMENT — PAIN DESCRIPTION - ONSET
ONSET: ON-GOING
ONSET: ON-GOING

## 2022-11-09 ASSESSMENT — PAIN - FUNCTIONAL ASSESSMENT: PAIN_FUNCTIONAL_ASSESSMENT: ACTIVITIES ARE NOT PREVENTED

## 2022-11-09 ASSESSMENT — PAIN SCALES - GENERAL
PAINLEVEL_OUTOF10: 0
PAINLEVEL_OUTOF10: 6
PAINLEVEL_OUTOF10: 6
PAINLEVEL_OUTOF10: 3

## 2022-11-09 ASSESSMENT — PAIN DESCRIPTION - PAIN TYPE
TYPE: CHRONIC PAIN
TYPE: ACUTE PAIN

## 2022-11-09 ASSESSMENT — PAIN DESCRIPTION - DESCRIPTORS
DESCRIPTORS: DISCOMFORT
DESCRIPTORS: ACHING
DESCRIPTORS: ACHING

## 2022-11-09 NOTE — PROGRESS NOTES
Vascular Surgery  Dr.Victoria Gomez  Daily Progress Note    Pt Name: Shelley Flannery Weidman Record Number: [id number]  Date of Birth 1970   Today's Date: 11/9/2022    SUBJECTIVE:     Patient was seen and examined, asking when she can go home. OBJECTIVE:     Patient Vitals for the past 24 hrs:   BP Temp Temp src Pulse Resp SpO2 Weight   11/09/22 0604 130/77 98.6 °F (37 °C) Oral 66 16 98 % 123 lb 5 oz (55.9 kg)   11/09/22 0130 (!) 112/92 97.3 °F (36.3 °C) Oral 61 16 96 % --   11/08/22 2055 -- -- -- -- 14 -- --   11/08/22 1852 124/70 97.5 °F (36.4 °C) Axillary 64 14 97 % --   11/08/22 1223 (!) 95/44 97.6 °F (36.4 °C) Axillary 58 16 96 % --       Intake/Output Summary (Last 24 hours) at 11/9/2022 0706  Last data filed at 11/9/2022 8734  Gross per 24 hour   Intake 2393 ml   Output 1700 ml   Net 693 ml       In: 2393 [P.O.:1390; I.V.:1003]  Out: 1700 [Urine:1500]    I/O last 3 completed shifts: In: 2393 [P.O.:1390; I.V.:1003]  Out: 2300 [Urine:1850; Stool:450]     Date 11/09/22 0000 - 11/09/22 2359   Shift 7350-7332 7305-0771 0210-4013 24 Hour Total   INTAKE   Shift Total(mL/kg)       OUTPUT   Urine(mL/kg/hr) 400   400   Shift Total(mL/kg) 400(7.2)   400(7.2)   Weight (kg) 55.9 55.9 55.9 55.9       Wt Readings from Last 3 Encounters:   11/09/22 123 lb 5 oz (55.9 kg)   07/12/22 139 lb (63 kg)   06/21/22 139 lb (63 kg)        Body mass index is 18.21 kg/m². Diet: ADULT ORAL NUTRITION SUPPLEMENT; Breakfast; Fortified Pudding Oral Supplement  ADULT ORAL NUTRITION SUPPLEMENT; Dinner; Frozen Oral Supplement  ADULT DIET; Regular  ADULT ORAL NUTRITION SUPPLEMENT; Lunch;  Other Oral Supplement; Peanut butter milkshake    MEDS:     Scheduled Meds:   lidocaine  20 mL Topical Once    levETIRAcetam  750 mg Oral Nightly    meropenem  1,000 mg IntraVENous Q8H    tiZANidine  12 mg Oral BID    Acetic Acid  250 mL Topical BID    sodium chloride flush  5-40 mL IntraVENous 2 times per day    enoxaparin  40 mg SubCUTAneous Daily    cetirizine  10 mg Oral Daily    DULoxetine  30 mg Oral Nightly    therapeutic multivitamin-minerals  1 tablet Oral Daily    pregabalin  300 mg Oral BID    pilocarpine  7.5 mg Oral BID    nicotine  1 patch TransDERmal Daily     Continuous Infusions:   sodium chloride       PRN Meds:acetaminophen, 650 mg, Q6H PRN  sodium chloride flush, 5-40 mL, PRN  sodium chloride, , PRN  ondansetron, 4 mg, Q8H PRN   Or  ondansetron, 4 mg, Q6H PRN  polyethylene glycol, 17 g, Daily PRN  docusate sodium, 200 mg, PRN  HYDROcodone-acetaminophen, 1 tablet, Q8H PRN  naloxone, 0.4 mg, PRN  albuterol, 2.5 mg, Q6H PRN    YSICAL EXAM:     CONSTITUTIONAL: Awake and alert, cooperative  LUNGS: Chest clear bilaterally anteriorly, diminished lower lobe breath sounds  CARDIOVASCULAR:   Regular rate and rhythm without murmur, gallop or rub. ABDOMEN: soft, nontender, colostomy and urostomy noted  NEUROLOGIC: Awake, alert, oriented to name, place and time. Wheelchair bound  WOUND/INCISION:  Dressing to right foot dry/intact. Toes warm to touch, pink color. Left chest port accessed, site clear and dressing CDI. EXTREMITY: Feet warm to touch bilaterally, has bilateral heel-lift boots in place.  No edema to LE's    LABS:     CBC:   Recent Labs     11/07/22  0640   WBC 3.0*   RBC 3.98*   HGB 11.6*   HCT 36.1*   MCV 90.7   MCH 29.1   MCHC 32.1*   RDW 14.0      MPV 11.6      Last 3 CMP:   Recent Labs     11/07/22  0640 11/08/22  1334   * 143   K 3.4* 4.1    105   CO2 30* 32*   BUN 8 10   CREATININE 0.2* 0.2*   GLUCOSE 90 97   CALCIUM 8.7 8.7   PROT 4.8*  --    LABALBU 2.9*  --    BILITOT <0.2  --    ALKPHOS 96  --    AST 23  --    ALT 24  --         Calcium:   Lab Results   Component Value Date/Time    CALCIUM 8.7 11/08/2022 01:34 PM    CALCIUM 8.7 11/07/2022 06:40 AM    CALCIUM 8.2 11/05/2022 01:18 AM        Lactic Acid:   Lab Results   Component Value Date/Time    LACTA 0.6 11/02/2022 03:13 PM    LACTA 1.1 07/31/2019 01:20 PM LACTA 1.9 08/19/2016 03:20 PM      Culture, Anaerobic and Aerobic [[id number]] (Abnormal)  Collected: 10/24/22 1325   Lab Status: Edited Result - FINAL Specimen: Ankle from Wound Updated: 10/29/22 0715    Gram Stain Result Rare Gram positive cocci  in pairs   Rare WBC's (Polymorphonuclear)   Rare Epithelial Cells    Abnormal     Anaerobic Culture No anaerobes isolated  4 days    Organism Pseudomonas aeruginosa Abnormal     WOUND/ABSCESS Moderate growth    Organism Proteus mirabilis Abnormal     WOUND/ABSCESS Moderate growth    Organism Enterococcus faecalis Abnormal     WOUND/ABSCESS Rare growth    Organism Escherichia coli Abnormal     WOUND/ABSCESS Moderate growth   Narrative:     ORDER#: [id number]                          ORDERED BY: Gloria Wilkerson   SOURCE: Wound right ankle                  COLLECTED:  10/24/22 13:25   ANTIBIOTICS AT [facility].:                      RECEIVED :  10/24/22 15:26   Susceptibility     Escherichia coli Pseudomonas aeruginosa Proteus mirabilis Enterococcus faecalis     BACTERIAL SUSCEPTIBILITY PANEL BY AYLA BACTERIAL SUSCEPTIBILITY PANEL BY AYLA BACTERIAL SUSCEPTIBILITY PANEL BY AYLA BACTERIAL SUSCEPTIBILITY PANEL BY AYLA     ampicillin <=2 mcg/mL Sensitive   >=32 mcg/mL Resistant <=2 mcg/mL Sensitive     ampicillin-sulbactam <=2 mcg/mL Sensitive   >=32 mcg/mL Resistant       aztreonam <=1 mcg/mL Sensitive   <=1 mcg/mL Sensitive       benzylpenicillin       2 mcg/mL Sensitive     ceFAZolin <=4 mcg/mL Sensitive   <=4 mcg/mL Sensitive       cefepime <=1 mcg/mL Sensitive 2 mcg/mL Sensitive <=1 mcg/mL Sensitive       cefTRIAXone <=1 mcg/mL Sensitive   <=1 mcg/mL Sensitive       ertapenem <=0.5 mcg/mL Sensitive   <=0.5 mcg/mL Sensitive       gentamicin <=1 mcg/mL Sensitive 4 mcg/mL Sensitive >=16 mcg/mL Resistant       gentamicin-syn       SYN-S mcg/mL Sensitive     levofloxacin <=0.12 mcg/mL Sensitive >=8 mcg/mL Resistant >=8 mcg/mL Resistant       meropenem <=0.25 mcg/mL Sensitive <=0.25 mcg/mL Sensitive <=0.25 mcg/mL Sensitive       piperacillin-tazobactam <=4 mcg/mL Sensitive <=4 mcg/mL Sensitive         tobramycin   4 mcg/mL Sensitive >=16 mcg/mL Resistant       trimethoprim-sulfamethoxazole <=20 mcg/mL Sensitive   >=320 mcg/mL Resistant       vancomycin       2 mcg/mL Sensitive                            VT prophylaxis: [x] Lovenox                              ASSESSMENT:     POD #   HD # 7  Active Hospital Problems    Diagnosis Date Noted    Infected wound [T14. 8XXA, L08.9] 11/07/2022     Priority: Medium    Severe malnutrition ([facility] [address] [E43] 11/03/2022     Priority: Medium    Ankle wound, right, sequela [S91.001S] 11/02/2022     Priority: Medium    Multiple [facility] ([facility] Erlene Lucita 06/08/2016     Priority: Medium    Peripheral vascular disease ([facility] [I73.9] 02/01/2016       Chief Complaint:  Chief Complaint   Patient presents with    Abnormal Lab     Sent by [facility] after wound culture results, needs IV abx       PLAN:     Acetic acid dressings BID to right dorsal foot wound  For punch biopsy of RIGHT dorsal foot today  [employer], heel lift boots in place  Appetite poor. ONS per Dietary. Dietitian has discussed options with patient   5. ID ordering antibiotics, punch biopsy requested. 1. Send biopsy sample to pathology for histopathology with tissue Gram and GMS stain. 2. Send biopsy to you for aerobic/anaerobic culture, fungal culture, and AFB culture. 6.   Stating she does not want HH upon dc, she has a helper that stays with her. 7.    Will follow up at [address] [place] [address],[address]  8. Today her left chest mediport will be flushed/de-accessed and re-accessed. Wound Bx done.

## 2022-11-09 NOTE — PLAN OF CARE
Problem: Discharge Planning  Goal: Discharge to home or other facility with appropriate resources  11/8/2022 2256 by Merna Scruggs RN  Outcome: Not Progressing  Flowsheets  Taken 11/8/2022 2256  Discharge to home or other facility with appropriate resources: Identify barriers to discharge with patient and caregiver  Taken 11/8/2022 2025  Discharge to home or other facility with appropriate resources: Identify barriers to discharge with patient and caregiver  Note: Pt wound dressings being changed BID; Pts pain being controlled by PO medications at this time  11/8/2022 1604 by Janeen Olivo RN  Outcome: Not Progressing  Flowsheets (Taken 11/8/2022 0825)  Discharge to home or other facility with appropriate resources:   Identify barriers to discharge with patient and caregiver   Arrange for needed discharge resources and transportation as appropriate   Identify discharge learning needs (meds, wound care, etc)   Arrange for interpreters to assist at discharge as needed   Refer to discharge planning if patient needs post-hospital services based on physician order or complex needs related to functional status, cognitive ability or social support system     Problem: Pain  Goal: Verbalizes/displays adequate comfort level or baseline comfort level  11/8/2022 2256 by Merna Scruggs RN  Outcome: Not Progressing  11/8/2022 1604 by Janeen Olivo RN  Outcome: Not Progressing     Problem: Skin/Tissue Integrity  Goal: Absence of new skin breakdown  Description: 1. Monitor for areas of redness and/or skin breakdown  2. Assess vascular access sites hourly  3. Every 4-6 hours minimum:  Change oxygen saturation probe site  4. Every 4-6 hours:  If on nasal continuous positive airway pressure, respiratory therapy assess nares and determine need for appliance change or resting period.   11/8/2022 2256 by Merna Scruggs RN  Outcome: Not Progressing  11/8/2022 1604 by Janeen Olivo RN  Outcome: Not Progressing Problem: Safety - Adult  Goal: Free from fall injury  11/8/2022 2256 by Errol Coreas RN  Outcome: Not Progressing  Flowsheets (Taken 11/8/2022 2254)  Free From Fall Injury: Based on caregiver fall risk screen, instruct family/caregiver to ask for assistance with transferring infant if caregiver noted to have fall risk factors  11/8/2022 1604 by Gina Kessler RN  Outcome: Not Progressing  Flowsheets (Taken 11/8/2022 1208)  Free From Fall Injury: Based on caregiver fall risk screen, instruct family/caregiver to ask for assistance with transferring infant if caregiver noted to have fall risk factors     Problem: Neurosensory - Adult  Goal: Achieves stable or improved neurological status  11/8/2022 2256 by Errol Coreas RN  Outcome: Not Progressing  Flowsheets (Taken 11/8/2022 2025)  Achieves stable or improved neurological status: Assess for and report changes in neurological status  11/8/2022 1604 by Gina Kessler RN  Outcome: Not Progressing  Flowsheets (Taken 11/8/2022 0825)  Achieves stable or improved neurological status:   Assess for and report changes in neurological status   Monitor temperature, glucose, and sodium. Initiate appropriate interventions as ordered   Initiate measures to prevent increased intracranial pressure   Maintain blood pressure and fluid volume within ordered parameters to optimize cerebral perfusion and minimize risk of hemorrhage  Goal: Absence of seizures  11/8/2022 2256 by Errol Coreas RN  Outcome: Not Progressing  Flowsheets (Taken 11/8/2022 2025)  Absence of seizures: Monitor for seizure activity. If seizure occurs, document type and location of movements and any associated apnea  11/8/2022 1604 by Gina Kessler RN  Outcome: Not Progressing  Flowsheets (Taken 11/8/2022 0825)  Absence of seizures:   Monitor for seizure activity.   If seizure occurs, document type and location of movements and any associated apnea   If seizure occurs, turn head to side and suction secretions as needed   Administer anticonvulsants as ordered   Support airway/breathing, administer oxygen as needed   Diagnostic studies as ordered  Goal: Remains free of injury related to seizures activity  11/8/2022 2256 by Christine Miller RN  Outcome: Not Progressing  Flowsheets (Taken 11/8/2022 2025)  Remains free of injury related to seizure activity: Maintain airway, patient safety  and administer oxygen as ordered  11/8/2022 1604 by Duke Phillips RN  Outcome: Not Progressing  Flowsheets (Taken 11/8/2022 0825)  Remains free of injury related to seizure activity:   Maintain airway, patient safety  and administer oxygen as ordered   Monitor patient for seizure activity, document and report duration and description of seizure to Licensed Independent Practitioner   If seizure occurs, turn patient to side and suction secretions as needed   Reorient patient post seizure   Instruct patient/family to notify RN of any seizure activity   Seizure pads on all 4 side rails   Instruct patient/family to call for assistance with activity based on assessment  Goal: Achieves maximal functionality and self care  11/8/2022 2256 by Christine Miller RN  Outcome: Not Progressing  Flowsheets (Taken 11/8/2022 2025)  Achieves maximal functionality and self care: Monitor swallowing and airway patency with patient fatigue and changes in neurological status  11/8/2022 1604 by Duke Phillips RN  Outcome: Not Progressing  Flowsheets (Taken 11/8/2022 0825)  Achieves maximal functionality and self care:   Monitor swallowing and airway patency with patient fatigue and changes in neurological status   Encourage and assist patient to increase activity and self care with guidance from physical therapy/occupational therapy   Encourage visually impaired, hearing impaired and aphasic patients to use assistive/communication devices     Problem: Respiratory - Adult  Goal: Achieves optimal ventilation and oxygenation  11/8/2022 2256 by Gerhardt Rings Alonzo Caicedo RN  Outcome: Not Progressing  Flowsheets (Taken 11/8/2022 2025)  Achieves optimal ventilation and oxygenation:   Assess for changes in respiratory status   Assess for changes in mentation and behavior  11/8/2022 1604 by Duke Phillips RN  Outcome: Not Progressing  Flowsheets (Taken 11/8/2022 0825)  Achieves optimal ventilation and oxygenation:   Assess for changes in respiratory status   Assess for changes in mentation and behavior   Position to facilitate oxygenation and minimize respiratory effort   Oxygen supplementation based on oxygen saturation or arterial blood gases   Encourage broncho-pulmonary hygiene including cough, deep breathe, incentive spirometry   Initiate smoking cessation protocol as indicated   Assess the need for suctioning and aspirate as needed   Assess and instruct to report shortness of breath or any respiratory difficulty   Respiratory therapy support as indicated     Problem: Cardiovascular - Adult  Goal: Maintains optimal cardiac output and hemodynamic stability  11/8/2022 2256 by Christine Miller RN  Outcome: Not Progressing  Flowsheets (Taken 11/8/2022 2025)  Maintains optimal cardiac output and hemodynamic stability: Monitor blood pressure and heart rate  11/8/2022 1604 by Duke Phillips RN  Outcome: Not Progressing  Flowsheets (Taken 11/8/2022 0825)  Maintains optimal cardiac output and hemodynamic stability:   Monitor blood pressure and heart rate   Monitor urine output and notify Licensed Independent Practitioner for values outside of normal range   Assess for signs of decreased cardiac output   Administer fluid and/or volume expanders as ordered   Administer vasoactive medications as ordered   For PPHN infants, administer sedation as ordered and minimize all controllable stressors.   Goal: Absence of cardiac dysrhythmias or at baseline  11/8/2022 2256 by Christine Miller RN  Outcome: Not Progressing  Flowsheets (Taken 11/8/2022 2025)  Absence of cardiac dysrhythmias or at baseline: Monitor cardiac rate and rhythm  11/8/2022 1604 by Kami Tsang RN  Outcome: Not Progressing  Flowsheets (Taken 11/8/2022 0825)  Absence of cardiac dysrhythmias or at baseline:   Monitor cardiac rate and rhythm   Assess for signs of decreased cardiac output   Administer antiarrhythmia medication and electrolyte replacement as ordered     Problem: Skin/Tissue Integrity - Adult  Goal: Skin integrity remains intact  11/8/2022 2256 by Adelia Desai RN  Outcome: Not Progressing  Flowsheets  Taken 11/8/2022 2254  Skin Integrity Remains Intact: Monitor for areas of redness and/or skin breakdown  Taken 11/8/2022 2025  Skin Integrity Remains Intact: Monitor for areas of redness and/or skin breakdown  11/8/2022 1604 by Kami Tsang RN  Outcome: Not Progressing  Flowsheets (Taken 11/8/2022 1208)  Skin Integrity Remains Intact:   Monitor for areas of redness and/or skin breakdown   Assess vascular access sites hourly   Every 4-6 hours minimum: Change oxygen saturation probe site  Goal: Incisions, wounds, or drain sites healing without S/S of infection  11/8/2022 2256 by Adelia Desai RN  Outcome: Not Progressing  Flowsheets  Taken 11/8/2022 2254  Incisions, Wounds, or Drain Sites Healing Without Sign and Symptoms of Infection: ADMISSION and DAILY: Assess and document risk factors for pressure ulcer development  Taken 11/8/2022 2025  Incisions, Wounds, or Drain Sites Healing Without Sign and Symptoms of Infection: ADMISSION and DAILY: Assess and document risk factors for pressure ulcer development  11/8/2022 1604 by Kami Tsang RN  Outcome: Not Progressing  Flowsheets (Taken 11/8/2022 1208)  Incisions, Wounds, or Drain Sites Healing Without Sign and Symptoms of Infection:   ADMISSION and DAILY: Assess and document risk factors for pressure ulcer development   TWICE DAILY: Assess and document skin integrity   TWICE DAILY: Assess and document dressing/incision, wound bed, drain sites and surrounding tissue   Implement wound care per orders   Initiate isolation precautions as appropriate   Initiate pressure ulcer prevention bundle as indicated  Goal: Oral mucous membranes remain intact  11/8/2022 2256 by Mary Alice Madison RN  Outcome: Not Progressing  Flowsheets  Taken 11/8/2022 2254  Oral Mucous Membranes Remain Intact: Assess oral mucosa and hygiene practices  Taken 11/8/2022 2025  Oral Mucous Membranes Remain Intact: Assess oral mucosa and hygiene practices  11/8/2022 1604 by Ethan Gaviria RN  Outcome: Not Progressing  Flowsheets (Taken 11/8/2022 1208)  Oral Mucous Membranes Remain Intact:   Assess oral mucosa and hygiene practices   Implement preventative oral hygiene regimen     Problem: Musculoskeletal - Adult  Goal: Return mobility to safest level of function  11/8/2022 2256 by Mary Alice Madison RN  Outcome: Not Progressing  Flowsheets (Taken 11/8/2022 2025)  Return Mobility to Safest Level of Function: Assess patient stability and activity tolerance for standing, transferring and ambulating with or without assistive devices  11/8/2022 1604 by Ethan Gaviria RN  Outcome: Not Progressing  Goal: Maintain proper alignment of affected body part  11/8/2022 2256 by Mary Alice Madison RN  Outcome: Not Progressing  Flowsheets (Taken 11/8/2022 2025)  Maintain proper alignment of affected body part: Support and protect limb and body alignment per provider's orders  11/8/2022 1604 by Ethan Gaviria RN  Outcome: Not Progressing  Goal: Return ADL status to a safe level of function  11/8/2022 2256 by Mary Alice Madison RN  Outcome: Not Progressing  Flowsheets (Taken 11/8/2022 2025)  Return ADL Status to a Safe Level of Function: Administer medication as ordered  11/8/2022 1604 by Ethan Gaviria RN  Outcome: Not Progressing     Problem: Gastrointestinal - Adult  Goal: Minimal or absence of nausea and vomiting  11/8/2022 2256 by Mary Alice Madison RN  Outcome: Not Progressing  Flowsheets (Taken 11/8/2022 2025)  Minimal or absence of nausea and vomiting: Administer IV fluids as ordered to ensure adequate hydration  11/8/2022 1604 by Ruthann Palencia RN  Outcome: Not Progressing  Flowsheets (Taken 11/8/2022 0825)  Minimal or absence of nausea and vomiting:   Administer IV fluids as ordered to ensure adequate hydration   Maintain NPO status until nausea and vomiting are resolved   Nasogastric tube to low intermittent suction as ordered   Administer ordered antiemetic medications as needed   Provide nonpharmacologic comfort measures as appropriate   Advance diet as tolerated, if ordered   Nutrition consult to assist patient with adequate nutrition and appropriate food choices  Goal: Maintains or returns to baseline bowel function  11/8/2022 2256 by Arina Salcido RN  Outcome: Not Progressing  Flowsheets (Taken 11/8/2022 2025)  Maintains or returns to baseline bowel function: Assess bowel function  11/8/2022 1604 by Ruthann Palencia RN  Outcome: Not Progressing  Flowsheets (Taken 11/8/2022 0825)  Maintains or returns to baseline bowel function:   Assess bowel function   Encourage oral fluids to ensure adequate hydration   Administer IV fluids as ordered to ensure adequate hydration   Administer ordered medications as needed   Nutrition consult to assist patient with appropriate food choices  Goal: Maintains adequate nutritional intake  11/8/2022 2256 by Arina Salcido RN  Outcome: Not Progressing  Flowsheets (Taken 11/8/2022 2025)  Maintains adequate nutritional intake: Monitor percentage of each meal consumed  11/8/2022 1604 by Ruthann Palencia RN  Outcome: Not Progressing  Goal: Establish and maintain optimal ostomy function  11/8/2022 2256 by Arina Salcido RN  Outcome: Not Progressing  Flowsheets (Taken 11/8/2022 2025)  Establish and maintain optimal ostomy function: Monitor output from ostomies  11/8/2022 1604 by Ruthann Palencia RN  Outcome: Not Progressing  Flowsheets (Taken 11/8/2022 0825)  Establish and maintain optimal ostomy function:   Monitor output from ostomies   Nutrition consult     Problem: Genitourinary - Adult  Goal: Absence of urinary retention  11/8/2022 2256 by Amy Betts RN  Outcome: Not Progressing  Flowsheets (Taken 11/8/2022 2025)  Absence of urinary retention: Monitor intake/output and perform bladder scan as needed  11/8/2022 1604 by Jared Brooks RN  Outcome: Not Progressing  Flowsheets (Taken 11/8/2022 0825)  Absence of urinary retention:   Monitor intake/output and perform bladder scan as needed   Assess patients ability to void and empty bladder   Place urinary catheter per Licensed Independent Practitioner order if needed   Discuss catheterization for long term situations as appropriate  Goal: Urinary catheter remains patent  11/8/2022 2256 by Amy Betts RN  Outcome: Not Progressing  Flowsheets (Taken 11/8/2022 2025)  Urinary catheter remains patent: Assess patency of urinary catheter  11/8/2022 1604 by Jared Brooks RN  Outcome: Not Progressing  Flowsheets (Taken 11/8/2022 0825)  Urinary catheter remains patent:   Assess patency of urinary catheter   Assess need for a larger catheter size or a 3-way catheter for continuous bladder irrigation     Problem: Infection - Adult  Goal: Absence of infection at discharge  11/8/2022 2256 by Amy Betts RN  Outcome: Not Progressing  Flowsheets (Taken 11/8/2022 2025)  Absence of infection at discharge: Assess and monitor for signs and symptoms of infection  11/8/2022 1604 by Jared Brooks RN  Outcome: Not Progressing  Flowsheets (Taken 11/8/2022 0825)  Absence of infection at discharge:   Assess and monitor for signs and symptoms of infection   Monitor lab/diagnostic results   [place] appropriate cooling/warming therapies per order   Administer medications as ordered   Instruct and encourage patient and family to use good hand hygiene technique  Goal: Absence of infection during hospitalization  11/8/2022 2256 by Amy Betts RN  Outcome: Not Progressing  Flowsheets (Taken 11/8/2022 2025)  Absence of infection during hospitalization: Assess and monitor for signs and symptoms of infection  11/8/2022 1604 by Ruthann Palencia RN  Outcome: Not Progressing  Flowsheets (Taken 11/8/2022 0825)  Absence of infection during hospitalization:   Assess and monitor for signs and symptoms of infection   Monitor lab/diagnostic results   Monitor all insertion sites i.e., indwelling lines, tubes and drains  Goal: Absence of fever/infection during anticipated neutropenic period  11/8/2022 2256 by Arina Salcido RN  Outcome: Not Progressing  Flowsheets (Taken 11/8/2022 2025)  Absence of fever/infection during anticipated neutropenic period: Monitor white blood cell count  11/8/2022 1604 by Ruthann Palencia RN  Outcome: Not Progressing     Problem: Metabolic/Fluid and Electrolytes - Adult  Goal: Electrolytes maintained within normal limits  11/8/2022 2256 by Arina Salcido RN  Outcome: Not Progressing  Flowsheets (Taken 11/8/2022 2025)  Electrolytes maintained within normal limits: Monitor labs and assess patient for signs and symptoms of electrolyte imbalances  11/8/2022 1604 by Ruthann Palencia RN  Outcome: Not Progressing  Goal: Hemodynamic stability and optimal renal function maintained  11/8/2022 2256 by Arina Salcido RN  Outcome: Not Progressing  11/8/2022 1604 by Ruthann Palencia RN  Outcome: Not Progressing  Goal: Glucose maintained within prescribed range  11/8/2022 2256 by Arina Salcido RN  Outcome: Not Progressing  Flowsheets (Taken 11/8/2022 2025)  Glucose maintained within prescribed range: Monitor blood glucose as ordered  11/8/2022 1604 by Ruthann Palencia RN  Outcome: Not Progressing     Problem: Hematologic - Adult  Goal: Maintains hematologic stability  11/8/2022 2256 by Arina Salcido RN  Outcome: Not Progressing  Flowsheets (Taken 11/8/2022 2025)  Maintains hematologic stability: Assess for signs and symptoms of bleeding or hemorrhage  11/8/2022 1604 by Tiff Graves RN  Outcome: Not Progressing  Flowsheets (Taken 11/8/2022 0825)  Maintains hematologic stability:   Assess for signs and symptoms of bleeding or hemorrhage   Monitor labs for bleeding or clotting disorders   Administer blood products/factors as ordered     Problem: Chronic Conditions and Co-morbidities  Goal: Patient's chronic conditions and co-morbidity symptoms are monitored and maintained or improved  11/8/2022 2256 by Laurel Jaimes RN  Outcome: Not Progressing  Flowsheets (Taken 11/8/2022 2025)  Care Plan - Patient's Chronic Conditions and Co-Morbidity Symptoms are Monitored and Maintained or Improved: Update acute care plan with appropriate goals if chronic or comorbid symptoms are exacerbated and prevent overall improvement and discharge  11/8/2022 1604 by Tiff Graves RN  Outcome: Not Progressing  Flowsheets (Taken 11/8/2022 0825)  Care Plan - Patient's Chronic Conditions and Co-Morbidity Symptoms are Monitored and Maintained or Improved:   Update acute care plan with appropriate goals if chronic or comorbid symptoms are exacerbated and prevent overall improvement and discharge   Collaborate with multidisciplinary team to address chronic and comorbid conditions and prevent exacerbation or deterioration   Monitor and assess patient's chronic conditions and comorbid symptoms for stability, deterioration, or improvement     Problem: ABCDS Injury Assessment  Goal: Absence of physical injury  11/8/2022 2256 by Laurel Jaimes RN  Outcome: Not Progressing  Flowsheets (Taken 11/8/2022 2254)  Absence of Physical Injury: Implement safety measures based on patient assessment  11/8/2022 1604 by Tiff Graves RN  Outcome: Not Progressing  Flowsheets (Taken 11/8/2022 1208)  Absence of Physical Injury: Implement safety measures based on patient assessment     Problem: Nutrition Deficit:  Goal: Optimize nutritional status  11/8/2022 2256 by Laurel Jaimes RN  Outcome: Not Progressing  11/8/2022 1604 by Rahul Arambula RN  Outcome: Not Progressing  Flowsheets (Taken 11/8/2022 0536 by Shaan Izquierdo, RD, LD)  Nutrient intake appropriate for improving, restoring, or maintaining nutritional needs:   Monitor oral intake, labs, and treatment plans   Recommend appropriate diets, oral nutritional supplements, and vitamin/mineral supplements

## 2022-11-09 NOTE — PROGRESS NOTES
Progress Note  Date:2022       LIZET:[id number]  Patient Ramses Sanchez     Date of Birth:[birth year]     Age:52 y.o. Subjective    Subjective: 26-year-old female with a history of MS, peripheral vascular disease, seizures, presented to the hospital 2022 with concerns of abnormal labs. Outpatient was seen by wound care, patient with chronic right foot wound and cultures outpatient grew Pseudomonas, Proteus, Enterococcus faecalis and E. coli. Initially on broad-spectrum antibiotics with Vanco and meropenem and transition to Unasyn and meropenem after review of sensitivities. Seen in house by vascular surgery, repeat x-ray of the low right foot was obtained with no definitive diagnosis of osteomyelitis. Recommended continuation of antibiotics, local wound care and ID evaluation and recommendations. Patient evaluated by ID and recc biopsy to r/o fungal/mycobacterial infection as well as malignancy. Vascular plans biopsy. Patient was seen in house today with no family member present, denied any acute overnight event, denied any chest pain or shortness of breath denied any other ongoing issues at this time. Review of Systems: 12 point system review negative except as above. Objective         Vitals Last 24 Hours:  TEMPERATURE:  Temp  Av.8 °F (36.6 °C)  Min: 97.3 °F (36.3 °C)  Max: 98.6 °F (37 °C)  RESPIRATIONS RANGE: Resp  Avg: 15  Min: 14  Max: 16  PULSE OXIMETRY RANGE: SpO2  Av %  Min: 96 %  Max: 98 %  PULSE RANGE: Pulse  Av.7  Min: 61  Max: 66  BLOOD PRESSURE RANGE: Systolic (33VXS), MHM:775 , Min:112 , PYU:768   ; Diastolic (26HYE), SAFIA:78, Min:70, Max:92    I/O (24Hr): Intake/Output Summary (Last 24 hours) at 2022 1404  Last data filed at 2022 1059  Gross per 24 hour   Intake 1386 ml   Output 1000 ml   Net 386 ml         Physical Examination:  General: Underweight, no acute distress lying comfortably in bed.   HEENT: Atraumatic normocephalic, range of motion normal, no JVD, no tracheal deviation noted. Cardiac: Normal S1-S2   Respiratory: clear To auscultation bilaterally, no rhonchi or rales, no wheezing  Abdomen: Soft, positive bowel sounds in all quadrants, no distention, nontender to palpation, no organomegaly noted, no rebound noted. Extremities: ankle wound noted in media, no tenderness, no edema, moves all extremities  Psych: Affect normal and good eye contact, behavioral normal.        Labs/Imaging/Diagnostics    Labs:  CBC:  Recent Labs     11/07/22  0640   WBC 3.0*   RBC 3.98*   HGB 11.6*   HCT 36.1*   MCV 90.7   RDW 14.0          CHEMISTRIES:  Recent Labs     11/07/22 0640 11/08/22  1334   * 143   K 3.4* 4.1    105   CO2 30* 32*   BUN 8 10   CREATININE 0.2* 0.2*   GLUCOSE 90 97       PT/INR:No results for input(s): PROTIME, INR in the last 72 hours. APTT:No results for input(s): APTT in the last 72 hours. LIVER PROFILE:  Recent Labs     11/07/22  0640   AST 23   ALT 24   BILITOT <0.2   ALKPHOS 96         Imaging Last 24 Hours:  XR ANKLE LEFT (MIN 3 VIEWS)    Result Date: 11/3/2022  CLINICAL HISTORY: Wound evaluation TECHNIQUE: 3 views of the left ankle FINDINGS: Severe bony demineralization. Postsurgical changes from transmetatarsal amputation. The surgical margins appear sharp. Surgical clips are seen in the distal soft tissues of the foot. There is no evidence of acute fracture or dislocation. Ankle mortise joint is unremarkable. The soft tissues are unremarkable. No acute osseous abnormality of the ankle. No osseous erosions are seen to suggest osteomyelitis. XR ANKLE RIGHT (MIN 3 VIEWS)    Result Date: 11/4/2022  NO PRIOR REPORT AVAILABLE Exam:X-RAYS OF RIGHT ANKLE Clinical data: Questionable osteo. Technique: Three views of the right ankle. Prior studies: Radiographs of the right ankle dated 10/31/2022. Findings: Diffuse osteopenia which may obscure fine bony detail.   The right ankle demonstrates no evidence of fracture or dislocation. The talar dome is intact. The medial, lateral and posterior malleoli show no gross abnormality. Talofibular degenerative changes again noted. Rest the intra-articular surfaces are within normal limits. No erosive lesions, no abnormal calcifications, no foci of subcutaneous air. Diffuse osteopenia. No acute osseous abnormality or erosive lesions. Low sensitivity of plain radiography for early osteomyelitis. Could correlate with MR if clinically warranted and not contraindicated. Recommendation: Follow up as clinically indicated. Note: Direct correlation with point tenderness and the X-ray images is recommended and does significantly increase the sensitivity of radiographic evaluation. Electronically Signed by Gerson Nye MD at 04-Nov-2022 09:40:50 AM EST             XR FOOT LEFT (MIN 3 VIEWS)    Result Date: 11/3/2022  LEFT FOOT, THREE VIEWS: CLINICAL HISTORY: Wound FINDINGS : There is transmetatarsal i amputation of the left foot the stump appears to be intact. There are surgical clip near the 1st and 2nd metatarsal . There is severe osteopenia of the osseous structure. The left foot has a neuropathic radiographic appearance. There is no evidence of fracture or dislocation. The soft tissues appear normal.    Transmetatarsal amputation of the left foot with postsurgical changes Severe osteopenia    XR FOOT RIGHT (MIN 3 VIEWS)    Result Date: 11/4/2022  NO PRIOR REPORT AVAILABLE Exam:X-RAYS OF RIGHT FOOT Clinical data:Questionable osteo. Technique: Three views of the right foot. Prior studies: No prior studies submitted. Findings: Diffuse osteopenia which may obscure fine bony detail. Toes are held in flexion. Arterial calcifications. No evidence of fracture or dislocation. No discernible erosive lesion. The intra-articular surfaces are within normal limits. The plantar fascia shows no gross abnormality. The first MTP sesamoid bones show no gross abnormality.     Diffuse osteopenia which may obscure fine bony detail. Toes are held in flexion. Arterial calcifications. No acute osseous abnormality or significant degenerative/erosive change. Low sensitivity of plain radiography for early osteomyelitis. Could correlate with MR if clinically warranted and not contraindicated. Recommendation: Follow up as clinically indicated. Note: Direct correlation with point tenderness and the X-ray images is recommended and does significantly increase the sensitivity of radiographic evaluation. Electronically Signed by Gerson Nye MD at 04-Nov-2022 10:35:39 AM EST             Assessment//Plan           Hospital Problems             Last Modified POA    * (Principal) Ankle wound, right, sequela 11/2/2022 Yes    Multiple sclerosis ([facility] [address] 11/2/2022 Yes    Severe malnutrition ([address] 11/3/2022 Yes    Infected wound 11/7/2022 Yes    Peripheral vascular disease ([facility] [address] 11/2/2022 Yes    Overview Signed 10/14/2017  7:29 PM by [facility]     Updating Deprecated Diagnoses          Assessment & Plan    Right Ankle wound  Wound culture obtained 10/24: Pseudomonas Aeruginosa, Proteus Mirabilis, Enterococcus faecalis, Escherichia coli  Initiatlly on Vanc and ford then transitoned to Unasyn and meropenem; evaluated by ID and recc d/c unasyn  ID recc biopsy to r/o fungal/mycobacterial infection as well as malignancy. Vascular surgery following  VL Arterial LE mildly diminished flow to bilateral lower extremities  Wound care  Blood cultures currently negative. History of Seizures: Continue Keppra    Multiple Sclerosis: Stable    Severe Malnutrition    PVD      Electronically signed by   Alex Bell MD   Internal Medicine Hospitalist  On 11/9/2022  At 2:04 PM    EMR Dragon/Transcription disclaimer:   Much of this encounter note is an electronic transcription/translation of spoken language to printed text.  The electronic translation of spoken language may permit erroneous, or at times, nonsensical words or phrases to be inadvertently transcribed; although attempts have made to review the note for such errors, some may still exist.

## 2022-11-09 NOTE — OP NOTE
"  ICU PROCEDURE NOTE     Aroldo Prado  Gestational Age: 35w0d male now 37w 5d on DOL# [id number]    Informed Consent: was obtained from parent/guardian and \"time-out\" performed as indicated by the procedure.  Indication: routine circumcision     GOMCO CIRCUMCISION     Good hand hygiene performed and the sterile barriers, including mask, hand hygiene, gloves and antiseptics    Site Prep: betadine    Prep was dry at time of initiation: Yes    Procedural Pain Management: lidocaine 1% injectable (0.8-1 mL)    Equipment Used: GOMCO CLAMP 1.1    Exam: No obvious hypospadias, chordee, torsion, or penile scrotal webbing was present on exam    Description: The infant was laid in a supine position and the surgical field was prepped and draped in sterile fashion.   1 mL of 1% lidocaine without epinephrine was used to anesthetize the penis with dorsal penile nerve block. A pacifier with sucrose water was used to aid anesthesia.   Foreskin & mucosa were  from glans using a blunt probe and a hemostat. The foreskin was retracted and adhesions were removed bluntly. A dorsal slit was made after clamping the foreskin. The size 1.1 Gomco bell and clamp was placed and secured ensuring the dorsal slit was completely included and that the amount of foreskin was symmetric on all sides. After securing the Gomco clamp to ensure hemostasis for 3 mins, the foreskin was cut with a scalpel. The Gomco clamp was removed. Hemostasis was assured. The wound was dressed with vaseline gauze.  Estimated blood loss: Trace    Findings and/orComplication(s): None     Assisted by: NICU Staff nurse    Eric Christianson MD  [facility] - Neonatology  [facility]    Documentation reviewed and electronically signed on 2021 at 11:52 EDT    " Operative Note      Patient: Ed Wild  YOB: 1970  MRN: [id number]      Procedure  Biopsy Note    Date: 11/9/22    Performed by: Zehra Larson, DO    Consent obtained? Yes    Time out taken:Yes    Procedure performed at the bedside of the right dorsal ankle. Pain Control:      Location of Biopsy:  Wound/Ulcer Number(s)  Wound/Ulcer # 1    Wound 09/22/22 Foot Anterior;Right;Dorsal Wound #1 Right dorsal foot (traumatic) (Active)   Wound Image   10/24/22 1308   Wound Etiology Venous 11/09/22 0829   Dressing Status New dressing applied 11/09/22 0829   Wound Cleansed Cleansed with saline; Soap and water 11/09/22 0829   Dressing/Treatment Moist to dry; Pharmaceutical agent (see MAR); Petroleum impregnated gauze; Roll gauze 11/09/22 0829   Offloading for Diabetic Foot Ulcers Offloading boot 11/09/22 0829   Dressing Change Due 11/08/22 11/08/22 2025   Wound Length (cm) 1.5 cm 10/24/22 1308   Wound Width (cm) 1.7 cm 10/24/22 1308   Wound Depth (cm) 0.2 cm 10/24/22 1308   Wound Surface Area (cm^2) 2.55 cm^2 10/24/22 1308   Change in Wound Size % (l*w) -1175 10/24/22 1308   Wound Volume (cm^3) 0.51 cm^3 10/24/22 1308   Wound Healing % -2450 10/24/22 1308   Post-Procedure Length (cm) 1.5 cm 10/24/22 1326   Post-Procedure Width (cm) 1.7 cm 10/24/22 1326   Post-Procedure Depth (cm) 0.2 cm 10/24/22 1326   Post-Procedure Surface Area (cm^2) 2.55 cm^2 10/24/22 1326   Post-Procedure Volume (cm^3) 0.51 cm^3 10/24/22 1326   Wound Assessment Eschar dry;Pink/red 11/09/22 0829   Drainage Amount None 11/09/22 0829   Drainage Description Serosanguinous 11/09/22 0829   Odor None 11/09/22 0829   Audelia-wound Assessment Intact; Hemosiderin staining (brown yellow) 11/09/22 0829   Margins Attached edges 11/09/22 0829   Wound Thickness Description not for Pressure Injury Full thickness 11/09/22 0829   Number of days: 48         Biopsy performed with: #15 blade at the 3 and 5 o'clock location    Instruments:#15 blade scalpel and forceps     Specimen sent to Pathology:Yes    Bleeding: with a small amount of bleeding    Hemostasis Achieved:by pressure    Procedural Pain:3  / 10     Post Procedural Pain:1 / 10     Response to treatment:Well tolerated by patient.            Electronically signed by Cristian Menendez DO on 11/9/2022 at 3:17 PM

## 2022-11-09 NOTE — PLAN OF CARE
Problem: Discharge Planning  Goal: Discharge to home or other facility with appropriate resources  Outcome: Not Progressing  Flowsheets (Taken 11/9/2022 0829)  Discharge to home or other facility with appropriate resources:   Identify barriers to discharge with patient and caregiver   Arrange for needed discharge resources and transportation as appropriate   Identify discharge learning needs (meds, wound care, etc)   Arrange for interpreters to assist at discharge as needed   Refer to discharge planning if patient needs post-hospital services based on physician order or complex needs related to functional status, cognitive ability or social support system     Problem: Pain  Goal: Verbalizes/displays adequate comfort level or baseline comfort level  Outcome: Not Progressing     Problem: Skin/Tissue Integrity  Goal: Absence of new skin breakdown  Outcome: Not Progressing     Problem: Safety - Adult  Goal: Free from fall injury  Outcome: Not Progressing  Flowsheets (Taken 11/9/2022 1203)  Free From Fall Injury:   Instruct family/caregiver on patient safety   Based on caregiver fall risk screen, instruct family/caregiver to ask for assistance with transferring infant if caregiver noted to have fall risk factors     Problem: Neurosensory - Adult  Goal: Achieves stable or improved neurological status  Outcome: Not Progressing  Flowsheets (Taken 11/9/2022 0829)  Achieves stable or improved neurological status:   Assess for and report changes in neurological status   Initiate measures to prevent increased intracranial pressure   Maintain blood pressure and fluid volume within ordered parameters to optimize cerebral perfusion and minimize risk of hemorrhage   Monitor temperature, glucose, and sodium. Initiate appropriate interventions as ordered  Goal: Absence of seizures  Outcome: Not Progressing  Flowsheets (Taken 11/9/2022 0829)  Absence of seizures:   Monitor for seizure activity.   If seizure occurs, document type and location of movements and any associated apnea   If seizure occurs, turn head to side and suction secretions as needed   Support airway/breathing, administer oxygen as needed   Administer anticonvulsants as ordered   Diagnostic studies as ordered  Goal: Remains free of injury related to seizures activity  Outcome: Not Progressing  Flowsheets (Taken 11/9/2022 0829)  Remains free of injury related to seizure activity:   Maintain airway, patient safety  and administer oxygen as ordered   Monitor patient for seizure activity, document and report duration and description of seizure to Licensed Independent Practitioner   If seizure occurs, turn patient to side and suction secretions as needed   Reorient patient post seizure   Seizure pads on all 4 side rails   Instruct patient/family to notify RN of any seizure activity   Instruct patient/family to call for assistance with activity based on assessment  Goal: Achieves maximal functionality and self care  Outcome: Not Progressing  Flowsheets (Taken 11/9/2022 0829)  Achieves maximal functionality and self care:   Monitor swallowing and airway patency with patient fatigue and changes in neurological status   Encourage and assist patient to increase activity and self care with guidance from physical therapy/occupational therapy   Encourage visually impaired, hearing impaired and aphasic patients to use assistive/communication devices     Problem: Respiratory - Adult  Goal: Achieves optimal ventilation and oxygenation  Outcome: Not Progressing  Flowsheets (Taken 11/9/2022 0829)  Achieves optimal ventilation and oxygenation:   Assess for changes in mentation and behavior   Position to facilitate oxygenation and minimize respiratory effort   Assess for changes in respiratory status   Oxygen supplementation based on oxygen saturation or arterial blood gases   Initiate smoking cessation protocol as indicated   Encourage broncho-pulmonary hygiene including cough, deep breathe, incentive spirometry   Assess and instruct to report shortness of breath or any respiratory difficulty   Assess the need for suctioning and aspirate as needed     Problem: Cardiovascular - Adult  Goal: Maintains optimal cardiac output and hemodynamic stability  Outcome: Not Progressing  Flowsheets (Taken 11/9/2022 0829)  Maintains optimal cardiac output and hemodynamic stability:   Monitor urine output and notify Licensed Independent Practitioner for values outside of normal range   Monitor blood pressure and heart rate   Assess for signs of decreased cardiac output   Administer fluid and/or volume expanders as ordered   Administer vasoactive medications as ordered  Goal: Absence of cardiac dysrhythmias or at baseline  Outcome: Not Progressing  Flowsheets (Taken 11/9/2022 0829)  Absence of cardiac dysrhythmias or at baseline:   Monitor cardiac rate and rhythm   Assess for signs of decreased cardiac output   Administer antiarrhythmia medication and electrolyte replacement as ordered     Problem: Skin/Tissue Integrity - Adult  Goal: Skin integrity remains intact  Outcome: Not Progressing  Flowsheets (Taken 11/9/2022 0829)  Skin Integrity Remains Intact:   Monitor for areas of redness and/or skin breakdown   Assess vascular access sites hourly   Every 4-6 hours minimum: Change oxygen saturation probe site   Every 4-6 hours: If on nasal continuous positive airway pressure, respiratory therapy assesses nares and determine need for appliance change or resting period  Goal: Incisions, wounds, or drain sites healing without S/S of infection  Outcome: Not Progressing  Flowsheets (Taken 11/9/2022 0829)  Incisions, Wounds, or Drain Sites Healing Without Sign and Symptoms of Infection:   ADMISSION and DAILY: Assess and document risk factors for pressure ulcer development   TWICE DAILY: Assess and document skin integrity   TWICE DAILY: Assess and document dressing/incision, wound bed, drain sites and surrounding tissue Implement wound care per orders   Initiate isolation precautions as appropriate   Initiate pressure ulcer prevention bundle as indicated  Goal: Oral mucous membranes remain intact  Outcome: Not Progressing  Flowsheets (Taken 11/9/2022 0829)  Oral Mucous Membranes Remain Intact:   Assess oral mucosa and hygiene practices   Implement oral medicated treatments as ordered   Implement preventative oral hygiene regimen     Problem: Musculoskeletal - Adult  Goal: Return mobility to safest level of function  Outcome: Not Progressing  Flowsheets (Taken 11/9/2022 0829)  Return Mobility to Safest Level of Function:   Assess patient stability and activity tolerance for standing, transferring and ambulating with or without assistive devices   Assist with transfers and ambulation using safe patient handling equipment as needed   Ensure adequate protection for wounds/incisions during mobilization   Obtain physical therapy/occupational therapy consults as needed   Apply continuous passive motion per provider or physical therapy orders to increase flexion toward goal   Instruct patient/family in ordered activity level  Goal: Maintain proper alignment of affected body part  Outcome: Not Progressing  Flowsheets (Taken 11/9/2022 0829)  Maintain proper alignment of affected body part:   Support and protect limb and body alignment per provider's orders   Instruct and reinforce with patient and family use of appropriate assistive device and precautions (e.g. spinal or hip dislocation precautions)  Goal: Return ADL status to a safe level of function  Outcome: Not Progressing  Flowsheets (Taken 11/9/2022 0829)  Return ADL Status to a Safe Level of Function:   Administer medication as ordered   Assess activities of daily living deficits and provide assistive devices as needed   Obtain physical therapy/occupational therapy consults as needed   Assist and instruct patient to increase activity and self care as tolerated     Problem: Gastrointestinal - Adult  Goal: Minimal or absence of nausea and vomiting  Outcome: Not Progressing  Flowsheets (Taken 11/9/2022 0829)  Minimal or absence of nausea and vomiting:   Administer IV fluids as ordered to ensure adequate hydration   Maintain NPO status until nausea and vomiting are resolved   Administer ordered antiemetic medications as needed   Provide nonpharmacologic comfort measures as appropriate   Advance diet as tolerated, if ordered   Nutrition consult to assist patient with adequate nutrition and appropriate food choices   Nasogastric tube to low intermittent suction as ordered  Goal: Maintains or returns to baseline bowel function  Outcome: Not Progressing  Flowsheets (Taken 11/9/2022 0829)  Maintains or returns to baseline bowel function:   Assess bowel function   Encourage oral fluids to ensure adequate hydration   Administer ordered medications as needed   Administer IV fluids as ordered to ensure adequate hydration   Encourage mobilization and activity   Nutrition consult to assist patient with appropriate food choices  Goal: Maintains adequate nutritional intake  Outcome: Not Progressing  Flowsheets (Taken 11/9/2022 0829)  Maintains adequate nutritional intake:   Identify factors contributing to decreased intake, treat as appropriate   Monitor percentage of each meal consumed   Assist with meals as needed   Monitor intake and output, weight and lab values   Obtain nutritional consult as needed  Goal: Establish and maintain optimal ostomy function  Outcome: Not Progressing  Flowsheets (Taken 11/9/2022 0829)  Establish and maintain optimal ostomy function:   Monitor output from ostomies   Administer IV fluids and TPN as ordered   Introduce and advance enteral feedings as ordered   Nutrition consult     Problem: Genitourinary - Adult  Goal: Absence of urinary retention  Outcome: Not Progressing  Flowsheets (Taken 11/9/2022 0829)  Absence of urinary retention:   Monitor intake/output and perform bladder scan as needed   Assess patients ability to void and empty bladder   Place urinary catheter per Licensed Independent Practitioner order if needed  Goal: Urinary catheter remains patent  Outcome: Not Progressing  Flowsheets (Taken 11/9/2022 0829)  Urinary catheter remains patent: Assess patency of urinary catheter     Problem: Infection - Adult  Goal: Absence of infection at discharge  Outcome: Not Progressing  Goal: Absence of infection during hospitalization  Outcome: Not Progressing  Goal: Absence of fever/infection during anticipated neutropenic period  Outcome: Not Progressing     Problem: Metabolic/Fluid and Electrolytes - Adult  Goal: Electrolytes maintained within normal limits  Outcome: Not Progressing  Flowsheets (Taken 11/9/2022 0829)  Electrolytes maintained within normal limits:   Monitor labs and assess patient for signs and symptoms of electrolyte imbalances   Administer electrolyte replacement as ordered   Monitor response to electrolyte replacements, including repeat lab results as appropriate   Fluid restriction as ordered   Instruct patient on fluid and nutrition restrictions as appropriate  Goal: Hemodynamic stability and optimal renal function maintained  Outcome: Not Progressing  Flowsheets (Taken 11/9/2022 0829)  Hemodynamic stability and optimal renal function maintained:   Monitor labs and assess for signs and symptoms of volume excess or deficit   Monitor intake, output and patient weight   Monitor urine specific gravity, serum osmolarity and serum sodium as indicated or ordered   Monitor response to interventions for patient's volume status, including labs, urine output, blood pressure (other measures as available)   Instruct patient on fluid and nutrition restrictions as appropriate   Encourage oral intake as appropriate  Goal: Glucose maintained within prescribed range  Outcome: Not Progressing     Problem: Hematologic - Adult  Goal: Maintains hematologic stability  Outcome: Not Progressing  Flowsheets (Taken 11/9/2022 0829)  Maintains hematologic stability:   Assess for signs and symptoms of bleeding or hemorrhage   Monitor labs for bleeding or clotting disorders   Administer blood products/factors as ordered     Problem: Chronic Conditions and Co-morbidities  Goal: Patient's chronic conditions and co-morbidity symptoms are monitored and maintained or improved  Outcome: Not Progressing  Flowsheets (Taken 11/9/2022 0829)  Care Plan - Patient's Chronic Conditions and Co-Morbidity Symptoms are Monitored and Maintained or Improved:   Monitor and assess patient's chronic conditions and comorbid symptoms for stability, deterioration, or improvement   Collaborate with multidisciplinary team to address chronic and comorbid conditions and prevent exacerbation or deterioration   Update acute care plan with appropriate goals if chronic or comorbid symptoms are exacerbated and prevent overall improvement and discharge     Problem: ABCDS Injury Assessment  Goal: Absence of physical injury  Outcome: Not Progressing  Flowsheets (Taken 11/9/2022 1203)  Absence of Physical Injury: Implement safety measures based on patient assessment     Problem: Nutrition Deficit:  Goal: Optimize nutritional status  Outcome: Not Progressing

## 2022-11-10 VITALS
TEMPERATURE: 97.7 F | BODY MASS INDEX: 20.4 KG/M2 | DIASTOLIC BLOOD PRESSURE: 74 MMHG | HEIGHT: 69 IN | RESPIRATION RATE: 18 BRPM | OXYGEN SATURATION: 97 % | SYSTOLIC BLOOD PRESSURE: 98 MMHG | WEIGHT: 137.7 LBS | HEART RATE: 57 BPM

## 2022-11-10 PROCEDURE — 6370000000 HC RX 637 (ALT 250 FOR IP)

## 2022-11-10 PROCEDURE — 6360000002 HC RX W HCPCS: Performed by: HOSPITALIST

## 2022-11-10 PROCEDURE — 6370000000 HC RX 637 (ALT 250 FOR IP): Performed by: HOSPITALIST

## 2022-11-10 PROCEDURE — 99231 SBSQ HOSP IP/OBS SF/LOW 25: CPT | Performed by: SURGERY

## 2022-11-10 PROCEDURE — 6360000002 HC RX W HCPCS

## 2022-11-10 RX ORDER — HEPARIN SODIUM (PORCINE) LOCK FLUSH IV SOLN 100 UNIT/ML 100 UNIT/ML
300 SOLUTION INTRAVENOUS ONCE
Status: DISCONTINUED | OUTPATIENT
Start: 2022-11-10 | End: 2022-11-10 | Stop reason: HOSPADM

## 2022-11-10 RX ADMIN — TIZANIDINE 12 MG: 4 TABLET ORAL at 09:25

## 2022-11-10 RX ADMIN — Medication 250 ML: at 10:39

## 2022-11-10 RX ADMIN — MEROPENEM AND SODIUM CHLORIDE 1000 MG: 1 INJECTION, SOLUTION INTRAVENOUS at 03:49

## 2022-11-10 RX ADMIN — Medication 1 TABLET: at 09:26

## 2022-11-10 RX ADMIN — HYDROCODONE BITARTRATE AND ACETAMINOPHEN 1 TABLET: 10; 325 TABLET ORAL at 09:28

## 2022-11-10 RX ADMIN — PILOCARPINE HYDROCHLORIDE 7.5 MG: 5 TABLET, FILM COATED ORAL at 09:26

## 2022-11-10 RX ADMIN — ENOXAPARIN SODIUM 40 MG: 100 INJECTION SUBCUTANEOUS at 09:29

## 2022-11-10 RX ADMIN — CETIRIZINE HYDROCHLORIDE 10 MG: 10 TABLET, FILM COATED ORAL at 09:25

## 2022-11-10 ASSESSMENT — PAIN DESCRIPTION - LOCATION: LOCATION: NECK;BACK

## 2022-11-10 ASSESSMENT — PAIN SCALES - GENERAL: PAINLEVEL_OUTOF10: 3

## 2022-11-10 NOTE — DISCHARGE INSTRUCTIONS
Wound Care Instructions RIGHT dorsal foot:    2 times daily gently wash area with soap and water, pat dry. Acetic Acid moistened gauze in open wound, cover with single layer of Vaseline Gauze. Cover Vaseline gauze with dry fluffs, wrap with gauze roll. Heel lift boots to protect heels from pressure. Elevate legs often to prevent swelling. If you have excessive bleeding, drainage with odor, redness or new tenderness at your incision site, call [address]. If you have a temperature over 101, fever and chills, call   [address] [facility]  at [phone].

## 2022-11-10 NOTE — PLAN OF CARE
Problem: Discharge Planning  Goal: Discharge to home or other facility with appropriate resources  11/10/2022 0142 by Caprice Wilson RN  Outcome: Progressing  Flowsheets (Taken 11/9/2022 2010)  Discharge to home or other facility with appropriate resources: Identify barriers to discharge with patient and caregiver  11/9/2022 1636 by Duke Phillips RN  Outcome: Not Progressing  Flowsheets (Taken 11/9/2022 3498)  Discharge to home or other facility with appropriate resources:   Identify barriers to discharge with patient and caregiver   Arrange for needed discharge resources and transportation as appropriate   Identify discharge learning needs (meds, wound care, etc)   Arrange for interpreters to assist at discharge as needed   Refer to discharge planning if patient needs post-hospital services based on physician order or complex needs related to functional status, cognitive ability or social support system     Problem: Pain  Goal: Verbalizes/displays adequate comfort level or baseline comfort level  11/10/2022 0142 by Caprice Wilson RN  Outcome: Progressing  11/9/2022 1636 by Duke Phillips RN  Outcome: Not Progressing     Problem: Skin/Tissue Integrity  Goal: Absence of new skin breakdown  Description: 1. Monitor for areas of redness and/or skin breakdown  2. Assess vascular access sites hourly  3. Every 4-6 hours minimum:  Change oxygen saturation probe site  4. Every 4-6 hours:  If on nasal continuous positive airway pressure, respiratory therapy assess nares and determine need for appliance change or resting period.   11/10/2022 0142 by Caprice Wilson RN  Outcome: Progressing  11/9/2022 1636 by Duke Phillips RN  Outcome: Not Progressing     Problem: Safety - Adult  Goal: Free from fall injury  11/10/2022 0142 by Caprice Wilson RN  Outcome: Progressing  11/9/2022 1636 by Duke Phillips RN  Outcome: Not Progressing  Flowsheets (Taken 11/9/2022 1203)  Free From Fall Injury: Instruct family/caregiver on patient safety   Based on caregiver fall risk screen, instruct family/caregiver to ask for assistance with transferring infant if caregiver noted to have fall risk factors     Problem: Neurosensory - Adult  Goal: Achieves stable or improved neurological status  11/10/2022 0142 by Bassem Drake RN  Outcome: Progressing  11/9/2022 1636 by Betzy Lang RN  Outcome: Not Progressing  Flowsheets (Taken 11/9/2022 0829)  Achieves stable or improved neurological status:   Assess for and report changes in neurological status   Initiate measures to prevent increased intracranial pressure   Maintain blood pressure and fluid volume within ordered parameters to optimize cerebral perfusion and minimize risk of hemorrhage   Monitor temperature, glucose, and sodium. Initiate appropriate interventions as ordered  Goal: Absence of seizures  11/10/2022 0142 by Bassem Drake RN  Outcome: Progressing  11/9/2022 1636 by Betzy Lang RN  Outcome: Not Progressing  Flowsheets (Taken 11/9/2022 9183)  Absence of seizures:   Monitor for seizure activity.   If seizure occurs, document type and location of movements and any associated apnea   If seizure occurs, turn head to side and suction secretions as needed   Support airway/breathing, administer oxygen as needed   Administer anticonvulsants as ordered   Diagnostic studies as ordered  Goal: Remains free of injury related to seizures activity  11/10/2022 0142 by Bassem Drake RN  Outcome: Progressing  11/9/2022 1636 by Betzy Lang RN  Outcome: Not Progressing  Flowsheets (Taken 11/9/2022 3819)  Remains free of injury related to seizure activity:   Maintain airway, patient safety  and administer oxygen as ordered   Monitor patient for seizure activity, document and report duration and description of seizure to Licensed Independent Practitioner   If seizure occurs, turn patient to side and suction secretions as needed   Reorient patient post seizure   Seizure pads on all 4 side rails   Instruct patient/family to notify RN of any seizure activity   Instruct patient/family to call for assistance with activity based on assessment  Goal: Achieves maximal functionality and self care  11/10/2022 0142 by Genoveva Saldivar RN  Outcome: Progressing  11/9/2022 1636 by Jared Brooks RN  Outcome: Not Progressing  Flowsheets (Taken 11/9/2022 0829)  Achieves maximal functionality and self care:   Monitor swallowing and airway patency with patient fatigue and changes in neurological status   Encourage and assist patient to increase activity and self care with guidance from physical therapy/occupational therapy   Encourage visually impaired, hearing impaired and aphasic patients to use assistive/communication devices     Problem: Respiratory - Adult  Goal: Achieves optimal ventilation and oxygenation  11/10/2022 0142 by Genoveva Saldivar RN  Outcome: Progressing  11/9/2022 1636 by Jared Brooks RN  Outcome: Not Progressing  Flowsheets (Taken 11/9/2022 0829)  Achieves optimal ventilation and oxygenation:   Assess for changes in mentation and behavior   Position to facilitate oxygenation and minimize respiratory effort   Assess for changes in respiratory status   Oxygen supplementation based on oxygen saturation or arterial blood gases   Initiate smoking cessation protocol as indicated   Encourage broncho-pulmonary hygiene including cough, deep breathe, incentive spirometry   Assess and instruct to report shortness of breath or any respiratory difficulty   Assess the need for suctioning and aspirate as needed     Problem: Cardiovascular - Adult  Goal: Maintains optimal cardiac output and hemodynamic stability  11/10/2022 0142 by Genoveva Saldivar RN  Outcome: Progressing  Flowsheets (Taken 11/9/2022 2010)  Maintains optimal cardiac output and hemodynamic stability: Monitor blood pressure and heart rate  11/9/2022 1636 by Jared Brooks RN  Outcome: Not Progressing  Flowsheets (Taken 11/9/2022 0829)  Maintains optimal cardiac output and hemodynamic stability:   Monitor urine output and notify Licensed Independent Practitioner for values outside of normal range   Monitor blood pressure and heart rate   Assess for signs of decreased cardiac output   Administer fluid and/or volume expanders as ordered   Administer vasoactive medications as ordered  Goal: Absence of cardiac dysrhythmias or at baseline  11/10/2022 0142 by Marika Perez RN  Outcome: Progressing  11/9/2022 1636 by Divya Dorado RN  Outcome: Not Progressing  Flowsheets (Taken 11/9/2022 8732)  Absence of cardiac dysrhythmias or at baseline:   Monitor cardiac rate and rhythm   Assess for signs of decreased cardiac output   Administer antiarrhythmia medication and electrolyte replacement as ordered     Problem: Skin/Tissue Integrity - Adult  Goal: Skin integrity remains intact  11/10/2022 0142 by Marika Perez RN  Outcome: Progressing  Flowsheets (Taken 11/9/2022 2010)  Skin Integrity Remains Intact: Monitor for areas of redness and/or skin breakdown  11/9/2022 1636 by Divya Dorado RN  Outcome: Not Progressing  Flowsheets (Taken 11/9/2022 0829)  Skin Integrity Remains Intact:   Monitor for areas of redness and/or skin breakdown   Assess vascular access sites hourly   Every 4-6 hours minimum: Change oxygen saturation probe site   Every 4-6 hours: If on nasal continuous positive airway pressure, respiratory therapy assesses nares and determine need for appliance change or resting period  Goal: Incisions, wounds, or drain sites healing without S/S of infection  11/10/2022 0142 by Marika Perez RN  Outcome: Progressing  Flowsheets (Taken 11/9/2022 2010)  Incisions, Wounds, or Drain Sites Healing Without Sign and Symptoms of Infection: TWICE DAILY: Assess and document skin integrity  11/9/2022 1636 by Divya Dorado RN  Outcome: Not Progressing  Flowsheets (Taken 11/9/2022 in ordered activity level  Goal: Maintain proper alignment of affected body part  11/10/2022 0142 by Radhames Guillermo RN  Outcome: Progressing  Flowsheets (Taken 11/9/2022 2010)  Maintain proper alignment of affected body part: Support and protect limb and body alignment per provider's orders  11/9/2022 1636 by Adeline Jaffe RN  Outcome: Not Progressing  Flowsheets (Taken 11/9/2022 0829)  Maintain proper alignment of affected body part:   Support and protect limb and body alignment per provider's orders   Instruct and reinforce with patient and family use of appropriate assistive device and precautions (e.g. spinal or hip dislocation precautions)  Goal: Return ADL status to a safe level of function  11/10/2022 0142 by Radhames Guillermo RN  Outcome: Progressing  Flowsheets (Taken 11/9/2022 2010)  Return ADL Status to a Safe Level of Function: Administer medication as ordered  11/9/2022 1636 by Adeline Jaffe RN  Outcome: Not Progressing  Flowsheets (Taken 11/9/2022 0829)  Return ADL Status to a Safe Level of Function:   Administer medication as ordered   Assess activities of daily living deficits and provide assistive devices as needed   Obtain physical therapy/occupational therapy consults as needed   Assist and instruct patient to increase activity and self care as tolerated     Problem: Gastrointestinal - Adult  Goal: Minimal or absence of nausea and vomiting  11/10/2022 0142 by Radhames Guillermo RN  Outcome: Progressing  Flowsheets (Taken 11/9/2022 2010)  Minimal or absence of nausea and vomiting: Administer IV fluids as ordered to ensure adequate hydration  11/9/2022 1636 by Adeline Jaffe RN  Outcome: Not Progressing  Flowsheets (Taken 11/9/2022 0829)  Minimal or absence of nausea and vomiting:   Administer IV fluids as ordered to ensure adequate hydration   Maintain NPO status until nausea and vomiting are resolved   Administer ordered antiemetic medications as needed   Provide nonpharmacologic comfort measures as appropriate   Advance diet as tolerated, if ordered   Nutrition consult to assist patient with adequate nutrition and appropriate food choices   Nasogastric tube to low intermittent suction as ordered  Goal: Maintains or returns to baseline bowel function  11/10/2022 0142 by Lokesh Choi RN  Outcome: Sushila Mendoza (Taken 11/9/2022 2010)  Maintains or returns to baseline bowel function: Assess bowel function  11/9/2022 1636 by Verena Bumpers, RN  Outcome: Not Progressing  Flowsheets (Taken 11/9/2022 0829)  Maintains or returns to baseline bowel function:   Assess bowel function   Encourage oral fluids to ensure adequate hydration   Administer ordered medications as needed   Administer IV fluids as ordered to ensure adequate hydration   Encourage mobilization and activity   Nutrition consult to assist patient with appropriate food choices  Goal: Maintains adequate nutritional intake  11/10/2022 0142 by Lokesh Choi RN  Outcome: Progressing  Flowsheets (Taken 11/9/2022 2010)  Maintains adequate nutritional intake: Monitor percentage of each meal consumed  11/9/2022 1636 by Verena Bumpers, RN  Outcome: Not Progressing  Flowsheets (Taken 11/9/2022 0829)  Maintains adequate nutritional intake:   Identify factors contributing to decreased intake, treat as appropriate   Monitor percentage of each meal consumed   Assist with meals as needed   Monitor intake and output, weight and lab values   Obtain nutritional consult as needed  Goal: Establish and maintain optimal ostomy function  11/10/2022 0142 by Lokesh Choi RN  Outcome: Progressing  11/9/2022 1636 by Verena Bumpers, RN  Outcome: Not Progressing  Flowsheets (Taken 11/9/2022 0829)  Establish and maintain optimal ostomy function:   Monitor output from ostomies   Administer IV fluids and TPN as ordered   Introduce and advance enteral feedings as ordered   Nutrition consult     Problem: Genitourinary - Adult  Goal: Absence of urinary retention  11/10/2022 0142 by Corky Tang RN  Outcome: Progressing  Flowsheets (Taken 11/9/2022 2010)  Absence of urinary retention:   Monitor intake/output and perform bladder scan as needed   Assess patients ability to void and empty bladder  11/9/2022 1636 by Janeen Olivo RN  Outcome: Not Progressing  Flowsheets (Taken 11/9/2022 0829)  Absence of urinary retention:   Monitor intake/output and perform bladder scan as needed   Assess patients ability to void and empty bladder   Place urinary catheter per Licensed Independent Practitioner order if needed  Goal: Urinary catheter remains patent  11/10/2022 0142 by Corky Tang RN  Outcome: Progressing  11/9/2022 1636 by Janeen Olivo RN  Outcome: Not Progressing  Flowsheets (Taken 11/9/2022 0199)  Urinary catheter remains patent: Assess patency of urinary catheter     Problem: Infection - Adult  Goal: Absence of infection at discharge  11/10/2022 0142 by Corky Tang RN  Outcome: Progressing  [address] (Taken 11/9/2022 2010)  Absence of infection at discharge: Assess and monitor for signs and symptoms of infection  11/9/2022 1636 by Janeen Olivo RN  Outcome: Not Progressing  Goal: Absence of infection during hospitalization  11/10/2022 0142 by Corky Tang RN  Outcome: Progressing  Flowsheets (Taken 11/9/2022 2010)  Absence of infection during hospitalization: Assess and monitor for signs and symptoms of infection  11/9/2022 1636 by Janeen Olivo RN  Outcome: Not Progressing  Goal: Absence of fever/infection during anticipated neutropenic period  11/10/2022 0142 by Corky Tang RN  Outcome: Progressing  Flowsheets (Taken 11/9/2022 2010)  Absence of fever/infection during anticipated neutropenic period: Monitor white blood cell count  11/9/2022 1636 by Janeen Olivo RN  Outcome: Not Progressing     Problem: Metabolic/Fluid and Electrolytes - Adult  Goal: Electrolytes maintained within normal limits  11/10/2022 0142 by Caprice Wilson RN  Outcome: Progressing  11/9/2022 1636 by Duke Phillips RN  Outcome: Not Progressing  Flowsheets (Taken 11/9/2022 6621)  Electrolytes maintained within normal limits:   Monitor labs and assess patient for signs and symptoms of electrolyte imbalances   Administer electrolyte replacement as ordered   Monitor response to electrolyte replacements, including repeat lab results as appropriate   Fluid restriction as ordered   Instruct patient on fluid and nutrition restrictions as appropriate  Goal: Hemodynamic stability and optimal renal function maintained  11/10/2022 0142 by Caprice Wilson RN  Outcome: Progressing  Flowsheets (Taken 11/9/2022 2010)  Hemodynamic stability and optimal renal function maintained: Monitor labs and assess for signs and symptoms of volume excess or deficit  11/9/2022 1636 by Duke Phillips RN  Outcome: Not Progressing  Flowsheets (Taken 11/9/2022 0829)  Hemodynamic stability and optimal renal function maintained:   Monitor labs and assess for signs and symptoms of volume excess or deficit   Monitor intake, output and patient weight   Monitor urine specific gravity, serum osmolarity and serum sodium as indicated or ordered   Monitor response to interventions for patient's volume status, including labs, urine output, blood pressure (other measures as available)   Instruct patient on fluid and nutrition restrictions as appropriate   Encourage oral intake as appropriate  Goal: Glucose maintained within prescribed range  11/10/2022 0142 by Caprice Wilson RN  Outcome: Progressing  Flowsheets (Taken 11/9/2022 2010)  Glucose maintained within prescribed range: Monitor blood glucose as ordered  11/9/2022 1636 by Duke Phillips RN  Outcome: Not Progressing     Problem: Hematologic - Adult  Goal: Maintains hematologic stability  11/10/2022 0142 by Caprice Wilson RN  Outcome: Progressing  11/9/2022 1636 by Duke Phillips RN  Outcome: Not Progressing  Flowsheets (Taken 11/9/2022 0829)  Maintains hematologic stability:   Assess for signs and symptoms of bleeding or hemorrhage   Monitor labs for bleeding or clotting disorders   Administer blood products/factors as ordered     Problem: Chronic Conditions and Co-morbidities  Goal: Patient's chronic conditions and co-morbidity symptoms are monitored and maintained or improved  11/10/2022 0142 by Marika Perez RN  Outcome: Progressing  Flowsheets (Taken 11/9/2022 2010)  Care Plan - Patient's Chronic Conditions and Co-Morbidity Symptoms are Monitored and Maintained or Improved: Monitor and assess patient's chronic conditions and comorbid symptoms for stability, deterioration, or improvement  11/9/2022 1636 by Divya Dorado RN  Outcome: Not Progressing  Flowsheets (Taken 11/9/2022 0829)  Care Plan - Patient's Chronic Conditions and Co-Morbidity Symptoms are Monitored and Maintained or Improved:   Monitor and assess patient's chronic conditions and comorbid symptoms for stability, deterioration, or improvement   Collaborate with multidisciplinary team to address chronic and comorbid conditions and prevent exacerbation or deterioration   Update acute care plan with appropriate goals if chronic or comorbid symptoms are exacerbated and prevent overall improvement and discharge     Problem: ABCDS Injury Assessment  Goal: Absence of physical injury  11/10/2022 0142 by Marika Perez RN  Outcome: Progressing  11/9/2022 1636 by Divya Dorado RN  Outcome: Not Progressing  Flowsheets (Taken 11/9/2022 1203)  Absence of Physical Injury: Implement safety measures based on patient assessment     Problem: Discharge Planning  Goal: Discharge to home or other facility with appropriate resources  11/10/2022 0142 by Marika Perez RN  Outcome: Progressing  Flowsheets (Taken 11/9/2022 2010)  Discharge to home or other facility with appropriate resources: Identify barriers to discharge with patient and caregiver  11/9/2022 1636 by Kaim Tsang RN  Outcome: Not Progressing  Flowsheets (Taken 11/9/2022 1603)  Discharge to home or other facility with appropriate resources:   Identify barriers to discharge with patient and caregiver   Arrange for needed discharge resources and transportation as appropriate   Identify discharge learning needs (meds, wound care, etc)   Arrange for interpreters to assist at discharge as needed   Refer to discharge planning if patient needs post-hospital services based on physician order or complex needs related to functional status, cognitive ability or social support system     Problem: Pain  Goal: Verbalizes/displays adequate comfort level or baseline comfort level  11/10/2022 0142 by Jose Muhammad RN  Outcome: Progressing  11/9/2022 1636 by Kami Tsang RN  Outcome: Not Progressing     Problem: Skin/Tissue Integrity  Goal: Absence of new skin breakdown  Description: 1. Monitor for areas of redness and/or skin breakdown  2. Assess vascular access sites hourly  3. Every 4-6 hours minimum:  Change oxygen saturation probe site  4. Every 4-6 hours:  If on nasal continuous positive airway pressure, respiratory therapy assess nares and determine need for appliance change or resting period.   11/10/2022 0142 by Jose Muhammad RN  Outcome: Progressing  11/9/2022 1636 by Kami Tsang RN  Outcome: Not Progressing     Problem: Safety - Adult  Goal: Free from fall injury  11/10/2022 0142 by Jose Muhammad RN  Outcome: Progressing  11/9/2022 1636 by Kami Tsang RN  Outcome: Not Progressing  Flowsheets (Taken 11/9/2022 1203)  Free From Fall Injury:   Katy Gilbert family/caregiver on patient safety   Based on caregiver fall risk screen, instruct family/caregiver to ask for assistance with transferring infant if caregiver noted to have fall risk factors     Problem: Neurosensory - Adult  Goal: Achieves stable or improved neurological status  11/10/2022 0142 by Citlaly Porter Chris Chopra RN  Outcome: Progressing  11/9/2022 1636 by Duke Phillips RN  Outcome: Not Progressing  Flowsheets (Taken 11/9/2022 8004)  Achieves stable or improved neurological status:   Assess for and report changes in neurological status   Initiate measures to prevent increased intracranial pressure   Maintain blood pressure and fluid volume within ordered parameters to optimize cerebral perfusion and minimize risk of hemorrhage   Monitor temperature, glucose, and sodium. Initiate appropriate interventions as ordered  Goal: Absence of seizures  11/10/2022 0142 by Caprice Wilson RN  Outcome: Progressing  11/9/2022 1636 by Duke Phillips RN  Outcome: Not Progressing  Flowsheets (Taken 11/9/2022 4132)  Absence of seizures:   Monitor for seizure activity.   If seizure occurs, document type and location of movements and any associated apnea   If seizure occurs, turn head to side and suction secretions as needed   Support airway/breathing, administer oxygen as needed   Administer anticonvulsants as ordered   Diagnostic studies as ordered  Goal: Remains free of injury related to seizures activity  11/10/2022 0142 by Caprice Wilson RN  Outcome: Progressing  11/9/2022 1636 by Duke Phillips RN  Outcome: Not Progressing  Flowsheets (Taken 11/9/2022 6142)  Remains free of injury related to seizure activity:   Maintain airway, patient safety  and administer oxygen as ordered   Monitor patient for seizure activity, document and report duration and description of seizure to Licensed Independent Practitioner   If seizure occurs, turn patient to side and suction secretions as needed   Reorient patient post seizure   Seizure pads on all 4 side rails   Instruct patient/family to notify RN of any seizure activity   Instruct patient/family to call for assistance with activity based on assessment  Goal: Achieves maximal functionality and self care  11/10/2022 0142 by Caprice Wilson RN  Outcome: Progressing  11/9/2022 1636 by Fredis Jiménez RN  Outcome: Not Progressing  Flowsheets (Taken 11/9/2022 0829)  Achieves maximal functionality and self care:   Monitor swallowing and airway patency with patient fatigue and changes in neurological status   Encourage and assist patient to increase activity and self care with guidance from physical therapy/occupational therapy   Encourage visually impaired, hearing impaired and aphasic patients to use assistive/communication devices     Problem: Respiratory - Adult  Goal: Achieves optimal ventilation and oxygenation  11/10/2022 0142 by Deirdre Rivero RN  Outcome: Progressing  11/9/2022 1636 by Fredis Jiménez RN  Outcome: Not Progressing  Flowsheets (Taken 11/9/2022 0829)  Achieves optimal ventilation and oxygenation:   Assess for changes in mentation and behavior   Position to facilitate oxygenation and minimize respiratory effort   Assess for changes in respiratory status   Oxygen supplementation based on oxygen saturation or arterial blood gases   Initiate smoking cessation protocol as indicated   Encourage broncho-pulmonary hygiene including cough, deep breathe, incentive spirometry   Assess and instruct to report shortness of breath or any respiratory difficulty   Assess the need for suctioning and aspirate as needed     Problem: Cardiovascular - Adult  Goal: Maintains optimal cardiac output and hemodynamic stability  11/10/2022 0142 by Deirdre Rivero RN  Outcome: Progressing  Flowsheets (Taken 11/9/2022 2010)  Maintains optimal cardiac output and hemodynamic stability: Monitor blood pressure and heart rate  11/9/2022 1636 by Fredis Jiménez RN  Outcome: Not Progressing  Flowsheets (Taken 11/9/2022 0829)  Maintains optimal cardiac output and hemodynamic stability:   Monitor urine output and notify Licensed Independent Practitioner for values outside of normal range   Monitor blood pressure and heart rate   Assess for signs of decreased cardiac output   Administer fluid and/or volume expanders as ordered   Administer vasoactive medications as ordered  Goal: Absence of cardiac dysrhythmias or at baseline  11/10/2022 0142 by Barbara Wild RN  Outcome: Progressing  11/9/2022 1636 by Victoria Kimble RN  Outcome: Not Progressing  Flowsheets (Taken 11/9/2022 4723)  Absence of cardiac dysrhythmias or at baseline:   Monitor cardiac rate and rhythm   Assess for signs of decreased cardiac output   Administer antiarrhythmia medication and electrolyte replacement as ordered     Problem: Skin/Tissue Integrity - Adult  Goal: Skin integrity remains intact  11/10/2022 0142 by Barbara Wild RN  Outcome: Progressing  Flowsheets (Taken 11/9/2022 2010)  Skin Integrity Remains Intact: Monitor for areas of redness and/or skin breakdown  11/9/2022 1636 by Victoria Kimble RN  Outcome: Not Progressing  Flowsheets (Taken 11/9/2022 0829)  Skin Integrity Remains Intact:   Monitor for areas of redness and/or skin breakdown   Assess vascular access sites hourly   Every 4-6 hours minimum: Change oxygen saturation probe site   Every 4-6 hours: If on nasal continuous positive airway pressure, respiratory therapy assesses nares and determine need for appliance change or resting period  Goal: Incisions, wounds, or drain sites healing without S/S of infection  11/10/2022 0142 by Barbara Wild RN  Outcome: Progressing  Flowsheets (Taken 11/9/2022 2010)  Incisions, Wounds, or Drain Sites Healing Without Sign and Symptoms of Infection: TWICE DAILY: Assess and document skin integrity  11/9/2022 1636 by Victoria Kimble RN  Outcome: Not Progressing  Flowsheets (Taken 11/9/2022 0829)  Incisions, Wounds, or Drain Sites Healing Without Sign and Symptoms of Infection:   ADMISSION and DAILY: Assess and document risk factors for pressure ulcer development   TWICE DAILY: Assess and document skin integrity   TWICE DAILY: Assess and document dressing/incision, wound bed, drain sites and surrounding tissue   Implement wound care per orders   Initiate isolation precautions as appropriate   Initiate pressure ulcer prevention bundle as indicated  Goal: Oral mucous membranes remain intact  11/10/2022 0142 by Jocy Russell RN  Outcome: Progressing  Flowsheets (Taken 11/9/2022 2010)  Oral Mucous Membranes Remain Intact: Assess oral mucosa and hygiene practices  11/9/2022 1636 by Tiff Graves RN  Outcome: Not Progressing  Flowsheets (Taken 11/9/2022 0829)  Oral Mucous Membranes Remain Intact:   Assess oral mucosa and hygiene practices   Implement oral medicated treatments as ordered   Implement preventative oral hygiene regimen     Problem: Musculoskeletal - Adult  Goal: Return mobility to safest level of function  11/10/2022 0142 by Jocy Russell RN  Outcome: Progressing  Flowsheets (Taken 11/9/2022 2010)  Return Mobility to Safest Level of Function: Assess patient stability and activity tolerance for standing, transferring and ambulating with or without assistive devices  11/9/2022 1636 by Tiff Graves RN  Outcome: Not Progressing  Flowsheets (Taken 11/9/2022 0829)  Return Mobility to Safest Level of Function:   Assess patient stability and activity tolerance for standing, transferring and ambulating with or without assistive devices   Assist with transfers and ambulation using safe patient handling equipment as needed   Ensure adequate protection for wounds/incisions during mobilization   Obtain physical therapy/occupational therapy consults as needed   Apply continuous passive motion per provider or physical therapy orders to increase flexion toward goal   Instruct patient/family in ordered activity level  Goal: Maintain proper alignment of affected body part  11/10/2022 0142 by Jocy Russell RN  Outcome: Progressing  Flowsheets (Taken 11/9/2022 2010)  Maintain proper alignment of affected body part: Support and protect limb and body alignment per provider's orders  11/9/2022 1636 by Pamela Marquez RN  Outcome: Not Progressing  Flowsheets (Taken 11/9/2022 6943)  Maintain proper alignment of affected body part:   Support and protect limb and body alignment per provider's orders   Instruct and reinforce with patient and family use of appropriate assistive device and precautions (e.g. spinal or hip dislocation precautions)  Goal: Return ADL status to a safe level of function  11/10/2022 0142 by Dennie Knudsen, RN  Outcome: Progressing  Flowsheets (Taken 11/9/2022 2010)  Return ADL Status to a Safe Level of Function: Administer medication as ordered  11/9/2022 1636 by Pamela Marquez RN  Outcome: Not Progressing  Flowsheets (Taken 11/9/2022 0829)  Return ADL Status to a Safe Level of Function:   Administer medication as ordered   Assess activities of daily living deficits and provide assistive devices as needed   Obtain physical therapy/occupational therapy consults as needed   Assist and instruct patient to increase activity and self care as tolerated     Problem: Gastrointestinal - Adult  Goal: Minimal or absence of nausea and vomiting  11/10/2022 0142 by Dennie Knudsen, RN  Outcome: Progressing  Flowsheets (Taken 11/9/2022 2010)  Minimal or absence of nausea and vomiting: Administer IV fluids as ordered to ensure adequate hydration  11/9/2022 1636 by Pamela Marquez RN  Outcome: Not Progressing  Flowsheets (Taken 11/9/2022 0829)  Minimal or absence of nausea and vomiting:   Administer IV fluids as ordered to ensure adequate hydration   Maintain NPO status until nausea and vomiting are resolved   Administer ordered antiemetic medications as needed   Provide nonpharmacologic comfort measures as appropriate   Advance diet as tolerated, if ordered   Nutrition consult to assist patient with adequate nutrition and appropriate food choices   Nasogastric tube to low intermittent suction as ordered  Goal: Maintains or returns to baseline bowel function  11/10/2022 0142 by Yaa Gilmore RN  Outcome: Progressing  Flowsheets (Taken 11/9/2022 2010)  Maintains or returns to baseline bowel function: Assess bowel function  11/9/2022 1636 by Keny Raymond RN  Outcome: Not Progressing  Flowsheets (Taken 11/9/2022 0601)  Maintains or returns to baseline bowel function:   Assess bowel function   Encourage oral fluids to ensure adequate hydration   Administer ordered medications as needed   Administer IV fluids as ordered to ensure adequate hydration   Encourage mobilization and activity   Nutrition consult to assist patient with appropriate food choices  Goal: Maintains adequate nutritional intake  11/10/2022 0142 by Yaa Gilmore RN  Outcome: Progressing  Flowsheets (Taken 11/9/2022 2010)  Maintains adequate nutritional intake: Monitor percentage of each meal consumed  11/9/2022 1636 by Keny Raymond RN  Outcome: Not Progressing  Flowsheets (Taken 11/9/2022 0829)  Maintains adequate nutritional intake:   Identify factors contributing to decreased intake, treat as appropriate   Monitor percentage of each meal consumed   Assist with meals as needed   Monitor intake and output, weight and lab values   Obtain nutritional consult as needed  Goal: Establish and maintain optimal ostomy function  11/10/2022 0142 by Yaa Gilmore RN  Outcome: Progressing  11/9/2022 1636 by Keny Raymond RN  Outcome: Not Progressing  Flowsheets (Taken 11/9/2022 3428)  Establish and maintain optimal ostomy function:   Monitor output from ostomies   Administer IV fluids and TPN as ordered   Introduce and advance enteral feedings as ordered   Nutrition consult     Problem: Genitourinary - Adult  Goal: Absence of urinary retention  11/10/2022 0142 by Yaa Gilmore RN  Outcome: Progressing  Flowsheets (Taken 11/9/2022 2010)  Absence of urinary retention:   Monitor intake/output and perform bladder scan as needed   Assess patients ability to void and empty bladder  11/9/2022 1636 by Sydney Delgado RN  Outcome: Not Progressing  Flowsheets (Taken 11/9/2022 4196)  Absence of urinary retention:   Monitor intake/output and perform bladder scan as needed   Assess patients ability to void and empty bladder   Place urinary catheter per Licensed Independent Practitioner order if needed  Goal: Urinary catheter remains patent  11/10/2022 0142 by Christiano Rivera RN  Outcome: Progressing  11/9/2022 1636 by Sydney Delgado RN  Outcome: Not Progressing  Flowsheets (Taken 11/9/2022 8427)  Urinary catheter remains patent: Assess patency of urinary catheter     Problem: Infection - Adult  Goal: Absence of infection at discharge  11/10/2022 0142 by Christiano Rivera RN  Outcome: Progressing  [address] (Taken 11/9/2022 2010)  Absence of infection at discharge: Assess and monitor for signs and symptoms of infection  11/9/2022 1636 by Sydney Delgado RN  Outcome: Not Progressing  Goal: Absence of infection during hospitalization  11/10/2022 0142 by Christiano Rivera RN  Outcome: Progressing  Flowsheets (Taken 11/9/2022 2010)  Absence of infection during hospitalization: Assess and monitor for signs and symptoms of infection  11/9/2022 1636 by Sydney Delgado RN  Outcome: Not Progressing  Goal: Absence of fever/infection during anticipated neutropenic period  11/10/2022 0142 by Christiano Rivera RN  Outcome: Progressing  Flowsheets (Taken 11/9/2022 2010)  Absence of fever/infection during anticipated neutropenic period: Monitor white blood cell count  11/9/2022 1636 by Sydney Delgado RN  Outcome: Not Progressing     Problem: Metabolic/Fluid and Electrolytes - Adult  Goal: Electrolytes maintained within normal limits  11/10/2022 0142 by Christiano Rivera RN  Outcome: Progressing  11/9/2022 1636 by Sydney Delgado RN  Outcome: Not Progressing  Flowsheets (Taken 11/9/2022 9839)  Electrolytes maintained within normal limits:   Monitor labs and assess patient for signs and symptoms of electrolyte imbalances   Administer electrolyte replacement as ordered   Monitor response to electrolyte replacements, including repeat lab results as appropriate   Fluid restriction as ordered   Instruct patient on fluid and nutrition restrictions as appropriate  Goal: Hemodynamic stability and optimal renal function maintained  11/10/2022 0142 by Surinder Patricia RN  Outcome: Progressing  Flowsheets (Taken 11/9/2022 2010)  Hemodynamic stability and optimal renal function maintained: Monitor labs and assess for signs and symptoms of volume excess or deficit  11/9/2022 1636 by Ethan Gaviria RN  Outcome: Not Progressing  Flowsheets (Taken 11/9/2022 3713)  Hemodynamic stability and optimal renal function maintained:   Monitor labs and assess for signs and symptoms of volume excess or deficit   Monitor intake, output and patient weight   Monitor urine specific gravity, serum osmolarity and serum sodium as indicated or ordered   Monitor response to interventions for patient's volume status, including labs, urine output, blood pressure (other measures as available)   Instruct patient on fluid and nutrition restrictions as appropriate   Encourage oral intake as appropriate  Goal: Glucose maintained within prescribed range  11/10/2022 0142 by Surinder Patricia RN  Outcome: Progressing  Flowsheets (Taken 11/9/2022 2010)  Glucose maintained within prescribed range: Monitor blood glucose as ordered  11/9/2022 1636 by Ethan Gaviria RN  Outcome: Not Progressing     Problem: Hematologic - Adult  Goal: Maintains hematologic stability  11/10/2022 0142 by Surinder Patricia RN  Outcome: Progressing  11/9/2022 1636 by Ethan Gaviria RN  Outcome: Not Progressing  Flowsheets (Taken 11/9/2022 0829)  Maintains hematologic stability:   Assess for signs and symptoms of bleeding or hemorrhage   Monitor labs for bleeding or clotting disorders   Administer blood products/factors as ordered     Problem: Chronic Conditions and Co-morbidities  Goal: Patient's chronic conditions and co-morbidity symptoms are monitored and maintained or improved  11/10/2022 0142 by Luz Clrak RN  Outcome: Progressing  Flowsheets (Taken 11/9/2022 2010)  Care Plan - Patient's Chronic Conditions and Co-Morbidity Symptoms are Monitored and Maintained or Improved: Monitor and assess patient's chronic conditions and comorbid symptoms for stability, deterioration, or improvement  11/9/2022 1636 by Rahul Arambula RN  Outcome: Not Progressing  Flowsheets (Taken 11/9/2022 0829)  Care Plan - Patient's Chronic Conditions and Co-Morbidity Symptoms are Monitored and Maintained or Improved:   Monitor and assess patient's chronic conditions and comorbid symptoms for stability, deterioration, or improvement   Collaborate with multidisciplinary team to address chronic and comorbid conditions and prevent exacerbation or deterioration   Update acute care plan with appropriate goals if chronic or comorbid symptoms are exacerbated and prevent overall improvement and discharge     Problem: ABCDS Injury Assessment  Goal: Absence of physical injury  11/10/2022 0142 by Luz Clark RN  Outcome: Progressing  11/9/2022 1636 by Rahul Arambula RN  Outcome: Not Progressing  Flowsheets (Taken 11/9/2022 1203)  Absence of Physical Injury: Implement safety measures based on patient assessment     Problem: Nutrition Deficit:  Goal: Optimize nutritional status  11/10/2022 0142 by Luz Clark RN  Outcome: Progressing  11/9/2022 1636 by Rahul Arambula RN  Outcome: Not Progressing

## 2022-11-10 NOTE — PROGRESS NOTES
Physical Therapy   Name: Norma Zamora  MRN:  [id number]  Date of service:  11/10/2022  Pt. declined need for PT at this time. Pt up in chair and preparing for d/c.  Will defer PT eval.  Electronically signed by Richard Toledo PT on 11/10/2022 at 11:59 AM

## 2022-11-10 NOTE — PROGRESS NOTES
Vascular Surgery  Dr.Victoria Gomez  Daily Progress Note    Pt Name: Shelley Flannery Albia Record Number: [id number]  Date of Birth 1970   Today's Date: 11/10/2022    SUBJECTIVE:     Patient was seen and examined, asking when she can go home. Stating she will need supplies until [address] orders her some    OBJECTIVE:     Patient Vitals for the past 24 hrs:   BP Temp Temp src Pulse Resp SpO2 Height Weight   11/10/22 0620 119/79 97.9 °F (36.6 °C) Oral 67 18 97 % 5' 9\" (1.753 m) 137 lb 11.2 oz (62.5 kg)   11/10/22 0033 109/69 98.1 °F (36.7 °C) Oral 68 18 96 % -- --   11/09/22 2010 -- -- -- 63 -- -- -- --   11/09/22 1646 (!) 141/60 97.5 °F (36.4 °C) -- 63 18 98 % -- --       Intake/Output Summary (Last 24 hours) at 11/10/2022 0721  Last data filed at 11/10/2022 0717  Gross per 24 hour   Intake 1545 ml   Output 2700 ml   Net -1155 ml       In: 0461 [P.O.:1170; I.V.:375]  Out: 2700 [Urine:2500]    I/O last 3 completed shifts: In: 2941 [P.O.:1170; I.V.:375]  Out: 3400 [Urine:3000; Stool:400]     Date 11/10/22 0000 - 11/10/22 2359   Shift 6758-7876 3963-9323 7670-2240 24 Hour Total   INTAKE   I.V.(mL/kg) 158(2.5)   158(2.5)   Shift Total(mL/kg) 158(2.5)   158(2.5)   OUTPUT   Urine(mL/kg/hr) 700   700   Shift Total(mL/kg) 700(11.2)   700(11.2)   Weight (kg) 62.5 62.5 62.5 62.5       Wt Readings from Last 3 Encounters:   11/10/22 137 lb 11.2 oz (62.5 kg)   07/12/22 139 lb (63 kg)   06/21/22 139 lb (63 kg)        Body mass index is 20.33 kg/m². Diet: ADULT ORAL NUTRITION SUPPLEMENT; Breakfast; Fortified Pudding Oral Supplement  ADULT ORAL NUTRITION SUPPLEMENT; Dinner; Frozen Oral Supplement  ADULT DIET; Regular  ADULT ORAL NUTRITION SUPPLEMENT; Lunch;  Other Oral Supplement; Peanut butter milkshake    MEDS:     Scheduled Meds:   levETIRAcetam  750 mg Oral Nightly    meropenem  1,000 mg IntraVENous Q8H    tiZANidine  12 mg Oral BID    Acetic Acid  250 mL Topical BID    sodium chloride flush  5-40 mL IntraVENous 2 times per day    enoxaparin  40 mg SubCUTAneous Daily    cetirizine  10 mg Oral Daily    DULoxetine  30 mg Oral Nightly    therapeutic multivitamin-minerals  1 tablet Oral Daily    pregabalin  300 mg Oral BID    pilocarpine  7.5 mg Oral BID    nicotine  1 patch TransDERmal Daily     Continuous Infusions:   sodium chloride       PRN Meds:acetaminophen, 650 mg, Q6H PRN  sodium chloride flush, 5-40 mL, PRN  sodium chloride, , PRN  ondansetron, 4 mg, Q8H PRN   Or  ondansetron, 4 mg, Q6H PRN  polyethylene glycol, 17 g, Daily PRN  docusate sodium, 200 mg, PRN  HYDROcodone-acetaminophen, 1 tablet, Q8H PRN  naloxone, 0.4 mg, PRN  albuterol, 2.5 mg, Q6H PRN    YSICAL EXAM:     CONSTITUTIONAL: Awake and alert, cooperative  LUNGS: Chest clear bilaterally anteriorly, diminished lower lobe breath sounds  CARDIOVASCULAR:   Regular rate and rhythm without murmur, gallop or rub. ABDOMEN: soft, nontender, colostomy and urostomy noted  NEUROLOGIC: Awake, alert, oriented to name, place and time. Does not walk, wheelchair bound  WOUND/INCISION:  Wound right dorsal foot clean and pink color, minimal serosang drainage on old dressing. Toes warm to touch, pink color. Left chest port accessed, site clear and dressing CDI. EXTREMITY: Feet warm to touch bilaterally, has bilateral heel-lift boots in place. No edema to LE's  Document Information    Wound Care Image:  Wound   Right dorsal foot photo   11/09/2022 15:20   Attached To:    Hospital Encounter on 11/2/22     [facility]     LABS:          Last 3 CMP:   Recent Labs     11/08/22  1334      K 4.1      CO2 32*   BUN 10   CREATININE 0.2*   GLUCOSE 97   CALCIUM 8.7        Calcium:   Lab Results   Component Value Date/Time    CALCIUM 8.7 11/08/2022 01:34 PM    CALCIUM 8.7 11/07/2022 06:40 AM    CALCIUM 8.2 11/05/2022 01:18 AM        Lactic Acid:   Lab Results   Component Value Date/Time    LACTA 0.6 11/02/2022 03:13 PM    LACTA 1.1 07/31/2019 01:20 PM    LACTA 1.9 08/19/2016 03:20 PM        VT prophylaxis: [x] Lovenox                              ASSESSMENT:     POD #   HD # [facility] Problems    Diagnosis Date Noted    Infected wound [T14. 8XXA, L08.9] 11/07/2022     Priority: Medium    Severe malnutrition ([facility] [address] [E43] 11/03/2022     Priority: Medium    Ankle wound, right, sequela [S91.001S] 11/02/2022     Priority: Medium    Multiple sclerosis ([facility] [address] Laila Branch 06/08/2016     Priority: Medium    Peripheral vascular disease ([facility] [address] [I73.9] 02/01/2016       Chief Complaint:  Chief Complaint   Patient presents with    Abnormal Lab     Sent by [facility] after wound culture results, needs IV abx       PLAN:     Acetic acid dressings BID to right dorsal foot wound  [place] [employer], heel lift boots in place  Appetite poor. ONS per Dietary. 4.   Punch bx done yesterday and sent to micro and path per  instructions  5. Stating she does not want HH upon dc, she has a helper that stays with her. 6.    Will follow up at [facility]. Message left with her [facility]  [place]on Wynn, [address]) that patient will need wound care supplies ordered.   7.    Okay for dc from Vascular standpoint

## 2022-11-10 NOTE — PROGRESS NOTES
Infectious Diseases Progress Note    Patient:  Bryan Ott  YOB: 1970  MRN: [id number]   Admit date: 11/2/2022   Admitting Physician: Faye Cleary MD  Primary Care Physician: Jaxson Jackson MD    Chief Complaint/Interval History: Patient seen yesterday evening rounds. She had biopsy. She is comfortable. She is without fevers or chills. She would like to go home soon. Note initiated and completed as a late entry on November 10, 2022. In/Out    Intake/Output Summary (Last 24 hours) at 11/10/2022 0644  Last data filed at 11/10/2022 5336  Gross per 24 hour   Intake 1545 ml   Output 2400 ml   Net -855 ml     Allergies: Allergies   Allergen Reactions    Darvocet [Propoxyphene N-Acetaminophen]      Talking out of her head    Morphine      Category:  Allergy;     Morphine And Related Itching and Swelling    Propoxyphene Swelling     Current Meds: levETIRAcetam (KEPPRA) tablet 750 mg, Nightly  meropenem (MERREM) 1000 mg in sodium chloride 0.9% 50 mL (duplex), Q8H  tiZANidine (ZANAFLEX) tablet 12 mg, BID  acetaminophen (TYLENOL) tablet 650 mg, Q6H PRN  Acetic Acid 3 % solution 250 mL, BID  sodium chloride flush 0.9 % injection 5-40 mL, 2 times per day  sodium chloride flush 0.9 % injection 5-40 mL, PRN  0.9 % sodium chloride infusion, PRN  enoxaparin (LOVENOX) injection 40 mg, Daily  ondansetron (ZOFRAN-ODT) disintegrating tablet 4 mg, Q8H PRN   Or  ondansetron (ZOFRAN) injection 4 mg, Q6H PRN  polyethylene glycol (GLYCOLAX) packet 17 g, Daily PRN  cetirizine (ZYRTEC) tablet 10 mg, Daily  docusate sodium (COLACE) capsule 200 mg, PRN  DULoxetine (CYMBALTA) extended release capsule 30 mg, Nightly  HYDROcodone-acetaminophen (NORCO)  MG per tablet 1 tablet, Q8H PRN  therapeutic multivitamin-minerals 1 tablet, Daily  pregabalin (LYRICA) capsule 300 mg, BID  pilocarpine (SALAGEN) tablet 7.5 mg, BID  naloxone (NARCAN) injection 0.4 mg, PRN  albuterol (PROVENTIL) nebulizer solution 2.5 mg, Q6H PRN  nicotine (NICODERM CQ) 7 MG/24HR 1 patch, Daily      Review of Systems see HPI    VitalSigns:  /79   Pulse 67   Temp 97.9 °F (36.6 °C) (Oral)   Resp 18   Ht 5' 9\" (1.753 m)   Wt 137 lb 11.2 oz (62.5 kg)   SpO2 97%   BMI 20.33 kg/m²      Physical Exam  Line/IV site: No erythema, warmth, induration, or tenderness. Right foot dressing intact  No bleeding    Lab Results:  CBC: No results for input(s): WBC, HGB, PLT in the last 72 hours. BMP:  Recent Labs     11/08/22  1334      K 4.1      CO2 32*   BUN 10   CREATININE 0.2*   GLUCOSE 97     CultureResults:  Surgical cultures yesterday-few white blood cells, no organisms  Culture pending    Radiology: None    Additional Studies Reviewed: Surgical pathology pending    Impression:  1. Chronic wound dorsal aspect right foot  2.   Multiple sclerosis    Recommendations:  Feels she has had adequate course of antibiotic treatment  Okay with me for discharge off antibiotic treatment with local care when ready for release per others  Await biopsy of right foot  She can follow-up with wound care next week    Lillian Hoffman MD

## 2022-11-10 NOTE — DISCHARGE SUMMARY
Discharge Summary      Date:11/10/2022        Patient Bert Monzon     Date of Birth:[birth year]     Age:52 y.o. Admit Date:11/2/2022   Admission Condition:fair   Discharged Condition:stable  Discharge Date: 11/10/22       Discharge Diagnoses   Principal Problem:    Ankle wound, right, sequela  Active Problems:    Multiple sclerosis (HCC)    Severe malnutrition (HCC)    Infected wound    Peripheral vascular disease ([address]  Resolved Problems:    * No resolved hospital problems. [facility] Stay   Narrative of Hospital Course:     72-year-old female with a history of MS, peripheral vascular disease, seizures, presented to the hospital 11/2/2022 with concerns of abnormal labs. Outpatient was seen by wound care, patient with chronic right foot wound and cultures outpatient grew Pseudomonas, Proteus, Enterococcus faecalis and E. coli. Initially on broad-spectrum antibiotics with Vanco and meropenem and transition to Unasyn and meropenem after review of sensitivities. Seen in house by vascular surgery, repeat x-ray of the low right foot was obtained with no definitive diagnosis of osteomyelitis. Recommended local wound care and ID evaluation. Patient evaluated by ID and recc biopsy to r/o fungal/mycobacterial infection as well as malignancy which was done by Vascular results need to be follow-up outpatient. Patient stable for discharge, ID recommended no continuation of antibiotics and to follow-up with wound care outpatient. Patient declined home health. Patient was discharged in stable condition to follow-up with PCP as well as vascular surgery for biopsy results. Physical Examination:  General: Underweight, no acute distress lying comfortably in bed. HEENT: Atraumatic normocephalic, range of motion normal, no JVD, no tracheal deviation noted.   Cardiac: Normal S1-S2   Respiratory: clear To auscultation bilaterally, no rhonchi or rales, no wheezing  Abdomen: Soft, positive bowel sounds in all quadrants, no distention, nontender to palpation, no organomegaly noted, no rebound noted. Extremities: ankle wound noted, no tenderness, no edema, moves all extremities  Psych: Affect normal and good eye contact, behavioral normal.        Consultants:   IP CONSULT TO VASCULAR SURGERY  IP CONSULT TO DIETITIAN  IP CONSULT TO INFECTIOUS DISEASES    Time Spent on Discharge:  35 minutes were spent in patient examination, evaluation, counseling as well as medication reconciliation, prescriptions for required medications, discharge plan and follow up. Surgeries/Procedures Performed:  NONE       Significant Diagnostic Studies:   Recent Labs:  CBC:   Lab Results   Component Value Date/Time    WBC 3.0 11/07/2022 06:40 AM    RBC 3.98 11/07/2022 06:40 AM    HGB 11.6 11/07/2022 06:40 AM    HCT 36.1 11/07/2022 06:40 AM    HCT 45.0 09/26/2011 03:12 PM    MCV 90.7 11/07/2022 06:40 AM    MCH 29.1 11/07/2022 06:40 AM    MCHC 32.1 11/07/2022 06:40 AM    RDW 14.0 11/07/2022 06:40 AM     11/07/2022 06:40 AM     09/26/2011 03:12 PM     BMP:    Lab Results   Component Value Date/Time    GLUCOSE 97 11/08/2022 01:34 PM     11/08/2022 01:34 PM     09/26/2011 03:12 PM    K 4.1 11/08/2022 01:34 PM    K 3.8 09/26/2011 03:12 PM     11/08/2022 01:34 PM     09/26/2011 03:12 PM    CO2 32 11/08/2022 01:34 PM    ANIONGAP 6 11/08/2022 01:34 PM    BUN 10 11/08/2022 01:34 PM    CREATININE 0.2 11/08/2022 01:34 PM    CREATININE 0.4 09/26/2011 03:12 PM    CALCIUM 8.7 11/08/2022 01:34 PM    LABGLOM >60 11/08/2022 01:34 PM    GFRAA >59 12/07/2021 01:30 PM       Radiology Last 7 Days:  XR ANKLE RIGHT (MIN 3 VIEWS)    Result Date: 11/4/2022  Diffuse osteopenia. No acute osseous abnormality or erosive lesions. Low sensitivity of plain radiography for early osteomyelitis. Could correlate with MR if clinically warranted and not contraindicated. Recommendation: Follow up as clinically indicated.  Note: Direct correlation with point tenderness and the X-ray images is recommended and does significantly increase the sensitivity of radiographic evaluation. Electronically Signed by Lauren Llamas MD at 04-Nov-2022 09:40:50 AM EST             XR FOOT RIGHT (MIN 3 VIEWS)    Result Date: 11/4/2022  Diffuse osteopenia which may obscure fine bony detail. Toes are held in flexion. Arterial calcifications. No acute osseous abnormality or significant degenerative/erosive change. Low sensitivity of plain radiography for early osteomyelitis. Could correlate with MR if clinically warranted and not contraindicated. Recommendation: Follow up as clinically indicated. Note: Direct correlation with point tenderness and the X-ray images is recommended and does significantly increase the sensitivity of radiographic evaluation. Electronically Signed by Lauren Llamas MD at 04-Nov-2022 10:35:39 AM EST               Discharge Plan   Disposition: Home    Provider Follow-Up:   Josiah Martin MD  [place] [address] [address]    Follow up on 11/14/2022      Tray Saeed MD  [address] [place] [address] [address] [facility] [address] [facility] [address]  [phone]    Follow up in 1 week(s)           Patient Instructions   Diet: regular diet    Activity: activity as tolerated      Discharge Medications         Medication List        CONTINUE taking these medications      azelastine 0.1 % nasal spray  Commonly known as: ASTELIN  USE 2 SPRAYS IN EACH NOSTRIL TWICE DAILY AS DIRECTED     BACLOFEN (PAIN PUMP REFILL CHARGE)     baclofen 10 MG tablet  Commonly known as: LIORESAL  Take 1 tablet by mouth 2 times daily as needed (muscle spasms)     * blood glucose test strips  Test 1 times a day & as needed for symptoms of irregular blood glucose. * blood glucose test strips  Test 1 times a day & as needed for symptoms of irregular blood glucose.      Catheters [facility]  Catheter supplies and lubricant 5 times daily G82.20  to [address] [facility] [phone]     cetirizine 10 MG tablet  Commonly known as: ZYRTEC     Diapers & Supplies Misc  Indications: FX: [id number] Diapers, Chucks, Wipes, and Gloves. docusate sodium 100 MG capsule  Commonly known as: COLACE     DULoxetine 30 MG extended release capsule  Commonly known as: CYMBALTA  Take 1 capsule by mouth nightly     fluocinolone acetonide 0.01 % external solution  Commonly known as: SYNALAR     * glucose monitoring kit  1 kit by Does not apply route daily     * glucose monitoring kit  1 kit by Does not apply route daily     heparin flush (100 units/mL) 100 UNIT/ML injection  3 mLs by Intercatheter route every 30 days No every 30 days but every 4-6 weeks.  Flush port with 300 units heparin with 20 cc normal saline for ocrevus infusion for Multiple sclerosis and NS 20CC     hydroCHLOROthiazide 25 MG tablet  Commonly known as: HYDRODIURIL  TAKE 1 TABLET BY MOUTH ONCE DAILY AS NEEDED     HYDROcodone-acetaminophen  MG per tablet  Commonly known as: NORCO  TAKE 1 TABLET BY MOUTH 3 TIMES  A DAY     ipratropium-albuterol 0.5-2.5 (3) MG/3ML Soln nebulizer solution  Commonly known as: DUONEB  USE 1 UNIT DOSE IN NEBULIZER EVERY 4 TO 6 HOURS AS NEEDED.     levETIRAcetam 750 MG tablet  Commonly known as: KEPPRA  TAKE 1 TABLET BY MOUTH DAILY     methylphenidate 20 MG tablet  Commonly known as: RITALIN  TAKE 1 TABLET BY MOUTH DAILY     nitrofurantoin 50 MG capsule  Commonly known as: MACRODANTIN  TAKE 1 CAPSULE BY MOUTH NIGHTLY     ondansetron 4 MG disintegrating tablet  Commonly known as: ZOFRAN-ODT  Take 1 tablet by mouth 3 times daily as needed for Nausea or Vomiting     pilocarpine 7.5 MG tablet  Commonly known as: SALAGEN  TAKE ONE TABLET BY MOUTH 3 TIMES DAILY     pregabalin 300 MG capsule  Commonly known as: LYRICA  TAKE 1 CAPSULE BY MOUTH TWICE A DAY     ProAir  (90 Base) MCG/ACT inhaler  Generic drug: albuterol sulfate HFA  INHALE 1 PUFF INTO THE LUNGS EVERY 6 HOURS AS NEEDED FOR WHEEZING OR SHORTNESS OF BREATH     Saline Flush 0.9 % Soln  Infuse 20 mLs intravenously every 30 days Not every 30 days but every 4-6 weeks as need with 300 units of heparin to flush port for Infusion of Ocrevus for multiple sclerosis     therapeutic multivitamin-minerals tablet     tiZANidine 4 MG tablet  Commonly known as: ZANAFLEX  TAKE 3 TABLETS TWICE DAILY AS NEEDED           * This list has 4 medication(s) that are the same as other medications prescribed for you. Read the directions carefully, and ask your doctor or other care provider to review them with you.                 STOP taking these medications      naloxone 4 MG/0.1ML Liqd nasal spray              Electronically signed by Maurisio Montano MD on 11/10/22 at 1:07 PM CST

## 2022-11-11 ENCOUNTER — CARE COORDINATION (OUTPATIENT)
Dept: CASE MANAGEMENT | Age: 52
End: 2022-11-11

## 2022-11-11 DIAGNOSIS — S91.001S ANKLE WOUND, RIGHT, SEQUELA: Primary | ICD-10-CM

## 2022-11-11 PROCEDURE — 1111F DSCHRG MED/CURRENT MED MERGE: CPT | Performed by: INTERNAL MEDICINE

## 2022-11-11 NOTE — CARE COORDINATION
[facility] Care Transitions Initial Follow Up Call    Call within 2 business days of discharge: Yes    CTN contacted the patient by telephone to perform post hospital discharge assessment. Verified name and  with patient as identifiers. Provided introduction to self, and explanation of the Care Transition Nurse role. Patient: Michaela Bolton Patient : 1970   MRN: [id number]  Reason for Admission:   Discharge Date: 11/10/22 RARS: Readmission Risk Score: 15.5      Last Discharge  Street       Date Complaint Diagnosis Description Type Department Provider    22 Abnormal Lab Infected wound ED to Hosp-Admission (Discharged) (ADMITTED) [facility] Sebastian Benoit MD; Mehul Knight. .. Challenges to be reviewed by the provider   Additional needs identified to be addressed with provider: Yes  HFU               Method of communication with provider:  Patient says she is calling this morning . Spoke with: [address] Transition Nurse reviewed discharge instructions with patient who verbalized understanding. The patient was given an opportunity to ask questions and does not have any further questions or concerns at this time. Were discharge instructions available to patient? Yes. Reviewed appropriate site of care based on symptoms and resources available to patient including: PCP  Specialist. The patient agrees to contact the PCP office for questions related to their healthcare. Advance Care Planning:   Does patient have an Advance Directive: not on file; education provided. Medication reconciliation was performed with patient, who verbalizes understanding of administration of home medications.  Medications reviewed, 1111F entered: yes    Was patient discharged with a pulse oximeter? no    Non-face-to-face services provided:  Obtained and reviewed discharge summary and/or continuity of care documents    Offered patient enrollment in the Remote Patient Monitoring (RPM) program for in-home monitoring: NA.    Care Transitions 24 Hour Call    Do you have a copy of your discharge instructions?: Yes  Do you have all of your prescriptions and are they filled?: Yes  Have you been contacted by a [employer] [address]?: No  Have you scheduled your follow up appointment?: Yes  How are you going to get to your appointment?: Car - family or friend to transport  Do you have support at home?: Partner/Spouse/SO  Do you feel like you have everything you need to keep you well at home?: Yes  Are you an active caregiver in your home?: No  Care Transitions Interventions         Follow Up : Spoke with patient today for CTN call after discharge from [facility]. She says she is doing well. Did get meds, reviewed them, 1111F order placed. She says she is eating some, not drinking as much as she should but going to try to do better. She says her caregiver is changing her dressing to her ankle today. She has appt with wound care and she is calling PCP office this morning to set up her HFU. She is paralyzed from the breast down. She has had PCP office fill out paperwork for new hospital bed and mattress. She is requested a lift as well while inpatient, says she did not receive one. CTN advised her when she goes to her HFU to request the lift that way she can fill out the paperwork and get that started for her. She says she does not check her blood sugar often, says not Diabetic, but does check occasionally. She irrigates her bowels and bladder. She has had the Covid vaccine, listed her PHCDM and does not have LW, but has POA, she is sending copy to PCP office. She is accepting of CTN calls and f/u. CTN will follow up at a later time.    Future Appointments   Date Time Provider Maurizio Hawkins   11/14/2022  1:45 PM Samuel Smith MD [facility] [address] [facility] [address]   12/21/2022  1:00 PM Iram Lai MD [address] [facility] -   1/19/2023  1:00 PM [facility]       Care Transition Nurse provided contact information. Plan for follow-up call in 5-7 days based on severity of symptoms and risk factors.   Plan for next call:  HFU, med changes, bowel and bladder, wound care visits, wound assessment    Andreudencelizabeth Goins RN

## 2022-11-13 LAB
ANAEROBIC CULTURE: NORMAL
CULTURE SURGICAL: NORMAL
GRAM STAIN RESULT: NORMAL

## 2022-11-14 ENCOUNTER — HOSPITAL ENCOUNTER (OUTPATIENT)
Dept: WOUND CARE | Age: 52
Discharge: HOME | End: 2022-11-14
Payer: MEDICARE

## 2022-11-14 VITALS
SYSTOLIC BLOOD PRESSURE: 93 MMHG | HEART RATE: 65 BPM | BODY MASS INDEX: 20.29 KG/M2 | DIASTOLIC BLOOD PRESSURE: 65 MMHG | RESPIRATION RATE: 18 BRPM | WEIGHT: 137 LBS | TEMPERATURE: 98.1 F | HEIGHT: 69 IN

## 2022-11-14 DIAGNOSIS — G82.20 PARAPLEGIA (HCC): Chronic | ICD-10-CM

## 2022-11-14 DIAGNOSIS — L97.512 NEUROPATHIC ULCER OF RIGHT FOOT WITH FAT LAYER EXPOSED (HCC): Primary | ICD-10-CM

## 2022-11-14 PROCEDURE — 97597 DBRDMT OPN WND 1ST 20 CM/<: CPT | Performed by: SURGERY

## 2022-11-14 PROCEDURE — 97597 DBRDMT OPN WND 1ST 20 CM/<: CPT

## 2022-11-14 PROCEDURE — 6370000000 HC RX 637 (ALT 250 FOR IP): Performed by: NURSE PRACTITIONER

## 2022-11-14 RX ORDER — LIDOCAINE 40 MG/G
CREAM TOPICAL ONCE
Status: CANCELLED | OUTPATIENT
Start: 2022-11-14 | End: 2022-11-14

## 2022-11-14 RX ORDER — LIDOCAINE HYDROCHLORIDE 20 MG/ML
JELLY TOPICAL ONCE
Status: CANCELLED | OUTPATIENT
Start: 2022-11-14 | End: 2022-11-14

## 2022-11-14 RX ORDER — LIDOCAINE HYDROCHLORIDE 20 MG/ML
JELLY TOPICAL ONCE
Status: COMPLETED | OUTPATIENT
Start: 2022-11-14 | End: 2022-11-14

## 2022-11-14 RX ORDER — ACETIC ACID 5 %
1000 LIQUID (ML) MISCELLANEOUS DAILY
Status: DISCONTINUED | OUTPATIENT
Start: 2022-11-14 | End: 2022-11-16 | Stop reason: HOSPADM

## 2022-11-14 RX ORDER — LIDOCAINE HYDROCHLORIDE 40 MG/ML
SOLUTION TOPICAL ONCE
Status: CANCELLED | OUTPATIENT
Start: 2022-11-14 | End: 2022-11-14

## 2022-11-14 RX ORDER — ACETIC ACID 3 %
500 LIQUID (ML) MISCELLANEOUS DAILY
Status: DISCONTINUED | OUTPATIENT
Start: 2022-11-14 | End: 2022-11-16 | Stop reason: HOSPADM

## 2022-11-14 RX ORDER — LIDOCAINE 50 MG/G
OINTMENT TOPICAL ONCE
Status: CANCELLED | OUTPATIENT
Start: 2022-11-14 | End: 2022-11-14

## 2022-11-14 RX ADMIN — LIDOCAINE HYDROCHLORIDE: 20 JELLY TOPICAL at 13:57

## 2022-11-14 NOTE — PLAN OF CARE
Problem: Chronic Conditions and Co-morbidities  Goal: Patient's chronic conditions and co-morbidity symptoms are monitored and maintained or improved  Outcome: Progressing     Problem: Discharge Planning  Goal: Discharge to home or other facility with appropriate resources  Outcome: Progressing     Problem: Wound:  Goal: Will show signs of wound healing; wound closure and no evidence of infection  Description: Will show signs of wound healing; wound closure and no evidence of infection  Outcome: Progressing     Problem: Pressure Ulcer:  Goal: Signs of wound healing will improve  Description: Signs of wound healing will improve  Outcome: Progressing  Goal: Absence of new pressure ulcer  Description: Absence of new pressure ulcer  Outcome: Progressing  Goal: Will show no infection signs and symptoms  Description: Will show no infection signs and symptoms  Outcome: Progressing     Problem: Smoking cessation:  Goal: Ability to formulate a plan to maintain a tobacco-free life will be supported  Description: Ability to formulate a plan to maintain a tobacco-free life will be supported  Outcome: Progressing     Problem: Weight control:  Goal: Ability to maintain an optimal weight for height and age will be supported  Description: Ability to maintain an optimal weight for height and age will be supported  Outcome: Progressing     Problem: Falls - Risk of:  Goal: Will remain free from falls  Description: Will remain free from falls  Outcome: Progressing     Problem: Blood Glucose:  Goal: Ability to maintain appropriate glucose levels will improve  Description: Ability to maintain appropriate glucose levels will improve  Outcome: Progressing

## 2022-11-14 NOTE — WOUND CARE
Maria Eugenia[address]e-er [address] f: [phone] f: [phone] [phone] [email or web address]      Ordering Center:     [address],[facility] [address]  [address] [place] [address] AUGIE [address] [id number] [phone]  WOUND CARE Dept: [address] NUMBER [phone]    Patient Information:      Loreta Harper  [address]   [phone]   : 1970  AGE: 47 y.o. GENDER: female   EPISODE DATE: 2022    Insurance:      PRIMARY INSURANCE:  Plan: MEDICARE PART A AND B  Coverage: MEDICARE  Effective Date: 2022  Group Number: [unfilled]  Subscriber Number: [id number] - (Medicare)    Payer/Plan Subscr  Sex Relation Sub. Ins. ID Effective Group Num   1. [address] [facility] 1970 Female Self [id number]                                    [address]    2. MEDICAID [place] -* AJAY HERNANDEZ O 1970 Female Self [id number] 1/15/15                                    [address]        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 ([address] - Primary (Chronic)    Relevant Orders    Initiate Outpatient Wound Care Protocol       WOUNDS REQUIRING DRESSING SUPPLIES:     Wound 22 Foot Anterior;Right;Dorsal Wound #1 Right dorsal foot (traumatic) (Active)   Wound Image   22 1406   Wound Etiology Venous 22 1406   Dressing Status New dressing applied 22 1430   Wound Cleansed Soap and water 22 1406   Dressing/Treatment Gauze dressing/dressing sponge;Moist to moist;Petroleum gauze; Roll gauze 22 1430   Offloading for Diabetic Foot Ulcers Offloading boot 22 1430   Dressing Change Due 11/10/22 11/09/22 2010   Wound Length (cm) 1.9 cm 22 1406   Wound Width (cm) 1.8 cm 22 1406   Wound Depth (cm) 0.2 cm 11/14/22 1406   Wound Surface Area (cm^2) 3.42 cm^2 11/14/22 1406   Change in Wound Size % (l*w) -1610 11/14/22 1406   Wound Volume (cm^3) 0.684 cm^3 11/14/22 1406   Wound Healing % -3320 11/14/22 1406   Post-Procedure Length (cm) 1.9 cm 11/14/22 1428   Post-Procedure Width (cm) 1.8 cm 11/14/22 1428   Post-Procedure Depth (cm) 0.2 cm 11/14/22 1428   Post-Procedure Surface Area (cm^2) 3.42 cm^2 11/14/22 1428   Post-Procedure Volume (cm^3) 0.684 cm^3 11/14/22 1428   Distance Tunneling (cm) 0 cm 11/14/22 1406   Tunneling Position ___ O'Clock 0 11/14/22 1406   Undermining Starts ___ O'Clock 0 11/14/22 1406   Undermining Ends___ O'Clock 0 11/14/22 1406   Undermining Maxium Distance (cm) 0 11/14/22 1406   Wound Assessment Pink/red;Slough 11/14/22 1406   Drainage Amount Moderate 11/14/22 1406   Drainage Description Serosanguinous 11/14/22 1406   Odor None 11/14/22 1406   Audelia-wound Assessment Hemosiderin staining (brown yellow); Intact 11/14/22 1406   Margins Attached edges 11/14/22 1406   Wound Thickness Description not for Pressure Injury Full thickness 11/14/22 1406   Number of days: 53          Supplies Requested :      WOUND #: 1   PRIMARY DRESSING:  Saline moist 2x2, dry gauze, roll gauze, medipore tape        FREQUENCY OF DRESSING CHANGES:  Twice daily            ADDITIONAL ITEMS:  [x] Gloves Small  [x] Gloves Medium [] Gloves Large [] Gloves XLarge  [] Tape 1\" [x] Tape 2\" [] Tape 3\"  [] Medipore Tape  [x] Saline  [] Skin Prep   [] Adhesive Remover   [] Cotton Tip Applicators   [] Other:    Patient Wound(s) Debrided: [] Yes if yes please add date 11/14   [] No    Debribement Type: Excisional/Sharp    Is the patient currently on an antibiotic for their Wound(s): [] Yes if yes please add name and dose   [] No    Patient currently being seen by Home Health: [] Yes   [x] No    Duration for needed supplies:  []15  [x]30  []60  []90 Days    Electronically signed by Maciej Velasquez RN on 11/14/2022 at 2:50 PM Provider Information:      PROVIDER'S NAME: Dr Shawn Poole: [id number]

## 2022-11-14 NOTE — PROGRESS NOTES
[address]   Progress Note and Procedure Note      Sravani Crane  MEDICAL RECORD NUMBER:  [id number]  AGE: 47 y.o. GENDER: female  : 1970  EPISODE DATE:  2022    Subjective:     Chief Complaint   Patient presents with    Wound Check     Patient presents today for recheck right foot wound. HISTORY of PRESENT ILLNESS HPI     Sravani Crane is a 47 y.o. female who presents today for wound/ulcer evaluation.    Wound Context: Pt with R ankle wound here for eval/treat  Wound/Ulcer Pain Timing/Severity: none  Quality of pain: N/A  Severity:  0 / 10   Modifying Factors: None  Associated Signs/Symptoms: none    Ulcer Identification:  Ulcer Type: pressure  Contributing Factors: edema    Wound:  Pressure        PAST MEDICAL HISTORY        Diagnosis Date    Ankle wound     and toe wound    Aptyalism 2017    Arthritis 2020    Asthma     Back pain 2017    Binocular vision disorder with diplopia 2017    CAFL (chronic airflow limitation) ([address] 2017    Hay fever 2017    Headache 2017    MS (multiple sclerosis) ([address]     Muscle spasticity     Neuropathic ulcer of left foot, limited to breakdown of skin ([address] 4/10/2017    Numbness 2016    Open wound of ankle 2017    Open wound of second toe of left foot 2018    Peripheral vascular disease ([address]     Seizure ([address]     occ. related to ms    Sepsis ([address] 2016    Weakness 2016       PAST SURGICAL HISTORY    Past Surgical History:   Procedure Laterality Date    BACLOFEN PUMP IMPLANTATION      BREAST BIOPSY Right     neg    COLONOSCOPY N/A 2019    Dr Vargas Hanson ileum through ostomy-patent and healthy appearing anastomosis in the right colon-entero colonic anastomosis-10 yr recall    COLOSTOMY      FEMUR FRACTURE SURGERY      Right    FOOT AMPUTATION Left 2019    LEFT TRANSMET AMPUTATION performed by Ethan Davidson MD at [address] ([address] [place] [address]) OTHER SURGICAL HISTORY      urostomy    OTHER SURGICAL HISTORY      pain pump    MD INSJ PRPH CTR VAD W/SUBQ PORT UNDER 5 YR N/A 06/26/2018    SINGLE LUMEN PORT PLACEMENT WITH FLUORO performed by Lawerence Phalen, MD at [facility]      TOE AMPUTATION      L last toe    TONSILLECTOMY      URETEROTOMY         FAMILY HISTORY    Family History   Problem Relation Age of Onset    Cancer Mother         breast    Colon Cancer Mother     Colon Polyps Mother     Breast Cancer Mother     Diabetes Father     High Blood Pressure Father     Heart Disease Paternal Grandmother     Breast Cancer Paternal Aunt     Cancer Paternal Aunt         breast    Liver Cancer Paternal Aunt     Esophageal Cancer Neg Hx     Liver Disease Neg Hx     Rectal Cancer Neg Hx     Stomach Cancer Neg Hx        SOCIAL HISTORY    Social History     Tobacco Use    Smoking status: Every Day     Packs/day: 0.50     Years: 15.00     Pack years: 7.50     Types: Cigarettes    Smokeless tobacco: Never    Tobacco comments:     Less than 10 per day   Vaping Use    Vaping Use: Never used   Substance Use Topics    Alcohol use: Yes     Comment: yaritza    Drug use: Yes     Types: Marijuana Daril Enoch)     Comment: daily        ALLERGIES    Allergies   Allergen Reactions    Darvocet [Propoxyphene N-Acetaminophen]      Talking out of her head    Morphine      Category:  Allergy;     Morphine And Related Itching and Swelling    Propoxyphene Swelling       MEDICATIONS    Current Outpatient Medications on File Prior to Encounter   Medication Sig Dispense Refill    methylphenidate (RITALIN) 20 MG tablet TAKE 1 TABLET BY MOUTH DAILY 30 tablet 0    HYDROcodone-acetaminophen (NORCO)  MG per tablet TAKE 1 TABLET BY MOUTH 3 TIMES  A DAY 90 tablet 0    tiZANidine (ZANAFLEX) 4 MG tablet TAKE 3 TABLETS TWICE DAILY AS NEEDED 180 tablet 11    nitrofurantoin (MACRODANTIN) 50 MG capsule TAKE 1 CAPSULE BY MOUTH NIGHTLY 90 capsule 1    levETIRAcetam (KEPPRA) 750 MG tablet TAKE 1 TABLET BY MOUTH DAILY 30 tablet 5    pilocarpine (SALAGEN) 7.5 MG tablet TAKE ONE TABLET BY MOUTH 3 TIMES DAILY 90 tablet 3    pregabalin (LYRICA) 300 MG capsule TAKE 1 CAPSULE BY MOUTH TWICE A DAY 60 capsule 5    ondansetron (ZOFRAN-ODT) 4 MG disintegrating tablet Take 1 tablet by mouth 3 times daily as needed for Nausea or Vomiting 30 tablet 0    hydroCHLOROthiazide (HYDRODIURIL) 25 MG tablet TAKE 1 TABLET BY MOUTH ONCE DAILY AS NEEDED 30 tablet 0    DULoxetine (CYMBALTA) 30 MG extended release capsule Take 1 capsule by mouth nightly 90 capsule 2    fluocinolone acetonide (SYNALAR) 0.01 % external solution Apply 10 drops topically at bedtime      Diapers & Supplies MISC Indications: FX: [id number] Diapers, Chucks, Wipes, and Gloves. 100 each 3    azelastine (ASTELIN) 0.1 % nasal spray USE 2 SPRAYS IN EACH NOSTRIL TWICE DAILY AS DIRECTED 30 mL 0    cetirizine (ZYRTEC) 10 MG tablet Take 10 mg by mouth daily      Catheters MISC Catheter supplies and lubricant 5 times daily G82.20  to [address] [facility] [phone] 450 each 3    glucose monitoring kit (FREESTYLE) monitoring kit 1 kit by Does not apply route daily (Patient not taking: Reported on 11/11/2022) 1 kit 0    blood glucose monitor strips Test 1 times a day & as needed for symptoms of irregular blood glucose. 100 strip 3    docusate sodium (COLACE) 100 MG capsule Take 200 mg by mouth as needed for Constipation      PROAIR  (90 Base) MCG/ACT inhaler INHALE 1 PUFF INTO THE LUNGS EVERY 6 HOURS AS NEEDED FOR WHEEZING OR SHORTNESS OF BREATH 8.5 g 5    Heparin Lock Flush (HEPARIN FLUSH, 100 UNITS/ML,) 100 UNIT/ML injection 3 mLs by Intercatheter route every 30 days No every 30 days but every 4-6 weeks.  Flush port with 300 units heparin with 20 cc normal saline for ocrevus infusion for Multiple sclerosis and NS 20CC 1 Syringe 5    ipratropium-albuterol (DUONEB) 0.5-2.5 (3) MG/3ML SOLN nebulizer solution USE 1 UNIT DOSE IN NEBULIZER EVERY 4 TO 6 HOURS AS NEEDED. 450 mL 11    BACLOFEN, PAIN PUMP REFILL CHARGE, by Implant route continuous Indications: every 3 months      Multiple Vitamins-Minerals (THERAPEUTIC MULTIVITAMIN-MINERALS) tablet Take 1 tablet by mouth daily      glucose monitoring kit (FREESTYLE) monitoring kit 1 kit by Does not apply route daily (Patient not taking: Reported on 11/11/2022) 1 kit 0    blood glucose monitor strips Test 1 times a day & as needed for symptoms of irregular blood glucose. 100 strip 0    baclofen (LIORESAL) 10 MG tablet Take 1 tablet by mouth 2 times daily as needed (muscle spasms) (Patient taking differently: Take 10 mg by mouth 2 times daily as needed (muscle spasms) Indications: using pump) 60 tablet 5    Sodium Chloride Flush (SALINE FLUSH) 0.9 % SOLN Infuse 20 mLs intravenously every 30 days Not every 30 days but every 4-6 weeks as need with 300 units of heparin to flush port for Infusion of Ocrevus for multiple sclerosis 20 mL 5     No current facility-administered medications on file prior to encounter. REVIEW OF SYSTEMS    A comprehensive review of systems was negative.     Objective:      BP 93/65   Pulse 65   Temp 98.1 °F (36.7 °C) (Temporal)   Resp 18   Ht 5' 9\" (1.753 m)   Wt 137 lb (62.1 kg)   BMI 20.23 kg/m²     Wt Readings from Last 3 Encounters:   11/14/22 137 lb (62.1 kg)   11/10/22 137 lb 11.2 oz (62.5 kg)   07/12/22 139 lb (63 kg)       PHYSICAL EXAM    General Appearance: alert and oriented to person, place and time, well developed and well- nourished, in no acute distress  Skin: warm and dry, no rash or erythema  Head: normocephalic and atraumatic  Eyes: pupils equal, round, and reactive to light, extraocular eye movements intact, conjunctivae normal  ENT: tympanic membrane, external ear and ear canal normal bilaterally, nose without deformity, nasal mucosa and turbinates normal without polyps, lips teeth and gums normal  Neck: supple and non-tender without mass, no thyromegaly or thyroid nodules, no cervical lymphadenopathy  Pulmonary/Chest: clear to auscultation bilaterally- no wheezes, rales or rhonchi, normal air movement, no respiratory distress  Cardiovascular: normal rate, regular rhythm, normal S1 and S2, no murmurs, rubs, clicks, or gallops, distal pulses intact, no carotid bruits  Abdomen: soft, non-tender, non-distended, normal bowel sounds, no masses or organomegaly  Extremities: no cyanosis, clubbing or edema  Musculoskeletal: normal range of motion, no joint swelling, deformity or tenderness  Neurologic: reflexes normal and symmetric, no cranial nerve deficit, gait, coordination and speech normal, skin sensation normal      Assessment:      Problem List Items Addressed This Visit       Paraplegia (HCC) (Chronic)    * (Principal) 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 Performed by: Mattie Ramirez MD    Consent obtained:  Yes    Time out taken:  Yes    Pain Control: Anesthetic  Anesthetic: 2% Lidocaine Gel Topical       Debridement:Non-excisional Debridement    Using curette the wound(s)/ulcer(s) was/were sharply debrided down through and including the removal of epidermis and dermis.         Devitalized Tissue Debrided:  fibrin, biofilm, slough, necrotic/eschar, and exudate      Pre Debridement Measurements:  Are located in the Wound/Ulcer Documentation Flow Sheet    Wound/Ulcer #: 1    Percent of Wound(s)/Ulcer(s) Debrided: 100%    Total Surface Area Debrided:  3.4 sq cm       Diabetic/Pressure/Non Pressure Ulcers only:  Ulcer: Pressure ulcer, Stage 3             Post Debridement Measurements:    Wound/Ulcer Descriptions are Pre Debridement --EXCEPT MEASUREMENTS    Wound 09/22/22 Foot Anterior;Right;Dorsal Wound #1 Right dorsal foot (traumatic) (Active)   Wound Image   11/14/22 1406   Wound Etiology Venous 11/14/22 1406   Dressing Status Old drainage noted 11/14/22 1406   Wound Cleansed Soap and water 11/14/22 1406   Dressing/Treatment Pharmaceutical agent (see MAR); Moist to dry;Petroleum impregnated gauze;Gauze dressing/dressing sponge;Roll gauze 11/10/22 1040   Offloading for Diabetic Foot Ulcers Offloading boot 11/14/22 1406   Dressing Change Due 11/10/22 11/09/22 2010   Wound Length (cm) 1.9 cm 11/14/22 1406   Wound Width (cm) 1.8 cm 11/14/22 1406   Wound Depth (cm) 0.2 cm 11/14/22 1406   Wound Surface Area (cm^2) 3.42 cm^2 11/14/22 1406   Change in Wound Size % (l*w) -1610 11/14/22 1406   Wound Volume (cm^3) 0.684 cm^3 11/14/22 1406   Wound Healing % -3320 11/14/22 1406   Post-Procedure Length (cm) 1.9 cm 11/14/22 1428   Post-Procedure Width (cm) 1.8 cm 11/14/22 1428   Post-Procedure Depth (cm) 0.2 cm 11/14/22 1428   Post-Procedure Surface Area (cm^2) 3.42 cm^2 11/14/22 1428   Post-Procedure Volume (cm^3) 0.684 cm^3 11/14/22 1428   Distance Tunneling (cm) 0 cm 11/14/22 1406   Tunneling Position ___ O'Clock 0 11/14/22 1406   Undermining Starts ___ O'Clock 0 11/14/22 1406   Undermining Ends___ O'Clock 0 11/14/22 1406   Undermining Maxium Distance (cm) 0 11/14/22 1406   Wound Assessment Pink/red;Slough 11/14/22 1406   Drainage Amount Moderate 11/14/22 1406   Drainage Description Serosanguinous 11/14/22 1406   Odor None 11/14/22 1406   Audelia-wound Assessment Hemosiderin staining (brown yellow); Intact 11/14/22 1406   Margins Attached edges 11/14/22 1406   Wound Thickness Description not for Pressure Injury Full thickness 11/14/22 1406   Number of days: 53             Estimated Blood Loss:  Minimal    Hemostasis Achieved:  by pressure    Procedural Pain:  0  / 10     Post Procedural Pain:  0 / 10     Response to treatment:  Well tolerated by patient.          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 Discharge [address] and Hyperbaric Oxygen Therapy   Physician Orders and Discharge Instructions  [address] [facility] [place]  [place], [address]  Telephone: [phone]    NAME:  Raza Rueda OF BIRTH:  1970  MEDICAL RECORD NUMBER:  [id number]  DATE:  11/14/2022    Discharge condition: Stable    Discharge to: Home    Left via:Private automobile    [facility] [employer] to order supplies     Accompanied by: Care Giver      Dressing Orders: Right lateral ankle:   Wash with soap and water. Acidic Acid moist gauze, cover with Vaseline gauze, Secure with roll gauze and tape, change twice daily   Follow up with Dr Bear Paris on 11/17 at 1:30     Treatment Orders:  Protein rich diet (unless restricted by your physician); Multivitamin daily; Elevate legs above the level of your heart when sitting 3-4 times daily for at least one hour each time,    [facility] follow up visit ___________1 week_______________  (Please note your next appointment above and if you are unable to keep, kindly give a 24 hour notice. Thank you.)    If you experience any of the following, please call the [address] [place] [address] Road during business hours:    * Increase in Pain  * Temperature over 101  * Increase in drainage from your wound  * Drainage with a foul odor  * Bleeding  * Increase in swelling  * Need for compression bandage changes due to slippage, breakthrough drainage.     If you need

## 2022-11-14 NOTE — DISCHARGE INSTRUCTIONS
[address] and Hyper[facility] Oxygen Therapy   Physician Orders and Discharge Instructions  [address]  [address] [facility] [place]   Telephone: [phone]    NAME:  Brendan Damon OF BIRTH:  1970  MEDICAL RECORD NUMBER:  [id number]  DATE:  11/14/2022    Discharge condition: Stable    Discharge to: Home    Left via:Private automobile    [facility] to order supplies     Accompanied by: Care Giver      Dressing Orders: Right lateral ankle:   Wash with soap and water. Acidic Acid moist gauze, cover with Vaseline gauze, Secure with roll gauze and tape, change twice daily   Follow up with Dr Kwabena Thomas on 11/17 at 1:30     Treatment Orders:  Protein rich diet (unless restricted by your physician); Multivitamin daily; Elevate legs above the level of your heart when sitting 3-4 times daily for at least one hour each time,    [address] [place] [address] follow up visit ___________1 week_______________  (Please note your next appointment above and if you are unable to keep, kindly give a 24 hour notice. Thank you.)    If you experience any of the following, please call the [address] during business hours:    * Increase in Pain  * Temperature over 101  * Increase in drainage from your wound  * Drainage with a foul odor  * Bleeding  * Increase in swelling  * Need for compression bandage changes due to slippage, breakthrough drainage. If you need medical attention outside of the business hours of the [address] please contact your PCP or go to the nearest emergency room.

## 2022-11-17 ENCOUNTER — OFFICE VISIT (OUTPATIENT)
Dept: INTERNAL MEDICINE | Age: 52
End: 2022-11-17

## 2022-11-17 ENCOUNTER — TRANSCRIBE ORDERS (OUTPATIENT)
Dept: ADMINISTRATIVE | Facility: HOSPITAL | Age: 52
End: 2022-11-17

## 2022-11-17 VITALS
OXYGEN SATURATION: 98 % | DIASTOLIC BLOOD PRESSURE: 70 MMHG | RESPIRATION RATE: 18 BRPM | SYSTOLIC BLOOD PRESSURE: 98 MMHG | HEART RATE: 73 BPM

## 2022-11-17 DIAGNOSIS — L97.319 LOWER LIMB ULCER, ANKLE, RIGHT, WITH UNSPECIFIED SEVERITY (HCC): Primary | ICD-10-CM

## 2022-11-17 DIAGNOSIS — G40.909 SEIZURE DISORDER (HCC): ICD-10-CM

## 2022-11-17 DIAGNOSIS — R40.0 DAYTIME SOMNOLENCE: ICD-10-CM

## 2022-11-17 DIAGNOSIS — N76.1 SUBACUTE VAGINITIS: ICD-10-CM

## 2022-11-17 DIAGNOSIS — Z87.891 SMOKING HISTORY: ICD-10-CM

## 2022-11-17 DIAGNOSIS — F32.89 OTHER DEPRESSION: ICD-10-CM

## 2022-11-17 DIAGNOSIS — G35 MULTIPLE SCLEROSIS (HCC): ICD-10-CM

## 2022-11-17 DIAGNOSIS — Z87.891 PERSONAL HISTORY OF TOBACCO USE, PRESENTING HAZARDS TO HEALTH: Primary | ICD-10-CM

## 2022-11-17 RX ORDER — FLUCONAZOLE 100 MG/1
100 TABLET ORAL DAILY
Qty: 3 TABLET | Refills: 3 | Status: SHIPPED | OUTPATIENT
Start: 2022-11-17 | End: 2022-11-20

## 2022-11-17 NOTE — PROGRESS NOTES
Post-Discharge Transitional Care Follow Up      Alyce Roland   YOB: 1970    Date of Office Visit:  11/17/2022  Date of Hospital Admission: 11/2/22  Date of Hospital Discharge: 11/10/22  Readmission Risk Score (high >=14%. Medium >=10%):Readmission Risk Score: 15.5      Care management risk score Rising risk (score 2-5) and Complex Care (Scores >=6): No Risk Score On File     Non face to face  following discharge, date last encounter closed (first attempt may have been earlier): 11/11/2022     Call initiated 2 business days of discharge: Yes     Smoking history  -     CT lung screen [Initial/Annual]; Future    Medical Decision Making: high complexity  No follow-ups on file. On this date 11/17/2022 I have spent 35 minutes reviewing previous notes, test results and face to face with the patient discussing the diagnosis and importance of compliance with the treatment plan as well as documenting on the day of the visit. Subjective:   HPI    Inpatient course: Discharge summary reviewed- see chart. Interval history/Current status: Ms. Amy Wilde is a 64-year-old woman who presents to the office today for hospital follow-up. She was seen by wound care recently for chronic right foot room that grew out Pseudomonas, Proteus, Enterococcus faecalis and E. coli. She was admitted to the hospital because the right foot wound was not improving. She was started initially on broad-spectrum antibiotics with vancomycin and meropenem and later transition to Unasyn and meropenem after review of sensitivities. Vascular surgery did see her in house. There was no evidence of osteomyelitis. She is also evaluated by infectious diseases and wound care. A biopsy was done to rule out fungal or myocardial infection as well as malignancy. Those results are still pending. She was advised to follow-up with wound care as an outpatient. She declined home health. She was discharged home in stable condition.   At this time, she is doing well. She did see wound care earlier and she states that the wound is healing. She states that the area initially looked like a red dot, but later became more deep. She denies any complaints of fevers or chills. She declines to let me look at the wound today. The wound is wrapped in a bandage. She also has a boot for pressure relief from her wheelchair. She also states that she needs replacement parts for her motorized wheelchair. She broke off her armrest and foot rest while traveling. She will let us know exactly what she needs and we can send in an order for most replacement díaz. She would like to do her low-dose CT scan at [facility]. She does have a smoking history. She states that [facility] said it was not covered by her insurance. I did tell her that that was not covered at [facility], it probably would not be covered at [facility].  However, we can put in the order anyway. She also states that she has a yeast infection. She would like some Diflucan called in. She does have a history of multiple sclerosis. She is been wheelchair-bound for a number of years. She is on Ritalin for daytime somnolence, which has been is prescribed by neurology, and this does work well. Hydrocodone is helpful for chronic pain secondary to multiple sclerosis. She has not had any seizures. Her blood pressure is well controlled. Cymbalta is helpful for depression.     Patient Active Problem List   Diagnosis    Muscle spasticity    Peripheral vascular disease (HCC)    Multiple sclerosis (HCC)    Pain in both upper arms    Numbness    Other fatigue    Weakness    Chronic pain    Hx of seizure disorder    Insomnia    Pressure ulcer of sacral region    Neck pain    Seasonal allergic rhinitis    S/P transmetatarsal amputation of foot, left (HCC)    Constipation due to neurogenic bowel    Colostomy present (HCC)    Paraplegia (HCC)    Hip pain    Seizure (HCC)    Smoking Encounter for care related to vascular access port    Urinary incontinence    Mixed anxiety and depressive disorder    Malodorous urine    Arthritis    Osteoporosis    Urinary bladder stone    Vesicovaginal fistula    Neuropathic 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 ([address]    Dyspnea    Marijuana user    Neurogenic bladder    Other specified misadventures during surgical and medical care    Presence of other specified devices    Renal stone    Wheelchair bound    Pressure injury of sacral region, stage 3 ([address]    Unspecified severe protein-calorie malnutrition ([address]    Ankle wound, right, sequela    Severe malnutrition ([address]    Infected wound       Medications listed as ordered at the time of discharge from hospital     Medication List            Accurate as of November 17, 2022 11:59 PM. If you have any questions, ask your nurse or doctor. START taking these medications      fluconazole 100 MG tablet  Commonly known as: DIFLUCAN  Take 1 tablet by mouth daily for 3 days  Started by: Gerardo Bingham MD            CONTINUE taking these medications      azelastine 0.1 % nasal spray  Commonly known as: ASTELIN  USE 2 SPRAYS IN EACH NOSTRIL TWICE DAILY AS DIRECTED     BACLOFEN (PAIN PUMP REFILL CHARGE)     baclofen 10 MG tablet  Commonly known as: LIORESAL  Take 1 tablet by mouth 2 times daily as needed (muscle spasms)     blood glucose test strips  Test 1 times a day & as needed for symptoms of irregular blood glucose. Catheters [facility]  Catheter supplies and lubricant 5 times daily G82.20  to [facility] [phone]     cetirizine 10 MG tablet  Commonly known as: ZYRTEC     Diapers & Supplies Misc  Indications: FX: [id number] Diapers, Chucks, Wipes, and Gloves.      docusate sodium 100 MG capsule  Commonly known as: COLACE     DULoxetine 30 MG extended release capsule  Commonly known as: CYMBALTA  Take 1 capsule by mouth nightly     fluocinolone acetonide 0.01 % external solution  Commonly known as: SYNALAR     heparin flush (100 units/mL) 100 UNIT/ML injection  3 mLs by Intercatheter route every 30 days No every 30 days but every 4-6 weeks.  Flush port with 300 units heparin with 20 cc normal saline for ocrevus infusion for Multiple sclerosis and NS 20CC     hydroCHLOROthiazide 25 MG tablet  Commonly known as: HYDRODIURIL  TAKE 1 TABLET BY MOUTH ONCE DAILY AS NEEDED     HYDROcodone-acetaminophen  MG per tablet  Commonly known as: NORCO  TAKE 1 TABLET BY MOUTH 3 TIMES  A DAY     ipratropium-albuterol 0.5-2.5 (3) MG/3ML Soln nebulizer solution  Commonly known as: DUONEB  USE 1 UNIT DOSE IN NEBULIZER EVERY 4 TO 6 HOURS AS NEEDED.     levETIRAcetam 750 MG tablet  Commonly known as: KEPPRA  TAKE 1 TABLET BY MOUTH DAILY     methylphenidate 20 MG tablet  Commonly known as: RITALIN  TAKE 1 TABLET BY MOUTH DAILY     nitrofurantoin 50 MG capsule  Commonly known as: MACRODANTIN  TAKE 1 CAPSULE BY MOUTH NIGHTLY     ondansetron 4 MG disintegrating tablet  Commonly known as: ZOFRAN-ODT  Take 1 tablet by mouth 3 times daily as needed for Nausea or Vomiting     pilocarpine 7.5 MG tablet  Commonly known as: SALAGEN  TAKE ONE TABLET BY MOUTH 3 TIMES DAILY     pregabalin 300 MG capsule  Commonly known as: LYRICA  TAKE 1 CAPSULE BY MOUTH TWICE A DAY     ProAir  (90 Base) MCG/ACT inhaler  Generic drug: albuterol sulfate HFA  INHALE 1 PUFF INTO THE LUNGS EVERY 6 HOURS AS NEEDED FOR WHEEZING OR SHORTNESS OF BREATH     Saline Flush 0.9 % Soln  Infuse 20 mLs intravenously every 30 days Not every 30 days but every 4-6 weeks as need with 300 units of heparin to flush port for Infusion of Ocrevus for multiple sclerosis     therapeutic multivitamin-minerals tablet     tiZANidine 4 MG tablet  Commonly known as: ZANAFLEX  TAKE 3 TABLETS TWICE DAILY AS NEEDED               Where to Get Your Medications        These medications were sent to [facility], [address] [phone] - [address]  [address], [address] [facility]      Phone: [phone]   fluconazole 100 MG tablet          Medications marked \"taking\" at this time  Outpatient Medications Marked as Taking for the 22 encounter (Office Visit) with Marta Bridges MD   Medication Sig Dispense Refill    [] fluconazole (DIFLUCAN) 100 MG tablet Take 1 tablet by mouth daily for 3 days 3 tablet 3    methylphenidate (RITALIN) 20 MG tablet TAKE 1 TABLET BY MOUTH DAILY (Patient taking differently: Indications: MANAGED BY DR. LINK TAKE 1 TABLET BY MOUTH DAILY) 30 tablet 0    HYDROcodone-acetaminophen (NORCO)  MG per tablet TAKE 1 TABLET BY MOUTH 3 TIMES  A DAY (Patient taking differently: Indications: MANAGED BY DR. LINK TAKE 1 TABLET BY MOUTH 3 TIMES  A DAY) 90 tablet 0    tiZANidine (ZANAFLEX) 4 MG tablet TAKE 3 TABLETS TWICE DAILY AS NEEDED 180 tablet 11    nitrofurantoin (MACRODANTIN) 50 MG capsule TAKE 1 CAPSULE BY MOUTH NIGHTLY 90 capsule 1    levETIRAcetam (KEPPRA) 750 MG tablet TAKE 1 TABLET BY MOUTH DAILY 30 tablet 5    pilocarpine (SALAGEN) 7.5 MG tablet TAKE ONE TABLET BY MOUTH 3 TIMES DAILY 90 tablet 3    ondansetron (ZOFRAN-ODT) 4 MG disintegrating tablet Take 1 tablet by mouth 3 times daily as needed for Nausea or Vomiting 30 tablet 0    hydroCHLOROthiazide (HYDRODIURIL) 25 MG tablet TAKE 1 TABLET BY MOUTH ONCE DAILY AS NEEDED 30 tablet 0    DULoxetine (CYMBALTA) 30 MG extended release capsule Take 1 capsule by mouth nightly 90 capsule 2    fluocinolone acetonide (SYNALAR) 0.01 % external solution Apply 10 drops topically at bedtime      Diapers & Supplies MISC Indications: FX: [id number] Diapers, Chucks, Wipes, and Gloves.  100 each 3    azelastine (ASTELIN) 0.1 % nasal spray USE 2 SPRAYS IN EACH NOSTRIL TWICE DAILY AS DIRECTED 30 mL 0    cetirizine (ZYRTEC) 10 MG tablet Take 10 mg by mouth daily      Catheters MISC Catheter supplies and lubricant 5 times daily G82.20  to [address] [facility] [phone] 450 each 3    docusate sodium (COLACE) 100 MG capsule Take 200 mg by mouth as needed for Constipation      PROAIR  (90 Base) MCG/ACT inhaler INHALE 1 PUFF INTO THE LUNGS EVERY 6 HOURS AS NEEDED FOR WHEEZING OR SHORTNESS OF BREATH 8.5 g 5    ipratropium-albuterol (DUONEB) 0.5-2.5 (3) MG/3ML SOLN nebulizer solution USE 1 UNIT DOSE IN NEBULIZER EVERY 4 TO 6 HOURS AS NEEDED. 450 mL 11    BACLOFEN, PAIN PUMP REFILL CHARGE, by Implant route continuous Indications: every 3 months      Multiple Vitamins-Minerals (THERAPEUTIC MULTIVITAMIN-MINERALS) tablet Take 1 tablet by mouth daily      blood glucose monitor strips Test 1 times a day & as needed for symptoms of irregular blood glucose. 100 strip 0    baclofen (LIORESAL) 10 MG tablet Take 1 tablet by mouth 2 times daily as needed (muscle spasms) (Patient taking differently: Take 10 mg by mouth 2 times daily as needed (muscle spasms) Indications: using pump) 60 tablet 5        Medications patient taking as of now reconciled against medications ordered at time of hospital discharge: Yes    Review of Systems   Constitutional:  Positive for fatigue. Negative for activity change, appetite change, fever and unexpected weight change. HENT:  Negative for congestion, ear pain, postnasal drip, rhinorrhea, sinus pressure, sore throat and tinnitus. Eyes:  Negative for photophobia, pain, discharge, redness, itching and visual disturbance. Respiratory:  Negative for cough, chest tightness, shortness of breath and wheezing. Cardiovascular:  Negative for chest pain, palpitations and leg swelling. Gastrointestinal:  Negative for abdominal distention, abdominal pain, blood in stool, constipation, diarrhea, nausea and vomiting. Urinary and stool incontinence. Endocrine: Negative for cold intolerance, heat intolerance, polydipsia and polyuria.    Genitourinary:  Negative for decreased urine volume, difficulty urinating, dysuria, flank pain, frequency, hematuria and urgency. Musculoskeletal:         She has chronic back pain and muscle spasms. She is wheelchair-bound secondary to history of multiple sclerosis. Skin:  Positive for wound. Negative for color change, pallor and rash. Has an ulcer to her right heel and right ankle. Allergic/Immunologic: Positive for environmental allergies. Negative for food allergies and immunocompromised state. Neurological:  Negative for dizziness, seizures, syncope, speech difficulty, weakness, numbness and headaches. Occasionally gets sinus headaches   Hematological:  Negative for adenopathy. Does not bruise/bleed easily. Psychiatric/Behavioral:  Positive for dysphoric mood. Negative for agitation, confusion and decreased concentration. The patient is nervous/anxious. The patient is not hyperactive. Anxiety and depression are stable     Objective:    BP 98/70 (Site: Left Upper Arm, Position: Sitting)   Pulse 73   Resp 18   SpO2 98%   Physical Exam  Vitals and nursing note reviewed. Constitutional:       General: She is not in acute distress. Appearance: Normal appearance. She is well-developed and normal weight. She is not ill-appearing, toxic-appearing or diaphoretic. HENT:      Head: Normocephalic and atraumatic. Right Ear: Tympanic membrane, ear canal and external ear normal. There is no impacted cerumen. Left Ear: Tympanic membrane, ear canal and external ear normal. There is no impacted cerumen. Nose: Nose normal. No congestion or rhinorrhea. Mouth/Throat:      Mouth: Mucous membranes are moist.      Pharynx: Oropharynx is clear. No oropharyngeal exudate or posterior oropharyngeal erythema. Eyes:      General: No scleral icterus. Right eye: No discharge. Left eye: No discharge. Extraocular Movements: Extraocular movements intact. Conjunctiva/sclera: Conjunctivae normal.      Pupils: Pupils are equal, round, and reactive to light.    Neck:      Thyroid: No thyromegaly. Vascular: No carotid bruit or JVD. Trachea: No tracheal deviation. Cardiovascular:      Rate and Rhythm: Normal rate and regular rhythm. Pulses: Normal pulses. Heart sounds: Normal heart sounds. No murmur heard. No friction rub. No gallop. Pulmonary:      Effort: Pulmonary effort is normal. No respiratory distress. Breath sounds: Normal breath sounds. No stridor. No wheezing, rhonchi or rales. Chest:      Chest wall: No tenderness. Abdominal:      General: Bowel sounds are normal. There is no distension. Palpations: Abdomen is soft. There is no mass. Tenderness: There is no abdominal tenderness. There is no right CVA tenderness, left CVA tenderness, guarding or rebound. Hernia: No hernia is present. Comments: Ostomy bags are intact   Musculoskeletal:         General: No swelling, tenderness, deformity or signs of injury. Normal range of motion. Cervical back: Normal range of motion and neck supple. No rigidity. No muscular tenderness. Right lower leg: No edema. Left lower leg: No edema. Comments: She is wheelchair bound. She does have contractures of both of her feet. Lymphadenopathy:      Cervical: No cervical adenopathy. Skin:     General: Skin is warm and dry. Capillary Refill: Capillary refill takes less than 2 seconds. Coloration: Skin is not jaundiced or pale. Findings: No bruising, erythema or lesion. Comments: Pressure ulcer to right heel ulcer to her right ankle. At this time, the wound is covered with dressing in a boot. She declined to let me take the dressing off today. Neurological:      General: No focal deficit present. Mental Status: She is alert and oriented to person, place, and time. Mental status is at baseline. Cranial Nerves: No cranial nerve deficit. Motor: No weakness or abnormal muscle tone.       Coordination: Coordination normal.      Gait: Gait normal. Deep Tendon Reflexes: Reflexes are normal and symmetric. Reflexes normal.   Psychiatric:         Mood and Affect: Mood normal.         Behavior: Behavior normal.         Thought Content: Thought content normal.         Judgment: Judgment normal.     71-year-old woman here for hospital follow-up    1. Ankle ulcer: Continue care as per wound care. She denies any history of fever or chills. Cultures are currently pending. 2.  Vaginitis: Diflucan prescribed    3. Smoking history: Low-dose CT scan ordered    4. Multiple sclerosis: Stable her current medication regimens. Hydrocodone and baclofen are helpful for spasticity. She also takes Lyrica for neuropathic pain. Patient continues to use and needs a power wheelchair and needs for her to be repaired. She is having some difficulty with her power wheelchair. At this time, it has been a while since have seen this patient I do not recall exactly what the problems were with the wheelchair    Addendum:     Received a letter from [facility] asking you to addend the last chart note to state \" The patient continues to use and needs the [facility] and it needs to be repaired\"   Once completed, will send with the CMN with this note. Thank you. See media for faxed letter        5. Daytime somnolence: Stable on methylphenidate    6. Seizure disorder: No seizures reported on Keppra    7. Depression: Stable on Cymbalta  An electronic signature was used to authenticate this note.   --Fahad Johnson MD

## 2022-11-18 ENCOUNTER — CARE COORDINATION (OUTPATIENT)
Dept: CASE MANAGEMENT | Age: 52
End: 2022-11-18

## 2022-11-18 NOTE — CARE COORDINATION
[facility] Care Transitions Follow Up Call    Care Transition Nurse contacted the patient by telephone to follow up. Verified name and  with patient as identifiers. Patient: Genesis Ashby  Patient : 1970   MRN: [id number]  Reason for Admission:   Discharge Date: 11/10/22 RARS: Readmission Risk Score: 15.5      Needs to be reviewed by the provider   Additional needs identified to be addressed with provider: No  none             Method of communication with provider: none. Spoke with: Alyce Baker     Follow Up : Spoke with patient today for follow up call. She has been to wound care and wound to her ankle was debrided and dressed. She is to have dressing changes twice a day. She has a caregiver that assists with this. She has been to see her PCP for HFU, says it went well. She has a bit of a cold/sniffles, but nothing much bothering her. She is stable and convalescing well at this point. CTN will follow up at a later time. Future Appointments   Date Time Provider Maurizio Hawkins   2022  1:45 PM Perico Bishop MD [facility]   2022  1:00 PM Perico Bishop MD [facility]   2022  1:00 PM Shannon Sloan MD [address] [facility] Neurology -   2023  1:00 PM Lizett Balderas MD [facility]        Care Transitions Subsequent and Final Call    Subsequent and Final Calls  Do you have any ongoing symptoms?: No  Have your medications changed?: No  Do you have any questions related to your medications?: No  Do you currently have any active services?: No  Do you have any needs or concerns that I can assist you with?: No  Identified Barriers: None  Care Transitions Interventions  Other Interventions:             Care Transition Nurse provided contact information for future needs. Plan for follow-up call in 10-14 days based on severity of symptoms and risk factors.       Urbano Fernandez RN

## 2022-11-21 ENCOUNTER — HOSPITAL ENCOUNTER (OUTPATIENT)
Dept: WOUND CARE | Age: 52
Discharge: HOME | End: 2022-11-21
Payer: MEDICARE

## 2022-11-21 VITALS
WEIGHT: 132.06 LBS | HEIGHT: 69 IN | HEART RATE: 77 BPM | SYSTOLIC BLOOD PRESSURE: 123 MMHG | DIASTOLIC BLOOD PRESSURE: 93 MMHG | RESPIRATION RATE: 18 BRPM | BODY MASS INDEX: 19.56 KG/M2 | TEMPERATURE: 98.6 F

## 2022-11-21 DIAGNOSIS — L97.512 NEUROPATHIC ULCER OF RIGHT FOOT WITH FAT LAYER EXPOSED (HCC): Primary | ICD-10-CM

## 2022-11-21 DIAGNOSIS — G82.20 PARAPLEGIA (HCC): Chronic | ICD-10-CM

## 2022-11-21 PROCEDURE — 97597 DBRDMT OPN WND 1ST 20 CM/<: CPT

## 2022-11-21 PROCEDURE — 6370000000 HC RX 637 (ALT 250 FOR IP): Performed by: NURSE PRACTITIONER

## 2022-11-21 RX ORDER — LIDOCAINE 40 MG/G
CREAM TOPICAL ONCE
OUTPATIENT
Start: 2022-11-21 | End: 2022-11-21

## 2022-11-21 RX ORDER — LIDOCAINE HYDROCHLORIDE 40 MG/ML
SOLUTION TOPICAL ONCE
OUTPATIENT
Start: 2022-11-21 | End: 2022-11-21

## 2022-11-21 RX ORDER — LIDOCAINE HYDROCHLORIDE 20 MG/ML
JELLY TOPICAL ONCE
Status: COMPLETED | OUTPATIENT
Start: 2022-11-21 | End: 2022-11-21

## 2022-11-21 RX ORDER — LIDOCAINE HYDROCHLORIDE 20 MG/ML
JELLY TOPICAL ONCE
OUTPATIENT
Start: 2022-11-21 | End: 2022-11-21

## 2022-11-21 RX ORDER — LIDOCAINE 50 MG/G
OINTMENT TOPICAL ONCE
OUTPATIENT
Start: 2022-11-21 | End: 2022-11-21

## 2022-11-21 RX ORDER — LIDOCAINE HYDROCHLORIDE 20 MG/ML
JELLY TOPICAL ONCE
Status: CANCELLED | OUTPATIENT
Start: 2022-11-21 | End: 2022-11-21

## 2022-11-21 RX ADMIN — LIDOCAINE HYDROCHLORIDE: 20 JELLY TOPICAL at 14:31

## 2022-11-21 NOTE — DISCHARGE INSTRUCTIONS
[address] [facility]   Physician Orders and Discharge Instructions  [address]  Telephone: [phone]    NAME:  Hiral Class OF BIRTH:  1970  MEDICAL RECORD NUMBER:  [id number]  DATE:  11/21/2022    Discharge condition: Stable    Discharge to: Home    Left via:Private automobile    Accompanied by: Caregiver      Dressing Orders: Right Ankle  Xeroform to open area, cover with dry gauze, secure with loose roll gauze and tape, change daily     Dressing Orders: Right Foot   Wash with soap and water. Acidic Acid moist gauze, cover with dry gauze, Secure with loose roll gauze and tape, change twice daily   Wear Heel Lift Boots, No shoes or tight dressing or socks  Protein rich diet (unless restricted by your physician); Multivitamin daily; Elevate legs above the level of your heart when sitting 3-4 times daily for at least one hour each time,    [address] [place] [address],[address] follow up visit _____________1 week________________  (Please note your next appointment above and if you are unable to keep, kindly give a 24 hour notice. Thank you.)    If you experience any of the following, please call the [facility] during business hours:    * Increase in Pain  * Temperature over 101  * Increase in drainage from your wound  * Drainage with a foul odor  * Bleeding  * Increase in swelling  * Need for compression bandage changes due to slippage, breakthrough drainage. If you need medical attention outside of the business hours of the [facility] please contact your PCP or go to the nearest emergency room.

## 2022-11-21 NOTE — PLAN OF CARE
Problem: Chronic Conditions and Co-morbidities  Goal: Patient's chronic conditions and co-morbidity symptoms are monitored and maintained or improved  Outcome: Progressing     Problem: Discharge Planning  Goal: Discharge to home or other facility with appropriate resources  Outcome: Progressing     Problem: Safety - Adult  Goal: Free from fall injury  Outcome: Progressing     Problem: Wound:  Goal: Will show signs of wound healing; wound closure and no evidence of infection  Description: Will show signs of wound healing; wound closure and no evidence of infection  Outcome: Progressing     Problem: Smoking cessation:  Goal: Ability to formulate a plan to maintain a tobacco-free life will be supported  Description: Ability to formulate a plan to maintain a tobacco-free life will be supported  Outcome: Progressing     Problem: Falls - Risk of:  Goal: Will remain free from falls  Description: Will remain free from falls  Outcome: Progressing     Problem: Blood Glucose:  Goal: Ability to maintain appropriate glucose levels will improve  Description: Ability to maintain appropriate glucose levels will improve  Outcome: Progressing

## 2022-11-21 NOTE — PROGRESS NOTES
[address]   Progress Note and Procedure Note      Terra Martin  MEDICAL RECORD NUMBER:  [id number]  AGE: 47 y.o. GENDER: female  : 1970  EPISODE DATE:  2022    Subjective:     Chief Complaint   Patient presents with    Wound Check     Right dorsal foot wound, patient reports redness to Right hip where a yanick in her leg is rubbing the skin. HISTORY of PRESENT ILLNESS HPI     Terra Martin is a 47 y.o. female who presents today for wound/ulcer evaluation.    Wound Context: Pt with R ankle wound  here for eval/treat  Wound/Ulcer Pain Timing/Severity: none  Quality of pain: N/A  Severity:  0 / 10   Modifying Factors: None  Associated Signs/Symptoms: none    Ulcer Identification:  Ulcer Type: pressure  Contributing Factors: chronic pressure and shear force    Wound:  pressure        PAST MEDICAL HISTORY        Diagnosis Date    Ankle wound     and toe wound    Aptyalism 2017    Arthritis 2020    Asthma     Back pain 2017    Binocular vision disorder with diplopia 2017    CAFL (chronic airflow limitation) ([address] 2017    Hay fever 2017    Headache 2017    MS (multiple sclerosis) ([address]     Muscle spasticity     Neuropathic ulcer of left foot, limited to breakdown of skin ([address] 4/10/2017    Numbness 2016    Open wound of ankle 2017    Open wound of second toe of left foot 2018    Peripheral vascular disease ([address]     Seizure ([address]     occ. related to ms    Sepsis ([address] 2016    Weakness 2016       PAST SURGICAL HISTORY    Past Surgical History:   Procedure Laterality Date    BACLOFEN PUMP IMPLANTATION      BREAST BIOPSY Right     neg    COLONOSCOPY N/A 2019    Dr Montejo Earing Ora Perez ileum through ostomy-patent and healthy appearing anastomosis in the right colon-entero colonic anastomosis-10 yr recall    COLOSTOMY      FEMUR FRACTURE SURGERY      Right    FOOT AMPUTATION Left 2019    LEFT TRANSMET AMPUTATION performed by Randi Parikh MD at [address] [place] [address] (CERVIX STATUS UNKNOWN)      OTHER SURGICAL HISTORY      urostomy    OTHER SURGICAL HISTORY      pain pump    OR INSJ PRPH CTR VAD W/SUBQ PORT UNDER 5 YR N/A 06/26/2018    SINGLE LUMEN PORT PLACEMENT WITH FLUORO performed by Stella Weems MD at [facility]      TOE AMPUTATION      L last toe    TONSILLECTOMY      URETEROTOMY         FAMILY HISTORY    Family History   Problem Relation Age of Onset    Cancer Mother         breast    Colon Cancer Mother     Colon Polyps Mother     Breast Cancer Mother     Diabetes Father     High Blood Pressure Father     Heart Disease Paternal Grandmother     Breast Cancer Paternal Aunt     Cancer Paternal Aunt         breast    Liver Cancer Paternal Aunt     Esophageal Cancer Neg Hx     Liver Disease Neg Hx     Rectal Cancer Neg Hx     Stomach Cancer Neg Hx        SOCIAL HISTORY    Social History     Tobacco Use    Smoking status: Every Day     Packs/day: 0.50     Years: 15.00     Pack years: 7.50     Types: Cigarettes    Smokeless tobacco: Never    Tobacco comments:     Less than 10 per day   Vaping Use    Vaping Use: Never used   Substance Use Topics    Alcohol use: Yes     Comment: rarley    Drug use: Yes     Types: Marijuana Pema Rasmussen)     Comment: daily        ALLERGIES    Allergies   Allergen Reactions    Darvocet [Propoxyphene N-Acetaminophen]      Talking out of her head    Morphine      Category: Allergy;     Morphine And Related Itching and Swelling    Propoxyphene Swelling       MEDICATIONS    Current Outpatient Medications on File Prior to Encounter   Medication Sig Dispense Refill    methylphenidate (RITALIN) 20 MG tablet TAKE 1 TABLET BY MOUTH DAILY (Patient taking differently: Indications: MANAGED BY DR. LINK TAKE 1 TABLET BY MOUTH DAILY) 30 tablet 0    HYDROcodone-acetaminophen (NORCO)  MG per tablet TAKE 1 TABLET BY MOUTH 3 TIMES  A DAY (Patient taking differently: Indications: MANAGED BY DR. LINK TAKE 1 TABLET BY MOUTH 3 TIMES  A DAY) 90 tablet 0    tiZANidine (ZANAFLEX) 4 MG tablet TAKE 3 TABLETS TWICE DAILY AS NEEDED 180 tablet 11    nitrofurantoin (MACRODANTIN) 50 MG capsule TAKE 1 CAPSULE BY MOUTH NIGHTLY 90 capsule 1    levETIRAcetam (KEPPRA) 750 MG tablet TAKE 1 TABLET BY MOUTH DAILY 30 tablet 5    pilocarpine (SALAGEN) 7.5 MG tablet TAKE ONE TABLET BY MOUTH 3 TIMES DAILY 90 tablet 3    pregabalin (LYRICA) 300 MG capsule TAKE 1 CAPSULE BY MOUTH TWICE A DAY (Patient taking differently: Indications: MANAGED BY DR. LINK TAKE 1 CAPSULE BY MOUTH TWICE A DAY) 60 capsule 5    ondansetron (ZOFRAN-ODT) 4 MG disintegrating tablet Take 1 tablet by mouth 3 times daily as needed for Nausea or Vomiting 30 tablet 0    hydroCHLOROthiazide (HYDRODIURIL) 25 MG tablet TAKE 1 TABLET BY MOUTH ONCE DAILY AS NEEDED 30 tablet 0    DULoxetine (CYMBALTA) 30 MG extended release capsule Take 1 capsule by mouth nightly 90 capsule 2    fluocinolone acetonide (SYNALAR) 0.01 % external solution Apply 10 drops topically at bedtime      Diapers & Supplies MISC Indications: FX: [id number] Diapers, Chucks, Wipes, and Gloves. 100 each 3    azelastine (ASTELIN) 0.1 % nasal spray USE 2 SPRAYS IN EACH NOSTRIL TWICE DAILY AS DIRECTED 30 mL 0    cetirizine (ZYRTEC) 10 MG tablet Take 10 mg by mouth daily      Catheters MISC Catheter supplies and lubricant 5 times daily G82.20  to [facility] [phone] 450 each 3    docusate sodium (COLACE) 100 MG capsule Take 200 mg by mouth as needed for Constipation      PROAIR  (90 Base) MCG/ACT inhaler INHALE 1 PUFF INTO THE LUNGS EVERY 6 HOURS AS NEEDED FOR WHEEZING OR SHORTNESS OF BREATH 8.5 g 5    Heparin Lock Flush (HEPARIN FLUSH, 100 UNITS/ML,) 100 UNIT/ML injection 3 mLs by Intercatheter route every 30 days No every 30 days but every 4-6 weeks.  Flush port with 300 units heparin with 20 cc normal saline for ocrevus infusion for Multiple sclerosis and NS 20CC 1 Syringe 5    ipratropium-albuterol (DUONEB) 0.5-2.5 (3) MG/3ML SOLN nebulizer solution USE 1 UNIT DOSE IN NEBULIZER EVERY 4 TO 6 HOURS AS NEEDED. 450 mL 11    BACLOFEN, PAIN PUMP REFILL CHARGE, by Implant route continuous Indications: every 3 months      Multiple Vitamins-Minerals (THERAPEUTIC MULTIVITAMIN-MINERALS) tablet Take 1 tablet by mouth daily      blood glucose monitor strips Test 1 times a day & as needed for symptoms of irregular blood glucose. 100 strip 0    baclofen (LIORESAL) 10 MG tablet Take 1 tablet by mouth 2 times daily as needed (muscle spasms) (Patient taking differently: Take 10 mg by mouth 2 times daily as needed (muscle spasms) Indications: using pump) 60 tablet 5    Sodium Chloride Flush (SALINE FLUSH) 0.9 % SOLN Infuse 20 mLs intravenously every 30 days Not every 30 days but every 4-6 weeks as need with 300 units of heparin to flush port for Infusion of Ocrevus for multiple sclerosis 20 mL 5     No current facility-administered medications on file prior to encounter. REVIEW OF SYSTEMS    A comprehensive review of systems was negative.     Objective:      BP (!) 123/93   Pulse 77   Temp 98.6 °F (37 °C) (Temporal)   Resp 18   Ht 5' 9\" (1.753 m)   Wt 132 lb 0.9 oz (59.9 kg)   BMI 19.50 kg/m²     Wt Readings from Last 3 Encounters:   11/21/22 132 lb 0.9 oz (59.9 kg)   11/14/22 137 lb (62.1 kg)   11/10/22 137 lb 11.2 oz (62.5 kg)       PHYSICAL EXAM    General Appearance: alert and oriented to person, place and time, well developed and well- nourished, in no acute distress  Skin: warm and dry, no rash or erythema  Head: normocephalic and atraumatic  Eyes: pupils equal, round, and reactive to light, extraocular eye movements intact, conjunctivae normal  ENT: tympanic membrane, external ear and ear canal normal bilaterally, nose without deformity, nasal mucosa and turbinates normal without polyps, lips teeth and gums normal  Neck: supple and non-tender without mass, no thyromegaly or thyroid nodules, no cervical lymphadenopathy  Pulmonary/Chest: clear to auscultation bilaterally- no wheezes, rales or rhonchi, normal air movement, no respiratory distress  Cardiovascular: normal rate, regular rhythm, normal S1 and S2, no murmurs, rubs, clicks, or gallops, distal pulses intact, no carotid bruits  Abdomen: soft, non-tender, non-distended, normal bowel sounds, no masses or organomegaly  Extremities: no cyanosis, clubbing or edema  Musculoskeletal: normal range of motion, no joint swelling, deformity or tenderness  Neurologic: reflexes normal and symmetric, no cranial nerve deficit, gait, coordination and speech normal, skin sensation normal      Assessment:      Problem List Items Addressed This Visit       Paraplegia (HCC) (Chronic)    * (Principal) 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 Performed by: Mickey Olivo MD    Consent obtained:  Yes    Time out taken:  Yes    Pain Control: Anesthetic  Anesthetic: 2% Lidocaine Gel Topical       Debridement:Non-excisional Debridement    Using #15 blade scalpel and forceps the wound(s)/ulcer(s) was/were sharply debrided down through and including the removal of epidermis and dermis.         Devitalized Tissue Debrided:  fibrin, biofilm, slough, necrotic/eschar, and exudate      Pre Debridement Measurements:  Are located in the Wound/Ulcer Documentation Flow Sheet    Wound/Ulcer #: 1    Percent of Wound(s)/Ulcer(s) Debrided: 100%    Total Surface Area Debrided:  2.55 sq cm       Diabetic/Pressure/Non Pressure Ulcers only:  Ulcer: Pressure ulcer, Stage 3             Post Debridement Measurements:    Wound/Ulcer Descriptions are Pre Debridement --EXCEPT MEASUREMENTS    Wound 09/22/22 Foot Anterior;Right;Dorsal Wound #1 Right dorsal foot (traumatic) (Active)   Wound Image   11/21/22 1417   Wound Etiology Venous 11/21/22 1417   Dressing Status Old drainage noted 11/21/22 1417   Wound Cleansed Soap and water 11/21/22 1417   Dressing/Treatment Gauze dressing/dressing sponge;Moist to moist;Petroleum gauze; Roll gauze 11/14/22 1430   Offloading for Diabetic Foot Ulcers Offloading boot 11/21/22 1417   Dressing Change Due 11/10/22 11/09/22 2010   Wound Length (cm) 1.7 cm 11/21/22 1417   Wound Width (cm) 1.5 cm 11/21/22 1417   Wound Depth (cm) 0.2 cm 11/21/22 1417   Wound Surface Area (cm^2) 2.55 cm^2 11/21/22 1417   Change in Wound Size % (l*w) -1175 11/21/22 1417   Wound Volume (cm^3) 0.51 cm^3 11/21/22 1417   Wound Healing % -2450 11/21/22 1417   Post-Procedure Length (cm) 1.7 cm 11/21/22 1459   Post-Procedure Width (cm) 1.5 cm 11/21/22 1459   Post-Procedure Depth (cm) 0.2 cm 11/21/22 1459   Post-Procedure Surface Area (cm^2) 2.55 cm^2 11/21/22 1459   Post-Procedure Volume (cm^3) 0.51 cm^3 11/21/22 1459   Distance Tunneling (cm) 0 cm 11/14/22 1406   Tunneling Position ___ O'Clock 0 11/14/22 1406   Undermining Starts ___ O'Clock 0 11/14/22 1406   Undermining Ends___ O'Clock 0 11/14/22 1406   Undermining Maxium Distance (cm) 0 11/14/22 1406   Wound Assessment Pink/red;Slough 11/21/22 1417   Drainage Amount Moderate 11/21/22 1417   Drainage Description Serosanguinous 11/21/22 1417   Odor None 11/21/22 1417   Audelia-wound Assessment Hemosiderin staining (brown yellow) 11/21/22 1417   Margins Attached edges 11/21/22 1417   Wound Thickness Description not for Pressure Injury Full thickness 11/21/22 1417   Number of days: 60             Estimated Blood Loss:  Minimal    Hemostasis Achieved:  by pressure    Procedural Pain:  0  / 10     Post Procedural Pain:  0 / 10     Response to treatment:  Well tolerated by patient.          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 Discharge [address] and [facility] Therapy   Physician Orders and Discharge Instructions  [address] [address]  Telephone: [phone]    NAME:  Meera Mask OF BIRTH:  1970  MEDICAL RECORD NUMBER:  [id number]  DATE:  11/21/2022    Discharge condition: Stable    Discharge to: Home    Left via:Private automobile    Accompanied by: Caregiver     Dressing Orders: Right lateral ankle:   Wash with soap and water. Acidic Acid moist gauze, cover with dry gauze, Secure with loose roll gauze and tape, change twice daily   Wear Heel Lift Boots, No shoes or tight dressing or socks  Protein rich diet (unless restricted by your physician); Multivitamin daily; Elevate legs above the level of your heart when sitting 3-4 times daily for at least one hour each time,    [address] [place] [address],[address] follow up visit _____________2 weeks________________  (Please note your next appointment above and if you are unable to keep, kindly give a 24 hour notice. Thank you.)    If you experience any of the following, please call the [facility] during business hours:    * Increase in Pain  * Temperature over 101  * Increase in drainage from your wound  * Drainage with a foul odor  * Bleeding  * Increase in swelling  * Need for compression bandage changes due to slippage, breakthrough drainage.     If you need medical attention outside of the business hours of the [facility] please contact your PCP or go to

## 2022-11-23 ASSESSMENT — ENCOUNTER SYMPTOMS
SINUS PRESSURE: 0
EYE PAIN: 0
SORE THROAT: 0
EYE REDNESS: 0
RHINORRHEA: 0
ABDOMINAL PAIN: 0
NAUSEA: 0
SHORTNESS OF BREATH: 0
EYE ITCHING: 0
VOMITING: 0
WHEEZING: 0
PHOTOPHOBIA: 0
ABDOMINAL DISTENTION: 0
CONSTIPATION: 0
EYE DISCHARGE: 0
DIARRHEA: 0
COUGH: 0
BLOOD IN STOOL: 0
COLOR CHANGE: 0
CHEST TIGHTNESS: 0

## 2022-11-28 ENCOUNTER — HOSPITAL ENCOUNTER (OUTPATIENT)
Dept: WOUND CARE | Age: 52
Discharge: HOME | End: 2022-11-28
Payer: MEDICARE

## 2022-11-28 VITALS
RESPIRATION RATE: 18 BRPM | TEMPERATURE: 97.7 F | WEIGHT: 132 LBS | SYSTOLIC BLOOD PRESSURE: 108 MMHG | HEART RATE: 78 BPM | HEIGHT: 69 IN | DIASTOLIC BLOOD PRESSURE: 72 MMHG | BODY MASS INDEX: 19.55 KG/M2

## 2022-11-28 DIAGNOSIS — G82.20 PARAPLEGIA (HCC): Chronic | ICD-10-CM

## 2022-11-28 DIAGNOSIS — L97.512 NEUROPATHIC ULCER OF RIGHT FOOT WITH FAT LAYER EXPOSED (HCC): Primary | ICD-10-CM

## 2022-11-28 PROCEDURE — 6370000000 HC RX 637 (ALT 250 FOR IP): Performed by: NURSE PRACTITIONER

## 2022-11-28 PROCEDURE — 97597 DBRDMT OPN WND 1ST 20 CM/<: CPT

## 2022-11-28 RX ORDER — LIDOCAINE HYDROCHLORIDE 40 MG/ML
SOLUTION TOPICAL ONCE
OUTPATIENT
Start: 2022-11-28 | End: 2022-11-28

## 2022-11-28 RX ORDER — LIDOCAINE 40 MG/G
CREAM TOPICAL ONCE
OUTPATIENT
Start: 2022-11-28 | End: 2022-11-28

## 2022-11-28 RX ORDER — LIDOCAINE HYDROCHLORIDE 20 MG/ML
JELLY TOPICAL ONCE
Status: COMPLETED | OUTPATIENT
Start: 2022-11-28 | End: 2022-11-28

## 2022-11-28 RX ORDER — LIDOCAINE HYDROCHLORIDE 20 MG/ML
JELLY TOPICAL ONCE
OUTPATIENT
Start: 2022-11-28 | End: 2022-11-28

## 2022-11-28 RX ORDER — LIDOCAINE 50 MG/G
OINTMENT TOPICAL ONCE
OUTPATIENT
Start: 2022-11-28 | End: 2022-11-28

## 2022-11-28 RX ADMIN — LIDOCAINE HYDROCHLORIDE: 20 JELLY TOPICAL at 13:23

## 2022-11-28 ASSESSMENT — PAIN DESCRIPTION - ORIENTATION: ORIENTATION: POSTERIOR

## 2022-11-28 ASSESSMENT — PAIN DESCRIPTION - DESCRIPTORS: DESCRIPTORS: ACHING

## 2022-11-28 ASSESSMENT — PAIN DESCRIPTION - LOCATION: LOCATION: NECK;HEAD

## 2022-11-28 ASSESSMENT — PAIN SCALES - GENERAL: PAINLEVEL_OUTOF10: 3

## 2022-11-28 NOTE — PLAN OF CARE
Problem: Chronic Conditions and Co-morbidities  Goal: Patient's chronic conditions and co-morbidity symptoms are monitored and maintained or improved  Outcome: Progressing     Problem: Discharge Planning  Goal: Discharge to home or other facility with appropriate resources  Outcome: Progressing     Problem: Wound:  Goal: Will show signs of wound healing; wound closure and no evidence of infection  Description: Will show signs of wound healing; wound closure and no evidence of infection  Outcome: Progressing     Problem: Smoking cessation:  Goal: Ability to formulate a plan to maintain a tobacco-free life will be supported  Description: Ability to formulate a plan to maintain a tobacco-free life will be supported  Outcome: Progressing     Problem: Falls - Risk of:  Goal: Will remain free from falls  Description: Will remain free from falls  Outcome: Progressing

## 2022-11-28 NOTE — DISCHARGE INSTRUCTIONS
[address] and [facility] Therapy   Physician Orders and Discharge Instructions  Corine García  Telephone: [phone]    NAME:  Rach Mckeon OF BIRTH:  1970  MEDICAL RECORD NUMBER:  [id number]  DATE:  11/28/2022    Discharge condition: Stable    Discharge to: Home    Left via:Private automobile    Accompanied by: [id number] [address] f: [phone] f: [phone] p: [phone] [email or web address]      Dressing Orders: Left Lateral ankle:   Wash with soap and water. Xeroform cut to fit open area, cover with dry gauze, roll gauze and tape, change once daily      Dressing Orders: Right Lateral ankle:   Wash with soap and water. Acidic Acid or Saline moist 2x2 gauze tucked loosely in the wound, cover with dry gauze, Secure with loose roll gauze or medipore tape, change twice daily   Wear Heel Lift Boots, No shoes or tight dressing or socks  Protein rich diet (unless restricted by your physician); Multivitamin daily; Elevate legs above the level of your heart when sitting 3-4 times daily for at least one hour each time,    [facility] follow up visit ________2 weeks________________  (Please note your next appointment above and if you are unable to keep, kindly give a 24 hour notice. Thank you.)    If you experience any of the following, please call the [facility] during business hours:    * Increase in Pain  * Temperature over 101  * Increase in drainage from your wound  * Drainage with a foul odor  * Bleeding  * Increase in swelling  * Need for compression bandage changes due to slippage, breakthrough drainage. If you need medical attention outside of the business hours of the [facility] please contact your PCP or go to the nearest emergency room.

## 2022-11-28 NOTE — PROGRESS NOTES
[address]   Progress Note and Procedure Note      Iain Briggs  MEDICAL RECORD NUMBER:  [id number]  AGE: 47 y.o. GENDER: female  : 1970  EPISODE DATE:  2022    Subjective:     Chief Complaint   Patient presents with    Wound Check     Right foot wound         HISTORY of PRESENT ILLNESS HPI     Iain Briggs is a 47 y.o. female who presents today for wound/ulcer evaluation.    Wound Context: Pt with R foot wound here for eval/treat  Wound/Ulcer Pain Timing/Severity: none  Quality of pain: N/A  Severity:  0 / 10   Modifying Factors: None  Associated Signs/Symptoms: none    Ulcer Identification:  Ulcer Type: pressure and neuropathic  Contributing Factors: decreased mobility and shear force    Wound:  pressure        PAST MEDICAL HISTORY        Diagnosis Date    Ankle wound     and toe wound    Aptyalism 2017    Arthritis 2020    Asthma     Back pain 2017    Binocular vision disorder with diplopia 2017    CAFL (chronic airflow limitation) ([address] 2017    Hay fever 2017    Headache 2017    MS (multiple sclerosis) ([facility])     Muscle spasticity     Neuropathic ulcer of left foot, limited to breakdown of skin ([address] 4/10/2017    Numbness 2016    Open wound of ankle 2017    Open wound of second toe of left foot 2018    Peripheral vascular disease ([address]     Seizure ([address]     occ. related to ms    Sepsis ([address] 2016    Weakness 2016       PAST SURGICAL HISTORY    Past Surgical History:   Procedure Laterality Date    BACLOFEN PUMP IMPLANTATION      BREAST BIOPSY Right     neg    COLONOSCOPY N/A 2019    Dr Radhika Donahue [facility] ileum through ostomy-patent and healthy appearing anastomosis in the right colon-entero colonic anastomosis-10 yr recall    COLOSTOMY      FEMUR FRACTURE SURGERY      Right    FOOT AMPUTATION Left 2019    LEFT TRANSMET AMPUTATION performed by Priyanka Paz MD at [address] [facility] ([address] [place] [address] UNKNOWN)      OTHER SURGICAL HISTORY      urostomy    OTHER SURGICAL HISTORY      pain pump    VT INSJ PRPH CTR VAD W/SUBQ PORT UNDER 5 YR N/A 06/26/2018    SINGLE LUMEN PORT PLACEMENT WITH FLUORO performed by Kanika Medina MD at [address]      TOE AMPUTATION      L last toe    TONSILLECTOMY      URETEROTOMY         FAMILY HISTORY    Family History   Problem Relation Age of Onset    Cancer Mother         breast    Colon Cancer Mother     Colon Polyps Mother     Breast Cancer Mother     Diabetes Father     High Blood Pressure Father     Heart Disease Paternal Grandmother     Breast Cancer Paternal Aunt     Cancer Paternal Aunt         breast    Liver Cancer Paternal Aunt     Esophageal Cancer Neg Hx     Liver Disease Neg Hx     Rectal Cancer Neg Hx     Stomach Cancer Neg Hx        SOCIAL HISTORY    Social History     Tobacco Use    Smoking status: Every Day     Packs/day: 0.50     Years: 15.00     Pack years: 7.50     Types: Cigarettes    Smokeless tobacco: Never    Tobacco comments:     Less than 10 per day   Vaping Use    Vaping Use: Never used   Substance Use Topics    Alcohol use: Yes     Comment: yaritza    Drug use: Yes     Types: Marijuana Velora Reynaga)     Comment: daily        ALLERGIES    Allergies   Allergen Reactions    Darvocet [Propoxyphene N-Acetaminophen]      Talking out of her head    Morphine      Category: Allergy;     Morphine And Related Itching and Swelling    Propoxyphene Swelling       MEDICATIONS    Current Outpatient Medications on File Prior to Encounter   Medication Sig Dispense Refill    methylphenidate (RITALIN) 20 MG tablet TAKE 1 TABLET BY MOUTH DAILY (Patient taking differently: Indications: MANAGED BY DR. LINK TAKE 1 TABLET BY MOUTH DAILY) 30 tablet 0    HYDROcodone-acetaminophen (NORCO)  MG per tablet TAKE 1 TABLET BY MOUTH 3 TIMES  A DAY (Patient taking differently: Indications: MANAGED BY DR. LINK TAKE 1 TABLET BY MOUTH 3 TIMES  A DAY) 90 tablet 0 tiZANidine (ZANAFLEX) 4 MG tablet TAKE 3 TABLETS TWICE DAILY AS NEEDED 180 tablet 11    nitrofurantoin (MACRODANTIN) 50 MG capsule TAKE 1 CAPSULE BY MOUTH NIGHTLY 90 capsule 1    levETIRAcetam (KEPPRA) 750 MG tablet TAKE 1 TABLET BY MOUTH DAILY 30 tablet 5    pilocarpine (SALAGEN) 7.5 MG tablet TAKE ONE TABLET BY MOUTH 3 TIMES DAILY 90 tablet 3    pregabalin (LYRICA) 300 MG capsule TAKE 1 CAPSULE BY MOUTH TWICE A DAY (Patient taking differently: Indications: MANAGED BY DR. LINK TAKE 1 CAPSULE BY MOUTH TWICE A DAY) 60 capsule 5    ondansetron (ZOFRAN-ODT) 4 MG disintegrating tablet Take 1 tablet by mouth 3 times daily as needed for Nausea or Vomiting 30 tablet 0    hydroCHLOROthiazide (HYDRODIURIL) 25 MG tablet TAKE 1 TABLET BY MOUTH ONCE DAILY AS NEEDED 30 tablet 0    DULoxetine (CYMBALTA) 30 MG extended release capsule Take 1 capsule by mouth nightly 90 capsule 2    fluocinolone acetonide (SYNALAR) 0.01 % external solution Apply 10 drops topically at bedtime      Diapers & Supplies MISC Indications: FX: [id number] Diapers, Chucks, Wipes, and Gloves. 100 each 3    azelastine (ASTELIN) 0.1 % nasal spray USE 2 SPRAYS IN EACH NOSTRIL TWICE DAILY AS DIRECTED 30 mL 0    cetirizine (ZYRTEC) 10 MG tablet Take 10 mg by mouth daily      Catheters MISC Catheter supplies and lubricant 5 times daily G82.20  to [employer] [phone] 450 each 3    docusate sodium (COLACE) 100 MG capsule Take 200 mg by mouth as needed for Constipation      PROAIR  (90 Base) MCG/ACT inhaler INHALE 1 PUFF INTO THE LUNGS EVERY 6 HOURS AS NEEDED FOR WHEEZING OR SHORTNESS OF BREATH 8.5 g 5    Heparin Lock Flush (HEPARIN FLUSH, 100 UNITS/ML,) 100 UNIT/ML injection 3 mLs by Intercatheter route every 30 days No every 30 days but every 4-6 weeks.  Flush port with 300 units heparin with 20 cc normal saline for ocrevus infusion for Multiple sclerosis and NS 20CC 1 Syringe 5    ipratropium-albuterol (DUONEB) 0.5-2.5 (3) MG/3ML SOLN nebulizer solution USE 1 UNIT DOSE IN NEBULIZER EVERY 4 TO 6 HOURS AS NEEDED. 450 mL 11    BACLOFEN, PAIN PUMP REFILL CHARGE, by Implant route continuous Indications: every 3 months      Multiple Vitamins-Minerals (THERAPEUTIC MULTIVITAMIN-MINERALS) tablet Take 1 tablet by mouth daily      blood glucose monitor strips Test 1 times a day & as needed for symptoms of irregular blood glucose. 100 strip 0    baclofen (LIORESAL) 10 MG tablet Take 1 tablet by mouth 2 times daily as needed (muscle spasms) (Patient taking differently: Take 10 mg by mouth 2 times daily as needed (muscle spasms) Indications: using pump) 60 tablet 5    Sodium Chloride Flush (SALINE FLUSH) 0.9 % SOLN Infuse 20 mLs intravenously every 30 days Not every 30 days but every 4-6 weeks as need with 300 units of heparin to flush port for Infusion of Ocrevus for multiple sclerosis 20 mL 5     No current facility-administered medications on file prior to encounter. REVIEW OF SYSTEMS    A comprehensive review of systems was negative.     Objective:      /72   Pulse 78   Temp 97.7 °F (36.5 °C) (Temporal)   Resp 18   Ht 5' 9\" (1.753 m)   Wt 132 lb (59.9 kg)   BMI 19.49 kg/m²     Wt Readings from Last 3 Encounters:   11/28/22 132 lb (59.9 kg)   11/21/22 132 lb 0.9 oz (59.9 kg)   11/14/22 137 lb (62.1 kg)       PHYSICAL EXAM    General Appearance: alert and oriented to person, place and time, well developed and well- nourished, in no acute distress  Skin: warm and dry, no rash or erythema  Head: normocephalic and atraumatic  Eyes: pupils equal, round, and reactive to light, extraocular eye movements intact, conjunctivae normal  ENT: tympanic membrane, external ear and ear canal normal bilaterally, nose without deformity, nasal mucosa and turbinates normal without polyps, lips teeth and gums normal  Neck: supple and non-tender without mass, no thyromegaly or thyroid nodules, no cervical lymphadenopathy  Pulmonary/Chest: clear to auscultation bilaterally- no wheezes, rales or rhonchi, normal air movement, no respiratory distress  Cardiovascular: normal rate, regular rhythm, normal S1 and S2, no murmurs, rubs, clicks, or gallops, distal pulses intact, no carotid bruits  Abdomen: soft, non-tender, non-distended, normal bowel sounds, no masses or organomegaly  Extremities: no cyanosis, clubbing or edema      Assessment:      Problem List Items Addressed This Visit       Paraplegia (HCC) (Chronic)    * (Principal) 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 Performed by: Kevon Hernández MD    Consent obtained:  Yes    Time out taken:  Yes    Pain Control: Anesthetic  Anesthetic: 2% Lidocaine Gel Topical       Debridement:Non-excisional Debridement    Using curette and forceps the wound(s)/ulcer(s) was/were sharply debrided down through and including the removal of epidermis and dermis.         Devitalized Tissue Debrided:  fibrin, biofilm, slough, necrotic/eschar, and exudate      Pre Debridement Measurements:  Are located in the Wound/Ulcer Documentation Flow Sheet    Wound/Ulcer #: 1 and 2    Percent of Wound(s)/Ulcer(s) Debrided: 100%    Total Surface Area Debrided:  5.1 sq cm       Diabetic/Pressure/Non Pressure Ulcers only:  Ulcer: Pressure ulcer, Stage 3             Post Debridement Measurements:    Wound/Ulcer Descriptions are Pre Debridement --EXCEPT MEASUREMENTS    Wound 09/22/22 Foot Anterior;Right;Dorsal Wound #1 Right dorsal foot traumatic (Active)   Wound Image   11/28/22 1324   Wound Etiology Venous 11/28/22 1324   Dressing Status Old drainage noted 11/28/22 1324   Wound Cleansed Soap and water 11/28/22 1324   Dressing/Treatment Gauze dressing/dressing sponge;Moist to moist;Roll gauze 11/21/22 1513   Offloading for Diabetic Foot Ulcers Offloading boot 11/21/22 1513   Dressing Change Due 11/10/22 11/09/22 2010   Wound Length (cm) 2 cm 11/28/22 1324   Wound Width (cm) 2.5 cm 11/28/22 1324   Wound Depth (cm) 0.2 cm 11/28/22 1324   Wound Surface Area (cm^2) 5 cm^2 11/28/22 1324   Change in Wound Size % (l*w) -2400 11/28/22 1324   Wound Volume (cm^3) 1 cm^3 11/28/22 1324   Wound Healing % -4900 11/28/22 1324   Post-Procedure Length (cm) 2 cm 11/28/22 1408   Post-Procedure Width (cm) 2.5 cm 11/28/22 1408   Post-Procedure Depth (cm) 0.2 cm 11/28/22 1408   Post-Procedure Surface Area (cm^2) 5 cm^2 11/28/22 1408   Post-Procedure Volume (cm^3) 1 cm^3 11/28/22 1408   Distance Tunneling (cm) 0 cm 11/28/22 1324   Tunneling Position ___ O'Clock 0 11/28/22 1324   Undermining Starts ___ O'Clock 0 11/28/22 1324   Undermining Ends___ O'Clock 0 11/28/22 1324   Undermining Maxium Distance (cm) 0 11/28/22 1324   Wound Assessment Exposed structure tendon;Slough;Pink/red 11/28/22 1324   Drainage Amount Moderate 11/28/22 1324   Drainage Description Serosanguinous 11/28/22 1324   Odor None 11/28/22 1324   Audelia-wound Assessment Hemosiderin staining (brown yellow) 11/28/22 1324   Margins Attached edges 11/28/22 1324   Wound Thickness Description not for Pressure Injury Full thickness 11/28/22 1324   Number of days: 67       Wound 11/21/22 Pretibial Distal;Right #2 Right ankle pressure stage 3 (Active)   Wound Image   11/28/22 1324   Wound Etiology Pressure Stage 3 11/28/22 1324   Dressing Status Old drainage noted 11/28/22 1324   Dressing/Treatment Xeroform;Gauze dressing/dressing sponge;Roll gauze 11/21/22 1513   Wound Length (cm) 0.3 cm 11/28/22 1324   Wound Width (cm) 0.3 cm 11/28/22 1324   Wound Depth (cm) 0.1 cm 11/28/22 1324   Wound Surface Area (cm^2) 0.09 cm^2 11/28/22 1324   Wound Volume (cm^3) 0.009 cm^3 11/28/22 1324   Post-Procedure Length (cm) 0.3 cm 11/28/22 1408   Post-Procedure Width (cm) 0.3 cm 11/28/22 1408   Post-Procedure Depth (cm) 0.1 cm 11/28/22 1408   Post-Procedure Surface Area (cm^2) 0.09 cm^2 11/28/22 1408   Post-Procedure Volume (cm^3) 0.009 cm^3 11/28/22 1408   Distance Tunneling (cm) 0 cm 11/28/22 1324   Tunneling Position ___ O'Clock 0 11/28/22 1324   Undermining Starts ___ O'Clock 0 11/28/22 1324   Undermining Ends___ O'Clock 0 11/28/22 1324   Undermining Maxium Distance (cm) 0 11/28/22 1324   Wound Assessment Pink/red;Slough 11/28/22 1324   Drainage Amount Small 11/28/22 1324   Drainage Description Serosanguinous 11/28/22 1324   Odor None 11/28/22 1324   Audelia-wound Assessment Intact 11/28/22 1324   Margins Attached edges 11/28/22 1324   Number of days: 6             Estimated Blood Loss:  Minimal    Hemostasis Achieved:  by pressure    Procedural Pain:  0  / 10     Post Procedural Pain:  0 / 10     Response to treatment:  Well tolerated by patient. 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 Will start promogran next visit. Wound looks good. Treatment Note please see attached Discharge Instructions    In my professional opinion this patient would benefit from HBO Therapy: No    Written patient dismissal instructions given to patient and signed by patient or POA.          Discharge [address] and Hyperbaric Oxygen Therapy   Physician Orders and Discharge Instructions  [address] [facility]  Corine Esposito  Telephone: [phone]    NAME:  Lana Ross:  1970  MEDICAL RECORD NUMBER:  [id number]  DATE:  11/28/2022    Discharge condition: Stable    Discharge to: Home    Left via:Private automobile    Accompanied by: Toshia Cooley [facility] [address] f: [phone] f: [phone] p: [phone] [email or web address]      Dressing Orders: Left Lateral ankle:   Wash with soap and water. Mepilex Border, change 3-4 days. Dressing Orders: Right Lateral ankle:   Wash with soap and water. Acidic Acid or Saline moist 2x2 gauze tucked loosely in the wound, cover with dry gauze, Secure with loose roll gauze or medipore tape, change twice daily   Wear Heel Lift Boots, No shoes or tight dressing or socks  Protein rich diet (unless restricted by your physician); Multivitamin daily; Elevate legs above the level of your heart when sitting 3-4 times daily for at least one hour each time,    [facility] follow up visit ________2 weeks________________  (Please note your next appointment above and if you are unable to keep, kindly give a 24 hour notice. Thank you.)    If you experience any of the following, please call the [address] [place] [address] during business hours:    * Increase in Pain  * Temperature over 101  * Increase in drainage from your wound  * Drainage with a foul odor  * Bleeding  * Increase in swelling  * Need for compression bandage changes due to slippage, breakthrough drainage. If you need medical attention outside of the business hours of the [address] [place] [address] please contact your PCP or go to the nearest emergency room.          Electronically signed by Elvi Lara MD on 11/28/2022 at 2:09 PM

## 2022-11-30 ENCOUNTER — CARE COORDINATION (OUTPATIENT)
Dept: CASE MANAGEMENT | Age: 52
End: 2022-11-30

## 2022-11-30 NOTE — CARE COORDINATION
[facility] Care Transitions Follow Up Call      Patient: Michael Restrepo  Patient : 1970   MRN: [id number]  Reason for Admission:   Discharge Date: 11/10/22 RARS: Readmission Risk Score: 15.5    Spoke to: N/A    Follow Up : Attempted to make contact with Alyce for CT f/u call without success. Unable to leave a message regarding intent of call and call back information. Will try again at a later time.     Future Appointments   Date Time Provider Maurizio Hawkins   2022 11:30 AM Sheila Salinas MD [facility] [address] [facility] [address]   2022  1:00 PM Alex Billings MD N  [facility] -   2023  1:00 PM Esteban Jimenez MD [facility]       Care Transitions Subsequent and Final Call    Subsequent and Final Calls  Care Transitions Interventions  Other Interventions:           Tosin Ferguson RN

## 2022-12-06 ENCOUNTER — CARE COORDINATION (OUTPATIENT)
Dept: CASE MANAGEMENT | Age: 52
End: 2022-12-06

## 2022-12-06 DIAGNOSIS — M79.622 PAIN IN BOTH UPPER ARMS: ICD-10-CM

## 2022-12-06 DIAGNOSIS — M79.621 PAIN IN BOTH UPPER ARMS: ICD-10-CM

## 2022-12-06 DIAGNOSIS — M54.2 PAIN, NECK: ICD-10-CM

## 2022-12-06 DIAGNOSIS — G35 MS (MULTIPLE SCLEROSIS) (HCC): ICD-10-CM

## 2022-12-06 DIAGNOSIS — R53.83 OTHER FATIGUE: ICD-10-CM

## 2022-12-06 DIAGNOSIS — M62.838 MUSCLE SPASTICITY: ICD-10-CM

## 2022-12-06 RX ORDER — HYDROCODONE BITARTRATE AND ACETAMINOPHEN 10; 325 MG/1; MG/1
TABLET ORAL
Qty: 90 TABLET | Refills: 0 | Status: SHIPPED | OUTPATIENT
Start: 2022-12-06 | End: 2023-01-05

## 2022-12-06 RX ORDER — METHYLPHENIDATE HYDROCHLORIDE 20 MG/1
TABLET ORAL
Qty: 30 TABLET | Refills: 0 | Status: SHIPPED | OUTPATIENT
Start: 2022-12-06 | End: 2023-01-05

## 2022-12-06 NOTE — CARE COORDINATION
[facility] Care Transitions Follow Up Call    Spoke with: N/A    Patient: Bryan Ott  Patient : 1970   MRN: [id number]  Reason for Admission:   Discharge Date: 11/10/22 RARS: Readmission Risk Score: 15.5        Follow Up  : Attempted to make contact with Alyce for a f/u call without success. Unable to leave a message regarding intent of call and call back information. Will discharge from CTN services at this time due to inability to make contact.     Future Appointments   Date Time Provider Maurizio Hawkins   2022 11:30 AM Nimesh Sexton MD [facility] [address] [facility] [address]   2022  1:00 PM Mahala Bumpers, MD N  [facility]   2023  1:00 PM Jaxson Jackson MD [facility]        Care Transitions Subsequent and Final Call    Subsequent and Final Calls  Care Transitions Interventions  Other Interventions:           Juan Manuel Zavala RN

## 2022-12-06 NOTE — TELEPHONE ENCOUNTER
Requested Prescriptions     Pending Prescriptions Disp Refills    methylphenidate (RITALIN) 20 MG tablet [Pharmacy Med Name: METHYLPHENIDATE HYDROCHLORIDE 20MG TABLET] 30 tablet 0     Sig: TAKE 1 TABLET BY MOUTH DAILY    HYDROcodone-acetaminophen ([facility])  MG per tablet [Pharmacy Med Name: HYDROCODONE BITARTRATE/ACETAMINOPHEN 325MG-10MG TABLET] 90 tablet 0     Sig: TAKE 1 TABLET BY MOUTH 3 TIMES  A DAY       Last Office Visit:  9/21/2022  Next Office Visit:  12/21/2022  Last Medication Refill:  BOTH 11/6/22  Jim up to date:  11/1/22    *RX updated to reflect   BOTH 12/6/22  fill date*

## 2022-12-14 ENCOUNTER — HOSPITAL ENCOUNTER (OUTPATIENT)
Dept: WOUND CARE | Age: 52
Discharge: HOME | End: 2022-12-14
Payer: MEDICARE

## 2022-12-14 VITALS
BODY MASS INDEX: 19.55 KG/M2 | TEMPERATURE: 96.7 F | RESPIRATION RATE: 18 BRPM | SYSTOLIC BLOOD PRESSURE: 113 MMHG | HEART RATE: 74 BPM | DIASTOLIC BLOOD PRESSURE: 77 MMHG | WEIGHT: 132 LBS | HEIGHT: 69 IN

## 2022-12-14 DIAGNOSIS — L97.512 NEUROPATHIC ULCER OF RIGHT FOOT WITH FAT LAYER EXPOSED (HCC): Primary | Chronic | ICD-10-CM

## 2022-12-14 DIAGNOSIS — G82.20 PARAPLEGIA (HCC): Chronic | ICD-10-CM

## 2022-12-14 PROCEDURE — 6370000000 HC RX 637 (ALT 250 FOR IP): Performed by: NURSE PRACTITIONER

## 2022-12-14 PROCEDURE — 97597 DBRDMT OPN WND 1ST 20 CM/<: CPT

## 2022-12-14 RX ORDER — LIDOCAINE HYDROCHLORIDE 20 MG/ML
JELLY TOPICAL ONCE
Status: COMPLETED | OUTPATIENT
Start: 2022-12-14 | End: 2022-12-14

## 2022-12-14 RX ORDER — LIDOCAINE HYDROCHLORIDE 40 MG/ML
SOLUTION TOPICAL ONCE
OUTPATIENT
Start: 2022-12-14 | End: 2022-12-14

## 2022-12-14 RX ORDER — MAG HYDROX/ALUMINUM HYD/SIMETH 400-400-40
1 SUSPENSION, ORAL (FINAL DOSE FORM) ORAL 2 TIMES DAILY
Qty: 1000 ML | Refills: 3 | Status: SHIPPED | OUTPATIENT
Start: 2022-12-14

## 2022-12-14 RX ORDER — LIDOCAINE HYDROCHLORIDE 20 MG/ML
JELLY TOPICAL ONCE
OUTPATIENT
Start: 2022-12-14 | End: 2022-12-14

## 2022-12-14 RX ORDER — LIDOCAINE 50 MG/G
OINTMENT TOPICAL ONCE
OUTPATIENT
Start: 2022-12-14 | End: 2022-12-14

## 2022-12-14 RX ORDER — LIDOCAINE 40 MG/G
CREAM TOPICAL ONCE
OUTPATIENT
Start: 2022-12-14 | End: 2022-12-14

## 2022-12-14 RX ADMIN — LIDOCAINE HYDROCHLORIDE: 20 JELLY TOPICAL at 11:55

## 2022-12-14 NOTE — PROGRESS NOTES
[address]   Progress Note and Procedure Note      Sue Henao  MEDICAL RECORD NUMBER:  [id number]  AGE: 47 y.o. GENDER: female  : 1970  EPISODE DATE:  2022    Subjective:     Chief Complaint   Patient presents with    Wound Check         HISTORY of PRESENT ILLNESS HPI     Sue Henao is a 47 y.o. female who presents today for wound/ulcer evaluation.    Wound Context: Pt with R foot wound here for eval/treat  Wound/Ulcer Pain Timing/Severity: none  Quality of pain: N/A  Severity:  0 / 10   Modifying Factors: None  Associated Signs/Symptoms: none    Ulcer Identification:  Ulcer Type: pressure  Contributing Factors: chronic pressure and decreased mobility    Wound:  pressure        PAST MEDICAL HISTORY        Diagnosis Date    Ankle wound     and toe wound    Aptyalism 2017    Arthritis 2020    Asthma     Back pain 2017    Binocular vision disorder with diplopia 2017    CAFL (chronic airflow limitation) ([address] 2017    Hay fever 2017    Headache 2017    MS (multiple sclerosis) ([facility])     Muscle spasticity     Neuropathic ulcer of left foot, limited to breakdown of skin ([address] 4/10/2017    Numbness 2016    Open wound of ankle 2017    Open wound of second toe of left foot 2018    Peripheral vascular disease ([address]     Seizure ([address]     occ. related to ms    Sepsis ([address] 2016    Weakness 2016       PAST SURGICAL HISTORY    Past Surgical History:   Procedure Laterality Date    BACLOFEN PUMP IMPLANTATION      BREAST BIOPSY Right     neg    COLONOSCOPY N/A 2019    Dr Marquita Sauer ileum through ostomy-patent and healthy appearing anastomosis in the right colon-entero colonic anastomosis-10 yr recall    COLOSTOMY      FEMUR FRACTURE SURGERY      Right    FOOT AMPUTATION Left 2019    LEFT TRANSMET AMPUTATION performed by Randi Parikh MD at [address] ([address] [place] [address])      OTHER SURGICAL HISTORY      urostomy    OTHER SURGICAL HISTORY      pain pump    SC INSJ PRPH CTR VAD W/SUBQ PORT UNDER 5 YR N/A 06/26/2018    SINGLE LUMEN PORT PLACEMENT WITH FLUORO performed by Jonny Kyle MD at [facility]      TOE AMPUTATION      L last toe    TONSILLECTOMY      URETEROTOMY         FAMILY HISTORY    Family History   Problem Relation Age of Onset    Cancer Mother         breast    Colon Cancer Mother     Colon Polyps Mother     Breast Cancer Mother     Diabetes Father     High Blood Pressure Father     Heart Disease Paternal Grandmother     Breast Cancer Paternal Aunt     Cancer Paternal Aunt         breast    Liver Cancer Paternal Aunt     Esophageal Cancer Neg Hx     Liver Disease Neg Hx     Rectal Cancer Neg Hx     Stomach Cancer Neg Hx        SOCIAL HISTORY    Social History     Tobacco Use    Smoking status: Every Day     Packs/day: 0.50     Years: 15.00     Pack years: 7.50     Types: Cigarettes    Smokeless tobacco: Never    Tobacco comments:     Less than 10 per day   Vaping Use    Vaping Use: Never used   Substance Use Topics    Alcohol use: Yes     Comment: yaritza    Drug use: Yes     Types: Marijuana [place]     Comment: daily        ALLERGIES    Allergies   Allergen Reactions    Darvocet [Propoxyphene N-Acetaminophen]      Talking out of her head    Morphine      Category:  Allergy;     Morphine And Related Itching and Swelling    Propoxyphene Swelling       MEDICATIONS    Current Outpatient Medications on File Prior to Encounter   Medication Sig Dispense Refill    methylphenidate (RITALIN) 20 MG tablet TAKE 1 TABLET BY MOUTH DAILY 30 tablet 0    HYDROcodone-acetaminophen (NORCO)  MG per tablet TAKE 1 TABLET BY MOUTH 3 TIMES  A DAY 90 tablet 0    tiZANidine (ZANAFLEX) 4 MG tablet TAKE 3 TABLETS TWICE DAILY AS NEEDED 180 tablet 11    nitrofurantoin (MACRODANTIN) 50 MG capsule TAKE 1 CAPSULE BY MOUTH NIGHTLY 90 capsule 1    levETIRAcetam (KEPPRA) 750 MG tablet TAKE 1 TABLET BY MOUTH DAILY 30 tablet 5    pilocarpine (SALAGEN) 7.5 MG tablet TAKE ONE TABLET BY MOUTH 3 TIMES DAILY 90 tablet 3    pregabalin (LYRICA) 300 MG capsule TAKE 1 CAPSULE BY MOUTH TWICE A DAY (Patient taking differently: Indications: MANAGED BY DR. LINK TAKE 1 CAPSULE BY MOUTH TWICE A DAY) 60 capsule 5    ondansetron (ZOFRAN-ODT) 4 MG disintegrating tablet Take 1 tablet by mouth 3 times daily as needed for Nausea or Vomiting 30 tablet 0    hydroCHLOROthiazide (HYDRODIURIL) 25 MG tablet TAKE 1 TABLET BY MOUTH ONCE DAILY AS NEEDED 30 tablet 0    DULoxetine (CYMBALTA) 30 MG extended release capsule Take 1 capsule by mouth nightly 90 capsule 2    fluocinolone acetonide (SYNALAR) 0.01 % external solution Apply 10 drops topically at bedtime      Diapers & Supplies MISC Indications: FX: [id number] Diapers, Chucks, Wipes, and Gloves. 100 each 3    azelastine (ASTELIN) 0.1 % nasal spray USE 2 SPRAYS IN EACH NOSTRIL TWICE DAILY AS DIRECTED 30 mL 0    cetirizine (ZYRTEC) 10 MG tablet Take 10 mg by mouth daily      Catheters MISC Catheter supplies and lubricant 5 times daily G82.20  to [facility] [phone] 450 each 3    docusate sodium (COLACE) 100 MG capsule Take 200 mg by mouth as needed for Constipation      PROAIR  (90 Base) MCG/ACT inhaler INHALE 1 PUFF INTO THE LUNGS EVERY 6 HOURS AS NEEDED FOR WHEEZING OR SHORTNESS OF BREATH 8.5 g 5    Heparin Lock Flush (HEPARIN FLUSH, 100 UNITS/ML,) 100 UNIT/ML injection 3 mLs by Intercatheter route every 30 days No every 30 days but every 4-6 weeks.  Flush port with 300 units heparin with 20 cc normal saline for ocrevus infusion for Multiple sclerosis and NS 20CC 1 Syringe 5    ipratropium-albuterol (DUONEB) 0.5-2.5 (3) MG/3ML SOLN nebulizer solution USE 1 UNIT DOSE IN NEBULIZER EVERY 4 TO 6 HOURS AS NEEDED. 450 mL 11    BACLOFEN, PAIN PUMP REFILL CHARGE, by Implant route continuous Indications: every 3 months      Multiple Vitamins-Minerals (THERAPEUTIC MULTIVITAMIN-MINERALS) tablet Take 1 tablet by mouth daily      blood glucose monitor strips Test 1 times a day & as needed for symptoms of irregular blood glucose. 100 strip 0    baclofen (LIORESAL) 10 MG tablet Take 1 tablet by mouth 2 times daily as needed (muscle spasms) (Patient taking differently: Take 10 mg by mouth 2 times daily as needed (muscle spasms) Indications: using pump) 60 tablet 5    Sodium Chloride Flush (SALINE FLUSH) 0.9 % SOLN Infuse 20 mLs intravenously every 30 days Not every 30 days but every 4-6 weeks as need with 300 units of heparin to flush port for Infusion of Ocrevus for multiple sclerosis 20 mL 5     No current facility-administered medications on file prior to encounter.        REVIEW OF SYSTEMS    A comprehensive review of systems was negative except for: Neurological: positive for paraplegia    Objective:      /77   Pulse 74   Temp (!) 96.7 °F (35.9 °C) (Temporal)   Resp 18   Ht 5' 9\" (1.753 m)   Wt 132 lb (59.9 kg)   BMI 19.49 kg/m²     Wt Readings from Last 3 Encounters:   12/14/22 132 lb (59.9 kg)   11/28/22 132 lb (59.9 kg)   11/21/22 132 lb 0.9 oz (59.9 kg)       PHYSICAL EXAM    General Appearance: alert and oriented to person, place and time, well developed and well- nourished, in no acute distress  Skin: warm and dry, no rash or erythema  Head: normocephalic and atraumatic  Eyes: pupils equal, round, and reactive to light, extraocular eye movements intact, conjunctivae normal  ENT: tympanic membrane, external ear and ear canal normal bilaterally, nose without deformity, nasal mucosa and turbinates normal without polyps, lips teeth and gums normal  Neck: supple and non-tender without mass, no thyromegaly or thyroid nodules, no cervical lymphadenopathy  Pulmonary/Chest: clear to auscultation bilaterally- no wheezes, rales or rhonchi, normal air movement, no respiratory distress  Cardiovascular: normal rate, regular rhythm, normal S1 and S2, no murmurs, rubs, clicks, or gallops, distal pulses intact, no carotid bruits  Abdomen: soft, non-tender, non-distended, normal bowel sounds, no masses or organomegaly  Extremities: no cyanosis, clubbing or edema      Assessment:      Problem List Items Addressed This Visit       Paraplegia (HCC) (Chronic)    * (Principal) 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 Performed by: Kim Correa MD    Consent obtained:  Yes    Time out taken:  Yes    Pain Control: Anesthetic  Anesthetic: 2% Lidocaine Gel Topical       Debridement:Non-excisional Debridement    Using curette the wound(s)/ulcer(s) was/were sharply debrided down through and including the removal of epidermis and dermis.         Devitalized Tissue Debrided:  fibrin, biofilm, slough, necrotic/eschar, and exudate      Pre Debridement Measurements:  Are located in the Wound/Ulcer Documentation Flow Sheet    Wound/Ulcer #: 1 and 2    Percent of Wound(s)/Ulcer(s) Debrided: 100%    Total Surface Area Debrided:  4.65 sq cm       Diabetic/Pressure/Non Pressure Ulcers only:  Ulcer: Non-Pressure ulcer, fat layer exposed             Post Debridement Measurements:    Wound/Ulcer Descriptions are Pre Debridement --EXCEPT MEASUREMENTS    Wound 09/22/22 Foot Anterior;Right;Dorsal Wound #1 Right dorsal foot traumatic (Active)   Wound Image   12/14/22 1138   Wound Etiology Venous 12/14/22 1138   Dressing Status Old drainage noted 12/14/22 1138   Wound Cleansed Soap and water 12/14/22 1138   Dressing/Treatment Gauze dressing/dressing sponge;Moist to moist;Roll gauze 11/28/22 1415   Offloading for Diabetic Foot Ulcers Offloading boot 12/14/22 1138   Wound Length (cm) 2 cm 12/14/22 1138   Wound Width (cm) 2.3 cm 12/14/22 1138   Wound Depth (cm) 0.2 cm 12/14/22 1138   Wound Surface Area (cm^2) 4.6 cm^2 12/14/22 1138   Change in Wound Size % (l*w) -2200 12/14/22 1138   Wound Volume (cm^3) 0.92 cm^3 12/14/22 1138   Wound Healing % -4500 12/14/22 1138   Post-Procedure Length (cm) 2 cm 12/14/22 1156   Post-Procedure Width (cm) 2.3 cm 12/14/22 1156   Post-Procedure Depth (cm) 0.2 cm 12/14/22 1156   Post-Procedure Surface Area (cm^2) 4.6 cm^2 12/14/22 1156   Post-Procedure Volume (cm^3) 0.92 cm^3 12/14/22 1156   Distance Tunneling (cm) 0 cm 11/28/22 1324   Tunneling Position ___ O'Clock 0 11/28/22 1324   Undermining Starts ___ O'Clock 0 11/28/22 1324   Undermining Ends___ O'Clock 0 11/28/22 1324   Undermining Maxium Distance (cm) 0 11/28/22 1324   Wound Assessment Exposed structure tendon;Slough;Pink/red 12/14/22 1138   Drainage Amount Moderate 12/14/22 1138   Drainage Description Serosanguinous 12/14/22 1138   Odor None 12/14/22 1138   Audelia-wound Assessment Hemosiderin staining (brown yellow) 12/14/22 1138   Margins Attached edges 12/14/22 1138   Wound Thickness Description not for Pressure Injury Full thickness 12/14/22 1138   Number of days: 82       Wound 11/21/22 Pretibial Distal;Right #2 Right ankle pressure stage 3 (Active)   Wound Image   12/14/22 1138   Wound Etiology Pressure Stage 3 12/14/22 1138   Dressing Status Old drainage noted 12/14/22 1138   Wound Cleansed Soap and water 12/14/22 1138   Dressing/Treatment Xeroform;Gauze dressing/dressing sponge;Roll gauze 11/28/22 1415   Wound Length (cm) 0.2 cm 12/14/22 1138   Wound Width (cm) 0.2 cm 12/14/22 1138   Wound Depth (cm) 0.1 cm 12/14/22 1138   Wound Surface Area (cm^2) 0.04 cm^2 12/14/22 1138   Change in Wound Size % (l*w) 55.56 12/14/22 1138   Wound Volume (cm^3) 0.004 cm^3 12/14/22 1138   Wound Healing % 56 12/14/22 1138   Post-Procedure Length (cm) 0.2 cm 12/14/22 1156   Post-Procedure Width (cm) 0.2 cm 12/14/22 1156   Post-Procedure Depth (cm) 0.1 cm 12/14/22 1156   Post-Procedure Surface Area (cm^2) 0.04 cm^2 12/14/22 1156   Post-Procedure Volume (cm^3) 0.004 cm^3 12/14/22 1156   Distance Tunneling (cm) 0 cm 11/28/22 1324   Tunneling Position ___ O'Clock 0 11/28/22 1324   Undermining Starts ___ O'Clock 0 11/28/22 1324   Undermining Ends___ O'Clock 0 11/28/22 1324   Undermining Maxium Distance (cm) 0 11/28/22 1324   Wound Assessment Pink/red 12/14/22 1138   Drainage Amount Small 12/14/22 1138   Drainage Description Serosanguinous 12/14/22 1138   Odor None 12/14/22 1138   Audelia-wound Assessment Intact 12/14/22 1138   Margins Attached edges 12/14/22 1138   Wound Thickness Description not for Pressure Injury Full thickness 12/14/22 1138   Number of days: 22             Estimated Blood Loss:  Minimal    Hemostasis Achieved:  by pressure    Procedural Pain:  0  / 10     Post Procedural Pain:  0 / 10     Response to treatment:  Well tolerated by patient. 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          Discharge [address] and Hyperbaric Oxygen Therapy   Physician Orders and Discharge Instructions  [address] [facility], [place] [address]  Telephone: [phone]    NAME:  Richard Toledo:  1970  MEDICAL RECORD NUMBER:  [id number]  DATE:  12/14/2022    Discharge condition: Stable    Discharge to: Home    Left via:Private automobile    Accompanied by:  [address] f: [phone] f: [phone] p: [phone] [email or web address]       Dressing Orders: Left Lateral Ankle and Toe Wounds   Wash with soap and water. Xeroform cut to fit open areas, cover with dry gauze, roll gauze and medipore tape, change daily      Dressing Orders: Right Lateral ankle:   Wash with soap and water. Promogran to base of wound, Saline moist 2x2 gauze, Cover with dry gauze, Secure with loose roll gauze or medipore tape, change daily   Wear Heel Lift Boots, No shoes or tight dressing or socks  Protein rich diet (unless restricted by your physician); Multivitamin daily; Elevate legs above the level of your heart when sitting 3-4 times daily for at least one hour each time,    [address] [place] [address],[address] follow up visit ___________2 weeks with Chin Medina __________________  (Please note your next appointment above and if you are unable to keep, kindly give a 24 hour notice. Thank you.)    If you experience any of the following, please call the [address] during business hours:    * Increase in Pain  * Temperature over 101  * Increase in drainage from your wound  * Drainage with a foul odor  * Bleeding  * Increase in swelling  * Need for compression bandage changes due to slippage, breakthrough drainage. If you need medical attention outside of the business hours of the [address] please contact your PCP or go to the nearest emergency room.          Electronically signed by Sheila Salinas MD on 12/14/2022 at 12:08 PM

## 2022-12-14 NOTE — PLAN OF CARE
Problem: Chronic Conditions and Co-morbidities  Goal: Patient's chronic conditions and co-morbidity symptoms are monitored and maintained or improved  Outcome: Progressing     Problem: Discharge Planning  Goal: Discharge to home or other facility with appropriate resources  Outcome: Progressing     Problem: Skin/Tissue Integrity  Goal: Absence of new skin breakdown  Description: 1. Monitor for areas of redness and/or skin breakdown  2. Assess vascular access sites hourly  3. Every 4-6 hours minimum:  Change oxygen saturation probe site  4. Every 4-6 hours:  If on nasal continuous positive airway pressure, respiratory therapy assess nares and determine need for appliance change or resting period.   Outcome: Progressing     Problem: Wound:  Goal: Will show signs of wound healing; wound closure and no evidence of infection  Description: Will show signs of wound healing; wound closure and no evidence of infection  Outcome: Progressing     Problem: Smoking cessation:  Goal: Ability to formulate a plan to maintain a tobacco-free life will be supported  Description: Ability to formulate a plan to maintain a tobacco-free life will be supported  Outcome: Progressing     Problem: Falls - Risk of:  Goal: Will remain free from falls  Description: Will remain free from falls  Outcome: Progressing     Problem: Blood Glucose:  Goal: Ability to maintain appropriate glucose levels will improve  Description: Ability to maintain appropriate glucose levels will improve  Outcome: Progressing

## 2022-12-14 NOTE — DISCHARGE INSTRUCTIONS
[address] and [facility] Oxygen Therapy   Physician Orders and Discharge Instructions  [address]  Telephone: [phone]    NAME:  Bettina Alexis OF BIRTH:  1970  MEDICAL RECORD NUMBER:  [id number]  DATE:  12/14/2022    Discharge condition: Stable    Discharge to: Home    Left via:Private automobile    Accompanied by:  [address] f: [phone] f: [phone] p: [phone] [email or web address]       Dressing Orders: Left Lateral Ankle and Toe Wounds   Wash with soap and water. Xeroform cut to fit open areas, cover with dry gauze, roll gauze and medipore tape, change daily      Dressing Orders: Right Lateral ankle:   Wash with soap and water. Promogran to base of wound, Saline moist 2x2 gauze, Cover with dry gauze, Secure with loose roll gauze or medipore tape, change daily   Wear Heel Lift Boots, No shoes or tight dressing or socks  Protein rich diet (unless restricted by your physician); Multivitamin daily; Elevate legs above the level of your heart when sitting 3-4 times daily for at least one hour each time,    [facility] follow up visit ___________2 weeks with Asiasusy Vee __________________  (Please note your next appointment above and if you are unable to keep, kindly give a 24 hour notice. Thank you.)    If you experience any of the following, please call the [facility] during business hours:    * Increase in Pain  * Temperature over 101  * Increase in drainage from your wound  * Drainage with a foul odor  * Bleeding  * Increase in swelling  * Need for compression bandage changes due to slippage, breakthrough drainage. If you need medical attention outside of the business hours of the [facility] please contact your PCP or go to the nearest emergency room.

## 2022-12-27 ENCOUNTER — HOSPITAL ENCOUNTER (OUTPATIENT)
Dept: WOUND CARE | Age: 52
Discharge: HOME | End: 2022-12-27
Payer: MEDICARE

## 2022-12-27 VITALS
HEART RATE: 69 BPM | DIASTOLIC BLOOD PRESSURE: 75 MMHG | SYSTOLIC BLOOD PRESSURE: 125 MMHG | RESPIRATION RATE: 18 BRPM | TEMPERATURE: 98.6 F

## 2022-12-27 DIAGNOSIS — F17.200 SMOKING: ICD-10-CM

## 2022-12-27 DIAGNOSIS — G35 MULTIPLE SCLEROSIS (HCC): ICD-10-CM

## 2022-12-27 DIAGNOSIS — G82.20 PARAPLEGIA (HCC): Chronic | ICD-10-CM

## 2022-12-27 DIAGNOSIS — L97.512 NEUROPATHIC ULCER OF RIGHT FOOT WITH FAT LAYER EXPOSED (HCC): Primary | ICD-10-CM

## 2022-12-27 PROBLEM — T14.8XXA INFECTED WOUND: Status: RESOLVED | Noted: 2022-11-07 | Resolved: 2022-12-27

## 2022-12-27 PROBLEM — L08.9 INFECTED WOUND: Status: RESOLVED | Noted: 2022-11-07 | Resolved: 2022-12-27

## 2022-12-27 PROCEDURE — 97597 DBRDMT OPN WND 1ST 20 CM/<: CPT

## 2022-12-27 PROCEDURE — 6370000000 HC RX 637 (ALT 250 FOR IP): Performed by: NURSE PRACTITIONER

## 2022-12-27 PROCEDURE — 99214 OFFICE O/P EST MOD 30 MIN: CPT

## 2022-12-27 PROCEDURE — 97597 DBRDMT OPN WND 1ST 20 CM/<: CPT | Performed by: NURSE PRACTITIONER

## 2022-12-27 RX ORDER — LIDOCAINE 40 MG/G
CREAM TOPICAL ONCE
OUTPATIENT
Start: 2022-12-27 | End: 2022-12-27

## 2022-12-27 RX ORDER — LIDOCAINE HYDROCHLORIDE 40 MG/ML
SOLUTION TOPICAL ONCE
OUTPATIENT
Start: 2022-12-27 | End: 2022-12-27

## 2022-12-27 RX ORDER — LIDOCAINE 50 MG/G
OINTMENT TOPICAL ONCE
OUTPATIENT
Start: 2022-12-27 | End: 2022-12-27

## 2022-12-27 RX ORDER — LIDOCAINE HYDROCHLORIDE 20 MG/ML
JELLY TOPICAL ONCE
OUTPATIENT
Start: 2022-12-27 | End: 2022-12-27

## 2022-12-27 RX ORDER — LIDOCAINE HYDROCHLORIDE 20 MG/ML
JELLY TOPICAL ONCE
Status: COMPLETED | OUTPATIENT
Start: 2022-12-27 | End: 2022-12-27

## 2022-12-27 RX ADMIN — LIDOCAINE HYDROCHLORIDE: 20 JELLY TOPICAL at 14:40

## 2022-12-27 NOTE — DISCHARGE INSTRUCTIONS
[address] and [facility] Oxygen Therapy   Physician Orders and Discharge Instructions  [address]  Telephone: [phone]    NAME:  Abi Reyna OF BIRTH:  1970  MEDICAL RECORD NUMBER:  [id number]  DATE:  12/27/2022    Discharge condition: Stable    Discharge to: Home    Left via:Private automobile    Accompanied by:  [address] f: [phone] f: [phone] p: [phone] [email or web address]       Dressing Orders:   Right toes: Wash with soap and water. Apply Aquacel Ag to wounds, cover with dry gauze, roll gauze and tape to dressing. Right medial Ankle: Wash with soap and water. Xeroform cut to fit open areas, cover with dry gauze, roll gauze and medipore tape, change daily      Dressing Orders: Right Lateral Ankle:   Wash with soap and water. Promogran to base of wound, Saline moist 2x2 gauze, Cover with dry gauze, Secure with loose roll gauze or medipore tape, change daily     Wear Heel Lift Boots, No shoes or tight dressing or socks  Protein rich diet (unless restricted by your physician); Multivitamin daily; Elevate legs above the level of your heart when sitting 3-4 times daily for at least one hour each time,     [address] [place] [address],[address] follow up visit ___________2 weeks with Dr. Giuseppe Parekh __________________  (Please note your next appointment above and if you are unable to keep, kindly give a 24 hour notice. Thank you.)          If you experience any of the following, please call the [address] during business hours:    * Increase in Pain  * Temperature over 101  * Increase in drainage from your wound  * Drainage with a foul odor  * Bleeding  * Increase in swelling  * Need for compression bandage changes due to slippage, breakthrough drainage.     If you need medical attention outside of the business hours of the [address] please contact your PCP or go to the nearest emergency room.

## 2022-12-27 NOTE — PROGRESS NOTES
[address]   Progress Note and Procedure Note      Ed Wild  MEDICAL RECORD NUMBER:  [id number]  AGE: 47 y.o. GENDER: female  : 1970  EPISODE DATE:  2022    Subjective:     Chief Complaint   Patient presents with    Wound Check     Right foot wounds      HISTORY of PRESENT ILLNESS HPI     Ed Wild is a 47 y.o. female who presents today for wound/ulcer evaluation.    History of Wound Context: right foot wound follow up/eval and treat    Ulcer Identification:  Ulcer Type: traumatic  Contributing Factors: smoking    Wound: Abrasion        PAST MEDICAL HISTORY        Diagnosis Date    Ankle wound     and toe wound    Aptyalism 2017    Arthritis 2020    Asthma     Back pain 2017    Binocular vision disorder with diplopia 2017    CAFL (chronic airflow limitation) ([address] 2017    Hay fever 2017    Headache 2017    MS (multiple sclerosis) (HCC)     Muscle spasticity     Neuropathic ulcer of left foot, limited to breakdown of skin ([address] 4/10/2017    Numbness 2016    Open wound of ankle 2017    Open wound of second toe of left foot 2018    Peripheral vascular disease ([address]     Seizure ([address]     occ. related to ms    Sepsis ([address] 2016    Weakness 2016       PAST SURGICAL HISTORY    Past Surgical History:   Procedure Laterality Date    BACLOFEN PUMP IMPLANTATION      BREAST BIOPSY Right     neg    COLONOSCOPY N/A 2019    Dr Sheron Clemente ileum through ostomy-patent and healthy appearing anastomosis in the right colon-entero colonic anastomosis-10 yr recall    COLOSTOMY      FEMUR FRACTURE SURGERY      Right    FOOT AMPUTATION Left 2019    LEFT TRANSMET AMPUTATION performed by Mike Ervin MD at [address] (CERVIX STATUS UNKNOWN)      OTHER SURGICAL HISTORY      urostomy    OTHER SURGICAL HISTORY      pain pump    [facility] VAD W/SUBQ PORT UNDER 5 YR N/A 2018    SINGLE pilocarpine (SALAGEN) 7.5 MG tablet TAKE ONE TABLET BY MOUTH 3 TIMES DAILY 90 tablet 3    pregabalin (LYRICA) 300 MG capsule TAKE 1 CAPSULE BY MOUTH TWICE A DAY (Patient taking differently: Indications: MANAGED BY DR. LINK TAKE 1 CAPSULE BY MOUTH TWICE A DAY) 60 capsule 5    ondansetron (ZOFRAN-ODT) 4 MG disintegrating tablet Take 1 tablet by mouth 3 times daily as needed for Nausea or Vomiting 30 tablet 0    hydroCHLOROthiazide (HYDRODIURIL) 25 MG tablet TAKE 1 TABLET BY MOUTH ONCE DAILY AS NEEDED 30 tablet 0    DULoxetine (CYMBALTA) 30 MG extended release capsule Take 1 capsule by mouth nightly 90 capsule 2    fluocinolone acetonide (SYNALAR) 0.01 % external solution Apply 10 drops topically at bedtime      Diapers & Supplies MISC Indications: FX: [id number] Diapers, Chucks, Wipes, and Gloves. 100 each 3    azelastine (ASTELIN) 0.1 % nasal spray USE 2 SPRAYS IN EACH NOSTRIL TWICE DAILY AS DIRECTED 30 mL 0    cetirizine (ZYRTEC) 10 MG tablet Take 10 mg by mouth daily      Catheters MISC Catheter supplies and lubricant 5 times daily G82.20  to [facility] [phone] 450 each 3    docusate sodium (COLACE) 100 MG capsule Take 200 mg by mouth as needed for Constipation      PROAIR  (90 Base) MCG/ACT inhaler INHALE 1 PUFF INTO THE LUNGS EVERY 6 HOURS AS NEEDED FOR WHEEZING OR SHORTNESS OF BREATH 8.5 g 5    Heparin Lock Flush (HEPARIN FLUSH, 100 UNITS/ML,) 100 UNIT/ML injection 3 mLs by Intercatheter route every 30 days No every 30 days but every 4-6 weeks.  Flush port with 300 units heparin with 20 cc normal saline for ocrevus infusion for Multiple sclerosis and NS 20CC 1 Syringe 5    ipratropium-albuterol (DUONEB) 0.5-2.5 (3) MG/3ML SOLN nebulizer solution USE 1 UNIT DOSE IN NEBULIZER EVERY 4 TO 6 HOURS AS NEEDED. 450 mL 11    BACLOFEN, PAIN PUMP REFILL CHARGE, by Implant route continuous Indications: every 3 months      Multiple Vitamins-Minerals (THERAPEUTIC MULTIVITAMIN-MINERALS) tablet Take 1 tablet by mouth daily      blood glucose monitor strips Test 1 times a day & as needed for symptoms of irregular blood glucose. 100 strip 0    baclofen (LIORESAL) 10 MG tablet Take 1 tablet by mouth 2 times daily as needed (muscle spasms) (Patient taking differently: Take 10 mg by mouth 2 times daily as needed (muscle spasms) Indications: using pump) 60 tablet 5    Sodium Chloride Flush (SALINE FLUSH) 0.9 % SOLN Infuse 20 mLs intravenously every 30 days Not every 30 days but every 4-6 weeks as need with 300 units of heparin to flush port for Infusion of Ocrevus for multiple sclerosis 20 mL 5     No current facility-administered medications on file prior to encounter. REVIEW OF SYSTEMS    Pertinent items are noted in HPI.     Objective:      /75   Pulse 69   Temp 98.6 °F (37 °C) (Temporal)   Resp 18     Wt Readings from Last 3 Encounters:   12/14/22 132 lb (59.9 kg)   11/28/22 132 lb (59.9 kg)   11/21/22 132 lb 0.9 oz (59.9 kg)       PHYSICAL EXAM    General Appearance: alert and oriented to person, place and time, well developed and well- nourished, in no acute distress  Skin: warm and dry, no rash or erythema  Head: normocephalic and atraumatic  Eyes: pupils equal, round, and reactive to light, extraocular eye movements intact, conjunctivae normal  ENT: tympanic membrane, external ear and ear canal normal bilaterally, nose without deformity, nasal mucosa and turbinates normal without polyps  Neck: supple and non-tender without mass, no thyromegaly or thyroid nodules, no cervical lymphadenopathy  Pulmonary/Chest: clear to auscultation bilaterally- no wheezes, rales or rhonchi, normal air movement, no respiratory distress      Assessment:      Patient Active Problem List   Diagnosis Code    Muscle spasticity M62.838    Peripheral vascular disease (HCC) I73.9    Multiple sclerosis (HCC) G35    Pain in both upper arms M79.621, M79.622    Numbness R20.0    Other fatigue R53.83    Weakness R53.1    Chronic pain G89.29    Hx of seizure disorder Z86.69    Insomnia G47.00    Pressure ulcer of sacral region L89.159    Neck pain M54.2    Seasonal allergic rhinitis J30.2    S/P transmetatarsal amputation of foot, left ([facility]) B77.051    Constipation due to neurogenic bowel K59.00, K59.2    Colostomy present ([facility]) Z93.3    Paraplegia ([facility]) G82.20    Hip pain M25.559    Seizure ([facility]) R56.9    Smoking F17.200    Encounter for care related to vascular access port Z45.2    Urinary incontinence R32    Mixed anxiety and depressive disorder F41.8    Malodorous urine R82.90    Arthritis M19.90    Osteoporosis M81.0    Urinary bladder stone N21.0    Vesicovaginal fistula N82.0    Neuropathic 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 ([facility] L97.512    Dyspnea R06.00    Marijuana user F12.90    Neurogenic bladder N31.9    Other specified misadventures during surgical and medical care Y65.8    Presence of other specified devices Z97.8    Renal stone N20.0    Wheelchair bound Z99.3    Pressure injury of sacral region, stage 3 ([facility]) L89.153    Unspecified severe protein-calorie malnutrition ([facility] E43    Ankle wound, right, sequela S91.001S    Severe malnutrition ([facility]) E43       Wound 09/22/22 Foot Anterior;Right;Dorsal Wound #1 Right dorsal foot traumatic (Active)   Wound Image   12/27/22 1424   Wound Etiology Other 12/27/22 1424   Dressing Status Old drainage noted 12/27/22 1424   Wound Cleansed Soap and water 12/27/22 1424   Dressing/Treatment Collagen;Moist to moist;Gauze dressing/dressing sponge 12/14/22 1215   Offloading for Diabetic Foot Ulcers Offloading boot 12/27/22 1424   Wound Length (cm) 1.6 cm 12/27/22 1424   Wound Width (cm) 1.9 cm 12/27/22 1424   Wound Depth (cm) 0.1 cm 12/27/22 1424   Wound Surface Area (cm^2) 3.04 cm^2 12/27/22 1424   Change in Wound Size % (l*w) -1420 12/27/22 1424   Wound Volume (cm^3) 0.304 cm^3 12/27/22 1424   Wound Healing % -1420 12/27/22 1424   Post-Procedure Length (cm) 1.6 cm 12/27/22 1447 Post-Procedure Width (cm) 1.9 cm 12/27/22 1447   Post-Procedure Depth (cm) 0.1 cm 12/27/22 1447   Post-Procedure Surface Area (cm^2) 3.04 cm^2 12/27/22 1447   Post-Procedure Volume (cm^3) 0.304 cm^3 12/27/22 1447   Distance Tunneling (cm) 0 cm 11/28/22 1324   Tunneling Position ___ O'Clock 0 11/28/22 1324   Undermining Starts ___ O'Clock 0 11/28/22 1324   Undermining Ends___ O'Clock 0 11/28/22 1324   Undermining Maxium Distance (cm) 0 11/28/22 1324   Wound Assessment Pink/red;Slough 12/27/22 1424   Drainage Amount Moderate 12/27/22 1424   Drainage Description Serosanguinous 12/27/22 1424   Odor None 12/27/22 1424   Audelia-wound Assessment Hemosiderin staining (brown yellow) 12/27/22 1424   Margins Attached edges 12/27/22 1424   Wound Thickness Description not for Pressure Injury Full thickness 12/27/22 1424   Number of days: 96       Wound 11/21/22 Pretibial Distal;Right #2 Right ankle pressure stage 3 (Active)   Wound Image   12/27/22 1424   Wound Etiology Pressure Stage 3 12/27/22 1424   Dressing Status Old drainage noted 12/27/22 1424   Wound Cleansed Soap and water 12/27/22 1424   Dressing/Treatment Xeroform;Gauze dressing/dressing sponge;Roll gauze 12/14/22 1215   Wound Length (cm) 0.3 cm 12/27/22 1424   Wound Width (cm) 0.4 cm 12/27/22 1424   Wound Depth (cm) 0.1 cm 12/27/22 1424   Wound Surface Area (cm^2) 0.12 cm^2 12/27/22 1424   Change in Wound Size % (l*w) -33.33 12/27/22 1424   Wound Volume (cm^3) 0.012 cm^3 12/27/22 1424   Wound Healing % -33 12/27/22 1424   Post-Procedure Length (cm) 0.3 cm 12/27/22 1447   Post-Procedure Width (cm) 0.4 cm 12/27/22 1447   Post-Procedure Depth (cm) 0.1 cm 12/27/22 1447   Post-Procedure Surface Area (cm^2) 0.12 cm^2 12/27/22 1447   Post-Procedure Volume (cm^3) 0.012 cm^3 12/27/22 1447   Distance Tunneling (cm) 0 cm 11/28/22 1324   Tunneling Position ___ O'Clock 0 11/28/22 1324   Undermining Starts ___ O'Clock 0 11/28/22 1324   Undermining Ends___ O'Clock 0 11/28/22 1324 Undermining Maxium Distance (cm) 0 11/28/22 1324   Wound Assessment Pink/red;Slough 12/27/22 1424   Drainage Amount Small 12/27/22 1424   Drainage Description Serosanguinous 12/27/22 1424   Odor None 12/27/22 1424   Audelia-wound Assessment Intact 12/27/22 1424   Margins Attached edges 12/27/22 1424   Wound Thickness Description not for Pressure Injury Full thickness 12/14/22 1138   Number of days: 35       Wound 12/27/22 Toe (Comment  which one) Anterior;Right #3 Right foot toes 1-3 wag 1 (Active)   Wound Image   12/27/22 1439   Wound Etiology Diabetic Flores 1 12/27/22 1439   Dressing Status Old drainage noted 12/27/22 1439   Wound Cleansed Soap and water 12/27/22 1439   Offloading for Diabetic Foot Ulcers Offloading boot 12/27/22 1439   Wound Length (cm) 1 cm 12/27/22 1439   Wound Width (cm) 1.9 cm 12/27/22 1439   Wound Depth (cm) 0.1 cm 12/27/22 1439   Wound Surface Area (cm^2) 1.9 cm^2 12/27/22 1439   Wound Volume (cm^3) 0.19 cm^3 12/27/22 1439   Post-Procedure Length (cm) 1 cm 12/27/22 1447   Post-Procedure Width (cm) 1.9 cm 12/27/22 1447   Post-Procedure Depth (cm) 0.1 cm 12/27/22 1447   Post-Procedure Surface Area (cm^2) 1.9 cm^2 12/27/22 1447   Post-Procedure Volume (cm^3) 0.19 cm^3 12/27/22 1447   Wound Assessment Pink/red;Slough 12/27/22 1439   Drainage Amount Moderate 12/27/22 1439   Drainage Description Serosanguinous 12/27/22 1439   Odor None 12/27/22 1439   Audelia-wound Assessment Blanchable erythema 12/27/22 1439   Margins Attached edges 12/27/22 1439   Wound Thickness Description not for Pressure Injury Full thickness 12/27/22 1439   Number of days: 0           Procedure Note  Indications:  Based on my examination of this patient's wound(s)/ulcer(s) today, debridement is required to promote healing and evaluate the wound base.     Performed by: EFREN Bustillo CNP    Consent obtained:  Yes    Time out taken:  Yes    Pain Control: Anesthetic  Anesthetic: 2% Lidocaine Gel Topical Debridement:Non-excisional Debridement    Using curette, #15 blade scalpel, and forceps the wound(s)/ulcer(s) was/were sharply debrided down through and including the removal of epidermis and dermis. Devitalized Tissue Debrided:  fibrin, biofilm, slough, and exudate    Pre Debridement Measurements:  Are located in the Wound/Ulcer Documentation Flow Sheet    Wound/Ulcer #: 1, 2, and 3    Post Debridement Measurements:  Wound/Ulcer Descriptions are Pre Debridement except measurements:      Percent of Wound/Ulcer Debrided: 100%    Total Surface Area Debrided:  5.06 sq cm     Estimated Blood Loss:  Minimal    Hemostasis Achieved:  by pressure    Procedural Pain:  0  / 10     Post Procedural Pain:  0 / 10     Response to treatment:  Well tolerated by patient. 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 Ms. Karen Lynn states her toe wounds are heavily draining so I want to change to aqua leda Ag, follow up in 2 weeks.   Electronically signed by EFREN Evans CNP on 12/27/2022 at 3:01 PM

## 2023-01-09 ENCOUNTER — TELEPHONE (OUTPATIENT)
Dept: INTERNAL MEDICINE | Age: 53
End: 2023-01-09

## 2023-01-09 NOTE — TELEPHONE ENCOUNTER
Received a letter from [facility] asking you to addend the last chart note to state \" The patient continues to use and needs the PWC and it needs to be repaired\"   Once completed, will send with the CMN with this note. Thank you. See media for faxed letter          .

## 2023-01-10 ENCOUNTER — HOSPITAL ENCOUNTER (OUTPATIENT)
Dept: WOUND CARE | Age: 53
Discharge: HOME | End: 2023-01-10
Payer: MEDICARE

## 2023-01-10 VITALS
WEIGHT: 132 LBS | TEMPERATURE: 98.7 F | SYSTOLIC BLOOD PRESSURE: 105 MMHG | HEART RATE: 121 BPM | HEIGHT: 69 IN | DIASTOLIC BLOOD PRESSURE: 77 MMHG | RESPIRATION RATE: 18 BRPM | BODY MASS INDEX: 19.55 KG/M2

## 2023-01-10 DIAGNOSIS — L97.512 NEUROPATHIC ULCER OF RIGHT FOOT WITH FAT LAYER EXPOSED (HCC): Primary | ICD-10-CM

## 2023-01-10 DIAGNOSIS — G82.20 PARAPLEGIA (HCC): Chronic | ICD-10-CM

## 2023-01-10 PROCEDURE — 6370000000 HC RX 637 (ALT 250 FOR IP): Performed by: NURSE PRACTITIONER

## 2023-01-10 PROCEDURE — 97597 DBRDMT OPN WND 1ST 20 CM/<: CPT

## 2023-01-10 RX ORDER — LIDOCAINE HYDROCHLORIDE 20 MG/ML
JELLY TOPICAL ONCE
Status: COMPLETED | OUTPATIENT
Start: 2023-01-10 | End: 2023-01-10

## 2023-01-10 RX ORDER — LIDOCAINE HYDROCHLORIDE 20 MG/ML
JELLY TOPICAL ONCE
OUTPATIENT
Start: 2023-01-10 | End: 2023-01-10

## 2023-01-10 RX ORDER — LIDOCAINE 40 MG/G
CREAM TOPICAL ONCE
OUTPATIENT
Start: 2023-01-10 | End: 2023-01-10

## 2023-01-10 RX ORDER — LIDOCAINE HYDROCHLORIDE 40 MG/ML
SOLUTION TOPICAL ONCE
OUTPATIENT
Start: 2023-01-10 | End: 2023-01-10

## 2023-01-10 RX ORDER — LIDOCAINE 50 MG/G
OINTMENT TOPICAL ONCE
OUTPATIENT
Start: 2023-01-10 | End: 2023-01-10

## 2023-01-10 RX ADMIN — LIDOCAINE HYDROCHLORIDE: 20 JELLY TOPICAL at 13:56

## 2023-01-10 NOTE — PLAN OF CARE
Problem: Chronic Conditions and Co-morbidities  Goal: Patient's chronic conditions and co-morbidity symptoms are monitored and maintained or improved  Outcome: Progressing     Problem: Discharge Planning  Goal: Discharge to home or other facility with appropriate resources  Outcome: Progressing     Problem: Safety - Adult  Goal: Free from fall injury  Outcome: Progressing     Problem: Wound:  Goal: Will show signs of wound healing; wound closure and no evidence of infection  Description: Will show signs of wound healing; wound closure and no evidence of infection  Outcome: Progressing     Problem: Pressure Ulcer:  Goal: Signs of wound healing will improve  Description: Signs of wound healing will improve  Outcome: Progressing  Goal: Absence of new pressure ulcer  Description: Absence of new pressure ulcer  Outcome: Progressing  Goal: Will show no infection signs and symptoms  Description: Will show no infection signs and symptoms  Outcome: Progressing     Problem: Smoking cessation:  Goal: Ability to formulate a plan to maintain a tobacco-free life will be supported  Description: Ability to formulate a plan to maintain a tobacco-free life will be supported  Outcome: Progressing     Problem: Weight control:  Goal: Ability to maintain an optimal weight for height and age will be supported  Description: Ability to maintain an optimal weight for height and age will be supported  Outcome: Progressing     Problem: Falls - Risk of:  Goal: Will remain free from falls  Description: Will remain free from falls  Outcome: Progressing     Problem: Blood Glucose:  Goal: Ability to maintain appropriate glucose levels will improve  Description: Ability to maintain appropriate glucose levels will improve  Outcome: Progressing

## 2023-01-10 NOTE — DISCHARGE INSTRUCTIONS
[address] [facility]   Physician Orders and Discharge Instructions  [address]  Telephone: [phone]    NAME:  Latrice Olvera OF BIRTH:  1970  MEDICAL RECORD NUMBER:  [id number]  DATE:  1/10/2023    Discharge condition: Stable    Discharge to: Home    Left via:Private automobile    Accompanied by:  [address] f: [phone] f: [phone] p:  [phone] [email or web address]    Dressing Orders:  Right toes: Wash with soap and water. Apply Aquacel Ag to wounds, cover with dry  gauze, roll gauze and tape to dressing. Right medial Ankle: Wash with soap and water. Xeroform cut to fit open areas, cover with dry gauze, roll gauze and medipore tape,  change daily    Dressing Orders: Right Lateral Ankle:  Wash with soap and water. Promogran to base of wound, Saline moist 2x2 gauze, Cover with dry gauze, Secure with  loose roll gauze or medipore tape, change daily  Wear Heel Lift Boots, No shoes or tight dressing or socks  Protein rich diet (unless restricted by your physician); Multivitamin daily; Elevate legs above the level of your heart when sitting 3-4 times daily for at least one  hour each time,    [facility] follow up visit __________2 weeks________________  (Please note your next appointment above and if you are unable to keep, kindly give a 24 hour notice. Thank you.)    If you experience any of the following, please call the [address] during business hours:    * Increase in Pain  * Temperature over 101  * Increase in drainage from your wound  * Drainage with a foul odor  * Bleeding  * Increase in swelling  * Need for compression bandage changes due to slippage, breakthrough drainage.     If you need medical attention outside of the business hours of the [address] please contact your PCP or go to the nearest emergency room.

## 2023-01-10 NOTE — PROGRESS NOTES
[address]   Progress Note and Procedure Note      Corwin Montes De Oca  MEDICAL RECORD NUMBER:  [id number]  AGE: 47 y.o. GENDER: female  : 1970  EPISODE DATE:  1/10/2023    Subjective:     Chief Complaint   Patient presents with    Wound Check     Patient presents today for recheck right foot wounds. HISTORY of PRESENT ILLNESS HPI     Corwin Montes De Oca is a 47 y.o. female who presents today for wound/ulcer evaluation.    Wound Context: Pt with paraplegia and R foot wounds x4 here for eval/treat  Wound/Ulcer Pain Timing/Severity: none  Quality of pain: N/A  Severity:  0 / 10   Modifying Factors: None  Associated Signs/Symptoms: none    Ulcer Identification:  Ulcer Type: pressure  Contributing Factors: chronic pressure and decreased mobility    Wound:  pressure        PAST MEDICAL HISTORY        Diagnosis Date    Ankle wound     and toe wound    Aptyalism 2017    Arthritis 2020    Asthma     Back pain 2017    Binocular vision disorder with diplopia 2017    CAFL (chronic airflow limitation) ([address] 2017    Hay fever 2017    Headache 2017    MS (multiple sclerosis) (HCC)     Muscle spasticity     Neuropathic ulcer of left foot, limited to breakdown of skin ([address] 4/10/2017    Numbness 2016    Open wound of ankle 2017    Open wound of second toe of left foot 2018    Peripheral vascular disease ([address]     Seizure ([address]     occ. related to ms    Sepsis ([address] 2016    Weakness 2016       PAST SURGICAL HISTORY    Past Surgical History:   Procedure Laterality Date    BACLOFEN PUMP IMPLANTATION      BREAST BIOPSY Right     neg    COLONOSCOPY N/A 2019    Dr Bailey Abrams ileum through ostomy-patent and healthy appearing anastomosis in the right colon-entero colonic anastomosis-10 yr recall    COLOSTOMY      FEMUR FRACTURE SURGERY      Right    FOOT AMPUTATION Left 2019    LEFT TRANSMET AMPUTATION performed by Dionisio Zhu MD at [facility] OR    HYSTERECTOMY (CERVIX STATUS UNKNOWN)      OTHER SURGICAL HISTORY      urostomy    OTHER SURGICAL HISTORY      pain pump    CT INSJ PRPH CTR VAD W/SUBQ PORT UNDER 5 YR N/A 06/26/2018    SINGLE LUMEN PORT PLACEMENT WITH FLUORO performed by Michelle Painting MD at [facility]      TOE AMPUTATION      L last toe    TONSILLECTOMY      URETEROTOMY         FAMILY HISTORY    Family History   Problem Relation Age of Onset    Cancer Mother         breast    Colon Cancer Mother     Colon Polyps Mother     Breast Cancer Mother     Diabetes Father     High Blood Pressure Father     Heart Disease Paternal Grandmother     Breast Cancer Paternal Aunt     Cancer Paternal Aunt         breast    Liver Cancer Paternal Aunt     Esophageal Cancer Neg Hx     Liver Disease Neg Hx     Rectal Cancer Neg Hx     Stomach Cancer Neg Hx        SOCIAL HISTORY    Social History     Tobacco Use    Smoking status: Every Day     Packs/day: 0.50     Years: 15.00     Pack years: 7.50     Types: Cigarettes    Smokeless tobacco: Never    Tobacco comments:     Less than 10 per day   Vaping Use    Vaping Use: Never used   Substance Use Topics    Alcohol use: Yes     Comment: yaritza    Drug use: Yes     Types: Marijuana Bk [place])     Comment: daily        ALLERGIES    Allergies   Allergen Reactions    Darvocet [Propoxyphene N-Acetaminophen]      Talking out of her head    Morphine      Category:  Allergy;     Morphine And Related Itching and Swelling    Propoxyphene Swelling       MEDICATIONS    Current Outpatient Medications on File Prior to Encounter   Medication Sig Dispense Refill    SODIUM CHLORIDE, EXTERNAL, (SALINE WOUND [address]) 0.9 % SOLN Apply 1 L topically 2 times daily 1000 mL 3    tiZANidine (ZANAFLEX) 4 MG tablet TAKE 3 TABLETS TWICE DAILY AS NEEDED 180 tablet 11    nitrofurantoin (MACRODANTIN) 50 MG capsule TAKE 1 CAPSULE BY MOUTH NIGHTLY 90 capsule 1    levETIRAcetam (KEPPRA) 750 MG tablet TAKE 1 TABLET BY MOUTH DAILY 30 tablet 5    pilocarpine (SALAGEN) 7.5 MG tablet TAKE ONE TABLET BY MOUTH 3 TIMES DAILY 90 tablet 3    pregabalin (LYRICA) 300 MG capsule TAKE 1 CAPSULE BY MOUTH TWICE A DAY (Patient taking differently: Indications: MANAGED BY DR. LINK TAKE 1 CAPSULE BY MOUTH TWICE A DAY) 60 capsule 5    ondansetron (ZOFRAN-ODT) 4 MG disintegrating tablet Take 1 tablet by mouth 3 times daily as needed for Nausea or Vomiting 30 tablet 0    hydroCHLOROthiazide (HYDRODIURIL) 25 MG tablet TAKE 1 TABLET BY MOUTH ONCE DAILY AS NEEDED 30 tablet 0    DULoxetine (CYMBALTA) 30 MG extended release capsule Take 1 capsule by mouth nightly 90 capsule 2    fluocinolone acetonide (SYNALAR) 0.01 % external solution Apply 10 drops topically at bedtime      Diapers & Supplies MISC Indications: FX: [id number] Diapers, Chucks, Wipes, and Gloves. 100 each 3    azelastine (ASTELIN) 0.1 % nasal spray USE 2 SPRAYS IN EACH NOSTRIL TWICE DAILY AS DIRECTED 30 mL 0    cetirizine (ZYRTEC) 10 MG tablet Take 10 mg by mouth daily      Catheters MISC Catheter supplies and lubricant 5 times daily G82.20  to [facility] [phone] 450 each 3    docusate sodium (COLACE) 100 MG capsule Take 200 mg by mouth as needed for Constipation      PROAIR  (90 Base) MCG/ACT inhaler INHALE 1 PUFF INTO THE LUNGS EVERY 6 HOURS AS NEEDED FOR WHEEZING OR SHORTNESS OF BREATH (Patient not taking: Reported on 1/10/2023) 8.5 g 5    Heparin Lock Flush (HEPARIN FLUSH, 100 UNITS/ML,) 100 UNIT/ML injection 3 mLs by Intercatheter route every 30 days No every 30 days but every 4-6 weeks.  Flush port with 300 units heparin with 20 cc normal saline for ocrevus infusion for Multiple sclerosis and NS 20CC 1 Syringe 5    ipratropium-albuterol (DUONEB) 0.5-2.5 (3) MG/3ML SOLN nebulizer solution USE 1 UNIT DOSE IN NEBULIZER EVERY 4 TO 6 HOURS AS NEEDED. 450 mL 11    BACLOFEN, PAIN PUMP REFILL CHARGE, by Implant route continuous Indications: every 3 months      Multiple Vitamins-Minerals (THERAPEUTIC MULTIVITAMIN-MINERALS) tablet Take 1 tablet by mouth daily      blood glucose monitor strips Test 1 times a day & as needed for symptoms of irregular blood glucose. 100 strip 0    baclofen (LIORESAL) 10 MG tablet Take 1 tablet by mouth 2 times daily as needed (muscle spasms) (Patient taking differently: Take 10 mg by mouth 2 times daily as needed (muscle spasms) Indications: using pump) 60 tablet 5    Sodium Chloride Flush (SALINE FLUSH) 0.9 % SOLN Infuse 20 mLs intravenously every 30 days Not every 30 days but every 4-6 weeks as need with 300 units of heparin to flush port for Infusion of Ocrevus for multiple sclerosis 20 mL 5     No current facility-administered medications on file prior to encounter.        REVIEW OF SYSTEMS    A comprehensive review of systems was negative except for: Musculoskeletal: positive for paraplegia  Neurological: positive for paraplegia    Objective:      /77   Pulse (!) 121   Temp 98.7 °F (37.1 °C) (Temporal)   Resp 18   Ht 5' 9\" (1.753 m)   Wt 132 lb (59.9 kg)   BMI 19.49 kg/m²     Wt Readings from Last 3 Encounters:   01/10/23 132 lb (59.9 kg)   12/14/22 132 lb (59.9 kg)   11/28/22 132 lb (59.9 kg)       PHYSICAL EXAM    General Appearance: alert and oriented to person, place and time, well developed and well- nourished, in no acute distress  Skin: warm and dry, no rash or erythema  Head: normocephalic and atraumatic  Eyes: pupils equal, round, and reactive to light, extraocular eye movements intact, conjunctivae normal  ENT: tympanic membrane, external ear and ear canal normal bilaterally, nose without deformity, nasal mucosa and turbinates normal without polyps, lips teeth and gums normal  Neck: supple and non-tender without mass, no thyromegaly or thyroid nodules, no cervical lymphadenopathy  Pulmonary/Chest: clear to auscultation bilaterally- no wheezes, rales or rhonchi, normal air movement, no respiratory distress  Cardiovascular: normal rate, regular rhythm, normal S1 and S2, no murmurs, rubs, clicks, or gallops, distal pulses intact, no carotid bruits  Abdomen: soft, non-tender, non-distended, normal bowel sounds, no masses or organomegaly  Extremities: no cyanosis, clubbing or edema      Assessment:      Problem List Items Addressed This Visit       Paraplegia (HCC) (Chronic)    * (Principal) 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 Performed by: Eva Mendoza MD    Consent obtained:  Yes    Time out taken:  Yes    Pain Control: Anesthetic  Anesthetic: 2% Lidocaine Gel Topical       Debridement:Non-excisional Debridement    Using curette the wound(s)/ulcer(s) was/were sharply debrided down through and including the removal of epidermis and dermis.         Devitalized Tissue Debrided:  fibrin, biofilm, slough, necrotic/eschar, and exudate      Pre Debridement Measurements:  Are located in the Wound/Ulcer Documentation Flow Sheet    Wound/Ulcer #: 1, 2, 3, and 4    Percent of Wound(s)/Ulcer(s) Debrided: 100%    Total Surface Area Debrided:  3.5 sq cm       Diabetic/Pressure/Non Pressure Ulcers only:  Ulcer: Pressure ulcer, Stage 3           Post Debridement Measurements:    Wound/Ulcer Descriptions are Pre Debridement --EXCEPT MEASUREMENTS    Wound 09/22/22 Foot Anterior;Right;Dorsal Wound #1 Right dorsal foot traumatic (Active)   Wound Image   01/10/23 1352   Wound Etiology Other 01/10/23 1352   Dressing Status Old drainage noted 01/10/23 1352   Wound Cleansed Soap and water 01/10/23 1352   Dressing/Treatment Moist to moist;Gauze dressing/dressing sponge 12/27/22 1513   Offloading for Diabetic Foot Ulcers Offloading boot 01/10/23 1352   Wound Length (cm) 1.9 cm 01/10/23 1357   Wound Width (cm) 0.3 cm 01/10/23 1357   Wound Depth (cm) 0.1 cm 01/10/23 1357   Wound Surface Area (cm^2) 0.57 cm^2 01/10/23 1357   Change in Wound Size % (l*w) -185 01/10/23 1357   Wound Volume (cm^3) 0.057 cm^3 01/10/23 1357   Wound Healing % -185 01/10/23 1357   Post-Procedure Length (cm) 1.6 cm 12/27/22 1447   Post-Procedure Width (cm) 1.9 cm 12/27/22 1447   Post-Procedure Depth (cm) 0.1 cm 12/27/22 1447   Post-Procedure Surface Area (cm^2) 3.04 cm^2 12/27/22 1447   Post-Procedure Volume (cm^3) 0.304 cm^3 12/27/22 1447   Distance Tunneling (cm) 0 cm 01/10/23 1352   Tunneling Position ___ O'Clock 0 01/10/23 1352   Undermining Starts ___ O'Clock 0 01/10/23 1352   Undermining Ends___ O'Clock 0 01/10/23 1352   Undermining Maxium Distance (cm) 0 01/10/23 1352   Wound Assessment Pink/red;Slough 01/10/23 1352   Drainage Amount Moderate 01/10/23 1352   Drainage Description Serosanguinous 01/10/23 1352   Odor None 01/10/23 1352   Audelia-wound Assessment Intact 01/10/23 1352   Margins Attached edges 01/10/23 1352   Wound Thickness Description not for Pressure Injury Full thickness 01/10/23 1352   Number of days: 110       Wound 11/21/22 Pretibial Distal;Right #2 Right ankle pressure stage 3 (Active)   Wound Image   01/10/23 1352   Wound Etiology Pressure Stage 3 01/10/23 1352   Dressing Status Old drainage noted 01/10/23 1352   Wound Cleansed Soap and water 01/10/23 1352   Dressing/Treatment Xeroform;Gauze dressing/dressing sponge;Roll gauze 12/27/22 1513   Offloading for Diabetic Foot Ulcers Offloading boot 01/10/23 1352   Wound Length (cm) 0.4 cm 01/10/23 1352   Wound Width (cm) 0.5 cm 01/10/23 1352   Wound Depth (cm) 0.1 cm 01/10/23 1352   Wound Surface Area (cm^2) 0.2 cm^2 01/10/23 1352   Change in Wound Size % (l*w) -122.22 01/10/23 1352   Wound Volume (cm^3) 0.02 cm^3 01/10/23 1352   Wound Healing % -122 01/10/23 1352   Post-Procedure Length (cm) 0.4 cm 01/10/23 1357   Post-Procedure Width (cm) 0.5 cm 01/10/23 1357   Post-Procedure Depth (cm) 0.1 cm 01/10/23 1357   Post-Procedure Surface Area (cm^2) 0.2 cm^2 01/10/23 1357   Post-Procedure Volume (cm^3) 0.02 cm^3 01/10/23 1357   Distance Tunneling (cm) 0 cm 01/10/23 1352   Tunneling Position ___ O'Clock 0 01/10/23 1352   Undermining Starts ___ O'Clock 0 01/10/23 1352   Undermining Ends___ O'Clock 0 01/10/23 1352   Undermining Maxium Distance (cm) 0 01/10/23 1352   Wound Assessment Pink/red;Slough 01/10/23 1352   Drainage Amount Small 01/10/23 1352   Drainage Description Serosanguinous 01/10/23 1352   Odor None 01/10/23 1352   Audelia-wound Assessment Intact 01/10/23 1352   Margins Attached edges 01/10/23 1352   Wound Thickness Description not for Pressure Injury Full thickness 01/10/23 1352   Number of days: 49       Wound 12/27/22 Toe (Comment  which one) Anterior;Right #3 Right foot toes 1-3 wag 1 (Active)   Wound Image   01/10/23 1352   Wound Etiology Diabetic Flores 1 01/10/23 1352   Dressing Status Old drainage noted 01/10/23 1352   Wound Cleansed Soap and water 01/10/23 1352   Dressing/Treatment Alginate with Ag;Gauze dressing/dressing sponge;Roll gauze;Tape/Soft cloth adhesive tape 12/27/22 1513   Offloading for Diabetic Foot Ulcers Offloading boot 12/27/22 1439   Wound Length (cm) 0.9 cm 01/10/23 1352   Wound Width (cm) 1.8 cm 01/10/23 1352   Wound Depth (cm) 0.1 cm 01/10/23 1352   Wound Surface Area (cm^2) 1.62 cm^2 01/10/23 1352   Change in Wound Size % (l*w) 14.74 01/10/23 1352   Wound Volume (cm^3) 0.162 cm^3 01/10/23 1352   Wound Healing % 15 01/10/23 1352   Post-Procedure Length (cm) 1 cm 01/10/23 1357   Post-Procedure Width (cm) 1.9 cm 01/10/23 1357   Post-Procedure Depth (cm) 0.1 cm 01/10/23 1357   Post-Procedure Surface Area (cm^2) 1.9 cm^2 01/10/23 1357   Post-Procedure Volume (cm^3) 0.19 cm^3 01/10/23 1357   Distance Tunneling (cm) 0 cm 01/10/23 1352   Tunneling Position ___ O'Clock 0 01/10/23 1352   Undermining Starts ___ O'Clock 0 01/10/23 1352 Undermining Ends___ O'Clock 0 01/10/23 1352   Undermining Maxium Distance (cm) 0 01/10/23 1352   Wound Assessment Pink/red;Slough 01/10/23 1352   Drainage Amount Moderate 01/10/23 1352   Drainage Description Serosanguinous 01/10/23 1352   Odor None 01/10/23 1352   Audelia-wound Assessment Blanchable erythema 01/10/23 1352   Margins Attached edges 01/10/23 1352   Wound Thickness Description not for Pressure Injury Full thickness 01/10/23 1352   Number of days: 13       Wound 01/10/23 Ankle Right;Medial Wound 4, right medial ankle, pressure injury (Active)   Wound Image   01/10/23 1352   Wound Etiology Pressure Stage 3 01/10/23 1352   Dressing Status Old drainage noted 01/10/23 1352   Wound Cleansed Soap and water 01/10/23 1352   Offloading for Diabetic Foot Ulcers Offloading boot 01/10/23 1352   Wound Length (cm) 0.9 cm 01/10/23 1352   Wound Width (cm) 1 cm 01/10/23 1352   Wound Depth (cm) 0.2 cm 01/10/23 1352   Wound Surface Area (cm^2) 0.9 cm^2 01/10/23 1352   Wound Volume (cm^3) 0.18 cm^3 01/10/23 1352   Post-Procedure Length (cm) 0.9 cm 01/10/23 1357   Post-Procedure Width (cm) 1 cm 01/10/23 1357   Post-Procedure Depth (cm) 0.2 cm 01/10/23 1357   Post-Procedure Surface Area (cm^2) 0.9 cm^2 01/10/23 1357   Post-Procedure Volume (cm^3) 0.18 cm^3 01/10/23 1357   Tunneling Position ___ O'Clock 0 01/10/23 1352   Undermining Starts ___ O'Clock 0 01/10/23 1352   Undermining Ends___ O'Clock 0 01/10/23 1352   Undermining Maxium Distance (cm) 0 01/10/23 1352   Wound Assessment Pink/red;Slough 01/10/23 1352   Drainage Amount Moderate 01/10/23 1352   Drainage Description Serosanguinous 01/10/23 1352   Odor None 01/10/23 1352   Audelia-wound Assessment Blanchable erythema; Intact 01/10/23 1352   Margins Attached edges 01/10/23 1352   Wound Thickness Description not for Pressure Injury Full thickness 01/10/23 1352   Number of days: 0             Estimated Blood Loss:  Minimal    Hemostasis Achieved:  by pressure    Procedural Pain: 0  / 10     Post Procedural Pain:  0 / 10     Response to treatment:  Well tolerated by patient. 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 Cont current care RTO 1 week. Pt wants to pursue L TMA and will see Dr. Maryuri Rodriguez in 2 weeks    Treatment Note please see attached Discharge Instructions    In my professional opinion this patient would benefit from HBO Therapy: No    Written patient dismissal instructions given to patient and signed by patient or POA. Discharge [address] and Hyperbaric Oxygen Therapy   Physician Orders and Discharge Instructions  [address] [place] [address] Corine flores   Telephone: [phone]    NAME:  Elise Benavidez OF BIRTH:  1970  MEDICAL RECORD NUMBER:  [id number]  DATE:  1/10/2023    Discharge condition: Stable    Discharge to: Home    Left via:Private automobile    Accompanied by:  [address] f: [phone] f: [phone] p:  [phone] [email or web address]    Dressing Orders:  Right toes: Wash with soap and water. Apply Aquacel Ag to wounds, cover with dry  gauze, roll gauze and tape to dressing. Right medial Ankle: Wash with soap and water. Xeroform cut to fit open areas, cover with dry gauze, roll gauze and medipore tape,  change daily    Dressing Orders: Right Lateral Ankle:  Wash with soap and water.   Promogran to base of wound, Saline moist 2x2 gauze, Cover with dry gauze, Secure with  loose roll gauze or medipore tape, change daily  Wear Heel Lift Boots, No shoes or tight dressing or socks  Protein rich diet (unless restricted by your physician); Multivitamin daily; Elevate legs above the level of your heart when sitting 3-4 times daily for at least one  hour each time,    [facility] follow up visit ____________1 week_________________  (Please note your next appointment above and if you are unable to keep, kindly give a 24 hour notice. Thank you.)    If you experience any of the following, please call the [facility] during business hours:    * Increase in Pain  * Temperature over 101  * Increase in drainage from your wound  * Drainage with a foul odor  * Bleeding  * Increase in swelling  * Need for compression bandage changes due to slippage, breakthrough drainage. If you need medical attention outside of the business hours of the [facility] please contact your PCP or go to the nearest emergency room.        Electronically signed by Elvi Lara MD on 1/10/2023 at 1:59 PM

## 2023-01-12 DIAGNOSIS — M79.621 PAIN IN BOTH UPPER ARMS: ICD-10-CM

## 2023-01-12 DIAGNOSIS — M79.622 PAIN IN BOTH UPPER ARMS: ICD-10-CM

## 2023-01-12 DIAGNOSIS — M62.838 MUSCLE SPASTICITY: ICD-10-CM

## 2023-01-12 DIAGNOSIS — G35 MS (MULTIPLE SCLEROSIS) (HCC): ICD-10-CM

## 2023-01-12 DIAGNOSIS — R53.83 OTHER FATIGUE: ICD-10-CM

## 2023-01-12 DIAGNOSIS — M54.2 PAIN, NECK: ICD-10-CM

## 2023-01-12 RX ORDER — HYDROCODONE BITARTRATE AND ACETAMINOPHEN 10; 325 MG/1; MG/1
TABLET ORAL
Qty: 90 TABLET | Refills: 0 | Status: SHIPPED | OUTPATIENT
Start: 2023-01-12 | End: 2023-02-11

## 2023-01-12 NOTE — WOUND CARE
Wound Care Supplies      Supply Company:     [employer] [address] [place] [address] f: [phone] f: [phone] p: [phone] [email or web address]      Ordering Center:     [facility]  [address] [place] [address] AUGIE HANNA Pascale  [place] [id number] [phone]  WOUND CARE Dept: [phone]   FAX NUMBER [phone]    Patient Information:      Alyce Baker  [address]  [place] [address]   [phone]   : 1970  AGE: 53 y.o.     GENDER: female   EPISODE DATE: 1/10/2023    Insurance:      PRIMARY INSURANCE:  Plan: MEDICARE PART A AND B  Coverage: MEDICARE  Effective Date: 2022  Group Number: [unfilled]  Subscriber Number: [id number] - (Medicare)    Payer/Plan Subscr  Sex Relation Sub. Ins. ID Effective Group Num   1. MEDICARE - ME* ALYCE BAKER 1970 Female Self [id number]                                    [address]    2. MEDICAID [place] -* ALYCE BAKER 1970 Female Self [id number] 1/15/15                                    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)       WOUNDS REQUIRING DRESSING SUPPLIES:     Wound 22 Foot Anterior;Right;Dorsal Wound #1 Right dorsal foot traumatic (Active)   Wound Image   01/10/23 1352   Wound Etiology Other 01/10/23 1352   Dressing Status New dressing applied 01/10/23 1434   Wound Cleansed Soap and water 01/10/23 1352   Dressing/Treatment Xeroform;Gauze dressing/dressing sponge;Roll gauze 01/10/23 1434   Offloading for Diabetic Foot Ulcers Offloading boot 01/10/23 1352   Wound Length (cm) 1.9 cm 01/10/23 1357   Wound Width (cm) 0.3 cm 01/10/23 1357   Wound Depth (cm) 0.1 cm 01/10/23 1357   Wound Surface Area (cm^2) 0.57 cm^2 01/10/23 1357   Change in Wound Size % (l*w) -185 01/10/23 1357  Wound Volume (cm^3) 0.057 cm^3 01/10/23 1357   Wound Healing % -185 01/10/23 1357   Post-Procedure Length (cm) 1.6 cm 12/27/22 1447   Post-Procedure Width (cm) 1.9 cm 12/27/22 1447   Post-Procedure Depth (cm) 0.1 cm 12/27/22 1447   Post-Procedure Surface Area (cm^2) 3.04 cm^2 12/27/22 1447   Post-Procedure Volume (cm^3) 0.304 cm^3 12/27/22 1447   Distance Tunneling (cm) 0 cm 01/10/23 1352   Tunneling Position ___ O'Clock 0 01/10/23 1352   Undermining Starts ___ O'Clock 0 01/10/23 1352   Undermining Ends___ O'Clock 0 01/10/23 1352   Undermining Maxium Distance (cm) 0 01/10/23 1352   Wound Assessment Pink/red;Slough 01/10/23 1352   Drainage Amount Moderate 01/10/23 1352   Drainage Description Serosanguinous 01/10/23 1352   Odor None 01/10/23 1352   Audelia-wound Assessment Intact 01/10/23 1352   Margins Attached edges 01/10/23 1352   Wound Thickness Description not for Pressure Injury Full thickness 01/10/23 1352   Number of days: 111       Wound 11/21/22 Pretibial Distal;Right #2 Right ankle pressure stage 3 (Active)   Wound Image   01/10/23 1352   Wound Etiology Pressure Stage 3 01/10/23 1352   Dressing Status New dressing applied 01/10/23 1434   Wound Cleansed Soap and water 01/10/23 1352   Dressing/Treatment Xeroform;Gauze dressing/dressing sponge;Roll gauze 01/10/23 1434   Offloading for Diabetic Foot Ulcers Offloading boot 01/10/23 1352   Wound Length (cm) 0.4 cm 01/10/23 1352   Wound Width (cm) 0.5 cm 01/10/23 1352   Wound Depth (cm) 0.1 cm 01/10/23 1352   Wound Surface Area (cm^2) 0.2 cm^2 01/10/23 1352   Change in Wound Size % (l*w) -122.22 01/10/23 1352   Wound Volume (cm^3) 0.02 cm^3 01/10/23 1352   Wound Healing % -122 01/10/23 1352   Post-Procedure Length (cm) 0.4 cm 01/10/23 1357   Post-Procedure Width (cm) 0.5 cm 01/10/23 1357   Post-Procedure Depth (cm) 0.1 cm 01/10/23 1357   Post-Procedure Surface Area (cm^2) 0.2 cm^2 01/10/23 1357   Post-Procedure Volume (cm^3) 0.02 cm^3 01/10/23 1357   Distance Tunneling (cm) 0 cm 01/10/23 1352   Tunneling Position ___ O'Clock 0 01/10/23 1352   Undermining Starts ___ O'Clock 0 01/10/23 1352   Undermining Ends___ O'Clock 0 01/10/23 1352   Undermining Maxium Distance (cm) 0 01/10/23 1352   Wound Assessment Pink/red;Slough 01/10/23 1352   Drainage Amount Small 01/10/23 1352   Drainage Description Serosanguinous 01/10/23 1352   Odor None 01/10/23 1352   Audelia-wound Assessment Intact 01/10/23 1352   Margins Attached edges 01/10/23 1352   Wound Thickness Description not for Pressure Injury Full thickness 01/10/23 1352   Number of days: 51       Wound 12/27/22 Toe (Comment  which one) Anterior;Right #3 Right foot toes 1-3 wag 1 (Active)   Wound Image   01/10/23 1352   Wound Etiology Diabetic Flores 1 01/10/23 1352   Dressing Status New dressing applied 01/10/23 1434   Wound Cleansed Soap and water 01/10/23 1352   Dressing/Treatment Alginate with Ag;Gauze dressing/dressing sponge;Roll gauze;Tape/Soft cloth adhesive tape 01/10/23 1434   Offloading for Diabetic Foot Ulcers Offloading boot 12/27/22 1439   Wound Length (cm) 0.9 cm 01/10/23 1352   Wound Width (cm) 1.8 cm 01/10/23 1352   Wound Depth (cm) 0.1 cm 01/10/23 1352   Wound Surface Area (cm^2) 1.62 cm^2 01/10/23 1352   Change in Wound Size % (l*w) 14.74 01/10/23 1352   Wound Volume (cm^3) 0.162 cm^3 01/10/23 1352   Wound Healing % 15 01/10/23 1352   Post-Procedure Length (cm) 1 cm 01/10/23 1357   Post-Procedure Width (cm) 1.9 cm 01/10/23 1357   Post-Procedure Depth (cm) 0.1 cm 01/10/23 1357   Post-Procedure Surface Area (cm^2) 1.9 cm^2 01/10/23 1357   Post-Procedure Volume (cm^3) 0.19 cm^3 01/10/23 1357   Distance Tunneling (cm) 0 cm 01/10/23 1352   Tunneling Position ___ O'Clock 0 01/10/23 1352   Undermining Starts ___ O'Clock 0 01/10/23 1352   Undermining Ends___ O'Clock 0 01/10/23 1352   Undermining Maxium Distance (cm) 0 01/10/23 1352   Wound Assessment Pink/red;Slough 01/10/23 1352   Drainage Amount Moderate 01/10/23 1352 Drainage Description Serosanguinous 01/10/23 1352   Odor None 01/10/23 1352   Audelia-wound Assessment Blanchable erythema 01/10/23 1352   Margins Attached edges 01/10/23 1352   Wound Thickness Description not for Pressure Injury Full thickness 01/10/23 1352   Number of days: 15       Wound 01/10/23 Ankle Right;Medial Wound 4, right medial ankle, pressure injury (Active)   Wound Image   01/10/23 1352   Wound Etiology Pressure Stage 3 01/10/23 1352   Dressing Status New dressing applied 01/10/23 1434   Wound Cleansed Soap and water 01/10/23 1352   Dressing/Treatment Collagen;Gauze dressing/dressing sponge;Moist to moist;Roll gauze 01/10/23 1434   Offloading for Diabetic Foot Ulcers Offloading boot 01/10/23 1352   Wound Length (cm) 0.9 cm 01/10/23 1352   Wound Width (cm) 1 cm 01/10/23 1352   Wound Depth (cm) 0.2 cm 01/10/23 1352   Wound Surface Area (cm^2) 0.9 cm^2 01/10/23 1352   Wound Volume (cm^3) 0.18 cm^3 01/10/23 1352   Post-Procedure Length (cm) 0.9 cm 01/10/23 1357   Post-Procedure Width (cm) 1 cm 01/10/23 1357   Post-Procedure Depth (cm) 0.2 cm 01/10/23 1357   Post-Procedure Surface Area (cm^2) 0.9 cm^2 01/10/23 1357   Post-Procedure Volume (cm^3) 0.18 cm^3 01/10/23 1357   Tunneling Position ___ O'Clock 0 01/10/23 1352   Undermining Starts ___ O'Clock 0 01/10/23 1352   Undermining Ends___ O'Clock 0 01/10/23 1352   Undermining Maxium Distance (cm) 0 01/10/23 1352   Wound Assessment Pink/red;Slough 01/10/23 1352   Drainage Amount Moderate 01/10/23 1352   Drainage Description Serosanguinous 01/10/23 1352   Odor None 01/10/23 1352   Audelia-wound Assessment Blanchable erythema; Intact 01/10/23 1352   Margins Attached edges 01/10/23 1352   Wound Thickness Description not for Pressure Injury Full thickness 01/10/23 1352   Number of days: 1          Supplies Requested :      WOUND #: 3   PRIMARY DRESSING:  Aquacel Ag   2x2, Medipore tape     FREQUENCY OF DRESSING CHANGES:  Daily        WOUND #: 2   PRIMARY DRESSING:  Promogran   4x4, roll gauze, medipore tape     FREQUENCY OF DRESSING CHANGES:  Daily          ADDITIONAL ITEMS:  [] Gloves Small  [x] Gloves Medium [] Gloves Large [] Gloves XLarge  [] Tape 1\" [x] Tape 2\" [] Tape 3\"  [] Medipore Tape  [x] Saline  [] Skin Prep   [] Adhesive Remover   [] Cotton Tip Applicators   [x] Other:Medipore tape    Patient Wound(s) Debrided: [] Yes if yes please add date 1/10/23  [] No    Debribement Type: Excisional/Sharp    Is the patient currently on an antibiotic for their Wound(s): [] Yes if yes please add name and dose [id number] [] No    Patient currently being seen by Home Health: [] Yes   [x] No    Duration for needed supplies:  []15  [x]30  []60  []90 Days    Electronically signed by Radha Jaime RN on 1/10/2023 at 11:30 AM     Provider Information:      PROVIDER'S NAME: Dr Stevie Wang     NPI: [id number]

## 2023-01-12 NOTE — TELEPHONE ENCOUNTER
Requested Prescriptions     Pending Prescriptions Disp Refills    HYDROcodone-acetaminophen ([address])  MG per tablet [Pharmacy Med Name: HYDROCODONE BITARTRATE/ACETAMINOPHEN 325MG-10MG TABLET] 90 tablet 0     Sig: TAKE 1 TABLET BY MOUTH 3 TIMES  A DAY       Last Office Visit:  9/21/2022  Next Office Visit:  1/25/2023  Last Medication Refill:  12/6/2022 with 0 JEFFREY Reese up to date: 1/12/2023     *RX updated to reflect   1/12/2023  fill date*

## 2023-01-13 DIAGNOSIS — R53.83 OTHER FATIGUE: ICD-10-CM

## 2023-01-13 RX ORDER — METHYLPHENIDATE HYDROCHLORIDE 20 MG/1
TABLET ORAL
Qty: 30 TABLET | Refills: 0 | Status: SHIPPED | OUTPATIENT
Start: 2023-01-13 | End: 2023-02-12

## 2023-01-13 NOTE — TELEPHONE ENCOUNTER
Requested Prescriptions     Pending Prescriptions Disp Refills    methylphenidate (RITALIN) 20 MG tablet [Pharmacy Med Name: METHYLPHENIDATE HYDROCHLORIDE 20MG TABLET] 30 tablet 0     Sig: TAKE 1 TABLET BY MOUTH DAILY       Last Office Visit:  9/21/2022  Next Office Visit:  1/25/2023  Last Medication Refill:  12/6/2022 with 0 JEFFREY [place] up to date: 1/12/2023     *RX updated to reflect   1/12/23  fill date*

## 2023-01-25 NOTE — DISCHARGE INSTRUCTIONS
[address] [facility]   Physician Orders and Discharge Instructions  [address]  Telephone: [phone]    NAME:  Malini Manual OF BIRTH:  1970  MEDICAL RECORD NUMBER:  [id number]  DATE:  1/25/2023    Discharge condition: Stable    Discharge to: Home    Left via:Private automobile    Accompanied by:  caregiver    [address] f: [phone] f: [phone] p:  [phone] [email or web address]     Dressing Orders:    New Sacral area ( the one in the middle): Soap and water wash, apply Aquacel Ag (tuck into wound), cover with dry gauze, cover with dry gauze and secure with Medipore tape daily. New Right Buttocks/Ischial area: Soap and water wash, apply Duoderm. Change every 3 days and prn. Make sure your [facility] cushion is not bottoming out when you sit. Limit sitting to 1 hour at a time with frequent weight shifts. Eat plenty of Protein rich foods  Avoid Pressure to wound site. Turn frequently (turn at least every two hours when in bed)      Right toes: Wash with soap and water. Apply Aquacel Ag to wounds, cover with dry  gauze, roll gauze and tape to dressing. Right medial Ankle: Wash with soap and water. Xeroform cut to fit open areas, cover with dry gauze, roll gauze and medipore tape,  change daily       Right Lateral (outer)Ankle:  Wash with soap and water.   Promogran to base of wound, Saline moist 2x2 gauze, Cover with dry gauze, Secure with  loose roll gauze or medipore tape, change daily    Wear Heel Lift Boots, No shoes or tight dressing or socks      Multivitamin daily  Elevate legs above the level of your heart when sitting 3-4 times daily for at least one  hour each time                      Alyce Baker    Surgery Directions    Report to the outpatient registration at [facility] on Monday, 2/6/23 @ 6:00 am. The day before your surgery, you will receive a phone call from the surgery nurse, to let you know what time to arrive on the day of surgery (just in case it changes). This call will usually be between 2-4 PM. If you do not receive a phone call by 4 PM the day before your surgery, please call [phone] and let them know you have not received an arrival time. If your surgery is on Monday, your call will be on the Friday before your Monday surgery. Nothing to eat or drink after midnight the night before the procedure. Please take all medications as normally scheduled to take, including heart and blood pressure medicines with a sip of water. Do not take Lasix, insulin, or any diabetic medicine the morning of the procedure. If you take insulin, you may only take 1/2 of any scheduled nightly dose, and none the morning of the procedure. You may take all regularly scheduled heart, cholesterol, and blood pressure medicines with a sip of water. If you take Glucophage, Metformin, Actos Plus Met, Janumet/Januvia, or Glucovance,please tell our nurse. it may interfere with some of the medications given during surgery. Hold Plavix/Coumadin for . . N/A . Saeid Vanegas days prior to surgery. If you have sleep apnea and require C-PAP, please bring this with you to the hospital.  Bring a list of all of your allergies and medications with you to the hospital.  Please let our nurse know if you have had an allergy to iodine, shellfish, or x-ray dye. Let the nurse know if you take any of the following:  Over the counter herbal supplements  Diclofenec, indomethacin, ketoprofen, Caridopa/levadopa, naproxen, sulindac, piroxicam, glucosamine, Chondrotin, colchicine, or methotrexate. Plan to stay at the hospital for 4 - 6 hours before being released  by the physician. You will need someone to drive you home after the procedure. It is possible you may be admitted after the procedure, but it is considered an outpatient surgery.   Medications instructed to hold: see above  Please stop at your local [employer] or pharmacy and buy a bottle of Hibiclens. Wash thoroughly with this the night before and the morning of the procedure, paying special attention to the area that will be operated on. Make sure you rinse very well. The Hibiclens should only be used prior to surgery. 14. Other Directions: See the pre admission nurse at [address] [place] [address] in [facility] at [facility] by the big marble ball Tuesday, 1/31/23 @ 12:45 pm THIS MUST BE DONE ON THIS DATE AT [facility] TO BE RESCHEDULED! 15.  For  5 days before surgery swab Bactroban/mupurocin ointment/cream into both nostrils (with a q-tip) twice daily. Stop after the 5 days. This has been sent to your pharmacy. PLEASE NOTE:  If the patient is unable to sign his/her own paperwork, the appointed caregiver (POA, child, sibling, etc) must be present at the time of registration for all testing and procedures. Transportation to and from all testing and procedure appointments is the sole responsibility of the patient, caregiver, and/or nursing facility in which they reside. Please remember you will not be able to drive after you are discharged. Please call the office at [phone] with any questions or concerns. Please allow 48-72 hours notice for cancellations or rescheduling. We will attempt to accommodate your needs to the best of our capabilities, however, strict policies with procedure room availability does not allow much flexibility. [address] [facility] follow up visit ____________________________  (Please note your next appointment above and if you are unable to keep, kindly give a 24 hour notice.  Thank you.)          If you experience any of the following, please call the [address] during business hours:    * Increase in Pain  * Temperature over 101  * Increase in drainage from your wound  * Drainage with a foul odor  * Bleeding  * Increase in swelling  * Need for compression bandage changes due to slippage, breakthrough drainage. If you need medical attention outside of the business hours of the [address] [place] [address] Road please contact your PCP or go to the nearest emergency room.

## 2023-01-26 ENCOUNTER — HOSPITAL ENCOUNTER (OUTPATIENT)
Dept: WOUND CARE | Age: 53
Discharge: HOME | End: 2023-01-26
Payer: MEDICARE

## 2023-01-26 VITALS
HEIGHT: 69 IN | BODY MASS INDEX: 19.55 KG/M2 | TEMPERATURE: 98.5 F | HEART RATE: 64 BPM | WEIGHT: 132 LBS | RESPIRATION RATE: 18 BRPM | SYSTOLIC BLOOD PRESSURE: 108 MMHG | DIASTOLIC BLOOD PRESSURE: 72 MMHG

## 2023-01-26 DIAGNOSIS — L97.512 NEUROPATHIC ULCER OF RIGHT FOOT WITH FAT LAYER EXPOSED (HCC): Primary | ICD-10-CM

## 2023-01-26 PROCEDURE — 6370000000 HC RX 637 (ALT 250 FOR IP): Performed by: NURSE PRACTITIONER

## 2023-01-26 PROCEDURE — 99214 OFFICE O/P EST MOD 30 MIN: CPT

## 2023-01-26 RX ORDER — LIDOCAINE 40 MG/G
CREAM TOPICAL ONCE
OUTPATIENT
Start: 2023-01-26 | End: 2023-01-26

## 2023-01-26 RX ORDER — LIDOCAINE HYDROCHLORIDE 40 MG/ML
SOLUTION TOPICAL ONCE
OUTPATIENT
Start: 2023-01-26 | End: 2023-01-26

## 2023-01-26 RX ORDER — LIDOCAINE HYDROCHLORIDE 20 MG/ML
JELLY TOPICAL ONCE
Status: COMPLETED | OUTPATIENT
Start: 2023-01-26 | End: 2023-01-26

## 2023-01-26 RX ORDER — LIDOCAINE 50 MG/G
OINTMENT TOPICAL ONCE
OUTPATIENT
Start: 2023-01-26 | End: 2023-01-26

## 2023-01-26 RX ORDER — LIDOCAINE HYDROCHLORIDE 20 MG/ML
JELLY TOPICAL ONCE
OUTPATIENT
Start: 2023-01-26 | End: 2023-01-26

## 2023-01-26 RX ADMIN — LIDOCAINE HYDROCHLORIDE: 20 JELLY TOPICAL at 13:51

## 2023-01-26 ASSESSMENT — PAIN DESCRIPTION - LOCATION: LOCATION: NECK;HEAD

## 2023-01-26 ASSESSMENT — PAIN SCALES - GENERAL: PAINLEVEL_OUTOF10: 3

## 2023-01-26 ASSESSMENT — PAIN DESCRIPTION - PAIN TYPE: TYPE: CHRONIC PAIN

## 2023-01-26 ASSESSMENT — PAIN DESCRIPTION - FREQUENCY: FREQUENCY: INTERMITTENT

## 2023-01-27 ENCOUNTER — TELEPHONE (OUTPATIENT)
Dept: WOUND CARE | Age: 53
End: 2023-01-27

## 2023-01-27 NOTE — TELEPHONE ENCOUNTER
Pallavi Sloan spoke with [facility] regarding dates and times of surgery and preadmission testing as outlined in letter dated 1/27/23. Patient stated understanding.

## 2023-01-31 ENCOUNTER — HOSPITAL ENCOUNTER (OUTPATIENT)
Dept: PREADMISSION TESTING | Age: 53
Discharge: HOME | End: 2023-01-31

## 2023-02-02 ENCOUNTER — PREP FOR PROCEDURE (OUTPATIENT)
Dept: WOUND CARE | Age: 53
End: 2023-02-02

## 2023-02-02 ENCOUNTER — PREP FOR SURGERY (OUTPATIENT)
Dept: OTHER | Facility: HOSPITAL | Age: 53
End: 2023-02-02
Payer: MEDICARE

## 2023-02-02 ENCOUNTER — OFFICE VISIT (OUTPATIENT)
Dept: NEUROSURGERY | Facility: CLINIC | Age: 53
End: 2023-02-02
Payer: MEDICARE

## 2023-02-02 VITALS — BODY MASS INDEX: 25.18 KG/M2 | WEIGHT: 170 LBS | HEIGHT: 69 IN

## 2023-02-02 DIAGNOSIS — G35 MS (MULTIPLE SCLEROSIS): ICD-10-CM

## 2023-02-02 DIAGNOSIS — E66.3 OVERWEIGHT WITH BODY MASS INDEX (BMI) OF 25 TO 25.9 IN ADULT: ICD-10-CM

## 2023-02-02 DIAGNOSIS — R25.2 SPASTICITY: Primary | ICD-10-CM

## 2023-02-02 DIAGNOSIS — F17.210 LIGHT CIGARETTE SMOKER: ICD-10-CM

## 2023-02-02 DIAGNOSIS — E43 UNSPECIFIED SEVERE PROTEIN-CALORIE MALNUTRITION (HCC): Primary | ICD-10-CM

## 2023-02-02 DIAGNOSIS — Z46.2 END OF BATTERY LIFE OF INTRATHECAL INFUSION PUMP: ICD-10-CM

## 2023-02-02 DIAGNOSIS — L97.512 NEUROPATHIC ULCER OF RIGHT FOOT WITH FAT LAYER EXPOSED (HCC): Chronic | ICD-10-CM

## 2023-02-02 PROCEDURE — 99204 OFFICE O/P NEW MOD 45 MIN: CPT | Performed by: NURSE PRACTITIONER

## 2023-02-02 RX ORDER — PREGABALIN 300 MG/1
1 CAPSULE ORAL DAILY
COMMUNITY
Start: 2023-01-09

## 2023-02-02 RX ORDER — SODIUM CHLORIDE 0.9 % (FLUSH) 0.9 %
5-40 SYRINGE (ML) INJECTION EVERY 12 HOURS SCHEDULED
Status: CANCELLED | OUTPATIENT
Start: 2023-02-02

## 2023-02-02 RX ORDER — DULOXETIN HYDROCHLORIDE 30 MG/1
30 CAPSULE, DELAYED RELEASE ORAL NIGHTLY
COMMUNITY
Start: 2023-01-24

## 2023-02-02 RX ORDER — SODIUM CHLORIDE 9 MG/ML
INJECTION, SOLUTION INTRAVENOUS PRN
Status: CANCELLED | OUTPATIENT
Start: 2023-02-02

## 2023-02-02 RX ORDER — PROMETHAZINE HYDROCHLORIDE 25 MG/1
25 TABLET ORAL EVERY 6 HOURS PRN
COMMUNITY
End: 2023-02-28

## 2023-02-02 RX ORDER — METHYLPHENIDATE HYDROCHLORIDE 20 MG/1
1 TABLET ORAL DAILY
COMMUNITY
Start: 2023-01-13

## 2023-02-02 RX ORDER — MAGNESIUM HYDROXIDE 1200 MG/15ML
1 LIQUID ORAL
COMMUNITY
Start: 2022-12-14

## 2023-02-02 RX ORDER — SODIUM CHLORIDE 0.9 % (FLUSH) 0.9 %
5-40 SYRINGE (ML) INJECTION PRN
Status: CANCELLED | OUTPATIENT
Start: 2023-02-02

## 2023-02-02 RX ORDER — CETIRIZINE HYDROCHLORIDE 10 MG/1
1 TABLET ORAL DAILY
COMMUNITY

## 2023-02-02 RX ORDER — PSEUDOEPHEDRINE HCL 30 MG
200 TABLET ORAL
COMMUNITY

## 2023-02-02 RX ORDER — PILOCARPINE HYDROCHLORIDE 7.5 MG/1
1 TABLET, FILM COATED ORAL 3 TIMES DAILY
COMMUNITY
Start: 2022-09-06

## 2023-02-02 RX ORDER — BUPIVACAINE HCL/0.9 % NACL/PF 0.1 %
2 PLASTIC BAG, INJECTION (ML) EPIDURAL ONCE
Status: CANCELLED | OUTPATIENT
Start: 2023-02-02 | End: 2023-02-02

## 2023-02-02 RX ORDER — HYDROCHLOROTHIAZIDE 25 MG/1
25 TABLET ORAL AS NEEDED
COMMUNITY

## 2023-02-02 RX ORDER — LEVETIRACETAM 750 MG/1
750 TABLET ORAL NIGHTLY
COMMUNITY
Start: 2023-01-24

## 2023-02-02 NOTE — H&P (VIEW-ONLY)
[address]              Progress Note and Procedure Note        Alyce Baker  AGE: 47 y.o. GENDER: female  : 1970  TODAY'S DATE:  2023     Subjective:         HISTORY of PRESENT ILLNESS HPI   Dante Crespo is a 47 y.o. female who presents today for wound evaluation.     Wound Type:neuropathic  Wound Location:right foot  Modifying factors:decreased mobility and shear force          Patient Active Problem List   Diagnosis Code    Muscle spasticity M62.838    Peripheral vascular disease ([facility]) I73.9    Multiple sclerosis ([facility]) G35    Pain in both upper arms M79.621, M79.622    Numbness R20.0    Other fatigue R53.83    Weakness R53.1    Chronic pain G89.29    Hx of seizure disorder Z86.69    Insomnia G47.00    Pressure ulcer of sacral region L89.159    Neck pain M54.2    Seasonal allergic rhinitis J30.2    S/P transmetatarsal amputation of foot, left ([facility]) J62.534    Constipation due to neurogenic bowel K59.00, K59.2    Colostomy present ([facility]) Z93.3    Paraplegia ([facility]) G82.20    Hip pain M25.559    Seizure ([facility]) R56.9    Smoking F17.200    Encounter for care related to vascular access port Z45.2    Urinary incontinence R32    Mixed anxiety and depressive disorder F41.8    Malodorous urine R82.90    Arthritis M19.90    Osteoporosis M81.0    Urinary bladder stone N21.0    Vesicovaginal fistula N82.0    Neuropathic 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 ([facility] [address] L97.512    Dyspnea R06.00    Marijuana user F12.90    Neurogenic bladder N31.9    Other specified misadventures during surgical and medical care Y65.8    Presence of other specified devices Z97.8    Renal stone N20.0    Wheelchair bound Z99.3    Pressure injury of sacral region, stage 3 ([facility]) L89.153    Unspecified severe protein-calorie malnutrition ([facility]) E43    Ankle wound, right, sequela S91.001S    Severe malnutrition ([facility]) E43         ALLERGIES           Allergies   Allergen Reactions    Darvocet [Propoxyphene N-Acetaminophen]         Talking out of her head    Morphine         Category:  Allergy;     Morphine And Related Itching and Swelling    Propoxyphene Swelling            Objective:       /72   Pulse 64   Temp 98.5 °F (36.9 °C) (Temporal)   Resp 18   Ht 5' 9\" (1.753 m)   Wt 132 lb (59.9 kg)   BMI 19.49 kg/m²      Post Debridement Measurements and Assessment:          Wound 11/21/22 Pretibial Distal;Right #2 Right ankle pressure stage 3 (Active)   Wound Image   01/26/23 1412   Wound Etiology Pressure Stage 3 01/26/23 1412   Dressing Status New dressing applied 01/26/23 1529   Wound Cleansed Soap and water 01/26/23 1412   Dressing/Treatment Xeroform;Gauze dressing/dressing sponge;Roll gauze 01/26/23 1529   Offloading for Diabetic Foot Ulcers Offloading boot 01/26/23 1412   Wound Length (cm) 0.4 cm 01/26/23 1412   Wound Width (cm) 0.5 cm 01/26/23 1412   Wound Depth (cm) 0.1 cm 01/26/23 1412   Wound Surface Area (cm^2) 0.2 cm^2 01/26/23 1412   Change in Wound Size % (l*w) -122.22 01/26/23 1412   Wound Volume (cm^3) 0.02 cm^3 01/26/23 1412   Wound Healing % -122 01/26/23 1412   Post-Procedure Length (cm) 0.4 cm 01/10/23 1357   Post-Procedure Width (cm) 0.5 cm 01/10/23 1357   Post-Procedure Depth (cm) 0.1 cm 01/10/23 1357   Post-Procedure Surface Area (cm^2) 0.2 cm^2 01/10/23 1357   Post-Procedure Volume (cm^3) 0.02 cm^3 01/10/23 1357   Distance Tunneling (cm) 0 cm 01/10/23 1352   Tunneling Position ___ O'Clock 0 01/10/23 1352   Undermining Starts ___ O'Clock 0 01/10/23 1352   Undermining Ends___ O'Clock 0 01/10/23 1352   Undermining Maxium Distance (cm) 0 01/10/23 1352   Wound Assessment Eschar dry 01/26/23 1412   Drainage Amount None 01/26/23 1412   Drainage Description Serosanguinous 01/10/23 1352   Odor None 01/26/23 1412   Audelia-wound Assessment Intact 01/26/23 1412   Margins Defined edges 01/26/23 1412   Wound Thickness Description not for Pressure Injury Full thickness 01/26/23 1412   Number of days: 72 Wound 12/27/22 Toe (Comment  which one) Anterior;Right #3 Right foot toes 1-3 wag 1 (Active)   Wound Image   01/26/23 1412   Wound Etiology Diabetic Flores 1 01/26/23 1412   Dressing Status New dressing applied 01/26/23 1529   Wound Cleansed Soap and water 01/26/23 1412   Dressing/Treatment Alginate with Ag;Gauze dressing/dressing sponge;Roll gauze;Tape/Soft cloth adhesive tape 01/26/23 1529   Offloading for Diabetic Foot Ulcers Offloading boot 01/26/23 1412   Wound Length (cm) 2 cm 01/26/23 1412   Wound Width (cm) 2 cm 01/26/23 1412   Wound Depth (cm) 0.1 cm 01/26/23 1412   Wound Surface Area (cm^2) 4 cm^2 01/26/23 1412   Change in Wound Size % (l*w) -110.53 01/26/23 1412   Wound Volume (cm^3) 0.4 cm^3 01/26/23 1412   Wound Healing % -111 01/26/23 1412   Post-Procedure Length (cm) 1 cm 01/10/23 1357   Post-Procedure Width (cm) 1.9 cm 01/10/23 1357   Post-Procedure Depth (cm) 0.1 cm 01/10/23 1357   Post-Procedure Surface Area (cm^2) 1.9 cm^2 01/10/23 1357   Post-Procedure Volume (cm^3) 0.19 cm^3 01/10/23 1357   Distance Tunneling (cm) 0 cm 01/26/23 1412   Tunneling Position ___ O'Clock 0 01/26/23 1412   Undermining Starts ___ O'Clock 0 01/26/23 1412   Undermining Ends___ O'Clock 0 01/26/23 1412   Undermining Maxium Distance (cm) 0 01/26/23 1412   Wound Assessment Eschar moist;Pink/red;Slough 01/26/23 1412   Drainage Amount Moderate 01/26/23 1412   Drainage Description Serosanguinous 01/26/23 1412   Odor None 01/26/23 1412   Audelia-wound Assessment Blanchable erythema 01/26/23 1412   Margins Attached edges 01/26/23 1412   Wound Thickness Description not for Pressure Injury Full thickness 01/26/23 1412   Number of days: 36       Wound 01/10/23 Ankle Right;Medial Wound 4, right medial ankle, pressure injury (Active)   Wound Image   01/26/23 1412   Wound Etiology Pressure Stage 3 01/26/23 1412   Dressing Status New dressing applied 01/26/23 1529   Wound Cleansed Soap and water 01/26/23 1412   Dressing/Treatment Xeroform;Gauze dressing/dressing sponge;Roll gauze;Tape/Soft cloth adhesive tape 01/26/23 1529   Offloading for Diabetic Foot Ulcers Offloading boot 01/26/23 1412   Wound Length (cm) 1.5 cm 01/26/23 1412   Wound Width (cm) 0.9 cm 01/26/23 1412   Wound Depth (cm) 0.1 cm 01/26/23 1412   Wound Surface Area (cm^2) 1.35 cm^2 01/26/23 1412   Change in Wound Size % (l*w) -50 01/26/23 1412   Wound Volume (cm^3) 0.135 cm^3 01/26/23 1412   Wound Healing % 25 01/26/23 1412   Post-Procedure Length (cm) 0.9 cm 01/10/23 1357   Post-Procedure Width (cm) 1 cm 01/10/23 1357   Post-Procedure Depth (cm) 0.2 cm 01/10/23 1357   Post-Procedure Surface Area (cm^2) 0.9 cm^2 01/10/23 1357   Post-Procedure Volume (cm^3) 0.18 cm^3 01/10/23 1357   Distance Tunneling (cm) 0 cm 01/26/23 1412   Tunneling Position ___ O'Clock 0 01/26/23 1412   Undermining Starts ___ O'Clock 0 01/26/23 1412   Undermining Ends___ O'Clock 0 01/26/23 1412   Undermining Maxium Distance (cm) 0 01/26/23 1412   Wound Assessment Pink/red;Slough 01/26/23 1412   Drainage Amount Moderate 01/26/23 1412   Drainage Description Serosanguinous 01/26/23 1412   Odor None 01/26/23 1412   Audelia-wound Assessment Blanchable erythema; Intact 01/26/23 1412   Margins Attached edges 01/26/23 1412   Wound Thickness Description not for Pressure Injury Full thickness 01/26/23 1412   Number of days: 22       Wound 01/26/23 Buttocks Right Wound 6, right buttock, pressure injury (Active)   Wound Image   01/26/23 1412   Wound Etiology Pressure Stage 3 01/26/23 1412   Dressing Status New dressing applied 01/26/23 1529   Wound Cleansed Soap and water 01/26/23 1412   Dressing/Treatment Hydrocolloid 01/26/23 1529   Wound Length (cm) 1.2 cm 01/26/23 1412   Wound Width (cm) 1.5 cm 01/26/23 1412   Wound Depth (cm) 0.1 cm 01/26/23 1412   Wound Surface Area (cm^2) 1.8 cm^2 01/26/23 1412   Wound Volume (cm^3) 0.18 cm^3 01/26/23 1412   Distance Tunneling (cm) 0 cm 01/26/23 1412   Tunneling Position ___ O'Clock 0 01/26/23 1412   Undermining Starts ___ O'Clock 0 01/26/23 1412   Undermining Ends___ O'Clock 0 01/26/23 1412   Undermining Maxium Distance (cm) 0 01/26/23 1412   Wound Assessment Pink/red;Slough 01/26/23 1412   Drainage Amount Moderate 01/26/23 1412   Drainage Description Serosanguinous 01/26/23 1412   Odor None 01/26/23 1412   Audelia-wound Assessment Intact 01/26/23 1412   Margins Attached edges 01/26/23 1412   Wound Thickness Description not for Pressure Injury Full thickness 01/26/23 1412   Number of days: 6       Wound 01/26/23 Sacrum Wound 5, sacrum, pressure injury (Active)   Wound Image   01/26/23 1412   Wound Etiology Pressure Stage 3 01/26/23 1412   Dressing Status New dressing applied 01/26/23 1529   Wound Cleansed Soap and water 01/26/23 1412   Dressing/Treatment Alginate with Ag;Gauze dressing/dressing sponge;Tape/Soft cloth adhesive tape 01/26/23 1529   Wound Length (cm) 1 cm 01/26/23 1412   Wound Width (cm) 0.3 cm 01/26/23 1412   Wound Depth (cm) 0.4 cm 01/26/23 1412   Wound Surface Area (cm^2) 0.3 cm^2 01/26/23 1412   Wound Volume (cm^3) 0.12 cm^3 01/26/23 1412   Distance Tunneling (cm) 0 cm 01/26/23 1412   Tunneling Position ___ O'Clock 0 01/26/23 1412   Undermining Starts ___ O'Clock 0 01/26/23 1412   Undermining Ends___ O'Clock 0 01/26/23 1412   Undermining Maxium Distance (cm) 0 01/26/23 1412   Wound Assessment Pink/red;Slough 01/26/23 1412   Drainage Amount Moderate 01/26/23 1412   Drainage Description Serosanguinous 01/26/23 1412   Odor None 01/26/23 1412   Audelia-wound Assessment Intact 01/26/23 1412   Margins Attached edges 01/26/23 1412   Wound Thickness Description not for Pressure Injury Full thickness 01/26/23 1412   Number of days: 6               The patients pain isPain Level: 3 Pain Type: Chronic pain. Wound(s) is unchanged. Please refer to nursing measurements and assessment regarding wound pre and post debridement.            Assessment:           Patient Active Problem List Diagnosis    Muscle spasticity    Peripheral vascular disease (HCC)    Multiple sclerosis (HCC)    Pain in both upper arms    Numbness    Other fatigue    Weakness    Chronic pain    Hx of seizure disorder    Insomnia    Pressure ulcer of sacral region    Neck pain    Seasonal allergic rhinitis    S/P transmetatarsal amputation of foot, left ([facility])    Constipation due to neurogenic bowel    Colostomy present (HCC)    Paraplegia (HCC)    Hip pain    Seizure ([address]    Smoking    Encounter for care related to vascular access port    Urinary incontinence    Mixed anxiety and depressive disorder    Malodorous urine    Arthritis    Osteoporosis    Urinary bladder stone    Vesicovaginal fistula    Neuropathic 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 ([address]    Dyspnea    Marijuana user    Neurogenic bladder    Other specified misadventures during surgical and medical care    Presence of other specified devices    Renal stone    Wheelchair bound    Pressure injury of sacral region, stage 3 ([address]    Unspecified severe protein-calorie malnutrition ([address]    Ankle wound, right, sequela    Severe malnutrition ([address]            Plan:            Plan for wound - Dress per physician order  Treatment:                Compression : No              Offloading : Yes              Dressing : Alginate AG              Additional Therapy : plan for transmetatarsal amputation of the right foot         1. Discussed appropriate home care of this wound. Wound redressed. 2. Patient instructions were given. 3. Follow up: 2 week(s). Patient was discussed risks, benefits and alternatives of the surgery. She agreed to procedure.                              Electronically signed by Berle Romberg, DO on 2/1/2023 at 3:45 PM

## 2023-02-02 NOTE — H&P
[address]              Progress Note and Procedure Note        Alyce Baker  AGE: 47 y.o. GENDER: female  : 1970  TODAY'S DATE:  2023     Subjective:         HISTORY of PRESENT ILLNESS HPI   Wili Manning is a 47 y.o. female who presents today for wound evaluation.     Wound Type:neuropathic  Wound Location:right foot  Modifying factors:decreased mobility and shear force          Patient Active Problem List   Diagnosis Code    Muscle spasticity M62.838    Peripheral vascular disease ([facility]) I73.9    Multiple sclerosis ([facility]) G35    Pain in both upper arms M79.621, M79.622    Numbness R20.0    Other fatigue R53.83    Weakness R53.1    Chronic pain G89.29    Hx of seizure disorder Z86.69    Insomnia G47.00    Pressure ulcer of sacral region L89.159    Neck pain M54.2    Seasonal allergic rhinitis J30.2    S/P transmetatarsal amputation of foot, left ([facility]) Q21.002    Constipation due to neurogenic bowel K59.00, K59.2    Colostomy present ([facility]) Z93.3    Paraplegia ([facility]) G82.20    Hip pain M25.559    Seizure ([facility]) R56.9    Smoking F17.200    Encounter for care related to vascular access port Z45.2    Urinary incontinence R32    Mixed anxiety and depressive disorder F41.8    Malodorous urine R82.90    Arthritis M19.90    Osteoporosis M81.0    Urinary bladder stone N21.0    Vesicovaginal fistula N82.0    Neuropathic 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 ([facility] [address] L97.512    Dyspnea R06.00    Marijuana user F12.90    Neurogenic bladder N31.9    Other specified misadventures during surgical and medical care Y65.8    Presence of other specified devices Z97.8    Renal stone N20.0    Wheelchair bound Z99.3    Pressure injury of sacral region, stage 3 ([facility]) L89.153    Unspecified severe protein-calorie malnutrition ([facility]) E43    Ankle wound, right, sequela S91.001S    Severe malnutrition ([facility]) E43         ALLERGIES           Allergies   Allergen Reactions    Darvocet [Propoxyphene Negative

## 2023-02-03 ENCOUNTER — TELEPHONE (OUTPATIENT)
Dept: NEUROSURGERY | Facility: CLINIC | Age: 53
End: 2023-02-03
Payer: MEDICARE

## 2023-02-03 ENCOUNTER — HOSPITAL ENCOUNTER (OUTPATIENT)
Dept: PREADMISSION TESTING | Age: 53
Discharge: HOME | End: 2023-02-07
Payer: MEDICARE

## 2023-02-03 VITALS — WEIGHT: 132 LBS | HEIGHT: 69 IN | BODY MASS INDEX: 19.55 KG/M2

## 2023-02-03 LAB
ANION GAP SERPL CALCULATED.3IONS-SCNC: 6 MMOL/L (ref 7–19)
BASOPHILS ABSOLUTE: 0 K/UL (ref 0–0.2)
BASOPHILS RELATIVE PERCENT: 0.7 % (ref 0–1)
BUN BLDV-MCNC: 14 MG/DL (ref 6–20)
CALCIUM SERPL-MCNC: 8.5 MG/DL (ref 8.6–10)
CHLORIDE BLD-SCNC: 107 MMOL/L (ref 98–111)
CO2: 26 MMOL/L (ref 22–29)
CREAT SERPL-MCNC: 0.3 MG/DL (ref 0.5–0.9)
EOSINOPHILS ABSOLUTE: 0.1 K/UL (ref 0–0.6)
EOSINOPHILS RELATIVE PERCENT: 1.5 % (ref 0–5)
GFR SERPL CREATININE-BSD FRML MDRD: >60 ML/MIN/{1.73_M2}
GLUCOSE BLD-MCNC: 86 MG/DL (ref 74–109)
HCT VFR BLD CALC: 38.8 % (ref 37–47)
HEMOGLOBIN: 12 G/DL (ref 12–16)
IMMATURE GRANULOCYTES #: 0 K/UL
LYMPHOCYTES ABSOLUTE: 0.9 K/UL (ref 1.1–4.5)
LYMPHOCYTES RELATIVE PERCENT: 20.8 % (ref 20–40)
MCH RBC QN AUTO: 27.7 PG (ref 27–31)
MCHC RBC AUTO-ENTMCNC: 30.9 G/DL (ref 33–37)
MCV RBC AUTO: 89.6 FL (ref 81–99)
MONOCYTES ABSOLUTE: 0.2 K/UL (ref 0–0.9)
MONOCYTES RELATIVE PERCENT: 4.2 % (ref 0–10)
NEUTROPHILS ABSOLUTE: 3.3 K/UL (ref 1.5–7.5)
NEUTROPHILS RELATIVE PERCENT: 72.6 % (ref 50–65)
PDW BLD-RTO: 13.8 % (ref 11.5–14.5)
PLATELET # BLD: 194 K/UL (ref 130–400)
PMV BLD AUTO: 11.7 FL (ref 9.4–12.3)
POTASSIUM SERPL-SCNC: 4.2 MMOL/L (ref 3.5–5)
RBC # BLD: 4.33 M/UL (ref 4.2–5.4)
SODIUM BLD-SCNC: 139 MMOL/L (ref 136–145)
WBC # BLD: 4.5 K/UL (ref 4.8–10.8)

## 2023-02-03 PROCEDURE — 93005 ELECTROCARDIOGRAM TRACING: CPT | Performed by: ANESTHESIOLOGY

## 2023-02-03 PROCEDURE — 85025 COMPLETE CBC W/AUTO DIFF WBC: CPT

## 2023-02-03 PROCEDURE — 80048 BASIC METABOLIC PNL TOTAL CA: CPT

## 2023-02-03 RX ORDER — METHYLPHENIDATE HYDROCHLORIDE 20 MG/1
10 TABLET ORAL DAILY PRN
COMMUNITY
End: 2023-02-07

## 2023-02-03 RX ORDER — HYDROCHLOROTHIAZIDE 25 MG/1
25 TABLET ORAL DAILY PRN
COMMUNITY

## 2023-02-03 RX ORDER — PREGABALIN 300 MG/1
300 CAPSULE ORAL NIGHTLY
COMMUNITY

## 2023-02-03 RX ORDER — IPRATROPIUM BROMIDE AND ALBUTEROL SULFATE 2.5; .5 MG/3ML; MG/3ML
1 SOLUTION RESPIRATORY (INHALATION) 2 TIMES DAILY PRN
COMMUNITY

## 2023-02-03 RX ORDER — HYDROCODONE BITARTRATE AND ACETAMINOPHEN 10; 325 MG/1; MG/1
1 TABLET ORAL 3 TIMES DAILY PRN
COMMUNITY
End: 2023-02-07

## 2023-02-03 RX ORDER — AZELASTINE 1 MG/ML
1 SPRAY, METERED NASAL 2 TIMES DAILY PRN
COMMUNITY

## 2023-02-03 RX ORDER — PILOCARPINE HYDROCHLORIDE 7.5 MG/1
7.5 TABLET, FILM COATED ORAL 2 TIMES DAILY
COMMUNITY

## 2023-02-03 NOTE — DISCHARGE INSTRUCTIONS
PREOPERATIVE GUIDELINES WHEN RECEIVING ANESTHESIA    Do not eat or drink anything after midnight, the night before your surgery. This is extremely important for your safety. Take a bath (or shower) the night before your surgery and you may brush your teeth the morning of your surgery. You will be scheduled to arrive at the hospital 2 hours before your surgery, or follow your surgeon's instructions. Dress comfortably. Wear loose clothing that will be easy to remove and comfortable for your trip home. You may wear eyeglasses or contacts but bring your cases with you as they must be remove before your surgery. Hearing aids and dentures will need to be removed before your surgery. Do not wear any jewelry, including body jewelry. All jewelry will need to be removed prior to your surgery. Do not wear fingernail polish or make-up. It is best not to bring any valuables with you. If you are to stay in the hospital overnight, bring your robe, slippers and personal toiletries that you may need. POSTOPERATIVE GUIDELINES AFTER RECEIVING ANESTHESIA    If you are to go home after your surgery, you will need a responsible adult to drive you home. You will not be able to take public transportation after your discharge from the Operative Care Unit unless you are accompanied by a        responsible adult. On returning home, be sure to follow your physician's orders regarding diet, activity and medications. Remember, surgery with general anesthesia or sedation may leave you sleepy, very tired and with a decreased appetite for 12 to 24 hours. If you develop any post-surgical complications or problems, call your surgeon or [facility] Emergency Department ([phone]). The day before surgery you will receive a phone call from the surgery nurse to let you know what time to arrive on the day of surgery.  This call will usually be between 2-4 PM. If you do not receive a phone call by 4 PM the day before your surgery please call [phone] and let them know you have not received an arrival time. If your surgery is on Monday, your call will be on the Friday before your Monday surgery. MEDICATION INSTRUCTIONS PRIOR TO YOUR SURGERY    Night before surgery:      ________Do not take Metformin (you will not be eating or drinking after midnight)      ________Take half dose of your evening insulin      The morning of surgery: You can take all your usual prescribed medications with a small sip of water. DO NOT TAKE ANY DIABETIC MEDICATIONS the morning of your surgery. DO NOT TAKE ANY SUPPLEMENTS or over the counter medications the morning of  surgery. [address] [place] [address] for Surgery Patients-Revised 6-    Visitors for surgery patients are essential for the patient's emotional well-being and care       post operatively. 2.   Visitor Expectations and Limitations         3. One visitor allowed with patients in the preop/postop rooms. 4.  A second visitor may sit in the waiting area. 5.  No children under 13 allowed in the pre-post op areas unless they are the patient. 6.  Two people may be with an underage surgical/procedural patient in preop/postop        room. 7.  If you are admitted to the hospital post operatively, there are NO RESTRICTIONS on       the floor at this time. 8.  If you are admitted to ICU postoperatively, you may have one visitor in the room from        7A-7P. A second visitor may sit in the ICU waiting room. No overnight visitors in         ICU waiting room.

## 2023-02-05 LAB
EKG P AXIS: 61 DEGREES
EKG P-R INTERVAL: 158 MS
EKG Q-T INTERVAL: 454 MS
EKG QRS DURATION: 98 MS
EKG QTC CALCULATION (BAZETT): 454 MS
EKG T AXIS: 28 DEGREES

## 2023-02-06 ENCOUNTER — ANESTHESIA EVENT (OUTPATIENT)
Dept: OPERATING ROOM | Age: 53
End: 2023-02-06
Payer: MEDICARE

## 2023-02-06 ENCOUNTER — ANESTHESIA (OUTPATIENT)
Dept: OPERATING ROOM | Age: 53
End: 2023-02-06
Payer: MEDICARE

## 2023-02-06 ENCOUNTER — HOSPITAL ENCOUNTER (OUTPATIENT)
Age: 53
Discharge: HOME | End: 2023-02-06
Attending: SURGERY | Admitting: SURGERY
Payer: MEDICARE

## 2023-02-06 VITALS
HEIGHT: 69 IN | TEMPERATURE: 97.5 F | HEART RATE: 57 BPM | WEIGHT: 132 LBS | OXYGEN SATURATION: 99 % | BODY MASS INDEX: 19.55 KG/M2 | DIASTOLIC BLOOD PRESSURE: 66 MMHG | RESPIRATION RATE: 18 BRPM | SYSTOLIC BLOOD PRESSURE: 118 MMHG

## 2023-02-06 DIAGNOSIS — L97.512 NEUROPATHIC ULCER OF TOE, RIGHT, WITH FAT LAYER EXPOSED (HCC): ICD-10-CM

## 2023-02-06 PROCEDURE — 7100000000 HC PACU RECOVERY - FIRST 15 MIN: Performed by: SURGERY

## 2023-02-06 PROCEDURE — 3600000003 HC SURGERY LEVEL 3 BASE: Performed by: SURGERY

## 2023-02-06 PROCEDURE — 2580000003 HC RX 258: Performed by: SURGERY

## 2023-02-06 PROCEDURE — 2580000003 HC RX 258

## 2023-02-06 PROCEDURE — 3700000000 HC ANESTHESIA ATTENDED CARE: Performed by: SURGERY

## 2023-02-06 PROCEDURE — 3600000013 HC SURGERY LEVEL 3 ADDTL 15MIN: Performed by: SURGERY

## 2023-02-06 PROCEDURE — 6360000002 HC RX W HCPCS: Performed by: NURSE PRACTITIONER

## 2023-02-06 PROCEDURE — 2500000003 HC RX 250 WO HCPCS

## 2023-02-06 PROCEDURE — 28805 AMPUTATION THRU METATARSAL: CPT | Performed by: SURGERY

## 2023-02-06 PROCEDURE — 6360000002 HC RX W HCPCS: Performed by: SURGERY

## 2023-02-06 PROCEDURE — 7100000001 HC PACU RECOVERY - ADDTL 15 MIN: Performed by: SURGERY

## 2023-02-06 PROCEDURE — 6360000002 HC RX W HCPCS

## 2023-02-06 PROCEDURE — 88305 TISSUE EXAM BY PATHOLOGIST: CPT

## 2023-02-06 PROCEDURE — 2709999900 HC NON-CHARGEABLE SUPPLY: Performed by: SURGERY

## 2023-02-06 PROCEDURE — 88311 DECALCIFY TISSUE: CPT

## 2023-02-06 PROCEDURE — 3700000001 HC ADD 15 MINUTES (ANESTHESIA): Performed by: SURGERY

## 2023-02-06 RX ORDER — SODIUM CHLORIDE 0.9 % (FLUSH) 0.9 %
5-40 SYRINGE (ML) INJECTION PRN
Status: DISCONTINUED | OUTPATIENT
Start: 2023-02-06 | End: 2023-02-06 | Stop reason: HOSPADM

## 2023-02-06 RX ORDER — SODIUM CHLORIDE 0.9 % (FLUSH) 0.9 %
5-40 SYRINGE (ML) INJECTION EVERY 12 HOURS SCHEDULED
Status: DISCONTINUED | OUTPATIENT
Start: 2023-02-06 | End: 2023-02-06 | Stop reason: HOSPADM

## 2023-02-06 RX ORDER — CETIRIZINE HYDROCHLORIDE 10 MG/1
10 TABLET ORAL DAILY
Status: DISCONTINUED | OUTPATIENT
Start: 2023-02-07 | End: 2023-02-06 | Stop reason: HOSPADM

## 2023-02-06 RX ORDER — METOCLOPRAMIDE HYDROCHLORIDE 5 MG/ML
10 INJECTION INTRAMUSCULAR; INTRAVENOUS
Status: DISCONTINUED | OUTPATIENT
Start: 2023-02-06 | End: 2023-02-06 | Stop reason: HOSPADM

## 2023-02-06 RX ORDER — SODIUM CHLORIDE 9 MG/ML
INJECTION, SOLUTION INTRAVENOUS PRN
Status: DISCONTINUED | OUTPATIENT
Start: 2023-02-06 | End: 2023-02-06 | Stop reason: HOSPADM

## 2023-02-06 RX ORDER — ONDANSETRON 4 MG/1
4 TABLET, ORALLY DISINTEGRATING ORAL 3 TIMES DAILY PRN
Status: DISCONTINUED | OUTPATIENT
Start: 2023-02-06 | End: 2023-02-06 | Stop reason: HOSPADM

## 2023-02-06 RX ORDER — BACLOFEN 10 MG/1
10 TABLET ORAL 2 TIMES DAILY PRN
Status: DISCONTINUED | OUTPATIENT
Start: 2023-02-06 | End: 2023-02-06 | Stop reason: HOSPADM

## 2023-02-06 RX ORDER — IPRATROPIUM BROMIDE AND ALBUTEROL SULFATE 2.5; .5 MG/3ML; MG/3ML
1 SOLUTION RESPIRATORY (INHALATION) EVERY 4 HOURS PRN
Status: DISCONTINUED | OUTPATIENT
Start: 2023-02-06 | End: 2023-02-06 | Stop reason: HOSPADM

## 2023-02-06 RX ORDER — LEVETIRACETAM 500 MG/1
750 TABLET ORAL NIGHTLY
Status: DISCONTINUED | OUTPATIENT
Start: 2023-02-06 | End: 2023-02-06 | Stop reason: HOSPADM

## 2023-02-06 RX ORDER — DOCUSATE SODIUM 100 MG/1
200 CAPSULE, LIQUID FILLED ORAL DAILY PRN
Status: DISCONTINUED | OUTPATIENT
Start: 2023-02-06 | End: 2023-02-06 | Stop reason: HOSPADM

## 2023-02-06 RX ORDER — ENOXAPARIN SODIUM 100 MG/ML
40 INJECTION SUBCUTANEOUS DAILY
Status: DISCONTINUED | OUTPATIENT
Start: 2023-02-07 | End: 2023-02-06 | Stop reason: HOSPADM

## 2023-02-06 RX ORDER — HYDROCODONE BITARTRATE AND ACETAMINOPHEN 10; 325 MG/1; MG/1
1 TABLET ORAL 3 TIMES DAILY PRN
Status: DISCONTINUED | OUTPATIENT
Start: 2023-02-06 | End: 2023-02-06 | Stop reason: HOSPADM

## 2023-02-06 RX ORDER — PREGABALIN 150 MG/1
300 CAPSULE ORAL NIGHTLY
Status: DISCONTINUED | OUTPATIENT
Start: 2023-02-06 | End: 2023-02-06 | Stop reason: HOSPADM

## 2023-02-06 RX ORDER — DULOXETIN HYDROCHLORIDE 30 MG/1
30 CAPSULE, DELAYED RELEASE ORAL NIGHTLY
Status: DISCONTINUED | OUTPATIENT
Start: 2023-02-06 | End: 2023-02-06 | Stop reason: HOSPADM

## 2023-02-06 RX ORDER — HYDROMORPHONE HYDROCHLORIDE 1 MG/ML
0.5 INJECTION, SOLUTION INTRAMUSCULAR; INTRAVENOUS; SUBCUTANEOUS EVERY 5 MIN PRN
Status: DISCONTINUED | OUTPATIENT
Start: 2023-02-06 | End: 2023-02-06 | Stop reason: HOSPADM

## 2023-02-06 RX ORDER — HYDROMORPHONE HYDROCHLORIDE 1 MG/ML
0.25 INJECTION, SOLUTION INTRAMUSCULAR; INTRAVENOUS; SUBCUTANEOUS EVERY 5 MIN PRN
Status: DISCONTINUED | OUTPATIENT
Start: 2023-02-06 | End: 2023-02-06 | Stop reason: HOSPADM

## 2023-02-06 RX ORDER — ONDANSETRON 2 MG/ML
INJECTION INTRAMUSCULAR; INTRAVENOUS PRN
Status: DISCONTINUED | OUTPATIENT
Start: 2023-02-06 | End: 2023-02-06 | Stop reason: SDUPTHER

## 2023-02-06 RX ORDER — EPHEDRINE SULFATE 50 MG/ML
INJECTION, SOLUTION INTRAVENOUS PRN
Status: DISCONTINUED | OUTPATIENT
Start: 2023-02-06 | End: 2023-02-06 | Stop reason: SDUPTHER

## 2023-02-06 RX ORDER — TIZANIDINE 4 MG/1
12 TABLET ORAL 2 TIMES DAILY
Status: DISCONTINUED | OUTPATIENT
Start: 2023-02-06 | End: 2023-02-06 | Stop reason: HOSPADM

## 2023-02-06 RX ORDER — PROPOFOL 10 MG/ML
INJECTION, EMULSION INTRAVENOUS PRN
Status: DISCONTINUED | OUTPATIENT
Start: 2023-02-06 | End: 2023-02-06 | Stop reason: SDUPTHER

## 2023-02-06 RX ORDER — LIDOCAINE HYDROCHLORIDE 10 MG/ML
INJECTION, SOLUTION EPIDURAL; INFILTRATION; INTRACAUDAL; PERINEURAL PRN
Status: DISCONTINUED | OUTPATIENT
Start: 2023-02-06 | End: 2023-02-06 | Stop reason: SDUPTHER

## 2023-02-06 RX ORDER — SODIUM CHLORIDE, SODIUM LACTATE, POTASSIUM CHLORIDE, CALCIUM CHLORIDE 600; 310; 30; 20 MG/100ML; MG/100ML; MG/100ML; MG/100ML
INJECTION, SOLUTION INTRAVENOUS CONTINUOUS PRN
Status: DISCONTINUED | OUTPATIENT
Start: 2023-02-06 | End: 2023-02-06 | Stop reason: SDUPTHER

## 2023-02-06 RX ORDER — ALBUTEROL SULFATE 90 UG/1
1 AEROSOL, METERED RESPIRATORY (INHALATION) EVERY 6 HOURS PRN
Status: DISCONTINUED | OUTPATIENT
Start: 2023-02-06 | End: 2023-02-06 | Stop reason: HOSPADM

## 2023-02-06 RX ORDER — DIPHENHYDRAMINE HYDROCHLORIDE 50 MG/ML
12.5 INJECTION INTRAMUSCULAR; INTRAVENOUS
Status: DISCONTINUED | OUTPATIENT
Start: 2023-02-06 | End: 2023-02-06 | Stop reason: HOSPADM

## 2023-02-06 RX ORDER — PILOCARPINE HYDROCHLORIDE 5 MG/1
7.5 TABLET, FILM COATED ORAL 2 TIMES DAILY
Status: DISCONTINUED | OUTPATIENT
Start: 2023-02-06 | End: 2023-02-06 | Stop reason: HOSPADM

## 2023-02-06 RX ADMIN — Medication 1000 MG: at 07:33

## 2023-02-06 RX ADMIN — LIDOCAINE HYDROCHLORIDE 50 MG: 10 INJECTION, SOLUTION EPIDURAL; INFILTRATION; INTRACAUDAL; PERINEURAL at 08:03

## 2023-02-06 RX ADMIN — PROPOFOL 120 MG: 10 INJECTION, EMULSION INTRAVENOUS at 08:03

## 2023-02-06 RX ADMIN — SODIUM CHLORIDE, SODIUM LACTATE, POTASSIUM CHLORIDE, AND CALCIUM CHLORIDE: 600; 310; 30; 20 INJECTION, SOLUTION INTRAVENOUS at 07:50

## 2023-02-06 RX ADMIN — HEPARIN 300 UNITS: 100 SYRINGE at 15:49

## 2023-02-06 RX ADMIN — Medication 1000 MG: at 08:05

## 2023-02-06 RX ADMIN — EPHEDRINE SULFATE 10 MG: 50 INJECTION INTRAMUSCULAR; INTRAVENOUS; SUBCUTANEOUS at 08:37

## 2023-02-06 RX ADMIN — ONDANSETRON 4 MG: 2 INJECTION INTRAMUSCULAR; INTRAVENOUS at 08:46

## 2023-02-06 RX ADMIN — SODIUM CHLORIDE, PRESERVATIVE FREE 10 ML: 5 INJECTION INTRAVENOUS at 15:48

## 2023-02-06 RX ADMIN — EPHEDRINE SULFATE 10 MG: 50 INJECTION INTRAMUSCULAR; INTRAVENOUS; SUBCUTANEOUS at 08:46

## 2023-02-06 RX ADMIN — EPHEDRINE SULFATE 10 MG: 50 INJECTION INTRAMUSCULAR; INTRAVENOUS; SUBCUTANEOUS at 08:41

## 2023-02-06 ASSESSMENT — PAIN - FUNCTIONAL ASSESSMENT: PAIN_FUNCTIONAL_ASSESSMENT: NONE - DENIES PAIN

## 2023-02-06 ASSESSMENT — LIFESTYLE VARIABLES: SMOKING_STATUS: 1

## 2023-02-06 NOTE — DISCHARGE INSTRUCTIONS
POST OP TRANSMETATARSAL (FOREFOOT)  AMPUTATION:          Elevate your leg/ foot as often as possible, this helps decrease the swelling. Gently wash incision line DAILY with antibacterial soap and water, pat dry. Cut thin strip of Adaptic (non-stick layer) and place over incision line. Cover with dry gauze 4X4's, wrap with gauze roll. If you have a safe option for a shower it is okay to shower. You are to be totally NO WEIGHT BEARING TO YOUR OPERATIVE FOOT. Generally the staples and sutures stay in your incision 3-4 weeks, you will have a follow up appointment to be seen in the [facility]. If you have excessive bleeding, drainage with odor, redness or new tenderness at your incision site, call the [facility]. If you have a temperature over 101, fever and chills, call [facility].

## 2023-02-06 NOTE — PROGRESS NOTES
Comprehensive Nutrition Assessment    Type and Reason for Visit:  Initial, Positive Nutrition Screen    Nutrition Recommendations/Plan:   ONS TID     Malnutrition Assessment:  Malnutrition Status: At risk for malnutrition (Comment) (02/06/23 1433)    Context:  Acute Illness     Findings of the 6 clinical characteristics of malnutrition:  Energy Intake:  No significant decrease in energy intake  Weight Loss:  No significant weight loss     Body Fat Loss:  Mild body fat loss Orbital, Buccal region   Muscle Mass Loss:  Mild muscle mass loss Temples (temporalis), Clavicles (pectoralis & deltoids), Hand (interosseous)  Fluid Accumulation:  No significant fluid accumulation     Strength:  Not Performed    Nutrition Assessment:    Seen for positive nutrition screen. Pt reflecting a PO intake of %. Electrolytes WNL. Will start ONS TID to help with wound healing and weight gain. Nutrition Related Findings:      Wound Type: Multiple, Pressure Injury, Diabetic Ulcer       Current Nutrition Intake & Therapies:    Average Meal Intake: %  ADULT DIET; Regular  ADULT ORAL NUTRITION SUPPLEMENT; Breakfast, Lunch, Dinner; Standard High Calorie/High Protein Oral Supplement    Anthropometric Measures:  Height: 5' 9\" (175.3 cm)  Ideal Body Weight (IBW): 145 lbs (66 kg)    Current Body Weight: 132 lb (59.9 kg), 91 % IBW. Weight Source: Not Specified  Current BMI (kg/m2): 19.5  Weight Adjustment For: No Adjustment  BMI Categories: Normal Weight (BMI 18.5-24. 9)    Estimated Daily Nutrient Needs:  Energy Requirements Based On: Kcal/kg  Weight Used for Energy Requirements: Current  Energy (kcal/day): 2308-6722  Weight Used for Protein Requirements: Current  Protein (g/day):   Method Used for Fluid Requirements: 1 ml/kcal  Fluid (ml/day): 3403-4276    Nutrition Diagnosis:   Increased nutrient needs related to acute injury/trauma, other (comment) as evidenced by BMI, wounds    Nutrition Interventions:   Food and/or Nutrient Delivery: Continue Current Diet, Start Oral Nutrition Supplement    Goals:  Goals: Meet at least 75% of estimated needs, PO intake 75% or greater    Nutrition Monitoring and Evaluation:   Food/Nutrient Intake Outcomes: Diet Advancement/Tolerance, Food and Nutrient Intake, Supplement Intake  Physical Signs/Symptoms Outcomes: Biochemical Data, Fluid Status or Edema, Weight, Nutrition Focused Physical Findings, Skin    Virlinda Payal, MS, RD, LD  Contact: [phone]

## 2023-02-06 NOTE — PLAN OF CARE
at bedside to evaluate patient. Right foot dressing remains CDI, no complaints of pain or discomfort from patient. Patient asking for dc home today with her caregivers. Per , patient may dc home in 1 hour if no bleeding or new complaints voiced. Yamila Ashraf

## 2023-02-06 NOTE — PLAN OF CARE
Problem: Discharge Planning  Goal: Discharge to home or other facility with appropriate resources  2/6/2023 1522 by Shayna Neves RN  Outcome: Completed  2/6/2023 1248 by Shayna Neves RN  Outcome: Progressing  Flowsheets (Taken 2/6/2023 1058)  Discharge to home or other facility with appropriate resources: Identify barriers to discharge with patient and caregiver     Problem: Pain  Goal: Verbalizes/displays adequate comfort level or baseline comfort level  2/6/2023 1522 by Shayna Neves RN  Outcome: Completed  2/6/2023 1248 by Shayna Neves RN  Outcome: Progressing     Problem: Chronic Conditions and Co-morbidities  Goal: Patient's chronic conditions and co-morbidity symptoms are monitored and maintained or improved  2/6/2023 1522 by Shayna Neves RN  Outcome: Completed  2/6/2023 1248 by Shayna Neves RN  Outcome: Progressing  Flowsheets (Taken 2/6/2023 1058)  Care Plan - Patient's Chronic Conditions and Co-Morbidity Symptoms are Monitored and Maintained or Improved: Monitor and assess patient's chronic conditions and comorbid symptoms for stability, deterioration, or improvement     Problem: Skin/Tissue Integrity  Goal: Absence of new skin breakdown  Description: 1. Monitor for areas of redness and/or skin breakdown  2. Assess vascular access sites hourly  3. Every 4-6 hours minimum:  Change oxygen saturation probe site  4. Every 4-6 hours:  If on nasal continuous positive airway pressure, respiratory therapy assess nares and determine need for appliance change or resting period.   2/6/2023 1522 by Shayna Neves RN  Outcome: Completed  2/6/2023 1248 by Shayna Neves RN  Outcome: Progressing     Problem: Safety - Adult  Goal: Free from fall injury  2/6/2023 1522 by Shayna Neves RN  Outcome: Completed  2/6/2023 1248 by Shayna Neves RN  Outcome: Progressing     Problem: ABCDS Injury Assessment  Goal: Absence of physical injury  2/6/2023 1522 by Shayna Neves RN  Outcome: Completed  2/6/2023 1248 by Marcellus Stringer, RN  Outcome: Progressing     Problem: Nutrition Deficit:  Goal: Optimize nutritional status  Outcome: Completed

## 2023-02-06 NOTE — ANESTHESIA PRE PROCEDURE
Department of Anesthesiology  Preprocedure Note       Name:  David Franklin   Age:  47 y.o.  :  1970                                          MRN:  [id number]         Date:  2023      Surgeon: Diana Appiah):  Gerson Tierney DO    Procedure: RIGHT TRANSMETATARSAL AMPUTATION (Right)    Medications prior to admission:   Prior to Admission medications    Medication Sig Start Date End Date Taking? Authorizing Provider   azelastine (ASTELIN) 0.1 % nasal spray 1 spray by Nasal route 2 times daily as needed for Rhinitis Use in each nostril as directed    Historical Provider, MD   hydroCHLOROthiazide (HYDRODIURIL) 25 MG tablet Take 25 mg by mouth daily as needed    Historical Provider, MD   HYDROcodone-acetaminophen (NORCO)  MG per tablet Take 1 tablet by mouth 3 times daily as needed for Pain. Historical Provider, MD   ipratropium-albuterol (DUONEB) 0.5-2.5 (3) MG/3ML SOLN nebulizer solution Inhale 1 vial into the lungs 2 times daily as needed for Shortness of Breath    Historical Provider, MD   methylphenidate (RITALIN) 20 MG tablet Take 10 mg by mouth daily as needed. Historical Provider, MD   pilocarpine (SALAGEN) 7.5 MG tablet Take 7.5 mg by mouth 2 times daily    Historical Provider, MD   pregabalin (LYRICA) 300 MG capsule Take 300 mg by mouth nightly. Historical Provider, MD   mupirocin (BACTROBAN) 2 % ointment For  5 days before surgery swab ointment/cream into both nostrils (with a q-tip) twice daily. Stop after the 5 days.  23   Mary Gomez DO   SODIUM CHLORIDE, EXTERNAL, (SALINE WOUND [address]) 0.9 % SOLN Apply 1 L topically 2 times daily 22   Aye Steel MD   tiZANidine (ZANAFLEX) 4 MG tablet TAKE 3 TABLETS TWICE DAILY AS NEEDED  Patient taking differently: Take 12 mg by mouth 2 times daily 22   Santhosh Alfredo MD   nitrofurantoin (MACRODANTIN) 50 MG capsule TAKE 1 CAPSULE BY MOUTH NIGHTLY 10/19/22   Santhosh Alfredo MD   levETIRAcetam (KEPPRA) 750 MG tablet TAKE 1 TABLET BY MOUTH DAILY 9/9/22   Chaya Little MD   ondansetron (ZOFRAN-ODT) 4 MG disintegrating tablet Take 1 tablet by mouth 3 times daily as needed for Nausea or Vomiting 7/19/22   Vernadine Jennifer, MD   DULoxetine (CYMBALTA) 30 MG extended release capsule Take 1 capsule by mouth nightly 6/6/22   Vernadine Jennifer, MD   fluocinolone acetonide (SYNALAR) 0.01 % external solution Apply 10 drops topically nightly as needed 2/18/22   Historical Provider, MD   Diapers & Supplies MISC Indications: FX: [phone] [address] [facility], [address] [facility], [address] [facility], and [place]. 1/11/22   Vernadine MD Jennifer   cetirizine (ZYRTEC) 10 MG tablet Take 10 mg by mouth daily    Historical Provider, MD   Catheters MISC Catheter supplies and lubricant 5 times daily G82.20  to [address] [facility] [phone] 8/25/20   Vernadine Jennifer, MD   docusate sodium (COLACE) 100 MG capsule Take 200 mg by mouth as needed for Constipation    Historical Provider, MD   PROAIR  (90 Base) MCG/ACT inhaler INHALE 1 PUFF INTO THE LUNGS EVERY 6 HOURS AS NEEDED FOR WHEEZING OR SHORTNESS OF BREATH 12/2/19   Vernadine Jennifer, MD   Heparin Lock Flush (HEPARIN FLUSH, 100 UNITS/ML,) 100 UNIT/ML injection 3 mLs by Intercatheter route every 30 days No every 30 days but every 4-6 weeks. Flush port with 300 units heparin with 20 cc normal saline for ocrevus infusion for Multiple sclerosis and NS 20CC 8/28/19   Chaya Little MD   BACLOFEN, PAIN PUMP REFILL CHARGE, by Implant route continuous Indications: every 3 months    Historical Provider, MD   Multiple Vitamins-Minerals (THERAPEUTIC MULTIVITAMIN-MINERALS) tablet Take 1 tablet by mouth daily    Historical Provider, MD   blood glucose monitor strips Test 1 times a day & as needed for symptoms of irregular blood glucose.   Patient not taking: Reported on 2/6/2023 11/15/18   Deepnadine MD Jennifer   baclofen (LIORESAL) 10 MG tablet Take 1 tablet by mouth 2 times daily as needed (muscle spasms)  Patient not taking: Reported on 2/6/2023 9/14/18   Stephen Silva MD   Sodium Chloride Flush (SALINE FLUSH) 0.9 % SOLN Infuse 20 mLs intravenously every 30 days Not every 30 days but every 4-6 weeks as need with 300 units of heparin to flush port for Infusion of Ocrevus for multiple sclerosis 8/13/18   Stephen Silva MD       Current medications:    No current facility-administered medications for this visit. No current outpatient medications on file. Facility-Administered Medications Ordered in Other Visits   Medication Dose Route Frequency Provider Last Rate Last Admin    sodium chloride flush 0.9 % injection 5-40 mL  5-40 mL IntraVENous 2 times per day EFREN Houston - CNP        sodium chloride flush 0.9 % injection 5-40 mL  5-40 mL IntraVENous PRN EFREN Houston - CNP        0.9 % sodium chloride infusion   IntraVENous PRN EFREN Houston - CNP        vancomycin (VANCOCIN) 1000 mg in sodium chloride 0.9% 250 mL IVPB  15 mg/kg IntraVENous On Call to [facility], APRN - CNP           Allergies: Allergies   Allergen Reactions    Darvocet [Propoxyphene N-Acetaminophen]      Talking out of her head    Morphine      Category:  Allergy;     Morphine And Related Itching and Swelling    Propoxyphene Swelling       Problem List:    Patient Active Problem List   Diagnosis Code    Muscle spasticity M62.838    Peripheral vascular disease ([facility]) I73.9    Multiple sclerosis ([facility]) G35    Pain in both upper arms M79.621, M79.622    Numbness R20.0    Other fatigue R53.83    Weakness R53.1    Chronic pain G89.29    Hx of seizure disorder Z86.69    Insomnia G47.00    Pressure ulcer of sacral region L89.159    Neck pain M54.2    Seasonal allergic rhinitis J30.2    S/P transmetatarsal amputation of foot, left ([facility]) I19.685    Constipation due to neurogenic bowel K59.00, K59.2    Colostomy present ([facility]) Z93.3    Paraplegia ([facility]) G82.20    Hip pain M25.559    Seizure (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.0    Neuropathic 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 ([address] L97.512    Dyspnea R06.00    Marijuana user F12.90    Neurogenic bladder N31.9    Other specified misadventures during surgical and medical care Y65.8    Presence of other specified devices Z97.8    Renal stone N20.0    Wheelchair bound Z99.3    Pressure injury of sacral region, stage 3 ([facility]) L89.153    Unspecified severe protein-calorie malnutrition ([address] E43    Ankle wound, right, sequela S91.001S    Severe malnutrition ([facility]) E43       Past Medical History:        Diagnosis Date    Ankle wound     and toe wound    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    Arthritis 5/4/2020    Asthma     Back pain 6/28/2017    Binocular vision disorder with diplopia 6/28/2017    CAFL (chronic airflow limitation) ([facility]) 6/28/2017    Hay fever 6/28/2017    Headache 6/28/2017    MS (multiple sclerosis) ([facility])     Muscle spasticity     Neuropathic ulcer of left foot, limited to breakdown of skin ([address] 4/10/2017    Numbness 8/4/2016    Open wound of ankle 6/28/2017    Open wound of second toe of left foot 12/11/2018    Peripheral vascular disease ([facility])     Seizure ([facility])     occ. related to ms    Sepsis ([address] 8/19/2016    Weakness 8/4/2016       Past Surgical History:        Procedure Laterality Date    BACLOFEN PUMP IMPLANTATION      BREAST BIOPSY Right     neg    COLONOSCOPY N/A 09/27/2019    Dr Coleman Paris ileum through ostomy-patent and healthy appearing anastomosis in the right colon-entero colonic anastomosis-10 yr recall    COLOSTOMY      [address]      Right    FOOT AMPUTATION Left 04/04/2019    LEFT TRANSMET AMPUTATION performed by Esteban Cuevas MD at [address] [place] [address] HYSTERECTOMY (CERVIX STATUS UNKNOWN)      OTHER SURGICAL HISTORY      urostomy    OTHER SURGICAL HISTORY      pain pump    PA INSJ PRPH CTR VAD W/SUBQ PORT UNDER 5 YR N/A 06/26/2018    SINGLE LUMEN PORT PLACEMENT WITH FLUORO performed by Seferino Kirby MD at [employer] [facility] TOE AMPUTATION      L last toe    TONSILLECTOMY      URETEROTOMY         Social History:    Social History     Tobacco Use    Smoking status: Former     Packs/day: 0.50     Years: 15.00     Pack years: 7.50     Types: Cigarettes    Smokeless tobacco: Never    Tobacco comments:     Less than 10 per day   Substance Use Topics    Alcohol use: Yes     Comment: [email or web address]                                Counseling given: Not Answered  Tobacco comments: Less than 10 per day      Vital Signs (Current): There were no vitals filed for this visit.                                            BP Readings from Last 3 Encounters:   02/06/23 132/78   01/26/23 108/72   01/10/23 105/77       NPO Status:                                                                                 BMI:   Wt Readings from Last 3 Encounters:   02/06/23 132 lb (59.9 kg)   02/03/23 132 lb (59.9 kg)   01/26/23 132 lb (59.9 kg)     There is no height or weight on file to calculate BMI.    CBC:   Lab Results   Component Value Date/Time    WBC 4.5 02/03/2023 02:24 PM    RBC 4.33 02/03/2023 02:24 PM    HGB 12.0 02/03/2023 02:24 PM    HCT 38.8 02/03/2023 02:24 PM    HCT 45.0 09/26/2011 03:12 PM    MCV 89.6 02/03/2023 02:24 PM    RDW 13.8 02/03/2023 02:24 PM     02/03/2023 02:24 PM     09/26/2011 03:12 PM       CMP:   Lab Results   Component Value Date/Time     02/03/2023 02:24 PM     09/26/2011 03:12 PM    K 4.2 02/03/2023 02:24 PM    K 4.1 11/08/2022 01:34 PM    K 3.8 09/26/2011 03:12 PM     02/03/2023 02:24 PM     09/26/2011 03:12 PM    CO2 26 02/03/2023 02:24 PM    BUN 14 02/03/2023 02:24 PM    CREATININE 0.3 02/03/2023 02:24 PM    CREATININE 0.4 09/26/2011 03:12 PM    GFRAA >59 12/07/2021 01:30 PM    LABGLOM >60 02/03/2023 02:24 PM    GLUCOSE 86 02/03/2023 02:24 PM    PROT 4.8 11/07/2022 06:40 AM    PROT 6.5 01/09/2013 01:11 PM    CALCIUM 8.5 02/03/2023 02:24 PM    BILITOT <0.2 11/07/2022 06:40 AM    ALKPHOS 96 11/07/2022 06:40 AM    ALKPHOS 88 09/26/2011 03:12 PM    AST 23 11/07/2022 06:40 AM    ALT 24 11/07/2022 06:40 AM       POC Tests: No results for input(s): POCGLU, POCNA, POCK, POCCL, POCBUN, POCHEMO, POCHCT in the last 72 hours. Coags:   Lab Results   Component Value Date/Time    PROTIME 11.52 01/25/2011 01:05 PM    INR 1.07 01/25/2011 01:05 PM       HCG (If Applicable): No results found for: PREGTESTUR, PREGSERUM, HCG, HCGQUANT     ABGs:   Lab Results   Component Value Date/Time    PHART 7.460 08/27/2016 08:00 AM    PO2ART 128.0 08/27/2016 08:00 AM    GJH1KTE 26.0 08/27/2016 08:00 AM    KOL8BAZ 18.5 08/27/2016 08:00 AM    BEART -3.8 08/27/2016 08:00 AM    T5YOGEEI 96.6 08/26/2016 03:54 PM        Type & Screen (If Applicable):  No results found for: LABABO, [address]    Anesthesia Evaluation  Patient summary reviewed and Nursing notes reviewed no history of anesthetic complications:   Airway: Mallampati: II  TM distance: >3 FB   Neck ROM: full  Mouth opening: < 3 FB   Dental: normal exam         Pulmonary:normal exam    (+) COPD:  asthma: current smoker          Patient smoked on day of surgery. ROS comment: Smoking 2018   Cardiovascular:          ECG reviewed               Beta Blocker:  Not on Beta Blocker         Neuro/Psych:   (+) seizures:, neuromuscular disease: multiple sclerosis,              ROS comment: Chronic narc use/chronic pain  Wheelchair bound due to MS    Last seizure 3 months ago GI/Hepatic/Renal:   (+) renal disease: kidney stones,      (-) GERD and liver disease      ROS comment: etoh and marijuana use  Colectomy  Urostomy .    Endo/Other:    (+) DiabetesType II DM, , .                 Abdominal:             Vascular: negative vascular ROS. Other Findings:             Anesthesia Plan      general     ASA 4       Induction: intravenous. Anesthetic plan and risks discussed with patient.                         Addison Maier MD   2/6/2023

## 2023-02-06 NOTE — PROGRESS NOTES
4 Eyes Skin Assessment    Alyce Baker is being assessed upon: Admission    I agree that Crystal Linton, RN, along with Ibis Paul, RN (either 2 RN's or 1 LPN and 1 RN) have performed a thorough Head to Toe Skin Assessment on the patient. ALL assessment sites listed below have been assessed. Areas assessed by both nurses:     [x]   Head, Face, and Ears   [x]   Shoulders, Back, and Chest  [x]   Arms, Elbows, and Hands   [x]   Coccyx, Sacrum, and Ischium  [x]   Legs, Feet, and Heels    Does the Patient have Skin Breakdown? Yes, wound(s) noted upon assessment. It is the responsibility of the Primary Nurse to assure that the following documentation, preventions, orders, and consults are complete on the above noted wound(s): Wound LDA initiated. LDA Flowsheet Documentation includes the [place]-wound, Wound Assessment, Measurements, Dressing Treatment, Drainage, and Color. Sacrum and Right Buttock. Pictures documented in chart.     Nilesh Prevention initiated: Yes  Wound Care Orders initiated: Yes    [address]  nurse consulted for Pressure Injury (Stage 3,4, Unstageable, DTI, NWPT, and Complex wounds) and New or Established Ostomies: Yes        Primary Nurse eSignature: Lakia Shaikh RN on 2/6/2023 at 11:33 AM      Co-Signer eSignature: Electronically signed by Daniel Marley RN on 2/6/23 at 3:57 PM CST

## 2023-02-06 NOTE — PLAN OF CARE
Problem: Discharge Planning  Goal: Discharge to home or other facility with appropriate resources  Outcome: Progressing  Flowsheets (Taken 2/6/2023 1058)  Discharge to home or other facility with appropriate resources: Identify barriers to discharge with patient and caregiver     Problem: Pain  Goal: Verbalizes/displays adequate comfort level or baseline comfort level  Outcome: Progressing     Problem: Chronic Conditions and Co-morbidities  Goal: Patient's chronic conditions and co-morbidity symptoms are monitored and maintained or improved  Outcome: Progressing  Flowsheets (Taken 2/6/2023 1057)  Care Plan - Patient's Chronic Conditions and Co-Morbidity Symptoms are Monitored and Maintained or Improved: Monitor and assess patient's chronic conditions and comorbid symptoms for stability, deterioration, or improvement     Problem: Skin/Tissue Integrity  Goal: Absence of new skin breakdown  Description: 1. Monitor for areas of redness and/or skin breakdown  2. Assess vascular access sites hourly  3. Every 4-6 hours minimum:  Change oxygen saturation probe site  4. Every 4-6 hours:  If on nasal continuous positive airway pressure, respiratory therapy assess nares and determine need for appliance change or resting period.   Outcome: Progressing     Problem: Safety - Adult  Goal: Free from fall injury  Outcome: Progressing     Problem: ABCDS Injury Assessment  Goal: Absence of physical injury  Outcome: Progressing

## 2023-02-06 NOTE — INTERVAL H&P NOTE
Update History & Physical    The patient's History and Physical of February 2, 2023 was reviewed with the patient and I examined the patient. There was no change. The surgical site was confirmed by the patient and me. Plan: The risks, benefits, expected outcome, and alternative to the recommended procedure have been discussed with the patient. Patient understands and wants to proceed with the procedure.      Electronically signed by Maru Toledo DO on 2/6/2023 at 7:44 AM

## 2023-02-06 NOTE — PROGRESS NOTES
Sowmya Williamson arrived to room # 326. Presented with: Post-Op Right Transmet  Mental Status: Patient is oriented, alert, coherent, logical, thought processes intact, and able to concentrate and follow conversation. Vitals:    02/06/23 1058   BP: 127/63   Pulse: 63   Resp: 14   Temp: 97.2 °F (36.2 °C)   SpO2: 98%     Patient safety contract and falls prevention contract reviewed with patient Yes. Oriented Patient and Family to room. Call light within reach. Yes.   Needs, issues or concerns expressed at this time: no.      Electronically signed by Shin Wood RN on 2/6/2023 at 11:00 AM

## 2023-02-06 NOTE — OP NOTE
Operative Note      Patient: Darin Andrea  YOB: 1970  MRN: [id number]    Date of Procedure: 2/6/2023    Pre-Op Diagnosis: Neuropathic ulcer of toe, right, with fat layer exposed ([facility] [L97.512]    Post-Op Diagnosis: Same       Procedure(s):  RIGHT TRANSMETATARSAL AMPUTATION    Surgeon(s):  Erum Escalera DO    Assistant:   First Assistant: Jazlyn Burns    Anesthesia: General    Estimated Blood Loss (mL): less than 285     Complications: None    Specimens:   ID Type Source Tests Collected by Time Destination   A : right transmet [address] [place] Supriya Gomez DO 2/6/2023 0900        Implants:  * No implants in log *      Drains:   Colostomy RLQ (Active)       Urostomy RLQ (Active)       Findings:  Blood flow in the level of amputation. Metatarsal bones are intact, however cortical structure is thin    Detailed Description of Procedure:   Patient was brought to the operating room and placed supine position. Her right foot was prepped and draped in a sterile fashion. Timeout performed. Using 15 blade the transmetatarsal amputation was carried to the base of the toes in the fishmouth fashion on the dorsal and plantar side. It was carried to the metatarsophalangeal joints. The toes were removed as 1 specimen. Using periosteal elevator metatarsal bones were cleaned and transected using electric saw. It was noted that the cortex of the bone with scant thin. Patient is nonambulatory with MS. With good pulsatile bleeding and healthy tissues at the end of the amputation. Ligaments were pulled and cut. Hemostasis was achieved with electrocautery and 4-0 Prolene stitches. Stump was irrigated using saline and checked for hemostasis. It was closed using PDS 3 oh stitch and skin was closed with 3-0 nylon mattress stitches. Sterile dressing was applied with Xeroform to the stitch site, fluffs Kerlix roll and Ace wrap.   Patient tolerated procedure well and was transferred to PACU in stable condition.     Electronically signed by Karly Perez DO on 2/6/2023 at 9:41 AM

## 2023-02-06 NOTE — ANESTHESIA POSTPROCEDURE EVALUATION
Department of Anesthesiology  Postprocedure Note    Patient: jAay Rodriguez  MRN: [id number]  YOB: 1970  Date of evaluation: 2/6/2023      Procedure Summary     Date: 02/06/23 Room / Location: [address]    Anesthesia Start: 4282 Anesthesia Stop: 0240    Procedure: RIGHT TRANSMETATARSAL AMPUTATION (Right) Diagnosis:       Neuropathic ulcer of toe, right, with fat layer exposed ([address]      (Neuropathic ulcer of toe, right, with fat layer exposed ([address] [S22.998])    Surgeons: Nate Ruffin DO Responsible Provider: EFREN Allison CRNA    Anesthesia Type: general ASA Status: 4          Anesthesia Type: No value filed.     Adriana Phase I: Adriana Score: 9    Adriana Phase II:        Anesthesia Post Evaluation    Patient location during evaluation: PACU  Patient participation: complete - patient participated  Level of consciousness: sleepy but conscious  Pain score: 0  Airway patency: patent  Nausea & Vomiting: no nausea and no vomiting  Complications: no  Cardiovascular status: hemodynamically stable and blood pressure returned to baseline  Respiratory status: acceptable and room air  Hydration status: stable  Comments: BP (!) 112/99   Pulse 62   Temp 97 °F (36.1 °C)   Resp 13   Ht 5' 9\" (1.753 m)   Wt 132 lb (59.9 kg)   SpO2 98%   BMI 19.49 kg/m²

## 2023-02-06 NOTE — PROGRESS NOTES
Received phone call from [address] [place], [place] [address]e S stating patient was to be discharged. Called Patricia to clarify, MD states she wants patient admitted. Called 3rd floor nurses station to let them know.

## 2023-02-07 DIAGNOSIS — M54.2 PAIN, NECK: ICD-10-CM

## 2023-02-07 DIAGNOSIS — R53.83 OTHER FATIGUE: Primary | ICD-10-CM

## 2023-02-07 RX ORDER — HYDROCODONE BITARTRATE AND ACETAMINOPHEN 10; 325 MG/1; MG/1
TABLET ORAL
Qty: 90 TABLET | Refills: 0 | Status: SHIPPED | OUTPATIENT
Start: 2023-02-11 | End: 2023-03-13

## 2023-02-07 RX ORDER — METHYLPHENIDATE HYDROCHLORIDE 20 MG/1
TABLET ORAL
Qty: 30 TABLET | Refills: 0 | Status: SHIPPED | OUTPATIENT
Start: 2023-02-12 | End: 2023-03-14

## 2023-02-07 NOTE — TELEPHONE ENCOUNTER
Requested Prescriptions     Pending Prescriptions Disp Refills    HYDROcodone-acetaminophen ([place] [facility])  MG per tablet [Pharmacy Med Name: HYDROCODONE BITARTRATE/ACETAMINOPHEN 325MG-10MG TABLET] 90 tablet 0     Sig: TAKE ONE TABLET BY MOUTH THREE TIMES DAILY.  REDUCE DOSES TAKEN AS PAIN BECOMES MANAGEABLE    methylphenidate (RITALIN) 20 MG tablet [Pharmacy Med Name: METHYLPHENIDATE HYDROCHLORIDE 20MG TABLET] 30 tablet 0     Sig: TAKE 1 TABLET BY MOUTH DAILY       Last Office Visit:  9/21/2022  Next Office Visit:  2/21/2023  Last Medication Refill:  1/13/2023 with 0 RF on Ritalin and Norco 1/12/2023  Willistine Gonzalez up to date:  1/12/2023     *RX updated to reflect   2/12/2023 on Ritalin and 2/11/2023 on Norco   fill date*

## 2023-02-13 NOTE — DISCHARGE INSTRUCTIONS
[facility]   Physician Orders and Discharge Instructions  [address] [place] [address]  [address]  [place] [address]  Telephone: [phone]      FAX [phone]    NAME:  Alyce Baker  YOB: 1970  MEDICAL RECORD NUMBER:  [id number]  DATE:  2/13/2023    Discharge condition: Stable    Discharge to: Home    Left via:Private automobile    Accompanied by:  caregiver    ECF/HHA: [facility] [address] [place] [id number] f: [phone] f: [phone] p:[phone] [email or web address]    Dressing Orders:  Incision line Right Foot:wash with soap and water. Apply Adaptic  over the incision line, cover with dry gauze, secure with Kerlix and medipore tape. Change dressing daily.     Right ankle:Wash with soap and water, Apply a double layer of Xeroform (the yellow sticky dressing) over the wound bed daily, cover with 4x4 gauze, roll gauze and medipore tape.  Apply 6 inch ace wrap from incision line to bend of knee, do not wrap tight.      New Sacral area ( the one in the middle): Soap and water wash, apply Aquacel Ag (tuck into wound), cover with dry gauze, cover with dry gauze and secure with Medipore tape daily.     New Right Buttocks/Ischial area:Soap and water wash, apply Duoderm.  Change every 3 days and prn. (Patient stated out of duoderm)     Make sure your ROHO cushion is not bottoming out when you sit.  Limit sitting to 1 hour at a time with frequent weight shifts.  Eat plenty of Protein rich foods  Avoid Pressure to wound site.  Turn frequently (turn at least every two hours when in bed)     Treatment Orders:  Protein rich diet (unless restricted by your physician); Multivitamin daily; Elevate legs above the level of your heart when sitting 3-4 times daily for at least one hour each time, avoid standing for long periods of time.    Wear Heel Lift Boots, No shoes or tight dressing or socks        [facility] follow up visit  ______________________2 weeks with Dr. Gomez____________________________________  (Please note your next appointment above and if you are unable to keep, kindly give a 24 hour notice. Thank you.)          If you experience any of the following, please call the [facility] during business hours:    * Increase in Pain  * Temperature over 101  * Increase in drainage from your wound  * Drainage with a foul odor  * Bleeding  * Increase in swelling  * Need for compression bandage changes due to slippage, breakthrough drainage. If you need medical attention outside of the business hours of the [facility] please contact your PCP or go to the nearest emergency room.

## 2023-02-16 ENCOUNTER — HOSPITAL ENCOUNTER (OUTPATIENT)
Dept: WOUND CARE | Age: 53
Discharge: HOME | End: 2023-02-16
Payer: MEDICARE

## 2023-02-16 VITALS
TEMPERATURE: 98.3 F | SYSTOLIC BLOOD PRESSURE: 126 MMHG | RESPIRATION RATE: 18 BRPM | HEART RATE: 89 BPM | BODY MASS INDEX: 19.55 KG/M2 | WEIGHT: 132 LBS | HEIGHT: 69 IN | DIASTOLIC BLOOD PRESSURE: 80 MMHG

## 2023-02-16 DIAGNOSIS — G35 MULTIPLE SCLEROSIS (HCC): ICD-10-CM

## 2023-02-16 DIAGNOSIS — F17.200 SMOKING: ICD-10-CM

## 2023-02-16 DIAGNOSIS — L97.512 NEUROPATHIC ULCER OF TOE, RIGHT, WITH FAT LAYER EXPOSED (HCC): Primary | ICD-10-CM

## 2023-02-16 DIAGNOSIS — G82.20 PARAPLEGIA (HCC): Chronic | ICD-10-CM

## 2023-02-16 PROCEDURE — 97597 DBRDMT OPN WND 1ST 20 CM/<: CPT

## 2023-02-16 PROCEDURE — 6370000000 HC RX 637 (ALT 250 FOR IP): Performed by: NURSE PRACTITIONER

## 2023-02-16 RX ORDER — LIDOCAINE 50 MG/G
OINTMENT TOPICAL ONCE
OUTPATIENT
Start: 2023-02-16 | End: 2023-02-16

## 2023-02-16 RX ORDER — LIDOCAINE 40 MG/G
CREAM TOPICAL ONCE
OUTPATIENT
Start: 2023-02-16 | End: 2023-02-16

## 2023-02-16 RX ORDER — LIDOCAINE HYDROCHLORIDE 20 MG/ML
JELLY TOPICAL ONCE
Status: COMPLETED | OUTPATIENT
Start: 2023-02-16 | End: 2023-02-16

## 2023-02-16 RX ORDER — LIDOCAINE HYDROCHLORIDE 40 MG/ML
SOLUTION TOPICAL ONCE
OUTPATIENT
Start: 2023-02-16 | End: 2023-02-16

## 2023-02-16 RX ORDER — LIDOCAINE HYDROCHLORIDE 20 MG/ML
JELLY TOPICAL ONCE
OUTPATIENT
Start: 2023-02-16 | End: 2023-02-16

## 2023-02-16 RX ADMIN — LIDOCAINE HYDROCHLORIDE: 20 JELLY TOPICAL at 13:46

## 2023-02-16 ASSESSMENT — PAIN SCALES - GENERAL: PAINLEVEL_OUTOF10: 6

## 2023-02-16 ASSESSMENT — PAIN DESCRIPTION - LOCATION: LOCATION: BACK;NECK

## 2023-02-16 ASSESSMENT — PAIN DESCRIPTION - PAIN TYPE: TYPE: CHRONIC PAIN

## 2023-02-16 ASSESSMENT — PAIN DESCRIPTION - FREQUENCY: FREQUENCY: INTERMITTENT

## 2023-02-16 NOTE — PLAN OF CARE
Problem: Chronic Conditions and Co-morbidities  Goal: Patient's chronic conditions and co-morbidity symptoms are monitored and maintained or improved  Outcome: Progressing     Problem: Discharge Planning  Goal: Discharge to home or other facility with appropriate resources  Outcome: Progressing     Problem: Wound:  Goal: Will show signs of wound healing; wound closure and no evidence of infection  Description: Will show signs of wound healing; wound closure and no evidence of infection  Outcome: Progressing     Problem: Weight control:  Goal: Ability to maintain an optimal weight for height and age will be supported  Description: Ability to maintain an optimal weight for height and age will be supported  Outcome: Progressing     Problem: Falls - Risk of:  Goal: Will remain free from falls  Description: Will remain free from falls  Outcome: Progressing

## 2023-02-16 NOTE — HOME CARE
[facility] [address] f: [phone] f: [phone] p: [phone] [email or web address]      Ordering Center:     [facility] [address]  [address] [id number] [phone]  WOUND CARE Dept: [address] [facility] [phone]    Patient Information:      Misha William  [address] [phone]   : 1970  AGE: 47 y.o. GENDER: female   EPISODE DATE: 2023    Insurance:      PRIMARY INSURANCE:  Plan: MEDICARE PART A AND B  Coverage: MEDICARE  Effective Date: 2022  Group Number: [unfilled]  Subscriber Number: [id number] - (Medicare)    Payer/Plan Subscr  Sex Relation Sub. Ins. ID Effective Group Num   1. [address] [facility] 1970 Female Self [id number]                                    [address]    2.  [place]ICA[place] -* AJAY HERNANDEZ O 1970 Female Self [id number] 1/15/15                                    [address]        Patient Wound Information:      Problem List Items Addressed This Visit          Other    Paraplegia ([address] (Chronic)    Multiple sclerosis ([address]    Smoking    * (Principal) Neuropathic ulcer of toe, right, with fat layer exposed ([address] - Primary    Relevant Orders    Initiate Outpatient Wound Care Protocol       WOUNDS REQUIRING DRESSING SUPPLIES:     Wound 22 Pretibial Distal;Right #2 Right ankle pressure stage 3 (Active)   Wound Image   23 1351   Wound Etiology Pressure Stage 3 23 1351   Dressing Status New dressing applied 23 1435   Wound Cleansed Soap and water 23 1351   Dressing/Treatment Xeroform;Gauze dressing/dressing sponge;Roll gauze 23 1435   Offloading for Diabetic Foot Ulcers Offloading boot 23 1351   Wound Length (cm) 0.6 cm 23 1351   Wound Width (cm) 0.6 cm 23 1351   Wound Depth (cm) 0.1 cm 23 1351   Wound Surface Area (cm^2) 0.36 cm^2 02/16/23 1351   Change in Wound Size % (l*w) -300 02/16/23 1351   Wound Volume (cm^3) 0.036 cm^3 02/16/23 1351   Wound Healing % -300 02/16/23 1351   Post-Procedure Length (cm) 0.4 cm 01/10/23 1357   Post-Procedure Width (cm) 0.5 cm 01/10/23 1357   Post-Procedure Depth (cm) 0.1 cm 01/10/23 1357   Post-Procedure Surface Area (cm^2) 0.2 cm^2 01/10/23 1357   Post-Procedure Volume (cm^3) 0.02 cm^3 01/10/23 1357   Distance Tunneling (cm) 0 cm 02/16/23 1351   Tunneling Position ___ O'Clock 0 02/16/23 1351   Undermining Starts ___ O'Clock 0 02/16/23 1351   Undermining Ends___ O'Clock 0 02/16/23 1351   Undermining Maxium Distance (cm) 0 02/16/23 1351   Wound Assessment Pink/red;Slough 02/16/23 1351   Drainage Amount Moderate 02/16/23 1351   Drainage Description Serosanguinous 02/16/23 1351   Odor None 02/16/23 1351   Audelia-wound Assessment Intact 02/16/23 1351   Margins Attached edges 02/16/23 1351   Wound Thickness Description not for Pressure Injury Full thickness 02/16/23 1351   Number of days: 87       Wound 01/10/23 Ankle Right;Medial Wound 4, right medial ankle, pressure injury (Active)   Wound Image   02/16/23 1351   Wound Etiology Pressure Stage 3 02/16/23 1351   Dressing Status New dressing applied 02/16/23 1435   Wound Cleansed Soap and water 02/16/23 1351   Dressing/Treatment Xeroform;Gauze dressing/dressing sponge;Roll gauze;Tape/Soft cloth adhesive tape 02/16/23 1435   Offloading for Diabetic Foot Ulcers Offloading boot 02/16/23 1351   Wound Length (cm) 1.1 cm 02/16/23 1351   Wound Width (cm) 1.4 cm 02/16/23 1351   Wound Depth (cm) 0.1 cm 02/16/23 1351   Wound Surface Area (cm^2) 1.54 cm^2 02/16/23 1351   Change in Wound Size % (l*w) -71.11 02/16/23 1351   Wound Volume (cm^3) 0.154 cm^3 02/16/23 1351   Wound Healing % 14 02/16/23 1351   Post-Procedure Length (cm) 1.1 cm 02/16/23 1415   Post-Procedure Width (cm) 1.4 cm 02/16/23 1415   Post-Procedure Depth (cm) 0.1 cm 02/16/23 1415 Post-Procedure Surface Area (cm^2) 1.54 cm^2 02/16/23 1415   Post-Procedure Volume (cm^3) 0.154 cm^3 02/16/23 1415   Distance Tunneling (cm) 0 cm 02/16/23 1351   Tunneling Position ___ O'Clock 0 02/16/23 1351   Undermining Starts ___ O'Clock 0 02/16/23 1351   Undermining Ends___ O'Clock 0 02/16/23 1351   Undermining Maxium Distance (cm) 0 02/16/23 1351   Wound Assessment Pink/red;Slough 02/16/23 1351   Drainage Amount Moderate 02/16/23 1351   Drainage Description Serosanguinous 02/16/23 1351   Odor None 02/16/23 1351   Audelia-wound Assessment Blanchable erythema; Intact 02/16/23 1351   Margins Attached edges 02/16/23 1351   Wound Thickness Description not for Pressure Injury Full thickness 02/16/23 1351   Number of days: 37       Wound 01/26/23 Buttocks Right Wound 6, right buttock, pressure injury (Active)   Wound Image   02/06/23 1058   Wound Etiology Pressure Stage 2 02/06/23 1058   Dressing Status New dressing applied 02/06/23 1058   Wound Cleansed Soap and water 02/06/23 1058   Dressing/Treatment Hydrocolloid 02/06/23 1058   Wound Length (cm) 1.2 cm 01/26/23 1412   Wound Width (cm) 1.5 cm 01/26/23 1412   Wound Depth (cm) 0.1 cm 01/26/23 1412   Wound Surface Area (cm^2) 1.8 cm^2 01/26/23 1412   Wound Volume (cm^3) 0.18 cm^3 01/26/23 1412   Distance Tunneling (cm) 0 cm 01/26/23 1412   Tunneling Position ___ O'Clock 0 01/26/23 1412   Undermining Starts ___ O'Clock 0 01/26/23 1412   Undermining Ends___ O'Clock 0 01/26/23 1412   Undermining Maxium Distance (cm) 0 01/26/23 1412   Wound Assessment Pink/red 02/06/23 1058   Drainage Amount None 02/06/23 1058   Drainage Description Serosanguinous 01/26/23 1412   Odor None 02/06/23 1058   Audelia-wound Assessment Intact 01/26/23 1412   Margins Attached edges 01/26/23 1412   Wound Thickness Description not for Pressure Injury Full thickness 01/26/23 1412   Number of days: 21       Wound 01/26/23 Sacrum Wound 5, sacrum, pressure injury (Active)   Wound Image   02/06/23 1058 Wound Etiology Pressure Stage 3 02/06/23 1058   Dressing Status New dressing applied 02/06/23 1058   Wound Cleansed Soap and water 02/06/23 1058   Dressing/Treatment Alginate with Ag;Gauze dressing/dressing sponge;Tape/Soft cloth adhesive tape 02/06/23 1058   Wound Length (cm) 1 cm 01/26/23 1412   Wound Width (cm) 0.3 cm 01/26/23 1412   Wound Depth (cm) 0.4 cm 01/26/23 1412   Wound Surface Area (cm^2) 0.3 cm^2 01/26/23 1412   Wound Volume (cm^3) 0.12 cm^3 01/26/23 1412   Distance Tunneling (cm) 0 cm 01/26/23 1412   Tunneling Position ___ O'Clock 0 01/26/23 1412   Undermining Starts ___ O'Clock 0 01/26/23 1412   Undermining Ends___ O'Clock 0 01/26/23 1412   Undermining Maxium Distance (cm) 0 01/26/23 1412   Wound Assessment Pink/red 02/06/23 1058   Drainage Amount None 02/06/23 1058   Drainage Description Serosanguinous 01/26/23 1412   Odor None 02/06/23 1058   Audelia-wound Assessment Intact 01/26/23 1412   Margins Attached edges 01/26/23 1412   Wound Thickness Description not for Pressure Injury Full thickness 01/26/23 1412   Number of days: 21     Incision 02/06/23 Foot Anterior;Right (Active)   Wound Image   02/16/23 1351   Dressing Status New dressing applied 02/16/23 1435   Dressing Change Due 02/07/23 02/06/23 1058   Incision Cleansed Soap and water 02/16/23 1351   Dressing/Treatment Petroleum gauze;Gauze dressing/dressing sponge;Roll gauze;Tape/Soft cloth adhesive tape; Ace wrap 02/16/23 1435   Closure Sutures 02/16/23 1351   Drainage Amount None 02/16/23 1351   Odor None 02/16/23 1351   Audelia-incision Assessment Blanchable erythema; Other (Comment) 02/16/23 1351   Number of days: 10       Supplies Requested :      WOUND #: 2   PRIMARY DRESSING:  Other: double layer xeroform   Cover and Secure with: 4X4 gauze pad  Bulky roll gauze     FREQUENCY OF DRESSING CHANGES:  Daily       WOUND #: Right foot incision   PRIMARY DRESSING:  Other: Adaptic   Cover and Secure with: 4X4 gauze pad  Bulky roll gauze     FREQUENCY OF DRESSING CHANGES:  Daily       WOUND #: 6   PRIMARY DRESSING:  Hydrocolloid thin        FREQUENCY OF DRESSING CHANGES:  Other Every 3 days and prn     ADDITIONAL ITEMS:  [] Gloves Small  [x] Gloves Medium [] Gloves Large [] Gloves XLarge  [] Tape 1\" [] Tape 2\" [] Tape 3\"  [x] Medipore Tape  [x] Saline  [] Vashe  [] Skin Prep   [] Adhesive Remover   [] Cotton Tip Applicators   [] Other:    Patient Wound(s) Debrided: [x] Yes if yes please add date 2/16/23   [] No    Debribement Type: Non-excisional/Selective    Is the patient currently on an antibiotic for their Wound(s): [] Yes if yes please add name and dose    [x] No    Patient currently being seen by Home Health: [] Yes   [x] No    Duration for needed supplies:  []15  [x]30  []60  []90 Days    Electronically signed by Tosin Torres RN on 2/16/2023 at 4:02 PM     Provider Information:      PROVIDER'S NAME: Luis Enrique SCHWARTZ    NPI: [id number]

## 2023-02-16 NOTE — PROGRESS NOTES
[address]   Progress Note and Procedure Note      Yoni Moncada  MEDICAL RECORD NUMBER:  [id number]  AGE: 47 y.o. GENDER: female  : 1970  EPISODE DATE:  2023    Subjective:     Chief Complaint   Patient presents with    Wound Check     Patient presents today for recheck right foot wounds. 23-Dr. Massimo Sepulveda  RIGHT TRANSMETATARSAL AMPUTATION      HISTORY of PRESENT ILLNESS HPI     Yoni Moncada is a 47 y.o. female who presents today for wound/ulcer evaluation.    History of Wound Context: right foot wound follow up/eval and treat    Ulcer Identification:  Ulcer Type:  neuropathic  Contributing Factors: chronic pressure, shear force, and neuropathy    Wound: Surgical incision        PAST MEDICAL HISTORY        Diagnosis Date    Ankle wound     and toe wound    Aptyalism 2017    Arthritis 2020    Asthma     Back pain 2017    Binocular vision disorder with diplopia 2017    CAFL (chronic airflow limitation) ([address] 2017    Hay fever 2017    Headache 2017    MS (multiple sclerosis) (HCC)     Muscle spasticity     Neuropathic ulcer of left foot, limited to breakdown of skin ([address] 4/10/2017    Numbness 2016    Open wound of ankle 2017    Open wound of second toe of left foot 2018    Peripheral vascular disease ([address]     Seizure ([address]     occ. related to ms    Sepsis ([address] 2016    Weakness 2016       PAST SURGICAL HISTORY    Past Surgical History:   Procedure Laterality Date    BACLOFEN PUMP IMPLANTATION      BREAST BIOPSY Right     neg    COLONOSCOPY N/A 2019    Dr Ashlee Pino ileum through ostomy-patent and healthy appearing anastomosis in the right colon-entero colonic anastomosis-10 yr recall    COLOSTOMY      FEMUR FRACTURE SURGERY      Right    FOOT AMPUTATION Left 2019    LEFT TRANSMET AMPUTATION performed by Aviva Terry MD at [address] [place] [address] Av Right 2023    RIGHT TRANSMETATARSAL AMPUTATION performed by Basil Lomeli DO at [address] (CERVIX STATUS UNKNOWN)      OTHER SURGICAL HISTORY      urostomy    OTHER SURGICAL HISTORY      pain pump    OH INSJ PRPH CTR VAD W/SUBQ PORT UNDER 5 YR N/A 06/26/2018    SINGLE LUMEN PORT PLACEMENT WITH FLUORO performed by Amrita Rios MD at [facility]      TOE AMPUTATION      L last toe    TONSILLECTOMY      URETEROTOMY      VASCULAR SURGERY  02/06/2023    RIGHT TRANSMETATARSAL AMPUTATION; VI       FAMILY HISTORY    Family History   Problem Relation Age of Onset    Cancer Mother         breast    Colon Cancer Mother     Colon Polyps Mother     Breast Cancer Mother     Diabetes Father     High Blood Pressure Father     Heart Disease Paternal Grandmother     Breast Cancer Paternal Aunt     Cancer Paternal Aunt         breast    Liver Cancer Paternal Aunt     Esophageal Cancer Neg Hx     Liver Disease Neg Hx     Rectal Cancer Neg Hx     Stomach Cancer Neg Hx        SOCIAL HISTORY    Social History     Tobacco Use    Smoking status: Former     Packs/day: 0.50     Years: 15.00     Pack years: 7.50     Types: Cigarettes    Smokeless tobacco: Never    Tobacco comments:     Less than 10 per day   Vaping Use    Vaping Use: Never used   Substance Use Topics    Alcohol use: Yes     Comment: yaritza    Drug use: Yes     Frequency: 7.0 times per week     Types: Marijuana Steffanie Postin)     Comment: daily        ALLERGIES    Allergies   Allergen Reactions    Darvocet [Propoxyphene N-Acetaminophen]      Talking out of her head    Morphine      Category: Allergy;     Morphine And Related Itching and Swelling    Propoxyphene Swelling       MEDICATIONS    Current Outpatient Medications on File Prior to Encounter   Medication Sig Dispense Refill    HYDROcodone-acetaminophen (NORCO)  MG per tablet TAKE ONE TABLET BY MOUTH THREE TIMES DAILY.  REDUCE DOSES TAKEN AS PAIN BECOMES MANAGEABLE 90 tablet 0    methylphenidate (RITALIN) 20 MG tablet TAKE 1 TABLET BY MOUTH DAILY 30 tablet 0    azelastine (ASTELIN) 0.1 % nasal spray 1 spray by Nasal route 2 times daily as needed for Rhinitis Use in each nostril as directed      hydroCHLOROthiazide (HYDRODIURIL) 25 MG tablet Take 25 mg by mouth daily as needed      ipratropium-albuterol (DUONEB) 0.5-2.5 (3) MG/3ML SOLN nebulizer solution Inhale 1 vial into the lungs 2 times daily as needed for Shortness of Breath      pilocarpine (SALAGEN) 7.5 MG tablet Take 7.5 mg by mouth 2 times daily      pregabalin (LYRICA) 300 MG capsule Take 300 mg by mouth nightly. mupirocin (BACTROBAN) 2 % ointment For  5 days before surgery swab ointment/cream into both nostrils (with a q-tip) twice daily. Stop after the 5 days. 1 each 0    SODIUM CHLORIDE, EXTERNAL, (SALINE WOUND [address]) 0.9 % SOLN Apply 1 L topically 2 times daily 1000 mL 3    tiZANidine (ZANAFLEX) 4 MG tablet TAKE 3 TABLETS TWICE DAILY AS NEEDED (Patient taking differently: Take 12 mg by mouth 2 times daily) 180 tablet 11    nitrofurantoin (MACRODANTIN) 50 MG capsule TAKE 1 CAPSULE BY MOUTH NIGHTLY 90 capsule 1    levETIRAcetam (KEPPRA) 750 MG tablet TAKE 1 TABLET BY MOUTH DAILY 30 tablet 5    ondansetron (ZOFRAN-ODT) 4 MG disintegrating tablet Take 1 tablet by mouth 3 times daily as needed for Nausea or Vomiting 30 tablet 0    DULoxetine (CYMBALTA) 30 MG extended release capsule Take 1 capsule by mouth nightly 90 capsule 2    fluocinolone acetonide (SYNALAR) 0.01 % external solution Apply 10 drops topically nightly as needed      Diapers & Supplies MISC Indications: FX: [phone] Diapers, Chucks, Wipes, and Gloves.  100 each 3    cetirizine (ZYRTEC) 10 MG tablet Take 10 mg by mouth daily      Catheters MISC Catheter supplies and lubricant 5 times daily G82.20  to [facility] [phone] 450 each 3    docusate sodium (COLACE) 100 MG capsule Take 200 mg by mouth as needed for Constipation      PROAIR  (90 Base) MCG/ACT inhaler INHALE 1 PUFF INTO THE LUNGS EVERY 6 HOURS AS NEEDED FOR WHEEZING OR SHORTNESS OF BREATH 8.5 g 5    Heparin Lock Flush (HEPARIN FLUSH, 100 UNITS/ML,) 100 UNIT/ML injection 3 mLs by Intercatheter route every 30 days No every 30 days but every 4-6 weeks. Flush port with 300 units heparin with 20 cc normal saline for ocrevus infusion for Multiple sclerosis and NS 20CC 1 Syringe 5    BACLOFEN, PAIN PUMP REFILL CHARGE, by Implant route continuous Indications: every 3 months      Multiple Vitamins-Minerals (THERAPEUTIC MULTIVITAMIN-MINERALS) tablet Take 1 tablet by mouth daily      Sodium Chloride Flush (SALINE FLUSH) 0.9 % SOLN Infuse 20 mLs intravenously every 30 days Not every 30 days but every 4-6 weeks as need with 300 units of heparin to flush port for Infusion of Ocrevus for multiple sclerosis 20 mL 5     No current facility-administered medications on file prior to encounter. REVIEW OF SYSTEMS    Pertinent items are noted in HPI.     Objective:      /80   Pulse 89   Temp 98.3 °F (36.8 °C) (Temporal)   Resp 18   Ht 5' 9\" (1.753 m)   Wt 132 lb (59.9 kg)   BMI 19.49 kg/m²     Wt Readings from Last 3 Encounters:   02/16/23 132 lb (59.9 kg)   02/03/23 132 lb (59.9 kg)   02/06/23 132 lb (59.9 kg)       PHYSICAL EXAM    General Appearance: alert and oriented to person, place and time, well developed and well- nourished, in no acute distress  Skin: warm and dry, no rash or erythema  Head: normocephalic and atraumatic  Eyes: pupils equal, round, and reactive to light, extraocular eye movements intact, conjunctivae normal  ENT: tympanic membrane, external ear and ear canal normal bilaterally, nose without deformity, nasal mucosa and turbinates normal without polyps  Neck: supple and non-tender without mass, no thyromegaly or thyroid nodules, no cervical lymphadenopathy  Pulmonary/Chest: clear to auscultation bilaterally- no wheezes, rales or rhonchi, normal air movement, no respiratory distress  Extremities: no cyanosis, clubbing or edema      Assessment:      Patient Active Problem List   Diagnosis Code    Muscle spasticity M62.838    Peripheral vascular disease ([facility]) I73.9    Multiple sclerosis ([facility]) G35    Pain in both upper arms M79.621, M79.622    Numbness R20.0    Other fatigue R53.83    Weakness R53.1    Chronic pain G89.29    Hx of seizure disorder Z86.69    Insomnia G47.00    Pressure ulcer of sacral region L89.159    Neck pain M54.2    Seasonal allergic rhinitis J30.2    S/P transmetatarsal amputation of foot, left ([address] H68.539    Constipation due to neurogenic bowel K59.00, K59.2    Colostomy present ([address] Z93.3    Paraplegia ([facility]) G82.20    Hip pain M25.559    Seizure ([facility]) R56.9    Smoking F17.200    Encounter for care related to vascular access port Z45.2    Urinary incontinence R32    Mixed anxiety and depressive disorder F41.8    Malodorous urine R82.90    Arthritis M19.90    Osteoporosis M81.0    Urinary bladder stone N21.0    Vesicovaginal fistula N82.0    Neuropathic ulcer of toe, right, with fat layer exposed ([address] L97.512    Dyspnea R06.00    Marijuana user F12.90    Neurogenic bladder N31.9    Other specified misadventures during surgical and medical care Y65.8    Presence of other specified devices Z97.8    Renal stone N20.0    Wheelchair bound Z99.3    Pressure injury of sacral region, stage 3 ([facility]) L89.153    Unspecified severe protein-calorie malnutrition ([address] E43    Ankle wound, right, sequela S91.001S    Severe malnutrition ([address] E43       Wound 11/21/22 Pretibial Distal;Right #2 Right ankle pressure stage 3 (Active)   Wound Image   02/16/23 1351   Wound Etiology Pressure Stage 3 02/16/23 1351   Dressing Status Old drainage noted 02/16/23 1351   Wound Cleansed Soap and water 02/16/23 1351   Dressing/Treatment Xeroform;Gauze dressing/dressing sponge;Roll gauze 01/26/23 1529   Offloading for Diabetic Foot Ulcers Offloading boot 02/16/23 1351   Wound Length (cm) 0.6 cm 02/16/23 1351   Wound Width (cm) 0.6 cm 02/16/23 1351   Wound Depth (cm) 0.1 cm 02/16/23 1351   Wound Surface Area (cm^2) 0.36 cm^2 02/16/23 1351   Change in Wound Size % (l*w) -300 02/16/23 1351   Wound Volume (cm^3) 0.036 cm^3 02/16/23 1351   Wound Healing % -300 02/16/23 1351   Post-Procedure Length (cm) 0.4 cm 01/10/23 1357   Post-Procedure Width (cm) 0.5 cm 01/10/23 1357   Post-Procedure Depth (cm) 0.1 cm 01/10/23 1357   Post-Procedure Surface Area (cm^2) 0.2 cm^2 01/10/23 1357   Post-Procedure Volume (cm^3) 0.02 cm^3 01/10/23 1357   Distance Tunneling (cm) 0 cm 02/16/23 1351   Tunneling Position ___ O'Clock 0 02/16/23 1351   Undermining Starts ___ O'Clock 0 02/16/23 1351   Undermining Ends___ O'Clock 0 02/16/23 1351   Undermining Maxium Distance (cm) 0 02/16/23 1351   Wound Assessment Pink/red;Slough 02/16/23 1351   Drainage Amount Moderate 02/16/23 1351   Drainage Description Serosanguinous 02/16/23 1351   Odor None 02/16/23 1351   Audelia-wound Assessment Intact 02/16/23 1351   Margins Attached edges 02/16/23 1351   Wound Thickness Description not for Pressure Injury Full thickness 02/16/23 1351   Number of days: 86       Wound 01/10/23 Ankle Right;Medial Wound 4, right medial ankle, pressure injury (Active)   Wound Image   02/16/23 1351   Wound Etiology Pressure Stage 3 02/16/23 1351   Dressing Status Old drainage noted 02/16/23 1351   Wound Cleansed Soap and water 02/16/23 1351   Dressing/Treatment Xeroform;Gauze dressing/dressing sponge;Roll gauze;Tape/Soft cloth adhesive tape 01/26/23 1529   Offloading for Diabetic Foot Ulcers Offloading boot 02/16/23 1351   Wound Length (cm) 1.1 cm 02/16/23 1351   Wound Width (cm) 1.4 cm 02/16/23 1351   Wound Depth (cm) 0.1 cm 02/16/23 1351   Wound Surface Area (cm^2) 1.54 cm^2 02/16/23 1351   Change in Wound Size % (l*w) -71.11 02/16/23 1351   Wound Volume (cm^3) 0.154 cm^3 02/16/23 1351   Wound Healing % 14 02/16/23 1351   Post-Procedure Length (cm) 1.1 cm 02/16/23 1415 Post-Procedure Width (cm) 1.4 cm 02/16/23 1415   Post-Procedure Depth (cm) 0.1 cm 02/16/23 1415   Post-Procedure Surface Area (cm^2) 1.54 cm^2 02/16/23 1415   Post-Procedure Volume (cm^3) 0.154 cm^3 02/16/23 1415   Distance Tunneling (cm) 0 cm 02/16/23 1351   Tunneling Position ___ O'Clock 0 02/16/23 1351   Undermining Starts ___ O'Clock 0 02/16/23 1351   Undermining Ends___ O'Clock 0 02/16/23 1351   Undermining Maxium Distance (cm) 0 02/16/23 1351   Wound Assessment Pink/red;Slough 02/16/23 1351   Drainage Amount Moderate 02/16/23 1351   Drainage Description Serosanguinous 02/16/23 1351   Odor None 02/16/23 1351   Audelia-wound Assessment Blanchable erythema; Intact 02/16/23 1351   Margins Attached edges 02/16/23 1351   Wound Thickness Description not for Pressure Injury Full thickness 02/16/23 1351   Number of days: 37       Wound 01/26/23 Buttocks Right Wound 6, right buttock, pressure injury (Active)   Wound Image   02/06/23 1058   Wound Etiology Pressure Stage 2 02/06/23 1058   Dressing Status New dressing applied 02/06/23 1058   Wound Cleansed Soap and water 02/06/23 1058   Dressing/Treatment Hydrocolloid 02/06/23 1058   Wound Length (cm) 1.2 cm 01/26/23 1412   Wound Width (cm) 1.5 cm 01/26/23 1412   Wound Depth (cm) 0.1 cm 01/26/23 1412   Wound Surface Area (cm^2) 1.8 cm^2 01/26/23 1412   Wound Volume (cm^3) 0.18 cm^3 01/26/23 1412   Distance Tunneling (cm) 0 cm 01/26/23 1412   Tunneling Position ___ O'Clock 0 01/26/23 1412   Undermining Starts ___ O'Clock 0 01/26/23 1412   Undermining Ends___ O'Clock 0 01/26/23 1412   Undermining Maxium Distance (cm) 0 01/26/23 1412   Wound Assessment Pink/red 02/06/23 1058   Drainage Amount None 02/06/23 1058   Drainage Description Serosanguinous 01/26/23 1412   Odor None 02/06/23 1058   Audelia-wound Assessment Intact 01/26/23 1412   Margins Attached edges 01/26/23 1412   Wound Thickness Description not for Pressure Injury Full thickness 01/26/23 1412   Number of days: 21 Wound 01/26/23 Sacrum Wound 5, sacrum, pressure injury (Active)   Wound Image   02/06/23 1058   Wound Etiology Pressure Stage 3 02/06/23 1058   Dressing Status New dressing applied 02/06/23 1058   Wound Cleansed Soap and water 02/06/23 1058   Dressing/Treatment Alginate with Ag;Gauze dressing/dressing sponge;Tape/Soft cloth adhesive tape 02/06/23 1058   Wound Length (cm) 1 cm 01/26/23 1412   Wound Width (cm) 0.3 cm 01/26/23 1412   Wound Depth (cm) 0.4 cm 01/26/23 1412   Wound Surface Area (cm^2) 0.3 cm^2 01/26/23 1412   Wound Volume (cm^3) 0.12 cm^3 01/26/23 1412   Distance Tunneling (cm) 0 cm 01/26/23 1412   Tunneling Position ___ O'Clock 0 01/26/23 1412   Undermining Starts ___ O'Clock 0 01/26/23 1412   Undermining Ends___ O'Clock 0 01/26/23 1412   Undermining Maxium Distance (cm) 0 01/26/23 1412   Wound Assessment Pink/red 02/06/23 1058   Drainage Amount None 02/06/23 1058   Drainage Description Serosanguinous 01/26/23 1412   Odor None 02/06/23 1058   Audelia-wound Assessment Intact 01/26/23 1412   Margins Attached edges 01/26/23 1412   Wound Thickness Description not for Pressure Injury Full thickness 01/26/23 1412   Number of days: 21     Incision 02/06/23 Foot Anterior;Right (Active)   Wound Image   02/16/23 1351   Dressing Status Clean;Dry; Intact 02/16/23 1351   Dressing Change Due 02/07/23 02/06/23 1058   Incision Cleansed Soap and water 02/16/23 1351   Dressing/Treatment Xeroform;Roll gauze;Gauze dressing/dressing sponge 02/06/23 1020   Closure Sutures 02/16/23 1351   Drainage Amount None 02/16/23 1351   Odor None 02/16/23 1351   Audelia-incision Assessment Blanchable erythema; Other (Comment) 02/16/23 1351   Number of days: 10        Procedure Note  Indications:  Based on my examination of this patient's wound(s)/ulcer(s) today, debridement is required to promote healing and evaluate the wound base.     Performed by: EFREN Mon CNP    Consent obtained:  Yes    Time out taken:  Yes    Pain Control: Anesthetic  Anesthetic: 2% Lidocaine Gel Topical       Debridement:Non-excisional Debridement    Using curette the wound(s)/ulcer(s) was/were sharply debrided down through and including the removal of epidermis and dermis. Devitalized Tissue Debrided:  fibrin, biofilm, slough, and exudate    Pre Debridement Measurements:  Are located in the Wound/Ulcer Documentation Flow Sheet    Wound/Ulcer #: 2 and 4    Post Debridement Measurements:  Wound/Ulcer Descriptions are Pre Debridement except measurements:      Percent of Wound/Ulcer Debrided: 100%    Total Surface Area Debrided:  1.74 sq cm     Estimated Blood Loss:  Minimal    Hemostasis Achieved:  by pressure    Procedural Pain:  0  / 10     Post Procedural Pain:  0 / 10     Response to treatment:  Well tolerated by patient. Diabetic/Pressure/Non Pressure Ulcers only:  Ulcer: N/A            Plan:     Problem List Items Addressed This Visit          Other    Paraplegia ([address] (Chronic)    Multiple sclerosis ([address]    Smoking    * (Principal) Neuropathic ulcer of toe, right, with fat layer exposed ([address] - Primary    Relevant Orders    Initiate Outpatient Wound Care Protocol       Treatment Note please see attached Discharge Instructions    In my professional opinion this patient would benefit from HBO Therapy: No    Written patient dismissal instructions given to patient and signed by patient or POA. Ms. Baker's transmetatarsal incision line looks great. She reports the offloading boot is adding additional pressure where the strap is so she is not using this anymore. The wounds are healing well, follow up with Dr. Nel Sher in 2 weeks.   Electronically signed by EFREN Dwyer CNP on 2/16/2023 at 2:21 PM

## 2023-02-21 ENCOUNTER — OFFICE VISIT (OUTPATIENT)
Dept: NEUROLOGY | Age: 53
End: 2023-02-21
Payer: MEDICARE

## 2023-02-21 VITALS
HEART RATE: 68 BPM | HEIGHT: 69 IN | OXYGEN SATURATION: 99 % | SYSTOLIC BLOOD PRESSURE: 123 MMHG | DIASTOLIC BLOOD PRESSURE: 74 MMHG | BODY MASS INDEX: 19.55 KG/M2 | WEIGHT: 132 LBS

## 2023-02-21 DIAGNOSIS — R53.83 OTHER FATIGUE: ICD-10-CM

## 2023-02-21 DIAGNOSIS — G82.20 PARAPLEGIA (HCC): ICD-10-CM

## 2023-02-21 DIAGNOSIS — M79.622 PAIN IN BOTH UPPER ARMS: ICD-10-CM

## 2023-02-21 DIAGNOSIS — M54.2 PAIN, NECK: ICD-10-CM

## 2023-02-21 DIAGNOSIS — G35 MS (MULTIPLE SCLEROSIS) (HCC): Primary | ICD-10-CM

## 2023-02-21 DIAGNOSIS — R20.0 NUMBNESS: ICD-10-CM

## 2023-02-21 DIAGNOSIS — R53.1 WEAKNESS: ICD-10-CM

## 2023-02-21 DIAGNOSIS — M62.838 MUSCLE SPASTICITY: ICD-10-CM

## 2023-02-21 DIAGNOSIS — M79.621 PAIN IN BOTH UPPER ARMS: ICD-10-CM

## 2023-02-21 DIAGNOSIS — G40.909 SEIZURE DISORDER (HCC): ICD-10-CM

## 2023-02-21 PROCEDURE — 1036F TOBACCO NON-USER: CPT | Performed by: PSYCHIATRY & NEUROLOGY

## 2023-02-21 PROCEDURE — G8420 CALC BMI NORM PARAMETERS: HCPCS | Performed by: PSYCHIATRY & NEUROLOGY

## 2023-02-21 PROCEDURE — 99213 OFFICE O/P EST LOW 20 MIN: CPT | Performed by: PSYCHIATRY & NEUROLOGY

## 2023-02-21 PROCEDURE — 3017F COLORECTAL CA SCREEN DOC REV: CPT | Performed by: PSYCHIATRY & NEUROLOGY

## 2023-02-21 PROCEDURE — G8427 DOCREV CUR MEDS BY ELIG CLIN: HCPCS | Performed by: PSYCHIATRY & NEUROLOGY

## 2023-02-21 PROCEDURE — G8482 FLU IMMUNIZE ORDER/ADMIN: HCPCS | Performed by: PSYCHIATRY & NEUROLOGY

## 2023-02-21 RX ORDER — DULOXETIN HYDROCHLORIDE 30 MG/1
30 CAPSULE, DELAYED RELEASE ORAL NIGHTLY
Qty: 90 CAPSULE | Refills: 2 | Status: SHIPPED | OUTPATIENT
Start: 2023-02-21

## 2023-02-21 RX ORDER — ONDANSETRON 4 MG/1
TABLET, ORALLY DISINTEGRATING ORAL
Qty: 30 TABLET | Refills: 0 | Status: SHIPPED | OUTPATIENT
Start: 2023-02-21

## 2023-02-21 NOTE — TELEPHONE ENCOUNTER
Alyce called requesting a refill of the below medication which has been pended for you:     Requested Prescriptions     Pending Prescriptions Disp Refills    DULoxetine (CYMBALTA) 30 MG extended release capsule [Pharmacy Med Name: DULOXETINE HYDROCHLORIDE 30MG CAPSULE DELAYED RELEASE PARTICLES] 90 capsule 2     Sig: TAKE 1 CAPSULE BY MOUTH NIGHTLY    ondansetron (ZOFRAN-ODT) 4 MG disintegrating tablet [Pharmacy Med Name: ONDANSETRON ODT 4MG TABLET DISINTEGRATING] 30 tablet 0     Sig: DISSOLVE 1 TABLET ON TONGUE 3 TIMES DAILY AS NEEDED FOR NAUSEA OR VOMITING       Last Appointment Date: 11/17/2022  Next Appointment Date: 3/1/2023    Allergies   Allergen Reactions    Darvocet [Propoxyphene N-Acetaminophen]      Talking out of her head    Morphine      Category:  Allergy;     Morphine And Related Itching and Swelling    Propoxyphene Swelling

## 2023-02-21 NOTE — PROGRESS NOTES
Chief Complaint   Patient presents with    Follow-up    Multiple Sclerosis       Shyam Terrell is a 47y.o. year old female who is seen for evaluation of multiple sclerosis. The patient indicates that she was diagnosed with multiple sclerosis in 1994. At that time to develop some visual disturbances including blurred vision and double vision. Within eight months she was in a wheelchair. She currently has no movement of the lower extremities. She does have some increased Spasticity in the legs worse in the arms. She underwent a baclofen pump with Dr. Cheryl Fuller with some complications. She is doing better from this. She currently sees Dr. nichols for her pump management. She does have some cognitive issues. She denies diplopia, dysarthria, or dysphagia. She does have some incontinence of bladder. She does have pain in the hips and lower extremities. She also has headaches with pain behind both eyes. She was followed by Dr. Marquis Sparks of neurology. She follows up today for evaluation. She is currently in a wheelchair. Since her last visit she is doing better with physical therapy. She had an accidental overdose with her baclofen pump and had a seizure. Doing better on. Recently admitted with seizure. Was off Chantix a few weeks and had a UTI. Had seizure in oct 2014 with seizure due to baclofen overose. This was her second seizure. Off Ocrevus. Occassionally gets shooting pain in head. Headache in the back of the head. Cymbalta helps mood. Had a seizure on 1/29/21 and went to the ER. Found to have a UTI and placed on Keppra increased to 750 mg bid. Went to [facility] due surgery for ileostomy. No seizures. Overall stable.      Active Ambulatory Problems     Diagnosis Date Noted    Muscle spasticity 04/29/2021    Peripheral vascular disease ([facility] 02/01/2016    Multiple sclerosis ([facility] 06/08/2016    Pain in both upper arms 08/04/2016    Numbness 08/04/2016    Other fatigue 08/04/2016    Weakness 08/04/2016    Chronic pain 08/19/2016    Hx of seizure disorder 08/19/2016    Insomnia 06/28/2017    Pressure ulcer of sacral region 06/28/2017    Neck pain 09/06/2017    Seasonal allergic rhinitis 11/26/2018    S/P transmetatarsal amputation of foot, left ([address] 04/17/2019    Constipation due to neurogenic bowel 05/02/2019    Colostomy present ([address] 05/02/2019    Paraplegia ([address] 11/05/2019    Hip pain 12/03/2019    Seizure ([address] 05/26/2020    Smoking 08/25/2020    Encounter for care related to vascular access port 12/10/2020    Urinary incontinence 05/04/2020    Mixed anxiety and depressive disorder 05/04/2020    Malodorous urine 12/14/2020    Arthritis 05/04/2020    Osteoporosis 05/04/2020    Urinary bladder stone 10/16/2020    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/29/2020    Neuropathic ulcer of toe, right, with fat layer exposed ([address] 12/13/2021    Dyspnea 04/29/2021    Marijuana user 04/29/2021    Neurogenic bladder 04/29/2021    Other specified misadventures during surgical and medical care 10/25/2016    Presence of other specified devices 12/10/2020    Renal stone 04/29/2021    Wheelchair bound 04/29/2021    Pressure injury of sacral region, stage 3 ([address] 04/15/2022    Unspecified severe protein-calorie malnutrition ([address] 04/15/2022    Ankle wound, right, sequela 11/02/2022    Severe malnutrition ([address] 11/03/2022     Resolved Ambulatory Problems     Diagnosis Date Noted    Neuropathic ulcer of right foot, limited to breakdown of skin ([address] 02/01/2016    Sepsis ([address] 08/19/2016    Urinary tract infection 08/19/2016    Hypokalemia 08/20/2016    Hypokalemia 08/20/2016    Abnormal EKG     Encephalopathy 08/25/2016    Elevated troponin level     Neuropathic ulcer of left foot, limited to breakdown of skin ([address] 04/10/2017    Acute bronchitis 06/28/2017    Acute sinusitis 06/28/2017    Open wound of ankle 06/28/2017    Vitamin D deficiency 06/28/2017    Back pain 06/28/2017    Breakdown (mechanical) of cranial or spinal infusion catheter, initial encounter 10/25/2016    CAFL (chronic airflow limitation) ([address] 06/28/2017    Cough 06/28/2017    Encounter for screening for other disorder 06/28/2017    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    Difficult or painful urination 06/28/2017    Headache 06/28/2017    Hyperlipidemia 06/28/2017    Influenza 06/28/2017    Breast lump 06/28/2017    Patient overweight 06/28/2017    Hay fever 06/28/2017    Cobalamin deficiency 06/28/2017    Disease caused by fungus 06/28/2017    Colostomy and enterostomy malfunction ([address] 09/06/2017    Atherosclerosis of native arteries of right leg with ulceration of calf ([address] 11/26/2018    Venous stasis ulcer of left lower extremity ([address] 11/26/2018    Open wound of second toe of left foot 12/11/2018    Type 2 diabetes mellitus with left diabetic foot ulcer ([address] 04/04/2019    Encounter for screening colonoscopy 05/02/2019    Family history of colon cancer 05/02/2019    Surgical wound breakdown 07/24/2019    Pressure ulcer of sacral region, stage 4 ([address] 10/17/2019    Neuropathic foot ulcer, left, limited to breakdown of skin ([address] 05/05/2020    Type 2 diabetes mellitus without complication ([address] 02/39/9551    Skin ulcer of abdominal wall, with fat layer exposed ([address] 06/16/2021    Ulcer of right heel, with fat layer exposed ([address] 12/13/2021    Non-pressure chronic ulcer of right ankle with fat layer exposed ([address] 02/07/2022    Ulcer of great toe ([address] 12/13/2021    Neuropathic ulcer of heel, left, with fat layer exposed ([address] 05/24/2022    Open wound of left thigh 05/24/2022    Infected wound 11/07/2022     Past Medical History:   Diagnosis Date    Ankle wound     Asthma     MS (multiple sclerosis) ([address]        Past Surgical History:   Procedure Laterality Date    BACLOFEN PUMP IMPLANTATION      BREAST BIOPSY Right     neg    COLONOSCOPY N/A 09/27/2019    Dr Lupe Page [place] ileum through ostomy-patent and healthy appearing anastomosis in the right colon-entero colonic anastomosis-10 yr recall    COLOSTOMY      FEMUR FRACTURE SURGERY      Right    FOOT AMPUTATION Left 04/04/2019    LEFT TRANSMET AMPUTATION performed by Christy Grimm MD at [address] Right 02/06/2023    RIGHT TRANSMETATARSAL AMPUTATION performed by Mandeep Fletcher DO at [address] (CERVIX STATUS UNKNOWN)      OTHER SURGICAL HISTORY      urostomy    OTHER SURGICAL HISTORY      pain pump    OK INSJ PRPH CTR VAD W/SUBQ PORT UNDER 5 YR N/A 06/26/2018    SINGLE LUMEN PORT PLACEMENT WITH FLUORO performed by Sarah Malone MD at [facility]      TOE AMPUTATION      L last toe    TONSILLECTOMY      URETEROTOMY      VASCULAR SURGERY  02/06/2023    RIGHT TRANSMETATARSAL AMPUTATION; VI       Family History   Problem Relation Age of Onset    Cancer Mother         breast    Colon Cancer Mother     Colon Polyps Mother     Breast Cancer Mother     Diabetes Father     High Blood Pressure Father     Heart Disease Paternal Grandmother     Breast Cancer Paternal Aunt     Cancer Paternal Aunt         breast    Liver Cancer Paternal Aunt     Esophageal Cancer Neg Hx     Liver Disease Neg Hx     Rectal Cancer Neg Hx     Stomach Cancer Neg Hx        Allergies   Allergen Reactions    Darvocet [Propoxyphene N-Acetaminophen]      Talking out of her head    Morphine      Category:  Allergy;     Morphine And Related Itching and Swelling    Propoxyphene Swelling       Social History     Socioeconomic History    Marital status:      Spouse name: Not on file    Number of children: Not on file    Years of education: Not on file    Highest education level: Not on file   Occupational History    Not on file   Tobacco Use    Smoking status: Former     Packs/day: 0.50     Years: 15.00     Pack years: 7.50     Types: Cigarettes    Smokeless tobacco: Never    Tobacco comments:     Less than 10 per day   Vaping Use    Vaping Use: Never used   Substance and Sexual Activity    Alcohol use: Yes     Comment: yaritza    Drug use: Yes     Frequency: 7.0 times per week     Types: Marijuana Juanita Jacobson)     Comment: daily     Sexual activity: Not on file   Other Topics Concern    Not on file   Social History Narrative    Not on file     Social Determinants of Health     Financial Resource Strain: Not on file   Food Insecurity: Not on file   Transportation Needs: Not on file   Physical Activity: Inactive    Days of Exercise per Week: 0 days    Minutes of Exercise per Session: 0 min   Stress: Not on file   Social Connections: Not on file   Intimate Partner Violence: Not on file   Housing Stability: Not on file     Review of Systems     Constitutional - No fever or chills. No diaphoresis or significant fatigue. HENT -  No tinnitus or significant hearing loss. Eyes - no sudden vision change or eye pain  Respiratory - no significant shortness of breath or cough  Cardiovascular - no chest pain No palpitations or significant leg swelling  Gastrointestinal - no abdominal swelling or pain. Genitourinary - No difficulty urinating, dysuria  Musculoskeletal - no back pain or myalgia. Skin - no color change or rash  Neurologic - No seizures. No lateralizing weakness. Hematologic - no easy bruising or excessive bleeding. Psychiatric - no severe anxiety or nervousness. All other review of systems are negative. Current Outpatient Medications   Medication Sig Dispense Refill    DULoxetine (CYMBALTA) 30 MG extended release capsule TAKE 1 CAPSULE BY MOUTH NIGHTLY 90 capsule 2    ondansetron (ZOFRAN-ODT) 4 MG disintegrating tablet DISSOLVE 1 TABLET ON TONGUE 3 TIMES DAILY AS NEEDED FOR NAUSEA OR VOMITING 30 tablet 0    HYDROcodone-acetaminophen (NORCO)  MG per tablet TAKE ONE TABLET BY MOUTH THREE TIMES DAILY.  REDUCE DOSES TAKEN AS PAIN BECOMES MANAGEABLE 90 tablet 0    methylphenidate (RITALIN) 20 MG tablet TAKE 1 TABLET BY MOUTH DAILY 30 tablet 0    azelastine (ASTELIN) 0.1 % nasal spray 1 spray by Nasal route 2 times daily as needed for Rhinitis Use in each nostril as directed      hydroCHLOROthiazide (HYDRODIURIL) 25 MG tablet Take 25 mg by mouth daily as needed      ipratropium-albuterol (DUONEB) 0.5-2.5 (3) MG/3ML SOLN nebulizer solution Inhale 1 vial into the lungs 2 times daily as needed for Shortness of Breath      pilocarpine (SALAGEN) 7.5 MG tablet Take 7.5 mg by mouth 2 times daily      pregabalin (LYRICA) 300 MG capsule Take 300 mg by mouth nightly. mupirocin (BACTROBAN) 2 % ointment For  5 days before surgery swab ointment/cream into both nostrils (with a q-tip) twice daily. Stop after the 5 days. 1 each 0    SODIUM CHLORIDE, EXTERNAL, (SALINE WOUND [address]) 0.9 % SOLN Apply 1 L topically 2 times daily 1000 mL 3    tiZANidine (ZANAFLEX) 4 MG tablet TAKE 3 TABLETS TWICE DAILY AS NEEDED (Patient taking differently: Take 12 mg by mouth 2 times daily) 180 tablet 11    nitrofurantoin (MACRODANTIN) 50 MG capsule TAKE 1 CAPSULE BY MOUTH NIGHTLY 90 capsule 1    levETIRAcetam (KEPPRA) 750 MG tablet TAKE 1 TABLET BY MOUTH DAILY 30 tablet 5    fluocinolone acetonide (SYNALAR) 0.01 % external solution Apply 10 drops topically nightly as needed      Diapers & Supplies MISC Indications: FX: [id number] Diapers, Chucks, Wipes, and Gloves. 100 each 3    cetirizine (ZYRTEC) 10 MG tablet Take 10 mg by mouth daily      Catheters MISC Catheter supplies and lubricant 5 times daily G82.20  to [facility] [phone] 450 each 3    docusate sodium (COLACE) 100 MG capsule Take 200 mg by mouth as needed for Constipation      PROAIR  (90 Base) MCG/ACT inhaler INHALE 1 PUFF INTO THE LUNGS EVERY 6 HOURS AS NEEDED FOR WHEEZING OR SHORTNESS OF BREATH 8.5 g 5    Heparin Lock Flush (HEPARIN FLUSH, 100 UNITS/ML,) 100 UNIT/ML injection 3 mLs by Intercatheter route every 30 days No every 30 days but every 4-6 weeks.  Flush port with 300 units heparin with 20 cc normal saline for ocrevus infusion for Multiple sclerosis and NS 20CC 1 Syringe 5    BACLOFEN, PAIN PUMP REFILL CHARGE, by Implant route continuous Indications: every 3 months      Multiple Vitamins-Minerals (THERAPEUTIC MULTIVITAMIN-MINERALS) tablet Take 1 tablet by mouth daily      Sodium Chloride Flush (SALINE FLUSH) 0.9 % SOLN Infuse 20 mLs intravenously every 30 days Not every 30 days but every 4-6 weeks as need with 300 units of heparin to flush port for Infusion of Ocrevus for multiple sclerosis 20 mL 5     No current facility-administered medications for this visit. /74   Pulse 68   Ht 5' 9\" (1.753 m)   Wt 132 lb (59.9 kg)   SpO2 99%   BMI 19.49 kg/m²     Constitutional - well developed, well nourished. Eyes - conjunctiva normal.  Ear, nose, throat - No scars, masses, or lesions over external nose or ears, no atrophy of tongue  Neck-symmetric, no masses noted, no jugular vein distension  Respiration- chest wall appears symmetric, good expansion,   normal effort without use of accessory muscles  Musculoskeletal - no significant wasting of muscles noted, no bony deformities  Extremities-no clubbing, cyanosis or edema  Skin - warm, dry, and intact. No rash, erythema, or pallor.   Psychiatric - mood, affect, and behavior appear normal.      Neurological exam  Awake, alert, fluent oriented  appropriate affect  Attention and concentration appear appropriate  Recent and remote memory appears unremarkable  Speech normal without dysarthria  No clear issues with language of fund of knowledge    Cranial Nerve Exam     CN III, IV,VI-EOMI, No nystagmus, conjugate eye movements, no ptosis  CN VII-no facial assymetry        Motor Exam  antigravity throughout upper extremities bilaterally with spasticity     Tremors- no tremors in hands or head noted    Gait  In wheelchair      Lab Results   Component Value Date    [id number] 387 05/02/2019     Lab Results   Component Value Date    WBC 4.5 (L) 02/03/2023    HGB 12.0 02/03/2023    HCT 38.8 02/03/2023    MCV 89.6 02/03/2023     02/03/2023     Lab Results   Component Value Date     02/03/2023    K 4.2 02/03/2023     02/03/2023    CO2 26 02/03/2023    BUN 14 02/03/2023    CREATININE 0.3 (L) 02/03/2023    GLUCOSE 86 02/03/2023    CALCIUM 8.5 (L) 02/03/2023    PROT 4.8 (L) 11/07/2022    LABALBU 2.9 (L) 11/07/2022    BILITOT <0.2 11/07/2022    ALKPHOS 96 11/07/2022    AST 23 11/07/2022    ALT 24 11/07/2022    LABGLOM >60 02/03/2023    GFRAA >59 12/07/2021    GLOB 2.7 08/19/2016     Impression   1.. No foci of abnormal T2 signal or enhancement within the cervical   cord to suggest plaques of multiple sclerosis or mass. 2. Mild bulging of the disc at C5-C6 with uncinate spurring   contributing to neural foraminal narrowing. There is no central   stenosis. The remaining cervical disc levels are unremarkable. Signed by Dr Kristi Jones on 8/14/2017 5:05 PM           Assessment    ICD-10-CM    1. MS (multiple sclerosis) ([facility]  G35       2. Seizure disorder ([facility]  G40.909       3. Other fatigue  R53.83       4. Pain, neck  M54.2       5. Pain in both upper arms  M79.621     M79.622       6. Muscle spasticity  M62.838       7. Numbness  R20.0       8. Weakness  R53.1       9. Paraplegia ([facility])  G82.20             Her neurological examination previously was significant for no movement in the lower extremities. She had some altered sensation in the legs along with some spasticity. She's wheelchair-bound. Her MRI of the brain revealed no new lesions. There was extensive white matter lesions. , Her cervical, thoracic, and lumbosacral spine were relatively unremarkable. She was agreeable to be started on Gilenya with no issues. stable. She had been on Copaxone but came off of this on her own. She denies any clear relapses over the last several years. She'll be referred to physical therapy as well. She will have a CBC and CMP every 3 months.  The patient indicated understanding of the management plan. At this time she will be referred to [facility] as per her wishes for some experimental treatments. She is to reduce her Keppra to 750 mg daily as she wants to be at this dose due to fatigue,  She is seeing Dr Obed Madsen who manages baclofen pump. She will be continued  Ritalin to see if this helps at a future. She will be tried on Nuvgil. Lyrica restarted. She will be referred to [facility] for colostomy issues. .her hepatitis B panel was unremkarkable. Her EMG with NCs of the arms was suggestive of an ulnar neuropathy on the left. her MRI of the brain had stable white matter change. Her MRI of the c spine had some minimal disc bulging but no MS plaques Her x rays of her hips due to pain were unremarkable except for bladder stone along with pin in right hip. 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 She is off Ocrevus due to [address] virus. Has reduced Cymbala. .She's to follow-up with me in approximately 3 months and call with any further problems. Medicines are continuing to control symptoms. Continue current regimen as overall stable. Plan    No orders of the defined types were placed in this encounter. No orders of the defined types were placed in this encounter. Return in about 3 months (around 5/21/2023).

## 2023-02-28 ENCOUNTER — PRE-ADMISSION TESTING (OUTPATIENT)
Dept: PREADMISSION TESTING | Facility: HOSPITAL | Age: 53
End: 2023-02-28
Payer: MEDICARE

## 2023-02-28 VITALS
RESPIRATION RATE: 20 BRPM | HEART RATE: 86 BPM | OXYGEN SATURATION: 99 % | SYSTOLIC BLOOD PRESSURE: 85 MMHG | DIASTOLIC BLOOD PRESSURE: 67 MMHG

## 2023-02-28 DIAGNOSIS — G35 MS (MULTIPLE SCLEROSIS): ICD-10-CM

## 2023-02-28 DIAGNOSIS — R25.2 SPASTICITY: ICD-10-CM

## 2023-02-28 DIAGNOSIS — Z46.2 END OF BATTERY LIFE OF INTRATHECAL INFUSION PUMP: ICD-10-CM

## 2023-02-28 LAB
ALBUMIN SERPL-MCNC: 4 G/DL (ref 3.5–5.2)
ALBUMIN/GLOB SERPL: 1.4 G/DL
ALP SERPL-CCNC: 128 U/L (ref 39–117)
ALT SERPL W P-5'-P-CCNC: 7 U/L (ref 1–33)
ANION GAP SERPL CALCULATED.3IONS-SCNC: 9 MMOL/L (ref 5–15)
APTT PPP: 32.6 SECONDS (ref 24.1–35)
AST SERPL-CCNC: 9 U/L (ref 1–32)
BASOPHILS # BLD AUTO: 0.04 10*3/MM3 (ref 0–0.2)
BASOPHILS NFR BLD AUTO: 0.6 % (ref 0–1.5)
BILIRUB SERPL-MCNC: 0.3 MG/DL (ref 0–1.2)
BUN SERPL-MCNC: 12 MG/DL (ref 6–20)
BUN/CREAT SERPL: 37.5 (ref 7–25)
CALCIUM SPEC-SCNC: 9.3 MG/DL (ref 8.6–10.5)
CHLORIDE SERPL-SCNC: 107 MMOL/L (ref 98–107)
CO2 SERPL-SCNC: 27 MMOL/L (ref 22–29)
CREAT SERPL-MCNC: 0.32 MG/DL (ref 0.57–1)
DEPRECATED RDW RBC AUTO: 46.5 FL (ref 37–54)
EGFRCR SERPLBLD CKD-EPI 2021: 125.8 ML/MIN/1.73
EOSINOPHIL # BLD AUTO: 0.14 10*3/MM3 (ref 0–0.4)
EOSINOPHIL NFR BLD AUTO: 1.9 % (ref 0.3–6.2)
ERYTHROCYTE [DISTWIDTH] IN BLOOD BY AUTOMATED COUNT: 14 % (ref 12.3–15.4)
GLOBULIN UR ELPH-MCNC: 2.8 GM/DL
GLUCOSE SERPL-MCNC: 92 MG/DL (ref 65–99)
HCT VFR BLD AUTO: 41.6 % (ref 34–46.6)
HGB BLD-MCNC: 12.5 G/DL (ref 12–15.9)
IMM GRANULOCYTES # BLD AUTO: 0.03 10*3/MM3 (ref 0–0.05)
IMM GRANULOCYTES NFR BLD AUTO: 0.4 % (ref 0–0.5)
INR PPP: 0.89 (ref 0.91–1.09)
LYMPHOCYTES # BLD AUTO: 1.04 10*3/MM3 (ref 0.7–3.1)
LYMPHOCYTES NFR BLD AUTO: 14.4 % (ref 19.6–45.3)
MCH RBC QN AUTO: 27.3 PG (ref 26.6–33)
MCHC RBC AUTO-ENTMCNC: 30 G/DL (ref 31.5–35.7)
MCV RBC AUTO: 90.8 FL (ref 79–97)
MONOCYTES # BLD AUTO: 0.34 10*3/MM3 (ref 0.1–0.9)
MONOCYTES NFR BLD AUTO: 4.7 % (ref 5–12)
NEUTROPHILS NFR BLD AUTO: 5.62 10*3/MM3 (ref 1.7–7)
NEUTROPHILS NFR BLD AUTO: 78 % (ref 42.7–76)
NRBC BLD AUTO-RTO: 0 /100 WBC (ref 0–0.2)
PLATELET # BLD AUTO: 235 10*3/MM3 (ref 140–450)
PMV BLD AUTO: 11.9 FL (ref 6–12)
POTASSIUM SERPL-SCNC: 4.2 MMOL/L (ref 3.5–5.2)
PROT SERPL-MCNC: 6.8 G/DL (ref 6–8.5)
PROTHROMBIN TIME: 12.1 SECONDS (ref 11.8–14.8)
RBC # BLD AUTO: 4.58 10*6/MM3 (ref 3.77–5.28)
SODIUM SERPL-SCNC: 143 MMOL/L (ref 136–145)
WBC NRBC COR # BLD: 7.21 10*3/MM3 (ref 3.4–10.8)

## 2023-02-28 PROCEDURE — 85730 THROMBOPLASTIN TIME PARTIAL: CPT

## 2023-02-28 PROCEDURE — 36415 COLL VENOUS BLD VENIPUNCTURE: CPT

## 2023-02-28 PROCEDURE — 80053 COMPREHEN METABOLIC PANEL: CPT

## 2023-02-28 PROCEDURE — 85025 COMPLETE CBC W/AUTO DIFF WBC: CPT

## 2023-02-28 PROCEDURE — 85610 PROTHROMBIN TIME: CPT

## 2023-02-28 RX ORDER — ONDANSETRON 4 MG/1
4 TABLET, FILM COATED ORAL EVERY 8 HOURS PRN
COMMUNITY
End: 2023-03-29

## 2023-03-01 ENCOUNTER — LAB (OUTPATIENT)
Dept: LAB | Facility: HOSPITAL | Age: 53
End: 2023-03-01
Payer: MEDICARE

## 2023-03-01 ENCOUNTER — TELEPHONE (OUTPATIENT)
Dept: NEUROSURGERY | Facility: CLINIC | Age: 53
End: 2023-03-01
Payer: MEDICARE

## 2023-03-01 LAB
BACTERIA UR QL AUTO: ABNORMAL /HPF
BILIRUB UR QL STRIP: NEGATIVE
CLARITY UR: ABNORMAL
COLOR UR: ABNORMAL
GLUCOSE UR STRIP-MCNC: NEGATIVE MG/DL
HGB UR QL STRIP.AUTO: NEGATIVE
HYALINE CASTS UR QL AUTO: ABNORMAL /LPF
KETONES UR QL STRIP: NEGATIVE
LEUKOCYTE ESTERASE UR QL STRIP.AUTO: ABNORMAL
NITRITE UR QL STRIP: NEGATIVE
PH UR STRIP.AUTO: 7 [PH] (ref 5–8)
PROT UR QL STRIP: ABNORMAL
RBC # UR STRIP: ABNORMAL /HPF
REF LAB TEST METHOD: ABNORMAL
SP GR UR STRIP: 1.01 (ref 1–1.03)
SQUAMOUS #/AREA URNS HPF: ABNORMAL /HPF
UROBILINOGEN UR QL STRIP: ABNORMAL
WBC # UR STRIP: ABNORMAL /HPF

## 2023-03-01 PROCEDURE — 87086 URINE CULTURE/COLONY COUNT: CPT | Performed by: NURSE PRACTITIONER

## 2023-03-01 PROCEDURE — 81001 URINALYSIS AUTO W/SCOPE: CPT | Performed by: NURSE PRACTITIONER

## 2023-03-02 ENCOUNTER — HOSPITAL ENCOUNTER (OUTPATIENT)
Dept: WOUND CARE | Age: 53
Discharge: HOME | End: 2023-03-02
Payer: MEDICARE

## 2023-03-02 VITALS
SYSTOLIC BLOOD PRESSURE: 95 MMHG | TEMPERATURE: 97.2 F | DIASTOLIC BLOOD PRESSURE: 64 MMHG | HEART RATE: 68 BPM | RESPIRATION RATE: 18 BRPM | BODY MASS INDEX: 19.5 KG/M2 | WEIGHT: 132.06 LBS

## 2023-03-02 DIAGNOSIS — L97.512 NEUROPATHIC ULCER OF TOE, RIGHT, WITH FAT LAYER EXPOSED (HCC): Primary | ICD-10-CM

## 2023-03-02 DIAGNOSIS — L89.310 PRESSURE INJURY OF RIGHT ISCHIUM, UNSTAGEABLE (HCC): ICD-10-CM

## 2023-03-02 DIAGNOSIS — M89.8X9 BONY PROMINENCE: ICD-10-CM

## 2023-03-02 DIAGNOSIS — G35 MULTIPLE SCLEROSIS (HCC): ICD-10-CM

## 2023-03-02 DIAGNOSIS — M25.559 HIP PAIN: ICD-10-CM

## 2023-03-02 PROCEDURE — 99213 OFFICE O/P EST LOW 20 MIN: CPT | Performed by: SURGERY

## 2023-03-02 PROCEDURE — 6370000000 HC RX 637 (ALT 250 FOR IP): Performed by: NURSE PRACTITIONER

## 2023-03-02 PROCEDURE — 99214 OFFICE O/P EST MOD 30 MIN: CPT

## 2023-03-02 RX ORDER — LIDOCAINE 40 MG/G
CREAM TOPICAL ONCE
OUTPATIENT
Start: 2023-03-02 | End: 2023-03-02

## 2023-03-02 RX ORDER — LIDOCAINE 50 MG/G
OINTMENT TOPICAL ONCE
OUTPATIENT
Start: 2023-03-02 | End: 2023-03-02

## 2023-03-02 RX ORDER — LIDOCAINE HYDROCHLORIDE 20 MG/ML
JELLY TOPICAL ONCE
OUTPATIENT
Start: 2023-03-02 | End: 2023-03-02

## 2023-03-02 RX ORDER — LIDOCAINE HYDROCHLORIDE 20 MG/ML
JELLY TOPICAL ONCE
Status: COMPLETED | OUTPATIENT
Start: 2023-03-02 | End: 2023-03-02

## 2023-03-02 RX ORDER — LIDOCAINE HYDROCHLORIDE 40 MG/ML
SOLUTION TOPICAL ONCE
OUTPATIENT
Start: 2023-03-02 | End: 2023-03-02

## 2023-03-02 RX ADMIN — LIDOCAINE HYDROCHLORIDE: 20 JELLY TOPICAL at 13:53

## 2023-03-02 NOTE — DISCHARGE INSTRUCTIONS
[address] [facility]   Physician Orders and Discharge Instructions  [address]  Telephone: [phone]    NAME:  Chasidy Greene OF BIRTH:  1970  MEDICAL RECORD NUMBER:  [id number]  DATE:  3/2/2023    Discharge condition: Stable    Discharge to: Home    Left via:public transportation    Accompanied by:  caregiver      ECF/HHA: [address] f: [phone] f: [phone] p:  [phone] [email or web address]    Dressing Orders:  Incision line Right Foot: Wash with soap and water. Apply Adaptic over the incision  line, cover with dry gauze, secure with Kerlix and medipore tape. Change dressing  daily. Right ankle: Wash with soap and water, Apply a double layer of Xeroform (the  yellow sticky dressing) over the wound bed daily, cover with 4x4 gauze, roll gauze  and medipore tape. Apply 6 inch ace wrap from incision line to bend of knee, do not wrap tight. Area on top of foot . L[place] Stokes use Tegaderm hydrocolloid with border, change 3 x wk . .when you get it. New Sacral area ( the one in the middle): Soap and water wash, apply Aquacel Ag  (tuck into wound), cover with dry gauze, cover with dry gauze and secure with  Medipore tape daily. New Right Buttocks/Ischial area: Soap and water wash, apply Duoderm. Change  every 3 days and prn. Continue using Xeroform (change daily)until Duoderm comes in. Make sure your [facility] cushion is not bottoming out when you sit. Limit sitting to 30 minutes to 1 hour at a time with frequent weight shifts. Eat plenty of Protein rich foods  Avoid Pressure to wound site. Turn frequently (turn at least every two hours when in bed)       Treatment Orders:  Protein rich diet (unless restricted by your physician); Multivitamin daily;  Elevate legs  above the level of your heart when sitting     Wear Heel Lift Boots, No shoes or tight dressing or socks    X-ray orders are in the system for your right hip. Go to [facility] at your earliest convenience for x-rays. Go to outpatient registration (by the big marble ball) in the [address] [place] [address] to register. Please call and let Giovanni Boateng know when you get this done. [phone]      [address] [place] [address],[address] follow up visit _____2 weeks________________________  (Please note your next appointment above and if you are unable to keep, kindly give a 24 hour notice. Thank you.)          If you experience any of the following, please call the [address] [facility] during business hours:    * Increase in Pain  * Temperature over 101  * Increase in drainage from your wound  * Drainage with a foul odor  * Bleeding  * Increase in swelling  * Need for compression bandage changes due to slippage, breakthrough drainage. If you need medical attention outside of the business hours of the [address] please contact your PCP or go to the nearest emergency room.

## 2023-03-02 NOTE — PROGRESS NOTES
[address]   Progress Note and Procedure Note      Alyce Baker  AGE: 47 y.o. GENDER: female  : 1970  TODAY'S DATE:  3/2/2023    Subjective:        HISTORY of PRESENT ILLNESS HUEY Parker is a 47 y.o. female who presents today for wound evaluation.     Wound Type: healed up right TMA with stitches, present medial and lateral small wounds  Wound Location:right foot, right ischial wound, coccygeal wound  Modifying factors:chronic pressure and shear force    Patient Active Problem List   Diagnosis Code    Muscle spasticity M62.838    Peripheral vascular disease ([facility]) I73.9    Multiple sclerosis ([facility]) G35    Pain in both upper arms M79.621, M79.622    Numbness R20.0    Other fatigue R53.83    Weakness R53.1    Chronic pain G89.29    Hx of seizure disorder Z86.69    Insomnia G47.00    Pressure ulcer of sacral region L89.159    Neck pain M54.2    Seasonal allergic rhinitis J30.2    S/P transmetatarsal amputation of foot, left ([facility]) N73.969    Constipation due to neurogenic bowel K59.00, K59.2    Colostomy present ([facility]) Z93.3    Paraplegia ([facility]) G82.20    Hip pain M25.559    Seizure ([facility]) R56.9    Smoking F17.200    Encounter for care related to vascular access port Z45.2    Urinary incontinence R32    Mixed anxiety and depressive disorder F41.8    Malodorous urine R82.90    Arthritis M19.90    Osteoporosis M81.0    Urinary bladder stone N21.0    Vesicovaginal fistula N82.0    Neuropathic ulcer of toe, right, with fat layer exposed ([address] L97.512    Dyspnea R06.00    Marijuana user F12.90    Neurogenic bladder N31.9    Other specified misadventures during surgical and medical care Y65.8    Presence of other specified devices Z97.8    Renal stone N20.0    Wheelchair bound Z99.3    Pressure injury of sacral region, stage 3 ([facility]) L89.153    Unspecified severe protein-calorie malnutrition ([facility]) E43    Ankle wound, right, sequela S91.001S    Severe malnutrition ([address] E43 ALLERGIES    Allergies   Allergen Reactions    Darvocet [Propoxyphene N-Acetaminophen]      Talking out of her head    Morphine      Category: Allergy;     Morphine And Related Itching and Swelling    Propoxyphene Swelling       Incision 02/06/23 Foot Anterior;Right (Active)   Wound Image     03/02/23 1328   Dressing Status New dressing applied 03/02/23 1555   Dressing Change Due 02/07/23 02/06/23 1058   Incision Cleansed Soap and water 03/02/23 1328   Dressing/Treatment Petroleum gauze;Gauze dressing/dressing sponge;Roll gauze;Tape/Soft cloth adhesive tape; Ace wrap 03/02/23 1555   Closure Sutures 03/02/23 1328   Drainage Amount Small 03/02/23 1328   Drainage Description Serosanguinous 03/02/23 1328   Odor None 03/02/23 1328   Audelia-incision Assessment Intact 03/02/23 1328   Number of days: 24       Objective:      BP 95/64   Pulse 68   Temp 97.2 °F (36.2 °C) (Temporal)   Resp 18   Wt 132 lb 0.9 oz (59.9 kg)   BMI 19.50 kg/m²     Post Debridement Measurements and Assessment:    Wound 11/21/22 Pretibial Distal;Right #2 Right ankle pressure stage 3 (Active)   Wound Image   03/02/23 1328   Wound Etiology Pressure Stage 3 03/02/23 1328   Dressing Status New dressing applied 03/02/23 1555   Wound Cleansed Soap and water 03/02/23 1328   Dressing/Treatment Xeroform;Gauze dressing/dressing sponge;Roll gauze 03/02/23 1555   Offloading for Diabetic Foot Ulcers Offloading boot 02/16/23 1351   Wound Length (cm) 0.8 cm 03/02/23 1328   Wound Width (cm) 0.6 cm 03/02/23 1328   Wound Depth (cm) 0.1 cm 03/02/23 1328   Wound Surface Area (cm^2) 0.48 cm^2 03/02/23 1328   Change in Wound Size % (l*w) -433.33 03/02/23 1328   Wound Volume (cm^3) 0.048 cm^3 03/02/23 1328   Wound Healing % -433 03/02/23 1328   Post-Procedure Length (cm) 0.4 cm 01/10/23 1357   Post-Procedure Width (cm) 0.5 cm 01/10/23 1357   Post-Procedure Depth (cm) 0.1 cm 01/10/23 1357   Post-Procedure Surface Area (cm^2) 0.2 cm^2 01/10/23 1357   Post-Procedure Volume (cm^3) 0.02 cm^3 01/10/23 1357   Distance Tunneling (cm) 0 cm 02/16/23 1351   Tunneling Position ___ O'Clock 0 02/16/23 1351   Undermining Starts ___ O'Clock 0 02/16/23 1351   Undermining Ends___ O'Clock 0 02/16/23 1351   Undermining Maxium Distance (cm) 0 02/16/23 1351   Wound Assessment Pink/red;Slough;Dry 03/02/23 1328   Drainage Amount Small 03/02/23 1328   Drainage Description Serosanguinous 03/02/23 1328   Odor None 03/02/23 1328   Audelia-wound Assessment Intact 03/02/23 1328   Margins Attached edges 03/02/23 1328   Wound Thickness Description not for Pressure Injury Full thickness 02/16/23 1351   Number of days: 101       Wound 01/10/23 Ankle Right;Medial Wound 4, right medial ankle, pressure injury (Active)   Wound Image   03/02/23 1328   Wound Etiology Pressure Stage 3 03/02/23 1328   Dressing Status New dressing applied 03/02/23 1555   Wound Cleansed Soap and water 03/02/23 1328   Dressing/Treatment Xeroform;Gauze dressing/dressing sponge;Roll gauze;Tape/Soft cloth adhesive tape 03/02/23 1555   Offloading for Diabetic Foot Ulcers Offloading boot 02/16/23 1351   Wound Length (cm) 0.9 cm 03/02/23 1328   Wound Width (cm) 0.9 cm 03/02/23 1328   Wound Depth (cm) 0.1 cm 03/02/23 1328   Wound Surface Area (cm^2) 0.81 cm^2 03/02/23 1328   Change in Wound Size % (l*w) 10 03/02/23 1328   Wound Volume (cm^3) 0.081 cm^3 03/02/23 1328   Wound Healing % 55 03/02/23 1328   Post-Procedure Length (cm) 1.1 cm 02/16/23 1415   Post-Procedure Width (cm) 1.4 cm 02/16/23 1415   Post-Procedure Depth (cm) 0.1 cm 02/16/23 1415   Post-Procedure Surface Area (cm^2) 1.54 cm^2 02/16/23 1415   Post-Procedure Volume (cm^3) 0.154 cm^3 02/16/23 1415   Distance Tunneling (cm) 0 cm 02/16/23 1351   Tunneling Position ___ O'Clock 0 02/16/23 1351   Undermining Starts ___ O'Clock 0 02/16/23 1351   Undermining Ends___ O'Clock 0 02/16/23 1351   Undermining Maxium Distance (cm) 0 02/16/23 1351   Wound Assessment [place]/red;Slough 03/02/23 1327 Drainage Amount Moderate 03/02/23 1328   Drainage Description Serosanguinous 03/02/23 1328   Odor None 03/02/23 1328   Audelia-wound Assessment Intact 03/02/23 1328   Margins Attached edges 03/02/23 1328   Wound Thickness Description not for Pressure Injury Full thickness 02/16/23 1351   Number of days: 51       Wound 01/26/23 Buttocks Right Wound 6, right buttock, pressure injury (Active)   Wound Image   03/02/23 1328   Wound Etiology Pressure Stage 3 03/02/23 1328   Dressing Status New dressing applied 03/02/23 1555   Wound Cleansed Soap and water 03/02/23 1328   Dressing/Treatment Hydrocolloid 03/02/23 1555   Wound Length (cm) 4.5 cm 03/02/23 1328   Wound Width (cm) 3.4 cm 03/02/23 1328   Wound Depth (cm) 0.1 cm 03/02/23 1328   Wound Surface Area (cm^2) 15.3 cm^2 03/02/23 1328   Change in Wound Size % (l*w) -750 03/02/23 1328   Wound Volume (cm^3) 1.53 cm^3 03/02/23 1328   Wound Healing % -750 03/02/23 1328   Distance Tunneling (cm) 0 cm 01/26/23 1412   Tunneling Position ___ O'Clock 0 01/26/23 1412   Undermining Starts ___ O'Clock 0 01/26/23 1412   Undermining Ends___ O'Clock 0 01/26/23 1412   Undermining Maxium Distance (cm) 0 01/26/23 1412   Wound Assessment [place]/red;Slough 03/02/23 1328   Drainage Amount Moderate 03/02/23 1328   Drainage Description Serous 03/02/23 1328   Odor Mild 03/02/23 1328   Audelia-wound Assessment Intact 03/02/23 1328   Margins Defined edges 03/02/23 1328   Wound Thickness Description not for Pressure Injury Full thickness 01/26/23 1412   Number of days: 35       Wound 01/26/23 Sacrum Wound 5, sacrum, pressure injury (Active)   Wound Image   03/02/23 1328   Wound Etiology Pressure Stage 3 03/02/23 1328   Dressing Status New dressing applied 03/02/23 1555   Wound Cleansed Soap and water 03/02/23 1328   Dressing/Treatment Alginate with Ag;Gauze dressing/dressing sponge;Tape/Soft cloth adhesive tape 03/02/23 1555   Wound Length (cm) 1 cm 03/02/23 1328   Wound Width (cm) 0.8 cm 03/02/23 1328 Wound Depth (cm) 0.1 cm 03/02/23 1328   Wound Surface Area (cm^2) 0.8 cm^2 03/02/23 1328   Change in Wound Size % (l*w) -166.67 03/02/23 1328   Wound Volume (cm^3) 0.08 cm^3 03/02/23 1328   Wound Healing % 33 03/02/23 1328   Distance Tunneling (cm) 0 cm 01/26/23 1412   Tunneling Position ___ O'Clock 0 01/26/23 1412   Undermining Starts ___ O'Clock 0 01/26/23 1412   Undermining Ends___ O'Clock 0 01/26/23 1412   Undermining Maxium Distance (cm) 0 01/26/23 1412   Wound Assessment [place]/red;Slough 03/02/23 1328   Drainage Amount Moderate 03/02/23 1328   Drainage Description Serosanguinous 03/02/23 1328   Odor None 03/02/23 1328   Audelia-wound Assessment Intact 03/02/23 1328   Margins Attached edges 03/02/23 1328   Wound Thickness Description not for Pressure Injury Full thickness 01/26/23 1412   Number of days: 35           The patients pain is   . Wound(s) has improved. Please refer to nursing measurements and assessment regarding wound pre and post debridement.         Assessment:      Patient Active Problem List   Diagnosis    Muscle spasticity    Peripheral vascular disease (HCC)    Multiple sclerosis (HCC)    Pain in both upper arms    Numbness    Other fatigue    Weakness    Chronic pain    Hx of seizure disorder    Insomnia    Pressure ulcer of sacral region    Neck pain    Seasonal allergic rhinitis    S/P transmetatarsal amputation of foot, left (HCC)    Constipation due to neurogenic bowel    Colostomy present ([facility])    Paraplegia ([facility])    Hip pain    Seizure ([address]    Smoking    Encounter for care related to vascular access port    Urinary incontinence    Mixed anxiety and depressive disorder    Malodorous urine    Arthritis    Osteoporosis    Urinary bladder stone    Vesicovaginal fistula    Neuropathic ulcer of toe, right, with fat layer exposed ([address]    Dyspnea    Marijuana user    Neurogenic bladder    Other specified misadventures during surgical and medical care    Presence of other specified devices Renal stone    Wheelchair bound    Pressure injury of sacral region, stage 3 ([address]    Unspecified severe protein-calorie malnutrition (HCC)    Ankle wound, right, sequela    Severe malnutrition ([address]          Plan:          Plan for wound - Dress per physician order  Treatment:     Compression : No   Offloading : Yes   Dressing : Duoderm   Additional Therapy :      1. Discussed appropriate home care of this wound. Wound redressed. 2. Patient instructions were given. 3. Follow up: 2 week(s). Stitches removed from right stump. Patient has right ischial wound that is despite have appropriate sitting. She is concerned that pressure is due to deformity  from right femur yanick displacement. We will order Xray of pelvis and right femur.  We will discuss orthopedic referral.                         Electronically signed by Alexx Doll DO on 3/2/2023 at 4:58 PM

## 2023-03-03 LAB — BACTERIA SPEC AEROBE CULT: NORMAL

## 2023-03-03 NOTE — PLAN OF CARE
[address] [place], [address] [place] [address] f: [phone] f: [phone] [email or web address]      Ordering Center:     [address]  [address] [id number] [phone]  WOUND CARE Dept: [address] [facility] [phone]    Patient Information:      Stevie Melgar [address]   [phone]   : 1970  AGE: 47 y.o. GENDER: female   EPISODE DATE: 3/2/2023    Insurance:      PRIMARY INSURANCE:  Plan: MEDICARE PART A AND B  Coverage: MEDICARE  Effective Date: 2022  Group Number: [unfilled]  Subscriber Number: [id number] - (Medicare)    Payer/Plan Subscr  Sex Relation Sub. Ins. ID Effective Group Num   1. [facility] 1970 Female Self [id number]                                    [address] [address]   2.  [place]ICA[place] -* AJAY HERNANDEZ O 1970 Female Self [id number] 1/15/15                                    P.O.        Patient Wound Information:      Problem List Items Addressed This Visit          Other    Multiple sclerosis ([address]    Relevant Orders    XR HIP 2-3 VW W PELVIS RIGHT    XR FEMUR RIGHT (MIN 2 VIEWS)    Hip pain    Relevant Orders    XR HIP 2-3 VW W PELVIS RIGHT    XR FEMUR RIGHT (MIN 2 VIEWS)    Neuropathic ulcer of toe, right, with fat layer exposed ([address] - Primary    Relevant Orders    Initiate Outpatient Wound Care Protocol     Other Visit Diagnoses       Pressure injury of right ischium, unstageable (HCC)        Relevant Orders    XR HIP 2-3 VW W PELVIS RIGHT    XR FEMUR RIGHT (MIN 2 VIEWS)    Bony prominence        Relevant Orders    XR HIP 2-3 VW W PELVIS RIGHT    XR FEMUR RIGHT (MIN 2 VIEWS)            WOUNDS REQUIRING DRESSING SUPPLIES:     Wound 22 Pretibial Distal;Right #2 Right ankle pressure stage 3 (Active)   Wound Image   23 1328   Wound Etiology Pressure Stage 3 03/02/23 1328   Dressing Status New dressing applied 03/02/23 1555   Wound Cleansed Soap and water 03/02/23 1328   Dressing/Treatment Xeroform;Gauze dressing/dressing sponge;Roll gauze 03/02/23 1555   Offloading for Diabetic Foot Ulcers Offloading boot 02/16/23 1351   Wound Length (cm) 0.8 cm 03/02/23 1328   Wound Width (cm) 0.6 cm 03/02/23 1328   Wound Depth (cm) 0.1 cm 03/02/23 1328   Wound Surface Area (cm^2) 0.48 cm^2 03/02/23 1328   Change in Wound Size % (l*w) -433.33 03/02/23 1328   Wound Volume (cm^3) 0.048 cm^3 03/02/23 1328   Wound Healing % -433 03/02/23 1328   Post-Procedure Length (cm) 0.4 cm 01/10/23 1357   Post-Procedure Width (cm) 0.5 cm 01/10/23 1357   Post-Procedure Depth (cm) 0.1 cm 01/10/23 1357   Post-Procedure Surface Area (cm^2) 0.2 cm^2 01/10/23 1357   Post-Procedure Volume (cm^3) 0.02 cm^3 01/10/23 1357   Distance Tunneling (cm) 0 cm 02/16/23 1351   Tunneling Position ___ O'Clock 0 02/16/23 1351   Undermining Starts ___ O'Clock 0 02/16/23 1351   Undermining Ends___ O'Clock 0 02/16/23 1351   Undermining Maxium Distance (cm) 0 02/16/23 1351   Wound Assessment Pink/red;Slough;Dry 03/02/23 1328   Drainage Amount Small 03/02/23 1328   Drainage Description Serosanguinous 03/02/23 1328   Odor None 03/02/23 1328   Audelia-wound Assessment Intact 03/02/23 1328   Margins Attached edges 03/02/23 1328   Wound Thickness Description not for Pressure Injury Full thickness 02/16/23 1351   Number of days: 101       Wound 01/10/23 Ankle Right;Medial Wound 4, right medial ankle, pressure injury (Active)   Wound Image   03/02/23 1328   Wound Etiology Pressure Stage 3 03/02/23 1328   Dressing Status New dressing applied 03/02/23 1555   Wound Cleansed Soap and water 03/02/23 1328   Dressing/Treatment Xeroform;Gauze dressing/dressing sponge;Roll gauze;Tape/Soft cloth adhesive tape 03/02/23 1555   Offloading for Diabetic Foot Ulcers Offloading boot 02/16/23 1351   Wound Length (cm) 0.9 cm 03/02/23 1328   Wound Width (cm) 0.9 cm 03/02/23 1328   Wound Depth (cm) 0.1 cm 03/02/23 1328   Wound Surface Area (cm^2) 0.81 cm^2 03/02/23 1328   Change in Wound Size % (l*w) 10 03/02/23 1328   Wound Volume (cm^3) 0.081 cm^3 03/02/23 1328   Wound Healing % 55 03/02/23 1328   Post-Procedure Length (cm) 1.1 cm 02/16/23 1415   Post-Procedure Width (cm) 1.4 cm 02/16/23 1415   Post-Procedure Depth (cm) 0.1 cm 02/16/23 1415   Post-Procedure Surface Area (cm^2) 1.54 cm^2 02/16/23 1415   Post-Procedure Volume (cm^3) 0.154 cm^3 02/16/23 1415   Distance Tunneling (cm) 0 cm 02/16/23 1351   Tunneling Position ___ O'Clock 0 02/16/23 1351   Undermining Starts ___ O'Clock 0 02/16/23 1351   Undermining Ends___ O'Clock 0 02/16/23 1351   Undermining Maxium Distance (cm) 0 02/16/23 1351   Wound Assessment [place]/red;Slough 03/02/23 1328   Drainage Amount Moderate 03/02/23 1328   Drainage Description Serosanguinous 03/02/23 1328   Odor None 03/02/23 1328   Audelia-wound Assessment Intact 03/02/23 1328   Margins Attached edges 03/02/23 1328   Wound Thickness Description not for Pressure Injury Full thickness 02/16/23 1351   Number of days: 51       Wound 01/26/23 Buttocks Right Wound 6, right buttock, pressure injury (Active)   Wound Image   03/02/23 1328   Wound Etiology Pressure Stage 3 03/02/23 1328   Dressing Status New dressing applied 03/02/23 1555   Wound Cleansed Soap and water 03/02/23 1328   Dressing/Treatment Hydrocolloid 03/02/23 1555   Wound Length (cm) 4.5 cm 03/02/23 1328   Wound Width (cm) 3.4 cm 03/02/23 1328   Wound Depth (cm) 0.1 cm 03/02/23 1328   Wound Surface Area (cm^2) 15.3 cm^2 03/02/23 1328   Change in Wound Size % (l*w) -750 03/02/23 1328   Wound Volume (cm^3) 1.53 cm^3 03/02/23 1328   Wound Healing % -750 03/02/23 1328   Distance Tunneling (cm) 0 cm 01/26/23 1412   Tunneling Position ___ O'Clock 0 01/26/23 1412   Undermining Starts ___ O'Clock 0 01/26/23 1412   Undermining Ends___ O'Clock 0 01/26/23 1412   Undermining Maxium Distance (cm) 0 01/26/23 1412   Wound Assessment [place]/red;Slough 03/02/23 1328   Drainage Amount Moderate 03/02/23 1328   Drainage Description Serous 03/02/23 1328   Odor Mild 03/02/23 1328   Audelia-wound Assessment Intact 03/02/23 1328   Margins Defined edges 03/02/23 1328   Wound Thickness Description not for Pressure Injury Full thickness 01/26/23 1412   Number of days: 35       Wound 01/26/23 Sacrum Wound 5, sacrum, pressure injury (Active)   Wound Image   03/02/23 1328   Wound Etiology Pressure Stage 3 03/02/23 1328   Dressing Status New dressing applied 03/02/23 1555   Wound Cleansed Soap and water 03/02/23 1328   Dressing/Treatment Alginate with Ag;Gauze dressing/dressing sponge;Tape/Soft cloth adhesive tape 03/02/23 1555   Wound Length (cm) 1 cm 03/02/23 1328   Wound Width (cm) 0.8 cm 03/02/23 1328   Wound Depth (cm) 0.1 cm 03/02/23 1328   Wound Surface Area (cm^2) 0.8 cm^2 03/02/23 1328   Change in Wound Size % (l*w) -166.67 03/02/23 1328   Wound Volume (cm^3) 0.08 cm^3 03/02/23 1328   Wound Healing % 33 03/02/23 1328   Distance Tunneling (cm) 0 cm 01/26/23 1412   Tunneling Position ___ O'Clock 0 01/26/23 1412   Undermining Starts ___ O'Clock 0 01/26/23 1412   Undermining Ends___ O'Clock 0 01/26/23 1412   Undermining Maxium Distance (cm) 0 01/26/23 1412   Wound Assessment [place]/red;Slough 03/02/23 1328   Drainage Amount Moderate 03/02/23 1328   Drainage Description Serosanguinous 03/02/23 1328   Odor None 03/02/23 1328   Audelia-wound Assessment Intact 03/02/23 1328   Margins Attached edges 03/02/23 1328   Wound Thickness Description not for Pressure Injury Full thickness 01/26/23 1412   Number of days: 35     Incision 02/06/23 Foot Anterior;Right (Active)   Wound Image     03/02/23 1328   Dressing Status New dressing applied 03/02/23 1555   Dressing Change Due 02/07/23 02/06/23 1058   Incision Cleansed Soap and water 03/02/23 1328   Dressing/Treatment Petroleum gauze;Gauze dressing/dressing sponge;Roll gauze;Tape/Soft cloth adhesive tape; Ace wrap 03/02/23 1555   Closure Sutures 03/02/23 1328   Drainage Amount Small 03/02/23 1328   Drainage Description Serosanguinous 03/02/23 1328   Odor None 03/02/23 1328   Audelia-incision Assessment Intact 03/02/23 1328   Number of days: 25       Supplies Requested :      WOUND #: 5   PRIMARY DRESSING:  Other: Aquacel Ag   Cover and Secure with: 4X4 gauze pad     FREQUENCY OF DRESSING CHANGES:  Daily       WOUND #: 6   PRIMARY DRESSING:  Hydrocolloid thick   Cover and Secure with: 4X4 gauze pad     FREQUENCY OF DRESSING CHANGES:  Three times per week       WOUND #: 4   PRIMARY DRESSING:  Other: 3M Tegaderm Hydrocolloid   Cover and Secure with: Bulky roll gauze     FREQUENCY OF DRESSING CHANGES:  Three times per week     ADDITIONAL ITEMS:  [] Gloves Small  [x] Gloves Medium [] Gloves Large [] Gloves XLarge  [] Tape 1\" [x] Tape 2\" [] Tape 3\"  [x] Medipore Tape  [x] Saline  [] Vashe  [] Skin Prep   [] Adhesive Remover   [] Cotton Tip Applicators   [] Other:    Patient Wound(s) Debrided: [x] Yes if yes please add date 3/2/23   [] No    Debribement Type: Mechanical     Is the patient currently on an antibiotic for their Wound(s): [] Yes if yes please add name and dose    [x] No    Patient currently being seen by Home Health: [] Yes   [x] No    Duration for needed supplies:  []15  []30  []60  [x]90 Days    Electronically signed by Leigh Ann Weir RN on 3/2/2023 at 9:38 AM     Provider Information:      PROVIDER'S NAME: Dr Edith Quinones     NPI: [id number]    Please dispense as written

## 2023-03-06 ENCOUNTER — HOSPITAL ENCOUNTER (OUTPATIENT)
Dept: GENERAL RADIOLOGY | Age: 53
Discharge: HOME | End: 2023-03-06
Payer: MEDICARE

## 2023-03-06 DIAGNOSIS — R53.83 OTHER FATIGUE: ICD-10-CM

## 2023-03-06 DIAGNOSIS — G35 MULTIPLE SCLEROSIS (HCC): ICD-10-CM

## 2023-03-06 DIAGNOSIS — M54.2 PAIN, NECK: ICD-10-CM

## 2023-03-06 DIAGNOSIS — L89.310 PRESSURE INJURY OF RIGHT ISCHIUM, UNSTAGEABLE (HCC): ICD-10-CM

## 2023-03-06 DIAGNOSIS — M89.8X9 BONY PROMINENCE: ICD-10-CM

## 2023-03-06 DIAGNOSIS — M25.559 HIP PAIN: ICD-10-CM

## 2023-03-06 PROCEDURE — 73502 X-RAY EXAM HIP UNI 2-3 VIEWS: CPT

## 2023-03-06 PROCEDURE — 73502 X-RAY EXAM HIP UNI 2-3 VIEWS: CPT | Performed by: RADIOLOGY

## 2023-03-06 PROCEDURE — 73552 X-RAY EXAM OF FEMUR 2/>: CPT | Performed by: RADIOLOGY

## 2023-03-06 PROCEDURE — 73552 X-RAY EXAM OF FEMUR 2/>: CPT

## 2023-03-07 RX ORDER — METHYLPHENIDATE HYDROCHLORIDE 20 MG/1
TABLET ORAL
Qty: 30 TABLET | Refills: 0 | Status: SHIPPED | OUTPATIENT
Start: 2023-03-14 | End: 2023-04-13

## 2023-03-07 RX ORDER — HYDROCODONE BITARTRATE AND ACETAMINOPHEN 10; 325 MG/1; MG/1
TABLET ORAL
Qty: 90 TABLET | Refills: 0 | Status: SHIPPED | OUTPATIENT
Start: 2023-03-13 | End: 2023-04-12

## 2023-03-07 RX ORDER — PREGABALIN 300 MG/1
CAPSULE ORAL
Qty: 60 CAPSULE | Refills: 2 | Status: SHIPPED | OUTPATIENT
Start: 2023-03-07 | End: 2023-04-06

## 2023-03-07 NOTE — TELEPHONE ENCOUNTER
Requested Prescriptions     Pending Prescriptions Disp Refills    methylphenidate (RITALIN) 20 MG tablet [Pharmacy Med Name: METHYLPHENIDATE HYDROCHLORIDE 20MG TABLET] 30 tablet 0     Sig: TAKE 1 TABLET BY MOUTH DAILY    HYDROcodone-acetaminophen (NORCO)  MG per tablet [Pharmacy Med Name: HYDROCODONE BITARTRATE/ACETAMINOPHEN 325MG-10MG TABLET] 90 tablet 0     Sig: TAKE ONE TABLET BY MOUTH THREE TIMES DAILY. REDUCE DOSES TAKEN AS PAIN BECOMES MANAGEABLE    pregabalin (LYRICA) 300 MG capsule [Pharmacy Med Name: PREGABALIN 300MG CAPSULE] 60 capsule 2     Sig: TAKE 1 CAPSULE BY MOUTH TWICE A DAY       Last Office Visit:  2023  Next Office Visit:  none  Last Medication Refill:    Ritalin 23  Hydro 23  Lyrica 22 with 5 RF    Lakeshia Samaniego up to date:  23    *RX updated to reflect fill date*  Ritalin 3/15/23  Hydro 3/13/23  Lyrica 3/7/23    Unable to obtain new Lakeshia Samaniego. [facility] website down for maintenance. Per pharmacy, Lyrica script is .

## 2023-03-08 ENCOUNTER — HOSPITAL ENCOUNTER (OUTPATIENT)
Facility: HOSPITAL | Age: 53
Setting detail: SURGERY ADMIT
Discharge: HOME | End: 2023-03-08
Attending: NEUROLOGICAL SURGERY | Admitting: NEUROLOGICAL SURGERY
Payer: MEDICARE

## 2023-03-08 ENCOUNTER — ANESTHESIA (OUTPATIENT)
Dept: PERIOP | Facility: HOSPITAL | Age: 53
End: 2023-03-08
Payer: MEDICARE

## 2023-03-08 ENCOUNTER — ANESTHESIA EVENT (OUTPATIENT)
Dept: PERIOP | Facility: HOSPITAL | Age: 53
End: 2023-03-08
Payer: MEDICARE

## 2023-03-08 VITALS
OXYGEN SATURATION: 98 % | WEIGHT: 126.98 LBS | SYSTOLIC BLOOD PRESSURE: 115 MMHG | TEMPERATURE: 97.5 F | HEIGHT: 69 IN | DIASTOLIC BLOOD PRESSURE: 85 MMHG | HEART RATE: 78 BPM | BODY MASS INDEX: 18.81 KG/M2 | RESPIRATION RATE: 18 BRPM

## 2023-03-08 DIAGNOSIS — Z46.2 END OF BATTERY LIFE OF INTRATHECAL INFUSION PUMP: ICD-10-CM

## 2023-03-08 DIAGNOSIS — G35 MS (MULTIPLE SCLEROSIS): ICD-10-CM

## 2023-03-08 DIAGNOSIS — R25.2 SPASTICITY: ICD-10-CM

## 2023-03-08 PROCEDURE — 25010000002 PROPOFOL 10 MG/ML EMULSION: Performed by: NURSE ANESTHETIST, CERTIFIED REGISTERED

## 2023-03-08 PROCEDURE — 25010000002 ONDANSETRON PER 1 MG: Performed by: NURSE ANESTHETIST, CERTIFIED REGISTERED

## 2023-03-08 PROCEDURE — S0260 H&P FOR SURGERY: HCPCS | Performed by: NEUROLOGICAL SURGERY

## 2023-03-08 PROCEDURE — C1772 INFUSION PUMP, PROGRAMMABLE: HCPCS | Performed by: NEUROLOGICAL SURGERY

## 2023-03-08 PROCEDURE — 62362 IMPLANT SPINE INFUSION PUMP: CPT | Performed by: NEUROLOGICAL SURGERY

## 2023-03-08 PROCEDURE — 25010000002 CEFAZOLIN PER 500 MG: Performed by: NEUROLOGICAL SURGERY

## 2023-03-08 PROCEDURE — 25010000002 CEFAZOLIN PER 500 MG: Performed by: NURSE PRACTITIONER

## 2023-03-08 PROCEDURE — 25010000002 FENTANYL CITRATE (PF) 100 MCG/2ML SOLUTION: Performed by: NURSE ANESTHETIST, CERTIFIED REGISTERED

## 2023-03-08 DEVICE — PUMP NEURO PROGRAM SYNCHROMED2 FLTR 40ML: Type: IMPLANTABLE DEVICE | Site: ABDOMEN | Status: FUNCTIONAL

## 2023-03-08 RX ORDER — OXYCODONE AND ACETAMINOPHEN 7.5; 325 MG/1; MG/1
2 TABLET ORAL EVERY 4 HOURS PRN
Status: DISCONTINUED | OUTPATIENT
Start: 2023-03-08 | End: 2023-03-08 | Stop reason: HOSPADM

## 2023-03-08 RX ORDER — MIDAZOLAM HYDROCHLORIDE 1 MG/ML
1 INJECTION INTRAMUSCULAR; INTRAVENOUS
Status: DISCONTINUED | OUTPATIENT
Start: 2023-03-08 | End: 2023-03-08 | Stop reason: HOSPADM

## 2023-03-08 RX ORDER — IBUPROFEN 600 MG/1
600 TABLET ORAL ONCE AS NEEDED
Status: DISCONTINUED | OUTPATIENT
Start: 2023-03-08 | End: 2023-03-08 | Stop reason: HOSPADM

## 2023-03-08 RX ORDER — SODIUM CHLORIDE 0.9 % (FLUSH) 0.9 %
3-10 SYRINGE (ML) INJECTION AS NEEDED
Status: DISCONTINUED | OUTPATIENT
Start: 2023-03-08 | End: 2023-03-08 | Stop reason: HOSPADM

## 2023-03-08 RX ORDER — SODIUM CHLORIDE, SODIUM LACTATE, POTASSIUM CHLORIDE, CALCIUM CHLORIDE 600; 310; 30; 20 MG/100ML; MG/100ML; MG/100ML; MG/100ML
1000 INJECTION, SOLUTION INTRAVENOUS CONTINUOUS
Status: DISCONTINUED | OUTPATIENT
Start: 2023-03-08 | End: 2023-03-08 | Stop reason: HOSPADM

## 2023-03-08 RX ORDER — NALOXONE HCL 0.4 MG/ML
0.4 VIAL (ML) INJECTION AS NEEDED
Status: DISCONTINUED | OUTPATIENT
Start: 2023-03-08 | End: 2023-03-08 | Stop reason: HOSPADM

## 2023-03-08 RX ORDER — LIDOCAINE HYDROCHLORIDE 10 MG/ML
0.5 INJECTION, SOLUTION EPIDURAL; INFILTRATION; INTRACAUDAL; PERINEURAL ONCE AS NEEDED
Status: DISCONTINUED | OUTPATIENT
Start: 2023-03-08 | End: 2023-03-08 | Stop reason: HOSPADM

## 2023-03-08 RX ORDER — DROPERIDOL 2.5 MG/ML
0.62 INJECTION, SOLUTION INTRAMUSCULAR; INTRAVENOUS ONCE AS NEEDED
Status: DISCONTINUED | OUTPATIENT
Start: 2023-03-08 | End: 2023-03-08 | Stop reason: HOSPADM

## 2023-03-08 RX ORDER — SODIUM CHLORIDE 0.9 % (FLUSH) 0.9 %
3 SYRINGE (ML) INJECTION AS NEEDED
Status: DISCONTINUED | OUTPATIENT
Start: 2023-03-08 | End: 2023-03-08 | Stop reason: HOSPADM

## 2023-03-08 RX ORDER — BUPIVACAINE HCL/0.9 % NACL/PF 0.1 %
2 PLASTIC BAG, INJECTION (ML) EPIDURAL ONCE
Status: COMPLETED | OUTPATIENT
Start: 2023-03-08 | End: 2023-03-08

## 2023-03-08 RX ORDER — SODIUM CHLORIDE 0.9 % (FLUSH) 0.9 %
3 SYRINGE (ML) INJECTION EVERY 12 HOURS SCHEDULED
Status: DISCONTINUED | OUTPATIENT
Start: 2023-03-08 | End: 2023-03-08 | Stop reason: HOSPADM

## 2023-03-08 RX ORDER — MAGNESIUM HYDROXIDE 1200 MG/15ML
LIQUID ORAL AS NEEDED
Status: DISCONTINUED | OUTPATIENT
Start: 2023-03-08 | End: 2023-03-08 | Stop reason: HOSPADM

## 2023-03-08 RX ORDER — BUPIVACAINE HCL/0.9 % NACL/PF 0.125 %
PLASTIC BAG, INJECTION (ML) EPIDURAL AS NEEDED
Status: DISCONTINUED | OUTPATIENT
Start: 2023-03-08 | End: 2023-03-08 | Stop reason: SURG

## 2023-03-08 RX ORDER — LIDOCAINE HYDROCHLORIDE 20 MG/ML
INJECTION, SOLUTION EPIDURAL; INFILTRATION; INTRACAUDAL; PERINEURAL AS NEEDED
Status: DISCONTINUED | OUTPATIENT
Start: 2023-03-08 | End: 2023-03-08 | Stop reason: SURG

## 2023-03-08 RX ORDER — FENTANYL CITRATE 50 UG/ML
INJECTION, SOLUTION INTRAMUSCULAR; INTRAVENOUS AS NEEDED
Status: DISCONTINUED | OUTPATIENT
Start: 2023-03-08 | End: 2023-03-08 | Stop reason: SURG

## 2023-03-08 RX ORDER — EPHEDRINE SULFATE 50 MG/ML
INJECTION INTRAVENOUS AS NEEDED
Status: DISCONTINUED | OUTPATIENT
Start: 2023-03-08 | End: 2023-03-08 | Stop reason: SURG

## 2023-03-08 RX ORDER — ONDANSETRON 2 MG/ML
INJECTION INTRAMUSCULAR; INTRAVENOUS AS NEEDED
Status: DISCONTINUED | OUTPATIENT
Start: 2023-03-08 | End: 2023-03-08 | Stop reason: SURG

## 2023-03-08 RX ORDER — SODIUM CHLORIDE, SODIUM LACTATE, POTASSIUM CHLORIDE, CALCIUM CHLORIDE 600; 310; 30; 20 MG/100ML; MG/100ML; MG/100ML; MG/100ML
100 INJECTION, SOLUTION INTRAVENOUS CONTINUOUS
Status: DISCONTINUED | OUTPATIENT
Start: 2023-03-08 | End: 2023-03-08 | Stop reason: HOSPADM

## 2023-03-08 RX ORDER — BUPIVACAINE HYDROCHLORIDE AND EPINEPHRINE 5; 5 MG/ML; UG/ML
INJECTION, SOLUTION PERINEURAL AS NEEDED
Status: DISCONTINUED | OUTPATIENT
Start: 2023-03-08 | End: 2023-03-08 | Stop reason: HOSPADM

## 2023-03-08 RX ORDER — LABETALOL HYDROCHLORIDE 5 MG/ML
5 INJECTION, SOLUTION INTRAVENOUS
Status: DISCONTINUED | OUTPATIENT
Start: 2023-03-08 | End: 2023-03-08 | Stop reason: HOSPADM

## 2023-03-08 RX ORDER — FENTANYL CITRATE 50 UG/ML
25 INJECTION, SOLUTION INTRAMUSCULAR; INTRAVENOUS
Status: DISCONTINUED | OUTPATIENT
Start: 2023-03-08 | End: 2023-03-08 | Stop reason: HOSPADM

## 2023-03-08 RX ORDER — ACETAMINOPHEN 500 MG
1000 TABLET ORAL ONCE
Status: DISCONTINUED | OUTPATIENT
Start: 2023-03-08 | End: 2023-03-08 | Stop reason: HOSPADM

## 2023-03-08 RX ORDER — PROPOFOL 10 MG/ML
VIAL (ML) INTRAVENOUS AS NEEDED
Status: DISCONTINUED | OUTPATIENT
Start: 2023-03-08 | End: 2023-03-08 | Stop reason: SURG

## 2023-03-08 RX ORDER — ONDANSETRON 2 MG/ML
4 INJECTION INTRAMUSCULAR; INTRAVENOUS ONCE AS NEEDED
Status: DISCONTINUED | OUTPATIENT
Start: 2023-03-08 | End: 2023-03-08 | Stop reason: HOSPADM

## 2023-03-08 RX ORDER — OXYCODONE AND ACETAMINOPHEN 10; 325 MG/1; MG/1
1 TABLET ORAL ONCE AS NEEDED
Status: DISCONTINUED | OUTPATIENT
Start: 2023-03-08 | End: 2023-03-08 | Stop reason: HOSPADM

## 2023-03-08 RX ORDER — SODIUM CHLORIDE 9 MG/ML
40 INJECTION, SOLUTION INTRAVENOUS AS NEEDED
Status: DISCONTINUED | OUTPATIENT
Start: 2023-03-08 | End: 2023-03-08 | Stop reason: HOSPADM

## 2023-03-08 RX ORDER — SULFAMETHOXAZOLE AND TRIMETHOPRIM 800; 160 MG/1; MG/1
1 TABLET ORAL 2 TIMES DAILY
Qty: 10 TABLET | Refills: 0 | Status: SHIPPED | OUTPATIENT
Start: 2023-03-08

## 2023-03-08 RX ORDER — FLUMAZENIL 0.1 MG/ML
0.2 INJECTION INTRAVENOUS AS NEEDED
Status: DISCONTINUED | OUTPATIENT
Start: 2023-03-08 | End: 2023-03-08 | Stop reason: HOSPADM

## 2023-03-08 RX ADMIN — EPHEDRINE SULFATE 10 MG: 50 INJECTION INTRAVENOUS at 09:04

## 2023-03-08 RX ADMIN — Medication 100 MCG: at 09:13

## 2023-03-08 RX ADMIN — ONDANSETRON 4 MG: 2 INJECTION INTRAMUSCULAR; INTRAVENOUS at 09:40

## 2023-03-08 RX ADMIN — Medication 100 MCG: at 09:40

## 2023-03-08 RX ADMIN — Medication 100 MCG: at 09:04

## 2023-03-08 RX ADMIN — EPHEDRINE SULFATE 10 MG: 50 INJECTION INTRAVENOUS at 09:13

## 2023-03-08 RX ADMIN — SODIUM CHLORIDE, POTASSIUM CHLORIDE, SODIUM LACTATE AND CALCIUM CHLORIDE 1000 ML: 600; 310; 30; 20 INJECTION, SOLUTION INTRAVENOUS at 06:40

## 2023-03-08 RX ADMIN — PROPOFOL INJECTABLE EMULSION 150 MG: 10 INJECTION, EMULSION INTRAVENOUS at 08:46

## 2023-03-08 RX ADMIN — EPHEDRINE SULFATE 10 MG: 50 INJECTION INTRAVENOUS at 09:22

## 2023-03-08 RX ADMIN — Medication 100 MCG: at 09:22

## 2023-03-08 RX ADMIN — FENTANYL CITRATE 100 MCG: 50 INJECTION INTRAMUSCULAR; INTRAVENOUS at 08:46

## 2023-03-08 RX ADMIN — Medication 100 MCG: at 08:57

## 2023-03-08 RX ADMIN — LIDOCAINE HYDROCHLORIDE 100 MG: 20 INJECTION, SOLUTION EPIDURAL; INFILTRATION; INTRACAUDAL; PERINEURAL at 08:46

## 2023-03-08 RX ADMIN — EPHEDRINE SULFATE 10 MG: 50 INJECTION INTRAVENOUS at 09:40

## 2023-03-08 RX ADMIN — Medication 2 G: at 08:51

## 2023-03-15 NOTE — DISCHARGE INSTRUCTIONS
[address] [facility]   Physician Orders and Discharge Instructions  [address] [place]  [address] [address]  Telephone: [phone]    NAME:  Panfilo Shepard OF BIRTH:  1970  MEDICAL RECORD NUMBER:  [id number]  DATE:  3/16/23    Discharge condition: Stable    Discharge to: Home    Left via:Private automobile    Accompanied by:  caregiver    ECF/HHA:     Dressing Orders:  Incision line Right Foot: Wash with soap and water. Apply Adaptic over the incision  line, cover with dry gauze, secure with Kerlix and medipore tape. Change dressing  daily. Right ankle: Wash with soap and water, Apply a double layer of Xeroform (the  yellow sticky dressing) over the wound bed daily, cover with 4x4 gauze, roll gauze  and medipore tape. Apply 6 inch ace wrap from incision line to bend of knee, do not wrap tight. Area on top of foot . Sallyanne Chain use Tegaderm hydrocolloid with border, change 3 x wk . .when you get it. New Sacral area ( the one in the middle): Soap and water wash, apply Aquacel Ag  (tuck into wound), cover with dry gauze, cover with dry gauze and secure with  Medipore tape daily. Right Buttocks/Ischial area: Soap and water wash, apply Santyl (nickel-thickness) to wound bed, place 1/4 St. Dakins Solution moistened gauze in wound over Santyl,  cover with dry gauze, secure with roll gauze twice daily. Make sure your [facility] cushion is not bottoming out when you sit. Limit sitting to 30 minutes to 1 hour at a time with frequent weight shifts. Eat plenty of Protein rich foods  Avoid Pressure to wound site. Turn frequently (turn at least every two hours when in bed)         Treatment Orders:  Protein rich diet (unless restricted by your physician); Multivitamin daily;  Elevate legs  above the level of your heart when sitting      Wear Heel Lift Boots, No shoes or tight dressing or socks        [facility] follow up visit _____2 weeks Dr. Moreno________________________  (Please note your next appointment above and if you are unable to keep, kindly give a 24 hour notice. Thank you.)          If you experience any of the following, please call the [facility] during business hours:    * Increase in Pain  * Temperature over 101  * Increase in drainage from your wound  * Drainage with a foul odor  * Bleeding  * Increase in swelling  * Need for compression bandage changes due to slippage, breakthrough drainage. If you need medical attention outside of the business hours of the [facility] please contact your PCP or go to the nearest emergency room.

## 2023-03-16 ENCOUNTER — HOSPITAL ENCOUNTER (OUTPATIENT)
Dept: WOUND CARE | Age: 53
Discharge: HOME | End: 2023-03-16
Payer: MEDICARE

## 2023-03-16 VITALS
HEIGHT: 69 IN | RESPIRATION RATE: 16 BRPM | SYSTOLIC BLOOD PRESSURE: 89 MMHG | DIASTOLIC BLOOD PRESSURE: 71 MMHG | BODY MASS INDEX: 18.22 KG/M2 | HEART RATE: 88 BPM | WEIGHT: 123 LBS | TEMPERATURE: 97 F

## 2023-03-16 DIAGNOSIS — L97.512 NEUROPATHIC ULCER OF TOE, RIGHT, WITH FAT LAYER EXPOSED (HCC): Primary | ICD-10-CM

## 2023-03-16 DIAGNOSIS — M25.559 HIP PAIN: ICD-10-CM

## 2023-03-16 DIAGNOSIS — L89.216 PRESSURE INJURY OF DEEP TISSUE OF RIGHT HIP: ICD-10-CM

## 2023-03-16 PROCEDURE — 97597 DBRDMT OPN WND 1ST 20 CM/<: CPT

## 2023-03-16 PROCEDURE — 11042 DBRDMT SUBQ TIS 1ST 20SQCM/<: CPT

## 2023-03-16 PROCEDURE — 6370000000 HC RX 637 (ALT 250 FOR IP): Performed by: NURSE PRACTITIONER

## 2023-03-16 PROCEDURE — 11045 DBRDMT SUBQ TISS EACH ADDL: CPT

## 2023-03-16 RX ORDER — LIDOCAINE 40 MG/G
CREAM TOPICAL ONCE
OUTPATIENT
Start: 2023-03-16 | End: 2023-03-16

## 2023-03-16 RX ORDER — LIDOCAINE HYDROCHLORIDE 20 MG/ML
JELLY TOPICAL ONCE
OUTPATIENT
Start: 2023-03-16 | End: 2023-03-16

## 2023-03-16 RX ORDER — LIDOCAINE HYDROCHLORIDE 20 MG/ML
JELLY TOPICAL ONCE
Status: COMPLETED | OUTPATIENT
Start: 2023-03-16 | End: 2023-03-16

## 2023-03-16 RX ORDER — LIDOCAINE HYDROCHLORIDE 40 MG/ML
SOLUTION TOPICAL ONCE
OUTPATIENT
Start: 2023-03-16 | End: 2023-03-16

## 2023-03-16 RX ORDER — SODIUM HYPOCHLORITE 1.25 MG/ML
SOLUTION TOPICAL
Qty: 1000 ML | Refills: 5 | Status: SHIPPED | OUTPATIENT
Start: 2023-03-16 | End: 2023-03-17 | Stop reason: CLARIF

## 2023-03-16 RX ORDER — LIDOCAINE 50 MG/G
OINTMENT TOPICAL ONCE
OUTPATIENT
Start: 2023-03-16 | End: 2023-03-16

## 2023-03-16 RX ADMIN — LIDOCAINE HYDROCHLORIDE: 20 JELLY TOPICAL at 14:09

## 2023-03-16 NOTE — PLAN OF CARE
Problem: Chronic Conditions and Co-morbidities  Goal: Patient's chronic conditions and co-morbidity symptoms are monitored and maintained or improved  Outcome: Progressing     Problem: Discharge Planning  Goal: Discharge to home or other facility with appropriate resources  Outcome: Progressing     Problem: Wound:  Goal: Will show signs of wound healing; wound closure and no evidence of infection  Description: Will show signs of wound healing; wound closure and no evidence of infection  Outcome: Progressing     Problem: Pressure Ulcer:  Goal: Signs of wound healing will improve  Description: Signs of wound healing will improve  Outcome: Progressing  Goal: Absence of new pressure ulcer  Description: Absence of new pressure ulcer  Outcome: Progressing  Goal: Will show no infection signs and symptoms  Description: Will show no infection signs and symptoms  Outcome: Progressing     Problem: Weight control:  Goal: Ability to maintain an optimal weight for height and age will be supported  Description: Ability to maintain an optimal weight for height and age will be supported  Outcome: Progressing     Problem: Falls - Risk of:  Goal: Will remain free from falls  Description: Will remain free from falls  Outcome: Progressing     Problem: Blood Glucose:  Goal: Ability to maintain appropriate glucose levels will improve  Description: Ability to maintain appropriate glucose levels will improve  Outcome: Progressing

## 2023-03-17 RX ORDER — SODIUM HYPOCHLORITE 1.25 MG/ML
SOLUTION TOPICAL
Qty: 1000 ML | Refills: 5 | Status: SHIPPED | OUTPATIENT
Start: 2023-03-17

## 2023-03-22 ENCOUNTER — HOSPITAL ENCOUNTER (OUTPATIENT)
Dept: CT IMAGING | Age: 53
Discharge: HOME | End: 2023-03-22
Payer: MEDICARE

## 2023-03-22 DIAGNOSIS — L89.216 PRESSURE INJURY OF DEEP TISSUE OF RIGHT HIP: ICD-10-CM

## 2023-03-22 DIAGNOSIS — M25.559 HIP PAIN: ICD-10-CM

## 2023-03-22 PROCEDURE — 73700 CT LOWER EXTREMITY W/O DYE: CPT

## 2023-03-27 NOTE — DISCHARGE INSTRUCTIONS
legs  above the level of your heart when sitting      Wear Heel Lift Boots, No shoes or tight dressing or socks           [facility] follow up visit _____2 weeks Dr. Moreno________________________  (Please note your next appointment above and if you are unable to keep, kindly give a 24 hour notice. Thank you.)          If you experience any of the following, please call the [facility] during business hours:    * Increase in Pain  * Temperature over 101  * Increase in drainage from your wound  * Drainage with a foul odor  * Bleeding  * Increase in swelling  * Need for compression bandage changes due to slippage, breakthrough drainage. If you need medical attention outside of the business hours of the [facility] please contact your PCP or go to the nearest emergency room.

## 2023-03-28 ENCOUNTER — HOSPITAL ENCOUNTER (OUTPATIENT)
Dept: WOUND CARE | Age: 53
Discharge: HOME | End: 2023-03-28
Payer: MEDICARE

## 2023-03-28 VITALS — RESPIRATION RATE: 18 BRPM | TEMPERATURE: 98.3 F | WEIGHT: 122 LBS | BODY MASS INDEX: 18.07 KG/M2 | HEIGHT: 69 IN

## 2023-03-28 DIAGNOSIS — L97.512 NEUROPATHIC ULCER OF TOE, RIGHT, WITH FAT LAYER EXPOSED (HCC): Primary | ICD-10-CM

## 2023-03-28 PROCEDURE — 11042 DBRDMT SUBQ TIS 1ST 20SQCM/<: CPT

## 2023-03-28 PROCEDURE — 97597 DBRDMT OPN WND 1ST 20 CM/<: CPT

## 2023-03-28 PROCEDURE — 11045 DBRDMT SUBQ TISS EACH ADDL: CPT

## 2023-03-28 PROCEDURE — 11043 DBRDMT MUSC&/FSCA 1ST 20/<: CPT

## 2023-03-28 PROCEDURE — 11043 DBRDMT MUSC&/FSCA 1ST 20/<: CPT | Performed by: SURGERY

## 2023-03-28 PROCEDURE — 11046 DBRDMT MUSC&/FSCA EA ADDL: CPT | Performed by: SURGERY

## 2023-03-28 PROCEDURE — 97598 DBRDMT OPN WND ADDL 20CM/<: CPT

## 2023-03-28 PROCEDURE — 11046 DBRDMT MUSC&/FSCA EA ADDL: CPT

## 2023-03-28 PROCEDURE — 6370000000 HC RX 637 (ALT 250 FOR IP): Performed by: NURSE PRACTITIONER

## 2023-03-28 RX ORDER — LIDOCAINE 40 MG/G
CREAM TOPICAL ONCE
OUTPATIENT
Start: 2023-03-28 | End: 2023-03-28

## 2023-03-28 RX ORDER — LIDOCAINE HYDROCHLORIDE 20 MG/ML
JELLY TOPICAL ONCE
Status: COMPLETED | OUTPATIENT
Start: 2023-03-28 | End: 2023-03-28

## 2023-03-28 RX ORDER — SODIUM HYPOCHLORITE 1.25 MG/ML
SOLUTION TOPICAL DAILY
Qty: 500 ML | Refills: 1 | Status: SHIPPED | OUTPATIENT
Start: 2023-03-28

## 2023-03-28 RX ORDER — LIDOCAINE HYDROCHLORIDE 20 MG/ML
JELLY TOPICAL ONCE
OUTPATIENT
Start: 2023-03-28 | End: 2023-03-28

## 2023-03-28 RX ORDER — LIDOCAINE HYDROCHLORIDE 40 MG/ML
SOLUTION TOPICAL ONCE
OUTPATIENT
Start: 2023-03-28 | End: 2023-03-28

## 2023-03-28 RX ORDER — LIDOCAINE 50 MG/G
OINTMENT TOPICAL ONCE
OUTPATIENT
Start: 2023-03-28 | End: 2023-03-28

## 2023-03-28 RX ADMIN — LIDOCAINE HYDROCHLORIDE: 20 JELLY TOPICAL at 11:14

## 2023-03-28 ASSESSMENT — PAIN DESCRIPTION - PAIN TYPE: TYPE: CHRONIC PAIN

## 2023-03-28 ASSESSMENT — PAIN DESCRIPTION - LOCATION: LOCATION: BACK;NECK

## 2023-03-28 ASSESSMENT — PAIN SCALES - GENERAL: PAINLEVEL_OUTOF10: 7

## 2023-03-28 ASSESSMENT — PAIN - FUNCTIONAL ASSESSMENT: PAIN_FUNCTIONAL_ASSESSMENT: PREVENTS OR INTERFERES SOME ACTIVE ACTIVITIES AND ADLS

## 2023-03-28 ASSESSMENT — PAIN DESCRIPTION - DESCRIPTORS: DESCRIPTORS: ACHING;THROBBING

## 2023-03-28 ASSESSMENT — PAIN DESCRIPTION - FREQUENCY: FREQUENCY: INTERMITTENT

## 2023-03-28 NOTE — PLAN OF CARE
Spoke with Candice Castro at [facility] [phone] re: patient chair cushion is loosing air and bottoming out. Candice Castro stated he would talk to Jerald Burnett about the cushion and getting this repaired. Have also left message for Jerald Burnett 21 . Call  placed to patient to let her know I have spoken to Bk at [facility] re: cushion repair.

## 2023-03-28 NOTE — PLAN OF CARE
Problem: Chronic Conditions and Co-morbidities  Goal: Patient's chronic conditions and co-morbidity symptoms are monitored and maintained or improved  Outcome: Progressing     Problem: Discharge Planning  Goal: Discharge to home or other facility with appropriate resources  Outcome: Progressing     Problem: Pain  Goal: Verbalizes/displays adequate comfort level or baseline comfort level  Outcome: Progressing     Problem: Wound:  Goal: Will show signs of wound healing; wound closure and no evidence of infection  Description: Will show signs of wound healing; wound closure and no evidence of infection  Outcome: Progressing     Problem: Pressure Ulcer:  Goal: Signs of wound healing will improve  Description: Signs of wound healing will improve  Outcome: Progressing  Goal: Absence of new pressure ulcer  Description: Absence of new pressure ulcer  Outcome: Progressing  Goal: Will show no infection signs and symptoms  Description: Will show no infection signs and symptoms  Outcome: Progressing     Problem: Smoking cessation:  Goal: Ability to formulate a plan to maintain a tobacco-free life will be supported  Description: Ability to formulate a plan to maintain a tobacco-free life will be supported  Outcome: Progressing     Problem: Weight control:  Goal: Ability to maintain an optimal weight for height and age will be supported  Description: Ability to maintain an optimal weight for height and age will be supported  Outcome: Progressing     Problem: Falls - Risk of:  Goal: Will remain free from falls  Description: Will remain free from falls  Outcome: Progressing     Problem: Blood Glucose:  Goal: Ability to maintain appropriate glucose levels will improve  Description: Ability to maintain appropriate glucose levels will improve  Outcome: Progressing

## 2023-03-28 NOTE — PROGRESS NOTES
Debridement:Excisional Debridement    Using #15 blade scalpel the wound(s)/ulcer(s) was/were sharply debrided down through and including the removal of dermis, subcutaneous tissue, and muscle/fascia. Devitalized Tissue Debrided:  slough, necrotic/eschar, and exudate    Pre Debridement Measurements:  Are located in the Wound/Ulcer Documentation Flow Sheet    Wound/Ulcer #: 6    Post Debridement Measurements:  Wound/Ulcer Descriptions are Pre Debridement except measurements:      Percent of Wound/Ulcer Debrided: 90%    Total Surface Area Debrided:  26 sq cm     Estimated Blood Loss:  Minimal    Hemostasis Achieved:  by pressure    Procedural Pain:  0  / 10     Post Procedural Pain:  0 / 10     Response to treatment:  Well tolerated by patient. Diabetic/Pressure/Non Pressure Ulcers only:  Ulcer: Pressure ulcer, Stage 4            Plan:     Problem List Items Addressed This Visit          Other    Neuropathic ulcer of toe, right, with fat layer exposed ([facility] [address] - Primary    Relevant Orders    Initiate Outpatient Wound Care Protocol       Treatment Note please see attached Discharge Instructions    In my professional opinion this patient would benefit from HBO Therapy: No    Written patient dismissal instructions given to patient and signed by patient or POA. After discussing with the patient, I did look at her Roho cushion. The cushion is not holding air. The patient is sinking down and sitting directly on the hard portion of her seat. This is likely the cause of her wound. We will get in contact with her team that cares for her chair to evaluate this.     Electronically signed by Pablo Plasencia DO on 3/28/2023 at 1:57 PM

## 2023-03-28 NOTE — WOUND CARE
03/28/23 1125   Wound Volume (cm^3) 0.03 cm^3 03/28/23 1125   Wound Healing % -233 03/28/23 1125   Post-Procedure Length (cm) 0.6 cm 03/16/23 1531   Post-Procedure Width (cm) 0.7 cm 03/16/23 1531   Post-Procedure Depth (cm) 0.1 cm 03/16/23 1531   Post-Procedure Surface Area (cm^2) 0.42 cm^2 03/16/23 1531   Post-Procedure Volume (cm^3) 0.042 cm^3 03/16/23 1531   Distance Tunneling (cm) 0 cm 03/28/23 1125   Tunneling Position ___ O'Clock 0 03/28/23 1125   Undermining Starts ___ O'Clock 0 03/28/23 1125   Undermining Ends___ O'Clock 0 03/28/23 1125   Undermining Maxium Distance (cm) 0 03/28/23 1125   Wound Assessment Pink/red;Slough;Dry 03/28/23 1125   Drainage Amount Small 03/28/23 1125   Drainage Description Serosanguinous 03/28/23 1125   Odor None 03/28/23 1125   Audelia-wound Assessment Intact 03/28/23 1125   Margins Attached edges 03/28/23 1125   Wound Thickness Description not for Pressure Injury Full thickness 03/16/23 1409   Number of days: 127       Wound 01/10/23 Ankle Right;Medial Wound 4, right medial ankle, pressure injury (Active)   Wound Image   03/28/23 1125   Wound Etiology Pressure Stage 3 03/28/23 1125   Dressing Status Old drainage noted 03/28/23 1125   Wound Cleansed Soap and water 03/28/23 1125   Dressing/Treatment Xeroform;Gauze dressing/dressing sponge;Roll gauze;Tape/Soft cloth adhesive tape; Ace wrap 03/16/23 1630   Offloading for Diabetic Foot Ulcers Offloading boot 03/28/23 1125   Wound Length (cm) 1.2 cm 03/28/23 1125   Wound Width (cm) 0.8 cm 03/28/23 1125   Wound Depth (cm) 0.1 cm 03/28/23 1125   Wound Surface Area (cm^2) 0.96 cm^2 03/28/23 1125   Change in Wound Size % (l*w) -6.67 03/28/23 1125   Wound Volume (cm^3) 0.096 cm^3 03/28/23 1125   Wound Healing % 47 03/28/23 1125   Post-Procedure Length (cm) 1.1 cm 03/16/23 1531   Post-Procedure Width (cm) 1.4 cm 03/16/23 1531   Post-Procedure Depth (cm) 0.1 cm 03/16/23 1531   Post-Procedure Surface Area (cm^2) 1.54 cm^2 03/16/23 1531

## 2023-03-29 ENCOUNTER — OFFICE VISIT (OUTPATIENT)
Dept: NEUROSURGERY | Facility: CLINIC | Age: 53
End: 2023-03-29
Payer: MEDICARE

## 2023-03-29 VITALS — HEIGHT: 69 IN | BODY MASS INDEX: 18.66 KG/M2 | WEIGHT: 126 LBS

## 2023-03-29 DIAGNOSIS — G35 MS (MULTIPLE SCLEROSIS): ICD-10-CM

## 2023-03-29 DIAGNOSIS — R25.2 SPASTICITY: Primary | ICD-10-CM

## 2023-03-29 DIAGNOSIS — Z46.2 END OF BATTERY LIFE OF INTRATHECAL INFUSION PUMP: ICD-10-CM

## 2023-03-29 PROCEDURE — 1159F MED LIST DOCD IN RCRD: CPT | Performed by: NURSE PRACTITIONER

## 2023-03-29 PROCEDURE — 1160F RVW MEDS BY RX/DR IN RCRD: CPT | Performed by: NURSE PRACTITIONER

## 2023-03-29 PROCEDURE — 99024 POSTOP FOLLOW-UP VISIT: CPT | Performed by: NURSE PRACTITIONER

## 2023-03-29 RX ORDER — AZELASTINE 1 MG/ML
1 SPRAY, METERED NASAL
COMMUNITY

## 2023-03-29 RX ORDER — ONDANSETRON 4 MG/1
TABLET, ORALLY DISINTEGRATING ORAL
COMMUNITY
Start: 2023-02-21

## 2023-03-29 RX ORDER — COLLAGENASE SANTYL 250 [ARB'U]/G
OINTMENT TOPICAL 2 TIMES DAILY
COMMUNITY
Start: 2023-03-17

## 2023-03-29 RX ORDER — IPRATROPIUM BROMIDE AND ALBUTEROL SULFATE 2.5; .5 MG/3ML; MG/3ML
1 SOLUTION RESPIRATORY (INHALATION)
COMMUNITY

## 2023-04-17 ENCOUNTER — HOSPITAL ENCOUNTER (EMERGENCY)
Facility: HOSPITAL | Age: 53
Discharge: HOME | End: 2023-04-17
Attending: FAMILY MEDICINE | Admitting: FAMILY MEDICINE
Payer: MEDICARE

## 2023-04-17 ENCOUNTER — OFFICE VISIT (OUTPATIENT)
Dept: WOUND CARE | Facility: HOSPITAL | Age: 53
End: 2023-04-17
Payer: MEDICARE

## 2023-04-17 VITALS
WEIGHT: 125 LBS | RESPIRATION RATE: 19 BRPM | TEMPERATURE: 98.4 F | DIASTOLIC BLOOD PRESSURE: 78 MMHG | SYSTOLIC BLOOD PRESSURE: 127 MMHG | BODY MASS INDEX: 18.94 KG/M2 | OXYGEN SATURATION: 96 % | HEART RATE: 80 BPM | HEIGHT: 68 IN

## 2023-04-17 DIAGNOSIS — I95.9 HYPOTENSION, UNSPECIFIED HYPOTENSION TYPE: Primary | ICD-10-CM

## 2023-04-17 LAB
ANION GAP SERPL CALCULATED.3IONS-SCNC: 15 MMOL/L (ref 5–15)
BASOPHILS # BLD AUTO: 0.02 10*3/MM3 (ref 0–0.2)
BASOPHILS NFR BLD AUTO: 0.2 % (ref 0–1.5)
BUN SERPL-MCNC: 13 MG/DL (ref 6–20)
BUN/CREAT SERPL: 41.9 (ref 7–25)
CALCIUM SPEC-SCNC: 9.4 MG/DL (ref 8.6–10.5)
CHLORIDE SERPL-SCNC: 95 MMOL/L (ref 98–107)
CK SERPL-CCNC: 22 U/L (ref 20–180)
CO2 SERPL-SCNC: 26 MMOL/L (ref 22–29)
CREAT SERPL-MCNC: 0.31 MG/DL (ref 0.57–1)
D-LACTATE SERPL-SCNC: 2.3 MMOL/L (ref 0.5–2)
DEPRECATED RDW RBC AUTO: 41.5 FL (ref 37–54)
EGFRCR SERPLBLD CKD-EPI 2021: 126.8 ML/MIN/1.73
EOSINOPHIL # BLD AUTO: 0 10*3/MM3 (ref 0–0.4)
EOSINOPHIL NFR BLD AUTO: 0 % (ref 0.3–6.2)
ERYTHROCYTE [DISTWIDTH] IN BLOOD BY AUTOMATED COUNT: 14.1 % (ref 12.3–15.4)
GLUCOSE SERPL-MCNC: 124 MG/DL (ref 65–99)
HCT VFR BLD AUTO: 37.7 % (ref 34–46.6)
HGB BLD-MCNC: 11.4 G/DL (ref 12–15.9)
LYMPHOCYTES # BLD AUTO: 0.7 10*3/MM3 (ref 0.7–3.1)
LYMPHOCYTES NFR BLD AUTO: 7.9 % (ref 19.6–45.3)
MAGNESIUM SERPL-MCNC: 1.9 MG/DL (ref 1.6–2.6)
MCH RBC QN AUTO: 24.7 PG (ref 26.6–33)
MCHC RBC AUTO-ENTMCNC: 30.2 G/DL (ref 31.5–35.7)
MCV RBC AUTO: 81.6 FL (ref 79–97)
MONOCYTES # BLD AUTO: 0.62 10*3/MM3 (ref 0.1–0.9)
MONOCYTES NFR BLD AUTO: 7 % (ref 5–12)
NEUTROPHILS NFR BLD AUTO: 7.5 10*3/MM3 (ref 1.7–7)
NEUTROPHILS NFR BLD AUTO: 84.6 % (ref 42.7–76)
PLATELET # BLD AUTO: 335 10*3/MM3 (ref 140–450)
PMV BLD AUTO: 13.1 FL (ref 6–12)
POTASSIUM SERPL-SCNC: 3.3 MMOL/L (ref 3.5–5.2)
PROCALCITONIN SERPL-MCNC: 0.08 NG/ML (ref 0–0.25)
RBC # BLD AUTO: 4.62 10*6/MM3 (ref 3.77–5.28)
SODIUM SERPL-SCNC: 136 MMOL/L (ref 136–145)
WBC NRBC COR # BLD: 8.87 10*3/MM3 (ref 3.4–10.8)

## 2023-04-17 PROCEDURE — 99284 EMERGENCY DEPT VISIT MOD MDM: CPT

## 2023-04-17 PROCEDURE — G0463 HOSPITAL OUTPT CLINIC VISIT: HCPCS

## 2023-04-17 PROCEDURE — 83605 ASSAY OF LACTIC ACID: CPT | Performed by: FAMILY MEDICINE

## 2023-04-17 PROCEDURE — 85025 COMPLETE CBC W/AUTO DIFF WBC: CPT | Performed by: FAMILY MEDICINE

## 2023-04-17 PROCEDURE — 82550 ASSAY OF CK (CPK): CPT | Performed by: FAMILY MEDICINE

## 2023-04-17 PROCEDURE — 84145 PROCALCITONIN (PCT): CPT | Performed by: FAMILY MEDICINE

## 2023-04-17 PROCEDURE — 83735 ASSAY OF MAGNESIUM: CPT | Performed by: FAMILY MEDICINE

## 2023-04-17 PROCEDURE — 80048 BASIC METABOLIC PNL TOTAL CA: CPT | Performed by: FAMILY MEDICINE

## 2023-04-17 RX ORDER — SODIUM CHLORIDE 0.9 % (FLUSH) 0.9 %
10 SYRINGE (ML) INJECTION AS NEEDED
Status: DISCONTINUED | OUTPATIENT
Start: 2023-04-17 | End: 2023-04-17 | Stop reason: HOSPADM

## 2023-04-17 RX ADMIN — SODIUM CHLORIDE 1000 ML: 9 INJECTION, SOLUTION INTRAVENOUS at 16:09

## 2023-04-17 NOTE — DISCHARGE INSTRUCTIONS
periwound. Then blot with skin prep to form crusting to protect skin. May repeat skin prep, powdering steps until proper crusting is made. )  Then apply drape to periwound. DO NOT PUT FOAM ON GOOD SKIN. Apply black foam to wound bed. BRIDGE to offload bony prominence from pressure. Set wound vac to 125 mmHG, continuous. Change wound vac dressing 3 times per week (MWF or TTS)  Reapply vac dressing if vac stops working or dressing begins to leak or cannot be reinforced. Alternative dressing: Wash with soap and water. Apply 1/4 STR Dakins moistened gauze to wound bed. Cover with gauze. Secure with roll gauze and medipore tape. Change twice daily . Make sure your [facility] cushion is not bottoming out when you sit. Limit sitting to 30 minutes to 1 hour at a time with frequent weight shifts. Eat plenty of Protein rich foods  Avoid Pressure to wound site. Turn frequently (turn at least every two hours when in bed)         Treatment Orders:  Protein rich diet (unless restricted by your physician); Multivitamin daily; Elevate legs  above the level of your heart when sitting      Wear Heel Lift Boots, No shoes or tight dressing or socks    [facility] low air loss bed  Roho cushion for chair           [facility],[address] follow up visit _____2 weeks Dr. Moreno________________________  (Please note your next appointment above and if you are unable to keep, kindly give a 24 hour notice. Thank you.)          If you experience any of the following, please call the [facility] during business hours:    * Increase in Pain  * Temperature over 101  * Increase in drainage from your wound  * Drainage with a foul odor  * Bleeding  * Increase in swelling  * Need for compression bandage changes due to slippage, breakthrough drainage. If you need medical attention outside of the business hours of the [facility] please contact your PCP or go to the nearest emergency room.

## 2023-04-18 ENCOUNTER — HOSPITAL ENCOUNTER (OUTPATIENT)
Dept: WOUND CARE | Age: 53
Discharge: HOME | End: 2023-04-18
Payer: MEDICARE

## 2023-04-18 VITALS
DIASTOLIC BLOOD PRESSURE: 90 MMHG | BODY MASS INDEX: 17.45 KG/M2 | HEART RATE: 120 BPM | SYSTOLIC BLOOD PRESSURE: 124 MMHG | TEMPERATURE: 96.9 F | RESPIRATION RATE: 18 BRPM | WEIGHT: 118.17 LBS

## 2023-04-18 DIAGNOSIS — L97.512 NEUROPATHIC ULCER OF TOE, RIGHT, WITH FAT LAYER EXPOSED (HCC): Primary | ICD-10-CM

## 2023-04-18 PROCEDURE — 11042 DBRDMT SUBQ TIS 1ST 20SQCM/<: CPT

## 2023-04-18 PROCEDURE — 6370000000 HC RX 637 (ALT 250 FOR IP): Performed by: NURSE PRACTITIONER

## 2023-04-18 PROCEDURE — 11045 DBRDMT SUBQ TISS EACH ADDL: CPT

## 2023-04-18 RX ORDER — LIDOCAINE 40 MG/G
CREAM TOPICAL ONCE
OUTPATIENT
Start: 2023-04-18 | End: 2023-04-18

## 2023-04-18 RX ORDER — LIDOCAINE HYDROCHLORIDE 20 MG/ML
JELLY TOPICAL ONCE
OUTPATIENT
Start: 2023-04-18 | End: 2023-04-18

## 2023-04-18 RX ORDER — LIDOCAINE HYDROCHLORIDE 40 MG/ML
SOLUTION TOPICAL ONCE
OUTPATIENT
Start: 2023-04-18 | End: 2023-04-18

## 2023-04-18 RX ORDER — LIDOCAINE HYDROCHLORIDE 20 MG/ML
JELLY TOPICAL ONCE
Status: COMPLETED | OUTPATIENT
Start: 2023-04-18 | End: 2023-04-18

## 2023-04-18 RX ORDER — LIDOCAINE 50 MG/G
OINTMENT TOPICAL ONCE
OUTPATIENT
Start: 2023-04-18 | End: 2023-04-18

## 2023-04-18 RX ADMIN — LIDOCAINE HYDROCHLORIDE: 20 JELLY TOPICAL at 11:40

## 2023-04-18 ASSESSMENT — PAIN DESCRIPTION - LOCATION: LOCATION: ABDOMEN;NECK

## 2023-04-18 ASSESSMENT — PAIN - FUNCTIONAL ASSESSMENT: PAIN_FUNCTIONAL_ASSESSMENT: PREVENTS OR INTERFERES SOME ACTIVE ACTIVITIES AND ADLS

## 2023-04-18 ASSESSMENT — PAIN DESCRIPTION - PAIN TYPE: TYPE: CHRONIC PAIN

## 2023-04-18 ASSESSMENT — PAIN DESCRIPTION - FREQUENCY: FREQUENCY: INTERMITTENT

## 2023-04-18 ASSESSMENT — PAIN DESCRIPTION - ONSET: ONSET: ON-GOING

## 2023-04-18 ASSESSMENT — PAIN DESCRIPTION - DESCRIPTORS: DESCRIPTORS: THROBBING

## 2023-04-18 ASSESSMENT — PAIN SCALES - GENERAL: PAINLEVEL_OUTOF10: 5

## 2023-04-18 NOTE — DISCHARGE INSTR - COC
Pharmacy called to clarify instructions on the Simvastatin 10 mg  2 sets of directions  Advised it is 3 times a week  LMOM 
Change in Wound Size % (l*w) -107.78 04/18/23 1127   Wound Volume (cm^3) 0.374 cm^3 04/18/23 1127   Wound Healing % -108 04/18/23 1127   Post-Procedure Length (cm) 1.1 cm 03/16/23 1531   Post-Procedure Width (cm) 1.4 cm 03/16/23 1531   Post-Procedure Depth (cm) 0.1 cm 03/16/23 1531   Post-Procedure Surface Area (cm^2) 1.54 cm^2 03/16/23 1531   Post-Procedure Volume (cm^3) 0.154 cm^3 03/16/23 1531   Distance Tunneling (cm) 0 cm 03/28/23 1125   Tunneling Position ___ O'Clock 0 03/28/23 1125   Undermining Starts ___ O'Clock 0 03/28/23 1125   Undermining Ends___ O'Clock 0 03/28/23 1125   Undermining Maxium Distance (cm) 0 03/28/23 1125   Wound Assessment Pink/red;Slough 04/18/23 1127   Drainage Amount Moderate 04/18/23 1127   Drainage Description Serosanguinous 04/18/23 1127   Odor None 04/18/23 1127   Audelia-wound Assessment Intact 04/18/23 1127   Margins Attached edges 04/18/23 1127   Wound Thickness Description not for Pressure Injury Full thickness 03/16/23 1409   Number of days: 97       Wound 01/26/23 Buttocks Right Wound 6, right buttock, pressure injury (Active)   Wound Image   04/18/23 1127   Wound Etiology Pressure Stage 4 04/18/23 1127   Dressing Status Old drainage noted 04/18/23 1127   Wound Cleansed Soap and water 04/18/23 1127   Dressing/Treatment Moisten with saline;Gauze dressing/dressing sponge;Tape/Soft cloth adhesive tape 03/28/23 1230   Wound Length (cm) 6 cm 04/18/23 1127   Wound Width (cm) 6.8 cm 04/18/23 1127   Wound Depth (cm) 2 cm 04/18/23 1127   Wound Surface Area (cm^2) 40.8 cm^2 04/18/23 1127   Change in Wound Size % (l*w) -2166.67 04/18/23 1127   Wound Volume (cm^3) 81.6 cm^3 04/18/23 1127   Wound Healing % -53315 04/18/23 1127   Post-Procedure Length (cm) 6 cm 04/18/23 1146   Post-Procedure Width (cm) 6.8 cm 04/18/23 1146   Post-Procedure Depth (cm) 2 cm 04/18/23 1146   Post-Procedure Surface Area (cm^2) 40.8 cm^2 04/18/23 1146   Post-Procedure Volume (cm^3) 81.6 cm^3 04/18/23 1146

## 2023-04-19 NOTE — PLAN OF CARE
Problem: Chronic Conditions and Co-morbidities  Goal: Patient's chronic conditions and co-morbidity symptoms are monitored and maintained or improved  Outcome: Progressing     Problem: Discharge Planning  Goal: Discharge to home or other facility with appropriate resources  Outcome: Progressing     Problem: Pain  Goal: Verbalizes/displays adequate comfort level or baseline comfort level  Outcome: Progressing     Problem: Wound:  Goal: Will show signs of wound healing; wound closure and no evidence of infection  Description: Will show signs of wound healing; wound closure and no evidence of infection  Outcome: Progressing     Problem: Pressure Ulcer:  Goal: Signs of wound healing will improve  Description: Signs of wound healing will improve  Outcome: Progressing  Goal: Absence of new pressure ulcer  Description: Absence of new pressure ulcer  Outcome: Progressing  Goal: Will show no infection signs and symptoms  Description: Will show no infection signs and symptoms  Outcome: Progressing     Problem: Weight control:  Goal: Ability to maintain an optimal weight for height and age will be supported  Description: Ability to maintain an optimal weight for height and age will be supported  Outcome: Progressing     Problem: Falls - Risk of:  Goal: Will remain free from falls  Description: Will remain free from falls  Outcome: Progressing     Problem: Blood Glucose:  Goal: Ability to maintain appropriate glucose levels will improve  Description: Ability to maintain appropriate glucose levels will improve  Outcome: Progressing
Wound Surface Area (cm^2) 3.4 cm^2 04/18/23 1127   Wound Volume (cm^3) 0.68 cm^3 04/18/23 1127   Post-Procedure Length (cm) 1.7 cm 04/18/23 1146   Post-Procedure Width (cm) 2 cm 04/18/23 1146   Post-Procedure Depth (cm) 0.2 cm 04/18/23 1146   Post-Procedure Surface Area (cm^2) 3.4 cm^2 04/18/23 1146   Post-Procedure Volume (cm^3) 0.68 cm^3 04/18/23 1146   Wound Assessment Pink/red;Slough 04/18/23 1127   Drainage Amount Moderate 04/18/23 1127   Drainage Description Serosanguinous 04/18/23 1127   Odor None 04/18/23 1127   Audelia-wound Assessment Intact 04/18/23 1127   Margins Defined edges 04/18/23 1127   Number of days: 0       Electronically signed by Clay Sandoval RN on 4/18/2023 at 8:47 AM    Providers Information:      PROVIDER'S NAME: Dr. Karla Donahue    NPI: [id number]

## 2023-04-20 ENCOUNTER — HOME HEALTH ADMISSION (OUTPATIENT)
Dept: HOME HEALTH SERVICES | Facility: HOME HEALTHCARE | Age: 53
End: 2023-04-20
Payer: MEDICARE

## 2023-04-21 ENCOUNTER — LAB REQUISITION (OUTPATIENT)
Dept: LAB | Facility: HOSPITAL | Age: 53
End: 2023-04-21
Payer: MEDICARE

## 2023-04-21 ENCOUNTER — OFFICE VISIT (OUTPATIENT)
Dept: WOUND CARE | Facility: HOSPITAL | Age: 53
End: 2023-04-21
Payer: MEDICARE

## 2023-04-21 PROCEDURE — 87186 SC STD MICRODIL/AGAR DIL: CPT | Performed by: NURSE PRACTITIONER

## 2023-04-21 PROCEDURE — 87070 CULTURE OTHR SPECIMN AEROBIC: CPT | Performed by: NURSE PRACTITIONER

## 2023-04-21 PROCEDURE — 87205 SMEAR GRAM STAIN: CPT | Performed by: NURSE PRACTITIONER

## 2023-04-21 PROCEDURE — G0463 HOSPITAL OUTPT CLINIC VISIT: HCPCS

## 2023-04-21 PROCEDURE — 87176 TISSUE HOMOGENIZATION CULTR: CPT | Performed by: NURSE PRACTITIONER

## 2023-04-21 PROCEDURE — 87077 CULTURE AEROBIC IDENTIFY: CPT | Performed by: NURSE PRACTITIONER

## 2023-04-21 PROCEDURE — 87075 CULTR BACTERIA EXCEPT BLOOD: CPT | Performed by: NURSE PRACTITIONER

## 2023-04-24 ENCOUNTER — TELEPHONE (OUTPATIENT)
Dept: INTERNAL MEDICINE CLINIC | Age: 53
End: 2023-04-24

## 2023-04-24 LAB
BACTERIA SPEC AEROBE CULT: ABNORMAL
BACTERIA SPEC AEROBE CULT: ABNORMAL
GRAM STN SPEC: ABNORMAL

## 2023-04-24 RX ORDER — LEVETIRACETAM 750 MG/1
750 TABLET ORAL DAILY
Qty: 30 TABLET | Refills: 0 | Status: SHIPPED | OUTPATIENT
Start: 2023-04-24 | End: 2023-05-24

## 2023-04-24 NOTE — TELEPHONE ENCOUNTER
[facility] reporting that pt has port that has not been flushed since Feb   Would you want HH to flush that port monthly ? If so , What size salomon and what dose heparin flush would you want them to use  ?      Please let me know if pt is no longer seeing Dr Emilio Srivastava , they reported that she is PCP but that is not what Epic says now

## 2023-04-26 LAB — BACTERIA SPEC ANAEROBE CULT: NORMAL

## 2023-04-28 ENCOUNTER — OFFICE VISIT (OUTPATIENT)
Dept: WOUND CARE | Facility: HOSPITAL | Age: 53
End: 2023-04-28
Payer: MEDICARE

## 2023-04-28 PROCEDURE — 97605 NEG PRS WND THER DME<=50SQCM: CPT

## 2023-05-01 NOTE — DISCHARGE INSTRUCTIONS
[address]  [facility] and Hyperbaric Oxygen Therapy   Physician Orders and Discharge Instructions   [facility]  [place], [address]  Telephone: [phone]    NAME:  Bigg Flow OF BIRTH:  1970  MEDICAL RECORD NUMBER:  [id number]  DATE:  2023    Discharge condition: {STABLE/UNSTABLE:177971462}    Discharge to: {[facility]P Wound Discharge To:97911}    Left via:{Left WC}    Accompanied by:  {:[id number]}      ECF/HHA: [facility] (begin [facility]) 3M/KCI wound vac to be ordered by the [address] [place] [address],[address], place place when it  arrives to patient home. Dressing Orders:  Incision line Right Foot: Wash with soap and water. Apply Adaptic over the incision  line, cover with dry gauze, secure with Kerlix and medipore tape. Change dressing  daily. Right ankle: Wash with soap and water, Apply a double layer of Xeroform (the  yellow sticky dressing) over the wound bed daily, cover with 4x4 gauze, roll gauze  and medipore tape. Apply 6 inch ace wrap from incision line to bend of knee, do not wrap tight. Area on top of foot . Hermilo Goodman use Tegaderm hydrocolloid with border, change 3 x wk . .when you get it. Sacral area ( the one in the middle): moisturize and protect, offload from pressure  Right Buttocks/Ischial area: Soap and water wash, apply Santyl (nickel-thickness) to wound bed, place / St. Dakins  Solution moistened gauze in wound over Santyl, cover with dry gauze, secure with roll gauze twice daily. Use this  dressing until the wound vac arrives for placement. Location of Wound:Right Buttocks/Ischial area    Wash with soap and water  Apply wound vac as follows: Apply Skin Prep to periwound. Apply drape - window pane to surrounding area  (periwound). (May use duoderm instead of drape to prevent skin breakdown. Use ostomy paste to fill any creases to prevent leakage. If skin becomes red due to tape irritation please apply skin prep.  Then apply light dusting of

## 2023-05-02 ENCOUNTER — HOSPITAL ENCOUNTER (OUTPATIENT)
Dept: WOUND CARE | Age: 53
Discharge: HOME | End: 2023-05-02

## 2023-05-08 ENCOUNTER — OFFICE VISIT (OUTPATIENT)
Dept: WOUND CARE | Facility: HOSPITAL | Age: 53
End: 2023-05-08
Payer: MEDICARE

## 2023-05-08 PROCEDURE — 97605 NEG PRS WND THER DME<=50SQCM: CPT

## 2023-05-09 DIAGNOSIS — R53.83 OTHER FATIGUE: ICD-10-CM

## 2023-05-09 DIAGNOSIS — M54.2 PAIN, NECK: ICD-10-CM

## 2023-05-09 RX ORDER — NITROFURANTOIN MACROCRYSTALS 50 MG/1
50 CAPSULE ORAL NIGHTLY
Qty: 90 CAPSULE | Refills: 1 | OUTPATIENT
Start: 2023-05-09

## 2023-05-09 RX ORDER — HYDROCODONE BITARTRATE AND ACETAMINOPHEN 10; 325 MG/1; MG/1
TABLET ORAL
Qty: 90 TABLET | Refills: 0 | Status: SHIPPED | OUTPATIENT
Start: 2023-05-12 | End: 2023-06-11

## 2023-05-09 RX ORDER — METHYLPHENIDATE HYDROCHLORIDE 20 MG/1
TABLET ORAL
Qty: 30 TABLET | Refills: 0 | Status: SHIPPED | OUTPATIENT
Start: 2023-05-13 | End: 2023-06-12

## 2023-05-09 NOTE — TELEPHONE ENCOUNTER
Requested Prescriptions     Pending Prescriptions Disp Refills    methylphenidate (RITALIN) 20 MG tablet [Pharmacy Med Name: METHYLPHENIDATE HYDROCHLORIDE 20MG TABLET] 30 tablet 0     Sig: TAKE 1 TABLET BY MOUTH DAILY       Last Office Visit:  2/21/2023  Next Office Visit:  Visit date not found  Last Medication Refill:  4/13/2023 with 0 JEFFREY Denise up to date:  5/9/2023     *RX updated to reflect   5/13/2023   fill date*

## 2023-05-09 NOTE — TELEPHONE ENCOUNTER
Requested Prescriptions     Pending Prescriptions Disp Refills    HYDROcodone-acetaminophen ([address])  MG per tablet [Pharmacy Med Name: HYDROCODONE BITARTRATE/ACETAMINOPHEN 325MG-10MG TABLET] 90 tablet 0     Sig: TAKE ONE TABLET BY MOUTH THREE TIMES DAILY.  REDUCE DOSES TAKEN AS PAIN BECOMES MANAGEABLE       Last Office Visit:  2/21/2023  Next Office Visit:  Visit date not found  Last Medication Refill: 4/12/2023     Juliet Loge up to date:  5/9/2023    *RX updated to reflect   5/12/2023   fill date*

## 2023-05-10 NOTE — TELEPHONE ENCOUNTER
Called and left patient a VM to let her know when I have her scheduled an appointment with Dr. Malini Garcia for continuation of medication refills. Left on VM the appointment time/date.

## 2023-05-12 RX ORDER — NITROFURANTOIN MACROCRYSTALS 50 MG/1
50 CAPSULE ORAL NIGHTLY
Qty: 90 CAPSULE | Refills: 1 | OUTPATIENT
Start: 2023-05-12

## 2023-05-15 ENCOUNTER — OFFICE VISIT (OUTPATIENT)
Dept: WOUND CARE | Facility: HOSPITAL | Age: 53
End: 2023-05-15
Payer: MEDICARE

## 2023-05-15 PROCEDURE — 97605 NEG PRS WND THER DME<=50SQCM: CPT

## 2023-05-19 ENCOUNTER — TELEPHONE (OUTPATIENT)
Dept: NEUROSURGERY | Facility: CLINIC | Age: 53
End: 2023-05-19
Payer: MEDICARE

## 2023-05-22 ENCOUNTER — OFFICE VISIT (OUTPATIENT)
Dept: WOUND CARE | Facility: HOSPITAL | Age: 53
End: 2023-05-22
Payer: MEDICARE

## 2023-05-22 PROCEDURE — 97605 NEG PRS WND THER DME<=50SQCM: CPT

## 2023-05-24 RX ORDER — LEVETIRACETAM 750 MG/1
750 TABLET ORAL DAILY
Qty: 30 TABLET | Refills: 5 | Status: SHIPPED | OUTPATIENT
Start: 2023-05-24

## 2023-05-24 NOTE — TELEPHONE ENCOUNTER
Requested Prescriptions     Pending Prescriptions Disp Refills    levETIRAcetam (KEPPRA) 750 MG tablet [Pharmacy Med Name: LEVETIRACETAM 750MG TABLET] 30 tablet 5     Sig: TAKE 1 TABLET BY MOUTH DAILY       Last Office Visit: 2/21/2023  Next Office Visit: 7/19/2023  Last Medication Refill: 4/24/23      Tor

## 2023-05-30 ENCOUNTER — OFFICE VISIT (OUTPATIENT)
Dept: WOUND CARE | Facility: HOSPITAL | Age: 53
End: 2023-05-30

## 2023-05-30 PROCEDURE — 97605 NEG PRS WND THER DME<=50SQCM: CPT

## 2023-06-05 ENCOUNTER — OFFICE VISIT (OUTPATIENT)
Dept: WOUND CARE | Facility: HOSPITAL | Age: 53
End: 2023-06-05
Payer: MEDICARE

## 2023-06-05 PROCEDURE — 97605 NEG PRS WND THER DME<=50SQCM: CPT

## 2023-06-06 DIAGNOSIS — M54.2 PAIN, NECK: ICD-10-CM

## 2023-06-06 DIAGNOSIS — R53.83 OTHER FATIGUE: ICD-10-CM

## 2023-06-06 NOTE — TELEPHONE ENCOUNTER
Requested Prescriptions     Pending Prescriptions Disp Refills    pregabalin (LYRICA) 300 MG capsule [Pharmacy Med Name: PREGABALIN 300MG CAPSULE] 60 capsule 2     Sig: TAKE 1 CAPSULE BY MOUTH TWICE A DAY       Last Office Visit:  2/21/2023  Next Office Visit:  7/19/2023  Last Medication Refill:  3/7/2023 with 2 RF   Prakash Risk up to date:  5/9/2023     *RX updated to reflect   6/6/2023  fill date*

## 2023-06-07 RX ORDER — PREGABALIN 300 MG/1
CAPSULE ORAL
Qty: 60 CAPSULE | Refills: 2 | Status: SHIPPED | OUTPATIENT
Start: 2023-06-07 | End: 2023-07-07

## 2023-06-12 ENCOUNTER — OFFICE VISIT (OUTPATIENT)
Dept: WOUND CARE | Facility: HOSPITAL | Age: 53
End: 2023-06-12
Payer: MEDICARE

## 2023-06-12 PROCEDURE — 97605 NEG PRS WND THER DME<=50SQCM: CPT

## 2023-06-23 NOTE — PROGRESS NOTES
[facility] Pharmacokinetic Monitoring Service - Vancomycin     Sravani Crane is a 47 y.o. female starting on vancomycin therapy for SSTI. Pharmacy consulted by Dr. Luz Maria Bruno for monitoring and adjustment. Target Concentration: Goal trough of 10-15 mg/L and AUC/AYLA <500 mg*hr/L    Additional Antimicrobials: Merrem    Pertinent Laboratory Values: Wt Readings from Last 1 Encounters:   11/02/22 128 lb 8 oz (58.3 kg)     Temp Readings from Last 1 Encounters:   11/02/22 98.4 °F (36.9 °C) (Temporal)     Estimated Creatinine Clearance: 204 mL/min (A) (based on SCr of 0.3 mg/dL (L)). Recent Labs     11/02/22  1513   CREATININE 0.3*   WBC 4.8       Pertinent Cultures:  Culture Date Source Results   11/2/22 Blood Sent   MRSA Nasal Swab: N/A. Non-respiratory infection.     Plan:  Dosing recommendations based on Bayesian software  Start vancomycin 1250 mg IV q12h  Anticipated AUC of 462 and trough concentration of 12 at steady state  Renal labs as indicated   Vancomycin concentration ordered for 11/4 @ 3430   Pharmacy will continue to monitor patient and adjust therapy as indicated    Thank you for the consult,  Ap Childs RPH, BCPS  11/2/2022 10:04 PM Cal[facility]

## 2023-07-05 DIAGNOSIS — R53.83 OTHER FATIGUE: ICD-10-CM

## 2023-07-05 DIAGNOSIS — M54.2 PAIN, NECK: ICD-10-CM

## 2023-07-05 RX ORDER — METHYLPHENIDATE HYDROCHLORIDE 20 MG/1
TABLET ORAL
Qty: 30 TABLET | Refills: 0 | Status: SHIPPED | OUTPATIENT
Start: 2023-07-05 | End: 2023-08-04

## 2023-07-05 RX ORDER — HYDROCODONE BITARTRATE AND ACETAMINOPHEN 10; 325 MG/1; MG/1
TABLET ORAL
Qty: 90 TABLET | Refills: 0 | Status: SHIPPED | OUTPATIENT
Start: 2023-07-05 | End: 2023-08-04

## 2023-07-05 NOTE — TELEPHONE ENCOUNTER
Requested Prescriptions     Pending Prescriptions Disp Refills    HYDROcodone-acetaminophen ([address])  MG per tablet [Pharmacy Med Name: HYDROCODONE BITARTRATE/ACETAMINOPHEN 325MG-10MG TABLET] 90 tablet 0     Sig: TAKE ONE TABLET BY MOUTH THREE TIMES DAILY.  REDUCE DOSES TAKEN AS PAIN BECOMES MANAGEABLE    methylphenidate (RITALIN) 20 MG tablet [Pharmacy Med Name: METHYLPHENIDATE HYDROCHLORIDE 20MG TABLET] 30 tablet 0     Sig: TAKE ONE TABLET BY MOUTH DAILY       Last Office Visit:  2/21/2023  Next Office Visit:  7/19/2023  Last Medication Refill:  both 5/13/23  Chery Robles up to date:  5/9/23    *RX updated to reflect   7/5/23  fill date*

## 2023-07-10 NOTE — TELEPHONE ENCOUNTER
Requested Prescriptions     Pending Prescriptions Disp Refills    methylphenidate (RITALIN) 20 MG tablet [Pharmacy Med Name: METHYLPHENIDATE HYDROCHLORIDE 20MG TABLET] 30 tablet 0     Sig: TAKE 1 TABLET BY MOUTH DAILY    HYDROcodone-acetaminophen (NORCO)  MG per tablet [Pharmacy Med Name: HYDROCODONE BITARTRATE/ACETAMINOPHEN 325MG-10MG TABLET] 90 tablet 0     Sig: TAKE 1 TABLET BY MOUTH 3 TIMES DAILY AS NEEDED FOR PAIN.  REDUCE DOSES AS PAIN BECOMES MANAGEABLE       Last Office Visit:  11/4/2021  Next Office Visit:  2/8/2022  Last Medication Refill:  12/06/2021  Beba López up to date:  Up to date    *RX updated to reflect   01/06/2022 warm and dry/color normal

## 2023-07-19 ENCOUNTER — OFFICE VISIT (OUTPATIENT)
Dept: NEUROLOGY | Age: 53
End: 2023-07-19
Payer: MEDICARE

## 2023-07-19 VITALS
SYSTOLIC BLOOD PRESSURE: 124 MMHG | OXYGEN SATURATION: 99 % | BODY MASS INDEX: 18.51 KG/M2 | HEART RATE: 68 BPM | DIASTOLIC BLOOD PRESSURE: 64 MMHG | WEIGHT: 125 LBS | HEIGHT: 69 IN

## 2023-07-19 DIAGNOSIS — G40.909 SEIZURE DISORDER (HCC): ICD-10-CM

## 2023-07-19 DIAGNOSIS — G35 MS (MULTIPLE SCLEROSIS) (HCC): Primary | ICD-10-CM

## 2023-07-19 DIAGNOSIS — R56.9 SEIZURE (HCC): ICD-10-CM

## 2023-07-19 DIAGNOSIS — R20.0 NUMBNESS: ICD-10-CM

## 2023-07-19 DIAGNOSIS — M54.2 PAIN, NECK: ICD-10-CM

## 2023-07-19 DIAGNOSIS — M62.838 MUSCLE SPASTICITY: ICD-10-CM

## 2023-07-19 DIAGNOSIS — M79.622 PAIN IN BOTH UPPER ARMS: ICD-10-CM

## 2023-07-19 DIAGNOSIS — M79.621 PAIN IN BOTH UPPER ARMS: ICD-10-CM

## 2023-07-19 DIAGNOSIS — G82.20 PARAPLEGIA (HCC): ICD-10-CM

## 2023-07-19 DIAGNOSIS — R53.83 OTHER FATIGUE: ICD-10-CM

## 2023-07-19 DIAGNOSIS — R53.1 WEAKNESS: ICD-10-CM

## 2023-07-19 PROCEDURE — G8419 CALC BMI OUT NRM PARAM NOF/U: HCPCS | Performed by: PSYCHIATRY & NEUROLOGY

## 2023-07-19 PROCEDURE — 4004F PT TOBACCO SCREEN RCVD TLK: CPT | Performed by: PSYCHIATRY & NEUROLOGY

## 2023-07-19 PROCEDURE — G8427 DOCREV CUR MEDS BY ELIG CLIN: HCPCS | Performed by: PSYCHIATRY & NEUROLOGY

## 2023-07-19 PROCEDURE — 3017F COLORECTAL CA SCREEN DOC REV: CPT | Performed by: PSYCHIATRY & NEUROLOGY

## 2023-07-19 PROCEDURE — 99214 OFFICE O/P EST MOD 30 MIN: CPT | Performed by: PSYCHIATRY & NEUROLOGY

## 2023-07-19 RX ORDER — ESZOPICLONE 2 MG/1
TABLET, FILM COATED ORAL
COMMUNITY
Start: 2023-07-18

## 2023-07-19 NOTE — PROGRESS NOTES
July 19, 2023  Lesly Jarrett  1970            Order: Urine Drug Screen for Medication Management  Purpose to monitor patient compliance during active treatment with controlled or scheduled 2 narcotic substances. Profile Requested: [id number], [id number], [id number]  Diagnosis: Z79.899 -Other Long Term (current drug therapy)    Current Medications:  Current Outpatient Medications   Medication Sig Dispense Refill    HYDROcodone-acetaminophen (NORCO)  MG per tablet TAKE ONE TABLET BY MOUTH THREE TIMES DAILY. REDUCE DOSES TAKEN AS PAIN BECOMES MANAGEABLE 90 tablet 0    methylphenidate (RITALIN) 20 MG tablet TAKE ONE TABLET BY MOUTH DAILY 30 tablet 0    pregabalin (LYRICA) 300 MG capsule TAKE 1 CAPSULE BY MOUTH TWICE A DAY 60 capsule 2    levETIRAcetam (KEPPRA) 750 MG tablet TAKE 1 TABLET BY MOUTH DAILY 30 tablet 5    sodium hypochlorite (DAKINS) 0.125 % SOLN external solution Apply topically daily 500 mL 1    collagenase (SANTYL) 250 UNIT/GM ointment Apply topically twice daily. 2 each 5    sodium hypochlorite (DAKINS) 0.125 % SOLN external solution Apply topically BID as directed.  1000 mL 5    DULoxetine (CYMBALTA) 30 MG extended release capsule TAKE 1 CAPSULE BY MOUTH NIGHTLY 90 capsule 2    ondansetron (ZOFRAN-ODT) 4 MG disintegrating tablet DISSOLVE 1 TABLET ON TONGUE 3 TIMES DAILY AS NEEDED FOR NAUSEA OR VOMITING 30 tablet 0    azelastine (ASTELIN) 0.1 % nasal spray 1 spray by Nasal route 2 times daily as needed for Rhinitis Use in each nostril as directed      hydroCHLOROthiazide (HYDRODIURIL) 25 MG tablet Take 25 mg by mouth daily as needed      ipratropium-albuterol (DUONEB) 0.5-2.5 (3) MG/3ML SOLN nebulizer solution Inhale 1 vial into the lungs 2 times daily as needed for Shortness of Breath      pilocarpine (SALAGEN) 7.5 MG tablet Take 7.5 mg by mouth 2 times daily      SODIUM CHLORIDE, EXTERNAL, (SALINE WOUND WASH) 0.9 % SOLN Apply 1 L topically 2 times daily 1000 mL 3    tiZANidine
Review of Systems    Constitutional - No fever or chills. No diaphoresis or significant fatigue. HENT -  No tinnitus or significant hearing loss. Eyes - no sudden vision change or eye pain  Respiratory - no significant shortness of breath or cough  Cardiovascular - no chest pain No palpitations or significant leg swelling  Gastrointestinal - no abdominal swelling or pain. Genitourinary - No difficulty urinating, dysuria  Musculoskeletal - no back pain or myalgia. Skin - no color change or rash  Neurologic - No seizures. No lateralizing weakness. Hematologic - no easy bruising or excessive bleeding. Psychiatric - no severe anxiety or nervousness. All other review of systems are negative.
ostomy-patent and healthy appearing anastomosis in the right colon-entero colonic anastomosis-10 yr recall    COLOSTOMY      FEMUR FRACTURE SURGERY      Right    FOOT AMPUTATION Left 04/04/2019    LEFT TRANSMET AMPUTATION performed by Hossein Contreras MD at [address] Right 02/06/2023    RIGHT TRANSMETATARSAL AMPUTATION performed by Bernardino Chaudhry DO at [facility] (CERVIX STATUS UNKNOWN)      OTHER SURGICAL HISTORY      urostomy    OTHER SURGICAL HISTORY      pain pump    [facility] VAD W/SUBQ PORT UNDER 5 YR N/A 06/26/2018    SINGLE LUMEN PORT PLACEMENT WITH FLUORO performed by Surya Dixon MD at [facility]      TOE AMPUTATION      L last toe    TONSILLECTOMY      URETEROTOMY      VASCULAR SURGERY  02/06/2023    RIGHT TRANSMETATARSAL AMPUTATION; VI       Family History   Problem Relation Age of Onset    Cancer Mother         breast    Colon Cancer Mother     Colon Polyps Mother     Breast Cancer Mother     Diabetes Father     High Blood Pressure Father     Heart Disease Paternal Grandmother     Breast Cancer Paternal Aunt     Cancer Paternal Aunt         breast    Liver Cancer Paternal Aunt     Esophageal Cancer Neg Hx     Liver Disease Neg Hx     Rectal Cancer Neg Hx     Stomach Cancer Neg Hx        Allergies   Allergen Reactions    Darvocet [Propoxyphene N-Acetaminophen]      Talking out of her head    Morphine      Category:  Allergy;     Morphine And Related Itching and Swelling    Propoxyphene Swelling       Social History     Socioeconomic History    Marital status:      Spouse name: Not on file    Number of children: Not on file    Years of education: Not on file    Highest education level: Not on file   Occupational History    Not on file   Tobacco Use    Smoking status: Some Days     Packs/day: 0.50     Years: 15.00     Pack years: 7.50     Types: Cigarettes    Smokeless tobacco: Never    Tobacco comments:     Haven't had a cigarette

## 2023-07-21 ENCOUNTER — OFFICE VISIT (OUTPATIENT)
Dept: WOUND CARE | Facility: HOSPITAL | Age: 53
End: 2023-07-21
Payer: MEDICARE

## 2023-07-28 ENCOUNTER — OFFICE VISIT (OUTPATIENT)
Dept: WOUND CARE | Facility: HOSPITAL | Age: 53
End: 2023-07-28
Payer: MEDICARE

## 2023-08-08 DIAGNOSIS — R53.83 OTHER FATIGUE: ICD-10-CM

## 2023-08-08 DIAGNOSIS — M54.2 PAIN, NECK: ICD-10-CM

## 2023-08-08 RX ORDER — DULOXETIN HYDROCHLORIDE 30 MG/1
30 CAPSULE, DELAYED RELEASE ORAL NIGHTLY
Qty: 90 CAPSULE | Refills: 1 | OUTPATIENT
Start: 2023-08-08

## 2023-08-08 RX ORDER — HYDROCODONE BITARTRATE AND ACETAMINOPHEN 10; 325 MG/1; MG/1
TABLET ORAL
Qty: 90 TABLET | Refills: 0 | Status: SHIPPED | OUTPATIENT
Start: 2023-08-08 | End: 2023-09-07

## 2023-08-08 RX ORDER — METHYLPHENIDATE HYDROCHLORIDE 20 MG/1
TABLET ORAL
Qty: 30 TABLET | Refills: 0 | Status: SHIPPED | OUTPATIENT
Start: 2023-08-08 | End: 2023-09-07

## 2023-08-08 NOTE — TELEPHONE ENCOUNTER
Requested Prescriptions     Pending Prescriptions Disp Refills    methylphenidate (RITALIN) 20 MG tablet [Pharmacy Med Name: METHYLPHENIDATE HYDROCHLORIDE 20MG TABLET] 30 tablet 0     Sig: TAKE ONE TABLET BY MOUTH DAILY       Last Office Visit:  7/19/2023  Next Office Visit:  10/19/2023  Last Medication Refill:  7/5/2023 with 0 JEFFREY Au up to date:  5/9/2023    *RX updated to reflect   8/8/2023  fill date*

## 2023-08-08 NOTE — TELEPHONE ENCOUNTER
Requested Prescriptions     Pending Prescriptions Disp Refills    HYDROcodone-acetaminophen ([address])  MG per tablet [Pharmacy Med Name: HYDROCODONE BITARTRATE/ACETAMINOPHEN 325MG-10MG TABLET] 90 tablet 0     Sig: TAKE ONE TABLET BY MOUTH THREE TIMES DAILY.  REDUCE DOSES TAKEN AS PAIN BECOMES MANAGEABLE       Last Office Visit:  7/19/2023  Next Office Visit:  10/19/2023  Last Medication Refill:  7/5/2023 with 0 RF   Silvano Chi up to date:  5/9/2023    *RX updated to reflect   8/8/2023  fill date*

## 2023-08-08 NOTE — TELEPHONE ENCOUNTER
Alyce called requesting a refill of the below medication which has been pended for you:     Requested Prescriptions     Pending Prescriptions Disp Refills    DULoxetine (CYMBALTA) 30 MG extended release capsule [Pharmacy Med Name: DULOXETINE HYDROCHLORIDE 30MG CAPSULE DELAYED RELEASE PARTICLES] 90 capsule 1     Sig: TAKE 1 CAPSULE BY MOUTH NIGHTLY       Last Appointment Date: 11/17/2022  Next Appointment Date: Visit date not found    Allergies   Allergen Reactions    Darvocet [Propoxyphene N-Acetaminophen]      Talking out of her head    Morphine      Category:  Allergy;     Morphine And Related Itching and Swelling    Propoxyphene Swelling

## 2023-08-11 ENCOUNTER — OFFICE VISIT (OUTPATIENT)
Dept: WOUND CARE | Facility: HOSPITAL | Age: 53
End: 2023-08-11
Payer: MEDICARE

## 2023-08-11 PROCEDURE — G0463 HOSPITAL OUTPT CLINIC VISIT: HCPCS

## 2023-08-25 ENCOUNTER — OFFICE VISIT (OUTPATIENT)
Dept: WOUND CARE | Facility: HOSPITAL | Age: 53
End: 2023-08-25
Payer: MEDICARE

## 2023-09-01 ENCOUNTER — OFFICE VISIT (OUTPATIENT)
Dept: WOUND CARE | Facility: HOSPITAL | Age: 53
End: 2023-09-01
Payer: MEDICARE

## 2023-09-11 ENCOUNTER — OFFICE VISIT (OUTPATIENT)
Dept: WOUND CARE | Facility: HOSPITAL | Age: 53
End: 2023-09-11
Payer: MEDICARE

## 2023-09-11 DIAGNOSIS — M54.2 PAIN, NECK: ICD-10-CM

## 2023-09-11 NOTE — TELEPHONE ENCOUNTER
Requested Prescriptions     Pending Prescriptions Disp Refills    HYDROcodone-acetaminophen ([address])  MG per tablet [Pharmacy Med Name: HYDROCODONE BITARTRATE/ACETAMINOPHEN 325MG-10MG TABLET] 90 tablet 0     Sig: TAKE ONE TABLET BY MOUTH THREE TIMES DAILY.  REDUCE DOSES TAKEN AS PAIN BECOMES MANAGEABLE       Last Office Visit:  7/19/2023  Next Office Visit:  10/19/2023  Last Medication Refill:  8/8/23  Irl Sole up to date:  5/23    *RX updated to reflect   9/11/23  fill date*

## 2023-09-12 RX ORDER — ONDANSETRON 4 MG/1
TABLET, ORALLY DISINTEGRATING ORAL
Qty: 30 TABLET | Refills: 0 | OUTPATIENT
Start: 2023-09-12

## 2023-09-12 RX ORDER — HYDROCODONE BITARTRATE AND ACETAMINOPHEN 10; 325 MG/1; MG/1
TABLET ORAL
Qty: 90 TABLET | Refills: 0 | Status: SHIPPED | OUTPATIENT
Start: 2023-09-12 | End: 2023-10-12

## 2023-09-12 NOTE — TELEPHONE ENCOUNTER
Alyce called requesting a refill of the below medication which has been pended for you:     Requested Prescriptions     Pending Prescriptions Disp Refills    ondansetron (ZOFRAN-ODT) 4 MG disintegrating tablet [Pharmacy Med Name: ONDANSETRON ODT 4MG TABLET DISINTEGRATING] 30 tablet 0     Sig: DISSOLVE 1 TABLET ON TONGUE 3 TIMES DAILY AS NEEDED FOR NAUSEA OR VOMITING       Last Appointment Date: 11/17/2022  Next Appointment Date: Visit date not found    Allergies   Allergen Reactions    Darvocet [Propoxyphene N-Acetaminophen]      Talking out of her head    Morphine      Category:  Allergy;     Morphine And Related Itching and Swelling    Propoxyphene Swelling

## 2023-09-22 ENCOUNTER — OFFICE VISIT (OUTPATIENT)
Dept: WOUND CARE | Facility: HOSPITAL | Age: 53
End: 2023-09-22

## 2023-09-22 DIAGNOSIS — L89.523 PRESSURE ULCER OF LEFT ANKLE, STAGE 3: Primary | ICD-10-CM

## 2023-09-29 ENCOUNTER — OFFICE VISIT (OUTPATIENT)
Dept: WOUND CARE | Facility: HOSPITAL | Age: 53
End: 2023-09-29
Payer: MEDICARE

## 2023-09-29 DIAGNOSIS — L89.523 PRESSURE ULCER OF LEFT ANKLE, STAGE 3: Primary | ICD-10-CM

## 2023-10-10 DIAGNOSIS — R53.83 OTHER FATIGUE: ICD-10-CM

## 2023-10-10 DIAGNOSIS — M54.2 PAIN, NECK: ICD-10-CM

## 2023-10-10 RX ORDER — HYDROCODONE BITARTRATE AND ACETAMINOPHEN 10; 325 MG/1; MG/1
TABLET ORAL
Qty: 90 TABLET | Refills: 0 | Status: SHIPPED | OUTPATIENT
Start: 2023-10-12 | End: 2023-11-11

## 2023-10-10 RX ORDER — METHYLPHENIDATE HYDROCHLORIDE 20 MG/1
TABLET ORAL
Qty: 30 TABLET | Refills: 0 | Status: SHIPPED | OUTPATIENT
Start: 2023-10-10 | End: 2023-11-09

## 2023-10-10 NOTE — TELEPHONE ENCOUNTER
Requested Prescriptions     Pending Prescriptions Disp Refills    HYDROcodone-acetaminophen ([address])  MG per tablet [Pharmacy Med Name: HYDROCODONE BITARTRATE/ACETAMINOPHEN 325MG-10MG TABLET] 90 tablet 0     Sig: TAKE ONE TABLET BY MOUTH THREE TIMES DAILY.  REDUCE DOSES TAKEN AS PAIN BECOMES MANAGEABLE    methylphenidate (RITALIN) 20 MG tablet [Pharmacy Med Name: METHYLPHENIDATE HYDROCHLORIDE 20MG TABLET] 30 tablet 0     Sig: TAKE ONE TABLET BY MOUTH DAILY       Last Office Visit:  7/19/2023  Next Office Visit:  10/19/2023  Last Medication Refill:  8/8/2023 on Ritalin medication  9/12/2023 on Ramirez Bear up to date:  10/10/2023    *RX updated to reflect   10/10/2023 & 10/12/2023  fill date*

## 2023-10-13 ENCOUNTER — OFFICE VISIT (OUTPATIENT)
Dept: WOUND CARE | Facility: HOSPITAL | Age: 53
End: 2023-10-13
Payer: MEDICARE

## 2023-10-13 DIAGNOSIS — Z74.01 BED CONFINEMENT STATUS: ICD-10-CM

## 2023-10-13 DIAGNOSIS — L89.523 PRESSURE ULCER OF LEFT ANKLE, STAGE 3: Primary | ICD-10-CM

## 2023-10-13 DIAGNOSIS — G35 MULTIPLE SCLEROSIS: ICD-10-CM

## 2023-10-13 DIAGNOSIS — I95.9 HYPOTENSION, UNSPECIFIED HYPOTENSION TYPE: ICD-10-CM

## 2023-10-17 RX ORDER — LEVETIRACETAM 750 MG/1
750 TABLET ORAL DAILY
Qty: 30 TABLET | Refills: 5 | Status: SHIPPED | OUTPATIENT
Start: 2023-10-17

## 2023-10-17 NOTE — TELEPHONE ENCOUNTER
Requested Prescriptions     Pending Prescriptions Disp Refills    levETIRAcetam (KEPPRA) 750 MG tablet [Pharmacy Med Name: LEVETIRACETAM 750MG TABLET] 30 tablet 5     Sig: TAKE 1 TABLET BY MOUTH DAILY       Last Office Visit: 7/19/2023  Next Office Visit: 10/19/2023  Last Medication Refill: 5/24/2023 with 5 RF

## 2023-10-19 ENCOUNTER — OFFICE VISIT (OUTPATIENT)
Dept: NEUROLOGY | Age: 53
End: 2023-10-19
Payer: MEDICARE

## 2023-10-19 VITALS
SYSTOLIC BLOOD PRESSURE: 112 MMHG | BODY MASS INDEX: 19.99 KG/M2 | WEIGHT: 135 LBS | DIASTOLIC BLOOD PRESSURE: 80 MMHG | HEART RATE: 72 BPM | HEIGHT: 69 IN | OXYGEN SATURATION: 99 %

## 2023-10-19 DIAGNOSIS — M79.622 PAIN IN BOTH UPPER ARMS: ICD-10-CM

## 2023-10-19 DIAGNOSIS — G40.909 SEIZURE DISORDER (HCC): ICD-10-CM

## 2023-10-19 DIAGNOSIS — M79.621 PAIN IN BOTH UPPER ARMS: ICD-10-CM

## 2023-10-19 DIAGNOSIS — R53.83 OTHER FATIGUE: ICD-10-CM

## 2023-10-19 DIAGNOSIS — R20.0 NUMBNESS: ICD-10-CM

## 2023-10-19 DIAGNOSIS — G82.20 PARAPLEGIA (HCC): ICD-10-CM

## 2023-10-19 DIAGNOSIS — R53.1 WEAKNESS: ICD-10-CM

## 2023-10-19 DIAGNOSIS — M62.838 MUSCLE SPASTICITY: ICD-10-CM

## 2023-10-19 DIAGNOSIS — M54.2 PAIN, NECK: ICD-10-CM

## 2023-10-19 DIAGNOSIS — G35 MS (MULTIPLE SCLEROSIS) (HCC): Primary | ICD-10-CM

## 2023-10-19 PROCEDURE — G8484 FLU IMMUNIZE NO ADMIN: HCPCS | Performed by: PSYCHIATRY & NEUROLOGY

## 2023-10-19 PROCEDURE — G8427 DOCREV CUR MEDS BY ELIG CLIN: HCPCS | Performed by: PSYCHIATRY & NEUROLOGY

## 2023-10-19 PROCEDURE — 99214 OFFICE O/P EST MOD 30 MIN: CPT | Performed by: PSYCHIATRY & NEUROLOGY

## 2023-10-19 PROCEDURE — 3017F COLORECTAL CA SCREEN DOC REV: CPT | Performed by: PSYCHIATRY & NEUROLOGY

## 2023-10-19 PROCEDURE — G8420 CALC BMI NORM PARAMETERS: HCPCS | Performed by: PSYCHIATRY & NEUROLOGY

## 2023-10-19 PROCEDURE — 4004F PT TOBACCO SCREEN RCVD TLK: CPT | Performed by: PSYCHIATRY & NEUROLOGY

## 2023-10-19 RX ORDER — AMITRIPTYLINE HYDROCHLORIDE 25 MG/1
TABLET, FILM COATED ORAL
COMMUNITY
Start: 2023-10-17

## 2023-10-19 RX ORDER — PRAVASTATIN SODIUM 20 MG
20 TABLET ORAL DAILY
COMMUNITY
Start: 2023-10-17

## 2023-10-19 RX ORDER — MULTIVIT-MIN/IRON/FOLIC ACID/K 18-600-40
1 CAPSULE ORAL
COMMUNITY
Start: 2023-09-21 | End: 2024-03-19

## 2023-10-19 RX ORDER — CYCLOBENZAPRINE HCL 10 MG
10 TABLET ORAL NIGHTLY PRN
Qty: 30 TABLET | Refills: 5 | Status: SHIPPED | OUTPATIENT
Start: 2023-10-19 | End: 2024-04-16

## 2023-10-19 RX ORDER — PREGABALIN 300 MG/1
300 CAPSULE ORAL 2 TIMES DAILY
Qty: 60 CAPSULE | Refills: 5 | Status: SHIPPED | OUTPATIENT
Start: 2023-10-19 | End: 2024-04-16

## 2023-10-19 NOTE — PROGRESS NOTES
Review of Systems    Constitutional - No fever or chills. No diaphoresis or significant fatigue. HENT -  No tinnitus or significant hearing loss. Eyes - no sudden vision change or eye pain  Respiratory - no significant shortness of breath or cough  Cardiovascular - no chest pain No palpitations or significant leg swelling  Gastrointestinal - no abdominal swelling or pain. Genitourinary - No difficulty urinating, dysuria  Musculoskeletal - no back pain or myalgia. Skin - no color change or rash  Neurologic - No seizures. No lateralizing weakness. Hematologic - no easy bruising or excessive bleeding. Psychiatric - no severe anxiety or nervousness. All other review of systems are negative.
with pin in right hip. 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 She is off Ocrevus due to [place] virus. Has reduced Cymbala. .She's to follow-up with me in approximately 3 months and call with any further problems. She will be tried on Flexeril at night for spasms. Medicines are continuing to control symptoms. Continue present regimen as overall stable. Plan    No orders of the defined types were placed in this encounter. Orders Placed This Encounter   Medications    cyclobenzaprine (FLEXERIL) 10 MG tablet     Sig: Take 1 tablet by mouth nightly as needed for Muscle spasms     Dispense:  30 tablet     Refill:  5    pregabalin (LYRICA) 300 MG capsule     Sig: Take 1 capsule by mouth 2 times daily for 180 days. Max Daily Amount: 600 mg     Dispense:  60 capsule     Refill:  5           Return in about 3 months (around 1/19/2024).

## 2023-10-27 ENCOUNTER — OFFICE VISIT (OUTPATIENT)
Dept: WOUND CARE | Facility: HOSPITAL | Age: 53
End: 2023-10-27
Payer: MEDICARE

## 2023-11-03 ENCOUNTER — OFFICE VISIT (OUTPATIENT)
Dept: WOUND CARE | Facility: HOSPITAL | Age: 53
End: 2023-11-03
Payer: MEDICARE

## 2023-11-09 RX ORDER — TIZANIDINE 4 MG/1
TABLET ORAL
Qty: 180 TABLET | Refills: 11 | OUTPATIENT
Start: 2023-11-09

## 2023-11-10 ENCOUNTER — OFFICE VISIT (OUTPATIENT)
Dept: WOUND CARE | Facility: HOSPITAL | Age: 53
End: 2023-11-10
Payer: MEDICARE

## 2023-11-10 PROCEDURE — G0463 HOSPITAL OUTPT CLINIC VISIT: HCPCS

## 2023-11-14 DIAGNOSIS — M54.2 PAIN, NECK: ICD-10-CM

## 2023-11-14 DIAGNOSIS — R53.83 OTHER FATIGUE: ICD-10-CM

## 2023-11-14 RX ORDER — PREGABALIN 150 MG/1
CAPSULE ORAL
Qty: 120 CAPSULE | Refills: 0 | OUTPATIENT
Start: 2023-11-14

## 2023-11-14 RX ORDER — TIZANIDINE 4 MG/1
TABLET ORAL
Qty: 180 TABLET | Refills: 11 | OUTPATIENT
Start: 2023-11-14

## 2023-11-14 NOTE — TELEPHONE ENCOUNTER
Requested Prescriptions     Pending Prescriptions Disp Refills    HYDROcodone-acetaminophen ([address])  MG per tablet [Pharmacy Med Name: HYDROCODONE BITARTRATE/ACETAMINOPHEN 325MG-10MG TABLET] 90 tablet 0     Sig: TAKE ONE TABLET BY MOUTH THREE TIMES DAILY.  REDUCE DOSES TAKEN AS PAIN BECOMES MANAGEABLE         Last Office Visit:  10/19/2023  Next Office Visit:  1/23/2024  Last Medication Refill:  10/12/23  Mariah Miguel up to date:  10/10/23    *RX updated to reflect   11/14/23  fill date* No

## 2023-11-15 RX ORDER — HYDROCODONE BITARTRATE AND ACETAMINOPHEN 10; 325 MG/1; MG/1
TABLET ORAL
Qty: 90 TABLET | Refills: 0 | Status: SHIPPED | OUTPATIENT
Start: 2023-11-15 | End: 2023-12-15

## 2023-12-28 DIAGNOSIS — M54.2 PAIN, NECK: ICD-10-CM

## 2023-12-28 DIAGNOSIS — R53.83 OTHER FATIGUE: ICD-10-CM

## 2023-12-28 RX ORDER — METHYLPHENIDATE HYDROCHLORIDE 20 MG/1
TABLET ORAL
Qty: 30 TABLET | Refills: 0 | Status: SHIPPED | OUTPATIENT
Start: 2023-12-28 | End: 2024-01-27

## 2023-12-28 RX ORDER — CYCLOBENZAPRINE HCL 10 MG
10 TABLET ORAL NIGHTLY PRN
Qty: 30 TABLET | Refills: 0 | Status: SHIPPED | OUTPATIENT
Start: 2023-12-28 | End: 2024-06-25

## 2023-12-28 RX ORDER — HYDROCODONE BITARTRATE AND ACETAMINOPHEN 10; 325 MG/1; MG/1
TABLET ORAL
Qty: 90 TABLET | Refills: 0 | Status: SHIPPED | OUTPATIENT
Start: 2023-12-28 | End: 2024-01-27

## 2023-12-28 NOTE — TELEPHONE ENCOUNTER
Requested Prescriptions     Pending Prescriptions Disp Refills    HYDROcodone-acetaminophen ([address])  MG per tablet [Pharmacy Med Name: HYDROCODONE BITARTRATE/ACETAMINOPHEN 325MG-10MG TABLET] 90 tablet 0     Sig: TAKE ONE TABLET BY MOUTH THREE TIMES DAILY.  REDUCE DOSES TAKEN AS PAIN BECOMES MANAGEABLE    cyclobenzaprine (FLEXERIL) 10 MG tablet [Pharmacy Med Name: CYCLOBENZAPRINE HYDROCHLORIDE 10MG TABLET] 30 tablet 0     Sig: TAKE 1 TABLET BY MOUTH NIGHTLY AS NEEDED FOR MUSCLE SPASMS    methylphenidate (RITALIN) 20 MG tablet [Pharmacy Med Name: [address] 20MG TABLET] 30 tablet 0     Sig: TAKE ONE TABLET BY MOUTH DAILY       Last Office Visit:  10/19/2023  Next Office Visit:  1/23/2024  Last Medication Refill:   Flexeril 10/19/2023 with 5 RF   Norco 11/15/2023 with 0 RF   Ritalin  10/10/2023 with 0 RF   Jim up to date:  10/10/2023     *RX updated to reflect   12/28/2023   fill date*

## 2024-01-25 DIAGNOSIS — M54.2 PAIN, NECK: ICD-10-CM

## 2024-01-25 NOTE — TELEPHONE ENCOUNTER
Requested Prescriptions     Pending Prescriptions Disp Refills    HYDROcodone-acetaminophen (NORCO)  MG per tablet [Pharmacy Med Name: HYDROCODONE BITARTRATE/ACETAMINOPHEN 325MG-10MG TABLET] 90 tablet 0     Sig: TAKE ONE TABLET BY MOUTH THREE TIMES DAILY. REDUCE DOSES TAKEN AS PAIN BECOMES MANAGEABLE       Last Office Visit:  10/19/2023  Next Office Visit:  3/14/2024  Last Medication Refill:  12/28/2023 with 0 RF   Jim up to date:1/25/2024      *RX updated to reflect   1/27/2024  fill date*

## 2024-01-26 RX ORDER — HYDROCODONE BITARTRATE AND ACETAMINOPHEN 10; 325 MG/1; MG/1
TABLET ORAL
Qty: 90 TABLET | Refills: 0 | Status: SHIPPED | OUTPATIENT
Start: 2024-01-27 | End: 2024-02-26

## 2024-01-30 DIAGNOSIS — R53.83 OTHER FATIGUE: ICD-10-CM

## 2024-01-30 RX ORDER — ONDANSETRON 4 MG/1
TABLET, ORALLY DISINTEGRATING ORAL
Qty: 30 TABLET | Refills: 0 | OUTPATIENT
Start: 2024-01-30

## 2024-01-30 RX ORDER — METHYLPHENIDATE HYDROCHLORIDE 20 MG/1
TABLET ORAL
Qty: 30 TABLET | Refills: 0 | Status: SHIPPED | OUTPATIENT
Start: 2024-01-30 | End: 2024-02-29

## 2024-01-30 NOTE — TELEPHONE ENCOUNTER
Requested Prescriptions     Pending Prescriptions Disp Refills    methylphenidate (RITALIN) 20 MG tablet [Pharmacy Med Name: METHYLPHENIDATE HYDROCHLORIDE 20MG TABLET] 30 tablet 0     Sig: TAKE ONE TABLET BY MOUTH DAILY       Last Office Visit:  10/19/2023  Next Office Visit:  3/14/2024  Last Medication Refill:  12/28/2023 with 0 RF   Jim up to date:  1/25/2024     *RX updated to reflect   1/30/2024  fill date*

## 2024-02-01 RX ORDER — DULOXETIN HYDROCHLORIDE 30 MG/1
30 CAPSULE, DELAYED RELEASE ORAL NIGHTLY
Qty: 90 CAPSULE | Refills: 2 | OUTPATIENT
Start: 2024-02-01

## 2024-02-14 DIAGNOSIS — M54.2 PAIN, NECK: ICD-10-CM

## 2024-02-14 RX ORDER — SODIUM CHLORIDE 9 MG/ML
25 INJECTION, SOLUTION INTRAVENOUS PRN
OUTPATIENT
Start: 2024-02-14

## 2024-02-14 RX ORDER — DIPHENHYDRAMINE HYDROCHLORIDE 50 MG/ML
50 INJECTION INTRAMUSCULAR; INTRAVENOUS
OUTPATIENT
Start: 2024-02-14

## 2024-02-14 RX ORDER — HEPARIN 100 UNIT/ML
500 SYRINGE INTRAVENOUS PRN
OUTPATIENT
Start: 2024-02-14

## 2024-02-14 RX ORDER — ALBUTEROL SULFATE 90 UG/1
4 AEROSOL, METERED RESPIRATORY (INHALATION) PRN
OUTPATIENT
Start: 2024-02-14

## 2024-02-14 RX ORDER — SODIUM CHLORIDE 0.9 % (FLUSH) 0.9 %
5-40 SYRINGE (ML) INJECTION PRN
OUTPATIENT
Start: 2024-02-14

## 2024-02-14 RX ORDER — EPINEPHRINE 1 MG/ML
0.3 INJECTION, SOLUTION, CONCENTRATE INTRAVENOUS PRN
OUTPATIENT
Start: 2024-02-14

## 2024-02-14 RX ORDER — HYDROCODONE BITARTRATE AND ACETAMINOPHEN 10; 325 MG/1; MG/1
TABLET ORAL
Qty: 90 TABLET | Refills: 0 | Status: SHIPPED | OUTPATIENT
Start: 2024-02-26 | End: 2024-03-27

## 2024-02-14 RX ORDER — ONDANSETRON 2 MG/ML
8 INJECTION INTRAMUSCULAR; INTRAVENOUS
OUTPATIENT
Start: 2024-02-14

## 2024-02-14 RX ORDER — ACETAMINOPHEN 325 MG/1
650 TABLET ORAL
OUTPATIENT
Start: 2024-02-14

## 2024-02-14 RX ORDER — SODIUM CHLORIDE 9 MG/ML
INJECTION, SOLUTION INTRAVENOUS CONTINUOUS
OUTPATIENT
Start: 2024-02-14

## 2024-02-14 NOTE — TELEPHONE ENCOUNTER
Requested Prescriptions     Pending Prescriptions Disp Refills    HYDROcodone-acetaminophen (NORCO)  MG per tablet 90 tablet 0     Sig: TAKE ONE TABLET BY MOUTH THREE TIMES DAILY. REDUCE DOSES TAKEN AS PAIN BECOMES MANAGEABLE       Last Office Visit:  10/19/2023  Next Office Visit:  3/14/2024  Last Medication Refill:  1/27/2024 with 0 JEFFREY Fernandez up to date:  1/25/2024    *RX updated to reflect   2/14/2024  fill date*

## 2024-03-11 ENCOUNTER — OFFICE VISIT (OUTPATIENT)
Dept: WOUND CARE | Facility: HOSPITAL | Age: 54
End: 2024-03-11
Payer: MEDICARE

## 2024-03-11 PROCEDURE — G0463 HOSPITAL OUTPT CLINIC VISIT: HCPCS

## 2024-03-14 ENCOUNTER — OFFICE VISIT (OUTPATIENT)
Dept: NEUROLOGY | Age: 54
End: 2024-03-14
Payer: MEDICARE

## 2024-03-14 ENCOUNTER — HOSPITAL ENCOUNTER (OUTPATIENT)
Dept: INFUSION THERAPY | Age: 54
Discharge: HOME | End: 2024-03-14

## 2024-03-14 VITALS — HEIGHT: 69 IN | BODY MASS INDEX: 19.99 KG/M2 | WEIGHT: 135 LBS

## 2024-03-14 DIAGNOSIS — M79.621 PAIN IN BOTH UPPER ARMS: ICD-10-CM

## 2024-03-14 DIAGNOSIS — M62.838 MUSCLE SPASTICITY: ICD-10-CM

## 2024-03-14 DIAGNOSIS — G82.20 PARAPLEGIA (HCC): ICD-10-CM

## 2024-03-14 DIAGNOSIS — F11.20 OPIOID DEPENDENCE WITH CURRENT USE (HCC): ICD-10-CM

## 2024-03-14 DIAGNOSIS — G40.909 SEIZURE DISORDER (HCC): ICD-10-CM

## 2024-03-14 DIAGNOSIS — G35 MS (MULTIPLE SCLEROSIS) (HCC): Primary | ICD-10-CM

## 2024-03-14 DIAGNOSIS — R20.0 NUMBNESS: ICD-10-CM

## 2024-03-14 DIAGNOSIS — R53.1 WEAKNESS: ICD-10-CM

## 2024-03-14 DIAGNOSIS — M79.622 PAIN IN BOTH UPPER ARMS: ICD-10-CM

## 2024-03-14 DIAGNOSIS — R53.83 OTHER FATIGUE: ICD-10-CM

## 2024-03-14 DIAGNOSIS — M54.2 PAIN, NECK: ICD-10-CM

## 2024-03-14 PROCEDURE — 3017F COLORECTAL CA SCREEN DOC REV: CPT | Performed by: PSYCHIATRY & NEUROLOGY

## 2024-03-14 PROCEDURE — 99213 OFFICE O/P EST LOW 20 MIN: CPT | Performed by: PSYCHIATRY & NEUROLOGY

## 2024-03-14 PROCEDURE — G8428 CUR MEDS NOT DOCUMENT: HCPCS | Performed by: PSYCHIATRY & NEUROLOGY

## 2024-03-14 RX ORDER — HYDROCODONE BITARTRATE AND ACETAMINOPHEN 10; 325 MG/1; MG/1
TABLET ORAL
Qty: 90 TABLET | Refills: 0 | Status: SHIPPED | OUTPATIENT
Start: 2024-03-27 | End: 2024-04-13

## 2024-03-14 RX ORDER — PREGABALIN 300 MG/1
300 CAPSULE ORAL 2 TIMES DAILY
Qty: 60 CAPSULE | Refills: 5 | Status: SHIPPED | OUTPATIENT
Start: 2024-03-14 | End: 2024-09-10

## 2024-03-14 NOTE — PROGRESS NOTES
Chief Complaint   Patient presents with    Multiple Sclerosis       Alyce Baker is a 54 y.o. year old female who is seen for evaluation of multiple sclerosis. The patient indicates that she was diagnosed with multiple sclerosis in 1994. At that time to develop some visual disturbances including blurred vision and double vision. Within eight months she was in a wheelchair. She currently has no movement of the lower extremities. She does have some increased Spasticity in the legs worse in the arms. She underwent a baclofen pump with Dr. Baker with some complications. She is doing better from this. She currently sees Dr. nichols for her pump management. She does have some cognitive issues. She denies diplopia, dysarthria, or dysphagia. She does have some incontinence of bladder. She does have pain in the hips and lower extremities. She also has headaches with pain behind both eyes. She was followed by Dr. Niño of neurology. She follows up today for evaluation. She is currently in a wheelchair. Since her last visit she is doing better with physical therapy.She had an accidental overdose with her baclofen pump and had a seizure. Doing better on. Recently admitted with seizure. Was off Chantix a few weeks and had a UTI. Had seizure in oct 2014 with seizure due to baclofen overose. This was her second seizure.  Off Ocrevus. Occassionally gets shooting pain in head. Headache in the back of the head. Cymbalta helps mood. Had a seizure on 1/29/21 and went to the ER. Found to have a UTI and placed on Keppra increased to 750 mg bid. Went to [facility] due surgery for ileostomy. No seizures.   Headaches last few months. Neck and shoulder stiffness. PCP started on Lunesta for insomnia but this was changed to Elavil. Ulcer better and closed. Doing well. Wound opened back up and now better.    Active Ambulatory Problems     Diagnosis Date Noted    Muscle spasticity 04/29/2021    Peripheral vascular disease (HCC) 02/01/2016

## 2024-04-16 ENCOUNTER — OFFICE VISIT (OUTPATIENT)
Dept: OBGYN CLINIC | Age: 54
End: 2024-04-16
Payer: MEDICARE

## 2024-04-16 VITALS
WEIGHT: 135 LBS | HEART RATE: 93 BPM | SYSTOLIC BLOOD PRESSURE: 104 MMHG | HEIGHT: 69 IN | DIASTOLIC BLOOD PRESSURE: 73 MMHG | BODY MASS INDEX: 19.99 KG/M2

## 2024-04-16 DIAGNOSIS — N89.8 VAGINAL DISCHARGE: ICD-10-CM

## 2024-04-16 DIAGNOSIS — Z12.31 SCREENING MAMMOGRAM FOR BREAST CANCER: ICD-10-CM

## 2024-04-16 DIAGNOSIS — Z12.4 CERVICAL CANCER SCREENING: ICD-10-CM

## 2024-04-16 DIAGNOSIS — Z01.419 WELL WOMAN EXAM WITH ROUTINE GYNECOLOGICAL EXAM: Primary | ICD-10-CM

## 2024-04-16 PROCEDURE — G0101 CA SCREEN;PELVIC/BREAST EXAM: HCPCS | Performed by: OBSTETRICS & GYNECOLOGY

## 2024-04-16 RX ORDER — METRONIDAZOLE 500 MG/1
500 TABLET ORAL 2 TIMES DAILY
Qty: 14 TABLET | Refills: 0 | Status: SHIPPED | OUTPATIENT
Start: 2024-04-16 | End: 2024-04-23

## 2024-04-16 ASSESSMENT — ENCOUNTER SYMPTOMS
BACK PAIN: 1
RESPIRATORY NEGATIVE: 1
GASTROINTESTINAL NEGATIVE: 1

## 2024-04-16 NOTE — PROGRESS NOTES
Pt presents today for pap smear and breast exam.  She also complains of vaginal discharge with odor x 2 years, family history of breast cancer - mother & ovarian cancer - mother     Last mammogram:  10/7/22  Last pap smear:    Contraception:  partial hysterectomy   :  4  Para:  3  AB:  1  Last bone density:  n/a   Last colonoscopy: 19    
WHEEZING OR SHORTNESS OF BREATH 8.5 g 0    pregabalin (LYRICA) 300 MG capsule Take 1 capsule by mouth 2 times daily for 180 days. Max Daily Amount: 600 mg 60 capsule 5    cyclobenzaprine (FLEXERIL) 10 MG tablet TAKE 1 TABLET BY MOUTH NIGHTLY AS NEEDED FOR MUSCLE SPASMS 30 tablet 0    amitriptyline (ELAVIL) 25 MG tablet TAKE ONE TABLET BY MOUTH AT BEDTIME FOR 30 DAYS      pravastatin (PRAVACHOL) 20 MG tablet Take 1 tablet by mouth daily      levETIRAcetam (KEPPRA) 750 MG tablet TAKE 1 TABLET BY MOUTH DAILY 30 tablet 5    eszopiclone (LUNESTA) 2 MG TABS       sodium hypochlorite (DAKINS) 0.125 % SOLN external solution Apply topically daily 500 mL 1    sodium hypochlorite (DAKINS) 0.125 % SOLN external solution Apply topically BID as directed. 1000 mL 5    DULoxetine (CYMBALTA) 30 MG extended release capsule TAKE 1 CAPSULE BY MOUTH NIGHTLY 90 capsule 2    ondansetron (ZOFRAN-ODT) 4 MG disintegrating tablet DISSOLVE 1 TABLET ON TONGUE 3 TIMES DAILY AS NEEDED FOR NAUSEA OR VOMITING 30 tablet 0    hydroCHLOROthiazide (HYDRODIURIL) 25 MG tablet Take 1 tablet by mouth daily as needed      ipratropium-albuterol (DUONEB) 0.5-2.5 (3) MG/3ML SOLN nebulizer solution Inhale 3 mLs into the lungs 2 times daily as needed for Shortness of Breath      pilocarpine (SALAGEN) 7.5 MG tablet Take 1 tablet by mouth 2 times daily      SODIUM CHLORIDE, EXTERNAL, (SALINE WOUND WASH) 0.9 % SOLN Apply 1 L topically 2 times daily 1000 mL 3    tiZANidine (ZANAFLEX) 4 MG tablet TAKE 3 TABLETS TWICE DAILY AS NEEDED (Patient taking differently: Take 3 tablets by mouth 2 times daily) 180 tablet 11    fluocinolone acetonide (SYNALAR) 0.01 % external solution Apply 10 drops topically nightly as needed      Diapers & Supplies MISC Indications: FX: [id number] Diapers, Chucks, Wipes, and Gloves. 100 each 3    cetirizine (ZYRTEC) 10 MG tablet Take 1 tablet by mouth daily      Catheters MISC Catheter supplies and lubricant 5 times daily G82.20  to [employer]fort

## 2024-04-18 LAB
HPV HR 12 DNA SPEC QL NAA+PROBE: NOT DETECTED
HPV16 DNA SPEC QL NAA+PROBE: NOT DETECTED
HPV16+18+H RISK 12 DNA SPEC-IMP: NORMAL
HPV18 DNA SPEC QL NAA+PROBE: NOT DETECTED

## 2024-04-22 ENCOUNTER — TELEPHONE (OUTPATIENT)
Dept: OBGYN CLINIC | Age: 54
End: 2024-04-22

## 2024-04-22 NOTE — TELEPHONE ENCOUNTER
Patient left message regarding pap smear results. Attempted to return call, voicemail would  and immediately disconnect. Attempted x 3

## 2024-04-26 DIAGNOSIS — M54.2 PAIN, NECK: ICD-10-CM

## 2024-04-26 DIAGNOSIS — R53.83 OTHER FATIGUE: ICD-10-CM

## 2024-04-26 RX ORDER — HYDROCODONE BITARTRATE AND ACETAMINOPHEN 10; 325 MG/1; MG/1
TABLET ORAL
Qty: 90 TABLET | Refills: 0 | Status: SHIPPED | OUTPATIENT
Start: 2024-04-26 | End: 2024-05-13

## 2024-04-26 RX ORDER — METHYLPHENIDATE HYDROCHLORIDE 20 MG/1
20 TABLET ORAL DAILY
Qty: 30 TABLET | Refills: 0 | Status: SHIPPED | OUTPATIENT
Start: 2024-04-26 | End: 2024-05-26

## 2024-04-26 NOTE — TELEPHONE ENCOUNTER
Alyce Baker has requested a refill on her medication.      Last office visit : 3/14/2024   Next office visit : 6/26/2024   Last medication refill : 3/27/24, 1/30/24  Jim : up to date       Requested Prescriptions     Pending Prescriptions Disp Refills    HYDROcodone-acetaminophen (NORCO)  MG per tablet 90 tablet 0     Sig: TAKE ONE TABLET BY MOUTH THREE TIMES DAILY. REDUCE DOSES TAKEN AS PAIN BECOMES MANAGEABLE    methylphenidate (RITALIN) 20 MG tablet 30 tablet 0     Sig: Take 1 tablet by mouth daily for 30 days.           Dr. Lentz patient

## 2024-05-01 ENCOUNTER — TELEPHONE (OUTPATIENT)
Dept: NEUROLOGY | Age: 54
End: 2024-05-01

## 2024-05-01 NOTE — TELEPHONE ENCOUNTER
Alyce Baker (Key: [id number])  PA Case ID #: [id number]  Rx #: [id number]  Need Help? Call us at [phone]  Outcome  Denied today  Denied. We do not show that you have active coverage with our plan for the requested date of service. If you disagree with this, please contact us using the toll free number on the back on your ID card.  Drug  Methylphenidate HCl 20MG tablets    Form  [employer] Electronic Prior Authorization Request Form (2017 NCPDP)  Original Claim Info  75 PRIOR AUTHORIZATION REQUIRED

## 2024-05-01 NOTE — TELEPHONE ENCOUNTER
Alyce Baker (Key: [id number])  PA Case ID #: [id number]  Rx #: [id number]  Need Help? Call us at [phone]  Status  Sent to Plan today  Drug  Methylphenidate HCl 20MG tablets    Form  [employer] Electronic Prior Authorization Request Form (2017 NCPDP)  Original Claim Info  75 PRIOR AUTHORIZATION REQUIRED

## 2024-05-02 ENCOUNTER — TELEPHONE (OUTPATIENT)
Dept: NEUROLOGY | Age: 54
End: 2024-05-02

## 2024-05-02 NOTE — TELEPHONE ENCOUNTER
Denied  PA Detail   Denied. We do not show that you have active coverage with our plan for the requested date of service. If you disagree with this, please contact us using the toll free number on the back on your ID card. An electronic appeal cannot be submitted for this request. Case ID: [id number]      Payer: [employer] Patient's Payer    [phone]   Electronic appeal: Not supported   Prior auth [facility] by: [facility] [place] [facility] [address] - P [phone] - F [phone]   View History     Notes     Time User Attachment    Attachment received from payer. 5/2/2024 12:11 PM Maximo, Lisa Outgoing Prescription Prior Authorization Response Document     Medication Being Authorized     methylphenidate (RITALIN) 20 MG tablet    Take 1 tablet by mouth daily for 30 days.    Dispense: 30 tablet Refills: 0     Start: 4/26/2024 End: 5/26/2024     Class: Normal Diagnoses: Other fatigue     This order has been released to its destination.   To be filled at: [facility], [place] - [address] - P [phone] - F [phone]         Pharmacy Benefits    No primary plan selected

## 2024-05-28 ENCOUNTER — TELEPHONE (OUTPATIENT)
Dept: NEUROLOGY | Age: 54
End: 2024-05-28

## 2024-05-28 DIAGNOSIS — M54.2 PAIN, NECK: ICD-10-CM

## 2024-05-28 RX ORDER — HYDROCODONE BITARTRATE AND ACETAMINOPHEN 10; 325 MG/1; MG/1
TABLET ORAL
Qty: 90 TABLET | Refills: 0 | Status: SHIPPED | OUTPATIENT
Start: 2024-05-28 | End: 2024-06-27

## 2024-05-28 NOTE — TELEPHONE ENCOUNTER
Requested Prescriptions     Pending Prescriptions Disp Refills    HYDROcodone-acetaminophen (NORCO)  MG per tablet [Pharmacy Med Name: HYDROCODONE BITARTRATE/ACETAMINOPHEN 325MG-10MG TABLET] 90 tablet 0     Sig: TAKE ONE TABLET BY MOUTH THREE TIMES DAILY. REDUCE DOSES TAKEN AS PAIN BECOMES MANAGEABLE       Last Office Visit:  3/14/2024  Next Office Visit:  6/26/2024  Last Medication Refill:  4/26/24  Jim up to date:  5/28/24    *RX updated to reflect   5/28/24  fill date*

## 2024-05-28 NOTE — TELEPHONE ENCOUNTER
Approved  PA Detail   Prior authorization approved Case ID: [id number]      Payer: [employer] (Formerly [employer]) - Medicare   CaseId:[id number];Status:Approved;Review Type:Prior Auth;Coverage Start Date:04/28/2024;Coverage End Date:05/28/2025;   Approval Details    Authorized from April 28, 2024 to May 28, 2025      Electronic appeal: Not supported   Prior auth initiated by: [employer]  [email or web address] [place] - [address] - P [phone] - F [phone]   View History    Medication Being Authorized     HYDROcodone-acetaminophen (NORCO)  MG per tablet    TAKE ONE TABLET BY MOUTH THREE TIMES DAILY. REDUCE DOSES TAKEN AS PAIN BECOMES MANAGEABLE    Dispense: 90 tablet Refills: 0     Start: 5/28/2024 End: 6/27/2024     Class: Normal Diagnoses: Pain, neck     This order has been released to its destination.   To be filled at: [employer]  [email or web address] [place] - [address] - P [phone] - F [phone]        Prior Authorization History for HYDROcodone-acetaminophen (NORCO)  MG per tablet    4 months ago Approved      Pharmacy Benefits    No primary plan selected   Other Plans

## 2024-06-26 DIAGNOSIS — M54.2 PAIN, NECK: ICD-10-CM

## 2024-06-26 RX ORDER — HYDROCODONE BITARTRATE AND ACETAMINOPHEN 10; 325 MG/1; MG/1
TABLET ORAL
Qty: 90 TABLET | Refills: 0 | Status: SHIPPED | OUTPATIENT
Start: 2024-06-27 | End: 2024-07-26

## 2024-06-26 NOTE — TELEPHONE ENCOUNTER
Requested Prescriptions     Pending Prescriptions Disp Refills    HYDROcodone-acetaminophen (NORCO)  MG per tablet [Pharmacy Med Name: HYDROCODONE BITARTRATE/ACETAMINOPHEN 325MG-10MG TABLET] 90 tablet 0     Sig: TAKE ONE TABLET BY MOUTH THREE TIMES DAILY. REDUCE DOSES TAKEN AS PAIN BECOMES MANAGEABLE       Last Office Visit:  3/14/2024  Next Office Visit:  10/8/2024  Last Medication Refill:  5/28/2024 with 0 JEFFREY Fernandez up to date:  5/28/2024    *RX updated to reflect   6/27/2024  fill date*

## 2024-08-05 DIAGNOSIS — M54.2 PAIN, NECK: ICD-10-CM

## 2024-08-05 RX ORDER — HYDROCODONE BITARTRATE AND ACETAMINOPHEN 10; 325 MG/1; MG/1
TABLET ORAL
Qty: 90 TABLET | Refills: 0 | Status: SHIPPED | OUTPATIENT
Start: 2024-08-05 | End: 2024-09-03

## 2024-08-05 NOTE — TELEPHONE ENCOUNTER
Alyce Baker has requested a refill on her medication.      Last office visit : 3/14/2024   Next office visit : 10/8/2024   Last medication refill :24  Jim : up to date       Requested Prescriptions     Pending Prescriptions Disp Refills    HYDROcodone-acetaminophen (NORCO)  MG per tablet 90 tablet 0     Si tablet by mouth TID

## 2024-08-26 DIAGNOSIS — M54.2 PAIN, NECK: ICD-10-CM

## 2024-08-26 RX ORDER — HYDROCODONE BITARTRATE AND ACETAMINOPHEN 10; 325 MG/1; MG/1
TABLET ORAL
Qty: 90 TABLET | Refills: 0 | Status: SHIPPED | OUTPATIENT
Start: 2024-09-04 | End: 2024-10-04

## 2024-08-26 RX ORDER — LEVETIRACETAM 750 MG/1
750 TABLET ORAL DAILY
Qty: 30 TABLET | Refills: 5 | Status: SHIPPED | OUTPATIENT
Start: 2024-08-26

## 2024-08-26 NOTE — TELEPHONE ENCOUNTER
Requested Prescriptions     Pending Prescriptions Disp Refills    HYDROcodone-acetaminophen (NORCO)  MG per tablet [Pharmacy Med Name: HYDROCODONE BITARTRATE/ACETAMINOPHEN 325MG-10MG TABLET] 90 tablet 0     Sig: TAKE ONE TABLET BY MOUTH THREE TIMES A DAY    levETIRAcetam (KEPPRA) 750 MG tablet [Pharmacy Med Name: LEVETIRACETAM 750MG TABLET] 30 tablet 5     Sig: TAKE 1 TABLET BY MOUTH DAILY       Last Office Visit:  3/14/2024  Next Office Visit:  10/8/2024  Last Medication Refill:  8/5/2024 with 0 JEFFREY Fernandez up to date:  8/26/2024     *RX updated to reflect   8/26/2024   fill date*

## 2024-09-18 DIAGNOSIS — R53.83 OTHER FATIGUE: ICD-10-CM

## 2024-09-18 DIAGNOSIS — M54.2 PAIN, NECK: ICD-10-CM

## 2024-09-18 RX ORDER — PREGABALIN 300 MG/1
CAPSULE ORAL
Qty: 60 CAPSULE | Refills: 5 | Status: SHIPPED | OUTPATIENT
Start: 2024-09-18 | End: 2025-03-17

## 2024-10-03 DIAGNOSIS — M54.2 PAIN, NECK: ICD-10-CM

## 2024-10-04 RX ORDER — HYDROCODONE BITARTRATE AND ACETAMINOPHEN 10; 325 MG/1; MG/1
TABLET ORAL
Qty: 90 TABLET | Refills: 0 | Status: SHIPPED | OUTPATIENT
Start: 2024-10-04 | End: 2024-11-05

## 2024-10-04 NOTE — TELEPHONE ENCOUNTER
Requested Prescriptions     Pending Prescriptions Disp Refills    HYDROcodone-acetaminophen (NORCO)  MG per tablet [Pharmacy Med Name: HYDROCODONE BITARTRATE/ACETAMINOPHEN 325MG-10MG TABLET] 90 tablet 0     Sig: TAKE ONE TABLET BY MOUTH THREE TIMES A DAY       Last Office Visit:  3/14/2024  Next Office Visit:  10/8/2024  Last Medication Refill:  9/4/2024 with 0 JEFFREY Fernandez up to date:  8/26/2024    *RX updated to reflect   10/4/2024  fill date*

## 2024-10-08 ENCOUNTER — TELEMEDICINE (OUTPATIENT)
Dept: NEUROLOGY | Age: 54
End: 2024-10-08
Payer: MEDICARE

## 2024-10-08 DIAGNOSIS — R20.0 NUMBNESS: ICD-10-CM

## 2024-10-08 DIAGNOSIS — M54.2 PAIN, NECK: ICD-10-CM

## 2024-10-08 DIAGNOSIS — M79.621 PAIN IN BOTH UPPER ARMS: ICD-10-CM

## 2024-10-08 DIAGNOSIS — G82.20 PARAPLEGIA (HCC): ICD-10-CM

## 2024-10-08 DIAGNOSIS — R53.1 WEAKNESS: ICD-10-CM

## 2024-10-08 DIAGNOSIS — R53.83 OTHER FATIGUE: ICD-10-CM

## 2024-10-08 DIAGNOSIS — G35 MS (MULTIPLE SCLEROSIS) (HCC): Primary | ICD-10-CM

## 2024-10-08 DIAGNOSIS — G40.909 SEIZURE DISORDER (HCC): ICD-10-CM

## 2024-10-08 DIAGNOSIS — M79.622 PAIN IN BOTH UPPER ARMS: ICD-10-CM

## 2024-10-08 DIAGNOSIS — M62.838 MUSCLE SPASTICITY: ICD-10-CM

## 2024-10-08 PROCEDURE — 3017F COLORECTAL CA SCREEN DOC REV: CPT | Performed by: PSYCHIATRY & NEUROLOGY

## 2024-10-08 PROCEDURE — G8427 DOCREV CUR MEDS BY ELIG CLIN: HCPCS | Performed by: PSYCHIATRY & NEUROLOGY

## 2024-10-08 PROCEDURE — 99213 OFFICE O/P EST LOW 20 MIN: CPT | Performed by: PSYCHIATRY & NEUROLOGY

## 2024-10-08 NOTE — PROGRESS NOTES
REVIEW OF SYSTEMS    Constitutional: []Fever []Sweat []Chills [] Recent Injury [x] Denies all unless marked  HEENT:[]Headache  [] Head Injury/Hearing Loss  [] Sore Throat  [] Ear Ache/Dizziness  [x] Denies all unless marked  Spine:  [] Neck pain  [] Back pain  [] Sciaticia  [x] Denies all unless marked  Cardiovascular:[]Heart Disease []Chest Pain [] Palpitations  [x] Denies all unless marked  Pulmonary: []Shortness of Breath []Cough   [x] Denies all unless marke  Gastrointestinal: []Nausea  []Vomiting  []Abdominal Pain  []Constipation  []Diarrhea  []Dark Bloody Stools  [x] Denies all unless marked  Psychiatric/Behavioral:[] Depression [] Anxiety [x] Denies all unless marked  Genitourinary:   [] Frequency  [] Urgency  [] Incontinence [] Pain with Urination  [x] Denies all unless marked  Extremities: []Pain  []Swelling  [x] Denies all unless marked  Musculoskeletal: [] Muscle Pain  [] Joint Pain  [] Arthritis [] Muscle Cramps [] Muscle Twitches  [x] Denies all unless marked  Sleep: [] Insomnia [] Snoring [] Restless Legs [] Sleep Apnea  [] Daytime Sleepiness  [x] Denies all unless marked  Skin:[] Rash [] Skin Discoloration [x] Denies all unless marked   Neurological: []Visual Disturbance/Memory Loss [] Loss of Balance [] Slurred Speech/Weakness [] Seizures  [] Vertigo/Dizziness [x] Denies all unless marked      
  tiZANidine (ZANAFLEX) 4 MG tablet TAKE 3 TABLETS TWICE DAILY AS NEEDED  Patient taking differently: Take 3 tablets by mouth 2 times daily Yes Litzy Contreras MD   fluocinolone acetonide (SYNALAR) 0.01 % external solution Apply 10 drops topically nightly as needed Yes Javier Becker MD   Diapers & Supplies MISC Indications: FX: [phone] Diapers, Chucks, Wipes, and Gloves. Yes Litzy Contreras MD   cetirizine (ZYRTEC) 10 MG tablet Take 1 tablet by mouth daily Yes Javier Becker MD   Catheters MISC Catheter supplies and lubricant 5 times daily G82.20  to [facility] [phone] Yes Litzy Contreras MD   docusate sodium (COLACE) 100 MG capsule Take 2 capsules by mouth as needed for Constipation Yes Javier Becker MD   BACLOFEN, PAIN PUMP REFILL CHARGE, by Implant route continuous Indications: every 3 months Yes Javier Becker MD   Multiple Vitamins-Minerals (THERAPEUTIC MULTIVITAMIN-MINERALS) tablet Take 1 tablet by mouth daily Yes Javier Becker MD   Heparin Lock Flush (HEPARIN FLUSH, 100 UNITS/ML,) 100 UNIT/ML injection 3 mLs by Intercatheter route every 30 days No every 30 days but every 4-6 weeks. Flush port with 300 units heparin with 20 cc normal saline for ocrevus infusion for Multiple sclerosis and NS 20CC  Patient not taking: Reported on 10/8/2024  Chan Lentz MD   Sodium Chloride Flush (SALINE FLUSH) 0.9 % SOLN Infuse 20 mLs intravenously every 30 days Not every 30 days but every 4-6 weeks as need with 300 units of heparin to flush port for Infusion of Ocrevus for multiple sclerosis  Patient not taking: Reported on 10/8/2024  Chan Lentz MD       Social History     Tobacco Use    Smoking status: Some Days     Current packs/day: 0.50     Average packs/day: 0.5 packs/day for 15.0 years (7.5 ttl pk-yrs)     Types: Cigarettes    Smokeless tobacco: Never    Tobacco comments:     Haven't had a cigarette in several weeks   Vaping Use    Vaping status: Never

## 2024-11-04 DIAGNOSIS — M54.2 PAIN, NECK: ICD-10-CM

## 2024-11-04 RX ORDER — HYDROCODONE BITARTRATE AND ACETAMINOPHEN 10; 325 MG/1; MG/1
TABLET ORAL
Qty: 90 TABLET | Refills: 0 | Status: SHIPPED | OUTPATIENT
Start: 2024-11-04 | End: 2024-12-04

## 2024-11-04 NOTE — TELEPHONE ENCOUNTER
Requested Prescriptions     Pending Prescriptions Disp Refills    HYDROcodone-acetaminophen (NORCO)  MG per tablet [Pharmacy Med Name: HYDROCODONE BITARTRATE/ACETAMINOPHEN 325MG-10MG TABLET] 90 tablet 0     Sig: TAKE ONE TABLET BY MOUTH THREE TIMES A DAY       Last Office Visit:  10/8/2024  Next Office Visit:  1/9/2025  Last Medication Refill:  10/4/2024 with 0 JEFFREY Fernandez up to date:      *RX updated to reflect   11/4/2024   fill date*

## 2024-12-09 DIAGNOSIS — M54.2 PAIN, NECK: ICD-10-CM

## 2024-12-09 RX ORDER — HYDROCODONE BITARTRATE AND ACETAMINOPHEN 10; 325 MG/1; MG/1
TABLET ORAL
Qty: 90 TABLET | Refills: 0 | Status: SHIPPED | OUTPATIENT
Start: 2024-12-09 | End: 2025-01-09

## 2024-12-09 NOTE — TELEPHONE ENCOUNTER
Requested Prescriptions     Pending Prescriptions Disp Refills    HYDROcodone-acetaminophen (NORCO)  MG per tablet [Pharmacy Med Name: HYDROCODONE BITARTRATE/ACETAMINOPHEN 325MG-10MG TABLET] 90 tablet 0     Sig: TAKE ONE TABLET BY MOUTH THREE TIMES A DAY       Last Office Visit:  10/8/2024  Next Office Visit:  1/9/2025  Last Medication Refill:  11/4/2024 with 0 JEFFREY Fernandez up to date:  11/4/2024    *RX updated to reflect   12/9/2024   fill date*

## 2025-01-13 DIAGNOSIS — M54.2 PAIN, NECK: ICD-10-CM

## 2025-01-13 RX ORDER — HYDROCODONE BITARTRATE AND ACETAMINOPHEN 10; 325 MG/1; MG/1
TABLET ORAL
Qty: 90 TABLET | Refills: 0 | Status: SHIPPED | OUTPATIENT
Start: 2025-01-13 | End: 2025-02-12

## 2025-01-13 NOTE — TELEPHONE ENCOUNTER
/Alyce Baker has requested a refill on her medication.      Last office visit : 10/8/2024   Next office visit : 4/15/2025   Last medication refill : 12/9/24  Jim : up to date       Requested Prescriptions     Pending Prescriptions Disp Refills    HYDROcodone-acetaminophen (NORCO)  MG per tablet [Pharmacy Med Name: HYDROCODONE BITARTRATE/ACETAMINOPHEN 325MG-10MG TABLET] 90 tablet 0     Sig: TAKE ONE TABLET BY MOUTH THREE TIMES A DAY

## 2025-03-03 NOTE — PLAN OF CARE
Patient post op right TMA earlier today per . Patient right foot dressing is CDI, bilateral heels off bed with pillow, no bleeding noted. Patient and caregivers at bedside, asking why she is on 3rd floor, stating she thought she would be discharged home post op. Call to [address] (in [address]) and she is stating if no bleeding or problems and patient insistent on dc, will be okay to dc home with her caregivers. later this afternoon. Incision care instructions entered in AVS, caregivers stating they have most of dressing supplies. Adaptic provided, caregiver stating they will use Xeroform if they run out of the Adaptic before new supplies are ordered. Message left with [facility] with list of supplies for the daily dressing changes right foot dressing. Patient has follow up appt at [facility] 2/16/23 at 1:00. Thank you for choosing us for your  care today.  If you have any questions about your ultrasound or care, please do not hesitate to contact us or your primary obstetrician.        Some general instructions for your pregnancy are:    Exercise: Aim for 150 minutes per week of regular exercise.  Walking is great!  Nutrition: Choose healthy sources of calcium, iron, and protein.  Avoid ultraprocessed foods and added sugar.  Learn about Preeclampsia: preeclampsia is a common, potentially serious high blood pressure complication in pregnancy.  A blood pressure of 140mmHg (systolic or top number) or 90mmHg (diastolic or bottom number) should be evaluated by your doctor.  Aspirin is sometimes prescribed in early pregnancy to prevent preeclampsia in women with risk factors - ask your obstetrician if you should be on this medication.  For more resources, visit:  [email or web address].[email or web address]  If you smoke, please try to quit completely but also try to reduce your smoking by as much as possible (as soon as possible).  Do not vape.  Please also avoid cannabis products.  Other warning signs to watch out for in pregnancy or postpartum: chest pain, obstructed breathing or shortness of breath, seizures, thoughts of hurting yourself or your baby, bleeding, a painful or swollen leg, fever, or headache (see [facility] POST-BIRTH Warning Signs campaign).  If these happen call 911.  Itching is also not normal in pregnancy and if you experience this, especially over your hands and feet, potentially worse at night, notify your doctors.        no known allergies

## 2025-03-10 RX ORDER — LEVETIRACETAM 750 MG/1
750 TABLET ORAL DAILY
Qty: 30 TABLET | Refills: 5 | Status: SHIPPED | OUTPATIENT
Start: 2025-03-10

## 2025-03-10 NOTE — TELEPHONE ENCOUNTER
Requested Prescriptions     Pending Prescriptions Disp Refills    levETIRAcetam (KEPPRA) 750 MG tablet [Pharmacy Med Name: LEVETIRACETAM 750MG TABLET] 30 tablet 5     Sig: TAKE 1 TABLET BY MOUTH DAILY       Last Office Visit: 10/8/2024  Next Office Visit: 4/15/2025  Last Medication Refill: 2/12/2025

## 2025-03-12 DIAGNOSIS — M54.2 PAIN, NECK: ICD-10-CM

## 2025-03-12 NOTE — TELEPHONE ENCOUNTER
Requested Prescriptions     Pending Prescriptions Disp Refills    HYDROcodone-acetaminophen (NORCO)  MG per tablet [Pharmacy Med Name: HYDROCODONE BITARTRATE/ACETAMINOPHEN 325MG-10MG TABLET] 90 tablet 0     Sig: Take 1 tablet by mouth 3 times daily.       Last Office Visit: 10/8/2024  Next Office Visit: 4/15/2025  Last Medication Refill:1/13/2025

## 2025-03-13 RX ORDER — HYDROCODONE BITARTRATE AND ACETAMINOPHEN 10; 325 MG/1; MG/1
1 TABLET ORAL 3 TIMES DAILY
Qty: 90 TABLET | Refills: 0 | Status: SHIPPED | OUTPATIENT
Start: 2025-03-13 | End: 2025-04-12

## 2025-03-24 PROBLEM — Z95.828 PORT-A-CATH IN PLACE: Status: ACTIVE | Noted: 2025-03-24

## 2025-03-24 RX ORDER — HEPARIN SODIUM (PORCINE) LOCK FLUSH IV SOLN 100 UNIT/ML 100 UNIT/ML
500 SOLUTION INTRAVENOUS AS NEEDED
Status: CANCELLED | OUTPATIENT
Start: 2025-03-26

## 2025-03-24 RX ORDER — HEPARIN SODIUM (PORCINE) LOCK FLUSH IV SOLN 100 UNIT/ML 100 UNIT/ML
300 SOLUTION INTRAVENOUS ONCE
Status: CANCELLED | OUTPATIENT
Start: 2025-03-26

## 2025-03-24 RX ORDER — SODIUM CHLORIDE 0.9 % (FLUSH) 0.9 %
20 SYRINGE (ML) INJECTION AS NEEDED
Status: CANCELLED | OUTPATIENT
Start: 2025-03-26

## 2025-03-24 RX ORDER — SODIUM CHLORIDE 0.9 % (FLUSH) 0.9 %
10 SYRINGE (ML) INJECTION AS NEEDED
Status: CANCELLED | OUTPATIENT
Start: 2025-03-26

## 2025-03-26 ENCOUNTER — INFUSION (OUTPATIENT)
Dept: ONCOLOGY | Facility: HOSPITAL | Age: 55
End: 2025-03-26
Payer: MEDICARE

## 2025-03-26 ENCOUNTER — OFFICE VISIT (OUTPATIENT)
Dept: WOUND CARE | Facility: HOSPITAL | Age: 55
End: 2025-03-26
Payer: MEDICARE

## 2025-03-26 VITALS
OXYGEN SATURATION: 98 % | DIASTOLIC BLOOD PRESSURE: 56 MMHG | SYSTOLIC BLOOD PRESSURE: 106 MMHG | TEMPERATURE: 96.3 F | RESPIRATION RATE: 16 BRPM | HEART RATE: 92 BPM

## 2025-03-26 DIAGNOSIS — Z95.828 PORT-A-CATH IN PLACE: Primary | ICD-10-CM

## 2025-03-26 PROCEDURE — 25010000002 HEPARIN LOCK FLUSH PER 10 UNITS: Performed by: INTERNAL MEDICINE

## 2025-03-26 PROCEDURE — G0463 HOSPITAL OUTPT CLINIC VISIT: HCPCS

## 2025-03-26 RX ORDER — HEPARIN SODIUM (PORCINE) LOCK FLUSH IV SOLN 100 UNIT/ML 100 UNIT/ML
300 SOLUTION INTRAVENOUS ONCE
OUTPATIENT
Start: 2025-03-26

## 2025-03-26 RX ORDER — HEPARIN SODIUM (PORCINE) LOCK FLUSH IV SOLN 100 UNIT/ML 100 UNIT/ML
500 SOLUTION INTRAVENOUS AS NEEDED
Status: DISCONTINUED | OUTPATIENT
Start: 2025-03-26 | End: 2025-03-26 | Stop reason: HOSPADM

## 2025-03-26 RX ORDER — SODIUM CHLORIDE 0.9 % (FLUSH) 0.9 %
20 SYRINGE (ML) INJECTION AS NEEDED
OUTPATIENT
Start: 2025-03-26

## 2025-03-26 RX ORDER — SODIUM CHLORIDE 0.9 % (FLUSH) 0.9 %
10 SYRINGE (ML) INJECTION AS NEEDED
Status: DISCONTINUED | OUTPATIENT
Start: 2025-03-26 | End: 2025-03-26 | Stop reason: HOSPADM

## 2025-03-26 RX ORDER — HEPARIN SODIUM (PORCINE) LOCK FLUSH IV SOLN 100 UNIT/ML 100 UNIT/ML
500 SOLUTION INTRAVENOUS AS NEEDED
Status: CANCELLED | OUTPATIENT
Start: 2025-03-26

## 2025-03-26 RX ORDER — SODIUM CHLORIDE 0.9 % (FLUSH) 0.9 %
10 SYRINGE (ML) INJECTION AS NEEDED
Status: CANCELLED | OUTPATIENT
Start: 2025-03-26

## 2025-03-26 RX ADMIN — HEPARIN 500 UNITS: 100 SYRINGE at 12:09

## 2025-03-26 RX ADMIN — Medication 10 ML: at 12:09

## 2025-03-31 DIAGNOSIS — R53.83 OTHER FATIGUE: ICD-10-CM

## 2025-03-31 DIAGNOSIS — M54.2 PAIN, NECK: ICD-10-CM

## 2025-03-31 RX ORDER — PREGABALIN 300 MG/1
300 CAPSULE ORAL 2 TIMES DAILY
Qty: 60 CAPSULE | Refills: 5 | Status: SHIPPED | OUTPATIENT
Start: 2025-03-31 | End: 2025-04-30

## 2025-03-31 NOTE — TELEPHONE ENCOUNTER
Requested Prescriptions     Pending Prescriptions Disp Refills    pregabalin (LYRICA) 300 MG capsule [Pharmacy Med Name: PREGABALIN 300MG CAPSULE] 60 capsule 2     Sig: Take 1 capsule by mouth 2 times daily.       Last Office Visit: 10/8/2024  Next Office Visit: 4/15/2025  Last Medication Refill:2/10/2025

## 2025-04-09 ENCOUNTER — OFFICE VISIT (OUTPATIENT)
Dept: WOUND CARE | Facility: HOSPITAL | Age: 55
End: 2025-04-09
Payer: MEDICARE

## 2025-04-09 PROCEDURE — G0463 HOSPITAL OUTPT CLINIC VISIT: HCPCS

## 2025-04-11 DIAGNOSIS — M54.2 PAIN, NECK: ICD-10-CM

## 2025-04-11 RX ORDER — HYDROCODONE BITARTRATE AND ACETAMINOPHEN 10; 325 MG/1; MG/1
1 TABLET ORAL 3 TIMES DAILY
Qty: 90 TABLET | Refills: 0 | Status: SHIPPED | OUTPATIENT
Start: 2025-04-11 | End: 2025-05-11

## 2025-04-11 NOTE — TELEPHONE ENCOUNTER
Alyce ELIZABETH Baker has requested a refill on her medication.      Last office visit : 10/8/2024   Next office visit : 4/15/2025   Last medication refill : 03/13/2025  Jim : 04/10/2025      Requested Prescriptions     Pending Prescriptions Disp Refills    HYDROcodone-acetaminophen (NORCO)  MG per tablet [Pharmacy Med Name: HYDROCODONE BITARTRATE/ACETAMINOPHEN 325MG-10MG TABLET] 90 tablet 0     Sig: Take 1 tablet by mouth 3 times daily for 30 days. Max Daily Amount: 3 tablets

## 2025-04-15 ENCOUNTER — OFFICE VISIT (OUTPATIENT)
Dept: NEUROLOGY | Age: 55
End: 2025-04-15
Payer: MEDICARE

## 2025-04-15 VITALS
SYSTOLIC BLOOD PRESSURE: 106 MMHG | BODY MASS INDEX: 19.99 KG/M2 | WEIGHT: 135 LBS | DIASTOLIC BLOOD PRESSURE: 70 MMHG | HEIGHT: 69 IN

## 2025-04-15 DIAGNOSIS — M79.622 PAIN IN BOTH UPPER ARMS: ICD-10-CM

## 2025-04-15 DIAGNOSIS — G40.909 SEIZURE DISORDER (HCC): ICD-10-CM

## 2025-04-15 DIAGNOSIS — M79.621 PAIN IN BOTH UPPER ARMS: ICD-10-CM

## 2025-04-15 DIAGNOSIS — M54.2 PAIN, NECK: ICD-10-CM

## 2025-04-15 DIAGNOSIS — G35 MS (MULTIPLE SCLEROSIS) (HCC): Primary | ICD-10-CM

## 2025-04-15 DIAGNOSIS — R53.1 WEAKNESS: ICD-10-CM

## 2025-04-15 DIAGNOSIS — G82.20 PARAPLEGIA: ICD-10-CM

## 2025-04-15 DIAGNOSIS — R20.0 NUMBNESS: ICD-10-CM

## 2025-04-15 DIAGNOSIS — M62.838 MUSCLE SPASTICITY: ICD-10-CM

## 2025-04-15 DIAGNOSIS — R53.83 OTHER FATIGUE: ICD-10-CM

## 2025-04-15 PROCEDURE — G8420 CALC BMI NORM PARAMETERS: HCPCS | Performed by: PSYCHIATRY & NEUROLOGY

## 2025-04-15 PROCEDURE — 3017F COLORECTAL CA SCREEN DOC REV: CPT | Performed by: PSYCHIATRY & NEUROLOGY

## 2025-04-15 PROCEDURE — G8427 DOCREV CUR MEDS BY ELIG CLIN: HCPCS | Performed by: PSYCHIATRY & NEUROLOGY

## 2025-04-15 PROCEDURE — 99214 OFFICE O/P EST MOD 30 MIN: CPT | Performed by: PSYCHIATRY & NEUROLOGY

## 2025-04-15 PROCEDURE — 4004F PT TOBACCO SCREEN RCVD TLK: CPT | Performed by: PSYCHIATRY & NEUROLOGY

## 2025-04-15 NOTE — PROGRESS NOTES
Review of Systems    Constitutional - No fever or chills.  No diaphoresis or significant fatigue.  HENT -  yes tinnitus or significant hearing loss.  Eyes - no sudden vision change or eye pain  Respiratory - yes significant shortness of breath or cough  Cardiovascular - no chest pain No palpitations or significant leg swelling  Gastrointestinal - no abdominal swelling or pain.    Genitourinary - No difficulty urinating, dysuria  Musculoskeletal - no back pain or myalgia.  Skin - no color change or rash  Neurologic - No seizures.  No lateralizing weakness.  Hematologic - no easy bruising or excessive bleeding.  Psychiatric - no severe anxiety or nervousness.   All other review of systems are negative.    
   COLONOSCOPY N/A 09/27/2019    Dr BRODY Baca-to ileum through ostomy-patent and healthy appearing anastomosis in the right colon-entero colonic anastomosis-10 yr recall    COLOSTOMY      FEMUR FRACTURE SURGERY      Right    FOOT AMPUTATION Left 04/04/2019    LEFT TRANSMET AMPUTATION performed by Veto Johnson MD at [facility] OR    FOOT AMPUTATION Right 02/06/2023    RIGHT TRANSMETATARSAL AMPUTATION performed by Anju Gomez DO at [facility] OR    HYSTERECTOMY (CERVIX STATUS UNKNOWN)      OTHER SURGICAL HISTORY      urostomy    OTHER SURGICAL HISTORY      pain pump    [facility] INSJ PRPH CTR VAD W/SUBQ PORT UNDER 5 YR N/A 06/26/2018    SINGLE LUMEN PORT PLACEMENT WITH FLUORO performed by Praveen Yarbrough MD at [facility] OR    SMALL INTESTINE SURGERY      TOE AMPUTATION      L last toe    TONSILLECTOMY      URETEROTOMY      VASCULAR SURGERY  02/06/2023    RIGHT TRANSMETATARSAL AMPUTATION; VI       Family History   Problem Relation Age of Onset    Cancer Mother         breast    Colon Cancer Mother     Colon Polyps Mother     Breast Cancer Mother     Diabetes Father     High Blood Pressure Father     Heart Disease Paternal Grandmother     Breast Cancer Paternal Aunt     Cancer Paternal Aunt         breast    Liver Cancer Paternal Aunt     Esophageal Cancer Neg Hx     Liver Disease Neg Hx     Rectal Cancer Neg Hx     Stomach Cancer Neg Hx        Allergies   Allergen Reactions    Darvocet [Propoxyphene N-Acetaminophen]      Talking out of her head    Morphine      Category: Allergy;     Morphine And Codeine Itching and Swelling    Propoxyphene Swelling       Social History     Socioeconomic History    Marital status:      Spouse name: Not on file    Number of children: Not on file    Years of education: Not on file    Highest education level: Not on file   Occupational History    Not on file   Tobacco Use    Smoking status: Some Days     Current packs/day: 0.50     Average packs/day: 0.5 packs/day for 15.0 years (7.5 ttl pk-yrs)

## 2025-04-17 ENCOUNTER — OFFICE VISIT (OUTPATIENT)
Dept: WOUND CARE | Facility: HOSPITAL | Age: 55
End: 2025-04-17
Payer: MEDICARE

## 2025-04-17 PROCEDURE — G0463 HOSPITAL OUTPT CLINIC VISIT: HCPCS

## 2025-04-24 ENCOUNTER — OFFICE VISIT (OUTPATIENT)
Dept: WOUND CARE | Facility: HOSPITAL | Age: 55
End: 2025-04-24
Payer: MEDICARE

## 2025-05-07 ENCOUNTER — OFFICE VISIT (OUTPATIENT)
Dept: WOUND CARE | Facility: HOSPITAL | Age: 55
End: 2025-05-07
Payer: MEDICARE

## 2025-05-07 RX ORDER — HYDROCHLOROTHIAZIDE 25 MG/1
25 TABLET ORAL DAILY
Qty: 30 TABLET | Refills: 0 | OUTPATIENT
Start: 2025-05-07

## 2025-05-07 RX ORDER — CYCLOBENZAPRINE HCL 10 MG
10 TABLET ORAL NIGHTLY PRN
Qty: 30 TABLET | Refills: 11 | Status: SHIPPED | OUTPATIENT
Start: 2025-05-07 | End: 2025-06-06

## 2025-05-07 NOTE — TELEPHONE ENCOUNTER
Alyce Baker has requested a refill on her medication.      Last office visit : 4/15/2025   Next office visit : 7/25/2025   Last medication refill : 12/28/2023 R0        Requested Prescriptions     Pending Prescriptions Disp Refills    cyclobenzaprine (FLEXERIL) 10 MG tablet [Pharmacy Med Name: CYCLOBENZAPRINE HYDROCHLORIDE 10MG TABLET] 30 tablet 0     Sig: Take 1 tablet by mouth nightly as needed for Muscle spasms

## 2025-05-12 DIAGNOSIS — M54.2 PAIN, NECK: ICD-10-CM

## 2025-05-13 RX ORDER — HYDROCODONE BITARTRATE AND ACETAMINOPHEN 10; 325 MG/1; MG/1
1 TABLET ORAL 3 TIMES DAILY
Qty: 90 TABLET | Refills: 0 | Status: SHIPPED | OUTPATIENT
Start: 2025-05-13 | End: 2025-06-12

## 2025-05-13 NOTE — TELEPHONE ENCOUNTER
Alyce ELIZABETH Baker has requested a refill on her medication.      Last office visit : 4/15/2025   Next office visit : 7/25/2025   Last medication refill : 04/11/2025  Jim : 04/10/2025      Requested Prescriptions     Pending Prescriptions Disp Refills    HYDROcodone-acetaminophen (NORCO)  MG per tablet [Pharmacy Med Name: HYDROCODONE BITARTRATE/ACETAMINOPHEN 325MG-10MG TABLET] 90 tablet 0     Sig: Take 1 tablet by mouth 3 times daily for 30 days. Max Daily Amount: 3 tablets

## 2025-05-14 ENCOUNTER — OFFICE VISIT (OUTPATIENT)
Dept: WOUND CARE | Facility: HOSPITAL | Age: 55
End: 2025-05-14
Payer: MEDICARE

## 2025-05-29 ENCOUNTER — OFFICE VISIT (OUTPATIENT)
Dept: WOUND CARE | Facility: HOSPITAL | Age: 55
End: 2025-05-29
Payer: MEDICARE

## 2025-06-11 DIAGNOSIS — M54.2 PAIN, NECK: ICD-10-CM

## 2025-06-12 ENCOUNTER — OFFICE VISIT (OUTPATIENT)
Dept: WOUND CARE | Facility: HOSPITAL | Age: 55
End: 2025-06-12
Payer: MEDICARE

## 2025-06-12 PROCEDURE — G0463 HOSPITAL OUTPT CLINIC VISIT: HCPCS

## 2025-06-12 RX ORDER — HYDROCODONE BITARTRATE AND ACETAMINOPHEN 10; 325 MG/1; MG/1
1 TABLET ORAL 3 TIMES DAILY
Qty: 90 TABLET | Refills: 0 | Status: SHIPPED | OUTPATIENT
Start: 2025-06-12 | End: 2025-07-12

## 2025-06-12 NOTE — TELEPHONE ENCOUNTER
Alyce Baker has requested a refill on her medication.      Last office visit : 4/15/2025   Next office visit : 7/25/2025   Last medication refill : 5/13/25  Jim : up to date       Requested Prescriptions     Pending Prescriptions Disp Refills    HYDROcodone-acetaminophen (NORCO)  MG per tablet [Pharmacy Med Name: HYDROCODONE BITARTRATE/ACETAMINOPHEN 325MG-10MG TABLET] 90 tablet 0     Sig: Take 1 tablet by mouth 3 times daily for 30 days. Max Daily Amount: 3 tablets           Dr. Lentz patient

## 2025-06-20 ENCOUNTER — OFFICE VISIT (OUTPATIENT)
Dept: WOUND CARE | Facility: HOSPITAL | Age: 55
End: 2025-06-20
Payer: MEDICARE

## 2025-07-03 ENCOUNTER — OFFICE VISIT (OUTPATIENT)
Dept: WOUND CARE | Facility: HOSPITAL | Age: 55
End: 2025-07-03
Payer: MEDICARE

## 2025-07-03 PROCEDURE — G0463 HOSPITAL OUTPT CLINIC VISIT: HCPCS

## 2025-07-10 ENCOUNTER — OFFICE VISIT (OUTPATIENT)
Dept: WOUND CARE | Facility: HOSPITAL | Age: 55
End: 2025-07-10
Payer: MEDICARE

## 2025-07-11 DIAGNOSIS — M54.2 PAIN, NECK: ICD-10-CM

## 2025-07-14 RX ORDER — HYDROCODONE BITARTRATE AND ACETAMINOPHEN 10; 325 MG/1; MG/1
TABLET ORAL
Qty: 90 TABLET | Refills: 0 | Status: SHIPPED | OUTPATIENT
Start: 2025-07-14 | End: 2025-08-13

## 2025-07-14 NOTE — TELEPHONE ENCOUNTER
Alyce Baker has requested a refill on her medication.      Last office visit : 4/15/2025   Next office visit : 7/25/2025   Last medication refill : 6/12/25  Jim : up to date       Requested Prescriptions     Pending Prescriptions Disp Refills    HYDROcodone-acetaminophen (NORCO)  MG per tablet [Pharmacy Med Name: HYDROCODONE BITARTRATE/ACETAMINOPHEN 325MG-10MG TABLET] 90 tablet 0     Sig: TAKE ONE TABLET BY MOUTH THREE TIMES A DAY FOR 30 DAYS. MAX DAILY AMOUNT: 3 TABLETS         Dr. Lentz patient

## 2025-08-08 ENCOUNTER — OFFICE VISIT (OUTPATIENT)
Dept: WOUND CARE | Facility: HOSPITAL | Age: 55
End: 2025-08-08
Payer: MEDICARE

## 2025-08-11 ENCOUNTER — OFFICE VISIT (OUTPATIENT)
Dept: NEUROLOGY | Age: 55
End: 2025-08-11
Payer: MEDICARE

## 2025-08-11 VITALS
DIASTOLIC BLOOD PRESSURE: 77 MMHG | WEIGHT: 135 LBS | HEART RATE: 76 BPM | SYSTOLIC BLOOD PRESSURE: 105 MMHG | BODY MASS INDEX: 19.99 KG/M2 | HEIGHT: 69 IN

## 2025-08-11 DIAGNOSIS — G35 MS (MULTIPLE SCLEROSIS) (HCC): ICD-10-CM

## 2025-08-11 DIAGNOSIS — R20.0 NUMBNESS: ICD-10-CM

## 2025-08-11 DIAGNOSIS — M79.622 PAIN IN BOTH UPPER ARMS: ICD-10-CM

## 2025-08-11 DIAGNOSIS — M79.621 PAIN IN BOTH UPPER ARMS: ICD-10-CM

## 2025-08-11 DIAGNOSIS — R53.83 OTHER FATIGUE: ICD-10-CM

## 2025-08-11 DIAGNOSIS — M62.838 MUSCLE SPASTICITY: ICD-10-CM

## 2025-08-11 DIAGNOSIS — G40.909 SEIZURE DISORDER (HCC): ICD-10-CM

## 2025-08-11 DIAGNOSIS — R53.1 WEAKNESS: ICD-10-CM

## 2025-08-11 DIAGNOSIS — G82.20 PARAPLEGIA (HCC): ICD-10-CM

## 2025-08-11 DIAGNOSIS — M54.2 PAIN, NECK: Primary | ICD-10-CM

## 2025-08-11 DIAGNOSIS — F11.20 OPIOID DEPENDENCE, UNCOMPLICATED (HCC): ICD-10-CM

## 2025-08-11 PROCEDURE — G8420 CALC BMI NORM PARAMETERS: HCPCS | Performed by: PSYCHIATRY & NEUROLOGY

## 2025-08-11 PROCEDURE — 99214 OFFICE O/P EST MOD 30 MIN: CPT | Performed by: PSYCHIATRY & NEUROLOGY

## 2025-08-11 PROCEDURE — 3017F COLORECTAL CA SCREEN DOC REV: CPT | Performed by: PSYCHIATRY & NEUROLOGY

## 2025-08-11 PROCEDURE — 4004F PT TOBACCO SCREEN RCVD TLK: CPT | Performed by: PSYCHIATRY & NEUROLOGY

## 2025-08-11 PROCEDURE — G8427 DOCREV CUR MEDS BY ELIG CLIN: HCPCS | Performed by: PSYCHIATRY & NEUROLOGY

## 2025-08-11 RX ORDER — HYDROCODONE BITARTRATE AND ACETAMINOPHEN 10; 325 MG/1; MG/1
1 TABLET ORAL EVERY 8 HOURS PRN
Qty: 90 TABLET | Refills: 0 | Status: SHIPPED | OUTPATIENT
Start: 2025-08-13 | End: 2025-09-12

## 2025-08-13 ENCOUNTER — OFFICE VISIT (OUTPATIENT)
Dept: WOUND CARE | Facility: HOSPITAL | Age: 55
End: 2025-08-13
Payer: MEDICARE

## 2025-08-20 ENCOUNTER — OFFICE VISIT (OUTPATIENT)
Dept: WOUND CARE | Facility: HOSPITAL | Age: 55
End: 2025-08-20
Payer: MEDICARE

## 2025-08-27 ENCOUNTER — OFFICE VISIT (OUTPATIENT)
Dept: WOUND CARE | Facility: HOSPITAL | Age: 55
End: 2025-08-27
Payer: MEDICARE

## (undated) DEVICE — SUTURE PROL SZ 4-0 L36IN NONABSORBABLE BLU L26MM SH 1/2 CIR 8521H

## (undated) DEVICE — SYRINGE, LUER LOCK, 10ML: Brand: MEDLINE

## (undated) DEVICE — GAUZE,SPONGE,FLUFF,6"X6.75",STRL,10/TRAY: Brand: MEDLINE

## (undated) DEVICE — SYR LUERLOK 20CC BX/50

## (undated) DEVICE — CVR HNDL LIGHT RIGID

## (undated) DEVICE — ROYAL SILK SURGICAL GOWN, XXL: Brand: CONVERTORS

## (undated) DEVICE — SOLUTION IV IRRIG POUR BRL 0.9% SODIUM CHL 2F7124

## (undated) DEVICE — STRIP,CLOSURE,WOUND,MEDI-STRIP,1/2X4: Brand: MEDLINE

## (undated) DEVICE — SHEET,DRAPE,53X77,STERILE: Brand: MEDLINE

## (undated) DEVICE — PROXIMATE RH ROTATING HEAD SKIN STAPLERS (35 WIDE) CONTAINS 35 STAINLESS STEEL STAPLES: Brand: PROXIMATE

## (undated) DEVICE — SUTURE VCRL SZ 3-0 L27IN ABSRB UD L26MM SH 1/2 CIR J416H

## (undated) DEVICE — DERMATOME BLADES: Brand: DERMATOME

## (undated) DEVICE — PACK,UNIVERSAL,NO GOWNS: Brand: MEDLINE

## (undated) DEVICE — SYR SLP TP 10ML DISP

## (undated) DEVICE — KIT INTRO PTFE TEARAWAY 10FX15CM

## (undated) DEVICE — CONN FLX BREATHE CIRCT

## (undated) DEVICE — ASTOUND STANDARD SURGICAL GOWN, XXL: Brand: CONVERTORS

## (undated) DEVICE — C-ARM: Brand: UNBRANDED

## (undated) DEVICE — PREMIUM WET SKIN PREP TRAY: Brand: MEDLINE INDUSTRIES, INC.

## (undated) DEVICE — DECANTER FLD 9IN ST BG FOR ASEP TRNSF OF FLD

## (undated) DEVICE — GLOVE SURG SZ 7 CRM LTX FREE POLYISOPRENE POLYMER BEAD ANTI

## (undated) DEVICE — MINOR CDS: Brand: MEDLINE INDUSTRIES, INC.

## (undated) DEVICE — GLOVE SURG SZ 75 CRM LTX FREE POLYISOPRENE POLYMER BEAD ANTI

## (undated) DEVICE — 1.5 TO 1 DERMACARRIER: Brand: MESHGRAFTTM  II TISSUE EXPANSION SYSTEM

## (undated) DEVICE — 3M™ IOBAN™ 2 ANTIMICROBIAL INCISE DRAPE 6650EZ: Brand: IOBAN™ 2

## (undated) DEVICE — TRAP FLD MINIVAC MEGADYNE 100ML

## (undated) DEVICE — SUTURE NONABSORBABLE MONOFILAMENT 4-0 RB-1 36 IN BLU PROLENE 8557H

## (undated) DEVICE — ENDO KIT,LOURDES HOSPITAL: Brand: MEDLINE INDUSTRIES, INC.

## (undated) DEVICE — BAPTIST TURNOVER KIT: Brand: MEDLINE INDUSTRIES, INC.

## (undated) DEVICE — MEDIA CONTRAST INJ VISAPAQUE 50ML 320MG

## (undated) DEVICE — SPONGE GZ W6XL6IN COT 6 PLY SUP FLUF EXTRA ABSRB FOR PRE OP

## (undated) DEVICE — ELECTRD BLD EZ CLN MOD XLNG 2.75IN

## (undated) DEVICE — Z DUPLICATE USE 2738952 SYSTEM VENT M AD NSL PAP DEV HD STRP 2L HYPRINFL BG MRI

## (undated) DEVICE — GLV SURG BIOGEL LTX PF 8

## (undated) DEVICE — NDL HYPO PRECISIONGLIDE REG 22G 1 1/2

## (undated) DEVICE — SURGICAL SUCTION CONNECTING TUBE WITH MALE CONNECTOR AND SUCTION CLAMP, 2 FT. LONG (.6 M), 5 MM I.D.: Brand: CONMED

## (undated) DEVICE — ADHESIVE SKIN CLSR 0.7ML TOP DERMBND ADV

## (undated) DEVICE — 3M™ STERI-DRAPE™ INSTRUMENT POUCH 1018: Brand: STERI-DRAPE™

## (undated) DEVICE — BNDR ABD 4PANEL 12IN 46 TO 62IN

## (undated) DEVICE — DRSNG TELFA PAD NONADH STR 1S 3X8IN

## (undated) DEVICE — GLOVE SURG SZ 8 L12IN FNGR THK79MIL GRN LTX FREE

## (undated) DEVICE — DRESSING PETRO W3XL8IN OIL EMUL N ADH GZ KNIT IMPREG CELOS

## (undated) DEVICE — INTENDED FOR TISSUE SEPARATION, AND OTHER PROCEDURES THAT REQUIRE A SHARP SURGICAL BLADE TO PUNCTURE OR CUT.: Brand: BARD-PARKER ® STAINLESS STEEL BLADES

## (undated) DEVICE — TOWEL,OR,DSP,ST,BLUE,DLX,4/PK,20PK/CS: Brand: MEDLINE

## (undated) DEVICE — SUTURE ETHLN SZ 4-0 L18IN NONABSORBABLE BLK L19MM PS-2 3/8 1667H

## (undated) DEVICE — SUT MNCRYL 4/0 PS2 27IN UD MCP426H

## (undated) DEVICE — SUTURE MCRYL SZ 4-0 L18IN ABSRB UD L19MM PS-2 3/8 CIR PRIM Y496G

## (undated) DEVICE — CONTROL SYRINGE LUER-LOCK TIP: Brand: MONOJECT

## (undated) DEVICE — GOWN,PREVENTION PLUS,XLN/2XL,ST,22/CS: Brand: MEDLINE

## (undated) DEVICE — SUTURE VCRL SZ 3-0 L18IN ABSRB UD L26MM SH 1/2 CIR J864D

## (undated) DEVICE — BANDAGE,GAUZE,4.5"X4.1YD,STERILE,LF: Brand: MEDLINE

## (undated) DEVICE — CONTRAST IOTHALAMATE MEGLUMINE 60% 50 ML INJ CONRAY 60

## (undated) DEVICE — PAD MINOR UNIVERSAL: Brand: MEDLINE INDUSTRIES, INC.

## (undated) DEVICE — DISPOSABLE IRRIGATION BIPOLAR CORD, M1000 TYPE: Brand: KIRWAN

## (undated) DEVICE — COVER,MAYO STAND,STERILE: Brand: MEDLINE

## (undated) DEVICE — GLOVE SURG SZ 85 L12IN FNGR ORTHO 126MIL CRM LTX FREE

## (undated) DEVICE — ADHS LIQ MASTISOL 2/3ML

## (undated) DEVICE — DRP C/ARMOR

## (undated) DEVICE — AMBU AURA-I U SIZE 3, DISPOSABLE LARYNGEAL MASK: Brand: AURA-I

## (undated) DEVICE — BLADE SURG NO11 C STL RETRCT DISPOSABLE

## (undated) DEVICE — VAGINAL PREP TRAY: Brand: MEDLINE INDUSTRIES, INC.

## (undated) DEVICE — SPONGE LAP W18XL18IN WHT COT 4 PLY FLD STRUNG RADPQ DISP ST

## (undated) DEVICE — IRRIGATOR BULB ASEPTO 60CC STRL

## (undated) DEVICE — SUTURE PERMAHAND SZ 2-0 L18IN NONABSORBABLE BLK L26MM SH C012D

## (undated) DEVICE — 6 ML SYRINGE LUER-LOCK TIP: Brand: MONOJECT

## (undated) DEVICE — SUT ETHIB 2/0 RB1 DA 30IN GRN MX553

## (undated) DEVICE — ANTIBACTERIAL VIOLET BRAIDED (POLYGLACTIN 910), SYNTHETIC ABSORBABLE SUTURE: Brand: COATED VICRYL

## (undated) DEVICE — GLV SURG BIOGEL LTX PF 6 1/2

## (undated) DEVICE — Z INACTIVE USE 2535480 CLIP LIG M BLU TI HRT SHP WIRE HORZ 180 PER BX

## (undated) DEVICE — CVR UNIV C/ARM